# Patient Record
Sex: FEMALE | Race: WHITE | NOT HISPANIC OR LATINO | Employment: UNEMPLOYED | URBAN - METROPOLITAN AREA
[De-identification: names, ages, dates, MRNs, and addresses within clinical notes are randomized per-mention and may not be internally consistent; named-entity substitution may affect disease eponyms.]

---

## 2023-07-19 ENCOUNTER — HOSPITAL ENCOUNTER (EMERGENCY)
Facility: HOSPITAL | Age: 58
Discharge: HOME | End: 2023-07-19
Attending: EMERGENCY MEDICINE
Payer: MEDICAID

## 2023-07-19 VITALS
TEMPERATURE: 98 F | HEART RATE: 88 BPM | HEIGHT: 60 IN | RESPIRATION RATE: 18 BRPM | DIASTOLIC BLOOD PRESSURE: 75 MMHG | WEIGHT: 119.25 LBS | OXYGEN SATURATION: 95 % | BODY MASS INDEX: 23.41 KG/M2 | SYSTOLIC BLOOD PRESSURE: 160 MMHG

## 2023-07-19 DIAGNOSIS — K70.31 ALCOHOLIC CIRRHOSIS OF LIVER WITH ASCITES: Primary | ICD-10-CM

## 2023-07-19 DIAGNOSIS — D61.818 PANCYTOPENIA: ICD-10-CM

## 2023-07-19 DIAGNOSIS — F10.10 ALCOHOL ABUSE: ICD-10-CM

## 2023-07-19 DIAGNOSIS — R60.1 ANASARCA: ICD-10-CM

## 2023-07-19 LAB
ALBUMIN SERPL BCP-MCNC: 2.5 G/DL (ref 3.5–5.2)
ALP SERPL-CCNC: 158 U/L (ref 55–135)
ALT SERPL W/O P-5'-P-CCNC: 35 U/L (ref 10–44)
AMPHET+METHAMPHET UR QL: NEGATIVE
ANION GAP SERPL CALC-SCNC: 12 MMOL/L (ref 8–16)
APPEARANCE FLD: NORMAL
APTT PPP: 33.4 SEC (ref 21–32)
AST SERPL-CCNC: 110 U/L (ref 10–40)
BARBITURATES UR QL SCN>200 NG/ML: NEGATIVE
BASOPHILS # BLD AUTO: 0.09 K/UL (ref 0–0.2)
BASOPHILS NFR BLD: 3 % (ref 0–1.9)
BENZODIAZ UR QL SCN>200 NG/ML: NEGATIVE
BILIRUB SERPL-MCNC: 6.1 MG/DL (ref 0.1–1)
BILIRUB UR QL STRIP: NEGATIVE
BNP SERPL-MCNC: 359 PG/ML (ref 0–99)
BODY FLD TYPE: NORMAL
BUN SERPL-MCNC: 7 MG/DL (ref 6–20)
BZE UR QL SCN: NEGATIVE
CALCIUM SERPL-MCNC: 8.3 MG/DL (ref 8.7–10.5)
CANNABINOIDS UR QL SCN: NEGATIVE
CHLORIDE SERPL-SCNC: 110 MMOL/L (ref 95–110)
CLARITY UR: CLEAR
CO2 SERPL-SCNC: 20 MMOL/L (ref 23–29)
COLOR FLD: YELLOW
COLOR UR: YELLOW
CREAT SERPL-MCNC: 0.6 MG/DL (ref 0.5–1.4)
CREAT UR-MCNC: 17.4 MG/DL (ref 15–325)
DIFFERENTIAL METHOD: ABNORMAL
EOSINOPHIL # BLD AUTO: 0.1 K/UL (ref 0–0.5)
EOSINOPHIL NFR BLD: 1.7 % (ref 0–8)
ERYTHROCYTE [DISTWIDTH] IN BLOOD BY AUTOMATED COUNT: 15.1 % (ref 11.5–14.5)
EST. GFR  (NO RACE VARIABLE): >60 ML/MIN/1.73 M^2
ETHANOL SERPL-MCNC: 166 MG/DL
GLUCOSE SERPL-MCNC: 100 MG/DL (ref 70–110)
GLUCOSE UR QL STRIP: NEGATIVE
HCT VFR BLD AUTO: 27.6 % (ref 37–48.5)
HCV AB SERPL QL IA: NEGATIVE
HEP C VIRUS HOLD SPECIMEN: NORMAL
HGB BLD-MCNC: 9.2 G/DL (ref 12–16)
HGB UR QL STRIP: NEGATIVE
HIV 1+2 AB+HIV1 P24 AG SERPL QL IA: NEGATIVE
IMM GRANULOCYTES # BLD AUTO: 0.02 K/UL (ref 0–0.04)
IMM GRANULOCYTES NFR BLD AUTO: 0.7 % (ref 0–0.5)
INR PPP: 1.6 (ref 0.8–1.2)
KETONES UR QL STRIP: NEGATIVE
LEUKOCYTE ESTERASE UR QL STRIP: NEGATIVE
LIPASE SERPL-CCNC: 120 U/L (ref 4–60)
LYMPHOCYTES # BLD AUTO: 0.8 K/UL (ref 1–4.8)
LYMPHOCYTES NFR BLD: 27.4 % (ref 18–48)
LYMPHOCYTES NFR FLD MANUAL: 39 %
MAGNESIUM SERPL-MCNC: 1.6 MG/DL (ref 1.6–2.6)
MCH RBC QN AUTO: 35.7 PG (ref 27–31)
MCHC RBC AUTO-ENTMCNC: 33.3 G/DL (ref 32–36)
MCV RBC AUTO: 107 FL (ref 82–98)
MESOTHL CELL NFR FLD MANUAL: 25 %
METHADONE UR QL SCN>300 NG/ML: NEGATIVE
MONOCYTES # BLD AUTO: 0.5 K/UL (ref 0.3–1)
MONOCYTES NFR BLD: 17.7 % (ref 4–15)
MONOS+MACROS NFR FLD MANUAL: 30 %
NEUTROPHILS # BLD AUTO: 1.5 K/UL (ref 1.8–7.7)
NEUTROPHILS NFR BLD: 49.5 % (ref 38–73)
NEUTROPHILS NFR FLD MANUAL: 6 %
NITRITE UR QL STRIP: NEGATIVE
NRBC BLD-RTO: 0 /100 WBC
OPIATES UR QL SCN: ABNORMAL
PCP UR QL SCN>25 NG/ML: NEGATIVE
PH UR STRIP: 6 [PH] (ref 5–8)
PLATELET # BLD AUTO: 58 K/UL (ref 150–450)
PMV BLD AUTO: 11.3 FL (ref 9.2–12.9)
POTASSIUM SERPL-SCNC: 3.9 MMOL/L (ref 3.5–5.1)
PROT SERPL-MCNC: 6.9 G/DL (ref 6–8.4)
PROT UR QL STRIP: NEGATIVE
PROTHROMBIN TIME: 16.4 SEC (ref 9–12.5)
RBC # BLD AUTO: 2.58 M/UL (ref 4–5.4)
SODIUM SERPL-SCNC: 142 MMOL/L (ref 136–145)
SP GR UR STRIP: 1.01 (ref 1–1.03)
TOXICOLOGY INFORMATION: ABNORMAL
URN SPEC COLLECT METH UR: NORMAL
UROBILINOGEN UR STRIP-ACNC: NEGATIVE EU/DL
WBC # BLD AUTO: 2.99 K/UL (ref 3.9–12.7)
WBC # FLD: 160 /CU MM

## 2023-07-19 PROCEDURE — 89051 BODY FLUID CELL COUNT: CPT | Performed by: EMERGENCY MEDICINE

## 2023-07-19 PROCEDURE — 82077 ASSAY SPEC XCP UR&BREATH IA: CPT | Performed by: EMERGENCY MEDICINE

## 2023-07-19 PROCEDURE — 82042 OTHER SOURCE ALBUMIN QUAN EA: CPT | Performed by: EMERGENCY MEDICINE

## 2023-07-19 PROCEDURE — 25500020 PHARM REV CODE 255: Performed by: EMERGENCY MEDICINE

## 2023-07-19 PROCEDURE — 85610 PROTHROMBIN TIME: CPT | Performed by: EMERGENCY MEDICINE

## 2023-07-19 PROCEDURE — 80053 COMPREHEN METABOLIC PANEL: CPT | Performed by: EMERGENCY MEDICINE

## 2023-07-19 PROCEDURE — 86803 HEPATITIS C AB TEST: CPT | Performed by: EMERGENCY MEDICINE

## 2023-07-19 PROCEDURE — 83615 LACTATE (LD) (LDH) ENZYME: CPT | Performed by: EMERGENCY MEDICINE

## 2023-07-19 PROCEDURE — 83880 ASSAY OF NATRIURETIC PEPTIDE: CPT | Performed by: EMERGENCY MEDICINE

## 2023-07-19 PROCEDURE — 96375 TX/PRO/DX INJ NEW DRUG ADDON: CPT | Mod: 59

## 2023-07-19 PROCEDURE — 80307 DRUG TEST PRSMV CHEM ANLYZR: CPT | Performed by: EMERGENCY MEDICINE

## 2023-07-19 PROCEDURE — 96374 THER/PROPH/DIAG INJ IV PUSH: CPT

## 2023-07-19 PROCEDURE — 87389 HIV-1 AG W/HIV-1&-2 AB AG IA: CPT | Performed by: EMERGENCY MEDICINE

## 2023-07-19 PROCEDURE — 87205 SMEAR GRAM STAIN: CPT | Performed by: EMERGENCY MEDICINE

## 2023-07-19 PROCEDURE — 99291 CRITICAL CARE FIRST HOUR: CPT

## 2023-07-19 PROCEDURE — 83735 ASSAY OF MAGNESIUM: CPT | Performed by: EMERGENCY MEDICINE

## 2023-07-19 PROCEDURE — 82945 GLUCOSE OTHER FLUID: CPT | Performed by: EMERGENCY MEDICINE

## 2023-07-19 PROCEDURE — 85730 THROMBOPLASTIN TIME PARTIAL: CPT | Performed by: EMERGENCY MEDICINE

## 2023-07-19 PROCEDURE — 81003 URINALYSIS AUTO W/O SCOPE: CPT | Mod: 59 | Performed by: EMERGENCY MEDICINE

## 2023-07-19 PROCEDURE — 83690 ASSAY OF LIPASE: CPT | Performed by: EMERGENCY MEDICINE

## 2023-07-19 PROCEDURE — 85025 COMPLETE CBC W/AUTO DIFF WBC: CPT | Performed by: EMERGENCY MEDICINE

## 2023-07-19 PROCEDURE — 87070 CULTURE OTHR SPECIMN AEROBIC: CPT | Performed by: EMERGENCY MEDICINE

## 2023-07-19 PROCEDURE — 63600175 PHARM REV CODE 636 W HCPCS: Performed by: EMERGENCY MEDICINE

## 2023-07-19 RX ORDER — FUROSEMIDE 40 MG/1
40 TABLET ORAL DAILY
Qty: 30 TABLET | Refills: 0 | Status: SHIPPED | OUTPATIENT
Start: 2023-07-19 | End: 2023-09-06 | Stop reason: SDUPTHER

## 2023-07-19 RX ORDER — MORPHINE SULFATE 4 MG/ML
4 INJECTION, SOLUTION INTRAMUSCULAR; INTRAVENOUS
Status: COMPLETED | OUTPATIENT
Start: 2023-07-19 | End: 2023-07-19

## 2023-07-19 RX ORDER — SPIRONOLACTONE 50 MG/1
50 TABLET, FILM COATED ORAL DAILY
Qty: 30 TABLET | Refills: 0 | Status: SHIPPED | OUTPATIENT
Start: 2023-07-19 | End: 2023-09-06 | Stop reason: DRUGHIGH

## 2023-07-19 RX ORDER — FUROSEMIDE 10 MG/ML
40 INJECTION INTRAMUSCULAR; INTRAVENOUS
Status: COMPLETED | OUTPATIENT
Start: 2023-07-19 | End: 2023-07-19

## 2023-07-19 RX ORDER — ONDANSETRON 2 MG/ML
4 INJECTION INTRAMUSCULAR; INTRAVENOUS
Status: COMPLETED | OUTPATIENT
Start: 2023-07-19 | End: 2023-07-19

## 2023-07-19 RX ADMIN — ONDANSETRON 4 MG: 2 INJECTION INTRAMUSCULAR; INTRAVENOUS at 11:07

## 2023-07-19 RX ADMIN — FUROSEMIDE 40 MG: 10 INJECTION, SOLUTION INTRAMUSCULAR; INTRAVENOUS at 12:07

## 2023-07-19 RX ADMIN — IOHEXOL 100 ML: 350 INJECTION, SOLUTION INTRAVENOUS at 12:07

## 2023-07-19 RX ADMIN — MORPHINE SULFATE 4 MG: 4 INJECTION INTRAVENOUS at 11:07

## 2023-07-19 NOTE — OP NOTE
Pre Op Diagnosis: ascites     Post Op Diagnosis: same    Procedure:  para     Procedure performed by: Mason LINARES, Presley TRINIDAD     Written Informed Consent Obtained: Yes     Specimen Removed:  yes     Estimated Blood Loss:  minimal     Findings: Local anesthesia and moderate sedation were used.     The patient tolerated the procedure well and there were no complications.      Disposition:  F/U in clinic    Discharge instructions:  Light activity for 24 hours.  Remove band aid in 24 hours.  No baths (showers are appropriate).    F/U with ordering physician    Sterile technique was performed in the lower abdomen, lidocaine was used as a local anesthetic.   4 ccs of cynthia fluid removed for diagnostic purposes.  Pt tolerated the procedure well without immediate complications.  Please see radiologist report for details. F/u with PCP and/or ordering physician.

## 2023-07-19 NOTE — ED PROVIDER NOTES
"SCRIBE #1 NOTE: I, Zofia Brain, am scribing for, and in the presence of, Una Baker MD. I have scribed the entire note.      History      Chief Complaint   Patient presents with    Abdominal Pain     Pt reports abdominal pain for 1.5 days, denies N/V, + loose stools, pt states "it feels like my intestines are twisting"       Review of patient's allergies indicates:  No Known Allergies     HPI   HPI    7/19/2023, 11:02 AM   History obtained from the patient      History of Present Illness: Mara Mojica is a 57 y.o. female patient with a PMHx of liver cirrhosis and a PSHx of hernia repair surgery who presents to the Emergency Department for abdominal pain which onset 1.5 days ago. She also reports that she is currently on amoxicillin and has been having diarrhea since starting amoxicillin. Symptoms are constant and moderate in severity. No mitigating or exacerbating factors reported. Associated sxs include diarrhea, BUE swelling, and bilateral leg swelling. Patient denies any nausea, vomiting, fever, chills, CP, SOB, weakness, and all other sxs at this time. No prior Tx reported. Pt reports that she last drank ETOH yesterday. She also takes furosemide PRN. No further complaints or concerns at this time.         Arrival mode: EMS    PCP: No primary care provider on file.       Past Medical History:  Past Medical History:   Diagnosis Date    Alcoholic cirrhosis of liver     Kidney failure     Unilateral inguinal hernia, without obstruction or gangrene, not specified as recurrent        Past Surgical History:  Past Surgical History:   Procedure Laterality Date    HERNIA REPAIR           Family History:  No family history on file.    Social History:  Social History     Tobacco Use    Smoking status: Not on file    Smokeless tobacco: Not on file   Substance and Sexual Activity    Alcohol use: Yes    Drug use: Not on file    Sexual activity: Not on file       ROS   Review of Systems   Constitutional:  Negative " for chills and fever.   HENT:  Negative for sore throat.    Respiratory:  Negative for shortness of breath.    Cardiovascular:  Positive for leg swelling (bilateral). Negative for chest pain.   Gastrointestinal:  Positive for abdominal pain and diarrhea. Negative for nausea and vomiting.   Genitourinary:  Negative for dysuria.   Musculoskeletal:  Negative for back pain.        (+) BUE swelling   Skin:  Negative for rash.   Neurological:  Negative for weakness.   Hematological:  Does not bruise/bleed easily.   All other systems reviewed and are negative.    Physical Exam      Initial Vitals [07/19/23 1053]   BP Pulse Resp Temp SpO2   (!) 149/75 107 18 97.7 °F (36.5 °C) 95 %      MAP       --          Physical Exam  Nursing Notes and Vital Signs Reviewed.  Constitutional: Patient is in no acute distress. Pt appears older than stated age. Chronically ill appearing.   Head: Atraumatic. Normocephalic.  Eyes: PERRL. EOM intact. Conjunctivae are not pale. Scleral  icterus noted.   ENT: Mucous membranes are moist. Oropharynx is clear and symmetric.    Mouth: Poor dentition.  Neck: Supple. Full ROM. No lymphadenopathy.  Cardiovascular: Regular rate. Regular rhythm. No murmurs, rubs, or gallops. Distal pulses are 2+ and symmetric.  Pulmonary/Chest: No respiratory distress. Clear to auscultation bilaterally. No wheezing or rales.  Abdominal: Soft. There is an umbilical hernia that is easily reducible. Pt reports tenderness to the area of the umbilical hernia.  No rebound, guarding, or rigidity.   Genitourinary: No CVA tenderness  Musculoskeletal: Moves all extremities. No obvious deformities. 3+ edema to the legs bilaterally. There is soft tissue swelling to the hands and arms bilaterally.  Skin: Jaundice.  Neurological:  Alert, awake, and appropriate.  Normal speech.  No acute focal neurological deficits are appreciated.  Psychiatric: Normal affect. Good eye contact. Appropriate in content.    ED Course    Critical  Care    Date/Time: 7/19/2023 11:12 AM  Performed by: Una Baker MD  Authorized by: Una Baker MD   Direct patient critical care time: 25 minutes  Additional history critical care time: 8 minutes  Ordering / reviewing critical care time: 10 minutes  Documentation critical care time: 5 minutes  Total critical care time (exclusive of procedural time) : 48 minutes  Critical care was necessary to treat or prevent imminent or life-threatening deterioration of the following conditions: hepatic failure and dehydration (liver failure).  Critical care was time spent personally by me on the following activities: blood draw for specimens, development of treatment plan with patient or surrogate, interpretation of cardiac output measurements, evaluation of patient's response to treatment, examination of patient, obtaining history from patient or surrogate, ordering and performing treatments and interventions, ordering and review of laboratory studies, ordering and review of radiographic studies, pulse oximetry, re-evaluation of patient's condition and review of old charts.      ED Vital Signs:  Vitals:    07/19/23 1053 07/19/23 1128 07/19/23 1200 07/19/23 1330   BP: (!) 149/75  (!) 167/74 (!) 167/85   Pulse: 107  94 74   Resp: 18 19 18 18   Temp: 97.7 °F (36.5 °C)      TempSrc: Oral      SpO2: 95%  95% 97%   Weight: 54.1 kg (119 lb 4.3 oz)      Height: 5' (1.524 m)       07/19/23 1400 07/19/23 1637   BP: (!) 167/80 (!) 160/75   Pulse: 87 88   Resp: 18 18   Temp:  97.7 °F (36.5 °C)   TempSrc:  Oral   SpO2: 95%    Weight:     Height:         Abnormal Lab Results:  Labs Reviewed   CBC W/ AUTO DIFFERENTIAL - Abnormal; Notable for the following components:       Result Value    WBC 2.99 (*)     RBC 2.58 (*)     Hemoglobin 9.2 (*)     Hematocrit 27.6 (*)      (*)     MCH 35.7 (*)     RDW 15.1 (*)     Platelets 58 (*)     Immature Granulocytes 0.7 (*)     Gran # (ANC) 1.5 (*)     Lymph # 0.8 (*)     Mono % 17.7 (*)      Basophil % 3.0 (*)     All other components within normal limits    Narrative:     Release to patient->Immediate   COMPREHENSIVE METABOLIC PANEL - Abnormal; Notable for the following components:    CO2 20 (*)     Calcium 8.3 (*)     Albumin 2.5 (*)     Total Bilirubin 6.1 (*)     Alkaline Phosphatase 158 (*)      (*)     All other components within normal limits    Narrative:     Release to patient->Immediate   PROTIME-INR - Abnormal; Notable for the following components:    Prothrombin Time 16.4 (*)     INR 1.6 (*)     All other components within normal limits    Narrative:     Release to patient->Immediate   APTT - Abnormal; Notable for the following components:    aPTT 33.4 (*)     All other components within normal limits    Narrative:     Release to patient->Immediate   ALCOHOL,MEDICAL (ETHANOL) - Abnormal; Notable for the following components:    Alcohol, Serum 166 (*)     All other components within normal limits    Narrative:     Release to patient->Immediate   LIPASE - Abnormal; Notable for the following components:    Lipase 120 (*)     All other components within normal limits    Narrative:     Release to patient->Immediate   B-TYPE NATRIURETIC PEPTIDE - Abnormal; Notable for the following components:     (*)     All other components within normal limits    Narrative:     Release to patient->Immediate   DRUG SCREEN PANEL, URINE EMERGENCY - Abnormal; Notable for the following components:    Opiate Scrn, Ur Presumptive Positive (*)     All other components within normal limits    Narrative:     Specimen Source->Urine   CULTURE, FLUID  (AEROBIC) WITH GRAM STAIN   HIV 1 / 2 ANTIBODY    Narrative:     Release to patient->Immediate   HEPATITIS C ANTIBODY    Narrative:     Release to patient->Immediate   HEP C VIRUS HOLD SPECIMEN    Narrative:     Release to patient->Immediate   MAGNESIUM    Narrative:     Release to patient->Immediate   URINALYSIS, REFLEX TO URINE CULTURE    Narrative:      Specimen Source->Urine   WBC & DIFF, BODY FLUID   GLUCOSE, PERITONEAL, PLEURAL FLUID OR KENZIE DRAINAGE, IN-HOUSE   ALBUMIN, PERITONEAL, PLEURAL FLUID OR KENZIE DRAINAGE, IN-HOUSE   PATHOLOGIST REVIEW, BODY FLUID   LDH, BODY FLUID (REFERENCE LAB OR NON-[facility])   GLUCOSE, BODY FLUID   ALBUMIN, BODY FLUID        All Lab Results:  Results for orders placed or performed during the hospital encounter of 07/19/23   Culture, Body Fluid (Aerobic) w/ GS    Specimen: Ascites   Result Value Ref Range    Gram Stain Result Few WBC's     Gram Stain Result No organisms seen    HIV 1/2 Ag/Ab (4th Gen)   Result Value Ref Range    HIV 1/2 Ag/Ab Negative Negative   Hepatitis C Antibody   Result Value Ref Range    Hepatitis C Ab Negative Negative   HCV Virus Hold Specimen   Result Value Ref Range    HEP C Virus Hold Specimen Hold for HCV sendout    CBC auto differential   Result Value Ref Range    WBC 2.99 (L) 3.90 - 12.70 K/uL    RBC 2.58 (L) 4.00 - 5.40 M/uL    Hemoglobin 9.2 (L) 12.0 - 16.0 g/dL    Hematocrit 27.6 (L) 37.0 - 48.5 %     (H) 82 - 98 fL    MCH 35.7 (H) 27.0 - 31.0 pg    MCHC 33.3 32.0 - 36.0 g/dL    RDW 15.1 (H) 11.5 - 14.5 %    Platelets 58 (L) 150 - 450 K/uL    MPV 11.3 9.2 - 12.9 fL    Immature Granulocytes 0.7 (H) 0.0 - 0.5 %    Gran # (ANC) 1.5 (L) 1.8 - 7.7 K/uL    Immature Grans (Abs) 0.02 0.00 - 0.04 K/uL    Lymph # 0.8 (L) 1.0 - 4.8 K/uL    Mono # 0.5 0.3 - 1.0 K/uL    Eos # 0.1 0.0 - 0.5 K/uL    Baso # 0.09 0.00 - 0.20 K/uL    nRBC 0 0 /100 WBC    Gran % 49.5 38.0 - 73.0 %    Lymph % 27.4 18.0 - 48.0 %    Mono % 17.7 (H) 4.0 - 15.0 %    Eosinophil % 1.7 0.0 - 8.0 %    Basophil % 3.0 (H) 0.0 - 1.9 %    Differential Method Automated    Comprehensive metabolic panel   Result Value Ref Range    Sodium 142 136 - 145 mmol/L    Potassium 3.9 3.5 - 5.1 mmol/L    Chloride 110 95 - 110 mmol/L    CO2 20 (L) 23 - 29 mmol/L    Glucose 100 70 - 110 mg/dL    BUN 7 6 - 20 mg/dL    Creatinine 0.6 0.5 - 1.4 mg/dL    Calcium  8.3 (L) 8.7 - 10.5 mg/dL    Total Protein 6.9 6.0 - 8.4 g/dL    Albumin 2.5 (L) 3.5 - 5.2 g/dL    Total Bilirubin 6.1 (H) 0.1 - 1.0 mg/dL    Alkaline Phosphatase 158 (H) 55 - 135 U/L     (H) 10 - 40 U/L    ALT 35 10 - 44 U/L    eGFR >60 >60 mL/min/1.73 m^2    Anion Gap 12 8 - 16 mmol/L   Protime-INR   Result Value Ref Range    Prothrombin Time 16.4 (H) 9.0 - 12.5 sec    INR 1.6 (H) 0.8 - 1.2   APTT   Result Value Ref Range    aPTT 33.4 (H) 21.0 - 32.0 sec   Ethanol   Result Value Ref Range    Alcohol, Serum 166 (H) <10 mg/dL   Lipase   Result Value Ref Range    Lipase 120 (H) 4 - 60 U/L   Magnesium   Result Value Ref Range    Magnesium 1.6 1.6 - 2.6 mg/dL   Brain natriuretic peptide   Result Value Ref Range     (H) 0 - 99 pg/mL   Urinalysis, Reflex to Urine Culture Urine, Clean Catch    Specimen: Urine   Result Value Ref Range    Specimen UA Urine, Clean Catch     Color, UA Yellow Yellow, Straw, Sarina    Appearance, UA Clear Clear    pH, UA 6.0 5.0 - 8.0    Specific Gravity, UA 1.015 1.005 - 1.030    Protein, UA Negative Negative    Glucose, UA Negative Negative    Ketones, UA Negative Negative    Bilirubin (UA) Negative Negative    Occult Blood UA Negative Negative    Nitrite, UA Negative Negative    Urobilinogen, UA Negative <2.0 EU/dL    Leukocytes, UA Negative Negative   Drug screen panel, emergency   Result Value Ref Range    Benzodiazepines Negative Negative    Methadone metabolites Negative Negative    Cocaine (Metab.) Negative Negative    Opiate Scrn, Ur Presumptive Positive (A) Negative    Barbiturate Screen, Ur Negative Negative    Amphetamine Screen, Ur Negative Negative    THC Negative Negative    Phencyclidine Negative Negative    Creatinine, Urine 17.4 15.0 - 325.0 mg/dL    Toxicology Information SEE COMMENT    WBC & Diff,Body Fluid Ascites   Result Value Ref Range    Body Fluid Type Ascites     Fluid Appearance Hazy     Fluid Color Yellow     WBC, Body Fluid 160 /cu mm    Segs, Fluid 6 %     Lymphs, Fluid 39 %    Monocytes/Macrophages, Fluid 30 %    Mesothelial Cells, Fluid 25 %   Glucose, Peritoneal, Pleural Fluid or KENZIE Drainage, In-House   Result Value Ref Range    Body Fluid Source, Glucose Ascites     Glucose, Fluid 107 Not established mg/dL   Albumin, Peritoneal, Pleural Fluid or KENZIE Drainage, In-House   Result Value Ref Range    Body Fluid Source, Albumin Ascites     Body Fluid, Albumin 0.5 See text g/dL   Pathologist Review of Laboratory Test   Result Value Ref Range    Pathologist Review, Body Fluid REVIEWED          Imaging Results:  Imaging Results              US Guided Paracentesis (Final result)  Result time 07/19/23 15:57:59      Final result by Romulo Bartholomew MD (07/19/23 15:57:59)                   Impression:      Technically successful paracentesis with removal of 4 mL for diagnostics.      Electronically signed by: Romulo Bartholomew  Date:    07/19/2023  Time:    15:57               Narrative:    EXAMINATION:  US GUIDED PARACENTESIS INC IMAGING    CLINICAL HISTORY:  ascites;  abdominal pain    TECHNIQUE:  Operators: Procedure performed by AMOS Sherwood under the direct supervision of Romulo Bartholomew MD.  And Romulo Bartholomew MD.    Written and verbal informed consent was obtained.  Targeted ultrasound was performed and the skin was marked superficial to a safe pocket of abdominal ascites.  The area was prepped and draped in the usual sterile fashion.  A time-out was performed.  Maximal sterile technique, sterile gloves, sterile gel, and sterile ultrasound cover were utilized.  Lidocaine was instilled for local anesthesia.  5 [place] catheter was inserted into the right lower quadrant.  Challenging access.  Approximately 4 mL of cloudy ascites was withdrawn. The catheter was removed.  Local hemostasis was achieved.  A sterile dressing was applied. The patient tolerated the procedure well.    FINDINGS:  Prominent ascites.                                       CT Abdomen Pelvis With  Contrast (Final result)  Result time 07/19/23 13:19:56      Final result by Paco Lara III, MD (07/19/23 13:19:56)                   Impression:      Small bilateral pleural effusions along with bibasilar airspace opacities, suggesting atelectasis or pneumonia.    Findings compatible with cirrhosis and portal hypertension.  Prominent portal collaterals as discussed.  Small gastroesophageal varices.    Anasarca.    Distended gallbladder.    Moderate to large ascites.    Thickened loops of proximal jejunum which may be secondary to nonspecific enteritis or portal hypertension.  There is also some wall thickening involving the right colon suggesting infectious or inflammatory colitis.      Electronically signed by: Reymundo Lara  Date:    07/19/2023  Time:    13:19               Narrative:    EXAMINATION:  CT ABDOMEN PELVIS WITH CONTRAST    CLINICAL HISTORY:  Bowel obstruction suspected;    TECHNIQUE:  Low dose axial images, sagittal and coronal reformations were obtained from the lung bases to the pubic symphysis following the IV administration of 100 mL of Omnipaque 350    COMPARISON:  None.    FINDINGS:  There are small bilateral pleural effusions.  Airspace opacities at the lung bases bilaterally suggesting atelectasis or pneumonia.    The liver is small and has a nodular contour compatible with cirrhosis.  Enlargement of the caudate lobe.  No visualized hepatic mass or biliary dilatation.  Moderate to large amount of ascites most prominent within the right upper quadrant and pelvis.  Increased attenuation within the subcutaneous fat compatible with anasarca.  This is noted diffusely.    Prominent collateral vessel arising from the portal system extending cephalad into the gastrohepatic ligament.  Small esophageal varices are noted.    The spleen is enlarged measuring 13 cm.  No splenic mass lesions.  The splenic vein and portal vein are patent.  The portal vein is not enlarged.  The gallbladder is  distended.  No visualized calcified stones.  The adrenal glands and kidneys are normal.    Calcification within the aorta and its branches.  No aortic aneurysm.  No free air or adenopathy.    Umbilical hernia containing fat.  The pancreas is unremarkable.    Wall thickening involving the proximal jejunum.  The appendix is normal.  The uterus and bladder are within normal limits.  The ovaries are age-appropriate.  There is wall thickening involving the ascending colon up to the hepatic flexure.  Surgical anastomosis is noted within the jejunum, anteriorly, at the midline.                                              The Emergency Provider reviewed the vital signs and test results, which are outlined above.    ED Discussion     3:46 PM: Reassessed pt at this time.   Discussed with pt all pertinent ED information and results. Discussed pt dx and plan of tx. Gave pt all f/u and return to the ED instructions. All questions and concerns were addressed at this time. Pt expresses understanding of information and instructions, and is comfortable with plan to discharge. Pt is stable for discharge.    I discussed with patient and/or family/caretaker that evaluation in the ED does not suggest any emergent or life threatening medical conditions requiring immediate intervention beyond what was provided in the ED, and I believe patient is safe for discharge.  Regardless, an unremarkable evaluation in the ED does not preclude the development or presence of a serious of life threatening condition. As such, patient was instructed to return immediately for any worsening or change in current symptoms.           ED Medication(s):  Medications   morphine injection 4 mg (4 mg Intravenous Given 7/19/23 1128)   ondansetron injection 4 mg (4 mg Intravenous Given 7/19/23 1128)   furosemide injection 40 mg (40 mg Intravenous Given 7/19/23 1224)   iohexoL (OMNIPAQUE 350) injection 100 mL (100 mLs Intravenous Given 7/19/23 1250)        Follow-up  Information       [facility]. Schedule an appointment as soon as possible for a visit in 3 days.    Specialty: Hepatology  Why: Return to the Emergency Room, If symptoms worsen  Contact information:  [address]  [facility] [id number] [phone]                           Discharge Medication List as of 7/19/2023  3:42 PM        START taking these medications    Details   furosemide (LASIX) 40 MG tablet Take 1 tablet (40 mg total) by mouth once daily., Starting Wed 7/19/2023, Until Thu 7/18/2024, Print      spironolactone (ALDACTONE) 50 MG tablet Take 1 tablet (50 mg total) by mouth once daily., Starting Wed 7/19/2023, Until Thu 7/18/2024, Print              Medication List        START taking these medications      furosemide 40 MG tablet  Commonly known as: LASIX  Take 1 tablet (40 mg total) by mouth once daily.     spironolactone 50 MG tablet  Commonly known as: ALDACTONE  Take 1 tablet (50 mg total) by mouth once daily.               Where to Get Your Medications        You can get these medications from any pharmacy    Bring a paper prescription for each of these medications  furosemide 40 MG tablet  spironolactone 50 MG tablet           Medical Decision Making    Medical Decision Making:   Differential Diagnosis:   1. Decompensated cirrhosis  2. Infection  3. SBP    Clinical Tests:   Lab Tests: Ordered and Reviewed  Radiological Study: Ordered and Reviewed  ED Management:  Hx of alcoholic cirrhosis, recently moved here from [place], not under the care of a physician here, continues to drink presents with concerns for swelling all over, generalized abdominal pain, patient noted to be volume overload, jaundiced, lab work reviewed and pancytopenia noted, kidney function normal, BNP elevated, urinalysis normal, inr 1.4, lab work overall consistent with decompensated cirrhosis but still actively drinking, CT abdomen reviewed and moderate to large amt ascites noted otherwise stable,  patient underwent diagnostic and therapeutic paracentesis, no evidence of SBP, no indication patient needs admission, nothing to suggest infection or gi bleed, will start on diuretics, drinking cessation encouraged, referral to hepatology placed.          Scribe Attestation:   Scribe #1: I performed the above scribed service and the documentation accurately describes the services I performed. I attest to the accuracy of the note.    Attending:   Physician Attestation Statement for Scribe #1: I, Una Baker MD, personally performed the services described in this documentation, as scribed by Zofia De La Paz, in my presence, and it is both accurate and complete.          Clinical Impression       ICD-10-CM ICD-9-CM   1. Alcoholic cirrhosis of liver with ascites  K70.31 571.2   2. Alcohol abuse  F10.10 305.00   3. Pancytopenia  D61.818 284.19   4. Anasarca  R60.1 782.3       Disposition:   Disposition: Discharged  Condition: Stable       Una Baker MD  07/20/23 1118

## 2023-07-20 LAB
ALBUMIN FLD-MCNC: 0.5 G/DL
GLUCOSE FLD-MCNC: 107 MG/DL
PATH INTERP FLD-IMP: NORMAL
SPECIMEN SOURCE: NORMAL
SPECIMEN SOURCE: NORMAL

## 2023-07-21 LAB — LACTATE DEHYDROGENASE FLUID: 111 U/L

## 2023-07-24 LAB
BACTERIA FLD AEROBE CULT: NO GROWTH
GRAM STN SPEC: NORMAL
GRAM STN SPEC: NORMAL

## 2023-08-22 RX ORDER — SPIRONOLACTONE 50 MG/1
50 TABLET, FILM COATED ORAL DAILY
Qty: 30 TABLET | Refills: 0 | OUTPATIENT
Start: 2023-08-22 | End: 2024-08-21

## 2023-08-22 RX ORDER — FUROSEMIDE 40 MG/1
40 TABLET ORAL DAILY
Qty: 30 TABLET | Refills: 0 | OUTPATIENT
Start: 2023-08-22 | End: 2024-08-21

## 2023-08-28 ENCOUNTER — TELEPHONE (OUTPATIENT)
Dept: HEPATOLOGY | Facility: CLINIC | Age: 58
End: 2023-08-28

## 2023-08-28 NOTE — TELEPHONE ENCOUNTER
----- Message from Cal Ramirez sent at 8/28/2023  9:05 AM CDT -----  Contact: [phone]  Type:  Patient Returning Call    Who Called:Mara   Who Left Message for Patient:lion   Does the patient know what this is regarding?:yes   Would the patient rather a call back or a response via [facility]? Call back   Best Call Back Number:[phone]  Additional Information: n/a      Thanks KB

## 2023-08-28 NOTE — TELEPHONE ENCOUNTER
Attempted to contact patient at [phone] with no answer. Left voicemail message for patient to return call.

## 2023-08-28 NOTE — TELEPHONE ENCOUNTER
----- Message from Lily Randolph RN sent at 8/25/2023  2:47 PM CDT -----  Contact: [phone]    ----- Message -----  From: Shannan Reardon MD  Sent: 8/25/2023   1:33 PM CDT  To: Lily Randolph RN; Alexys ERICKSON Staff    Yes please schedule at [facility]. Thanks  ----- Message -----  From: Lily Randolph RN  Sent: 8/25/2023  11:05 AM CDT  To: Shannan Reardon MD; Alexys ERICKSON Staff    Dr. Reardon- This is a patient seen in the ER back in July with a history of cirrhosis. Her calculated MELD was 22. She is a Medicaid payor. She is requesting a ER follow-up. Given her MELD, would you like her scheduled within [facility]?    ----- Message -----  From: Caitie Kessler  Sent: 8/25/2023  10:16 AM CDT  To: [facility] Clinical Support Staff; #    Type:  Sooner Apoointment Request    Caller is requesting a sooner appointment.  Caller declined first available appointment listed below.  Caller will not accept being placed on the waitlist and is requesting a message be sent to doctor.  Name of Caller:Mara   When is the first available appointment?03/04/2024  Symptoms:E.R f/u  Would the patient rather a call back or a response via [facility]? Call back   Best Call Back Number:[phone]  Additional Information: pt is needing medication

## 2023-09-06 ENCOUNTER — LAB VISIT (OUTPATIENT)
Dept: LAB | Facility: HOSPITAL | Age: 58
End: 2023-09-06
Attending: INTERNAL MEDICINE
Payer: MEDICAID

## 2023-09-06 ENCOUNTER — OFFICE VISIT (OUTPATIENT)
Dept: HEPATOLOGY | Facility: CLINIC | Age: 58
End: 2023-09-06
Payer: MEDICAID

## 2023-09-06 VITALS
HEART RATE: 98 BPM | HEIGHT: 59 IN | BODY MASS INDEX: 25.69 KG/M2 | SYSTOLIC BLOOD PRESSURE: 169 MMHG | WEIGHT: 127.44 LBS | DIASTOLIC BLOOD PRESSURE: 76 MMHG

## 2023-09-06 DIAGNOSIS — R18.8 OTHER ASCITES: ICD-10-CM

## 2023-09-06 DIAGNOSIS — R25.1 TREMORS OF NERVOUS SYSTEM: ICD-10-CM

## 2023-09-06 DIAGNOSIS — K70.31 ALCOHOLIC CIRRHOSIS OF LIVER WITH ASCITES: ICD-10-CM

## 2023-09-06 DIAGNOSIS — K70.31 ALCOHOLIC CIRRHOSIS OF LIVER WITH ASCITES: Primary | ICD-10-CM

## 2023-09-06 DIAGNOSIS — R60.0 BILATERAL LOWER EXTREMITY EDEMA: ICD-10-CM

## 2023-09-06 LAB
ALBUMIN SERPL BCP-MCNC: 2.5 G/DL (ref 3.5–5.2)
ALP SERPL-CCNC: 165 U/L (ref 55–135)
ALT SERPL W/O P-5'-P-CCNC: 38 U/L (ref 10–44)
ANION GAP SERPL CALC-SCNC: 8 MMOL/L (ref 8–16)
AST SERPL-CCNC: 45 U/L (ref 10–40)
BASOPHILS # BLD AUTO: 0.06 K/UL (ref 0–0.2)
BASOPHILS NFR BLD: 1 % (ref 0–1.9)
BILIRUB SERPL-MCNC: 6.2 MG/DL (ref 0.1–1)
BUN SERPL-MCNC: 14 MG/DL (ref 6–20)
CALCIUM SERPL-MCNC: 8.9 MG/DL (ref 8.7–10.5)
CHLORIDE SERPL-SCNC: 111 MMOL/L (ref 95–110)
CO2 SERPL-SCNC: 19 MMOL/L (ref 23–29)
CREAT SERPL-MCNC: 0.8 MG/DL (ref 0.5–1.4)
DIFFERENTIAL METHOD: ABNORMAL
EOSINOPHIL # BLD AUTO: 0.1 K/UL (ref 0–0.5)
EOSINOPHIL NFR BLD: 2.3 % (ref 0–8)
ERYTHROCYTE [DISTWIDTH] IN BLOOD BY AUTOMATED COUNT: 13.9 % (ref 11.5–14.5)
EST. GFR  (NO RACE VARIABLE): >60 ML/MIN/1.73 M^2
GLUCOSE SERPL-MCNC: 78 MG/DL (ref 70–110)
HCT VFR BLD AUTO: 28.4 % (ref 37–48.5)
HGB BLD-MCNC: 9.4 G/DL (ref 12–16)
IMM GRANULOCYTES # BLD AUTO: 0.01 K/UL (ref 0–0.04)
IMM GRANULOCYTES NFR BLD AUTO: 0.2 % (ref 0–0.5)
INR PPP: 1.8 (ref 0.8–1.2)
LYMPHOCYTES # BLD AUTO: 1.5 K/UL (ref 1–4.8)
LYMPHOCYTES NFR BLD: 23.7 % (ref 18–48)
MCH RBC QN AUTO: 33.6 PG (ref 27–31)
MCHC RBC AUTO-ENTMCNC: 33.1 G/DL (ref 32–36)
MCV RBC AUTO: 101 FL (ref 82–98)
MONOCYTES # BLD AUTO: 0.9 K/UL (ref 0.3–1)
MONOCYTES NFR BLD: 14 % (ref 4–15)
NEUTROPHILS # BLD AUTO: 3.6 K/UL (ref 1.8–7.7)
NEUTROPHILS NFR BLD: 58.8 % (ref 38–73)
NRBC BLD-RTO: 0 /100 WBC
PLATELET # BLD AUTO: 66 K/UL (ref 150–450)
PMV BLD AUTO: 10.6 FL (ref 9.2–12.9)
POTASSIUM SERPL-SCNC: 4.2 MMOL/L (ref 3.5–5.1)
PROT SERPL-MCNC: 6.5 G/DL (ref 6–8.4)
PROTHROMBIN TIME: 18.5 SEC (ref 9–12.5)
RBC # BLD AUTO: 2.8 M/UL (ref 4–5.4)
SODIUM SERPL-SCNC: 138 MMOL/L (ref 136–145)
WBC # BLD AUTO: 6.16 K/UL (ref 3.9–12.7)

## 2023-09-06 PROCEDURE — 82728 ASSAY OF FERRITIN: CPT | Performed by: INTERNAL MEDICINE

## 2023-09-06 PROCEDURE — 86038 ANTINUCLEAR ANTIBODIES: CPT | Performed by: INTERNAL MEDICINE

## 2023-09-06 PROCEDURE — 1159F PR MEDICATION LIST DOCUMENTED IN MEDICAL RECORD: ICD-10-PCS | Mod: CPTII,,, | Performed by: INTERNAL MEDICINE

## 2023-09-06 PROCEDURE — 36415 COLL VENOUS BLD VENIPUNCTURE: CPT | Performed by: INTERNAL MEDICINE

## 2023-09-06 PROCEDURE — 1159F MED LIST DOCD IN RCRD: CPT | Mod: CPTII,,, | Performed by: INTERNAL MEDICINE

## 2023-09-06 PROCEDURE — 85610 PROTHROMBIN TIME: CPT | Performed by: INTERNAL MEDICINE

## 2023-09-06 PROCEDURE — 86039 ANTINUCLEAR ANTIBODIES (ANA): CPT | Performed by: INTERNAL MEDICINE

## 2023-09-06 PROCEDURE — 99205 PR OFFICE/OUTPT VISIT, NEW, LEVL V, 60-74 MIN: ICD-10-PCS | Mod: S$PBB,,, | Performed by: INTERNAL MEDICINE

## 2023-09-06 PROCEDURE — 86706 HEP B SURFACE ANTIBODY: CPT | Performed by: INTERNAL MEDICINE

## 2023-09-06 PROCEDURE — 1160F RVW MEDS BY RX/DR IN RCRD: CPT | Mod: CPTII,,, | Performed by: INTERNAL MEDICINE

## 2023-09-06 PROCEDURE — 84466 ASSAY OF TRANSFERRIN: CPT | Performed by: INTERNAL MEDICINE

## 2023-09-06 PROCEDURE — 3008F PR BODY MASS INDEX (BMI) DOCUMENTED: ICD-10-PCS | Mod: CPTII,,, | Performed by: INTERNAL MEDICINE

## 2023-09-06 PROCEDURE — 3078F PR MOST RECENT DIASTOLIC BLOOD PRESSURE < 80 MM HG: ICD-10-PCS | Mod: CPTII,,, | Performed by: INTERNAL MEDICINE

## 2023-09-06 PROCEDURE — 85025 COMPLETE CBC W/AUTO DIFF WBC: CPT | Performed by: INTERNAL MEDICINE

## 2023-09-06 PROCEDURE — 83540 ASSAY OF IRON: CPT | Performed by: INTERNAL MEDICINE

## 2023-09-06 PROCEDURE — 86235 NUCLEAR ANTIGEN ANTIBODY: CPT | Mod: 59 | Performed by: INTERNAL MEDICINE

## 2023-09-06 PROCEDURE — 99205 OFFICE O/P NEW HI 60 MIN: CPT | Mod: S$PBB,,, | Performed by: INTERNAL MEDICINE

## 2023-09-06 PROCEDURE — 3008F BODY MASS INDEX DOCD: CPT | Mod: CPTII,,, | Performed by: INTERNAL MEDICINE

## 2023-09-06 PROCEDURE — 82103 ALPHA-1-ANTITRYPSIN TOTAL: CPT | Performed by: INTERNAL MEDICINE

## 2023-09-06 PROCEDURE — 86015 ACTIN ANTIBODY EACH: CPT | Performed by: INTERNAL MEDICINE

## 2023-09-06 PROCEDURE — 86381 MITOCHONDRIAL ANTIBODY EACH: CPT | Performed by: INTERNAL MEDICINE

## 2023-09-06 PROCEDURE — 82390 ASSAY OF CERULOPLASMIN: CPT | Performed by: INTERNAL MEDICINE

## 2023-09-06 PROCEDURE — 99999 PR PBB SHADOW E&M-EST. PATIENT-LVL III: ICD-10-PCS | Mod: PBBFAC,,, | Performed by: INTERNAL MEDICINE

## 2023-09-06 PROCEDURE — 1160F PR REVIEW ALL MEDS BY PRESCRIBER/CLIN PHARMACIST DOCUMENTED: ICD-10-PCS | Mod: CPTII,,, | Performed by: INTERNAL MEDICINE

## 2023-09-06 PROCEDURE — 99213 OFFICE O/P EST LOW 20 MIN: CPT | Mod: PBBFAC | Performed by: INTERNAL MEDICINE

## 2023-09-06 PROCEDURE — 3077F SYST BP >= 140 MM HG: CPT | Mod: CPTII,,, | Performed by: INTERNAL MEDICINE

## 2023-09-06 PROCEDURE — 82105 ALPHA-FETOPROTEIN SERUM: CPT | Performed by: INTERNAL MEDICINE

## 2023-09-06 PROCEDURE — 86790 VIRUS ANTIBODY NOS: CPT | Performed by: INTERNAL MEDICINE

## 2023-09-06 PROCEDURE — 3077F PR MOST RECENT SYSTOLIC BLOOD PRESSURE >= 140 MM HG: ICD-10-PCS | Mod: CPTII,,, | Performed by: INTERNAL MEDICINE

## 2023-09-06 PROCEDURE — 99999 PR PBB SHADOW E&M-EST. PATIENT-LVL III: CPT | Mod: PBBFAC,,, | Performed by: INTERNAL MEDICINE

## 2023-09-06 PROCEDURE — 80053 COMPREHEN METABOLIC PANEL: CPT | Performed by: INTERNAL MEDICINE

## 2023-09-06 PROCEDURE — 3078F DIAST BP <80 MM HG: CPT | Mod: CPTII,,, | Performed by: INTERNAL MEDICINE

## 2023-09-06 PROCEDURE — 87340 HEPATITIS B SURFACE AG IA: CPT | Performed by: INTERNAL MEDICINE

## 2023-09-06 RX ORDER — SPIRONOLACTONE 100 MG/1
100 TABLET, FILM COATED ORAL DAILY
Qty: 30 TABLET | Refills: 5 | Status: SHIPPED | OUTPATIENT
Start: 2023-09-06 | End: 2023-10-11 | Stop reason: SDUPTHER

## 2023-09-06 RX ORDER — FUROSEMIDE 40 MG/1
40 TABLET ORAL DAILY
Qty: 30 TABLET | Refills: 5 | Status: ON HOLD | OUTPATIENT
Start: 2023-09-06 | End: 2023-11-27 | Stop reason: HOSPADM

## 2023-09-06 RX ORDER — PROPRANOLOL HYDROCHLORIDE 10 MG/1
10 TABLET ORAL 2 TIMES DAILY
Qty: 60 TABLET | Refills: 5 | Status: SHIPPED | OUTPATIENT
Start: 2023-09-06 | End: 2023-10-11

## 2023-09-06 NOTE — PROGRESS NOTES
Subjective:     Mara Mojica is here for evaluation of Ascites (Hospital follow up) and Tremors      HPI  Mara Mojica is here for follow-up and management of decompensated cirrhosis.  Of note the patient has known about her diagnosis of cirrhosis seems like for a little while.  She was being monitored in [place].  It seems she 1st presented when she had an umbilical or periumbilical herniation that seems like it may have required emergency surgery.  She did have surgical repair and at that time it was identified that she had cirrhosis of the liver.  She reports she had quit alcohol at 1 point but then restarted sometime after COVID.  More recently she is had issues with ascites for which she is had to go to the emergency room.  She was seen in the emergency room in July.  She was started on diuretics.  Those diuretics have now 1 out this week.  She was taking both Lasix and spironolactone.  She reports that she stopped drinking in July after that ED visit.  She reports committed to alcohol abstinence.  She has some issues with foggy thinking but denies any overt encephalopathy.  She reports that in the past she did have issues with encephalopathy but has not been requiring lactulose.  She is not aware of whether not she has varices.  She believes her last upper endoscopy probably was around 2020.  She denies any gastrointestinal bleeding.  She reports she is not due for colonoscopy for another 1-2 years.    She also reports she's had issues with tremors mostly with doing things.    She is at this visit with her fiance.  She is now living in the [facility].    Review of Systems    Objective:     Physical Exam  Vitals reviewed.   Constitutional:       General: She is not in acute distress.     Appearance: She is well-developed.   HENT:      Head: Normocephalic and atraumatic.      Mouth/Throat:      Pharynx: No oropharyngeal exudate.   Eyes:      General: Scleral icterus present.         Right eye:  No discharge.         Left eye: No discharge.      Pupils: Pupils are equal, round, and reactive to light.   Pulmonary:      Effort: Pulmonary effort is normal. No respiratory distress.      Breath sounds: Normal breath sounds. No wheezing.   Abdominal:      General: There is distension (nontense).      Palpations: Abdomen is soft.      Tenderness: There is no abdominal tenderness.   Musculoskeletal:      Right lower leg: Edema present.      Left lower leg: Edema present.   Skin:     Coloration: Skin is jaundiced.   Neurological:      Mental Status: She is alert and oriented to person, place, and time.      Comments: Occasional tremors   Psychiatric:         Behavior: Behavior normal.         MELD 3.0: 22 at 7/19/2023 11:32 AM  MELD-Na: 19 at 7/19/2023 11:32 AM  Calculated from:  Serum Creatinine: 0.6 mg/dL (Using min of 1 mg/dL) at 7/19/2023 11:32 AM  Serum Sodium: 142 mmol/L (Using max of 137 mmol/L) at 7/19/2023 11:32 AM  Total Bilirubin: 6.1 mg/dL at 7/19/2023 11:32 AM  Serum Albumin: 2.5 g/dL at 7/19/2023 11:32 AM  INR(ratio): 1.6 at 7/19/2023 11:32 AM  Age at listing (hypothetical): 57 years  Sex: Female at 7/19/2023 11:32 AM      WBC   Date Value Ref Range Status   07/19/2023 2.99 (L) 3.90 - 12.70 K/uL Final     Hemoglobin   Date Value Ref Range Status   07/19/2023 9.2 (L) 12.0 - 16.0 g/dL Final     Hematocrit   Date Value Ref Range Status   07/19/2023 27.6 (L) 37.0 - 48.5 % Final     Platelets   Date Value Ref Range Status   07/19/2023 58 (L) 150 - 450 K/uL Final     BUN   Date Value Ref Range Status   07/19/2023 7 6 - 20 mg/dL Final     Creatinine   Date Value Ref Range Status   07/19/2023 0.6 0.5 - 1.4 mg/dL Final     Glucose   Date Value Ref Range Status   07/19/2023 100 70 - 110 mg/dL Final     Calcium   Date Value Ref Range Status   07/19/2023 8.3 (L) 8.7 - 10.5 mg/dL Final     Sodium   Date Value Ref Range Status   07/19/2023 142 136 - 145 mmol/L Final     Potassium   Date Value Ref Range Status    07/19/2023 3.9 3.5 - 5.1 mmol/L Final     Chloride   Date Value Ref Range Status   07/19/2023 110 95 - 110 mmol/L Final     Magnesium   Date Value Ref Range Status   07/19/2023 1.6 1.6 - 2.6 mg/dL Final     AST   Date Value Ref Range Status   07/19/2023 110 (H) 10 - 40 U/L Final     ALT   Date Value Ref Range Status   07/19/2023 35 10 - 44 U/L Final     Alkaline Phosphatase   Date Value Ref Range Status   07/19/2023 158 (H) 55 - 135 U/L Final     Total Bilirubin   Date Value Ref Range Status   07/19/2023 6.1 (H) 0.1 - 1.0 mg/dL Final     Comment:     For infants and newborns, interpretation of results should be based  on gestational age, weight and in agreement with clinical  observations.    Premature Infant recommended reference ranges:  Up to 24 hours.............<8.0 mg/dL  Up to 48 hours............<12.0 mg/dL  3-5 days..................<15.0 mg/dL  6-29 days.................<15.0 mg/dL       Albumin   Date Value Ref Range Status   07/19/2023 2.5 (L) 3.5 - 5.2 g/dL Final     INR   Date Value Ref Range Status   07/19/2023 1.6 (H) 0.8 - 1.2 Final     Comment:     Coumadin Therapy:  2.0 - 3.0 for INR for all indicators except mechanical heart valves  and antiphospholipid syndromes which should use 2.5 - 3.5.           Assessment/Plan:     1. Alcoholic cirrhosis of liver with ascites    2. Other ascites    3. Bilateral lower extremity edema    4. Tremors of nervous system      Mara Mojica is a 57 y.o. female withAscites (Hospital follow up) and Tremors    Alcoholic cirrhosis of liver with ascites-decompensated with elevated meld score.  Patient reports that she had been abstinent for about a month and a half.  -explained to patient that she needs to continue with complete alcohol abstinence as she is high risk of dying from alcohol-related liver disease in transplant is not an option if she continues to drink  -will check for other causes of liver disease  -will see if meld score is improved with alcohol  abstinence  -continue HCC surveillance- due 1/2024 can  -concerned that she is high risk for esophageal varices given her level of decompensation in history of alcohol use.  Referral placed for upper endoscopy.  -     Anti-Smooth Muscle Antibody; Future; Expected date: 09/06/2023  -     BALWINDER Screen w/Reflex; Future; Expected date: 09/06/2023  -     Iron and TIBC; Future; Expected date: 09/06/2023  -     Ferritin; Future; Expected date: 09/06/2023  -     Ceruloplasmin; Future; Expected date: 09/06/2023  -     Antimitochondrial Antibody; Future; Expected date: 09/06/2023  -     Alpha-1-Antitrypsin; Future; Expected date: 09/06/2023  -     Hepatitis A antibody, IgG; Future; Expected date: 09/06/2023  -     Comprehensive Metabolic Panel; Standing  -     CBC Auto Differential; Standing  -     AFP Tumor Marker; Future; Expected date: 09/06/2023  -     Protime-INR; Standing  -     Hepatitis B Surface Ab, Qualitative; Future; Expected date: 09/06/2023  -     Hepatitis B Surface Antigen; Future; Expected date: 09/06/2023  -     Phosphatidylethanol (PETH); Future; Expected date: 09/06/2023  -     Ambulatory referral/consult to Endo Procedure ; Future; Expected date: 09/07/2023    Other ascites-appears improved but still with some ascites.  -will titrate diuretics; continue on Lasix 40 mg daily will increase spironolactone to 100 mg daily  -based on labs from today will decide if repeat are needed to decide on titration of diuretics  -     Comprehensive Metabolic Panel; Standing  -     furosemide (LASIX) 40 MG tablet; Take 1 tablet (40 mg total) by mouth once daily.  Dispense: 30 tablet; Refill: 5  -     spironolactone (ALDACTONE) 100 MG tablet; Take 1 tablet (100 mg total) by mouth once daily.  Dispense: 30 tablet; Refill: 5    Bilateral lower extremity edema-uncontrolled  -low-salt diet  -titrate diuretics  -     furosemide (LASIX) 40 MG tablet; Take 1 tablet (40 mg total) by mouth once daily.  Dispense: 30 tablet;  Refill: 5  -     spironolactone (ALDACTONE) 100 MG tablet; Take 1 tablet (100 mg total) by mouth once daily.  Dispense: 30 tablet; Refill: 5    Tremors of nervous system-unclear etiology  -will give propranolol to help with symptoms; if persistent she will need to see Neurology  -     propranoloL (INDERAL) 10 MG tablet; Take 1 tablet (10 mg total) by mouth 2 (two) times daily.  Dispense: 60 tablet; Refill: 5      Return to clinic in 4-6 weeks with preclinic labs    Shannan Reardon MD

## 2023-09-07 ENCOUNTER — TELEPHONE (OUTPATIENT)
Dept: HEPATOLOGY | Facility: CLINIC | Age: 58
End: 2023-09-07
Payer: MEDICAID

## 2023-09-07 LAB
A1AT SERPL-MCNC: 103 MG/DL (ref 100–190)
AFP SERPL-MCNC: 7.8 NG/ML (ref 0–8.4)
CERULOPLASMIN SERPL-MCNC: 12 MG/DL (ref 15–45)
FERRITIN SERPL-MCNC: 89 NG/ML (ref 20–300)
HAV IGG SER QL IA: REACTIVE
HBV SURFACE AB SER-ACNC: <3 MIU/ML
HBV SURFACE AB SER-ACNC: NORMAL M[IU]/ML
HBV SURFACE AG SERPL QL IA: NORMAL
IRON SERPL-MCNC: 237 UG/DL (ref 30–160)
SATURATED IRON: 73 % (ref 20–50)
TOTAL IRON BINDING CAPACITY: 326 UG/DL (ref 250–450)
TRANSFERRIN SERPL-MCNC: 220 MG/DL (ref 200–375)

## 2023-09-07 NOTE — TELEPHONE ENCOUNTER
----- Message from Deep Hunter MA sent at 9/7/2023  9:20 AM CDT -----  Per Dr. Reardon patient needs to be seen in person within 4-6 weeks Meld score of 22.

## 2023-09-07 NOTE — TELEPHONE ENCOUNTER
----- Message from Swathi Durand sent at 9/7/2023  2:11 PM CDT -----  Contact: Mara  .Type:  Patient Returning Call    Who Called:Mara   Who Left Message for Patient: nurse   Does the patient know what this is regarding?:appt   Would the patient rather a call back or a response via [facility]? Call   Best Call Back Number:.[phone]   Additional Information: Pt is returning a call

## 2023-09-08 LAB
ANA PATTERN 1: NORMAL
ANA SER QL IF: POSITIVE
ANA TITR SER IF: NORMAL {TITER}
MITOCHONDRIA AB TITR SER IF: NORMAL {TITER}

## 2023-09-09 DIAGNOSIS — E83.00 LOW CERULOPLASMIN LEVEL: Primary | ICD-10-CM

## 2023-09-09 DIAGNOSIS — R79.0 ABNORMAL IRON SATURATION: ICD-10-CM

## 2023-09-11 LAB
ANTI SM ANTIBODY: 0.09 RATIO (ref 0–0.99)
ANTI SM/RNP ANTIBODY: 0.08 RATIO (ref 0–0.99)
ANTI-SM INTERPRETATION: NEGATIVE
ANTI-SM/RNP INTERPRETATION: NEGATIVE
ANTI-SSA ANTIBODY: 0.09 RATIO (ref 0–0.99)
ANTI-SSA INTERPRETATION: NEGATIVE
ANTI-SSB ANTIBODY: 0.09 RATIO (ref 0–0.99)
ANTI-SSB INTERPRETATION: NEGATIVE
DSDNA AB SER-ACNC: NORMAL [IU]/ML

## 2023-09-12 ENCOUNTER — HOSPITAL ENCOUNTER (OUTPATIENT)
Dept: PREADMISSION TESTING | Facility: HOSPITAL | Age: 58
Discharge: HOME | End: 2023-09-12
Attending: INTERNAL MEDICINE
Payer: MEDICAID

## 2023-09-12 DIAGNOSIS — K70.31 ALCOHOLIC CIRRHOSIS OF LIVER WITH ASCITES: Primary | ICD-10-CM

## 2023-09-12 LAB — SMOOTH MUSCLE AB TITR SER IF: ABNORMAL {TITER}

## 2023-09-21 ENCOUNTER — HOSPITAL ENCOUNTER (OUTPATIENT)
Facility: HOSPITAL | Age: 58
Discharge: HOME | End: 2023-09-21
Attending: INTERNAL MEDICINE | Admitting: INTERNAL MEDICINE
Payer: MEDICAID

## 2023-09-21 ENCOUNTER — ANESTHESIA EVENT (OUTPATIENT)
Dept: ENDOSCOPY | Facility: HOSPITAL | Age: 58
End: 2023-09-21
Payer: MEDICAID

## 2023-09-21 ENCOUNTER — ANESTHESIA (OUTPATIENT)
Dept: ENDOSCOPY | Facility: HOSPITAL | Age: 58
End: 2023-09-21
Payer: MEDICAID

## 2023-09-21 DIAGNOSIS — K70.31 ALCOHOLIC CIRRHOSIS OF LIVER WITH ASCITES: Primary | ICD-10-CM

## 2023-09-21 PROCEDURE — 25000003 PHARM REV CODE 250: Performed by: NURSE ANESTHETIST, CERTIFIED REGISTERED

## 2023-09-21 PROCEDURE — 63600175 PHARM REV CODE 636 W HCPCS: Performed by: NURSE ANESTHETIST, CERTIFIED REGISTERED

## 2023-09-21 PROCEDURE — 43235 EGD DIAGNOSTIC BRUSH WASH: CPT | Performed by: INTERNAL MEDICINE

## 2023-09-21 PROCEDURE — 43235 EGD DIAGNOSTIC BRUSH WASH: CPT | Mod: ,,, | Performed by: INTERNAL MEDICINE

## 2023-09-21 PROCEDURE — 37000008 HC ANESTHESIA 1ST 15 MINUTES: Performed by: INTERNAL MEDICINE

## 2023-09-21 PROCEDURE — 43235 PR EGD, FLEX, DIAGNOSTIC: ICD-10-PCS | Mod: ,,, | Performed by: INTERNAL MEDICINE

## 2023-09-21 RX ORDER — SODIUM CHLORIDE 9 MG/ML
INJECTION, SOLUTION INTRAVENOUS CONTINUOUS
Status: DISCONTINUED | OUTPATIENT
Start: 2023-09-21 | End: 2023-09-21 | Stop reason: HOSPADM

## 2023-09-21 RX ORDER — LIDOCAINE HYDROCHLORIDE 10 MG/ML
INJECTION, SOLUTION EPIDURAL; INFILTRATION; INTRACAUDAL; PERINEURAL
Status: DISCONTINUED | OUTPATIENT
Start: 2023-09-21 | End: 2023-09-21

## 2023-09-21 RX ORDER — SODIUM CHLORIDE, SODIUM LACTATE, POTASSIUM CHLORIDE, CALCIUM CHLORIDE 600; 310; 30; 20 MG/100ML; MG/100ML; MG/100ML; MG/100ML
INJECTION, SOLUTION INTRAVENOUS CONTINUOUS PRN
Status: DISCONTINUED | OUTPATIENT
Start: 2023-09-21 | End: 2023-09-21

## 2023-09-21 RX ORDER — PROPOFOL 10 MG/ML
VIAL (ML) INTRAVENOUS
Status: DISCONTINUED | OUTPATIENT
Start: 2023-09-21 | End: 2023-09-21

## 2023-09-21 RX ADMIN — SODIUM CHLORIDE, SODIUM LACTATE, POTASSIUM CHLORIDE, AND CALCIUM CHLORIDE: 600; 310; 30; 20 INJECTION, SOLUTION INTRAVENOUS at 11:09

## 2023-09-21 RX ADMIN — LIDOCAINE HYDROCHLORIDE 50 MG: 10 SOLUTION INTRAVENOUS at 11:09

## 2023-09-21 RX ADMIN — PROPOFOL 70 MG: 10 INJECTION, EMULSION INTRAVENOUS at 11:09

## 2023-09-21 NOTE — PROVATION PATIENT INSTRUCTIONS
Discharge Summary/Instructions after an Endoscopic Procedure  Patient Name: Mara Mojica  Patient MRN: [id number]  Patient YOB: 1965  Thursday, September 21, 2023 Melissa Edwards MD  Dear patient,  As a result of recent federal legislation (The Federal Cures Act), you may   receive lab or pathology results from your procedure in your [facility]   account before your physician is able to contact you. Your physician or   their representative will relay the results to you with their   recommendations at their soonest availability.  Thank you,  RESTRICTIONS:  During your procedure today, you received medications for sedation.  These   medications may affect your judgment, balance and coordination.  Therefore,   for 24 hours, you have the following restrictions:   - DO NOT drive a car, operate machinery, make legal/financial decisions,   sign important papers or drink alcohol.    ACTIVITY:  Today: no heavy lifting, straining or running due to procedural   sedation/anesthesia.  The following day: return to full activity including work.  DIET:  Eat and drink normally unless instructed otherwise.     TREATMENT FOR COMMON SIDE EFFECTS:  - Mild abdominal pain, nausea, belching, bloating or excessive gas:  rest,   eat lightly and use a heating pad.  - Sore Throat: treat with throat lozenges and/or gargle with warm salt   water.  - Because air was used during the procedure, expelling large amounts of air   from your rectum or belching is normal.  - If a bowel prep was taken, you may not have a bowel movement for 1-3 days.    This is normal.  SYMPTOMS TO WATCH FOR AND REPORT TO YOUR PHYSICIAN:  1. Abdominal pain or bloating, other than gas cramps.  2. Chest pain.  3. Back pain.  4. Signs of infection such as: chills or fever occurring within 24 hours   after the procedure.  5. Rectal bleeding, which would show as bright red, maroon, or black stools.   (A tablespoon of blood from the rectum is not serious,  especially if   hemorrhoids are present.)  6. Vomiting.  7. Weakness or dizziness.  GO DIRECTLY TO THE NEAREST EMERGENCY ROOM IF YOU HAVE ANY OF THE FOLLOWING:      Difficulty breathing              Chills and/or fever over 101 F   Persistent vomiting and/or vomiting blood   Severe abdominal pain   Severe chest pain   Black, tarry stools   Bleeding- more than one tablespoon   Any other symptom or condition that you feel may need urgent attention  Your doctor recommends these additional instructions:  If any biopsies were taken, your doctors clinic will contact you in 1 to 2   weeks with any results.  - Discharge patient to home (via wheelchair).   - Resume previous diet.   - Continue present medications.   - Repeat upper endoscopy in 2 years for surveillance.   - Patient has a contact number available for emergencies.  The signs and   symptoms of potential delayed complications were discussed with the   patient.  Return to normal activities tomorrow.  Written discharge   instructions were provided to the patient.  For questions, problems or results please call your physician Melissa Edwards MD at Work:  [phone]  If you have any questions about the above instructions, call the GI   department at [phone] or call the endoscopy unit at [phone]   from 7am until 3 pm.  [facility] [place], EMERGENCY ROOM PHONE NUMBER:   [phone]  IF A COMPLICATION OR EMERGENCY SITUATION ARISES AND YOU ARE UNABLE TO REACH   YOUR PHYSICIAN - GO DIRECTLY TO THE EMERGENCY ROOM.  I have read or have had read to me these discharge instructions for my   procedure and have received a written copy.  I understand these   instructions and will follow-up with my physician if I have any questions.     __________________________________       _____________________________________  Nurse Signature                                          Patient/Designated   Responsible Party Signature  Melissa Edawrds  MD Melissa Edwards MD  9/21/2023 11:34:27 AM  [employer]ATION

## 2023-09-21 NOTE — ANESTHESIA POSTPROCEDURE EVALUATION
Anesthesia Post Evaluation    Patient: Mara Mojica    Procedure(s) Performed: Procedure(s) (LRB):  EGD (ESOPHAGOGASTRODUODENOSCOPY) (N/A)    Final Anesthesia Type: MAC      Patient location during evaluation: GI PACU  Patient participation: Yes- Able to Participate  Level of consciousness: awake and alert and oriented  Post-procedure vital signs: reviewed and stable  Pain management: adequate  Airway patency: patent  REYES mitigation strategies: Multimodal analgesia  PONV status at discharge: No PONV  Anesthetic complications: no      Cardiovascular status: hemodynamically stable  Respiratory status: unassisted, spontaneous ventilation and room air  Hydration status: euvolemic  Follow-up not needed.          Vitals Value Taken Time   /64 09/21/23 1154   Temp 36.4 °C (97.6 °F) 09/21/23 1154   Pulse 68 09/21/23 1154   Resp 16 09/21/23 1154   SpO2 99 % 09/21/23 1154         Event Time   Out of Recovery 12:02:28         Pain/Christopher Score: Christopher Score: 10 (9/21/2023 11:54 AM)

## 2023-09-21 NOTE — H&P
Short Stay Endoscopy History and Physical    PCP - Osiris Nevarez NP    Procedure - EGD  ASA - 2  Mallampati - per anesthesia  History of Anesthesia problems - no  Family history Anesthesia problems -  no     HPI:  This is a 57 y.o. female here for evaluation of :   Active Hospital Problems    Diagnosis  POA    *Alcoholic cirrhosis of liver with ascites [K70.31]  Yes      Resolved Hospital Problems   No resolved problems to display.         ROS:  CONSTITUTIONAL: Denies weight change,  fatigue, fevers, chills, night sweats.  CARDIOVASCULAR: Denies chest pain, shortness of breath, orthopnea and edema.  RESPIRATORY: Denies cough, hemoptysis, dyspnea, and wheezing.  GI: See HPI.    Medical History:   Past Medical History:   Diagnosis Date    Alcoholic cirrhosis of liver     Kidney failure     Unilateral inguinal hernia, without obstruction or gangrene, not specified as recurrent        Surgical History:   Past Surgical History:   Procedure Laterality Date    HERNIA REPAIR         Family History:  History reviewed. No pertinent family history.    Social History:   Social History     Tobacco Use    Smoking status: Every Day     Current packs/day: 0.50     Types: Cigarettes     Passive exposure: Never    Smokeless tobacco: Never   Substance Use Topics    Alcohol use: Yes       Allergy  Review of patient's allergies indicates:  No Known Allergies    Medications:   No current facility-administered medications on file prior to encounter.     Current Outpatient Medications on File Prior to Encounter   Medication Sig Dispense Refill    furosemide (LASIX) 40 MG tablet Take 1 tablet (40 mg total) by mouth once daily. 30 tablet 5    propranoloL (INDERAL) 10 MG tablet Take 1 tablet (10 mg total) by mouth 2 (two) times daily. 60 tablet 5    spironolactone (ALDACTONE) 100 MG tablet Take 1 tablet (100 mg total) by mouth once daily. 30 tablet 5       Physical Exam:  Vital Signs: There were no vitals filed for this visit.  General  Appearance: Well appearing in no acute distress  ENT: OP clear  Chest: CTA B  CV: RRR, no m/r/g  Abd: s/nt/nd/nabs  Ext: no edema    Labs:  Reviewed    IMP:  Active Hospital Problems    Diagnosis  POA    *Alcoholic cirrhosis of liver with ascites [K70.31]  Yes      Resolved Hospital Problems   No resolved problems to display.         Plan:  I have explained the risks and benefits of endoscopy procedures to the patient including but not limited to bleeding, perforation, infection, and death. The patient wishes to proceed.

## 2023-09-21 NOTE — TRANSFER OF CARE
"Anesthesia Transfer of Care Note    Patient: Mara Mojica    Procedure(s) Performed: Procedure(s) (LRB):  EGD (ESOPHAGOGASTRODUODENOSCOPY) (N/A)    Patient location: GI    Anesthesia Type: MAC    Transport from OR: Transported from OR on room air with adequate spontaneous ventilation    Post pain: adequate analgesia    Post assessment: no apparent anesthetic complications and tolerated procedure well    Post vital signs: stable    Level of consciousness: awake    Nausea/Vomiting: no nausea/vomiting    Complications: none    Transfer of care protocol was followed      Last vitals:   Visit Vitals  BP (!) 153/70   Pulse 68   Temp 36.7 °C (98.1 °F) (Temporal)   Resp 17   Ht 4' 11" (1.499 m)   Wt 53.5 kg (118 lb)   LMP  (LMP Unknown)   SpO2 98%   Breastfeeding No   BMI 23.83 kg/m²     "

## 2023-09-21 NOTE — DISCHARGE SUMMARY
[facility] - Endoscopy (Hospital)  Discharge Note  Short Stay    Procedure(s) (LRB):  EGD (ESOPHAGOGASTRODUODENOSCOPY) (N/A)      OUTCOME: Patient tolerated treatment/procedure well without complication and is now ready for discharge.    DISPOSITION: Home or Self Care    FINAL DIAGNOSIS:  Alcoholic cirrhosis of liver with ascites    FOLLOWUP: With primary care provider    DISCHARGE INSTRUCTIONS:  No discharge procedures on file.

## 2023-09-21 NOTE — ANESTHESIA PREPROCEDURE EVALUATION
09/21/2023  Mara Mojica is a 57 y.o., female.      Pre-op Assessment    I have reviewed the Patient Summary Reports.    I have reviewed the NPO Status.   I have reviewed the Medications.     Review of Systems  Anesthesia Hx:  No problems with previous Anesthesia    Social:  Alcohol Use    Hematology/Oncology:     Oncology Normal    -- Anemia: Hematology Comments: Thrombocytopenia     EENT/Dental:EENT/Dental Normal   Cardiovascular:  Cardiovascular Normal     Pulmonary:  Pulmonary Normal    Renal/:  Renal/ Normal     Hepatic/GI:   GERD, well controlled Liver Disease,    Musculoskeletal:  Musculoskeletal Normal    Neurological:  Neurology Normal    Endocrine:  Endocrine Normal    Dermatological:  Skin Normal    Psych:  Psychiatric Normal           Physical Exam  General: Well nourished, Cooperative, Alert and Oriented    Airway:  Mallampati: II   Mouth Opening: Normal  TM Distance: Normal  Tongue: Normal  Neck ROM: Normal ROM    Dental:  Periodontal disease        Anesthesia Plan  Type of Anesthesia, risks & benefits discussed:    Anesthesia Type: MAC  Intra-op Monitoring Plan: Standard ASA Monitors  Post Op Pain Control Plan: IV/PO Opioids PRN  Informed Consent: Informed consent signed with the Patient and all parties understand the risks and agree with anesthesia plan.  All questions answered.   ASA Score: 2  Day of Surgery Review of History & Physical: H&P Update referred to the surgeon/provider.    Ready For Surgery From Anesthesia Perspective.     .

## 2023-09-22 VITALS
TEMPERATURE: 98 F | SYSTOLIC BLOOD PRESSURE: 104 MMHG | HEIGHT: 59 IN | DIASTOLIC BLOOD PRESSURE: 64 MMHG | BODY MASS INDEX: 23.79 KG/M2 | RESPIRATION RATE: 16 BRPM | HEART RATE: 68 BPM | OXYGEN SATURATION: 99 % | WEIGHT: 118 LBS

## 2023-09-25 ENCOUNTER — TELEPHONE (OUTPATIENT)
Dept: HEPATOLOGY | Facility: CLINIC | Age: 58
End: 2023-09-25
Payer: MEDICAID

## 2023-09-25 NOTE — TELEPHONE ENCOUNTER
----- Message from Giadiel Lopez sent at 9/25/2023  8:34 AM CDT -----  States she is having side effects from the propranoloL. States her hair was falling out and spots on her skin, before she started taking taking this medicine. States she would like to get off of this medication. States her hair falling out and the spots on her skin are worse. States she is feeling bad, she nauseated, anxiety, insomina and sweating. Please call pt [phone]. Thank you

## 2023-09-25 NOTE — TELEPHONE ENCOUNTER
Patient returned my call, she states that since starting the Inderal she's having dry itchy skin, red patches, tremblig and nightsweats. Please advise.

## 2023-09-30 ENCOUNTER — TELEPHONE (OUTPATIENT)
Dept: HEPATOLOGY | Facility: CLINIC | Age: 58
End: 2023-09-30
Payer: MEDICAID

## 2023-09-30 ENCOUNTER — PATIENT MESSAGE (OUTPATIENT)
Dept: HEPATOLOGY | Facility: CLINIC | Age: 58
End: 2023-09-30
Payer: MEDICAID

## 2023-09-30 NOTE — TELEPHONE ENCOUNTER
Called patient but had to leave a message. I will send a patient message through the portal to hav eher stop the propranolol.

## 2023-10-04 ENCOUNTER — LAB VISIT (OUTPATIENT)
Dept: LAB | Facility: HOSPITAL | Age: 58
End: 2023-10-04
Attending: INTERNAL MEDICINE
Payer: MEDICAID

## 2023-10-04 DIAGNOSIS — R79.0 ABNORMAL IRON SATURATION: ICD-10-CM

## 2023-10-04 DIAGNOSIS — E83.00 LOW CERULOPLASMIN LEVEL: ICD-10-CM

## 2023-10-04 DIAGNOSIS — R18.8 OTHER ASCITES: ICD-10-CM

## 2023-10-04 DIAGNOSIS — K70.31 ALCOHOLIC CIRRHOSIS OF LIVER WITH ASCITES: ICD-10-CM

## 2023-10-04 LAB
ALBUMIN SERPL BCP-MCNC: 2.2 G/DL (ref 3.5–5.2)
ALP SERPL-CCNC: 174 U/L (ref 55–135)
ALT SERPL W/O P-5'-P-CCNC: 34 U/L (ref 10–44)
ANION GAP SERPL CALC-SCNC: 8 MMOL/L (ref 8–16)
AST SERPL-CCNC: 53 U/L (ref 10–40)
BASOPHILS # BLD AUTO: 0.07 K/UL (ref 0–0.2)
BASOPHILS NFR BLD: 1.4 % (ref 0–1.9)
BILIRUB SERPL-MCNC: 4.3 MG/DL (ref 0.1–1)
BUN SERPL-MCNC: 13 MG/DL (ref 6–20)
CALCIUM SERPL-MCNC: 7.8 MG/DL (ref 8.7–10.5)
CERULOPLASMIN SERPL-MCNC: 15 MG/DL (ref 15–45)
CHLORIDE SERPL-SCNC: 105 MMOL/L (ref 95–110)
CO2 SERPL-SCNC: 21 MMOL/L (ref 23–29)
CREAT SERPL-MCNC: 0.9 MG/DL (ref 0.5–1.4)
DIFFERENTIAL METHOD: ABNORMAL
EOSINOPHIL # BLD AUTO: 0.2 K/UL (ref 0–0.5)
EOSINOPHIL NFR BLD: 3.5 % (ref 0–8)
ERYTHROCYTE [DISTWIDTH] IN BLOOD BY AUTOMATED COUNT: 15.3 % (ref 11.5–14.5)
EST. GFR  (NO RACE VARIABLE): >60 ML/MIN/1.73 M^2
GLUCOSE SERPL-MCNC: 77 MG/DL (ref 70–110)
HCT VFR BLD AUTO: 23.9 % (ref 37–48.5)
HGB BLD-MCNC: 7.9 G/DL (ref 12–16)
IMM GRANULOCYTES # BLD AUTO: 0.01 K/UL (ref 0–0.04)
IMM GRANULOCYTES NFR BLD AUTO: 0.2 % (ref 0–0.5)
INR PPP: 1.8 (ref 0.8–1.2)
LYMPHOCYTES # BLD AUTO: 1.7 K/UL (ref 1–4.8)
LYMPHOCYTES NFR BLD: 32.3 % (ref 18–48)
MCH RBC QN AUTO: 33.2 PG (ref 27–31)
MCHC RBC AUTO-ENTMCNC: 33.1 G/DL (ref 32–36)
MCV RBC AUTO: 100 FL (ref 82–98)
MONOCYTES # BLD AUTO: 0.8 K/UL (ref 0.3–1)
MONOCYTES NFR BLD: 16.3 % (ref 4–15)
NEUTROPHILS # BLD AUTO: 2.4 K/UL (ref 1.8–7.7)
NEUTROPHILS NFR BLD: 46.3 % (ref 38–73)
NRBC BLD-RTO: 0 /100 WBC
PLATELET # BLD AUTO: 70 K/UL (ref 150–450)
PMV BLD AUTO: 11.1 FL (ref 9.2–12.9)
POTASSIUM SERPL-SCNC: 3.6 MMOL/L (ref 3.5–5.1)
PROT SERPL-MCNC: 5.6 G/DL (ref 6–8.4)
PROTHROMBIN TIME: 18.3 SEC (ref 9–12.5)
RBC # BLD AUTO: 2.38 M/UL (ref 4–5.4)
SODIUM SERPL-SCNC: 134 MMOL/L (ref 136–145)
WBC # BLD AUTO: 5.14 K/UL (ref 3.9–12.7)

## 2023-10-04 PROCEDURE — 85610 PROTHROMBIN TIME: CPT | Performed by: INTERNAL MEDICINE

## 2023-10-04 PROCEDURE — 85025 COMPLETE CBC W/AUTO DIFF WBC: CPT | Performed by: INTERNAL MEDICINE

## 2023-10-04 PROCEDURE — 81256 HFE GENE: CPT | Performed by: INTERNAL MEDICINE

## 2023-10-04 PROCEDURE — 82390 ASSAY OF CERULOPLASMIN: CPT | Performed by: INTERNAL MEDICINE

## 2023-10-04 PROCEDURE — 80053 COMPREHEN METABOLIC PANEL: CPT | Performed by: INTERNAL MEDICINE

## 2023-10-04 PROCEDURE — 80321 ALCOHOLS BIOMARKERS 1OR 2: CPT | Performed by: INTERNAL MEDICINE

## 2023-10-06 ENCOUNTER — HOSPITAL ENCOUNTER (EMERGENCY)
Facility: HOSPITAL | Age: 58
Discharge: HOME | End: 2023-10-06
Attending: EMERGENCY MEDICINE
Payer: MEDICAID

## 2023-10-06 VITALS
HEIGHT: 59 IN | WEIGHT: 131.63 LBS | HEART RATE: 102 BPM | BODY MASS INDEX: 26.54 KG/M2 | TEMPERATURE: 99 F | OXYGEN SATURATION: 100 % | DIASTOLIC BLOOD PRESSURE: 70 MMHG | SYSTOLIC BLOOD PRESSURE: 158 MMHG | RESPIRATION RATE: 17 BRPM

## 2023-10-06 DIAGNOSIS — E87.6 HYPOKALEMIA: Primary | ICD-10-CM

## 2023-10-06 DIAGNOSIS — K70.30 ALCOHOLIC CIRRHOSIS, UNSPECIFIED WHETHER ASCITES PRESENT: ICD-10-CM

## 2023-10-06 DIAGNOSIS — R06.02 SHORTNESS OF BREATH: ICD-10-CM

## 2023-10-06 LAB
ALBUMIN SERPL BCP-MCNC: 2.4 G/DL (ref 3.5–5.2)
ALP SERPL-CCNC: 167 U/L (ref 55–135)
ALT SERPL W/O P-5'-P-CCNC: 37 U/L (ref 10–44)
ANION GAP SERPL CALC-SCNC: 10 MMOL/L (ref 8–16)
AST SERPL-CCNC: 55 U/L (ref 10–40)
BASOPHILS # BLD AUTO: 0.04 K/UL (ref 0–0.2)
BASOPHILS NFR BLD: 0.9 % (ref 0–1.9)
BILIRUB SERPL-MCNC: 5 MG/DL (ref 0.1–1)
BNP SERPL-MCNC: 134 PG/ML (ref 0–99)
BUN SERPL-MCNC: 11 MG/DL (ref 6–20)
CALCIUM SERPL-MCNC: 8.2 MG/DL (ref 8.7–10.5)
CHLORIDE SERPL-SCNC: 102 MMOL/L (ref 95–110)
CLINICAL BIOCHEMIST REVIEW: NORMAL
CO2 SERPL-SCNC: 24 MMOL/L (ref 23–29)
CREAT SERPL-MCNC: 0.8 MG/DL (ref 0.5–1.4)
DIFFERENTIAL METHOD: ABNORMAL
EOSINOPHIL # BLD AUTO: 0.1 K/UL (ref 0–0.5)
EOSINOPHIL NFR BLD: 2.7 % (ref 0–8)
ERYTHROCYTE [DISTWIDTH] IN BLOOD BY AUTOMATED COUNT: 15 % (ref 11.5–14.5)
EST. GFR  (NO RACE VARIABLE): >60 ML/MIN/1.73 M^2
GLUCOSE SERPL-MCNC: 98 MG/DL (ref 70–110)
HCT VFR BLD AUTO: 25.4 % (ref 37–48.5)
HGB BLD-MCNC: 8.8 G/DL (ref 12–16)
IMM GRANULOCYTES # BLD AUTO: 0.02 K/UL (ref 0–0.04)
IMM GRANULOCYTES NFR BLD AUTO: 0.5 % (ref 0–0.5)
LYMPHOCYTES # BLD AUTO: 1.4 K/UL (ref 1–4.8)
LYMPHOCYTES NFR BLD: 30.9 % (ref 18–48)
MCH RBC QN AUTO: 33.7 PG (ref 27–31)
MCHC RBC AUTO-ENTMCNC: 34.6 G/DL (ref 32–36)
MCV RBC AUTO: 97 FL (ref 82–98)
MONOCYTES # BLD AUTO: 0.9 K/UL (ref 0.3–1)
MONOCYTES NFR BLD: 20.2 % (ref 4–15)
NEUTROPHILS # BLD AUTO: 2 K/UL (ref 1.8–7.7)
NEUTROPHILS NFR BLD: 44.8 % (ref 38–73)
NRBC BLD-RTO: 0 /100 WBC
PLATELET # BLD AUTO: 56 K/UL (ref 150–450)
PLPETH BLD-MCNC: <10 NG/ML
PMV BLD AUTO: 10.1 FL (ref 9.2–12.9)
POPETH BLD-MCNC: <10 NG/ML
POTASSIUM SERPL-SCNC: 3.1 MMOL/L (ref 3.5–5.1)
PROT SERPL-MCNC: 6.1 G/DL (ref 6–8.4)
RBC # BLD AUTO: 2.61 M/UL (ref 4–5.4)
SODIUM SERPL-SCNC: 136 MMOL/L (ref 136–145)
TROPONIN I SERPL DL<=0.01 NG/ML-MCNC: 0.02 NG/ML (ref 0–0.03)
WBC # BLD AUTO: 4.4 K/UL (ref 3.9–12.7)

## 2023-10-06 PROCEDURE — 93010 ELECTROCARDIOGRAM REPORT: CPT | Mod: ,,, | Performed by: INTERNAL MEDICINE

## 2023-10-06 PROCEDURE — 83880 ASSAY OF NATRIURETIC PEPTIDE: CPT | Performed by: NURSE PRACTITIONER

## 2023-10-06 PROCEDURE — 84484 ASSAY OF TROPONIN QUANT: CPT | Performed by: NURSE PRACTITIONER

## 2023-10-06 PROCEDURE — 93010 EKG 12-LEAD: ICD-10-PCS | Mod: ,,, | Performed by: INTERNAL MEDICINE

## 2023-10-06 PROCEDURE — 99285 EMERGENCY DEPT VISIT HI MDM: CPT | Mod: 25

## 2023-10-06 PROCEDURE — 85025 COMPLETE CBC W/AUTO DIFF WBC: CPT | Performed by: NURSE PRACTITIONER

## 2023-10-06 PROCEDURE — 80053 COMPREHEN METABOLIC PANEL: CPT | Performed by: NURSE PRACTITIONER

## 2023-10-06 PROCEDURE — 93005 ELECTROCARDIOGRAM TRACING: CPT

## 2023-10-06 PROCEDURE — 25000003 PHARM REV CODE 250: Performed by: EMERGENCY MEDICINE

## 2023-10-06 RX ORDER — POTASSIUM CHLORIDE 20 MEQ/1
40 TABLET, EXTENDED RELEASE ORAL
Status: COMPLETED | OUTPATIENT
Start: 2023-10-06 | End: 2023-10-06

## 2023-10-06 RX ORDER — POTASSIUM CHLORIDE 750 MG/1
10 TABLET, EXTENDED RELEASE ORAL DAILY
Qty: 30 TABLET | Refills: 0 | Status: ON HOLD | OUTPATIENT
Start: 2023-10-06 | End: 2023-10-28

## 2023-10-06 RX ADMIN — POTASSIUM CHLORIDE 40 MEQ: 1500 TABLET, EXTENDED RELEASE ORAL at 02:10

## 2023-10-06 NOTE — ED PROVIDER NOTES
SCRIBE #1 NOTE: I, Duane Lauren, am scribing for, and in the presence of, Chong Frost MD. I have scribed the entire note.       History     Chief Complaint   Patient presents with    Shortness of Breath     Pt reports SOB with increased edema, reports she just restarted her lasix. Hx cirrhosis      Review of patient's allergies indicates:  No Known Allergies      History of Present Illness     HPI    10/6/2023, 1:59 AM  History obtained from the patient      History of Present Illness: Mara Mojica is a 58 y.o. female patient with a PMHx of alcoholic cirrhosis of liver, kidney failure who presents to the Emergency Department for evaluation of SOB which onset gradually yesterday. Pt states she no longer takes spironolactone due to increased acid reflux. Symptoms are constant and moderate in severity. No mitigating or exacerbating factors reported. Associated sxs include BLE swelling, abdominal distension, fatigue, generalized weakness, dizziness. Patient denies any fever, chills, CP, HA, and all other sxs at this time. No further complaints or concerns at this time.       Arrival mode: EMS    PCP: Osiris Nevarez NP        Past Medical History:  Past Medical History:   Diagnosis Date    Alcoholic cirrhosis of liver     Kidney failure     Unilateral inguinal hernia, without obstruction or gangrene, not specified as recurrent        Past Surgical History:  Past Surgical History:   Procedure Laterality Date    ESOPHAGOGASTRODUODENOSCOPY N/A 9/21/2023    Procedure: EGD (ESOPHAGOGASTRODUODENOSCOPY);  Surgeon: Melissa Edwards MD;  Location: [facility];  Service: Endoscopy;  Laterality: N/A;    HERNIA REPAIR           Family History:  No family history on file.    Social History:  Social History     Tobacco Use    Smoking status: Every Day     Current packs/day: 0.50     Types: Cigarettes     Passive exposure: Never    Smokeless tobacco: Never   Substance and Sexual Activity    Alcohol use: Yes    Drug use: Not on  file    Sexual activity: Not on file        Review of Systems     Review of Systems   Constitutional:  Positive for fatigue. Negative for fever.   HENT:  Negative for sore throat.    Respiratory:  Positive for shortness of breath.    Cardiovascular:  Positive for leg swelling (BLE). Negative for chest pain.   Gastrointestinal:  Positive for abdominal distention. Negative for abdominal pain and nausea.   Genitourinary:  Negative for dysuria.   Musculoskeletal:  Negative for back pain.   Skin:  Negative for rash.   Neurological:  Positive for dizziness. Negative for weakness.   Hematological:  Does not bruise/bleed easily.   All other systems reviewed and are negative.       Physical Exam     Initial Vitals [10/06/23 0040]   BP Pulse Resp Temp SpO2   (!) 169/98 108 18 98.7 °F (37.1 °C) 98 %      MAP       --          Physical Exam   Nursing Notes and Vital Signs Reviewed.  Constitutional: Patient is in no acute distress. Well-developed and well-nourished.  Head: Atraumatic. Normocephalic.  Eyes: PERRL. EOM intact. Conjunctivae are not pale. Pt has scleral icterus.  ENT: Mucous membranes are moist. Oropharynx is clear and symmetric.    Neck: Supple. Full ROM. No lymphadenopathy.  Cardiovascular: Regular rate. Regular rhythm. No murmurs, rubs, or gallops. Distal pulses are 2+ and symmetric.  Pulmonary/Chest: No respiratory distress. Clear to auscultation bilaterally. No wheezing or rales.  Abdominal: Pt has signs of ascites.  There is no tenderness.  No rebound, guarding, or rigidity. Good bowel sounds.  Genitourinary: No CVA tenderness  Musculoskeletal: Moves all extremities. No obvious deformities. No edema. No calf tenderness.  Skin: Warm and dry.  Neurological:  Alert, awake, and appropriate.  Normal speech.  No acute focal neurological deficits are appreciated.  Psychiatric: Normal affect. Good eye contact. Appropriate in content.     ED Course   Procedures  ED Vital Signs:  Vitals:    10/06/23 0040 10/06/23 0120  "10/06/23 0132 10/06/23 0201   BP: (!) 169/98  (!) 146/68 (!) 120/57   Pulse: 108  100 99   Resp: 18  (!) 21 15   Temp: 98.7 °F (37.1 °C)      TempSrc: Oral      SpO2: 98%  100% 98%   Weight:  59.7 kg (131 lb 9.8 oz)     Height: 4' 11" (1.499 m)       10/06/23 0232   BP: (!) 158/70   Pulse: 102   Resp: 17   Temp:    TempSrc:    SpO2: 100%   Weight:    Height:        Abnormal Lab Results:  Labs Reviewed   B-TYPE NATRIURETIC PEPTIDE - Abnormal; Notable for the following components:       Result Value     (*)     All other components within normal limits   CBC W/ AUTO DIFFERENTIAL - Abnormal; Notable for the following components:    RBC 2.61 (*)     Hemoglobin 8.8 (*)     Hematocrit 25.4 (*)     MCH 33.7 (*)     RDW 15.0 (*)     Platelets 56 (*)     Mono % 20.2 (*)     All other components within normal limits   COMPREHENSIVE METABOLIC PANEL - Abnormal; Notable for the following components:    Potassium 3.1 (*)     Calcium 8.2 (*)     Albumin 2.4 (*)     Total Bilirubin 5.0 (*)     Alkaline Phosphatase 167 (*)     AST 55 (*)     All other components within normal limits   TROPONIN I        All Lab Results:  Results for orders placed or performed during the hospital encounter of 10/06/23   Brain natriuretic peptide   Result Value Ref Range     (H) 0 - 99 pg/mL   CBC auto differential   Result Value Ref Range    WBC 4.40 3.90 - 12.70 K/uL    RBC 2.61 (L) 4.00 - 5.40 M/uL    Hemoglobin 8.8 (L) 12.0 - 16.0 g/dL    Hematocrit 25.4 (L) 37.0 - 48.5 %    MCV 97 82 - 98 fL    MCH 33.7 (H) 27.0 - 31.0 pg    MCHC 34.6 32.0 - 36.0 g/dL    RDW 15.0 (H) 11.5 - 14.5 %    Platelets 56 (L) 150 - 450 K/uL    MPV 10.1 9.2 - 12.9 fL    Immature Granulocytes 0.5 0.0 - 0.5 %    Gran # (ANC) 2.0 1.8 - 7.7 K/uL    Immature Grans (Abs) 0.02 0.00 - 0.04 K/uL    Lymph # 1.4 1.0 - 4.8 K/uL    Mono # 0.9 0.3 - 1.0 K/uL    Eos # 0.1 0.0 - 0.5 K/uL    Baso # 0.04 0.00 - 0.20 K/uL    nRBC 0 0 /100 WBC    Gran % 44.8 38.0 - 73.0 %    " Lymph % 30.9 18.0 - 48.0 %    Mono % 20.2 (H) 4.0 - 15.0 %    Eosinophil % 2.7 0.0 - 8.0 %    Basophil % 0.9 0.0 - 1.9 %    Differential Method Automated    Troponin I   Result Value Ref Range    Troponin I 0.018 0.000 - 0.026 ng/mL   Comprehensive metabolic panel   Result Value Ref Range    Sodium 136 136 - 145 mmol/L    Potassium 3.1 (L) 3.5 - 5.1 mmol/L    Chloride 102 95 - 110 mmol/L    CO2 24 23 - 29 mmol/L    Glucose 98 70 - 110 mg/dL    BUN 11 6 - 20 mg/dL    Creatinine 0.8 0.5 - 1.4 mg/dL    Calcium 8.2 (L) 8.7 - 10.5 mg/dL    Total Protein 6.1 6.0 - 8.4 g/dL    Albumin 2.4 (L) 3.5 - 5.2 g/dL    Total Bilirubin 5.0 (H) 0.1 - 1.0 mg/dL    Alkaline Phosphatase 167 (H) 55 - 135 U/L    AST 55 (H) 10 - 40 U/L    ALT 37 10 - 44 U/L    eGFR >60 >60 mL/min/1.73 m^2    Anion Gap 10 8 - 16 mmol/L         Imaging Results:  Imaging Results              X-Ray Chest 1 View (In process)                      The EKG was ordered, reviewed, and independently interpreted by the ED provider.  Interpretation time: 01:36  Rate: 97 BPM  Rhythm: normal sinus rhythm  Interpretation: Nonspecific T wave abnormality. Prolonged QT. No STEMI.    The Emergency Provider reviewed the vital signs and test results, which are outlined above.     ED Discussion       2:48 AM: Reassessed pt at this time. Discussed with pt all pertinent ED information and results. Discussed need for additional supplementation of potassium. Discussed pt dx and plan of tx. Gave pt all f/u and return to the ED instructions. All questions and concerns were addressed at this time. Pt expresses understanding of information and instructions, and is comfortable with plan to discharge. Pt is stable for discharge.    I discussed with patient and/or family/caretaker that evaluation in the ED does not suggest any emergent or life threatening medical conditions requiring immediate intervention beyond what was provided in the ED, and I believe patient is safe for discharge.   Regardless, an unremarkable evaluation in the ED does not preclude the development or presence of a serious of life threatening condition. As such, patient was instructed to return immediately for any worsening or change in current symptoms.         Medical Decision Making  Amount and/or Complexity of Data Reviewed  Labs: ordered. Decision-making details documented in ED Course.  Radiology: ordered. Decision-making details documented in ED Course.  ECG/medicine tests: ordered and independent interpretation performed. Decision-making details documented in ED Course.    Risk  Prescription drug management.                 ED Medication(s):  Medications   potassium chloride SA CR tablet 40 mEq (40 mEq Oral Given 10/6/23 0245)       Discharge Medication List as of 10/6/2023  2:45 AM        START taking these medications    Details   potassium chloride (KLOR-CON) 10 MEQ TbSR Take 1 tablet (10 mEq total) by mouth once daily., Starting Fri 10/6/2023, Print              Follow-up Information       Shannan Reardon MD In 3 days.    Specialties: Gastroenterology, Hepatology  Contact information:  [address]  [place] [address] [phone]               [facility] - Emergency Dept..    Specialty: Emergency Medicine  Why: As needed, If symptoms worsen  Contact information:  [address] [facility] [address] [phone]                               Scribe Attestation:   Scribe #1: I performed the above scribed service and the documentation accurately describes the services I performed. I attest to the accuracy of the note.     Attending:   Physician Attestation Statement for Scribe #1: I, Chong Frost MD, personally performed the services described in this documentation, as scribed by Duane Aguilar, in my presence, and it is both accurate and complete.           Clinical Impression       ICD-10-CM ICD-9-CM   1. Hypokalemia  E87.6 276.8   2. Shortness of breath  R06.02 786.05   3. Alcoholic  cirrhosis, unspecified whether ascites present  K70.30 571.2       Disposition:   Disposition: Discharged  Condition: Stable         Chong Frost MD  10/06/23 0540

## 2023-10-10 LAB
GENETICIST REVIEW: NORMAL
HFE GENE MUT ANL BLD/T: NORMAL
HFE RELEASED BY: NORMAL
HFE RESULT SUMMARY: NEGATIVE
REF LAB TEST METHOD: NORMAL
SPECIMEN SOURCE: NORMAL
SPECIMEN,  HEMOCHROMATOSIS: NORMAL

## 2023-10-11 ENCOUNTER — TELEPHONE (OUTPATIENT)
Dept: HEPATOLOGY | Facility: CLINIC | Age: 58
End: 2023-10-11
Payer: MEDICAID

## 2023-10-11 ENCOUNTER — OFFICE VISIT (OUTPATIENT)
Dept: HEPATOLOGY | Facility: CLINIC | Age: 58
End: 2023-10-11
Payer: MEDICAID

## 2023-10-11 VITALS
BODY MASS INDEX: 26.66 KG/M2 | HEART RATE: 71 BPM | WEIGHT: 132.25 LBS | SYSTOLIC BLOOD PRESSURE: 134 MMHG | HEIGHT: 59 IN | DIASTOLIC BLOOD PRESSURE: 72 MMHG

## 2023-10-11 DIAGNOSIS — R60.0 BILATERAL LOWER EXTREMITY EDEMA: ICD-10-CM

## 2023-10-11 DIAGNOSIS — R18.8 OTHER ASCITES: ICD-10-CM

## 2023-10-11 DIAGNOSIS — K70.31 ALCOHOLIC CIRRHOSIS OF LIVER WITH ASCITES: Primary | ICD-10-CM

## 2023-10-11 DIAGNOSIS — Z23 NEED FOR HEPATITIS B VACCINATION: ICD-10-CM

## 2023-10-11 DIAGNOSIS — K76.82 HEPATIC ENCEPHALOPATHY: ICD-10-CM

## 2023-10-11 PROCEDURE — 1159F MED LIST DOCD IN RCRD: CPT | Mod: CPTII,,, | Performed by: INTERNAL MEDICINE

## 2023-10-11 PROCEDURE — 3075F PR MOST RECENT SYSTOLIC BLOOD PRESS GE 130-139MM HG: ICD-10-PCS | Mod: CPTII,,, | Performed by: INTERNAL MEDICINE

## 2023-10-11 PROCEDURE — 3075F SYST BP GE 130 - 139MM HG: CPT | Mod: CPTII,,, | Performed by: INTERNAL MEDICINE

## 2023-10-11 PROCEDURE — 3008F PR BODY MASS INDEX (BMI) DOCUMENTED: ICD-10-PCS | Mod: CPTII,,, | Performed by: INTERNAL MEDICINE

## 2023-10-11 PROCEDURE — 99999 PR PBB SHADOW E&M-EST. PATIENT-LVL III: ICD-10-PCS | Mod: PBBFAC,,, | Performed by: INTERNAL MEDICINE

## 2023-10-11 PROCEDURE — 1160F PR REVIEW ALL MEDS BY PRESCRIBER/CLIN PHARMACIST DOCUMENTED: ICD-10-PCS | Mod: CPTII,,, | Performed by: INTERNAL MEDICINE

## 2023-10-11 PROCEDURE — 3078F DIAST BP <80 MM HG: CPT | Mod: CPTII,,, | Performed by: INTERNAL MEDICINE

## 2023-10-11 PROCEDURE — 1159F PR MEDICATION LIST DOCUMENTED IN MEDICAL RECORD: ICD-10-PCS | Mod: CPTII,,, | Performed by: INTERNAL MEDICINE

## 2023-10-11 PROCEDURE — 3008F BODY MASS INDEX DOCD: CPT | Mod: CPTII,,, | Performed by: INTERNAL MEDICINE

## 2023-10-11 PROCEDURE — 3078F PR MOST RECENT DIASTOLIC BLOOD PRESSURE < 80 MM HG: ICD-10-PCS | Mod: CPTII,,, | Performed by: INTERNAL MEDICINE

## 2023-10-11 PROCEDURE — 99213 OFFICE O/P EST LOW 20 MIN: CPT | Mod: PBBFAC | Performed by: INTERNAL MEDICINE

## 2023-10-11 PROCEDURE — 99215 OFFICE O/P EST HI 40 MIN: CPT | Mod: S$PBB,,, | Performed by: INTERNAL MEDICINE

## 2023-10-11 PROCEDURE — 1160F RVW MEDS BY RX/DR IN RCRD: CPT | Mod: CPTII,,, | Performed by: INTERNAL MEDICINE

## 2023-10-11 PROCEDURE — 99215 PR OFFICE/OUTPT VISIT, EST, LEVL V, 40-54 MIN: ICD-10-PCS | Mod: S$PBB,,, | Performed by: INTERNAL MEDICINE

## 2023-10-11 PROCEDURE — 99999 PR PBB SHADOW E&M-EST. PATIENT-LVL III: CPT | Mod: PBBFAC,,, | Performed by: INTERNAL MEDICINE

## 2023-10-11 RX ORDER — SPIRONOLACTONE 100 MG/1
100 TABLET, FILM COATED ORAL DAILY
Qty: 30 TABLET | Refills: 5 | Status: ON HOLD | OUTPATIENT
Start: 2023-10-11 | End: 2023-11-27 | Stop reason: HOSPADM

## 2023-10-11 RX ORDER — LACTULOSE 10 G/15ML
10 SOLUTION ORAL 3 TIMES DAILY
Qty: 1892 ML | Refills: 5 | Status: ON HOLD | OUTPATIENT
Start: 2023-10-11 | End: 2023-11-27 | Stop reason: HOSPADM

## 2023-10-11 NOTE — PROGRESS NOTES
Subjective:     Mara Mojica is here for follow up of cirrhosis.    HPI  Since Mara Mojica's last visit the main issues have been:    Worsening volume overload but she had stopped her aldactone b/c she thought she needed to stop it when stopping the propranolol for side effects. She also has worsening memory issues with delays in cognition. Denies any GIB.    She has issues with fatigue.    Review of Systems    Objective:     Physical Exam  Vitals reviewed.   Constitutional:       General: She is not in acute distress.     Appearance: She is well-developed.   HENT:      Head: Normocephalic and atraumatic.      Mouth/Throat:      Pharynx: No oropharyngeal exudate.   Eyes:      General: Scleral icterus present.         Right eye: No discharge.         Left eye: No discharge.      Conjunctiva/sclera: Conjunctivae normal.      Pupils: Pupils are equal, round, and reactive to light.   Pulmonary:      Effort: Pulmonary effort is normal. No respiratory distress.      Breath sounds: Normal breath sounds. No wheezing.   Abdominal:      General: There is distension.      Palpations: Abdomen is soft.      Tenderness: There is no abdominal tenderness.   Musculoskeletal:      Right lower leg: Edema present.      Left lower leg: Edema present.   Skin:     Coloration: Skin is jaundiced.   Neurological:      Mental Status: She is alert and oriented to person, place, and time.   Psychiatric:         Behavior: Behavior normal.         MELD 3.0: 22 at 10/6/2023  1:19 AM  MELD-Na: 20 at 10/6/2023  1:19 AM  Calculated from:  Serum Creatinine: 0.8 mg/dL (Using min of 1 mg/dL) at 10/6/2023  1:19 AM  Serum Sodium: 136 mmol/L at 10/6/2023  1:19 AM  Total Bilirubin: 5.0 mg/dL at 10/6/2023  1:19 AM  Serum Albumin: 2.4 g/dL at 10/6/2023  1:19 AM  INR(ratio): 1.8 at 10/4/2023  8:50 AM  Age at listing (hypothetical): 58 years  Sex: Female at 10/6/2023  1:19 AM      WBC   Date Value Ref Range Status   10/06/2023 4.40 3.90 - 12.70 K/uL  Final     Hemoglobin   Date Value Ref Range Status   10/06/2023 8.8 (L) 12.0 - 16.0 g/dL Final     Hematocrit   Date Value Ref Range Status   10/06/2023 25.4 (L) 37.0 - 48.5 % Final     Platelets   Date Value Ref Range Status   10/06/2023 56 (L) 150 - 450 K/uL Final     BUN   Date Value Ref Range Status   10/06/2023 11 6 - 20 mg/dL Final     Creatinine   Date Value Ref Range Status   10/06/2023 0.8 0.5 - 1.4 mg/dL Final     Glucose   Date Value Ref Range Status   10/06/2023 98 70 - 110 mg/dL Final     Calcium   Date Value Ref Range Status   10/06/2023 8.2 (L) 8.7 - 10.5 mg/dL Final     Sodium   Date Value Ref Range Status   10/06/2023 136 136 - 145 mmol/L Final     Potassium   Date Value Ref Range Status   10/06/2023 3.1 (L) 3.5 - 5.1 mmol/L Final     Chloride   Date Value Ref Range Status   10/06/2023 102 95 - 110 mmol/L Final     Magnesium   Date Value Ref Range Status   07/19/2023 1.6 1.6 - 2.6 mg/dL Final     AST   Date Value Ref Range Status   10/06/2023 55 (H) 10 - 40 U/L Final     ALT   Date Value Ref Range Status   10/06/2023 37 10 - 44 U/L Final     Alkaline Phosphatase   Date Value Ref Range Status   10/06/2023 167 (H) 55 - 135 U/L Final     Total Bilirubin   Date Value Ref Range Status   10/06/2023 5.0 (H) 0.1 - 1.0 mg/dL Final     Comment:     For infants and newborns, interpretation of results should be based  on gestational age, weight and in agreement with clinical  observations.    Premature Infant recommended reference ranges:  Up to 24 hours.............<8.0 mg/dL  Up to 48 hours............<12.0 mg/dL  3-5 days..................<15.0 mg/dL  6-29 days.................<15.0 mg/dL       Albumin   Date Value Ref Range Status   10/06/2023 2.4 (L) 3.5 - 5.2 g/dL Final     INR   Date Value Ref Range Status   10/04/2023 1.8 (H) 0.8 - 1.2 Final     Comment:     Coumadin Therapy:  2.0 - 3.0 for INR for all indicators except mechanical heart valves  and antiphospholipid syndromes which should use 2.5 -  3.5.           Assessment/Plan:     1. Alcoholic cirrhosis of liver with ascites    2. Other ascites    3. Bilateral lower extremity edema    4. Need for hepatitis B vaccination    5. Hepatic encephalopathy      Mara Mojica is a 58 y.o. female withCirrhosis      Alcoholic cirrhosis of liver with ascites-decomp with elevated MELD. Recommend she proceed with liver transplant evaluation. Advised patient and caregiver that autoimmune Ab + but low concern for active disease, hard to tell if there was a component of AIH that contributed to cirrhosis.  -Continue to monitor MELD labs  -Continue HCC surveillance  -Continue variceal surveillance-9/2026  -     Basic Metabolic Panel; Future; Expected date: 10/11/2023  -     Comprehensive Metabolic Panel; Future; Expected date: 10/11/2023  -     CBC Auto Differential; Future; Expected date: 10/11/2023  -     Protime-INR; Future; Expected date: 10/11/2023    Other ascites-uncontrolled  -Advised she restart aldactone and monitor volume status and for side effects  -     spironolactone (ALDACTONE) 100 MG tablet; Take 1 tablet (100 mg total) by mouth once daily.  Dispense: 30 tablet; Refill: 5    Bilateral lower extremity edema-uncontrolled, plan per ascites  -     spironolactone (ALDACTONE) 100 MG tablet; Take 1 tablet (100 mg total) by mouth once daily.  Dispense: 30 tablet; Refill: 5    Need for hepatitis B vaccination  -Can be done at a pharmacy    Hepatic encephalopathy-concerned she is having mild HE  -Start lactulose  -     lactulose (CHRONULAC) 20 gram/30 mL Soln; Take 15 mLs (10 g total) by mouth 3 (three) times daily.  Dispense: 1892 mL; Refill: 5    RTC in 8-12 weeks with preclinic labs      Shannan Reardon MD

## 2023-10-11 NOTE — TELEPHONE ENCOUNTER
----- Message from Annette Cobos sent at 10/11/2023  3:31 PM CDT -----  Contact: Mara  Patient is calling to speak with someone regarding visit. Patient reports receiving a call regarding arriving earlier for visit and will arrive for 4:15 p. Please give patient a call back at [phone], if needing to reschedule.  Thank you,  GH

## 2023-10-12 ENCOUNTER — TELEPHONE (OUTPATIENT)
Dept: TRANSPLANT | Facility: CLINIC | Age: 58
End: 2023-10-12
Payer: MEDICAID

## 2023-10-12 NOTE — TELEPHONE ENCOUNTER
Referral received from Dr ANANT PIMENTEL   Initial  referral from Una Baker MD  Patient with Alcoholic cirrhosis of liver with ascites-decomp with elevated MELD 22  ICD-10:  K74.60   Referred for liver transplant for EVALUATION.    Referral completed and forwarded to [facility].          Insurance:   PRIMARY:   ID:  Contact #     SECONDARY:   ID:  Contact #

## 2023-10-14 ENCOUNTER — HOSPITAL ENCOUNTER (INPATIENT)
Facility: HOSPITAL | Age: 58
LOS: 2 days | Discharge: LEFT BEFORE DISCHARGE | DRG: 390 | End: 2023-10-16
Attending: EMERGENCY MEDICINE | Admitting: FAMILY MEDICINE
Payer: MEDICAID

## 2023-10-14 DIAGNOSIS — D64.9 ANEMIA, UNSPECIFIED TYPE: ICD-10-CM

## 2023-10-14 DIAGNOSIS — E87.6 HYPOKALEMIA: ICD-10-CM

## 2023-10-14 DIAGNOSIS — K56.609 SBO (SMALL BOWEL OBSTRUCTION): Primary | ICD-10-CM

## 2023-10-14 DIAGNOSIS — Z01.89 ENCOUNTER FOR IMAGING STUDY TO CONFIRM NASOGASTRIC (NG) TUBE PLACEMENT: ICD-10-CM

## 2023-10-14 DIAGNOSIS — R17 JAUNDICE: ICD-10-CM

## 2023-10-14 DIAGNOSIS — Z87.19 HX OF CIRRHOSIS: ICD-10-CM

## 2023-10-14 DIAGNOSIS — R10.9 ABDOMINAL PAIN: ICD-10-CM

## 2023-10-14 LAB
ALBUMIN SERPL BCP-MCNC: 2.7 G/DL (ref 3.5–5.2)
ALP SERPL-CCNC: 121 U/L (ref 55–135)
ALT SERPL W/O P-5'-P-CCNC: 40 U/L (ref 10–44)
AMMONIA PLAS-SCNC: 42 UMOL/L (ref 10–50)
AMPHET+METHAMPHET UR QL: NEGATIVE
ANION GAP SERPL CALC-SCNC: 13 MMOL/L (ref 8–16)
AST SERPL-CCNC: 59 U/L (ref 10–40)
BARBITURATES UR QL SCN>200 NG/ML: NEGATIVE
BASOPHILS # BLD AUTO: 0.05 K/UL (ref 0–0.2)
BASOPHILS NFR BLD: 1.2 % (ref 0–1.9)
BENZODIAZ UR QL SCN>200 NG/ML: NEGATIVE
BILIRUB SERPL-MCNC: 7.8 MG/DL (ref 0.1–1)
BILIRUB UR QL STRIP: NEGATIVE
BUN SERPL-MCNC: 11 MG/DL (ref 6–20)
BZE UR QL SCN: NEGATIVE
CALCIUM SERPL-MCNC: 9.1 MG/DL (ref 8.7–10.5)
CANNABINOIDS UR QL SCN: NEGATIVE
CHLORIDE SERPL-SCNC: 100 MMOL/L (ref 95–110)
CLARITY UR: CLEAR
CO2 SERPL-SCNC: 24 MMOL/L (ref 23–29)
COLOR UR: YELLOW
CREAT SERPL-MCNC: 1 MG/DL (ref 0.5–1.4)
CREAT UR-MCNC: 30.1 MG/DL (ref 15–325)
DIFFERENTIAL METHOD: ABNORMAL
EOSINOPHIL # BLD AUTO: 0 K/UL (ref 0–0.5)
EOSINOPHIL NFR BLD: 0.7 % (ref 0–8)
ERYTHROCYTE [DISTWIDTH] IN BLOOD BY AUTOMATED COUNT: 14.7 % (ref 11.5–14.5)
EST. GFR  (NO RACE VARIABLE): >60 ML/MIN/1.73 M^2
ETHANOL SERPL-MCNC: <10 MG/DL
GLUCOSE SERPL-MCNC: 97 MG/DL (ref 70–110)
GLUCOSE UR QL STRIP: NEGATIVE
HCT VFR BLD AUTO: 29.5 % (ref 37–48.5)
HGB BLD-MCNC: 10.1 G/DL (ref 12–16)
HGB UR QL STRIP: NEGATIVE
IMM GRANULOCYTES # BLD AUTO: 0.01 K/UL (ref 0–0.04)
IMM GRANULOCYTES NFR BLD AUTO: 0.2 % (ref 0–0.5)
KETONES UR QL STRIP: NEGATIVE
LEUKOCYTE ESTERASE UR QL STRIP: NEGATIVE
LIPASE SERPL-CCNC: 77 U/L (ref 4–60)
LYMPHOCYTES # BLD AUTO: 1.2 K/UL (ref 1–4.8)
LYMPHOCYTES NFR BLD: 27.5 % (ref 18–48)
MAGNESIUM SERPL-MCNC: 1.4 MG/DL (ref 1.6–2.6)
MCH RBC QN AUTO: 33.7 PG (ref 27–31)
MCHC RBC AUTO-ENTMCNC: 34.2 G/DL (ref 32–36)
MCV RBC AUTO: 98 FL (ref 82–98)
METHADONE UR QL SCN>300 NG/ML: NEGATIVE
MONOCYTES # BLD AUTO: 0.6 K/UL (ref 0.3–1)
MONOCYTES NFR BLD: 14.5 % (ref 4–15)
NEUTROPHILS # BLD AUTO: 2.4 K/UL (ref 1.8–7.7)
NEUTROPHILS NFR BLD: 55.9 % (ref 38–73)
NITRITE UR QL STRIP: NEGATIVE
NRBC BLD-RTO: 0 /100 WBC
OPIATES UR QL SCN: NEGATIVE
PCP UR QL SCN>25 NG/ML: NEGATIVE
PH UR STRIP: 7 [PH] (ref 5–8)
PLATELET # BLD AUTO: 67 K/UL (ref 150–450)
PMV BLD AUTO: 10.5 FL (ref 9.2–12.9)
POTASSIUM SERPL-SCNC: 3.1 MMOL/L (ref 3.5–5.1)
PROT SERPL-MCNC: 6.7 G/DL (ref 6–8.4)
PROT UR QL STRIP: NEGATIVE
RBC # BLD AUTO: 3 M/UL (ref 4–5.4)
SODIUM SERPL-SCNC: 137 MMOL/L (ref 136–145)
SP GR UR STRIP: 1.01 (ref 1–1.03)
TOXICOLOGY INFORMATION: NORMAL
TROPONIN I SERPL DL<=0.01 NG/ML-MCNC: 0.01 NG/ML (ref 0–0.03)
URN SPEC COLLECT METH UR: NORMAL
UROBILINOGEN UR STRIP-ACNC: NEGATIVE EU/DL
WBC # BLD AUTO: 4.22 K/UL (ref 3.9–12.7)

## 2023-10-14 PROCEDURE — 93010 EKG 12-LEAD: ICD-10-PCS | Mod: NTX,,, | Performed by: INTERNAL MEDICINE

## 2023-10-14 PROCEDURE — 93005 ELECTROCARDIOGRAM TRACING: CPT | Mod: NTX

## 2023-10-14 PROCEDURE — 25500020 PHARM REV CODE 255: Mod: NTX | Performed by: EMERGENCY MEDICINE

## 2023-10-14 PROCEDURE — 85025 COMPLETE CBC W/AUTO DIFF WBC: CPT | Mod: NTX | Performed by: EMERGENCY MEDICINE

## 2023-10-14 PROCEDURE — 93010 ELECTROCARDIOGRAM REPORT: CPT | Mod: NTX,,, | Performed by: INTERNAL MEDICINE

## 2023-10-14 PROCEDURE — 84484 ASSAY OF TROPONIN QUANT: CPT | Mod: NTX | Performed by: EMERGENCY MEDICINE

## 2023-10-14 PROCEDURE — 11000001 HC ACUTE MED/SURG PRIVATE ROOM: Mod: NTX

## 2023-10-14 PROCEDURE — 82077 ASSAY SPEC XCP UR&BREATH IA: CPT | Mod: NTX | Performed by: EMERGENCY MEDICINE

## 2023-10-14 PROCEDURE — 80053 COMPREHEN METABOLIC PANEL: CPT | Mod: NTX | Performed by: EMERGENCY MEDICINE

## 2023-10-14 PROCEDURE — 83735 ASSAY OF MAGNESIUM: CPT | Mod: NTX | Performed by: EMERGENCY MEDICINE

## 2023-10-14 PROCEDURE — 81003 URINALYSIS AUTO W/O SCOPE: CPT | Mod: NTX | Performed by: EMERGENCY MEDICINE

## 2023-10-14 PROCEDURE — 80307 DRUG TEST PRSMV CHEM ANLYZR: CPT | Mod: NTX | Performed by: EMERGENCY MEDICINE

## 2023-10-14 PROCEDURE — 63600175 PHARM REV CODE 636 W HCPCS: Mod: NTX | Performed by: FAMILY MEDICINE

## 2023-10-14 PROCEDURE — 99291 CRITICAL CARE FIRST HOUR: CPT | Mod: NTX

## 2023-10-14 PROCEDURE — 25000003 PHARM REV CODE 250: Mod: NTX | Performed by: EMERGENCY MEDICINE

## 2023-10-14 PROCEDURE — 82140 ASSAY OF AMMONIA: CPT | Mod: NTX | Performed by: EMERGENCY MEDICINE

## 2023-10-14 PROCEDURE — 63600175 PHARM REV CODE 636 W HCPCS: Mod: NTX | Performed by: EMERGENCY MEDICINE

## 2023-10-14 PROCEDURE — 83690 ASSAY OF LIPASE: CPT | Mod: NTX | Performed by: EMERGENCY MEDICINE

## 2023-10-14 RX ORDER — KETOROLAC TROMETHAMINE 30 MG/ML
15 INJECTION, SOLUTION INTRAMUSCULAR; INTRAVENOUS
Status: COMPLETED | OUTPATIENT
Start: 2023-10-14 | End: 2023-10-14

## 2023-10-14 RX ORDER — ONDANSETRON 2 MG/ML
4 INJECTION INTRAMUSCULAR; INTRAVENOUS EVERY 6 HOURS PRN
Status: DISCONTINUED | OUTPATIENT
Start: 2023-10-14 | End: 2023-10-16 | Stop reason: HOSPADM

## 2023-10-14 RX ORDER — MORPHINE SULFATE 4 MG/ML
4 INJECTION, SOLUTION INTRAMUSCULAR; INTRAVENOUS
Status: COMPLETED | OUTPATIENT
Start: 2023-10-14 | End: 2023-10-14

## 2023-10-14 RX ORDER — DEXTROSE MONOHYDRATE, SODIUM CHLORIDE, AND POTASSIUM CHLORIDE 50; 1.49; 4.5 G/1000ML; G/1000ML; G/1000ML
INJECTION, SOLUTION INTRAVENOUS CONTINUOUS
Status: DISCONTINUED | OUTPATIENT
Start: 2023-10-14 | End: 2023-10-15

## 2023-10-14 RX ORDER — MORPHINE SULFATE 4 MG/ML
4 INJECTION, SOLUTION INTRAMUSCULAR; INTRAVENOUS EVERY 6 HOURS PRN
Status: DISCONTINUED | OUTPATIENT
Start: 2023-10-14 | End: 2023-10-16 | Stop reason: HOSPADM

## 2023-10-14 RX ORDER — HYDRALAZINE HYDROCHLORIDE 20 MG/ML
10 INJECTION INTRAMUSCULAR; INTRAVENOUS EVERY 6 HOURS PRN
Status: DISCONTINUED | OUTPATIENT
Start: 2023-10-14 | End: 2023-10-16 | Stop reason: HOSPADM

## 2023-10-14 RX ORDER — ONDANSETRON 2 MG/ML
4 INJECTION INTRAMUSCULAR; INTRAVENOUS
Status: COMPLETED | OUTPATIENT
Start: 2023-10-14 | End: 2023-10-14

## 2023-10-14 RX ORDER — KETOROLAC TROMETHAMINE 30 MG/ML
30 INJECTION, SOLUTION INTRAMUSCULAR; INTRAVENOUS
Status: DISCONTINUED | OUTPATIENT
Start: 2023-10-14 | End: 2023-10-14

## 2023-10-14 RX ORDER — POTASSIUM CHLORIDE 7.45 MG/ML
10 INJECTION INTRAVENOUS
Status: COMPLETED | OUTPATIENT
Start: 2023-10-14 | End: 2023-10-14

## 2023-10-14 RX ORDER — ACETAMINOPHEN 650 MG/1
650 SUPPOSITORY RECTAL EVERY 6 HOURS PRN
Status: DISCONTINUED | OUTPATIENT
Start: 2023-10-14 | End: 2023-10-16 | Stop reason: HOSPADM

## 2023-10-14 RX ADMIN — KETOROLAC TROMETHAMINE 15 MG: 30 INJECTION, SOLUTION INTRAMUSCULAR; INTRAVENOUS at 12:10

## 2023-10-14 RX ADMIN — ONDANSETRON 4 MG: 2 INJECTION INTRAMUSCULAR; INTRAVENOUS at 12:10

## 2023-10-14 RX ADMIN — MORPHINE SULFATE 4 MG: 4 INJECTION INTRAVENOUS at 04:10

## 2023-10-14 RX ADMIN — POTASSIUM CHLORIDE 10 MEQ: 7.46 INJECTION, SOLUTION INTRAVENOUS at 05:10

## 2023-10-14 RX ADMIN — POTASSIUM BICARBONATE 40 MEQ: 391 TABLET, EFFERVESCENT ORAL at 12:10

## 2023-10-14 RX ADMIN — IOHEXOL 100 ML: 350 INJECTION, SOLUTION INTRAVENOUS at 02:10

## 2023-10-14 RX ADMIN — DEXTROSE, SODIUM CHLORIDE, AND POTASSIUM CHLORIDE: 5; .45; .15 INJECTION INTRAVENOUS at 08:10

## 2023-10-14 RX ADMIN — MORPHINE SULFATE 4 MG: 4 INJECTION INTRAVENOUS at 02:10

## 2023-10-14 NOTE — HPI
Patient is a 58 y.o.  female with a PMHx of alcoholic cirrhosis who presents to the Emergency Department for evaluation of R-sided abd pain which onset 12 hrs PTA. She reports thinking it was her liver hurting however the pain persistent. She reported having a small liquid bowel movement yesterday but nothing since. States she had a hernia repair surgery several years ago for an incarcerated hernia. Currently sees Dr. Reardon outpatient and reports needing a liver transplant. Denies any current alcohol use. Patient reports nausea however has not vomited.     In the Ed, CT abd and pelvis was concerning for sbo. K: 3.1, biliribuin: 7.8. Ng tube to be placed in ED and general surgery notified.

## 2023-10-14 NOTE — Clinical Note
Diagnosis: SBO (small bowel obstruction) [222005]   Admitting Provider:: EVANGELIST STOCK [[id number]]   Future Attending Provider: EVANGELIST STOCK [[id number]]   Reason for IP Medical Treatment  (Clinical interventions that can only be accomplished in the IP setting? ) :: SBO   I certify that Inpatient services for greater than or equal to 2 midnights are medically necessary:: Yes   Plans for Post-Acute care--if anticipated (pick the single best option):: A. No post acute care anticipated at this time

## 2023-10-14 NOTE — ASSESSMENT & PLAN NOTE
CT scan reviewed  NG tube to be placed in ed  Keep NPO  Surgery consulted  Maintenance IVF  zofran prn  Morphine prn

## 2023-10-14 NOTE — ED NOTES
Mara is AAOx4. No acute findings or s/s of distress. She is compliant with treatment and plan of care. Will continue to monitor/treat per MD orders.
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
## 2023-10-14 NOTE — PHARMACY MED REC
"Admission Medication History     The home medication history was taken by Satinder Dale.    You may go to "Admission" then "Reconcile Home Medications" tabs to review and/or act upon these items.     The home medication list has been updated by the Pharmacy department.   Please read ALL comments highlighted in yellow.   Please address this information as you see fit.    Feel free to contact us if you have any questions or require assistance.      Medications listed below were obtained from: Analytic software- [employer] and Medical records  (Not in a hospital admission)    Satinder Dale  [id number]    Current Outpatient Medications on File Prior to Encounter   Medication Sig Dispense Refill Last Dose    furosemide (LASIX) 40 MG tablet Take 1 tablet (40 mg total) by mouth once daily. 30 tablet 5 10/14/2023    lactulose (CHRONULAC) 20 gram/30 mL Soln Take 15 mLs (10 g total) by mouth 3 (three) times daily. 1892 mL 5 10/14/2023    potassium chloride (KLOR-CON) 10 MEQ TbSR Take 1 tablet (10 mEq total) by mouth once daily. 30 tablet 0 10/14/2023    spironolactone (ALDACTONE) 100 MG tablet Take 1 tablet (100 mg total) by mouth once daily. 30 tablet 5 10/14/2023                         .          "

## 2023-10-14 NOTE — ED NOTES
Mara request assistance with urinary waste management due to generalized weakness.     Intervention: Pure Wick external catheter applied. The wick was carefully placed near the urethral meatus to facilitate absorption of urine. Mara tolerated well. Will continue to monitor patency while in use.

## 2023-10-14 NOTE — SUBJECTIVE & OBJECTIVE
Past Medical History:   Diagnosis Date    Alcoholic cirrhosis of liver     Kidney failure     Unilateral inguinal hernia, without obstruction or gangrene, not specified as recurrent        Past Surgical History:   Procedure Laterality Date    ESOPHAGOGASTRODUODENOSCOPY N/A 9/21/2023    Procedure: EGD (ESOPHAGOGASTRODUODENOSCOPY);  Surgeon: Melissa Edwards MD;  Location: [facility];  Service: Endoscopy;  Laterality: N/A;    HERNIA REPAIR         Review of patient's allergies indicates:  No Known Allergies    No current facility-administered medications on file prior to encounter.     Current Outpatient Medications on File Prior to Encounter   Medication Sig    furosemide (LASIX) 40 MG tablet Take 1 tablet (40 mg total) by mouth once daily.    lactulose (CHRONULAC) 20 gram/30 mL Soln Take 15 mLs (10 g total) by mouth 3 (three) times daily.    potassium chloride (KLOR-CON) 10 MEQ TbSR Take 1 tablet (10 mEq total) by mouth once daily.    spironolactone (ALDACTONE) 100 MG tablet Take 1 tablet (100 mg total) by mouth once daily.     Family History    None       Tobacco Use    Smoking status: Every Day     Current packs/day: 0.50     Types: Cigarettes     Passive exposure: Never    Smokeless tobacco: Never   Substance and Sexual Activity    Alcohol use: Yes    Drug use: Not on file    Sexual activity: Not on file     Review of Systems   Constitutional:  Negative for fatigue and fever.   HENT:  Negative for sinus pressure.    Eyes:  Negative for visual disturbance.   Respiratory:  Negative for shortness of breath.    Cardiovascular:  Negative for chest pain.   Gastrointestinal:  Positive for abdominal pain and nausea. Negative for vomiting.   Genitourinary:  Negative for difficulty urinating.   Musculoskeletal:  Negative for back pain.   Skin:  Negative for rash.   Neurological:  Negative for headaches.   Psychiatric/Behavioral:  Negative for confusion.      Objective:     Vital Signs (Most Recent):  Temp: 98 °F (36.7  °C) (10/14/23 1525)  Pulse: 90 (10/14/23 1525)  Resp: 20 (10/14/23 1525)  BP: (!) 182/76 (10/14/23 1525)  SpO2: 100 % (10/14/23 1525) Vital Signs (24h Range):  Temp:  [97 °F (36.1 °C)-98 °F (36.7 °C)] 98 °F (36.7 °C)  Pulse:  [] 90  Resp:  [18-20] 20  SpO2:  [100 %] 100 %  BP: (130-182)/(66-76) 182/76        There is no height or weight on file to calculate BMI.     Physical Exam  Constitutional:       General: She is not in acute distress.     Appearance: She is well-developed. She is not diaphoretic.   HENT:      Head: Normocephalic and atraumatic.   Eyes:      General: Scleral icterus present.      Pupils: Pupils are equal, round, and reactive to light.   Cardiovascular:      Rate and Rhythm: Normal rate and regular rhythm.      Heart sounds: Normal heart sounds. No murmur heard.     No friction rub. No gallop.   Pulmonary:      Effort: Pulmonary effort is normal. No respiratory distress.      Breath sounds: Normal breath sounds. No stridor. No wheezing or rales.   Abdominal:      General: Bowel sounds are normal. There is distension.      Palpations: Abdomen is soft. There is no mass.      Tenderness: There is abdominal tenderness. There is no guarding.   Skin:     General: Skin is warm.      Coloration: Skin is jaundiced.      Findings: No erythema.   Neurological:      Mental Status: She is alert and oriented to person, place, and time.              CRANIAL NERVES     CN III, IV, VI   Pupils are equal, round, and reactive to light.       Significant Labs:   Results for orders placed or performed during the hospital encounter of 10/14/23   Urinalysis, Reflex to Urine Culture Urine, Clean Catch    Specimen: Urine   Result Value Ref Range    Specimen UA Urine, Clean Catch     Color, UA Yellow Yellow, Straw, Sarina    Appearance, UA Clear Clear    pH, UA 7.0 5.0 - 8.0    Specific Gravity, UA 1.010 1.005 - 1.030    Protein, UA Negative Negative    Glucose, UA Negative Negative    Ketones, UA Negative Negative     Bilirubin (UA) Negative Negative    Occult Blood UA Negative Negative    Nitrite, UA Negative Negative    Urobilinogen, UA Negative <2.0 EU/dL    Leukocytes, UA Negative Negative   Ethanol   Result Value Ref Range    Alcohol, Serum <10 <10 mg/dL   Drug screen panel, in-house   Result Value Ref Range    Benzodiazepines Negative Negative    Methadone metabolites Negative Negative    Cocaine (Metab.) Negative Negative    Opiate Scrn, Ur Negative Negative    Barbiturate Screen, Ur Negative Negative    Amphetamine Screen, Ur Negative Negative    THC Negative Negative    Phencyclidine Negative Negative    Creatinine, Urine 30.1 15.0 - 325.0 mg/dL    Toxicology Information SEE COMMENT    CBC auto differential   Result Value Ref Range    WBC 4.22 3.90 - 12.70 K/uL    RBC 3.00 (L) 4.00 - 5.40 M/uL    Hemoglobin 10.1 (L) 12.0 - 16.0 g/dL    Hematocrit 29.5 (L) 37.0 - 48.5 %    MCV 98 82 - 98 fL    MCH 33.7 (H) 27.0 - 31.0 pg    MCHC 34.2 32.0 - 36.0 g/dL    RDW 14.7 (H) 11.5 - 14.5 %    Platelets 67 (L) 150 - 450 K/uL    MPV 10.5 9.2 - 12.9 fL    Immature Granulocytes 0.2 0.0 - 0.5 %    Gran # (ANC) 2.4 1.8 - 7.7 K/uL    Immature Grans (Abs) 0.01 0.00 - 0.04 K/uL    Lymph # 1.2 1.0 - 4.8 K/uL    Mono # 0.6 0.3 - 1.0 K/uL    Eos # 0.0 0.0 - 0.5 K/uL    Baso # 0.05 0.00 - 0.20 K/uL    nRBC 0 0 /100 WBC    Gran % 55.9 38.0 - 73.0 %    Lymph % 27.5 18.0 - 48.0 %    Mono % 14.5 4.0 - 15.0 %    Eosinophil % 0.7 0.0 - 8.0 %    Basophil % 1.2 0.0 - 1.9 %    Differential Method Automated    Comprehensive metabolic panel   Result Value Ref Range    Sodium 137 136 - 145 mmol/L    Potassium 3.1 (L) 3.5 - 5.1 mmol/L    Chloride 100 95 - 110 mmol/L    CO2 24 23 - 29 mmol/L    Glucose 97 70 - 110 mg/dL    BUN 11 6 - 20 mg/dL    Creatinine 1.0 0.5 - 1.4 mg/dL    Calcium 9.1 8.7 - 10.5 mg/dL    Total Protein 6.7 6.0 - 8.4 g/dL    Albumin 2.7 (L) 3.5 - 5.2 g/dL    Total Bilirubin 7.8 (H) 0.1 - 1.0 mg/dL    Alkaline Phosphatase 121 55 - 135  U/L    AST 59 (H) 10 - 40 U/L    ALT 40 10 - 44 U/L    eGFR >60 >60 mL/min/1.73 m^2    Anion Gap 13 8 - 16 mmol/L   Lipase   Result Value Ref Range    Lipase 77 (H) 4 - 60 U/L   Ammonia   Result Value Ref Range    Ammonia 42 10 - 50 umol/L   Troponin I   Result Value Ref Range    Troponin I 0.007 0.000 - 0.026 ng/mL        Significant Imaging: CT Abdomen Pelvis With Contrast  Narrative: EXAMINATION:  CT ABDOMEN PELVIS WITH CONTRAST    CLINICAL HISTORY:  Abdominal abscess/infection suspected;Bowel obstruction suspected;    TECHNIQUE:  Standard abdomen and pelvis CT protocol with oral and IV contrast was performed.    COMPARISON:  07/19/2023    FINDINGS:  Finding: The size of the heart is within normal limits.  There are minimal dependent atelectatic changes in both lungs.  There is no pneumothorax or pleural effusion.    There is a small amount of ascites.  The liver has a nodular surface.  There are varices in the left upper quadrant of the abdomen.  The gallbladder, pancreas, spleen, and adrenals are normal in appearance.  There are oval-shaped hypodense areas associated with the kidneys.  One of the larger ones measures 7 mm and is located in the anterior aspect of the midpole of the left kidney.  There is no hydronephrosis or nephrolithiasis.  The ureters and the urinary bladder are normal in appearance.  The uterus and ovaries were not optimally visualized.  The appendix is normal in appearance.  The wall of the antrum of the stomach is prominent in thickness.  It measures 11 mm in thickness.  There are dilated loops of small bowel.  The midportion of the ileum as a diameter of 3.3 cm.  The distal aspect of the ileum has a diameter of 1.2 cm.  There is no pneumoperitoneum.  There is grade 3 anterolisthesis of L5 on S1.  There is bony fusion between the L5 and S1 vertebral bodies.  Impression: 1. There are dilated loops of small bowel. The midportion of the ileum as a diameter of 3.3 cm.  The distal aspect of the  ileum has a diameter of 1.2 cm.  This is characteristic of a small-bowel obstruction between the mid and distal portions of the ileum.  2. The wall of the antrum of the stomach is prominent in thickness. It measures 11 mm in thickness.  3. There is a small amount of ascites.  4. The liver has a nodular surface.  There are varices in the left upper quadrant of the abdomen.  This is characteristic of hepatic cirrhosis.  5. There are oval-shaped hypodense areas associated with the kidneys.  One of the larger ones measures 7 mm and is located in the anterior aspect of the midpole of the left kidney.  This is characteristic of cysts.  6. There is grade 3 anterolisthesis of L5 on S1. There is bony fusion between the L5 and S1 vertebral bodies.  All CT scans at this facility use dose modulation, iterative reconstruction, and/or weight base dosing when appropriate to reduce radiation dose when appropriate to reduce radiation dose to as low as reasonably achievable.    Electronically signed by: Ahsan Lin MD  Date:    10/14/2023  Time:    15:04  X-Ray Abdomen Flat And Erect  Narrative: EXAMINATION:  XR ABDOMEN FLAT AND ERECT    CLINICAL HISTORY:  Abdominal Pain;    COMPARISON:  None    FINDINGS:  There is a prominent amount of gas within the gastrointestinal system.  Individual loops of bowel are not well seen secondary to multiple overlying loops of bowel.  There is no pneumoperitoneum. The bony structures appear intact.  Impression: There is a prominent amount of gas within the gastrointestinal system. Individual loops of bowel are not well seen secondary to multiple overlying loops of bowel.  If additional imaging evaluation is clinically indicated, I recommend consideration of a CT examination of the abdomen and pelvis with oral and IV contrast.    Electronically signed by: Ahsan Lin MD  Date:    10/14/2023  Time:    10:40

## 2023-10-14 NOTE — ED NOTES
Mara is AAOx4. No acute findings or s/s of distress x persistent abd pain . She is complaint with treatment and plan of care. Will continue to monitor/treat per MD orders.

## 2023-10-14 NOTE — H&P
[facility] Emergency Dept.  [facility] Medicine  History & Physical    Patient Name: Mara Mojica  MRN: [id number]  Patient Class: Emergency  Admission Date: 10/14/2023  Attending Physician: Justin De La Cruz MD  Primary Care Provider: Osiris Nevarez NP         Patient information was obtained from patient and ER records.     Subjective:     Principal Problem:SBO (small bowel obstruction)    Chief Complaint:   Chief Complaint   Patient presents with    Abdominal Pain     Abdominal pain/swelling        HPI: Patient is a 58 y.o.  female with a PMHx of alcoholic cirrhosis who presents to the Emergency Department for evaluation of R-sided abd pain which onset 12 hrs PTA. She reports thinking it was her liver hurting however the pain persistent. She reported having a small liquid bowel movement yesterday but nothing since. States she had a hernia repair surgery several years ago for an incarcerated hernia. Currently sees Dr. Reardon outpatient and reports needing a liver transplant. Denies any current alcohol use. Patient reports nausea however has not vomited.     In the Ed, CT abd and pelvis was concerning for sbo. K: 3.1, biliribuin: 7.8. Ng tube to be placed in ED and general surgery notified.           Past Medical History:   Diagnosis Date    Alcoholic cirrhosis of liver     Kidney failure     Unilateral inguinal hernia, without obstruction or gangrene, not specified as recurrent        Past Surgical History:   Procedure Laterality Date    ESOPHAGOGASTRODUODENOSCOPY N/A 9/21/2023    Procedure: EGD (ESOPHAGOGASTRODUODENOSCOPY);  Surgeon: Melissa Edwards MD;  Location: [facility];  Service: Endoscopy;  Laterality: N/A;    HERNIA REPAIR         Review of patient's allergies indicates:  No Known Allergies    No current facility-administered medications on file prior to encounter.     Current Outpatient Medications on File Prior to Encounter   Medication Sig    furosemide (LASIX) 40 MG tablet Take 1 tablet (40 mg  total) by mouth once daily.    lactulose (CHRONULAC) 20 gram/30 mL Soln Take 15 mLs (10 g total) by mouth 3 (three) times daily.    potassium chloride (KLOR-CON) 10 MEQ TbSR Take 1 tablet (10 mEq total) by mouth once daily.    spironolactone (ALDACTONE) 100 MG tablet Take 1 tablet (100 mg total) by mouth once daily.     Family History    None       Tobacco Use    Smoking status: Every Day     Current packs/day: 0.50     Types: Cigarettes     Passive exposure: Never    Smokeless tobacco: Never   Substance and Sexual Activity    Alcohol use: Yes    Drug use: Not on file    Sexual activity: Not on file     Review of Systems   Constitutional:  Negative for fatigue and fever.   HENT:  Negative for sinus pressure.    Eyes:  Negative for visual disturbance.   Respiratory:  Negative for shortness of breath.    Cardiovascular:  Negative for chest pain.   Gastrointestinal:  Positive for abdominal pain and nausea. Negative for vomiting.   Genitourinary:  Negative for difficulty urinating.   Musculoskeletal:  Negative for back pain.   Skin:  Negative for rash.   Neurological:  Negative for headaches.   Psychiatric/Behavioral:  Negative for confusion.      Objective:     Vital Signs (Most Recent):  Temp: 98 °F (36.7 °C) (10/14/23 1525)  Pulse: 90 (10/14/23 1525)  Resp: 20 (10/14/23 1525)  BP: (!) 182/76 (10/14/23 1525)  SpO2: 100 % (10/14/23 1525) Vital Signs (24h Range):  Temp:  [97 °F (36.1 °C)-98 °F (36.7 °C)] 98 °F (36.7 °C)  Pulse:  [] 90  Resp:  [18-20] 20  SpO2:  [100 %] 100 %  BP: (130-182)/(66-76) 182/76        There is no height or weight on file to calculate BMI.     Physical Exam  Constitutional:       General: She is not in acute distress.     Appearance: She is well-developed. She is not diaphoretic.   HENT:      Head: Normocephalic and atraumatic.   Eyes:      General: Scleral icterus present.      Pupils: Pupils are equal, round, and reactive to light.   Cardiovascular:      Rate and Rhythm: Normal  rate and regular rhythm.      Heart sounds: Normal heart sounds. No murmur heard.     No friction rub. No gallop.   Pulmonary:      Effort: Pulmonary effort is normal. No respiratory distress.      Breath sounds: Normal breath sounds. No stridor. No wheezing or rales.   Abdominal:      General: Bowel sounds are normal. There is distension.      Palpations: Abdomen is soft. There is no mass.      Tenderness: There is abdominal tenderness. There is no guarding.   Skin:     General: Skin is warm.      Coloration: Skin is jaundiced.      Findings: No erythema.   Neurological:      Mental Status: She is alert and oriented to person, place, and time.              CRANIAL NERVES     CN III, IV, VI   Pupils are equal, round, and reactive to light.       Significant Labs:   Results for orders placed or performed during the hospital encounter of 10/14/23   Urinalysis, Reflex to Urine Culture Urine, Clean Catch    Specimen: Urine   Result Value Ref Range    Specimen UA Urine, Clean Catch     Color, UA Yellow Yellow, Straw, Sarina    Appearance, UA Clear Clear    pH, UA 7.0 5.0 - 8.0    Specific Gravity, UA 1.010 1.005 - 1.030    Protein, UA Negative Negative    Glucose, UA Negative Negative    Ketones, UA Negative Negative    Bilirubin (UA) Negative Negative    Occult Blood UA Negative Negative    Nitrite, UA Negative Negative    Urobilinogen, UA Negative <2.0 EU/dL    Leukocytes, UA Negative Negative   Ethanol   Result Value Ref Range    Alcohol, Serum <10 <10 mg/dL   Drug screen panel, in-house   Result Value Ref Range    Benzodiazepines Negative Negative    Methadone metabolites Negative Negative    Cocaine (Metab.) Negative Negative    Opiate Scrn, Ur Negative Negative    Barbiturate Screen, Ur Negative Negative    Amphetamine Screen, Ur Negative Negative    THC Negative Negative    Phencyclidine Negative Negative    Creatinine, Urine 30.1 15.0 - 325.0 mg/dL    Toxicology Information SEE COMMENT    CBC auto differential    Result Value Ref Range    WBC 4.22 3.90 - 12.70 K/uL    RBC 3.00 (L) 4.00 - 5.40 M/uL    Hemoglobin 10.1 (L) 12.0 - 16.0 g/dL    Hematocrit 29.5 (L) 37.0 - 48.5 %    MCV 98 82 - 98 fL    MCH 33.7 (H) 27.0 - 31.0 pg    MCHC 34.2 32.0 - 36.0 g/dL    RDW 14.7 (H) 11.5 - 14.5 %    Platelets 67 (L) 150 - 450 K/uL    MPV 10.5 9.2 - 12.9 fL    Immature Granulocytes 0.2 0.0 - 0.5 %    Gran # (ANC) 2.4 1.8 - 7.7 K/uL    Immature Grans (Abs) 0.01 0.00 - 0.04 K/uL    Lymph # 1.2 1.0 - 4.8 K/uL    Mono # 0.6 0.3 - 1.0 K/uL    Eos # 0.0 0.0 - 0.5 K/uL    Baso # 0.05 0.00 - 0.20 K/uL    nRBC 0 0 /100 WBC    Gran % 55.9 38.0 - 73.0 %    Lymph % 27.5 18.0 - 48.0 %    Mono % 14.5 4.0 - 15.0 %    Eosinophil % 0.7 0.0 - 8.0 %    Basophil % 1.2 0.0 - 1.9 %    Differential Method Automated    Comprehensive metabolic panel   Result Value Ref Range    Sodium 137 136 - 145 mmol/L    Potassium 3.1 (L) 3.5 - 5.1 mmol/L    Chloride 100 95 - 110 mmol/L    CO2 24 23 - 29 mmol/L    Glucose 97 70 - 110 mg/dL    BUN 11 6 - 20 mg/dL    Creatinine 1.0 0.5 - 1.4 mg/dL    Calcium 9.1 8.7 - 10.5 mg/dL    Total Protein 6.7 6.0 - 8.4 g/dL    Albumin 2.7 (L) 3.5 - 5.2 g/dL    Total Bilirubin 7.8 (H) 0.1 - 1.0 mg/dL    Alkaline Phosphatase 121 55 - 135 U/L    AST 59 (H) 10 - 40 U/L    ALT 40 10 - 44 U/L    eGFR >60 >60 mL/min/1.73 m^2    Anion Gap 13 8 - 16 mmol/L   Lipase   Result Value Ref Range    Lipase 77 (H) 4 - 60 U/L   Ammonia   Result Value Ref Range    Ammonia 42 10 - 50 umol/L   Troponin I   Result Value Ref Range    Troponin I 0.007 0.000 - 0.026 ng/mL        Significant Imaging: CT Abdomen Pelvis With Contrast  Narrative: EXAMINATION:  CT ABDOMEN PELVIS WITH CONTRAST    CLINICAL HISTORY:  Abdominal abscess/infection suspected;Bowel obstruction suspected;    TECHNIQUE:  Standard abdomen and pelvis CT protocol with oral and IV contrast was performed.    COMPARISON:  07/19/2023    FINDINGS:  Finding: The size of the heart is within normal  limits.  There are minimal dependent atelectatic changes in both lungs.  There is no pneumothorax or pleural effusion.    There is a small amount of ascites.  The liver has a nodular surface.  There are varices in the left upper quadrant of the abdomen.  The gallbladder, pancreas, spleen, and adrenals are normal in appearance.  There are oval-shaped hypodense areas associated with the kidneys.  One of the larger ones measures 7 mm and is located in the anterior aspect of the midpole of the left kidney.  There is no hydronephrosis or nephrolithiasis.  The ureters and the urinary bladder are normal in appearance.  The uterus and ovaries were not optimally visualized.  The appendix is normal in appearance.  The wall of the antrum of the stomach is prominent in thickness.  It measures 11 mm in thickness.  There are dilated loops of small bowel.  The midportion of the ileum as a diameter of 3.3 cm.  The distal aspect of the ileum has a diameter of 1.2 cm.  There is no pneumoperitoneum.  There is grade 3 anterolisthesis of L5 on S1.  There is bony fusion between the L5 and S1 vertebral bodies.  Impression: 1. There are dilated loops of small bowel. The midportion of the ileum as a diameter of 3.3 cm.  The distal aspect of the ileum has a diameter of 1.2 cm.  This is characteristic of a small-bowel obstruction between the mid and distal portions of the ileum.  2. The wall of the antrum of the stomach is prominent in thickness. It measures 11 mm in thickness.  3. There is a small amount of ascites.  4. The liver has a nodular surface.  There are varices in the left upper quadrant of the abdomen.  This is characteristic of hepatic cirrhosis.  5. There are oval-shaped hypodense areas associated with the kidneys.  One of the larger ones measures 7 mm and is located in the anterior aspect of the midpole of the left kidney.  This is characteristic of cysts.  6. There is grade 3 anterolisthesis of L5 on S1. There is bony fusion  between the L5 and S1 vertebral bodies.  All CT scans at this facility use dose modulation, iterative reconstruction, and/or weight base dosing when appropriate to reduce radiation dose when appropriate to reduce radiation dose to as low as reasonably achievable.    Electronically signed by: Ahsan Lin MD  Date:    10/14/2023  Time:    15:04  X-Ray Abdomen Flat And Erect  Narrative: EXAMINATION:  XR ABDOMEN FLAT AND ERECT    CLINICAL HISTORY:  Abdominal Pain;    COMPARISON:  None    FINDINGS:  There is a prominent amount of gas within the gastrointestinal system.  Individual loops of bowel are not well seen secondary to multiple overlying loops of bowel.  There is no pneumoperitoneum. The bony structures appear intact.  Impression: There is a prominent amount of gas within the gastrointestinal system. Individual loops of bowel are not well seen secondary to multiple overlying loops of bowel.  If additional imaging evaluation is clinically indicated, I recommend consideration of a CT examination of the abdomen and pelvis with oral and IV contrast.    Electronically signed by: Ahsan Lin MD  Date:    10/14/2023  Time:    10:40        Assessment/Plan:     * SBO (small bowel obstruction)  CT scan reviewed  NG tube to be placed in ed  Keep NPO  Surgery consulted  Maintenance IVF  zofran prn  Morphine prn       Hypokalemia  replete  Check mag    Alcoholic cirrhosis of liver with ascites  Sees hepatology outpatient  Denies any recent alcohol use        VTE Risk Mitigation (From admission, onward)         Ordered     Place sequential compression device  Until discontinued         10/14/23 1610                           Justin De La Cruz MD  Department of Hospital Medicine  [facility] - Emergency Dept.

## 2023-10-15 LAB
ALBUMIN SERPL BCP-MCNC: 1.9 G/DL (ref 3.5–5.2)
ALP SERPL-CCNC: 79 U/L (ref 55–135)
ALT SERPL W/O P-5'-P-CCNC: 27 U/L (ref 10–44)
AMMONIA PLAS-SCNC: 88 UMOL/L (ref 10–50)
ANION GAP SERPL CALC-SCNC: 10 MMOL/L (ref 8–16)
AST SERPL-CCNC: 40 U/L (ref 10–40)
BASOPHILS # BLD AUTO: 0.07 K/UL (ref 0–0.2)
BASOPHILS NFR BLD: 0.9 % (ref 0–1.9)
BILIRUB SERPL-MCNC: 5.3 MG/DL (ref 0.1–1)
BUN SERPL-MCNC: 13 MG/DL (ref 6–20)
CALCIUM SERPL-MCNC: 7 MG/DL (ref 8.7–10.5)
CHLORIDE SERPL-SCNC: 108 MMOL/L (ref 95–110)
CO2 SERPL-SCNC: 20 MMOL/L (ref 23–29)
CREAT SERPL-MCNC: 1.6 MG/DL (ref 0.5–1.4)
DIFFERENTIAL METHOD: ABNORMAL
EOSINOPHIL # BLD AUTO: 0.2 K/UL (ref 0–0.5)
EOSINOPHIL NFR BLD: 1.9 % (ref 0–8)
ERYTHROCYTE [DISTWIDTH] IN BLOOD BY AUTOMATED COUNT: 15.2 % (ref 11.5–14.5)
EST. GFR  (NO RACE VARIABLE): 38 ML/MIN/1.73 M^2
GLUCOSE SERPL-MCNC: 88 MG/DL (ref 70–110)
HCT VFR BLD AUTO: 24.7 % (ref 37–48.5)
HGB BLD-MCNC: 8.2 G/DL (ref 12–16)
IMM GRANULOCYTES # BLD AUTO: 0.03 K/UL (ref 0–0.04)
IMM GRANULOCYTES NFR BLD AUTO: 0.4 % (ref 0–0.5)
LYMPHOCYTES # BLD AUTO: 1.5 K/UL (ref 1–4.8)
LYMPHOCYTES NFR BLD: 18.9 % (ref 18–48)
MCH RBC QN AUTO: 33.2 PG (ref 27–31)
MCHC RBC AUTO-ENTMCNC: 33.2 G/DL (ref 32–36)
MCV RBC AUTO: 100 FL (ref 82–98)
MONOCYTES # BLD AUTO: 1.2 K/UL (ref 0.3–1)
MONOCYTES NFR BLD: 15.3 % (ref 4–15)
NEUTROPHILS # BLD AUTO: 5 K/UL (ref 1.8–7.7)
NEUTROPHILS NFR BLD: 62.6 % (ref 38–73)
NRBC BLD-RTO: 0 /100 WBC
PLATELET # BLD AUTO: 74 K/UL (ref 150–450)
PMV BLD AUTO: 10.7 FL (ref 9.2–12.9)
POTASSIUM SERPL-SCNC: 3.4 MMOL/L (ref 3.5–5.1)
PROT SERPL-MCNC: 4.7 G/DL (ref 6–8.4)
RBC # BLD AUTO: 2.47 M/UL (ref 4–5.4)
SODIUM SERPL-SCNC: 138 MMOL/L (ref 136–145)
WBC # BLD AUTO: 7.97 K/UL (ref 3.9–12.7)

## 2023-10-15 PROCEDURE — 85025 COMPLETE CBC W/AUTO DIFF WBC: CPT | Mod: NTX | Performed by: FAMILY MEDICINE

## 2023-10-15 PROCEDURE — 36410 VNPNXR 3YR/> PHY/QHP DX/THER: CPT | Mod: NTX

## 2023-10-15 PROCEDURE — 99223 PR INITIAL HOSPITAL CARE,LEVL III: ICD-10-PCS | Mod: NTX,,, | Performed by: SURGERY

## 2023-10-15 PROCEDURE — 11000001 HC ACUTE MED/SURG PRIVATE ROOM: Mod: NTX

## 2023-10-15 PROCEDURE — 63600175 PHARM REV CODE 636 W HCPCS: Mod: NTX | Performed by: NURSE PRACTITIONER

## 2023-10-15 PROCEDURE — 25000003 PHARM REV CODE 250: Mod: NTX | Performed by: NURSE PRACTITIONER

## 2023-10-15 PROCEDURE — C1751 CATH, INF, PER/CENT/MIDLINE: HCPCS | Mod: NTX

## 2023-10-15 PROCEDURE — 80053 COMPREHEN METABOLIC PANEL: CPT | Mod: NTX | Performed by: FAMILY MEDICINE

## 2023-10-15 PROCEDURE — 63600175 PHARM REV CODE 636 W HCPCS: Mod: NTX | Performed by: FAMILY MEDICINE

## 2023-10-15 PROCEDURE — 82140 ASSAY OF AMMONIA: CPT | Mod: NTX | Performed by: FAMILY MEDICINE

## 2023-10-15 PROCEDURE — 99223 1ST HOSP IP/OBS HIGH 75: CPT | Mod: NTX,,, | Performed by: SURGERY

## 2023-10-15 RX ORDER — POTASSIUM CHLORIDE 7.45 MG/ML
10 INJECTION INTRAVENOUS
Status: COMPLETED | OUTPATIENT
Start: 2023-10-15 | End: 2023-10-15

## 2023-10-15 RX ORDER — SPIRONOLACTONE 25 MG/1
100 TABLET ORAL DAILY
Status: DISCONTINUED | OUTPATIENT
Start: 2023-10-15 | End: 2023-10-16 | Stop reason: HOSPADM

## 2023-10-15 RX ORDER — POTASSIUM CHLORIDE 7.45 MG/ML
10 INJECTION INTRAVENOUS
Status: DISCONTINUED | OUTPATIENT
Start: 2023-10-15 | End: 2023-10-15

## 2023-10-15 RX ORDER — POTASSIUM CHLORIDE 7.45 MG/ML
10 INJECTION INTRAVENOUS
Status: DISPENSED | OUTPATIENT
Start: 2023-10-15 | End: 2023-10-15

## 2023-10-15 RX ORDER — FUROSEMIDE 10 MG/ML
20 INJECTION INTRAMUSCULAR; INTRAVENOUS DAILY
Status: DISCONTINUED | OUTPATIENT
Start: 2023-10-15 | End: 2023-10-16 | Stop reason: HOSPADM

## 2023-10-15 RX ORDER — DIPHENHYDRAMINE HYDROCHLORIDE 50 MG/ML
25 INJECTION INTRAMUSCULAR; INTRAVENOUS EVERY 4 HOURS PRN
Status: DISCONTINUED | OUTPATIENT
Start: 2023-10-15 | End: 2023-10-16 | Stop reason: HOSPADM

## 2023-10-15 RX ADMIN — SPIRONOLACTONE 100 MG: 25 TABLET ORAL at 02:10

## 2023-10-15 RX ADMIN — FUROSEMIDE 20 MG: 10 INJECTION, SOLUTION INTRAMUSCULAR; INTRAVENOUS at 09:10

## 2023-10-15 RX ADMIN — POTASSIUM CHLORIDE 10 MEQ: 7.46 INJECTION, SOLUTION INTRAVENOUS at 02:10

## 2023-10-15 RX ADMIN — POTASSIUM CHLORIDE 10 MEQ: 7.46 INJECTION, SOLUTION INTRAVENOUS at 03:10

## 2023-10-15 RX ADMIN — POTASSIUM CHLORIDE 10 MEQ: 7.46 INJECTION, SOLUTION INTRAVENOUS at 04:10

## 2023-10-15 RX ADMIN — POTASSIUM CHLORIDE 10 MEQ: 7.46 INJECTION, SOLUTION INTRAVENOUS at 09:10

## 2023-10-15 NOTE — PLAN OF CARE
[facility]  Initial Discharge Assessment       Primary Care Provider: Osiris Nevarez NP    Admission Diagnosis: Hypokalemia [E87.6]  SBO (small bowel obstruction) [K56.609]  Jaundice [R17]  Encounter for imaging study to confirm nasogastric (NG) tube placement [Z01.89]  Abdominal pain [R10.9]  Hx of cirrhosis [Z87.19]  Anemia, unspecified type [D64.9]    Admission Date: 10/14/2023  Expected Discharge Date:     Transition of Care Barriers: None    Payor: MEDICAID / Plan: [place] [employer] / Product Type: Managed Medicaid /     Extended Emergency Contact Information  Primary Emergency Contact: AR FARIA  Address: [address]           [place] [address] [place]  Mobile Phone: [phone]  Relation: Significant other  Preferred language: English   needed? No    Discharge Plan A: Home         [id number] [place] - [address] AT [facility] [address]  [address]   [place] [address]  Phone: [phone] Fax: [phone]      Initial Assessment (most recent)       Adult Discharge Assessment - 10/15/23 1146          Discharge Assessment    Assessment Type Discharge Planning Assessment     Confirmed/corrected address, phone number and insurance Yes     Confirmed Demographics Correct on Facesheet     Source of Information patient     Communicated ASHELY with patient/caregiver Yes     Reason For Admission SBO/Liver failure     People in Home significant other     Facility Arrived From: home     Do you expect to return to your current living situation? Yes     Do you have help at home or someone to help you manage your care at home? Yes     Who are your caregiver(s) and their phone number(s)? Ema Robin [phone]     Prior to hospitilization cognitive status: Alert/Oriented     Current cognitive status: Alert/Oriented     Home Layout Able to live on 1st floor     Equipment Currently Used at Home walker, rolling     Readmission within 30 days? No      Patient currently being followed by outpatient case management? No     Do you currently have service(s) that help you manage your care at home? No     Do you take prescription medications? Yes     Do you have prescription coverage? Yes     Coverage SUN     Do you have any problems affording any of your prescribed medications? No     Is the patient taking medications as prescribed? yes     Who is going to help you get home at discharge? S.O.     How do you get to doctors appointments? family or friend will provide     Are you on dialysis? No     Do you take coumadin? No     DME Needed Upon Discharge  none     Discharge Plan discussed with: Patient;Friend     Transition of Care Barriers None     Discharge Plan A Home                 Anticipated DC dispo: home  Prior Level of Function: Indp  People in home:  S.O.    Comments:  CM met with patient at bedside to introduce role and discuss discharge planning. Friend or S.O.  will be help at home and can provide transport at time of discharge. CM discharge needs depends on hospital progress. CM will continue following to assist with other needs.

## 2023-10-15 NOTE — ASSESSMENT & PLAN NOTE
CT scan reviewed  NG tube to be placed in ed  Keep NPO  Surgery consulted  Maintenance IVF  zofran prn  Morphine prn     10/15: KUB improved, passing gas and had BM, NGT removed, diet advnaced per general surgery

## 2023-10-15 NOTE — PLAN OF CARE
Ongoing (interventions implemented as appropriate)  Pt is alert and oriented.    VSS  Pt able to make needs known.  Pt remained afebrile throughout this shift.   Pt remained free of falls this shift.   Pt denies pain this shift.  Plan of care reviewed. Patient verbalizes understanding.   Pt moving/turing independent. Frequent weight shifting encouraged.  Patient normal sinus rhythm on monitor.   Bed low, side rails up x 2, wheels locked, call light in reach.   Hourly rounding completed.   Will continue to observe.

## 2023-10-15 NOTE — ASSESSMENT & PLAN NOTE
Sees hepatology outpatient  Denies any recent alcohol use    - trend bili, ast/alt  - restarted home lasix, aldactone and lactulose; ammonia level normal

## 2023-10-15 NOTE — CONSULTS
General Surgery Consultation    Mara Mojica  1965  [id number]    Date of consultation: 10/15/2023    Reason: Bowel obstruction    HPI: This is a 58 y.o. y/o female with history of alcoholic liver cirrhosis and ex lap with bowel resection in the past for incarcerated hernia presented due to abdominal pain with nausea. CT scan in the ED concerning for SBO.  Currently, she is feeling much better. She is passing gas and stool. No nausea.        Past Medical History:   Diagnosis Date    Alcoholic cirrhosis of liver     Kidney failure     Unilateral inguinal hernia, without obstruction or gangrene, not specified as recurrent        Past Surgical History:   Procedure Laterality Date    ESOPHAGOGASTRODUODENOSCOPY N/A 9/21/2023    Procedure: EGD (ESOPHAGOGASTRODUODENOSCOPY);  Surgeon: Melissa Edwards MD;  Location: [facility];  Service: Endoscopy;  Laterality: N/A;    HERNIA REPAIR         History reviewed. No pertinent family history.    Social History     Socioeconomic History    Marital status:    Tobacco Use    Smoking status: Every Day     Current packs/day: 0.50     Types: Cigarettes     Passive exposure: Never    Smokeless tobacco: Never   Substance and Sexual Activity    Alcohol use: Yes       Review of patient's allergies indicates:  No Known Allergies      Current Facility-Administered Medications:     acetaminophen suppository 650 mg, 650 mg, Rectal, Q6H PRN, Justin De La Cruz MD    furosemide injection 20 mg, 20 mg, Intravenous, Daily, Kaleb Day MD, 20 mg at 10/15/23 0904    hydrALAZINE injection 10 mg, 10 mg, Intravenous, Q6H PRNDorothy Loi, MD    morphine injection 4 mg, 4 mg, Intravenous, Q6H PRNDorotyh Loi, MD    ondansetron injection 4 mg, 4 mg, Intravenous, Q6H PRN, Justin De La Cruz MD    Review of Systems:  Constitutional: Denies fever, chills, unexpected weight loss  Eyes: Denies eye pain, eye discharge, sudden vision changes  ENT: Denies sore throat, ear pain, nasal drainage  Respiratory: Denies  cough, SOB, hemoptysis, wheezing  Cardiovascular: Denies chest pain, palpitations,   Gastrointestinal: per HPI  Genitourinary: Denies hematuria  Hematologic/Lymphatic: Denies easy bruising or lymphadenopathy  Musculoskeletal: Denies neck rigidity, joint swelling, new traumatic injuries.  Neurological: Denies focal weakness, numbness/tingling, seizures, LOC.  Behavioral/Psych: Denies hallucinations, suicidal and homicidal ideation.  Endocrine: Denies polydipsia, cold intolerance, heat intolerance      Physical exam:  Vital Signs (Most Recent):  Temp: 97.5 °F (36.4 °C) (10/15/23 1125)  Pulse: 80 (10/15/23 1125)  Resp: 17 (10/15/23 1125)  BP: (!) 117/54 (10/15/23 1125)  SpO2: 95 % (10/15/23 1125) Vital Signs (24h Range):  Temp:  [97.2 °F (36.2 °C)-98 °F (36.7 °C)] 97.5 °F (36.4 °C)  Pulse:  [80-92] 80  Resp:  [16-20] 17  SpO2:  [95 %-100 %] 95 %  BP: (109-182)/(54-76) 117/54   Weight: 60.5 kg (133 lb 6.1 oz)  Body mass index is 26.94 kg/m².      Gen: Alert, awake, NAD, pleasant, non-toxic appearing  HENT: Atraumatic, normocephalic, mucus membranes moist. NG tube with scant amount of clear output.  Neck: No nuchal rigidity, no masses, trachea midline  Cardio: RRR  Resp: No wheezes, no stridor, no respiratory distress  Abdomen: Midline laparotomy cicatrix with soft, reducible hernia. Non distended. Soft. Nontender.  Integumentary: Warm, dry  Extremities: RUE edema. No cyanosis, no deformity. No lower extremity edema.  Vascular: No gangrene, no pallor, cap refill <2s  Neuro: AAOx3, no focal deficits, PERRL  Psych: Normal affect      I have reviewed all pertinent lab results within the past 24 hours.  CBC:   Recent Labs   Lab 10/14/23  1126   WBC 4.22   RBC 3.00*   HGB 10.1*   HCT 29.5*   PLT 67*   MCV 98   MCH 33.7*   MCHC 34.2     CMP:   Recent Labs   Lab 10/14/23  1126   GLU 97   CALCIUM 9.1   ALBUMIN 2.7*   PROT 6.7      K 3.1*   CO2 24      BUN 11   CREATININE 1.0   ALKPHOS 121   ALT 40   AST 59*   BILITOT  7.8*       I have reviewed all pertinent imaging results/findings within the past 24 hours.    Assessment & Plan:  Patient Active Problem List   Diagnosis    Alcoholic cirrhosis of liver with ascites    Other ascites    SBO (small bowel obstruction)    Hypokalemia     - Obstruction appears to have resolved. NG tube removed. May have clear liquids and advance as tolerated.  - Medical management per           Thank you for this consultation. Will sign off. Please do not hesitate to contact me for any questions or concerns.    Nika Lorenzo, DO  General Surgery  [facility]  10/15/2023

## 2023-10-15 NOTE — SUBJECTIVE & OBJECTIVE
Interval History: ab pain improved, still having swelling to bother upper and lower extremities     Review of Systems   Cardiovascular:  Positive for leg swelling.   Gastrointestinal:  Positive for abdominal pain.     Objective:     Vital Signs (Most Recent):  Temp: 97.5 °F (36.4 °C) (10/15/23 1125)  Pulse: 80 (10/15/23 1125)  Resp: 17 (10/15/23 1125)  BP: (!) 117/54 (10/15/23 1125)  SpO2: 95 % (10/15/23 1125) Vital Signs (24h Range):  Temp:  [97.2 °F (36.2 °C)-98 °F (36.7 °C)] 97.5 °F (36.4 °C)  Pulse:  [80-92] 80  Resp:  [16-20] 17  SpO2:  [95 %-100 %] 95 %  BP: (109-182)/(54-76) 117/54     Weight: 60.5 kg (133 lb 6.1 oz)  Body mass index is 26.94 kg/m².    Intake/Output Summary (Last 24 hours) at 10/15/2023 1413  Last data filed at 10/15/2023 1200  Gross per 24 hour   Intake 0 ml   Output 150 ml   Net -150 ml         Physical Exam  Vitals and nursing note reviewed.   Constitutional:       Appearance: She is obese.   Eyes:      General: Scleral icterus present.   Cardiovascular:      Rate and Rhythm: Normal rate and regular rhythm.      Pulses: Normal pulses.      Heart sounds: Normal heart sounds.   Pulmonary:      Effort: Pulmonary effort is normal.      Breath sounds: Normal breath sounds. No wheezing.   Abdominal:      Comments: Large obese ab, non distended + BS, non-tender    Musculoskeletal:         General: Swelling (BUE and BLE) present.   Skin:     General: Skin is warm and dry.   Neurological:      Mental Status: She is alert and oriented to person, place, and time.             Significant Labs: All pertinent labs within the past 24 hours have been reviewed.    Significant Imaging: I have reviewed all pertinent imaging results/findings within the past 24 hours.

## 2023-10-15 NOTE — HOSPITAL COURSE
The patient presented with right sided abdominal pain. Workup in the ED was concerning for SBO. NG tube was placed in ED and surgery was consulted. Bowel function returned after bowel decompression. She was passing flatus and having bowel movements. NG was removed as recommended by surgery. Diet was resumed. She tolerated.       Cirrhosis with ascites: She was placed on diuretics. Her renal function worsened. Patient became agitated and stated Dr. Reardon would take care of her abnormal labs as none of the readings are new. Patient's creatinine was 2.8 prior to her leaving AMA.    Anemia: H/H repeated as concern for occult blood loss. Obtained peripheral specimen instead of from midline and reading more in line with previous levels.     Patient left AMA.

## 2023-10-15 NOTE — PROGRESS NOTES
[facility] Surg 3  [place] [facility] Medicine  Progress Note    Patient Name: Mara Mojica  MRN: [id number]  Patient Class: IP- Inpatient   Admission Date: 10/14/2023  Length of Stay: 1 days  Attending Physician: Kaleb Day MD  Primary Care Provider: Osiris Nevarez NP        Subjective:     Principal Problem:SBO (small bowel obstruction)        HPI:  Patient is a 58 y.o.  female with a PMHx of alcoholic cirrhosis who presents to the Emergency Department for evaluation of R-sided abd pain which onset 12 hrs PTA. She reports thinking it was her liver hurting however the pain persistent. She reported having a small liquid bowel movement yesterday but nothing since. States she had a hernia repair surgery several years ago for an incarcerated hernia. Currently sees Dr. Reardon outpatient and reports needing a liver transplant. Denies any current alcohol use. Patient reports nausea however has not vomited.     In the Ed, CT abd and pelvis was concerning for sbo. K: 3.1, biliribuin: 7.8. Ng tube to be placed in ED and general surgery notified.           Overview/Hospital Course:  Now passing gas and had BM, general surgery evaulated, ok to removed ngt and advance diet as tolerate.  Midline placed d/t poor iv access and inability to draw labs, will replace electrolytes, diuresis and restarted home lactulose.  Will repeat labs in am.       Interval History: ab pain improved, still having swelling to bother upper and lower extremities     Review of Systems   Cardiovascular:  Positive for leg swelling.   Gastrointestinal:  Positive for abdominal pain.     Objective:     Vital Signs (Most Recent):  Temp: 97.5 °F (36.4 °C) (10/15/23 1125)  Pulse: 80 (10/15/23 1125)  Resp: 17 (10/15/23 1125)  BP: (!) 117/54 (10/15/23 1125)  SpO2: 95 % (10/15/23 1125) Vital Signs (24h Range):  Temp:  [97.2 °F (36.2 °C)-98 °F (36.7 °C)] 97.5 °F (36.4 °C)  Pulse:  [80-92] 80  Resp:  [16-20] 17  SpO2:  [95 %-100 %] 95 %  BP: (109-182)/(54-76)  117/54     Weight: 60.5 kg (133 lb 6.1 oz)  Body mass index is 26.94 kg/m².    Intake/Output Summary (Last 24 hours) at 10/15/2023 1413  Last data filed at 10/15/2023 1200  Gross per 24 hour   Intake 0 ml   Output 150 ml   Net -150 ml         Physical Exam  Vitals and nursing note reviewed.   Constitutional:       Appearance: She is obese.   Eyes:      General: Scleral icterus present.   Cardiovascular:      Rate and Rhythm: Normal rate and regular rhythm.      Pulses: Normal pulses.      Heart sounds: Normal heart sounds.   Pulmonary:      Effort: Pulmonary effort is normal.      Breath sounds: Normal breath sounds. No wheezing.   Abdominal:      Comments: Large obese ab, non distended + BS, non-tender    Musculoskeletal:         General: Swelling (BUE and BLE) present.   Skin:     General: Skin is warm and dry.   Neurological:      Mental Status: She is alert and oriented to person, place, and time.             Significant Labs: All pertinent labs within the past 24 hours have been reviewed.    Significant Imaging: I have reviewed all pertinent imaging results/findings within the past 24 hours.      Assessment/Plan:      * SBO (small bowel obstruction)  CT scan reviewed  NG tube to be placed in ed  Keep NPO  Surgery consulted  Maintenance IVF  zofran prn  Morphine prn     10/15: KUB improved, passing gas and had BM, NGT removed, diet advnaced per general surgery     Hypokalemia  - replacing K and mag    Alcoholic cirrhosis of liver with ascites  Sees hepatology outpatient  Denies any recent alcohol use    - trend bili, ast/alt  - restarted home lasix, aldactone and lactulose; ammonia level normal        VTE Risk Mitigation (From admission, onward)         Ordered     Place sequential compression device  Until discontinued         10/14/23 1610                Discharge Planning   ASHELY:      Code Status: Full Code   Is the patient medically ready for discharge?:     Reason for patient still in hospital (select all  that apply): Patient trending condition, Laboratory test and Treatment  Discharge Plan A: Home                  Richelle Paul NP  Department of Hospital Medicine   [facility]

## 2023-10-16 ENCOUNTER — CLINICAL SUPPORT (OUTPATIENT)
Dept: SMOKING CESSATION | Facility: CLINIC | Age: 58
End: 2023-10-16
Payer: COMMERCIAL

## 2023-10-16 VITALS
HEIGHT: 59 IN | OXYGEN SATURATION: 96 % | BODY MASS INDEX: 26.89 KG/M2 | RESPIRATION RATE: 18 BRPM | HEART RATE: 84 BPM | TEMPERATURE: 98 F | SYSTOLIC BLOOD PRESSURE: 99 MMHG | WEIGHT: 133.38 LBS | DIASTOLIC BLOOD PRESSURE: 51 MMHG

## 2023-10-16 DIAGNOSIS — F17.200 NICOTINE DEPENDENCE: Primary | ICD-10-CM

## 2023-10-16 LAB
ABO + RH BLD: ABNORMAL
ALBUMIN SERPL BCP-MCNC: 2 G/DL (ref 3.5–5.2)
ALP SERPL-CCNC: 90 U/L (ref 55–135)
ALT SERPL W/O P-5'-P-CCNC: 27 U/L (ref 10–44)
AMMONIA PLAS-SCNC: 59 UMOL/L (ref 10–50)
ANION GAP SERPL CALC-SCNC: 8 MMOL/L (ref 8–16)
ANISOCYTOSIS BLD QL SMEAR: SLIGHT
AST SERPL-CCNC: 45 U/L (ref 10–40)
BASOPHILS # BLD AUTO: 0.06 K/UL (ref 0–0.2)
BASOPHILS NFR BLD: 1.1 % (ref 0–1.9)
BILIRUB SERPL-MCNC: 4.3 MG/DL (ref 0.1–1)
BLD GP AB SCN CELLS X3 SERPL QL: ABNORMAL
BLOOD GROUP ANTIBODIES SERPL: NORMAL
BUN SERPL-MCNC: 20 MG/DL (ref 6–20)
CALCIUM SERPL-MCNC: 8.1 MG/DL (ref 8.7–10.5)
CHLORIDE SERPL-SCNC: 101 MMOL/L (ref 95–110)
CO2 SERPL-SCNC: 25 MMOL/L (ref 23–29)
CREAT SERPL-MCNC: 2.7 MG/DL (ref 0.5–1.4)
CREAT SERPL-MCNC: 2.8 MG/DL (ref 0.5–1.4)
DIFFERENTIAL METHOD: ABNORMAL
EOSINOPHIL # BLD AUTO: 0.2 K/UL (ref 0–0.5)
EOSINOPHIL NFR BLD: 4.1 % (ref 0–8)
ERYTHROCYTE [DISTWIDTH] IN BLOOD BY AUTOMATED COUNT: 15.3 % (ref 11.5–14.5)
EST. GFR  (NO RACE VARIABLE): 19 ML/MIN/1.73 M^2
EST. GFR  (NO RACE VARIABLE): 20 ML/MIN/1.73 M^2
GLUCOSE SERPL-MCNC: 91 MG/DL (ref 70–110)
HCT VFR BLD AUTO: 18.1 % (ref 37–48.5)
HCT VFR BLD AUTO: 21.3 % (ref 37–48.5)
HCT VFR BLD AUTO: 23.7 % (ref 37–48.5)
HGB BLD-MCNC: 5.9 G/DL (ref 12–16)
HGB BLD-MCNC: 7.1 G/DL (ref 12–16)
HGB BLD-MCNC: 7.7 G/DL (ref 12–16)
IMM GRANULOCYTES # BLD AUTO: 0.01 K/UL (ref 0–0.04)
IMM GRANULOCYTES NFR BLD AUTO: 0.2 % (ref 0–0.5)
LYMPHOCYTES # BLD AUTO: 1.7 K/UL (ref 1–4.8)
LYMPHOCYTES NFR BLD: 31 % (ref 18–48)
MAGNESIUM SERPL-MCNC: 1.6 MG/DL (ref 1.6–2.6)
MCH RBC QN AUTO: 33.5 PG (ref 27–31)
MCHC RBC AUTO-ENTMCNC: 33.3 G/DL (ref 32–36)
MCV RBC AUTO: 101 FL (ref 82–98)
MONOCYTES # BLD AUTO: 1.1 K/UL (ref 0.3–1)
MONOCYTES NFR BLD: 21.1 % (ref 4–15)
NEUTROPHILS # BLD AUTO: 2.3 K/UL (ref 1.8–7.7)
NEUTROPHILS NFR BLD: 42.5 % (ref 38–73)
NRBC BLD-RTO: 0 /100 WBC
OVALOCYTES BLD QL SMEAR: ABNORMAL
PLATELET # BLD AUTO: 54 K/UL (ref 150–450)
PLATELET BLD QL SMEAR: ABNORMAL
PMV BLD AUTO: 10.9 FL (ref 9.2–12.9)
POIKILOCYTOSIS BLD QL SMEAR: SLIGHT
POTASSIUM SERPL-SCNC: 4 MMOL/L (ref 3.5–5.1)
PROT SERPL-MCNC: 4.9 G/DL (ref 6–8.4)
RBC # BLD AUTO: 2.12 M/UL (ref 4–5.4)
SCHISTOCYTES BLD QL SMEAR: PRESENT
SODIUM SERPL-SCNC: 134 MMOL/L (ref 136–145)
SPECIMEN OUTDATE: ABNORMAL
WBC # BLD AUTO: 5.33 K/UL (ref 3.9–12.7)

## 2023-10-16 PROCEDURE — 85025 COMPLETE CBC W/AUTO DIFF WBC: CPT | Mod: NTX | Performed by: NURSE PRACTITIONER

## 2023-10-16 PROCEDURE — 99406 BEHAV CHNG SMOKING 3-10 MIN: CPT | Mod: S$GLB,TXP,,

## 2023-10-16 PROCEDURE — 63600175 PHARM REV CODE 636 W HCPCS: Mod: NTX | Performed by: NURSE PRACTITIONER

## 2023-10-16 PROCEDURE — 36415 COLL VENOUS BLD VENIPUNCTURE: CPT | Mod: NTX | Performed by: INTERNAL MEDICINE

## 2023-10-16 PROCEDURE — 85014 HEMATOCRIT: CPT | Mod: NTX | Performed by: INTERNAL MEDICINE

## 2023-10-16 PROCEDURE — 25000003 PHARM REV CODE 250: Mod: NTX | Performed by: NURSE PRACTITIONER

## 2023-10-16 PROCEDURE — 82140 ASSAY OF AMMONIA: CPT | Mod: NTX | Performed by: FAMILY MEDICINE

## 2023-10-16 PROCEDURE — 86870 RBC ANTIBODY IDENTIFICATION: CPT | Mod: NTX | Performed by: INTERNAL MEDICINE

## 2023-10-16 PROCEDURE — 83735 ASSAY OF MAGNESIUM: CPT | Mod: NTX | Performed by: INTERNAL MEDICINE

## 2023-10-16 PROCEDURE — 86901 BLOOD TYPING SEROLOGIC RH(D): CPT | Mod: NTX | Performed by: INTERNAL MEDICINE

## 2023-10-16 PROCEDURE — 63600175 PHARM REV CODE 636 W HCPCS: Mod: NTX | Performed by: FAMILY MEDICINE

## 2023-10-16 PROCEDURE — 86905 BLOOD TYPING RBC ANTIGENS: CPT | Mod: NTX | Performed by: INTERNAL MEDICINE

## 2023-10-16 PROCEDURE — 80053 COMPREHEN METABOLIC PANEL: CPT | Mod: NTX | Performed by: NURSE PRACTITIONER

## 2023-10-16 PROCEDURE — 99406 PT REFUSED TOBACCO CESSATION: ICD-10-PCS | Mod: S$GLB,TXP,,

## 2023-10-16 PROCEDURE — 82565 ASSAY OF CREATININE: CPT | Mod: NTX | Performed by: INTERNAL MEDICINE

## 2023-10-16 PROCEDURE — 85018 HEMOGLOBIN: CPT | Mod: NTX | Performed by: INTERNAL MEDICINE

## 2023-10-16 RX ADMIN — SPIRONOLACTONE 100 MG: 25 TABLET ORAL at 08:10

## 2023-10-16 RX ADMIN — FUROSEMIDE 20 MG: 10 INJECTION, SOLUTION INTRAMUSCULAR; INTRAVENOUS at 08:10

## 2023-10-16 RX ADMIN — DIPHENHYDRAMINE HYDROCHLORIDE 25 MG: 50 INJECTION, SOLUTION INTRAMUSCULAR; INTRAVENOUS at 12:10

## 2023-10-16 NOTE — NURSING
Patient leaving against medical advice. Midline removed per patient request. Dr de la paz at bedside discussing out of range labs. Patient refusing to stay admitted. Patient signed AMA form with Dr De La Paz. I have witnessed the signature.

## 2023-10-16 NOTE — DISCHARGE SUMMARY
[facility] 3  [facility] Medicine  Discharge Summary      Patient Name: Mara Mojica  MRN: [id number]  [facility]: [id number]  Patient Class: IP- Inpatient  Admission Date: 10/14/2023  Hospital Length of Stay: 2 days  Discharge Date and Time: 10/16/2023 4:36 PM  Attending Physician: Jas De La Paz MD   Discharging Provider: Jas De La Paz MD  Primary Care Provider: Osiris Nevarez NP    Primary Care Team: Networked reference to record PCT     HPI:   Patient is a 58 y.o.  female with a PMHx of alcoholic cirrhosis who presents to the Emergency Department for evaluation of R-sided abd pain which onset 12 hrs PTA. She reports thinking it was her liver hurting however the pain persistent. She reported having a small liquid bowel movement yesterday but nothing since. States she had a hernia repair surgery several years ago for an incarcerated hernia. Currently sees Dr. Reardon outpatient and reports needing a liver transplant. Denies any current alcohol use. Patient reports nausea however has not vomited.     In the Ed, CT abd and pelvis was concerning for sbo. K: 3.1, biliribuin: 7.8. Ng tube to be placed in ED and general surgery notified.           * No surgery found *      Hospital Course:   The patient presented with right sided abdominal pain. Workup in the ED was concerning for SBO. NG tube was placed in ED and surgery was consulted. Bowel function returned after bowel decompression. She was passing flatus and having bowel movements. NG was removed as recommended by surgery. Diet was resumed. She tolerated.       Cirrhosis with ascites: She was placed on diuretics. Her renal function worsened. Patient became agitated and stated Dr. Reardon would take care of her abnormal labs as none of the readings are new. Patient's creatinine was 2.8 prior to her leaving AMA.    Anemia: H/H repeated as concern for occult blood loss. Obtained peripheral specimen instead of from midline and reading more in line with  previous levels.     Patient left AMA.        Goals of Care Treatment Preferences:  Code Status: Full Code      Consults:   Consults (From admission, onward)        Status Ordering Provider     Inpatient consult to Midline team  Once        Provider:  (Not yet assigned)    JACQUI Lee     Inpatient consult to General surgery  Once        Provider:  Nika Lorenzo, DO    NATHALIE Israel JR            Final Active Diagnoses:    Diagnosis Date Noted POA    PRINCIPAL PROBLEM:  SBO (small bowel obstruction) [K56.609] 10/14/2023 Yes    Hypokalemia [E87.6] 10/14/2023 Yes    Alcoholic cirrhosis of liver with ascites [K70.31] 09/06/2023 Yes      Problems Resolved During this Admission:       Discharged Condition: against medical advice    Disposition: Left Against Medical Adv*    Follow Up:    Patient Instructions:   No discharge procedures on file.    Significant Diagnostic Studies: Labs:   BMP:   Recent Labs   Lab 10/15/23  1410 10/16/23  0630 10/16/23  1204   GLU 88 91  --     134*  --    K 3.4* 4.0  --     101  --    CO2 20* 25  --    BUN 13 20  --    CREATININE 1.6* 2.7* 2.8*   CALCIUM 7.0* 8.1*  --    MG  --   --  1.6    and CBC   Recent Labs   Lab 10/15/23  1435 10/16/23  0630 10/16/23  0945 10/16/23  1204   WBC 7.97 5.33  --   --    HGB 8.2* 7.1* 5.9* 7.7*   HCT 24.7* 21.3* 18.1* 23.7*   PLT 74* 54*  --   --        Pending Diagnostic Studies:     None         Medications:  Reconciled Home Medications:  No changes were made to home medications as patient left AMA     Medication List      ASK your doctor about these medications    furosemide 40 MG tablet  Commonly known as: LASIX  Take 1 tablet (40 mg total) by mouth once daily.     lactulose 20 gram/30 mL Soln  Commonly known as: CHRONULAC  Take 15 mLs (10 g total) by mouth 3 (three) times daily.     potassium chloride 10 MEQ Tbsr  Commonly known as: KLOR-CON  Take 1 tablet (10 mEq total) by mouth once daily.      spironolactone 100 MG tablet  Commonly known as: ALDACTONE  Take 1 tablet (100 mg total) by mouth once daily.            Indwelling Lines/Drains at time of discharge:   Lines/Drains/Airways     None                 Time spent on the discharge of patient: 31 minutes         Jas De La Paz MD  Department of Hospital Medicine  [facility]

## 2023-10-18 ENCOUNTER — HOSPITAL ENCOUNTER (INPATIENT)
Facility: HOSPITAL | Age: 58
LOS: 7 days | Discharge: ANOTHER HOSPITAL | DRG: 441 | End: 2023-10-25
Attending: EMERGENCY MEDICINE | Admitting: HOSPITALIST
Payer: MEDICAID

## 2023-10-18 DIAGNOSIS — G93.40 ACUTE ENCEPHALOPATHY: ICD-10-CM

## 2023-10-18 DIAGNOSIS — K74.60 DECOMPENSATED HEPATIC CIRRHOSIS: ICD-10-CM

## 2023-10-18 DIAGNOSIS — R20.0 NUMBNESS OF LIP: ICD-10-CM

## 2023-10-18 DIAGNOSIS — R41.82 ALTERED MENTAL STATUS: ICD-10-CM

## 2023-10-18 DIAGNOSIS — M62.82 NON-TRAUMATIC RHABDOMYOLYSIS: Primary | ICD-10-CM

## 2023-10-18 DIAGNOSIS — R60.1 ANASARCA: ICD-10-CM

## 2023-10-18 DIAGNOSIS — S32.511A: ICD-10-CM

## 2023-10-18 DIAGNOSIS — K74.60 CIRRHOSIS OF LIVER WITHOUT ASCITES, UNSPECIFIED HEPATIC CIRRHOSIS TYPE: ICD-10-CM

## 2023-10-18 DIAGNOSIS — R29.90 STROKE-LIKE SYMPTOMS: ICD-10-CM

## 2023-10-18 DIAGNOSIS — G93.40 ENCEPHALOPATHY: ICD-10-CM

## 2023-10-18 DIAGNOSIS — K76.82 HEPATIC ENCEPHALOPATHY: ICD-10-CM

## 2023-10-18 DIAGNOSIS — K72.90 DECOMPENSATED HEPATIC CIRRHOSIS: ICD-10-CM

## 2023-10-18 PROBLEM — D68.9 COAGULOPATHY: Status: ACTIVE | Noted: 2023-10-18

## 2023-10-18 PROBLEM — N17.9 AKI (ACUTE KIDNEY INJURY): Status: ACTIVE | Noted: 2023-10-18

## 2023-10-18 PROBLEM — R53.1 GENERALIZED WEAKNESS: Status: ACTIVE | Noted: 2023-10-18

## 2023-10-18 LAB
ABO + RH BLD: ABNORMAL
ALBUMIN SERPL BCP-MCNC: 2.7 G/DL (ref 3.5–5.2)
ALLENS TEST: ABNORMAL
ALP SERPL-CCNC: 142 U/L (ref 55–135)
ALT SERPL W/O P-5'-P-CCNC: 47 U/L (ref 10–44)
AMMONIA PLAS-SCNC: 42 UMOL/L (ref 10–50)
AMPHET+METHAMPHET UR QL: NEGATIVE
ANION GAP SERPL CALC-SCNC: 12 MMOL/L (ref 8–16)
AORTIC ROOT ANNULUS: 2.93 CM
APTT PPP: 27 SEC (ref 21–32)
ASCENDING AORTA: 2.85 CM
AST SERPL-CCNC: 96 U/L (ref 10–40)
AV INDEX (PROSTH): 0.76
AV MEAN GRADIENT: 16 MMHG
AV PEAK GRADIENT: 35 MMHG
AV VALVE AREA BY VELOCITY RATIO: 2.01 CM²
AV VALVE AREA: 2.05 CM²
AV VELOCITY RATIO: 0.74
BACTERIA #/AREA URNS HPF: ABNORMAL /HPF
BACTERIA #/AREA URNS HPF: NORMAL /HPF
BARBITURATES UR QL SCN>200 NG/ML: NEGATIVE
BASOPHILS # BLD AUTO: 0.06 K/UL (ref 0–0.2)
BASOPHILS NFR BLD: 1 % (ref 0–1.9)
BENZODIAZ UR QL SCN>200 NG/ML: NEGATIVE
BILIRUB SERPL-MCNC: 6.4 MG/DL (ref 0.1–1)
BILIRUB UR QL STRIP: NEGATIVE
BILIRUB UR QL STRIP: NEGATIVE
BLD GP AB SCN CELLS X3 SERPL QL: ABNORMAL
BLOOD GROUP ANTIBODIES SERPL: NORMAL
BSA FOR ECHO PROCEDURE: 1.56 M2
BUN SERPL-MCNC: 16 MG/DL (ref 6–20)
BZE UR QL SCN: NEGATIVE
CALCIUM SERPL-MCNC: 8.8 MG/DL (ref 8.7–10.5)
CANNABINOIDS UR QL SCN: NEGATIVE
CHLORIDE SERPL-SCNC: 104 MMOL/L (ref 95–110)
CK SERPL-CCNC: 844 U/L (ref 20–180)
CLARITY UR: CLEAR
CLARITY UR: CLEAR
CO2 SERPL-SCNC: 20 MMOL/L (ref 23–29)
COLOR UR: YELLOW
COLOR UR: YELLOW
CREAT SERPL-MCNC: 1.5 MG/DL (ref 0.5–1.4)
CREAT UR-MCNC: 72.1 MG/DL (ref 15–325)
CV ECHO LV RWT: 0.68 CM
DIFFERENTIAL METHOD: ABNORMAL
DOP CALC AO PEAK VEL: 2.95 M/S
DOP CALC AO VTI: 55.2 CM
DOP CALC LVOT AREA: 2.7 CM2
DOP CALC LVOT DIAMETER: 1.86 CM
DOP CALC LVOT PEAK VEL: 2.18 M/S
DOP CALC LVOT STROKE VOLUME: 113.25 CM3
DOP CALC RVOT PEAK VEL: 1.21 M/S
DOP CALC RVOT VTI: 21.6 CM
DOP CALCLVOT PEAK VEL VTI: 41.7 CM
E WAVE DECELERATION TIME: 184.11 MSEC
E/A RATIO: 0.73
E/E' RATIO: 5.56 M/S
ECHO LV POSTERIOR WALL: 1.08 CM (ref 0.6–1.1)
EJECTION FRACTION: 65 %
EOSINOPHIL # BLD AUTO: 0.1 K/UL (ref 0–0.5)
EOSINOPHIL NFR BLD: 1.9 % (ref 0–8)
ERYTHROCYTE [DISTWIDTH] IN BLOOD BY AUTOMATED COUNT: 15.3 % (ref 11.5–14.5)
EST. GFR  (NO RACE VARIABLE): 41 ML/MIN/1.73 M^2
ESTIMATED AVG GLUCOSE: ABNORMAL MG/DL (ref 68–131)
FRACTIONAL SHORTENING: 34 % (ref 28–44)
GLUCOSE SERPL-MCNC: 88 MG/DL (ref 70–110)
GLUCOSE UR QL STRIP: NEGATIVE
GLUCOSE UR QL STRIP: NEGATIVE
HBA1C MFR BLD: <4 % (ref 4–5.6)
HCO3 UR-SCNC: 22.1 MMOL/L (ref 24–28)
HCT VFR BLD AUTO: 24.9 % (ref 37–48.5)
HGB BLD-MCNC: 8.5 G/DL (ref 12–16)
HGB UR QL STRIP: NEGATIVE
HGB UR QL STRIP: NEGATIVE
HYALINE CASTS #/AREA URNS LPF: 3 /LPF
IMM GRANULOCYTES # BLD AUTO: 0.02 K/UL (ref 0–0.04)
IMM GRANULOCYTES NFR BLD AUTO: 0.3 % (ref 0–0.5)
INR PPP: 2 (ref 0.8–1.2)
INTERVENTRICULAR SEPTUM: 1.16 CM (ref 0.6–1.1)
IVC DIAMETER: 1.27 CM
IVRT: 79.92 MSEC
KETONES UR QL STRIP: ABNORMAL
KETONES UR QL STRIP: NEGATIVE
LA MAJOR: 4.72 CM
LA MINOR: 4.68 CM
LA WIDTH: 3.3 CM
LEFT ATRIUM SIZE: 2.84 CM
LEFT ATRIUM VOLUME INDEX: 24.5 ML/M2
LEFT ATRIUM VOLUME: 37.44 CM3
LEFT INTERNAL DIMENSION IN SYSTOLE: 2.1 CM (ref 2.1–4)
LEFT VENTRICLE DIASTOLIC VOLUME INDEX: 25.93 ML/M2
LEFT VENTRICLE DIASTOLIC VOLUME: 39.68 ML
LEFT VENTRICLE MASS INDEX: 69 G/M2
LEFT VENTRICLE SYSTOLIC VOLUME INDEX: 9.4 ML/M2
LEFT VENTRICLE SYSTOLIC VOLUME: 14.45 ML
LEFT VENTRICULAR INTERNAL DIMENSION IN DIASTOLE: 3.16 CM (ref 3.5–6)
LEFT VENTRICULAR MASS: 105.36 G
LEUKOCYTE ESTERASE UR QL STRIP: NEGATIVE
LEUKOCYTE ESTERASE UR QL STRIP: NEGATIVE
LV LATERAL E/E' RATIO: 5.36 M/S
LV SEPTAL E/E' RATIO: 5.77 M/S
LVOT MG: 10.41 MMHG
LVOT MV: 1.5 CM/S
LYMPHOCYTES # BLD AUTO: 1.4 K/UL (ref 1–4.8)
LYMPHOCYTES NFR BLD: 21.6 % (ref 18–48)
MCH RBC QN AUTO: 32.9 PG (ref 27–31)
MCHC RBC AUTO-ENTMCNC: 34.1 G/DL (ref 32–36)
MCV RBC AUTO: 97 FL (ref 82–98)
METHADONE UR QL SCN>300 NG/ML: NEGATIVE
MICROSCOPIC COMMENT: ABNORMAL
MICROSCOPIC COMMENT: NORMAL
MONOCYTES # BLD AUTO: 1.2 K/UL (ref 0.3–1)
MONOCYTES NFR BLD: 19.4 % (ref 4–15)
MV PEAK A VEL: 1.03 M/S
MV PEAK E VEL: 0.75 M/S
MV STENOSIS PRESSURE HALF TIME: 53.39 MS
MV VALVE AREA P 1/2 METHOD: 4.12 CM2
NEUTROPHILS # BLD AUTO: 3.5 K/UL (ref 1.8–7.7)
NEUTROPHILS NFR BLD: 55.8 % (ref 38–73)
NITRITE UR QL STRIP: POSITIVE
NITRITE UR QL STRIP: POSITIVE
NRBC BLD-RTO: 0 /100 WBC
OPIATES UR QL SCN: NEGATIVE
PATHOLOGIST INTERPRETATION AB/XM: NORMAL
PCO2 BLDA: 27 MMHG (ref 35–45)
PCP UR QL SCN>25 NG/ML: NEGATIVE
PH SMN: 7.52 [PH] (ref 7.35–7.45)
PH UR STRIP: 8 [PH] (ref 5–8)
PH UR STRIP: 8 [PH] (ref 5–8)
PISA TR MAX VEL: 3.09 M/S
PLATELET # BLD AUTO: 66 K/UL (ref 150–450)
PMV BLD AUTO: 11.3 FL (ref 9.2–12.9)
PO2 BLDA: 51 MMHG (ref 40–60)
POC BE: -1 MMOL/L
POC SATURATED O2: 90 % (ref 95–100)
POTASSIUM SERPL-SCNC: 4.3 MMOL/L (ref 3.5–5.1)
PROT SERPL-MCNC: 6.8 G/DL (ref 6–8.4)
PROT UR QL STRIP: ABNORMAL
PROT UR QL STRIP: NEGATIVE
PROTHROMBIN TIME: 20.2 SEC (ref 9–12.5)
PV MEAN GRADIENT: 3 MMHG
RA MAJOR: 3.27 CM
RA PRESSURE ESTIMATED: 3 MMHG
RA WIDTH: 2.8 CM
RBC # BLD AUTO: 2.58 M/UL (ref 4–5.4)
RBC #/AREA URNS HPF: 2 /HPF (ref 0–4)
RV TB RVSP: 6 MMHG
SAMPLE: ABNORMAL
SITE: ABNORMAL
SODIUM SERPL-SCNC: 136 MMOL/L (ref 136–145)
SP GR UR STRIP: 1.01 (ref 1–1.03)
SP GR UR STRIP: 1.02 (ref 1–1.03)
SPECIMEN OUTDATE: ABNORMAL
STJ: 2.82 CM
TDI LATERAL: 0.14 M/S
TDI SEPTAL: 0.13 M/S
TDI: 0.14 M/S
TOXICOLOGY INFORMATION: NORMAL
TR MAX PG: 38 MMHG
TRICUSPID ANNULAR PLANE SYSTOLIC EXCURSION: 2.27 CM
TROPONIN I SERPL DL<=0.01 NG/ML-MCNC: 0.02 NG/ML (ref 0–0.03)
TSH SERPL DL<=0.005 MIU/L-ACNC: 0.44 UIU/ML (ref 0.4–4)
TV REST PULMONARY ARTERY PRESSURE: 41 MMHG
UNIDENT CRYS URNS QL MICRO: ABNORMAL
URN SPEC COLLECT METH UR: ABNORMAL
URN SPEC COLLECT METH UR: ABNORMAL
UROBILINOGEN UR STRIP-ACNC: ABNORMAL EU/DL
UROBILINOGEN UR STRIP-ACNC: ABNORMAL EU/DL
WBC # BLD AUTO: 6.29 K/UL (ref 3.9–12.7)
WBC #/AREA URNS HPF: 13 /HPF (ref 0–5)
WBC #/AREA URNS HPF: 5 /HPF (ref 0–5)
Z-SCORE OF LEFT VENTRICULAR DIMENSION IN END DIASTOLE: -3.45
Z-SCORE OF LEFT VENTRICULAR DIMENSION IN END SYSTOLE: -2.19

## 2023-10-18 PROCEDURE — 85610 PROTHROMBIN TIME: CPT | Mod: NTX | Performed by: EMERGENCY MEDICINE

## 2023-10-18 PROCEDURE — 63600175 PHARM REV CODE 636 W HCPCS: Mod: NTX | Performed by: EMERGENCY MEDICINE

## 2023-10-18 PROCEDURE — 25000003 PHARM REV CODE 250: Mod: NTX | Performed by: NURSE PRACTITIONER

## 2023-10-18 PROCEDURE — 21400001 HC TELEMETRY ROOM: Mod: NTX

## 2023-10-18 PROCEDURE — 92523 SPEECH SOUND LANG COMPREHEN: CPT | Mod: NTX

## 2023-10-18 PROCEDURE — 87077 CULTURE AEROBIC IDENTIFY: CPT | Mod: NTX | Performed by: NURSE PRACTITIONER

## 2023-10-18 PROCEDURE — 86077 PHYS BLOOD BANK SERV XMATCH: CPT | Mod: NTX,,, | Performed by: PATHOLOGY

## 2023-10-18 PROCEDURE — 87040 BLOOD CULTURE FOR BACTERIA: CPT | Mod: 59,NTX | Performed by: NURSE PRACTITIONER

## 2023-10-18 PROCEDURE — 81000 URINALYSIS NONAUTO W/SCOPE: CPT | Mod: 91,59,NTX | Performed by: NURSE PRACTITIONER

## 2023-10-18 PROCEDURE — 93010 ELECTROCARDIOGRAM REPORT: CPT | Mod: NTX,,, | Performed by: STUDENT IN AN ORGANIZED HEALTH CARE EDUCATION/TRAINING PROGRAM

## 2023-10-18 PROCEDURE — 81000 URINALYSIS NONAUTO W/SCOPE: CPT | Mod: NTX,59 | Performed by: EMERGENCY MEDICINE

## 2023-10-18 PROCEDURE — 82803 BLOOD GASES ANY COMBINATION: CPT | Mod: NTX

## 2023-10-18 PROCEDURE — 97166 OT EVAL MOD COMPLEX 45 MIN: CPT | Mod: NTX

## 2023-10-18 PROCEDURE — 84443 ASSAY THYROID STIM HORMONE: CPT | Mod: NTX | Performed by: NURSE PRACTITIONER

## 2023-10-18 PROCEDURE — 83036 HEMOGLOBIN GLYCOSYLATED A1C: CPT | Mod: NTX | Performed by: NURSE PRACTITIONER

## 2023-10-18 PROCEDURE — 93005 ELECTROCARDIOGRAM TRACING: CPT | Mod: NTX

## 2023-10-18 PROCEDURE — 99215 PR OFFICE/OUTPT VISIT, EST, LEVL V, 40-54 MIN: ICD-10-PCS | Mod: NTX,,, | Performed by: INTERNAL MEDICINE

## 2023-10-18 PROCEDURE — 97530 THERAPEUTIC ACTIVITIES: CPT | Mod: NTX

## 2023-10-18 PROCEDURE — 93010 EKG 12-LEAD: ICD-10-PCS | Mod: NTX,,, | Performed by: STUDENT IN AN ORGANIZED HEALTH CARE EDUCATION/TRAINING PROGRAM

## 2023-10-18 PROCEDURE — 87186 SC STD MICRODIL/AGAR DIL: CPT | Mod: NTX | Performed by: NURSE PRACTITIONER

## 2023-10-18 PROCEDURE — 87086 URINE CULTURE/COLONY COUNT: CPT | Mod: NTX | Performed by: NURSE PRACTITIONER

## 2023-10-18 PROCEDURE — 25000003 PHARM REV CODE 250: Mod: NTX | Performed by: EMERGENCY MEDICINE

## 2023-10-18 PROCEDURE — 80053 COMPREHEN METABOLIC PANEL: CPT | Mod: NTX | Performed by: EMERGENCY MEDICINE

## 2023-10-18 PROCEDURE — 84484 ASSAY OF TROPONIN QUANT: CPT | Mod: NTX | Performed by: EMERGENCY MEDICINE

## 2023-10-18 PROCEDURE — 36415 COLL VENOUS BLD VENIPUNCTURE: CPT | Mod: NTX | Performed by: NURSE PRACTITIONER

## 2023-10-18 PROCEDURE — 97162 PT EVAL MOD COMPLEX 30 MIN: CPT | Mod: NTX

## 2023-10-18 PROCEDURE — 80307 DRUG TEST PRSMV CHEM ANLYZR: CPT | Mod: NTX | Performed by: EMERGENCY MEDICINE

## 2023-10-18 PROCEDURE — 85730 THROMBOPLASTIN TIME PARTIAL: CPT | Mod: NTX | Performed by: EMERGENCY MEDICINE

## 2023-10-18 PROCEDURE — 99215 OFFICE O/P EST HI 40 MIN: CPT | Mod: NTX,,, | Performed by: INTERNAL MEDICINE

## 2023-10-18 PROCEDURE — 86077 PATHOLOGIST INTERPRETATION AB/XM: ICD-10-PCS | Mod: NTX,,, | Performed by: PATHOLOGY

## 2023-10-18 PROCEDURE — 85025 COMPLETE CBC W/AUTO DIFF WBC: CPT | Mod: NTX | Performed by: EMERGENCY MEDICINE

## 2023-10-18 PROCEDURE — 86870 RBC ANTIBODY IDENTIFICATION: CPT | Mod: NTX | Performed by: EMERGENCY MEDICINE

## 2023-10-18 PROCEDURE — 99285 EMERGENCY DEPT VISIT HI MDM: CPT | Mod: 25,NTX

## 2023-10-18 PROCEDURE — 99900035 HC TECH TIME PER 15 MIN (STAT): Mod: NTX

## 2023-10-18 PROCEDURE — 86901 BLOOD TYPING SEROLOGIC RH(D): CPT | Mod: NTX | Performed by: EMERGENCY MEDICINE

## 2023-10-18 PROCEDURE — 92610 EVALUATE SWALLOWING FUNCTION: CPT | Mod: NTX

## 2023-10-18 PROCEDURE — 87088 URINE BACTERIA CULTURE: CPT | Mod: NTX | Performed by: NURSE PRACTITIONER

## 2023-10-18 PROCEDURE — 82140 ASSAY OF AMMONIA: CPT | Mod: NTX | Performed by: EMERGENCY MEDICINE

## 2023-10-18 PROCEDURE — 82550 ASSAY OF CK (CPK): CPT | Mod: NTX | Performed by: EMERGENCY MEDICINE

## 2023-10-18 RX ORDER — SPIRONOLACTONE 25 MG/1
100 TABLET ORAL DAILY
Status: CANCELLED | OUTPATIENT
Start: 2023-10-18

## 2023-10-18 RX ORDER — ONDANSETRON 2 MG/ML
4 INJECTION INTRAMUSCULAR; INTRAVENOUS EVERY 12 HOURS PRN
Status: DISCONTINUED | OUTPATIENT
Start: 2023-10-18 | End: 2023-10-25 | Stop reason: HOSPADM

## 2023-10-18 RX ORDER — SODIUM CHLORIDE 0.9 % (FLUSH) 0.9 %
10 SYRINGE (ML) INJECTION
Status: DISCONTINUED | OUTPATIENT
Start: 2023-10-18 | End: 2023-10-25 | Stop reason: HOSPADM

## 2023-10-18 RX ORDER — LACTULOSE 10 G/15ML
10 SOLUTION ORAL 3 TIMES DAILY
Status: CANCELLED | OUTPATIENT
Start: 2023-10-18

## 2023-10-18 RX ORDER — LACTULOSE 10 G/15ML
10 SOLUTION ORAL 3 TIMES DAILY
Status: DISCONTINUED | OUTPATIENT
Start: 2023-10-18 | End: 2023-10-20

## 2023-10-18 RX ORDER — FUROSEMIDE 40 MG/1
40 TABLET ORAL DAILY
Status: CANCELLED | OUTPATIENT
Start: 2023-10-18

## 2023-10-18 RX ORDER — SODIUM CHLORIDE 9 MG/ML
INJECTION, SOLUTION INTRAVENOUS ONCE
Status: COMPLETED | OUTPATIENT
Start: 2023-10-18 | End: 2023-10-18

## 2023-10-18 RX ORDER — FUROSEMIDE 10 MG/ML
40 INJECTION INTRAMUSCULAR; INTRAVENOUS 2 TIMES DAILY
Status: DISCONTINUED | OUTPATIENT
Start: 2023-10-18 | End: 2023-10-18

## 2023-10-18 RX ADMIN — SODIUM CHLORIDE: 0.9 INJECTION, SOLUTION INTRAVENOUS at 06:10

## 2023-10-18 RX ADMIN — LACTULOSE 10 G: 20 SOLUTION ORAL at 08:10

## 2023-10-18 RX ADMIN — LACTULOSE 10 G: 20 SOLUTION ORAL at 10:10

## 2023-10-18 RX ADMIN — RIFAXIMIN 550 MG: 550 TABLET ORAL at 08:10

## 2023-10-18 RX ADMIN — LACTULOSE 10 G: 20 SOLUTION ORAL at 05:10

## 2023-10-18 RX ADMIN — CEFTRIAXONE SODIUM 1 G: 1 INJECTION, POWDER, FOR SOLUTION INTRAMUSCULAR; INTRAVENOUS at 05:10

## 2023-10-18 NOTE — PROGRESS NOTES
US Abd- no ascites. MRI Brain done but not read yet- appears normal to me, likely has HE and UTI-  will cont Lactulose and Rifaximin.

## 2023-10-18 NOTE — ASSESSMENT & PLAN NOTE
Liver continues to decompensate   Consult hepatology     MELD 3.0: 28 at 10/18/2023  2:56 AM  MELD-Na: 25 at 10/18/2023  2:56 AM  Calculated from:  Serum Creatinine: 1.5 mg/dL at 10/18/2023  2:56 AM  Serum Sodium: 136 mmol/L at 10/18/2023  2:56 AM  Total Bilirubin: 6.4 mg/dL at 10/18/2023  2:56 AM  Serum Albumin: 2.7 g/dL at 10/18/2023  2:56 AM  INR(ratio): 2.0 at 10/18/2023  2:56 AM  Age at listing (hypothetical): 58 years  Sex: Female at 10/18/2023  2:56 AM

## 2023-10-18 NOTE — ASSESSMENT & PLAN NOTE
CT head showed nothing acute -STAT RAD  Will obtain MRI brain   Carotid U/S   Echo  Hold ASA due to elevated INR and low platelets  PT/OT/ST  Neuro checks

## 2023-10-18 NOTE — ASSESSMENT & PLAN NOTE
Patient with acute kidney injury/acute renal failure likely due to pre-renal azotemia due to IVVD SAMRA is currently worsening. Baseline creatinine normal creatine  - Labs reviewed- Renal function/electrolytes with Estimated Creatinine Clearance: 34.9 mL/min (A) (based on SCr of 1.5 mg/dL (H)). according to latest data. Monitor urine output and serial BMP and adjust therapy as needed. Avoid nephrotoxins and renally dose meds for GFR listed above.    Will give one liter IVFs at 75ml/hr  Hold Lasix and Spironolactone for now

## 2023-10-18 NOTE — PROGRESS NOTES
59 yo female with Alcoholic cirrhosis with ascites, s/p Ex Lap with bowel resection in the past for incarcerated hernia, recent SBO with SAMRA, who presented to ED with AMS. Per ER, pt's  came home from work and found her on the floor with her head on a blood-stained pillow and some slurred speech, surrounded by soiled blankets with urine and feces. Per EMS, pt was ambulatory at the scene. The pt told the paramedics that she was on the floor for about 3 hours. In the ER, the pt is oriented x3 (contrary to triage note). She also c/o numbness to the entire tongue and lips which onset 3 hours PTA. She denies any falls or seizures. Pt can not recall all the events, however, she states she was laying on the couch and moved to the floor because she soiled on herself. She reports she was too weak to go to her bedroom or to the bathroom. She states she cut her tongue on her teeth (due to poor dentition). She denies LOC. However, the S.O. states that the pt was very confused on his arrival to the home. Confusion has improved since arrival to ED. Pt denies any alcohol intake or substance use. Last alcohol drink was 7/2023.     In the ED, the pt was also noted to have a left facial droop and reported numbness to to her mouth and tongue with concern for stroke.      Of note, the pt was recently hospitalized with SBO and SAMRA. SBO resolved with conservative treatment. Pt then left AMA. Serum Cr was 2.8 on discharge.      In the ED, Afebrile, BP stable. Mild tachycardia noted. Labs showed Hgb 8.5, Platelets 66, Serum Cr 1.4, T BIli 2.7, AST 96, ALT 47, , , Ammonia normal. INR 2, UA +nitrites, 13 WBCs. ABGs PH 7.5, pCO2 27, pO2 51, HCO3 22. CT head showed nothing acute.    She was placed in Obs under Hosp Med for AMS r/o CVA, UTI, possible Hep Encephalopathy. Hepatology evaluated the pt. Recommended Paracentesis to r/o SBP and cont Lactulose, Rifaximin. Await paracentesis and MRI Brain. Start Rocephin.

## 2023-10-18 NOTE — CONSULTS
[facility] - Telemetry ([facility])  Hepatology  Consult Note    Patient Name: Mara Mojica  MRN: [id number]  Admission Date: 10/18/2023  Hospital Length of Stay: 0 days  Attending Provider: Jesu Angel MD   Primary Care Physician: Roque, Osiris, NP  Principal Problem:Stroke-like symptoms    Inpatient Consult to Telemedicine - Hepatology  Consult performed by: Demarco Jackson MD  Consult ordered by: Raisa Carnes NP        Subjective:     Transplant status: No    HPI: The patient is a 57 yo female with Alcoholic cirrhosis with ascites who presented to ED with AMS. Hepatology was consulted for cirrhosis    In the ED, the pt was also noted to have a left facial droop and reported numbness to to her mouth and tongue with concern for stroke. CT negative. Labs showed Hgb 8.5, Platelets 66, Serum Cr 1.4, T BIli 2.7, AST 96, ALT 47, , , Ammonia normal INR 2.0. UA +nitrites, no WBCs. ABGs PH 7.5, pCO2 27, pHCO3 22.      Of note, the pt was recently hospitalized with SBO and SAMRA. SBO resolved with conservative treatment. Pt then left AMA. Serum Cr was 2.8 on discharge    Per records  patient has known about her diagnosis of cirrhosis seems like for a little while.  She was being monitored in [place].  It seems she 1st presented when she had an umbilical or periumbilical herniation that seems like it may have required emergency surgery.  She did have surgical repair and at that time it was identified that she had cirrhosis of the liver.  She reports she had quit alcohol at 1 point but then restarted sometime after COVID.  More recently she is had issues with ascites for which she is had to go to the emergency room.   She was started on diuretics. She reports that she stopped drinking in July after that ED visit.  She reports committed to alcohol abstinence.  She believes her last upper endoscopy probably was around 2020.  She reports she is not due for colonoscopy for another 1-2 years. Recommend she  proceed with liver transplant evaluation on 10/11/23.       Review of Systems  Constitutional:  Positive for fatigue. Negative for appetite change, chills, diaphoresis, fever and unexpected weight change.   HENT:  Positive for mouth sores (tongue). Negative for congestion, nosebleeds, sinus pressure and sore throat.    Eyes:  Negative for pain, discharge and visual disturbance.   Respiratory:  Negative for cough, chest tightness, shortness of breath, wheezing and stridor.    Cardiovascular:  Negative for chest pain, palpitations and leg swelling.   Gastrointestinal:  Positive for diarrhea (from lactulose). Negative for abdominal distention, abdominal pain, blood in stool, constipation, nausea and vomiting.   Endocrine: Negative for cold intolerance and heat intolerance.   Genitourinary:  Negative for difficulty urinating, dysuria, flank pain, frequency and urgency.   Musculoskeletal:  Negative for arthralgias, back pain, joint swelling, myalgias, neck pain and neck stiffness.   Skin:  Negative for rash and wound.   Allergic/Immunologic: Positive for immunocompromised state. Negative for food allergies.   Neurological:  Positive for facial asymmetry, weakness and numbness. Negative for dizziness, seizures, syncope, speech difficulty, light-headedness and headaches.   Hematological:  Negative for adenopathy.   Psychiatric/Behavioral:  Positive for confusion. Negative for agitation and hallucinations.    Past Medical History:   Diagnosis Date    Alcoholic cirrhosis of liver     Kidney failure     Unilateral inguinal hernia, without obstruction or gangrene, not specified as recurrent        Past Surgical History:   Procedure Laterality Date    ESOPHAGOGASTRODUODENOSCOPY N/A 09/21/2023    Procedure: EGD (ESOPHAGOGASTRODUODENOSCOPY);  Surgeon: Melissa Edwards MD;  Location: [facility];  Service: Endoscopy;  Laterality: N/A;    HERNIA REPAIR      TONSILLECTOMY         Family history of liver disease: No    Review of  patient's allergies indicates:  No Known Allergies      Tobacco Use    Smoking status: Every Day     Current packs/day: 0.50     Average packs/day: 0.5 packs/day for 25.0 years (12.5 ttl pk-yrs)     Types: Cigarettes     Start date: 10/18/1998     Passive exposure: Never    Smokeless tobacco: Never   Substance and Sexual Activity    Alcohol use: Not Currently    Drug use: Never    Sexual activity: Not on file       Medications Prior to Admission   Medication Sig Dispense Refill Last Dose    furosemide (LASIX) 40 MG tablet Take 1 tablet (40 mg total) by mouth once daily. 30 tablet 5     lactulose (CHRONULAC) 20 gram/30 mL Soln Take 15 mLs (10 g total) by mouth 3 (three) times daily. 1892 mL 5     potassium chloride (KLOR-CON) 10 MEQ TbSR Take 1 tablet (10 mEq total) by mouth once daily. 30 tablet 0     spironolactone (ALDACTONE) 100 MG tablet Take 1 tablet (100 mg total) by mouth once daily. 30 tablet 5        Objective:     Vital Signs (Most Recent):  Temp: 98.3 °F (36.8 °C) (10/18/23 1156)  Pulse: 91 (10/18/23 1156)  Resp: 18 (10/18/23 1156)  BP: (!) 124/56 (10/18/23 1156)  SpO2: 100 % (10/18/23 1156) Vital Signs (24h Range):  Temp:  [98.3 °F (36.8 °C)-98.8 °F (37.1 °C)] 98.3 °F (36.8 °C)  Pulse:  [] 91  Resp:  [16-20] 18  SpO2:  [97 %-100 %] 100 %  BP: (119-187)/(56-83) 124/56     Weight: 58.5 kg (129 lb) (10/18/23 0741)  Body mass index is 26.05 kg/m².    Physical Exam  Vitals and nursing note reviewed.   Constitutional:       General: She is not in acute distress.     Appearance: She is Ill appearing.  She is not diaphoretic.   HENT:      Head: Normocephalic and atraumatic.      Nose: Nose normal.      Mouth/Throat:      Comments: Very poor dentition with multiple fractured and missing teeth with dental caries noted. Small superficial laceration to right tongue -bleeding controlled   Eyes:      General: Scleral icterus present.      Conjunctiva/sclera: Conjunctivae normal.   Neck:      Trachea: No tracheal  deviation.   Cardiovascular:      Rate and Rhythm: Regular rhythm. Tachycardia present.      Heart sounds: Murmur heard.      No friction rub. No gallop.   Pulmonary:      Effort: Pulmonary effort is normal. No respiratory distress.      Breath sounds: Normal breath sounds. No stridor. No wheezing or rales.   Chest:      Chest wall: No tenderness.   Abdominal:      General: Bowel sounds are normal. There is no distension.      Palpations: Abdomen is soft. There is no mass.      Tenderness: There is no abdominal tenderness. There is no guarding or rebound.   Musculoskeletal:         General: No tenderness or deformity. Normal range of motion.      Cervical back: Normal range of motion and neck supple.   Skin:     General: Skin is warm and dry.      Coloration: Skin is not pale.      Findings: Bruising (bruising of varied stages of healing to both arms) present. No erythema or rash.   Neurological:      Mental Status: She is alert and oriented to person, place, and time.      Cranial Nerves: Dysarthria and facial asymmetry present. No cranial nerve deficit.      Motor: Weakness and pronator drift present. No abnormal muscle tone.      Comments: +dysarthria   Some word finding difficulties   Mild left mouth droop  Left UE muscle strength 3/5  Right UE MS 4/5  Right and Left LE MS 4/5  Mild LEft arm drift    Psychiatric:         Behavior: Behavior normal.         Thought Content: Thought content normal.         Cognition and Memory: Cognition is impaired.       Significant Labs:  CBC:   Recent Labs   Lab 10/18/23  0319   WBC 6.29   RBC 2.58*   HGB 8.5*   HCT 24.9*   PLT 66*     CMP:   Recent Labs   Lab 10/18/23  0256   GLU 88   CALCIUM 8.8   ALBUMIN 2.7*   PROT 6.8      K 4.3   CO2 20*      BUN 16   CREATININE 1.5*   ALKPHOS 142*   ALT 47*   AST 96*   BILITOT 6.4*     Coagulation:   Recent Labs   Lab 10/18/23  0256   INR 2.0*   APTT 27.0       Significant Imaging:  Labs: Reviewed    MELD 3.0: 28 at  10/18/2023  2:56 AM  MELD-Na: 25 at 10/18/2023  2:56 AM  Calculated from:  Serum Creatinine: 1.5 mg/dL at 10/18/2023  2:56 AM  Serum Sodium: 136 mmol/L at 10/18/2023  2:56 AM  Total Bilirubin: 6.4 mg/dL at 10/18/2023  2:56 AM  Serum Albumin: 2.7 g/dL at 10/18/2023  2:56 AM  INR(ratio): 2.0 at 10/18/2023  2:56 AM  Age at listing (hypothetical): 58 years  Sex: Female at 10/18/2023  2:56 AM      Assessment/Plan:     Active Diagnoses:    Diagnosis Date Noted POA    PRINCIPAL PROBLEM:  Stroke-like symptoms [R29.90] 10/18/2023 Yes    Acute encephalopathy [G93.40] 10/18/2023 Yes    Generalized weakness [R53.1] 10/18/2023 Yes    SAMRA (acute kidney injury) [N17.9] 10/18/2023 Yes    Coagulopathy [D68.9] 10/18/2023 Yes    Non-traumatic rhabdomyolysis [M62.82] 10/18/2023 Yes    Decompensated alcoholic hepatic cirrhosis [K72.90, K74.60] 09/06/2023 Yes      Problems Resolved During this Admission:     Acute encephalopathy  R/o infection, Pan-culture, Xray chest, Diagnostic para  lactulose and rifaximin     Alcoholic cirrhosis of liver  Sober since 7/2023   Decompensated with Ascites and HE  Plan to  proceed with liver transplant evaluation.   monitor MELD labs  -Continue HCC surveillance  -Continue variceal surveillance-9/202     ascites-uncontrolled  -Diuretics on hold due to SAMRA  -US abdomen and diagnostic Paracentesis  to rule out SBP    SAMRA  Hold diuretics  IV albumin challenge  Rule out SBP      Rest Management as Per Primary team      Demarco Jackson MD  Hepatology  [facility] - Telemetry ([facility])

## 2023-10-18 NOTE — PLAN OF CARE
A215/A215 CHESTER Mojica is a 58 y.o.female admitted on 10/18/2023 for Stroke-like symptoms   Code Status: Full Code MRN: [id number]   Review of patient's allergies indicates:  No Known Allergies  Past Medical History:   Diagnosis Date    Alcoholic cirrhosis of liver     Kidney failure     Unilateral inguinal hernia, without obstruction or gangrene, not specified as recurrent       PRN meds    ondansetron, 4 mg, Q12H PRN  sodium chloride 0.9%, 500 mL, Continuous PRN  sodium chloride 0.9%, 10 mL, PRN      Chart check completed. Will continue plan of care.      Orientation: oriented x 4  Woody Coma Scale Score: 15     Lead Monitored: Lead II Rhythm: atrial rhythm       VTE Required Core Measure: (SCDs) Sequential compression device initiated/maintained Last Bowel Movement: 10/15/23  Diet Soft & Bite Sized (IDDSI Level 6) Thin  Voiding Characteristics: external catheter  Ananth Score: 18  Fall Risk Score: 11  Accucheck []   Freq?      Lines/Drains/Airways       Peripheral Intravenous Line  Duration                  Peripheral IV - Single Lumen 10/18/23 0255 18 G Anterior;Distal;Left Upper Arm <1 day         Peripheral IV - Single Lumen 10/18/23 0304 18 G Anterior;Right Forearm <1 day                        Problem: Adult Inpatient Plan of Care  Goal: Plan of Care Review  Outcome: Ongoing, Progressing  Goal: Patient-Specific Goal (Individualized)  Outcome: Ongoing, Progressing  Goal: Absence of Hospital-Acquired Illness or Injury  Outcome: Ongoing, Progressing  Goal: Optimal Comfort and Wellbeing  Outcome: Ongoing, Progressing  Goal: Readiness for Transition of Care  Outcome: Ongoing, Progressing     Problem: Adjustment to Illness (Stroke, Ischemic/Transient Ischemic Attack)  Goal: Optimal Coping  Outcome: Ongoing, Progressing     Problem: Bowel Elimination Impaired (Stroke, Ischemic/Transient Ischemic Attack)  Goal: Effective Bowel Elimination  Outcome: Ongoing, Progressing     Problem: Cerebral Tissue Perfusion  (Stroke, Ischemic/Transient Ischemic Attack)  Goal: Optimal Cerebral Tissue Perfusion  Outcome: Ongoing, Progressing     Problem: Cognitive Impairment (Stroke, Ischemic/Transient Ischemic Attack)  Goal: Optimal Cognitive Function  Outcome: Ongoing, Progressing     Problem: Communication Impairment (Stroke, Ischemic/Transient Ischemic Attack)  Goal: Improved Communication Skills  Outcome: Ongoing, Progressing     Problem: Functional Ability Impaired (Stroke, Ischemic/Transient Ischemic Attack)  Goal: Optimal Functional Ability  Outcome: Ongoing, Progressing     Problem: Respiratory Compromise (Stroke, Ischemic/Transient Ischemic Attack)  Goal: Effective Oxygenation and Ventilation  Outcome: Ongoing, Progressing     Problem: Sensorimotor Impairment (Stroke, Ischemic/Transient Ischemic Attack)  Goal: Improved Sensorimotor Function  Outcome: Ongoing, Progressing     Problem: Swallowing Impairment (Stroke, Ischemic/Transient Ischemic Attack)  Goal: Optimal Eating and Swallowing without Aspiration  Outcome: Ongoing, Progressing     Problem: Urinary Elimination Impaired (Stroke, Ischemic/Transient Ischemic Attack)  Goal: Effective Urinary Elimination  Outcome: Ongoing, Progressing     Problem: Fall Injury Risk  Goal: Absence of Fall and Fall-Related Injury  Outcome: Ongoing, Progressing     Problem: Infection  Goal: Absence of Infection Signs and Symptoms  Outcome: Ongoing, Progressing     Problem: Fluid and Electrolyte Imbalance (Acute Kidney Injury/Impairment)  Goal: Fluid and Electrolyte Balance  Outcome: Ongoing, Progressing     Problem: Oral Intake Inadequate (Acute Kidney Injury/Impairment)  Goal: Optimal Nutrition Intake  Outcome: Ongoing, Progressing     Problem: Renal Function Impairment (Acute Kidney Injury/Impairment)  Goal: Effective Renal Function  Outcome: Ongoing, Progressing     Problem: Skin Injury Risk Increased  Goal: Skin Health and Integrity  Outcome: Ongoing, Progressing

## 2023-10-18 NOTE — PT/OT/SLP EVAL
"Occupational Therapy   Evaluation    Name: Mara Mojica  MRN: [id number]  Admitting Diagnosis: Stroke-like symptoms  Recent Surgery: * No surgery found *      Recommendations:     Discharge Recommendations: Moderate Intensity Therapy  Discharge Equipment Recommendations:  to be determined by next level of care  Barriers to discharge:  Inaccessible home environment    Assessment:     Mara Mojica is a 58 y.o. female with a medical diagnosis of Stroke-like symptoms.  She presents with the following performance deficits affecting function: weakness, impaired endurance, impaired sensation, impaired self care skills, impaired functional mobility, gait instability, impaired balance, impaired cognition, decreased coordination, decreased upper extremity function, decreased lower extremity function, decreased safety awareness, pain, edema, impaired cardiopulmonary response to activity.      Rehab Prognosis: Good; patient would benefit from acute skilled OT services to address these deficits and reach maximum level of function.       Plan:     Patient to be seen 2 x/week to address the above listed problems via therapeutic activities, therapeutic exercises, self-care/home management  Plan of Care Expires: 11/01/23  Plan of Care Reviewed with: patient    Subjective     Chief Complaint: Pain in hips and calves, "I'm wobbly."  Patient/Family Comments/goals: get better, return home    Occupational Profile:  Living Environment: lives with fiance in a trailer home with 4 steps and a railing to enter.   Previous level of function: Pt Mod (I) - (I) with ADLs and functional mobility limited household distances with no AD.  Roles and Routines: does not drive  Equipment Used at Home: shower chair, grab bar  Assistance upon Discharge: fiance    Pain/Comfort:  Pain Rating 1: 8/10 ("left is 9/10, right is 7/10")  Location - Side 1: Bilateral (L>R)  Location - Orientation 1: generalized  Location 1: hip  Pain Addressed 1: Reposition, " Distraction  Pain Rating Post-Intervention 1: 8/10  Pain Rating 2: 6/10  Location - Side 2: Bilateral  Location - Orientation 2: generalized  Location 2: calf (with standing)  Pain Addressed 2: Reposition, Distraction  Pain Rating Post-Intervention 2: 6/10    Objective:     Communicated with: Nurse and epic chart review prior to session.  Patient found supine with telemetry, peripheral IV, PureWick, bed alarm upon OT entry to room.    General Precautions: Standard, fall  Orthopedic Precautions: N/A  Braces: N/A  Respiratory Status: Room air    Occupational Performance:    Bed Mobility:    Patient completed Rolling/Turning to Left with  contact guard assistance  Patient completed Scooting/Bridging with contact guard assistance  Patient completed Supine to Sit with contact guard assistance  Patient completed Sit to Supine with minimum assistance    Functional Mobility/Transfers:  Patient completed Sit <> Stand Transfer with minimum assistance  with  rolling walker . Pt requiring verbal cueing for hand placement with RW.  Functional Mobility: Patient completed ~8 side steps to L side with Min A and RW to increase dynamic standing balance and activity tolerance needed for ADL completion.   Pt with posterior lean while standing, requiring verbal cueing for anterior weight shift.    Activities of Daily Living:  Upper Body Dressing: maximal assistance ny gown  Lower Body Dressing: total assistance ny socks    Cognitive/Visual Perceptual:  Cognitive/Psychosocial Skills:     -       Oriented to: Person, Place, and Time   -       Follows Commands/attention:Follows one-step commands  -       Communication: dysarthria  -       Safety awareness/insight to disability: impaired     Physical Exam:  Edema:  LUE  Sensation:    -       Impaired  pt reports numbness in L hand, lips, and tongue  Dominant hand:    -       right  Upper Extremity Range of Motion:     -       Right Upper Extremity: WFL  -       Left Upper Extremity:  WFL  Upper Extremity Strength:    -       Right Upper Extremity: 4/5 grossly  -       Left Upper Extremity: 4-/5 grossly   Strength:    -       Right Upper Extremity: WFL  -       Left Upper Extremity: WFL    AMPAC 6 Click ADL:  AMPAC Total Score: 13    Treatment & Education:  Patient educated on role of OT in acute setting and benefits of participation. Educated on techniques to use to increase independence and decrease fall risk with functional transfers. Educated on importance of OOB activity and calling for A to transfer and meet needs. Encouraged completion of B UE AROM therex throughout the day to tolerance to increase functional strength and activity tolerance. Educated on digit flexion/ext therex for edema management in BUE. Educated patient on importance of increased tolerance to upright position and direct impact on CV endurance and strength. Patient encouraged to sit up with bed in chair position for a minimum of 2 consecutive hours per day and for meals. Patient stated understanding and in agreement with POC.     Patient left with bed in chair position with all lines intact, call button in reach, bed alarm on, and nurse notified    GOALS:   Multidisciplinary Problems       Occupational Therapy Goals          Problem: Occupational Therapy    Goal Priority Disciplines Outcome Interventions   Occupational Therapy Goal     OT, PT/OT     Description: Goals to be met by: 11/1/23     Patient will increase functional independence with ADLs by performing:    UE Dressing with Stand-by Assistance.  Grooming while standing at sink with Supervision.  Toileting from toilet with Set-up Assistance for hygiene and clothing management.   Toilet transfer to toilet with Supervision.  Upper extremity exercise program x15 reps per handout, with independence.                         History:     Past Medical History:   Diagnosis Date    Alcoholic cirrhosis of liver     Kidney failure     Unilateral inguinal hernia, without  obstruction or gangrene, not specified as recurrent          Past Surgical History:   Procedure Laterality Date    ESOPHAGOGASTRODUODENOSCOPY N/A 09/21/2023    Procedure: EGD (ESOPHAGOGASTRODUODENOSCOPY);  Surgeon: Melissa Edwards MD;  Location: [facility];  Service: Endoscopy;  Laterality: N/A;    HERNIA REPAIR      TONSILLECTOMY         Time Tracking:     OT Date of Treatment: 10/18/23  OT Start Time: 1415  OT Stop Time: 1445  OT Total Time (min): 30 min    Billable Minutes:Evaluation 15  Therapeutic Activity 15    10/18/2023  Pat Wade OT

## 2023-10-18 NOTE — ASSESSMENT & PLAN NOTE
R/o infection   Repeat Cath UA  Check CXR   Blood cultures   Restart lactulose   UDS-negative   Alcohol level pending

## 2023-10-18 NOTE — ED PROVIDER NOTES
SCRIBE #1 NOTE: I, Treva Kumar, am scribing for, and in the presence of, Chong Frost MD. I have scribed the entire note.       History     Chief Complaint   Patient presents with    Altered Mental Status     Pt to ED via EMS who report pt found unresponsive on floor for several hours. Pt altered and disoriented to time, person, and place. Pt severe fluid overload with fluid weeping, ext swollen. Pt recently DC AMA from hospital for high ammonia levels. Pt hx liver disease     Review of patient's allergies indicates:  No Known Allergies      History of Present Illness     HPI    10/18/2023, 2:50 AM  History is obtained from the patient  Partial history obtained from independent historian: [facility] paramedic      History of Present Illness: Mara Mojica is a 58 y.o. female patient with a PMHx of alcoholic cirrhosis of liver and kidney failure who presents to the Emergency Department via [facility] for evaluation of altered mental status which onset PTA. Per paramedic: the pt's  came home from work and found her on the floor with her head on a blood-stained pillow. The pt was ambulatory on scene. The pt told the paramedics that she was on the floor for about 3 hours. On evaluation, the pt is oriented x3 (contrary to triage note). She is c/o numbness to the entire tongue and lips which onset 3 hours PTA. She denies any falls or seizures and states that she just wanted to lay down on the floor. The pt was recently admitted here for SBO, jaundice, and elevated ammonia but she left AMA. Associated sxs include swelling to all extremities. Patient denies any fever, CP, SOB, abdominal pain, headache, lateralized weakness, and all other sxs at this time. She denies alcohol use. No further complaints or concerns at this time.       Arrival mode: [facility]    PCP: Osiris Nevarez NP        Past Medical History:  Past Medical History:   Diagnosis Date    Alcoholic cirrhosis of liver     Kidney failure     Unilateral  inguinal hernia, without obstruction or gangrene, not specified as recurrent        Past Surgical History:  Past Surgical History:   Procedure Laterality Date    ESOPHAGOGASTRODUODENOSCOPY N/A 9/21/2023    Procedure: EGD (ESOPHAGOGASTRODUODENOSCOPY);  Surgeon: Melissa Edwards MD;  Location: [facility];  Service: Endoscopy;  Laterality: N/A;    HERNIA REPAIR           Family History:  No family history on file.    Social History:  Social History     Tobacco Use    Smoking status: Every Day     Current packs/day: 0.50     Types: Cigarettes     Passive exposure: Never    Smokeless tobacco: Never   Substance and Sexual Activity    Alcohol use: Yes    Drug use: Not on file    Sexual activity: Not on file        Review of Systems     Review of Systems   Constitutional:  Negative for fever.   HENT:  Negative for sore throat.    Respiratory:  Negative for shortness of breath.    Cardiovascular:  Positive for leg swelling (BLE). Negative for chest pain.   Gastrointestinal:  Negative for abdominal pain and nausea.   Genitourinary:  Negative for dysuria.   Musculoskeletal:  Negative for back pain.   Skin:  Negative for rash.   Neurological:  Positive for numbness (tongue and mouth). Negative for seizures, weakness and headaches.   Hematological:  Does not bruise/bleed easily.   All other systems reviewed and are negative.     Physical Exam     Initial Vitals [10/18/23 0245]   BP Pulse Resp Temp SpO2   (!) 187/83 (!) 120 16 98.5 °F (36.9 °C) 100 %      MAP       --          Physical Exam  Nursing Notes and Vital Signs Reviewed.  Constitutional: Patient is in no acute distress. Well-developed and well-nourished.  Head: Atraumatic. Normocephalic.  Eyes: PERRL. EOM intact. Conjunctivae are not pale. Scleral icterus.  ENT: Mucous membranes are moist. Oropharynx is clear and symmetric.    Neck: Supple. Full ROM. No lymphadenopathy.  Cardiovascular: Tachycardic. Regular rhythm. No murmurs, rubs, or gallops. Distal pulses are 2+  and symmetric.  Pulmonary/Chest: No respiratory distress. Clear to auscultation bilaterally. No wheezing or rales.  Abdominal: Soft and non-distended. Umbilical hernia. There is no tenderness.  No rebound, guarding, or rigidity. Good bowel sounds.  Genitourinary: No CVA tenderness  Musculoskeletal: Moves all extremities. No obvious deformities. 2+ edema to all extremities. No calf tenderness.  Skin: Warm and dry.  Neurological:  Alert, awake, and appropriate.  Normal speech. Left mouth droop. 5/5 strength to all extremities. The patient follows commands and responds to questions appropriately.   Psychiatric: Normal affect. Good eye contact. Appropriate in content.       ED Course   Critical Care    Date/Time: 10/18/2023 5:29 AM    Performed by: Chong Frost MD  Authorized by: Chong Frost MD  Direct patient critical care time: 17 minutes  Additional history critical care time: 6 minutes  Ordering / reviewing critical care time: 11 minutes  Documentation critical care time: 5 minutes  Consulting other physicians critical care time: 6 minutes  Total critical care time (exclusive of procedural time) : 45 minutes  Critical care time was exclusive of separately billable procedures and treating other patients and teaching time.  Critical care was necessary to treat or prevent imminent or life-threatening deterioration of the following conditions: Rhabdomyolysis.  Critical care was time spent personally by me on the following activities: blood draw for specimens, development of treatment plan with patient or surrogate, discussions with consultants, interpretation of cardiac output measurements, examination of patient, evaluation of patient's response to treatment, obtaining history from patient or surrogate, ordering and performing treatments and interventions, ordering and review of laboratory studies, ordering and review of radiographic studies, pulse oximetry, re-evaluation of patient's condition and review of old  "charts.        ED Vital Signs:  Vitals:    10/18/23 0245 10/18/23 0303 10/18/23 0322 10/18/23 0502   BP: (!) 187/83  134/73 (!) 151/66   Pulse: (!) 120 (!) 122 (!) 118 (!) 115   Resp: 16 19 20 18   Temp: 98.5 °F (36.9 °C)      TempSrc: Oral      SpO2: 100% 98% 99% 98%   Weight: 58.8 kg (129 lb 9.6 oz)      Height: 4' 11" (1.499 m)          Abnormal Lab Results:  Labs Reviewed   COMPREHENSIVE METABOLIC PANEL - Abnormal; Notable for the following components:       Result Value    CO2 20 (*)     Creatinine 1.5 (*)     Albumin 2.7 (*)     Total Bilirubin 6.4 (*)     Alkaline Phosphatase 142 (*)     AST 96 (*)     ALT 47 (*)     eGFR 41 (*)     All other components within normal limits   PROTIME-INR - Abnormal; Notable for the following components:    Prothrombin Time 20.2 (*)     INR 2.0 (*)     All other components within normal limits   URINALYSIS, REFLEX TO URINE CULTURE - Abnormal; Notable for the following components:    Nitrite, UA Positive (*)     Urobilinogen, UA 2.0-3.0 (*)     All other components within normal limits    Narrative:     Specimen Source->Urine   CK - Abnormal; Notable for the following components:     (*)     All other components within normal limits   CBC W/ AUTO DIFFERENTIAL - Abnormal; Notable for the following components:    RBC 2.58 (*)     Hemoglobin 8.5 (*)     Hematocrit 24.9 (*)     MCH 32.9 (*)     RDW 15.3 (*)     Platelets 66 (*)     Mono # 1.2 (*)     Mono % 19.4 (*)     All other components within normal limits   ISTAT PROCEDURE - Abnormal; Notable for the following components:    POC PH 7.521 (*)     POC PCO2 27.0 (*)     POC HCO3 22.1 (*)     All other components within normal limits   APTT   TROPONIN I   DRUG SCREEN PANEL, URINE EMERGENCY    Narrative:     Specimen Source->Urine   AMMONIA   URINALYSIS MICROSCOPIC    Narrative:     Specimen Source->Urine   ALCOHOL,MEDICAL (ETHANOL)   TYPE & SCREEN        All Lab Results:  Results for orders placed or performed during the " hospital encounter of 10/18/23   Comprehensive metabolic panel   Result Value Ref Range    Sodium 136 136 - 145 mmol/L    Potassium 4.3 3.5 - 5.1 mmol/L    Chloride 104 95 - 110 mmol/L    CO2 20 (L) 23 - 29 mmol/L    Glucose 88 70 - 110 mg/dL    BUN 16 6 - 20 mg/dL    Creatinine 1.5 (H) 0.5 - 1.4 mg/dL    Calcium 8.8 8.7 - 10.5 mg/dL    Total Protein 6.8 6.0 - 8.4 g/dL    Albumin 2.7 (L) 3.5 - 5.2 g/dL    Total Bilirubin 6.4 (H) 0.1 - 1.0 mg/dL    Alkaline Phosphatase 142 (H) 55 - 135 U/L    AST 96 (H) 10 - 40 U/L    ALT 47 (H) 10 - 44 U/L    eGFR 41 (A) >60 mL/min/1.73 m^2    Anion Gap 12 8 - 16 mmol/L   Protime-INR   Result Value Ref Range    Prothrombin Time 20.2 (H) 9.0 - 12.5 sec    INR 2.0 (H) 0.8 - 1.2   APTT   Result Value Ref Range    aPTT 27.0 21.0 - 32.0 sec   Troponin I   Result Value Ref Range    Troponin I 0.020 0.000 - 0.026 ng/mL   Drug screen panel, emergency   Result Value Ref Range    Benzodiazepines Negative Negative    Methadone metabolites Negative Negative    Cocaine (Metab.) Negative Negative    Opiate Scrn, Ur Negative Negative    Barbiturate Screen, Ur Negative Negative    Amphetamine Screen, Ur Negative Negative    THC Negative Negative    Phencyclidine Negative Negative    Creatinine, Urine 72.1 15.0 - 325.0 mg/dL    Toxicology Information SEE COMMENT    Urinalysis, Reflex to Urine Culture Urine, Clean Catch    Specimen: Urine   Result Value Ref Range    Specimen UA Urine, Clean Catch     Color, UA Yellow Yellow, Straw, Sarina    Appearance, UA Clear Clear    pH, UA 8.0 5.0 - 8.0    Specific Gravity, UA 1.015 1.005 - 1.030    Protein, UA Negative Negative    Glucose, UA Negative Negative    Ketones, UA Negative Negative    Bilirubin (UA) Negative Negative    Occult Blood UA Negative Negative    Nitrite, UA Positive (A) Negative    Urobilinogen, UA 2.0-3.0 (A) <2.0 EU/dL    Leukocytes, UA Negative Negative   Ammonia   Result Value Ref Range    Ammonia 42 10 - 50 umol/L   CPK   Result Value  Ref Range     (H) 20 - 180 U/L   CBC auto differential   Result Value Ref Range    WBC 6.29 3.90 - 12.70 K/uL    RBC 2.58 (L) 4.00 - 5.40 M/uL    Hemoglobin 8.5 (L) 12.0 - 16.0 g/dL    Hematocrit 24.9 (L) 37.0 - 48.5 %    MCV 97 82 - 98 fL    MCH 32.9 (H) 27.0 - 31.0 pg    MCHC 34.1 32.0 - 36.0 g/dL    RDW 15.3 (H) 11.5 - 14.5 %    Platelets 66 (L) 150 - 450 K/uL    MPV 11.3 9.2 - 12.9 fL    Immature Granulocytes 0.3 0.0 - 0.5 %    Gran # (ANC) 3.5 1.8 - 7.7 K/uL    Immature Grans (Abs) 0.02 0.00 - 0.04 K/uL    Lymph # 1.4 1.0 - 4.8 K/uL    Mono # 1.2 (H) 0.3 - 1.0 K/uL    Eos # 0.1 0.0 - 0.5 K/uL    Baso # 0.06 0.00 - 0.20 K/uL    nRBC 0 0 /100 WBC    Gran % 55.8 38.0 - 73.0 %    Lymph % 21.6 18.0 - 48.0 %    Mono % 19.4 (H) 4.0 - 15.0 %    Eosinophil % 1.9 0.0 - 8.0 %    Basophil % 1.0 0.0 - 1.9 %    Differential Method Automated    Urinalysis Microscopic   Result Value Ref Range    WBC, UA 5 0 - 5 /hpf    Bacteria Rare None-Occ /hpf    Microscopic Comment SEE COMMENT    ISTAT PROCEDURE   Result Value Ref Range    POC PH 7.521 (H) 7.35 - 7.45    POC PCO2 27.0 (L) 35 - 45 mmHg    POC PO2 51 40 - 60 mmHg    POC HCO3 22.1 (L) 24 - 28 mmol/L    POC BE -1 -2 to 2 mmol/L    POC SATURATED O2 90 95 - 100 %    Sample VENOUS     Site Other     Allens Test N/A          Imaging Results:  Imaging Results              CT Head Without Contrast (In process)                    Type of Interpretation: Outside Written Report.  Radiology Procedure Done: Head CT without contrast.  Interpretation: Normal head/brain CT.   Radiologist: Michele Saunders MD            The EKG was ordered, reviewed, and independently interpreted by the ED provider.  Interpretation time: 02:51  Rate: 121 BPM  Rhythm: sinus tachycardia  Interpretation: No acute ST changes. No STEMI.             The Emergency Provider reviewed the vital signs and test results, which are outlined above.     ED Discussion     5:30 AM: Discussed case with Jesu Angel MD  (Hospital Medicine). Dr. Angel agrees with current care and management of pt and accepts admission.   Admitting Service: Hospital Medicine  Admitting Physician: Dr. Angel  Admit to: Obs tele    5:30 AM: Re-evaluated pt. I have discussed test results, shared treatment plan, and the need for admission with patient and family at bedside. Pt and family express understanding at this time and agree with all information. All questions answered. Pt and family have no further questions or concerns at this time. Pt is ready for admit.           Medical Decision Making  Amount and/or Complexity of Data Reviewed  Labs: ordered. Decision-making details documented in ED Course.  Radiology: ordered. Decision-making details documented in ED Course.  ECG/medicine tests: ordered and independent interpretation performed. Decision-making details documented in ED Course.    Risk  Decision regarding hospitalization.                ED Medication(s):  Medications - No data to display    New Prescriptions    No medications on file               Scribe Attestation:   Scribe #1: I performed the above scribed service and the documentation accurately describes the services I performed. I attest to the accuracy of the note.     Attending:   Physician Attestation Statement for Scribe #1: I, Chong Frost MD, personally performed the services described in this documentation, as scribed by Treva Kumar, in my presence, and it is both accurate and complete.           Clinical Impression       ICD-10-CM ICD-9-CM   1. Non-traumatic rhabdomyolysis  M62.82 728.88   2. Altered mental status  R41.82 780.97   3. Cirrhosis of liver without ascites, unspecified hepatic cirrhosis type  K74.60 571.5   4. Numbness of lip  R20.0 782.0   5. Anasarca  R60.1 782.3       Disposition:   Disposition: Placed in Observation  Condition: Fair         Chong Frost MD  10/18/23 0537

## 2023-10-18 NOTE — PT/OT/SLP EVAL
Speech Language Pathology Evaluation  Cognitive/Bedside Swallow    Patient Name:  Mara Mojica   MRN:  [id number]  Admitting Diagnosis: Stroke-like symptoms    Recommendations:                  General Recommendations:  Speech/language therapy  Diet recommendations:  Soft & Bite Sized Diet - IDDSI Level 6, Thin liquids - IDDSI Level 0   Aspiration Precautions: 1 bite/sip at a time, Frequent oral care, HOB to 90 degrees, and Remain upright 30 minutes post meal   General Precautions: Standard, fall  Communication strategies:  none    Assessment:     Mara Mojica is a 58 y.o. female with an SLP diagnosis of oral dysphagia, Dysarthria and Cognitive-Linguistic Impairment.  She presents with slow, slurred speech with decreased speech intelligibility and decreased cognition related to safety awareness, problem solving, and perception.  Awaiting MRI results to determine acute infarct vs encephalopathy to direct ST in cognitive POC.      Pt completed bedside swallow evaluation with let corner drooling during intake of thin liquids via cup rim and minimum oral residue with solid consistencies requiring a liquid wash to clear.  No overt signs of aspiration noted at bedside.      History:     Past Medical History:   Diagnosis Date    Alcoholic cirrhosis of liver     Kidney failure     Unilateral inguinal hernia, without obstruction or gangrene, not specified as recurrent        Past Surgical History:   Procedure Laterality Date    ESOPHAGOGASTRODUODENOSCOPY N/A 09/21/2023    Procedure: EGD (ESOPHAGOGASTRODUODENOSCOPY);  Surgeon: Melissa Edwards MD;  Location: [facility];  Service: Endoscopy;  Laterality: N/A;    HERNIA REPAIR      TONSILLECTOMY         Subjective   Mara Mojica is a 57 yo female admitted from home after her fiance' found her down on the floor with AMS and slurred speech.  She was laying on a bloody pillow surrounded by soiled blankets.  She was placed in Obs under Hosp Med for AMS r/o CVA, UTI,  possible Hep Encephalopathy. Hepatology evaluated the pt. Recommended Paracentesis to r/o SBP and cont Lactulose, Rifaximin. Await paracentesis and MRI Brain. Start Rocephin.     Pt seen at bedside with no family/friends present.  She reports living with her fiance' who works a lot leaving her home by herself.  Pt stated she is independent with all ADLs but does not drive, denies previous dysphagia, and reported a good appetite.    Patient goals: Pt wants to eat     Pain/Comfort:  Pain Rating 1: 3/10  Location - Side 1: Left  Location 1: hip  Pain Addressed 1: Distraction, Reposition    Respiratory Status: Room air    Objective:     Cognitive Status:    Decreased safety awareness, problem solving, and insight.  She is oriented times 4 and able to participate in conversation      Receptive Language:   Comprehension:   Delayed response time and keeping up with topic    Pragmatics:    Pt crying when describing events leading to admission, she stated that she was scared of her finace' to find her with a bloddy mouth    Expressive Language:  Verbal:    Pt is able to answer all questions       Motor Speech:  Dysarthria characterized by slow, slurred speech and decreased intelligibility.     Voice:   Low intensity    Visual-Spatial:  She reports no visual deficits       Clinical Swallow Exam/ Anam Mechanism Exam:  Oral Motor Exam:  Mandibular Strength and Mobility - Trigeminal Nerve (CNV) Rest: equal   Lateralization: decreased   Protrusion: decreased   Retraction:  decreased  Involuntary Movement: absent    Oral Labial Strength and Mobility -Facial Nerve (CN VII)  Rest: min left sided droop   Lateralization: decreased on left  Protrusion: decreased  Retraction:  decreased  Involuntary Movement: absent    Lingual Strength and Mobility- Hypoglossal Nerve (CN XII)     Lateralization: decreased  Protrusion: decreased  Retraction:  decreased  Involuntary Movement: absent    Buccal Strength and Mobility - Facial Nerve (CN VII)   reduced left side   Facial Sensation and Movement - Facial Nerve (CN VII) Symmetrical at rest: No  Wrinkle forehead: Yes  Able to close eyes tightly: Yes  Pt reported decreased left labial and chin sensation        [place] Swallow Protocol:  [place] Swallow Protocol dictates patient remain NPO if fail screener (Hilaryr et al. 2014).  The [place] Swallow Protocol was administered. The patient was alert and provided the instructions prior to the beginning of the protocol. The patient consumed 3 oz before putting the cup down. Patient drank with consecutive swallows. No overt s/s of pharyngeal dysphagia or aspiration were observed during today's assessment.     Clinical Swallow Examination:   Of note, patient self-fed throughout evaluation. Patient presented with:     CONSISTENCY  NOTES   THIN (IDDSI 0) 3 oz water challenge    Left corner drooling with big sip size from cup rim   PUREE (IDDSI 4/Extremely Thick)   TSP bites of pudding/applesauce  Left lingual residue cleared with a liquid wash   SOLID (IDDSI 7/Regular) Bel[place]an cracker   Slow mastication with minimal oral residue cleared with a liquid wash      Thickened liquids were not used in this assessment. ODominic (2018) reported that thickened liquids have no sound evidence at reducing the risk of pneumonia in patients with dysphagia and can cause harm by increasing their risk of dehydration. It also presents an increased risk of UTI, electrolyte imbalance, constipation, fecal impaction, cognitive impairment, functional decline and even death (Langmore, 2002; [place], 2016).  Thickened liquids are associated with risks including dehydration, increased pharyngeal residue, potential interference with medication absorption, and decreased quality of life (Cyndi, 2013). Thickened liquids are also more likely to be silently aspirated than thin liquids (Quang et al., 2018). This supports the assertion that we should confirm a patient requires thickened liquids with an  instrumental swallow study prior to recommending them.    References:   Cyndi BRIGGS (2013). Thickening agents used for dysphagia management: Effect on bioavailability of water, medication and feelings of satiety. Nutrition Journal, 12, 54. https://doi.org/10.1186/3348-3058-60-54    CHESTER Del [place], SANTIAGO Fuentes, HECTOR Frausto, & CHESTER Jolly (2018). Cough response to aspiration in thin and thick fluids during FEES in hospitalized inpatients. International journal of language & communication disorders, 53(5), 909-918. https://doi.org/10.1111/1606-2119.32517    INTERPRETATION AND RISK ASSESSMENT:  Clinical swallow evaluation (CSE) revealed oral phase characterized by lingual, labial, and buccal strength and range of motion decreased (especially left side). The patient experienced left sided anterior loss of the bolus with thin liquids secondary to decreased closure of the lips around the cup rim. Min residue remained in the oral cavity following the swallow. Patient without overt clinical signs/symptoms of aspiration on any PO trials given.  Contributing risk factors for dysphagia include cognitive deficits. Patient with increased risk for silent aspiration given potential sensory deficits related to stroke.    Clinical signs of oral dysphagia, likely Acute related to left sided weakness. Instrumental swallow study is not indicated to assess the swallowing physiology and rule out aspiration of various consistencies at this time, however swallowing will be monitored and instrumental assessment will be completed if warranted.      Goals:   Multidisciplinary Problems       SLP Goals          Problem: SLP    Goal Priority Disciplines Outcome   SLP Goal     SLP    Description: 1. Pt will tolerate a regular consistency diet without overt signs of swallow difficulty  2. Pt will communicate wants and needs with 100% intelligibility                        Plan:     Patient to be seen:  2 x/week   Plan of Care expires:  11/01/23  Plan  of Care reviewed with:  patient   SLP Follow-Up:  Yes       Discharge recommendations:  Therapy Intensity Recommendations at Discharge: Moderate Intensity Therapy   Barriers to Discharge:  None    Time Tracking:     SLP Treatment Date:   10/18/23  Speech Start Time:  1000  Speech Stop Time:  1025     Speech Total Time (min):  25 min    Billable Minutes: Eval 15  and Eval Swallow and Oral Function 10    10/18/2023

## 2023-10-18 NOTE — HPI
1K given heads-up. Sitter arrives at bedside.    The patient is a 59 yo female with Alcoholic cirrhosis with ascites who presented to ED with AMS. Pt's significant other reports while at work, he tried calling the pt all after noon, but she did not answer the phone. When he got home from work at 1pm, he found the pt on the floor confused with slurred speech laying on a pillow with blood stains and surrounded by soiled blankets with urine and feces and paper towels with blood. Pt can not recall all the events, however, she states she was laying on the couch and moved to the floor because she soiled on herself. She denies any falls or seizures and states that she just wanted to lay down on the floor. She reports she was too weak to go to her bedroom or to the bathroom. She states she cut her tongue on her teeth (due to poor dentition). She denies LOC. However, the S.O. states that the pt was very confused on his arrival to the home. Confusion has improved since arrival to ED. Pt denies any alcohol intake or substance use. Last alcohol drink was 7/2023.   In the ED, the pt was also noted to have a left facial droop and reported numbness to to her mouth and tongue with concern for stroke.     Of note, the pt was recently hospitalized with SBO and SAMRA. SBO resolved with conservative treatment. Pt then left AMA. Serum Cr was 2.8 on discharge.     In the ED, Afebrile, BP stable. Mild tachycardia noted. Labs showed Hgb 8.5, Platelets 66, Serum Cr 1.4, T BIli 2.7, AST 96, ALT 47, , , Ammonia normal INR 2.0. UA +nitrites, no WBCs. ABGs PH 7.5, pCO2 27, pHCO3 22. CT head showed nothing acute -per STAT RAD.

## 2023-10-18 NOTE — PLAN OF CARE
PT EVAL complete. Required CGA for bed mobility, pt able to take 8 side steps towards HOB MIN A using RW. Recommending moderated intensity PT upon d/c.

## 2023-10-18 NOTE — H&P
[facility], later [facility] - Emergency Dept.  [facility] Medicine  History & Physical    Patient Name: Mara Mojica  MRN: [id number]  Patient Class: OP- Observation  Admission Date: 10/18/2023  Attending Physician: Jesu Angel MD   Primary Care Provider: Osiris Nevarez NP         Patient information was obtained from patient, spouse/SO and ER records.     Subjective:     Principal Problem:Stroke-like symptoms    Chief Complaint:   Chief Complaint   Patient presents with    Altered Mental Status     Pt to ED via EMS who report pt found unresponsive on floor for several hours. Pt altered and disoriented to time, person, and place. Pt severe fluid overload with fluid weeping, ext swollen. Pt recently DC AMA from hospital for high ammonia levels. Pt hx liver disease        HPI: The patient is a 59 yo female with Alcoholic cirrhosis with ascites who presented to ED with AMS. Pt's significant other reports while at work, he tried calling the pt all after noon, but she did not answer the phone. When he got home from work at 1pm, he found the pt on the floor confused with slurred speech laying on a pillow with blood stains and surrounded by soiled blankets with urine and feces and paper towels with blood. Pt can not recall all the events, however, she states she was laying on the couch and moved to the floor because she soiled on herself. She denies any falls or seizures and states that she just wanted to lay down on the floor. She reports she was too weak to go to her bedroom or to the bathroom. She states she cut her tongue on her teeth (due to poor dentition). She denies LOC. However, the S.O. states that the pt was very confused on his arrival to the home. Confusion has improved since arrival to ED. Pt denies any alcohol intake or substance use. Last alcohol drink was 7/2023.   In the ED, the pt was also noted to have a left facial droop and reported numbness to to her mouth and tongue with concern for stroke.     Of note, the pt was  recently hospitalized with SBO and SAMRA. SBO resolved with conservative treatment. Pt then left AMA. Serum Cr was 2.8 on discharge.     In the ED, Afebrile, BP stable. Mild tachycardia noted. Labs showed Hgb 8.5, Platelets 66, Serum Cr 1.4, T BIli 2.7, AST 96, ALT 47, , , Ammonia normal INR 2.0. UA +nitrites, no WBCs. ABGs PH 7.5, pCO2 27, pHCO3 22. CT head showed nothing acute -per STAT RAD.       Past Medical History:   Diagnosis Date    Alcoholic cirrhosis of liver     Kidney failure     Unilateral inguinal hernia, without obstruction or gangrene, not specified as recurrent        Past Surgical History:   Procedure Laterality Date    ESOPHAGOGASTRODUODENOSCOPY N/A 09/21/2023    Procedure: EGD (ESOPHAGOGASTRODUODENOSCOPY);  Surgeon: Melissa Edwards MD;  Location: [facility];  Service: Endoscopy;  Laterality: N/A;    HERNIA REPAIR      TONSILLECTOMY         Review of patient's allergies indicates:  No Known Allergies    No current facility-administered medications on file prior to encounter.     Current Outpatient Medications on File Prior to Encounter   Medication Sig    furosemide (LASIX) 40 MG tablet Take 1 tablet (40 mg total) by mouth once daily.    lactulose (CHRONULAC) 20 gram/30 mL Soln Take 15 mLs (10 g total) by mouth 3 (three) times daily.    potassium chloride (KLOR-CON) 10 MEQ TbSR Take 1 tablet (10 mEq total) by mouth once daily.    spironolactone (ALDACTONE) 100 MG tablet Take 1 tablet (100 mg total) by mouth once daily.     Family History       Problem Relation (Age of Onset)    Ovarian cancer Mother    Seizures Father          Tobacco Use    Smoking status: Every Day     Current packs/day: 0.50     Average packs/day: 0.5 packs/day for 25.0 years (12.5 ttl pk-yrs)     Types: Cigarettes     Start date: 10/18/1998     Passive exposure: Never    Smokeless tobacco: Never   Substance and Sexual Activity    Alcohol use: Not Currently    Drug use: Never    Sexual activity: Not on file      Review of Systems   Constitutional:  Positive for fatigue. Negative for appetite change, chills, diaphoresis, fever and unexpected weight change.   HENT:  Positive for mouth sores (tongue). Negative for congestion, nosebleeds, sinus pressure and sore throat.    Eyes:  Negative for pain, discharge and visual disturbance.   Respiratory:  Negative for cough, chest tightness, shortness of breath, wheezing and stridor.    Cardiovascular:  Negative for chest pain, palpitations and leg swelling.   Gastrointestinal:  Positive for diarrhea (from lactulose). Negative for abdominal distention, abdominal pain, blood in stool, constipation, nausea and vomiting.   Endocrine: Negative for cold intolerance and heat intolerance.   Genitourinary:  Negative for difficulty urinating, dysuria, flank pain, frequency and urgency.   Musculoskeletal:  Negative for arthralgias, back pain, joint swelling, myalgias, neck pain and neck stiffness.   Skin:  Negative for rash and wound.   Allergic/Immunologic: Positive for immunocompromised state. Negative for food allergies.   Neurological:  Positive for facial asymmetry, weakness and numbness. Negative for dizziness, seizures, syncope, speech difficulty, light-headedness and headaches.   Hematological:  Negative for adenopathy.   Psychiatric/Behavioral:  Positive for confusion. Negative for agitation and hallucinations.      Objective:     Vital Signs (Most Recent):  Temp: 98.5 °F (36.9 °C) (10/18/23 0245)  Pulse: (!) 115 (10/18/23 0502)  Resp: 18 (10/18/23 0502)  BP: (!) 151/66 (10/18/23 0502)  SpO2: 98 % (10/18/23 0502) Vital Signs (24h Range):  Temp:  [98.5 °F (36.9 °C)] 98.5 °F (36.9 °C)  Pulse:  [115-122] 115  Resp:  [16-20] 18  SpO2:  [98 %-100 %] 98 %  BP: (134-187)/(66-83) 151/66     Weight: 58.8 kg (129 lb 9.6 oz)  Body mass index is 26.18 kg/m².     Physical Exam  Vitals and nursing note reviewed.   Constitutional:       General: She is not in acute distress.     Appearance: She is  Ill appearing.  She is not diaphoretic.   HENT:      Head: Normocephalic and atraumatic.      Nose: Nose normal.      Mouth/Throat:      Comments: Very poor dentition with multiple fractured and missing teeth with dental caries noted. Small superficial laceration to right tongue -bleeding controlled   Eyes:      General: Scleral icterus present.      Conjunctiva/sclera: Conjunctivae normal.   Neck:      Trachea: No tracheal deviation.   Cardiovascular:      Rate and Rhythm: Regular rhythm. Tachycardia present.      Heart sounds: Murmur heard.      No friction rub. No gallop.   Pulmonary:      Effort: Pulmonary effort is normal. No respiratory distress.      Breath sounds: Normal breath sounds. No stridor. No wheezing or rales.   Chest:      Chest wall: No tenderness.   Abdominal:      General: Bowel sounds are normal. There is no distension.      Palpations: Abdomen is soft. There is no mass.      Tenderness: There is no abdominal tenderness. There is no guarding or rebound.   Musculoskeletal:         General: No tenderness or deformity. Normal range of motion.      Cervical back: Normal range of motion and neck supple.   Skin:     General: Skin is warm and dry.      Coloration: Skin is not pale.      Findings: Bruising (bruising of varied stages of healing to both arms) present. No erythema or rash.   Neurological:      Mental Status: She is alert and oriented to person, place, and time.      Cranial Nerves: Dysarthria and facial asymmetry present. No cranial nerve deficit.      Motor: Weakness and pronator drift present. No abnormal muscle tone.      Comments: +dysarthria   Some word finding difficulties   Mild left mouth droop  Left UE muscle strength 3/5  Right UE MS 4/5  Right and Left LE MS 4/5  Mild LEft arm drift    Psychiatric:         Behavior: Behavior normal.         Thought Content: Thought content normal.         Cognition and Memory: Cognition is impaired.                Significant Labs: All  pertinent labs within the past 24 hours have been reviewed.    Significant Imaging: I have reviewed all pertinent imaging results/findings within the past 24 hours.    Assessment/Plan:     * Stroke-like symptoms  CT head showed nothing acute -STAT RAD  Will obtain MRI brain   Carotid U/S   Echo  Hold ASA due to elevated INR and low platelets  PT/OT/ST  Neuro checks       Acute encephalopathy  R/o infection   Repeat Cath UA  Check CXR   Blood cultures   Restart lactulose   UDS-negative   Alcohol level pending       Decompensated alcoholic hepatic cirrhosis  Liver continues to decompensate   Consult hepatology     MELD 3.0: 28 at 10/18/2023  2:56 AM  MELD-Na: 25 at 10/18/2023  2:56 AM  Calculated from:  Serum Creatinine: 1.5 mg/dL at 10/18/2023  2:56 AM  Serum Sodium: 136 mmol/L at 10/18/2023  2:56 AM  Total Bilirubin: 6.4 mg/dL at 10/18/2023  2:56 AM  Serum Albumin: 2.7 g/dL at 10/18/2023  2:56 AM  INR(ratio): 2.0 at 10/18/2023  2:56 AM  Age at listing (hypothetical): 58 years  Sex: Female at 10/18/2023  2:56 AM        SAMRA (acute kidney injury)  Patient with acute kidney injury/acute renal failure likely due to pre-renal azotemia due to IVVD SAMRA is currently worsening. Baseline creatinine normal creatine  - Labs reviewed- Renal function/electrolytes with Estimated Creatinine Clearance: 34.9 mL/min (A) (based on SCr of 1.5 mg/dL (H)). according to latest data. Monitor urine output and serial BMP and adjust therapy as needed. Avoid nephrotoxins and renally dose meds for GFR listed above.    Will give one liter IVFs at 75ml/hr  Hold Lasix and Spironolactone for now     Generalized weakness  PT/OT/ST      Non-traumatic rhabdomyolysis  Gentle IV hydration   monitor      Coagulopathy  INR 2.0  Consult hepatology         VTE Risk Mitigation (From admission, onward)           Ordered     IP VTE HIGH RISK PATIENT  Once         10/18/23 0645     Place sequential compression device  Until discontinued         10/18/23 0645      Reason for No Pharmacological VTE Prophylaxis  Once        Question:  Reasons:  Answer:  Risk of Bleeding    10/18/23 0645                         On 10/18/2023, patient should be placed in hospital observation services under my care in collaboration with Dr. Angel .      Raisa Carnes NP  Department of Hospital Medicine  [facility] - Emergency Dept.

## 2023-10-18 NOTE — PLAN OF CARE
OT jared completed. Recommends moderate intensity therapy.  CGA for bed mobility. Min A for t/fs and side steps with RW.

## 2023-10-18 NOTE — SUBJECTIVE & OBJECTIVE
Past Medical History:   Diagnosis Date    Alcoholic cirrhosis of liver     Kidney failure     Unilateral inguinal hernia, without obstruction or gangrene, not specified as recurrent        Past Surgical History:   Procedure Laterality Date    ESOPHAGOGASTRODUODENOSCOPY N/A 09/21/2023    Procedure: EGD (ESOPHAGOGASTRODUODENOSCOPY);  Surgeon: Melissa Edwards MD;  Location: [facility];  Service: Endoscopy;  Laterality: N/A;    HERNIA REPAIR      TONSILLECTOMY         Review of patient's allergies indicates:  No Known Allergies    No current facility-administered medications on file prior to encounter.     Current Outpatient Medications on File Prior to Encounter   Medication Sig    furosemide (LASIX) 40 MG tablet Take 1 tablet (40 mg total) by mouth once daily.    lactulose (CHRONULAC) 20 gram/30 mL Soln Take 15 mLs (10 g total) by mouth 3 (three) times daily.    potassium chloride (KLOR-CON) 10 MEQ TbSR Take 1 tablet (10 mEq total) by mouth once daily.    spironolactone (ALDACTONE) 100 MG tablet Take 1 tablet (100 mg total) by mouth once daily.     Family History       Problem Relation (Age of Onset)    Ovarian cancer Mother    Seizures Father          Tobacco Use    Smoking status: Every Day     Current packs/day: 0.50     Average packs/day: 0.5 packs/day for 25.0 years (12.5 ttl pk-yrs)     Types: Cigarettes     Start date: 10/18/1998     Passive exposure: Never    Smokeless tobacco: Never   Substance and Sexual Activity    Alcohol use: Not Currently    Drug use: Never    Sexual activity: Not on file     Review of Systems   Constitutional:  Positive for fatigue. Negative for appetite change, chills, diaphoresis, fever and unexpected weight change.   HENT:  Positive for mouth sores (tongue). Negative for congestion, nosebleeds, sinus pressure and sore throat.    Eyes:  Negative for pain, discharge and visual disturbance.   Respiratory:  Negative for cough, chest tightness, shortness of breath, wheezing and  stridor.    Cardiovascular:  Negative for chest pain, palpitations and leg swelling.   Gastrointestinal:  Positive for diarrhea (from lactulose). Negative for abdominal distention, abdominal pain, blood in stool, constipation, nausea and vomiting.   Endocrine: Negative for cold intolerance and heat intolerance.   Genitourinary:  Negative for difficulty urinating, dysuria, flank pain, frequency and urgency.   Musculoskeletal:  Negative for arthralgias, back pain, joint swelling, myalgias, neck pain and neck stiffness.   Skin:  Negative for rash and wound.   Allergic/Immunologic: Positive for immunocompromised state. Negative for food allergies.   Neurological:  Positive for facial asymmetry, weakness and numbness. Negative for dizziness, seizures, syncope, speech difficulty, light-headedness and headaches.   Hematological:  Negative for adenopathy.   Psychiatric/Behavioral:  Positive for confusion. Negative for agitation and hallucinations.      Objective:     Vital Signs (Most Recent):  Temp: 98.5 °F (36.9 °C) (10/18/23 0245)  Pulse: (!) 115 (10/18/23 0502)  Resp: 18 (10/18/23 0502)  BP: (!) 151/66 (10/18/23 0502)  SpO2: 98 % (10/18/23 0502) Vital Signs (24h Range):  Temp:  [98.5 °F (36.9 °C)] 98.5 °F (36.9 °C)  Pulse:  [115-122] 115  Resp:  [16-20] 18  SpO2:  [98 %-100 %] 98 %  BP: (134-187)/(66-83) 151/66     Weight: 58.8 kg (129 lb 9.6 oz)  Body mass index is 26.18 kg/m².     Physical Exam  Vitals and nursing note reviewed.   Constitutional:       General: She is not in acute distress.     Appearance: She is well-developed. She is not diaphoretic.   HENT:      Head: Normocephalic and atraumatic.      Nose: Nose normal.      Mouth/Throat:      Comments: Very poor dentition with multiple fractured and missing teeth with dental caries noted  Eyes:      General: Scleral icterus present.      Conjunctiva/sclera: Conjunctivae normal.   Neck:      Trachea: No tracheal deviation.   Cardiovascular:      Rate and Rhythm:  Regular rhythm. Tachycardia present.      Heart sounds: Murmur heard.      No friction rub. No gallop.   Pulmonary:      Effort: Pulmonary effort is normal. No respiratory distress.      Breath sounds: Normal breath sounds. No stridor. No wheezing or rales.   Chest:      Chest wall: No tenderness.   Abdominal:      General: Bowel sounds are normal. There is no distension.      Palpations: Abdomen is soft. There is no mass.      Tenderness: There is no abdominal tenderness. There is no guarding or rebound.   Musculoskeletal:         General: No tenderness or deformity. Normal range of motion.      Cervical back: Normal range of motion and neck supple.   Skin:     General: Skin is warm and dry.      Coloration: Skin is not pale.      Findings: Bruising (bruising of varied stages of healing to both arms) present. No erythema or rash.   Neurological:      Mental Status: She is alert and oriented to person, place, and time.      Cranial Nerves: Dysarthria and facial asymmetry present. No cranial nerve deficit.      Motor: Weakness and pronator drift present. No abnormal muscle tone.      Comments: +dysarthria   Some word finding difficulties   Mild left mouth droop  Left UE muscle strength 3/5  Right UE MS 4/5  Right and Left LE MS 4/5  Mild LEft arm drift    Psychiatric:         Behavior: Behavior normal.         Thought Content: Thought content normal.         Cognition and Memory: Cognition is impaired.                Significant Labs: All pertinent labs within the past 24 hours have been reviewed.    Significant Imaging: I have reviewed all pertinent imaging results/findings within the past 24 hours.

## 2023-10-18 NOTE — PT/OT/SLP EVAL
Physical Therapy Evaluation and Treatment    Patient Name: Mara Mojica   MRN: [id number]  Recent Surgery: * No surgery found *      Recommendations:     Discharge Recommendations: Moderate Intensity Therapy   Discharge Equipment Recommendations: to be determined by next level of care   Barriers to discharge: None    Assessment:     Mara Mojica is a 58 y.o. female admitted with a medical diagnosis of Stroke-like symptoms. She presents with the following impairments/functional limitations: weakness, impaired endurance, impaired functional mobility, gait instability, impaired balance, pain, decreased safety awareness, decreased lower extremity function, decreased coordination, impaired cardiopulmonary response to activity, impaired sensation, impaired cognition.    Rehab Prognosis: Good; patient would benefit from acute PT services to address these deficits and reach maximum level of function.    Plan:     During this hospitalization, patient to be seen 3 x/week to address the above listed problems via gait training, therapeutic activities, therapeutic exercises    Plan of Care Expires: 11/01/23    Subjective     Chief Complaint: Pt is motivated to participate  Patient Comments/Goals: none stated  Pain/Comfort:  Pain Rating 1: 9/10  Location - Side 1: Bilateral (L>R)  Location - Orientation 1: generalized  Location 1: hip  Pain Addressed 1: Reposition, Distraction, Cessation of Activity  Pain Rating Post-Intervention 1: 9/10  Pain Rating 2: 6/10  Location - Side 2: Bilateral  Location - Orientation 2: generalized  Location 2: calf  Pain Addressed 2: Reposition, Distraction, Cessation of Activity  Pain Rating Post-Intervention 2: 6/10    Social History:  Living Environment: Patient lives with their significant other in a mobile home with number of outside stair(s): 4 with rail  Prior Level of Function: Prior to admission, patient was independent, not driving and not working, and ambulated household distances  "using no AD  Equipment Used at Home: shower chair  DME owned (not currently used): rollator  Assistance Upon Discharge: significant other    Objective:     Communicated with nurse Astorga and epic chart review prior to session. Patient found left sidelying with peripheral IV, bed alarm, telemetry, PureWick upon PT entry to room.    General Precautions: Standard, fall   Orthopedic Precautions: N/A   Braces: N/A    Respiratory Status: Room air    Exams:  Cognition: Patient is oriented to Person and Place  RLE ROM: WFL  RLE Strength:  Grossly 4-/5  LLE ROM: WFL  LLE Strength:  Grossly 4-/5  Sensation:    -       Impaired  L UE  Skin Integrity/Edema:     -       Skin integrity: Visible skin intact  -       Edema: Moderate L UE, B LE    Functional Mobility:  Gait belt applied - Yes  Bed Mobility  Scooting: contact guard assistance  Supine to Sit: contact guard assistance  Sit to Supine: contact guard assistance  Transfers  Sit to Stand: minimum assistance with rolling walker and with cues for hand placement  Gait  Patient able to take 8 side steps toward HOB with rolling walker and minimum assistance. Patient demonstrates unsteady gait. No c/o dizziness or SOB, no gross LOB. All lines remained intact throughout ambulation trail.  Balance  Sitting: contact guard assistance  Standing: minimum assistance  Linens soiled and changed    Therapeutic Activities and Exercises:   Pt educated on role of PT in acute care and POC. Educated on importance of OOB activities, activity pacing, and HEP (marching/hip flex, hip abd, heel slides/LAQ, quad sets, ankle pumps) in order to maintain/regain strength. Encouraged to sit up for all meals. Educated on proper use of RW for safety and to reduce risk of falling. Educated on "call don't fall" policy and increased risk of falling due to weakness, instructed to utilize call bell for assistance with all transfers. Pt agreeable to all requests.    AM-PAC 6 CLICK MOBILITY  Total " Score:16    Patient left with bed in chair position with all lines intact, call button in reach, RN notified, and bed alarm on.    GOALS:   Multidisciplinary Problems       Physical Therapy Goals          Problem: Physical Therapy    Goal Priority Disciplines Outcome Goal Variances Interventions   Physical Therapy Goal     PT, PT/OT      Description: Goals to be met by 11/1/23.  1. Pt will complete bed mobility SBA.  2. Pt will complete sit to stand CGA.  3. Pt will ambulate 100ft CGA using RW.  4. Pt will increase AMPAC score by 2 points to progress functional mobility.                       History:     Past Medical History:   Diagnosis Date    Alcoholic cirrhosis of liver     Kidney failure     Unilateral inguinal hernia, without obstruction or gangrene, not specified as recurrent        Past Surgical History:   Procedure Laterality Date    ESOPHAGOGASTRODUODENOSCOPY N/A 09/21/2023    Procedure: EGD (ESOPHAGOGASTRODUODENOSCOPY);  Surgeon: Melissa Edwards MD;  Location: [facility];  Service: Endoscopy;  Laterality: N/A;    HERNIA REPAIR      TONSILLECTOMY         Time Tracking:     PT Received On: 10/18/23  PT Start Time: 1415  PT Stop Time: 1445  PT Total Time (min): 30 min     Billable Minutes: Evaluation 15min and Therapeutic Activity 15min    10/18/2023

## 2023-10-19 PROBLEM — R60.1 ANASARCA: Status: ACTIVE | Noted: 2023-10-19

## 2023-10-19 PROCEDURE — 97530 THERAPEUTIC ACTIVITIES: CPT | Mod: NTX

## 2023-10-19 PROCEDURE — 99223 1ST HOSP IP/OBS HIGH 75: CPT | Mod: NTX,,, | Performed by: PSYCHIATRY & NEUROLOGY

## 2023-10-19 PROCEDURE — 25000003 PHARM REV CODE 250: Mod: NTX | Performed by: NURSE PRACTITIONER

## 2023-10-19 PROCEDURE — 95816 EEG AWAKE AND DROWSY: CPT | Mod: 26,NTX,, | Performed by: PSYCHIATRY & NEUROLOGY

## 2023-10-19 PROCEDURE — 99223 PR INITIAL HOSPITAL CARE,LEVL III: ICD-10-PCS | Mod: NTX,,, | Performed by: PSYCHIATRY & NEUROLOGY

## 2023-10-19 PROCEDURE — 63600175 PHARM REV CODE 636 W HCPCS: Mod: NTX | Performed by: EMERGENCY MEDICINE

## 2023-10-19 PROCEDURE — 21400001 HC TELEMETRY ROOM: Mod: NTX

## 2023-10-19 PROCEDURE — 92526 ORAL FUNCTION THERAPY: CPT | Mod: NTX

## 2023-10-19 PROCEDURE — 25000003 PHARM REV CODE 250: Mod: NTX | Performed by: EMERGENCY MEDICINE

## 2023-10-19 PROCEDURE — 99232 PR SUBSEQUENT HOSPITAL CARE,LEVL II: ICD-10-PCS | Mod: NTX,,, | Performed by: INTERNAL MEDICINE

## 2023-10-19 PROCEDURE — 97530 THERAPEUTIC ACTIVITIES: CPT | Mod: NTX,CQ

## 2023-10-19 PROCEDURE — 99232 SBSQ HOSP IP/OBS MODERATE 35: CPT | Mod: NTX,,, | Performed by: INTERNAL MEDICINE

## 2023-10-19 PROCEDURE — 92507 TX SP LANG VOICE COMM INDIV: CPT | Mod: NTX

## 2023-10-19 PROCEDURE — 95816 EEG AWAKE AND DROWSY: CPT | Mod: NTX

## 2023-10-19 PROCEDURE — 95816 PR EEG,W/AWAKE & DROWSY RECORD: ICD-10-PCS | Mod: 26,NTX,, | Performed by: PSYCHIATRY & NEUROLOGY

## 2023-10-19 RX ORDER — THIAMINE HCL 100 MG
100 TABLET ORAL DAILY
Status: DISCONTINUED | OUTPATIENT
Start: 2023-10-19 | End: 2023-10-25 | Stop reason: HOSPADM

## 2023-10-19 RX ADMIN — THIAMINE HCL TAB 100 MG 100 MG: 100 TAB at 11:10

## 2023-10-19 RX ADMIN — Medication 1 TABLET: at 11:10

## 2023-10-19 RX ADMIN — LACTULOSE 10 G: 20 SOLUTION ORAL at 11:10

## 2023-10-19 RX ADMIN — THERA TABS 1 TABLET: TAB at 11:10

## 2023-10-19 RX ADMIN — RIFAXIMIN 550 MG: 550 TABLET ORAL at 11:10

## 2023-10-19 RX ADMIN — CEFTRIAXONE SODIUM 1 G: 1 INJECTION, POWDER, FOR SOLUTION INTRAMUSCULAR; INTRAVENOUS at 03:10

## 2023-10-19 RX ADMIN — LACTULOSE 10 G: 20 SOLUTION ORAL at 08:10

## 2023-10-19 RX ADMIN — RIFAXIMIN 550 MG: 550 TABLET ORAL at 08:10

## 2023-10-19 RX ADMIN — LACTULOSE 10 G: 20 SOLUTION ORAL at 03:10

## 2023-10-19 NOTE — PROGRESS NOTES
[facility] Medicine  Progress Note    Patient Name: Mara Mojica  MRN: [id number]  Patient Class: IP- Inpatient   Admission Date: 10/18/2023  Length of Stay: 1 days  Attending Physician: Naveed Faith MD  Primary Care Provider: Osiris Nevarez NP        Subjective:     Principal Problem:Stroke-like symptoms        HPI:  The patient is a 57 yo female with Alcoholic cirrhosis with ascites who presented to ED with AMS. Pt's significant other reports while at work, he tried calling the pt all after noon, but she did not answer the phone. When he got home from work at 1pm, he found the pt on the floor confused with slurred speech laying on a pillow with blood stains and surrounded by soiled blankets with urine and feces and paper towels with blood. Pt can not recall all the events, however, she states she was laying on the couch and moved to the floor because she soiled on herself. She denies any falls or seizures and states that she just wanted to lay down on the floor. She reports she was too weak to go to her bedroom or to the bathroom. She states she cut her tongue on her teeth (due to poor dentition). She denies LOC. However, the S.O. states that the pt was very confused on his arrival to the home. Confusion has improved since arrival to ED. Pt denies any alcohol intake or substance use. Last alcohol drink was 7/2023.   In the ED, the pt was also noted to have a left facial droop and reported numbness to to her mouth and tongue with concern for stroke.     Of note, the pt was recently hospitalized with SBO and SAMRA. SBO resolved with conservative treatment. Pt then left AMA. Serum Cr was 2.8 on discharge.     In the ED, Afebrile, BP stable. Mild tachycardia noted. Labs showed Hgb 8.5, Platelets 66, Serum Cr 1.4, T BIli 2.7, AST 96, ALT 47, , , Ammonia normal INR 2.0. UA +nitrites, no WBCs. ABGs PH 7.5, pCO2 27, pHCO3 22. CT head showed nothing acute -per STAT RAD.        Overview/Hospital Course:  57 yo female with Alcoholic cirrhosis with ascites, s/p Ex Lap with bowel resection in the past for incarcerated hernia, recent SBO with SAMRA, who presented to ED with AMS. Per ER, pt's  came home from work and found her on the floor with her head on a blood-stained pillow and some slurred speech, surrounded by soiled blankets with urine and feces. Per EMS, pt was ambulatory at the scene. The pt told the paramedics that she was on the floor for about 3 hours. In the ER, the pt is oriented x3 (contrary to triage note). She also c/o numbness to the entire tongue and lips which onset 3 hours PTA. She denies any falls or seizures. Pt can not recall all the events, however, she states she was laying on the couch and moved to the floor because she soiled on herself. She reports she was too weak to go to her bedroom or to the bathroom. She states she cut her tongue on her teeth (due to poor dentition). She denies LOC. However, the S.O. states that the pt was very confused on his arrival to the home. Confusion has improved since arrival to ED. Pt denies any alcohol intake or substance use. Last alcohol drink was 7/2023.      In the ED, the pt was also noted to have a left facial droop and reported numbness to to her mouth and tongue with concern for stroke.      Of note, the pt was recently hospitalized with SBO and SAMRA. SBO resolved with conservative treatment. Pt then left AMA. Serum Cr was 2.8 on discharge.      In the ED, Afebrile, BP stable. Mild tachycardia noted. Labs showed Hgb 8.5, Platelets 66, Serum Cr 1.4, T BIli 2.7, AST 96, ALT 47, , , Ammonia normal. INR 2, UA +nitrites, 13 WBCs. ABGs PH 7.5, pCO2 27, pO2 51, HCO3 22. CT head showed nothing acute.     She was placed in Obs under Hosp Med for AMS r/o CVA, UTI, possible Hep Encephalopathy. Hepatology evaluated the pt. Recommended Paracentesis to r/o SBP and cont Lactulose, Rifaximin. Await paracentesis and  MRI Brain. Start Rocephin.     US Abd- no ascites. MRI Brain done but not read yet- appears normal to me, likely has HE and UTI-  will cont Lactulose and Rifaximin.  Evaluated by PT/OT/ST.     10/19- Appreciate Hepatology and Neurology: remains mostly unable to speak but appropriately says yes or no. Was not swallowing anything yesterday but finally gave her her lactulose and Rifaximin with applesauce, now improving. Her boyfriend is with her which helped. Looks calmer, less tremulous. EEG pending. Urine Cx growing E coli- getting Rocephin. Dr. Jackson worries that she will soon need Liver Tx. Cont present care.        Interval History:  Appreciate Hepatology and Neurology: remains mostly unable to speak but appropriately says yes or no. Was not swallowing anything yesterday but finally gave her her lactulose and Rifaximin with applesauce, now improving. Her boyfriend is with her which helped. Looks calmer, less tremulous. EEG pending. Urine Cx growing E coli- getting Rocephin. Dr. Jackson worries that she will soon need Liver Tx. Cont present care.      Review of Systems   Constitutional:  Positive for activity change, appetite change and fatigue. Negative for chills, diaphoresis, fever and unexpected weight change.   HENT:  Negative for congestion, mouth sores (tongue), nosebleeds, sinus pressure and sore throat.    Eyes:  Negative for pain, discharge and visual disturbance.   Respiratory:  Negative for cough, chest tightness, shortness of breath, wheezing and stridor.    Cardiovascular:  Negative for chest pain, palpitations and leg swelling.   Gastrointestinal:  Negative for abdominal distention, abdominal pain, blood in stool, constipation, diarrhea (from lactulose), nausea and vomiting.   Endocrine: Negative for cold intolerance and heat intolerance.   Genitourinary:  Negative for difficulty urinating, dysuria, flank pain, frequency and urgency.   Musculoskeletal:  Negative for arthralgias, back pain, joint  swelling, myalgias, neck pain and neck stiffness.   Skin:  Negative for rash and wound.   Allergic/Immunologic: Positive for immunocompromised state. Negative for food allergies.   Neurological:  Positive for speech difficulty, weakness and numbness. Negative for dizziness, seizures, syncope, facial asymmetry, light-headedness and headaches.   Hematological:  Negative for adenopathy.   Psychiatric/Behavioral:  Positive for confusion. Negative for agitation and hallucinations.      Objective:     Vital Signs (Most Recent):  Temp: 98.7 °F (37.1 °C) (10/19/23 1148)  Pulse: 100 (10/19/23 1539)  Resp: 18 (10/19/23 1148)  BP: 129/88 (10/19/23 1148)  SpO2: 100 % (10/19/23 1148) Vital Signs (24h Range):  Temp:  [97.8 °F (36.6 °C)-99.4 °F (37.4 °C)] 98.7 °F (37.1 °C)  Pulse:  [] 100  Resp:  [16-18] 18  SpO2:  [98 %-100 %] 100 %  BP: (106-136)/(57-88) 129/88     Weight: 58.5 kg (129 lb)  Body mass index is 26.05 kg/m².    Intake/Output Summary (Last 24 hours) at 10/19/2023 1643  Last data filed at 10/18/2023 1809  Gross per 24 hour   Intake 200 ml   Output 250 ml   Net -50 ml         Physical Exam  Vitals and nursing note reviewed.   Constitutional:       General: She is not in acute distress.     Appearance: She is well-developed. She is ill-appearing. She is not diaphoretic.      Comments: Middle aged lady, tremulous, anxious, restless   HENT:      Head: Normocephalic and atraumatic.      Nose: Nose normal.      Mouth/Throat:      Comments: Very poor dentition with multiple fractured and missing teeth with dental caries noted  Eyes:      General: Scleral icterus present.      Conjunctiva/sclera: Conjunctivae normal.   Neck:      Trachea: No tracheal deviation.   Cardiovascular:      Rate and Rhythm: Regular rhythm. Tachycardia present.      Heart sounds: Murmur heard.      No friction rub. No gallop.   Pulmonary:      Effort: Pulmonary effort is normal. No respiratory distress.      Breath sounds: Normal breath  sounds. No stridor. No wheezing or rales.   Chest:      Chest wall: No tenderness.   Abdominal:      General: Bowel sounds are normal. There is no distension.      Palpations: Abdomen is soft. There is no mass.      Tenderness: There is no abdominal tenderness. There is no guarding or rebound.   Musculoskeletal:         General: No tenderness or deformity. Normal range of motion.      Cervical back: Normal range of motion and neck supple.   Skin:     General: Skin is warm and dry.      Coloration: Skin is not pale.      Findings: Bruising (bruising of varied stages of healing to both arms) present. No erythema or rash.   Neurological:      Mental Status: She is alert and oriented to person, place, and time.      Cranial Nerves: Dysarthria and facial asymmetry present. No cranial nerve deficit.      Motor: Weakness and pronator drift present. No abnormal muscle tone.      Comments: +dysarthria   Some word finding difficulties   Mild left mouth droop  Left UE muscle strength 3/5  Right UE MS 4/5  Right and Left LE MS 4/5  Mild LEft arm drift    Psychiatric:         Behavior: Behavior normal.         Thought Content: Thought content normal.         Cognition and Memory: Cognition is impaired.             Significant Labs: All pertinent labs within the past 24 hours have been reviewed.  Blood Culture:   Recent Labs   Lab 10/18/23  0728 10/18/23  0729   LABBLOO No Growth to date No Growth to date     BMP:   Recent Labs   Lab 10/18/23  0256   GLU 88      K 4.3      CO2 20*   BUN 16   CREATININE 1.5*   CALCIUM 8.8     CBC:   Recent Labs   Lab 10/18/23  0319   WBC 6.29   HGB 8.5*   HCT 24.9*   PLT 66*     Urine Culture:   Recent Labs   Lab 10/18/23  0832   LABURIN GRAM NEGATIVE DOUGIE  > 100,000 cfu/ml  Identification and susceptibility pending  *       Significant Imaging: I have reviewed all pertinent imaging results/findings within the past 24 hours.      Assessment/Plan:      * Stroke-like symptoms  CT head  showed nothing acute -STAT RAD  Will obtain MRI brain   Carotid U/S   Echo  Hold ASA due to elevated INR and low platelets  PT/OT/ST  Neuro checks     No signs of any CVA- MRI neg, improving a little with lactulose      Non-traumatic rhabdomyolysis  Gentle IV hydration   monitor      Coagulopathy  INR 2.0  Consult hepatology     Repeat INR in am      SAMRA (acute kidney injury)  Patient with acute kidney injury/acute renal failure likely due to pre-renal azotemia due to IVVD SAMRA is currently worsening. Baseline creatinine normal creatine  - Labs reviewed- Renal function/electrolytes with Estimated Creatinine Clearance: 32.3 mL/min (A) (based on SCr of 1.5 mg/dL (H)). according to latest data. Monitor urine output and serial BMP and adjust therapy as needed. Avoid nephrotoxins and renally dose meds for GFR listed above.    Will give one liter IVFs at 75ml/hr  Hold Lasix and Spironolactone for now     improving    Generalized weakness  PT/OT/ST    inj B12 IM    Acute encephalopathy  R/o infection   Repeat Cath UA  Check CXR   Blood cultures   Restart lactulose   UDS-negative   Alcohol level pending       Likely metabolic or Hepatic Encephalopathy sec to UTI- Ammonia normal level yesterday    Decompensated alcoholic hepatic cirrhosis  Liver continues to decompensate   Consult hepatology     MELD 3.0: 28 at 10/18/2023  2:56 AM  MELD-Na: 25 at 10/18/2023  2:56 AM  Calculated from:  Serum Creatinine: 1.5 mg/dL at 10/18/2023  2:56 AM  Serum Sodium: 136 mmol/L at 10/18/2023  2:56 AM  Total Bilirubin: 6.4 mg/dL at 10/18/2023  2:56 AM  Serum Albumin: 2.7 g/dL at 10/18/2023  2:56 AM  INR(ratio): 2.0 at 10/18/2023  2:56 AM  Age at listing (hypothetical): 58 years  Sex: Female at 10/18/2023  2:56 AM    Ammonia was 42 yesterday, labs could not be drawn today  Cont Lactulose, Rifaximin      VTE Risk Mitigation (From admission, onward)         Ordered     IP VTE HIGH RISK PATIENT  Once         10/18/23 0645     Place sequential  compression device  Until discontinued         10/18/23 0645     Reason for No Pharmacological VTE Prophylaxis  Once        Question:  Reasons:  Answer:  Risk of Bleeding    10/18/23 0645                Discharge Planning   ASHELY:      Code Status: Full Code   Is the patient medically ready for discharge?:     Reason for patient still in hospital (select all that apply): Patient trending condition, Laboratory test, Treatment, Imaging, Consult recommendations and PT / OT recommendations             Naveed Faith MD  Department of Hospital Medicine   '[facility] ([facility])

## 2023-10-19 NOTE — ASSESSMENT & PLAN NOTE
Liver continues to decompensate   Consult hepatology     MELD 3.0: 28 at 10/18/2023  2:56 AM  MELD-Na: 25 at 10/18/2023  2:56 AM  Calculated from:  Serum Creatinine: 1.5 mg/dL at 10/18/2023  2:56 AM  Serum Sodium: 136 mmol/L at 10/18/2023  2:56 AM  Total Bilirubin: 6.4 mg/dL at 10/18/2023  2:56 AM  Serum Albumin: 2.7 g/dL at 10/18/2023  2:56 AM  INR(ratio): 2.0 at 10/18/2023  2:56 AM  Age at listing (hypothetical): 58 years  Sex: Female at 10/18/2023  2:56 AM    Ammonia was 42 yesterday, labs could not be drawn today  Cont Lactulose, Rifaximin

## 2023-10-19 NOTE — PT/OT/SLP PROGRESS
Physical Therapy  Treatment    Mara Mojica   MRN: [id number]   Admitting Diagnosis: Stroke-like symptoms    PT Received On: 10/19/23  PT Start Time: 0835     PT Stop Time: 0900    PT Total Time (min): 25 min       Billable Minutes:  Therapeutic Activity 25    Treatment Type: Treatment  PT/PTA: PTA     Number of PTA visits since last PT visit: 1       General Precautions: Standard, fall  Orthopedic Precautions: N/A  Braces: N/A  Respiratory Status: Room air    Spiritual, Cultural Beliefs, Sikhism Practices, Values that Affect Care: no    Subjective:  Communicated with patient's nurse, Ines, and completed Epic chart review prior to session.  Patient with little to no verbal interaction throughout tx session despite being asked questions and therapist attempting to interact.   She would moan/groan majority of the time as both response and to what appeared to be frustration.     Pain/Comfort  Pain Rating 1: other (see comments) (UNABLE TO RATE VERBALLY BUT FREQUENT MOANING AND POINTING TO AREA)  Location - Side 1: Left  Location 1: hip  Pain Addressed 1: Other (see comments), Cessation of Activity (ACTIVITY PACING)    Objective:   Patient found with: telemetry, peripheral IV, bed alarm    Supine > sit EOB: Max A of 2    Forward scoot towards EOB: Max A of 2    Seated EOB x 15 min total focusing on increased tolerance to upright position, core stability, trunk control and CV endurance.   Maintained self supported sitting balance with Min A -CGA  Unable to maintain unsupported sitting balance at this time.     X2 attempts made to have patient stand but both times she would shake her head and begin to cry    Sit > supine: Max A of 2    Supine scoot towards HOB: Total A of 2    Roll R/L to adjust/ replace padding under patient: Total A of 2    Educated patient on importance of increased tolerance to upright position and direct impact on CV endurance and strength. Patient encouraged to utilize elevated HOB to simulate  chair position until able to safely complete chair T/F. Patient given a minimum goal of majority of the day to be spent in upright, especially with all meals. Encouraged patient to perform AROM TE to BLE throughout the day within all available planes of motion. Re enforced importance of utilizing call light to meet needs in room and not attempt to get up without staff assistance. Unsure of patient's level of retention due to current cognitive status.        AM-PAC 6 CLICK MOBILITY  How much help from another person does this patient currently need?   1 = Unable, Total/Dependent Assistance  2 = A lot, Maximum/Moderate Assistance  3 = A little, Minimum/Contact Guard/Supervision  4 = None, Modified [place]/Independent    Turning over in bed (including adjusting bedclothes, sheets and blankets)?: 2  Sitting down on and standing up from a chair with arms (e.g., wheelchair, bedside commode, etc.): 1 (REFUSED)  Moving from lying on back to sitting on the side of the bed?: 2  Moving to and from a bed to a chair (including a wheelchair)?: 1  Need to walk in hospital room?: 1  Climbing 3-5 steps with a railing?: 1 (NT)  Basic Mobility Total Score: 8    AM-PAC Raw Score CMS G-Code Modifier Level of Impairment Assistance   6 % Total / Unable   7 - 9 CM 80 - 100% Maximal Assist   10 - 14 CL 60 - 80% Moderate Assist   15 - 19 CK 40 - 60% Moderate Assist   20 - 22 CJ 20 - 40% Minimal Assist   23 CI 1-20% SBA / CGA   24 CH 0% Independent/ Mod I     Patient left right sidelying & HOB in chair position with call button in reach and bed alarm on.    Assessment:  Mara Mojica is a 58 y.o. female with a medical diagnosis of Stroke-like symptoms and presents with overall decline in functional mobility. Patient would continue to benefit from skilled PT to address functional limitations listed below in order to return to PLOF/decrease caregiver burden. Patient demonstrates a significant regression in functional mobility  and abilities during today's session. At this time, patient would not attempt standing and is likely too unsafe to attempt gait. She required frequent re directive cues to remain on task but becomes easily frustrated and distracted. Noted difficulty with verbalization but appeared to have some consistency with nodding/ shaking head to yes/no questions.     Rehab identified problem list/impairments: weakness, impaired endurance, impaired sensation, impaired self care skills, impaired functional mobility, gait instability, impaired balance, impaired cognition, decreased coordination, decreased upper extremity function, decreased lower extremity function, decreased safety awareness, pain, decreased ROM, impaired coordination, impaired cardiopulmonary response to activity    Rehab potential is fair.    Activity tolerance: Fair    Discharge recommendations: Moderate Intensity Therapy      Barriers to discharge:      Equipment recommendations: to be determined by next level of care     GOALS:   Multidisciplinary Problems       Physical Therapy Goals          Problem: Physical Therapy    Goal Priority Disciplines Outcome Goal Variances Interventions   Physical Therapy Goal     PT, PT/OT      Description: Goals to be met by 11/1/23.  1. Pt will complete bed mobility SBA.  2. Pt will complete sit to stand CGA.  3. Pt will ambulate 100ft CGA using RW.  4. Pt will increase AMPAC score by 2 points to progress functional mobility.                       PLAN:    Patient to be seen 3 x/week to address the above listed problems via gait training, therapeutic activities, therapeutic exercises  Plan of Care expires: 11/01/23  Plan of Care reviewed with: patient         10/19/2023

## 2023-10-19 NOTE — PROCEDURES
Date of service  10/19/2023    Introduction  Electroencephalographic (EEG) recording is performed with electrodes placed according to the International 10-20 placement system. Thirty two (32) channels of digital signal (sampling rate of 512/sec) including T1 and T2 was simultaneously recorded from the scalp and may include EKG, EMG, and/or eye monitors. Recording band pass was 0.1 to 512 Hz. Digital video recording of the patient is simultaneously recorded with the EEG. The patient is instructed to report clinical symptoms which may occur during the recording session. EEG and video recording is stored and archived in digital format. Activation procedures which include photic stimulation, hyperventilation and instructing patients to perform simple tasks are done in selected patients.    The EEG is displayed on a monitor screen and can be reviewed using different montages. Computer assisted analysis is employed to detect spike and electrographic seizure activity. The entire record is submitted for computer analysis. The entire recording is visually reviewed and, the times identified by computer analysis as being spikes or seizures are reviewed again.     Compressed spectral analysis (CSA) is also performed on the activity recorded from each individual channel. This is displayed as a power display of frequencies from 0 to 30 Hz over time. The CSA is reviewed looking for asymmetries in power between homologous areas of the scalp and then compared with the original EEG recording.     Findings  The short-term video EEG recording during wakefulness contains 9 Hz alpha activity over the posterior head regions. There is mild diffuse slowing in the range of 5-7 Hz.      Photic stimulation and hyperventilation was not performed.     During the recording, the patient became drowsy but did not fall asleep.     The EKG channel regular rhythm.    Interpretation  The short-term video EEG shows mild diffuse nonspecific slowing of  the background.  No potentially epileptiform activity was seen.

## 2023-10-19 NOTE — CONSULTS
[facility] - Telemetry ([facility])  Neurology  Consult Note    Patient Name: Mara Mojica  MRN: [id number]  Admission Date: 10/18/2023  Hospital Length of Stay: 1 days  Code Status: Full Code   Attending Provider: Naveed Faith MD   Consulting Provider: Alexsander Claros MD  Primary Care Physician: Roque, Osiris, NP  Principal Problem:Stroke-like symptoms    Inpatient consult to Neurology  Consult performed by: Alexsander Claros MD  Consult ordered by: Naveed Faith MD        Subjective:     Chief Complaint:  Possible stroke     HPI: The patient is right handed.  History from chart review and from the  indicates that the patient's admission at this time because of patient being found down on the floor at home with evidence of incontinence of stool and urine, as well as confusion.    Her initial evaluation in the ER seemed to indicate left sided weakness, with down drift of the left arm.  The patient was also complaining of numbness of the left tongue, face and hand.  Since admission, CT scan brain did not show any acute abnormality.  MRI brain shows only small vessel vascular changes without evidence of a territorial infarct.  However, the patient has not been communicating well, although nodding understanding with yes/no questions.      Additional issues include her prior history of chronic alcoholism, cirrhosis of the liver.  The patient has been complaining of low back, hip pain and has been found to have fracture of the right superior pubic ramus.    Past Medical History:   Diagnosis Date    Alcoholic cirrhosis of liver     Kidney failure     Unilateral inguinal hernia, without obstruction or gangrene, not specified as recurrent        Past Surgical History:   Procedure Laterality Date    ESOPHAGOGASTRODUODENOSCOPY N/A 09/21/2023    Procedure: EGD (ESOPHAGOGASTRODUODENOSCOPY);  Surgeon: Melissa Edwards MD;  Location: [facility];  Service: Endoscopy;  Laterality: N/A;    HERNIA REPAIR       TONSILLECTOMY         Review of patient's allergies indicates:  No Known Allergies    Current Neurological Medications: see list below    No current facility-administered medications on file prior to encounter.     Current Outpatient Medications on File Prior to Encounter   Medication Sig    furosemide (LASIX) 40 MG tablet Take 1 tablet (40 mg total) by mouth once daily.    lactulose (CHRONULAC) 20 gram/30 mL Soln Take 15 mLs (10 g total) by mouth 3 (three) times daily.    potassium chloride (KLOR-CON) 10 MEQ TbSR Take 1 tablet (10 mEq total) by mouth once daily.    spironolactone (ALDACTONE) 100 MG tablet Take 1 tablet (100 mg total) by mouth once daily.      Family History       Problem Relation (Age of Onset)    Ovarian cancer Mother    Seizures Father          Tobacco Use    Smoking status: Every Day     Current packs/day: 0.50     Average packs/day: 0.5 packs/day for 25.0 years (12.5 ttl pk-yrs)     Types: Cigarettes     Start date: 10/18/1998     Passive exposure: Never    Smokeless tobacco: Never   Substance and Sexual Activity    Alcohol use: Not Currently    Drug use: Never    Sexual activity: Not on file     Review of Systems    ROS:  GENERAL: No fever, chills,  or  unplanned weight loss.  SKIN: No rashes,but has shown jaundice  HEAD: See comments in present illness.  Denies recent head trauma.  EYES: Visual acuity fine. No photophobia, ocular pain or diplopia.  EARS: Denies ear pain, discharge or vertigo.  NOSE: No loss of smell, no epistaxis or postnasal drip.  MOUTH & THROAT: No hoarseness or change in voice. No excessive gum bleeding.  NODES: Denies swollen glands.  CHEST: Denies HIGGINS, cyanosis, wheezing, cough and sputum production.  CARDIOVASCULAR: Denies chest pain, PND, orthopnea or reduced exercise tolerance.  ABDOMEN: Appetite fine. No weight loss. Prior history of ascites  URINARY: No flank pain, dysuria or hematuria.  PERIPHERAL VASCULAR: No claudication or cyanosis.  MUSCULOSKELETAL: Diffuse  extremity swelling.  Low back, hip pain  NEUROLOGIC: No history of known seizure, paralysis in the past       Objective:     Vital Signs (Most Recent):  Temp: 98.7 °F (37.1 °C) (10/19/23 1148)  Pulse: 98 (10/19/23 1148)  Resp: 18 (10/19/23 1148)  BP: 129/88 (10/19/23 1148)  SpO2: 100 % (10/19/23 1148) Vital Signs (24h Range):  Temp:  [97.8 °F (36.6 °C)-99.4 °F (37.4 °C)] 98.7 °F (37.1 °C)  Pulse:  [85-98] 98  Resp:  [16-18] 18  SpO2:  [98 %-100 %] 100 %  BP: (106-136)/(57-88) 129/88     Weight: 58.5 kg (129 lb)  Body mass index is 26.05 kg/m².    Physical Exam  APPEARANCE: Well nourished, well developed, who appears to be uncomfortable in hospital bed due to complaints of hip and back pain.  HEAD: Normocephalic, atraumatic.  EYES: PERRL. EOMI. Icteric slerae    NOSE: Mucosa pink. Airway clear.  MOUTH & THROAT: No tonsillar enlargement.  Mucous membranes moist.  NECK: Supple. No bruits.  CHEST: Lungs clear to auscultation.  CARDIOVASCULAR: Regular rhythm without significant murmurs.  MUSCULOSKELETAL:  Diffuse edema  NEUROLOGIC:   Mental Status:  The patient is well oriented to person, time, place, and situation.  The patient is attentive to the environment and cooperative for the exam. She was able to follow one and two step commands.  Cranial Nerves: II-XII grossly intact. Fundoscopic exam is normal.  No hemorrhage, exudate or papilledema is present. The extraocular muscles are intact in the cardinal directions of gaze.  No ptosis is present. Facial features are symmetrical.  Speech is marked by dysarthria, slow production and dysarthria.  Most answers are yes/no. Tongue protrudes in the midline.    Gait and Station:  Patient is confined to bed.  Motor:  No downdrift of either arm when held at shoulder level.  Manual muscle testing of proximal and distal muscles of both upper and lower extremities shows a diffuse, generalized weakness without focal or lateralized findings.    Sensory:  Intact both upper and lower  extremities to pin prick, touch.  However, sensory exam is not felt to be reliable.  Cerebellar:  Finger to nose done well.  Alternating movements intact.  No involuntary movements or tremor seen.  Reflexes:  Stretch reflexes are hypoactive both upper and lower extremities.  Plantar stimulation is flexor bilaterally and no pathological reflexes are seen           Significant Labs: CBC:   Recent Labs   Lab 10/18/23  0319   WBC 6.29   HGB 8.5*   HCT 24.9*   PLT 66*     CMP:   Recent Labs   Lab 10/18/23  0256   GLU 88      K 4.3      CO2 20*   BUN 16   CREATININE 1.5*   CALCIUM 8.8   PROT 6.8   ALBUMIN 2.7*   BILITOT 6.4*   ALKPHOS 142*   AST 96*   ALT 47*   ANIONGAP 12     Urine Studies:   Recent Labs   Lab 10/18/23  0832   COLORU Yellow   APPEARANCEUA Clear   PHUR 8.0   SPECGRAV 1.020   PROTEINUA Trace*   GLUCUA Negative   KETONESU Trace*   BILIRUBINUA Negative   OCCULTUA Negative   NITRITE Positive*   UROBILINOGEN 4.0-6.0*   LEUKOCYTESUR Negative   RBCUA 2   WBCUA 13*   BACTERIA Rare   HYALINECASTS 3*     All pertinent lab results from the past 24 hours have been reviewed.    Significant Imaging: I have reviewed all pertinent imaging results/findings within the past 24 hours.    Assessment and Plan:     ASSESSMENT   Metabolic/toxic encephalopathy in patient with cirrhosis and chronic liver failure    DISCUSSION  I have reviewed the MRI brain and CT brain, and do not see any acute changes that would support a diagnosis of stroke.  However, with the history of being found down on the floor, questionable tongue injury and obvious incontinence, a generalized seizure seems a possible explanation.  I will get EEG.      Active Diagnoses:    Diagnosis Date Noted POA    PRINCIPAL PROBLEM:  Stroke-like symptoms [R29.90] 10/18/2023 Yes    Acute encephalopathy [G93.40] 10/18/2023 Yes    Generalized weakness [R53.1] 10/18/2023 Yes    SAMRA (acute kidney injury) [N17.9] 10/18/2023 Yes    Coagulopathy [D68.9] 10/18/2023  Yes    Non-traumatic rhabdomyolysis [M62.82] 10/18/2023 Yes    Decompensated alcoholic hepatic cirrhosis [K72.90, K74.60] 09/06/2023 Yes      Problems Resolved During this Admission:       VTE Risk Mitigation (From admission, onward)           Ordered     IP VTE HIGH RISK PATIENT  Once         10/18/23 0645     Place sequential compression device  Until discontinued         10/18/23 0645     Reason for No Pharmacological VTE Prophylaxis  Once        Question:  Reasons:  Answer:  Risk of Bleeding    10/18/23 0645                    Thank you for your consult. I will follow-up with patient. Please contact us if you have any additional questions.    Alexsander Claros MD  Neurology  [facility] - Telemetry ([facility])

## 2023-10-19 NOTE — PT/OT/SLP PROGRESS
Occupational Therapy   Treatment    Name: Mara Mojica  MRN: [id number]  Admitting Diagnosis:  Stroke-like symptoms       Recommendations:     Discharge Recommendations: Moderate Intensity Therapy  Discharge Equipment Recommendations:  to be determined by next level of care  Barriers to discharge:  Inaccessible home environment    Assessment:     Mara Mojica is a 58 y.o. female with a medical diagnosis of Stroke-like symptoms.  She presents with the following performance deficits affecting function are weakness, impaired endurance, impaired sensation, impaired self care skills, impaired functional mobility, gait instability, impaired balance, impaired cognition, decreased coordination, decreased upper extremity function, decreased lower extremity function, decreased safety awareness, pain, decreased ROM, impaired skin, edema, impaired cardiopulmonary response to activity.     Rehab Prognosis:  Fair; patient would benefit from acute skilled OT services to address these deficits and reach maximum level of function.       Plan:     Patient to be seen 2 x/week to address the above listed problems via self-care/home management, therapeutic activities, therapeutic exercises  Plan of Care Expires: 11/01/23  Plan of Care Reviewed with: patient    Subjective     Chief Complaint: LEFT HIP PAIN  Patient/Family Comments/goals: none reported  Pain/Comfort:  Pain Rating 1:  (unable to rate verbally, pt frequently moaning and pointing to area)  Location - Side 1: Left  Location - Orientation 1: generalized  Location 1: hip  Pain Addressed 1: Other (see comments), Cessation of Activity (activity pacing)  Pain Rating Post-Intervention 1: 10/10    Objective:     Communicated with: Nurse and epic chart review prior to session.  Patient found HOB elevated with peripheral IV, telemetry, bed alarm upon OT entry to room.    General Precautions: Standard, fall, aspiration    Orthopedic Precautions:N/A  Braces: N/A  Respiratory  Status: Room air     Occupational Performance:     Bed Mobility:    Patient completed Rolling/Turning to Left with  total assistance and 2 persons  Patient completed Rolling/Turning to Right with total assistance and 2 persons  Patient completed Supine to Sit with maximal assistance and 2 persons  Patient completed Sit to Supine with maximal assistance and 2 persons   Forward scoot to EOB with Max A x2.  Supine scoot to HOB with Total A x2.    Functional Mobility/Transfers:  Pt declining sit<>stand at this time, shaking her head and crying.    Activities of Daily Living:  Upper Body Dressing: maximal assistance doff/ny gown EOB    [facility] 6 Click ADL: 12    Treatment & Education:  Pt with little to no verbalization throughout OT session despite engagement with pt. Pt groaning throughout, frequently pointing to left hip d/t pain. Pt noted to have increased edema in BUE. Pt sitting EOB ~15 minutes with Min A-CGA to maintain sitting balance.   Reviewed role of OT in acute setting and benefits of participation. Educated on techniques to use to increase independence with bed mobility. Educated on importance of EOB/OOB activity and calling for A to transfer and meet needs. Encouraged completion of B UE AROM therex throughout the day to tolerance to increase functional strength and activity tolerance. Educated patient on importance of increased tolerance to upright position and direct impact on CV endurance and strength. Patient encouraged to utilize elevated HOB to simulate chair position until able to safely complete chair T/F. Patient given a minimum goal of majority of the day to be spent in upright, especially with all meals. Patient stated understanding and in agreement with POC.     Patient left right sidelying with HOB elevated with all lines intact, call button in reach, bed alarm on, and nurse notified    GOALS:   Multidisciplinary Problems       Occupational Therapy Goals          Problem: Occupational Therapy     Goal Priority Disciplines Outcome Interventions   Occupational Therapy Goal     OT, PT/OT Ongoing, Progressing    Description: Goals to be met by: 11/1/23     Patient will increase functional independence with ADLs by performing:    UE Dressing with Stand-by Assistance.  Grooming while standing at sink with Supervision.  Toileting from toilet with Set-up Assistance for hygiene and clothing management.   Toilet transfer to toilet with Supervision.  Upper extremity exercise program x15 reps per handout, with independence.                         Time Tracking:     OT Date of Treatment: 10/19/23  OT Start Time: 0835  OT Stop Time: 0900  OT Total Time (min): 25 min    Billable Minutes:Therapeutic Activity 25    OT/SHAWNA: MENDEZ Wade OT     10/19/2023

## 2023-10-19 NOTE — ASSESSMENT & PLAN NOTE
CT head showed nothing acute -STAT RAD  Will obtain MRI brain   Carotid U/S   Echo  Hold ASA due to elevated INR and low platelets  PT/OT/ST  Neuro checks     No signs of any CVA- MRI neg, improving a little with lactulose

## 2023-10-19 NOTE — HOSPITAL COURSE
59 yo female with Alcoholic cirrhosis with ascites, s/p Ex Lap with bowel resection in the past for incarcerated hernia, recent SBO with SAMRA, who presented to ED with AMS. Per ER, pt's  came home from work and found her on the floor with her head on a blood-stained pillow and some slurred speech, surrounded by soiled blankets with urine and feces. Per EMS, pt was ambulatory at the scene. The pt told the paramedics that she was on the floor for about 3 hours. In the ER, the pt is oriented x3 (contrary to triage note). She also c/o numbness to the entire tongue and lips which onset 3 hours PTA. She denies any falls or seizures. Pt can not recall all the events, however, she states she was laying on the couch and moved to the floor because she soiled on herself. She reports she was too weak to go to her bedroom or to the bathroom. She states she cut her tongue on her teeth (due to poor dentition). She denies LOC. However, the S.O. states that the pt was very confused on his arrival to the home. Confusion has improved since arrival to ED. Pt denies any alcohol intake or substance use. Last alcohol drink was 7/2023.      In the ED, the pt was also noted to have a left facial droop and reported numbness to to her mouth and tongue with concern for stroke.      Of note, the pt was recently hospitalized with SBO and SAMRA. SBO resolved with conservative treatment. Pt then left AMA. Serum Cr was 2.8 on discharge.      In the ED, Afebrile, BP stable. Mild tachycardia noted. Labs showed Hgb 8.5, Platelets 66, Serum Cr 1.4, T BIli 2.7, AST 96, ALT 47, , , Ammonia normal. INR 2, UA +nitrites, 13 WBCs. ABGs PH 7.5, pCO2 27, pO2 51, HCO3 22. CT head showed nothing acute.     She was placed in Obs under Hosp Med for AMS r/o CVA, UTI, possible Hep Encephalopathy. Hepatology evaluated the pt. Recommended Paracentesis to r/o SBP and cont Lactulose, Rifaximin. Await paracentesis and MRI Brain. Start Rocephin.     US  Abd- no ascites. MRI Brain done but not read yet- appears normal to me, likely has HE and UTI-  will cont Lactulose and Rifaximin.  Evaluated by PT/OT/ST.     10/19- Appreciate Hepatology and Neurology: remains mostly unable to speak but appropriately says yes or no. Was not swallowing anything yesterday but finally gave her her lactulose and Rifaximin with applesauce, now improving. Her boyfriend is with her which helped. Looks calmer, less tremulous. EEG pending. Urine Cx growing E coli- getting Rocephin. Dr. Jackson worries that she will soon need Liver Tx. Cont present care.    10/20- not looking good, lethargic, eyes open but non responsive. No BM since yesterday, no meds given since last night. Ammonia 52. NGT placed but it clogged- hence Dr. Shannan Reardon ordered Lactulose enema and she had a huge BM later.  Cont present care. NGT replaced, will give lactulose thru the NGT.     10/21/23-Still not following commands. She seems alert but unable to communicate. Nursing staff reports several BMs. Plan for CT brain with contrast today. If no improvement by tomorrow will get LP.     10/22/23-Patient more alert, following simple commands but still not able to verbally communicate. Ammonia is normal and patient has continued to have large volume BMs. Awaiting input from Neurology. Hepatology following closely.   10/23 remains lethargic. After discussing with hepatology and neurology, transfer request placed for transplant service. Will attempt mri brain with contrast while transfer awaits. Labs reviewed with increased inr and thrombocytopenia  10/24 continues to be lethargic. Reconsulted neuro to review eeg and mri with contrast. Consulted infectious disease with repeat infectious workup in process. Ordered lumbar puncture and chest x-ray. prior echocardiogram with positive bubble study.    10/25  Patient not evaluated today. Patient left by ambulance for transfer to higher level of care.

## 2023-10-19 NOTE — PT/OT/SLP PROGRESS
Speech Language Pathology Treatment    Patient Name:  Mara Mojica   MRN:  [id number]  Admitting Diagnosis: Stroke-like symptoms    Recommendations:                 General Recommendations:  Dysphagia therapy, Speech/language therapy, and Cognitive-linguistic therapy  Diet recommendations:  NPO except medications  Aspiration Precautions: Frequent oral care, Ice chips sparingly, Meds crushed in puree, and Strict aspiration precautions   General Precautions: Standard, aspiration  Communication strategies:  provide increased time to answer      Subjective     Patient seen at bedside with significant other present. Pt utilizing suction. Pt reported pain everywhere, specifically her hip.   Patient goals: Did not state; however, assessment limited by c/o pain.      Pain/Comfort:  Pain Rating 1: other (see comments) (complained of pain everywhere)    Respiratory Status: Room air    Objective:     Has the patient been evaluated by SLP for swallowing?   Yes  Keep patient NPO? Yes   Current Respiratory Status:  room air          Clinical Swallow Examination:   Of note, patient was fed by SLP throughout evaluation s/t UE weakness/inability to hold cup/utensils when asked. Of note, pt with oral holding/anterior spillage of saliva, using Yankauer frequently; however, noted her ability to perform spontaneous swallows without difficulty.  Patient presented with:     CONSISTENCY  NOTES   THIN (IDDSI 0) Water via cup/ straw  Hand over hand   Assessment limited.  Absent deglutition secondary to oral holding, anterior/ labial spillage, purposeful oral excretion of liquids. Pt did not attempt initiation of swallow.  Coughing noted.    PUREE (IDDSI 4/Extremely Thick)   TSP/TBSP bites of applesauce No overt s/s of aspiration.  Denied c/o globus sensation/pharyngeus or odynophagia.  Slowed transit.     Thickened liquids were not used in this assessment. Mae (2018) reported that thickened liquids have no sound evidence at reducing  the risk of pneumonia in patients with dysphagia and can cause harm by increasing their risk of dehydration. It also presents an increased risk of UTI, electrolyte imbalance, constipation, fecal impaction, cognitive impairment, functional decline and even death (Langmore, 2002; Alicia, 2016).  Thickened liquids are associated with risks including dehydration, increased pharyngeal residue, potential interference with medication absorption, and decreased quality of life (Cyndi, 2013). Thickened liquids are also more likely to be silently aspirated than thin liquids (Quang et al., 2018). This supports the assertion that we should confirm a patient requires thickened liquids with an instrumental swallow study prior to recommending them.    References:   Cyndi BRIGGS (2013). Thickening agents used for dysphagia management: Effect on bioavailability of water, medication and feelings of satiety. Nutrition Journal, 12, 54. https://doi.org/10.1186/4172-9000-51-54    CHESTER Del Rio, SANTIAGO Fuentes, HECTOR Frausto, & CHESTER Jolly (2018). Cough response to aspiration in thin and thick fluids during FEES in hospitalized inpatients. International journal of language & communication disorders, 53(5), 909-918. https://doi.org/10.1111/7027-8007.64250    INTERPRETATION AND RISK ASSESSMENT:  Clinical swallow evaluation (CSE) revealed oral phase characterized by lingual, labial, and buccal strength and range of motion decreased (especially left side). The patient had  anterior loss of the bolus with incomplete closure of the lips around the cup.  Residue remained in the oral cavity following the swallow. Overall bedside swallow assessment limited by bolus holding and oral excretion of liquids (absent deglutition); however, no overt s/s of aspiration during pureed solid consumption. Patient presents with a possibly inefficient swallow as indicated by weakness, GI history, and encephalopathy. Patient reported inability to swallow, however denied  globus sensation or odynophagia.  Contributing risk factors for dysphagia include cognitive deficits and weakness related to liver disease.    Clinical signs of oral pharyngeal dysphagia, likely acute related to decompensated alcoholic hepatic cirrhosis, weakness, and encephalopathy. Instrumental swallow study is not indicated at this time secondary to patient's inconsistent swallow responses; However, ST is recommended for ongoing assessment as medical status improves and clinically appropriate for instrumental if indicated.     Goals:   Multidisciplinary Problems       SLP Goals          Problem: SLP    Goal Priority Disciplines Outcome   SLP Goal     SLP Ongoing, Not Progressing   Description: 1. Pt will tolerate a regular consistency diet without overt signs of swallow difficulty  2. Pt will communicate wants and needs with 100% intelligibility                      Assessment:     Mara Mojica is a 58 y.o. female with an SLP diagnosis of suspected oropharyngeal dysphagia, Dysarthria and Cognitive-Linguistic Impairment.  She presents with slow, slurred speech with decreased speech intelligibility and decreased cognition. Pt was alert; however, cognitive-linguistic assessment was limited by c/o pain and labile affect.  Inconsistent with basic y/n, /wh/ questions and commands. Patient c/o worsening dysphagia overnight with reduced secretion management, requiring Yankauer use. Patient unable to swallow thin liquids with absent deglutition noted secondary to oral holding, anterior/labial spillage, and purposeful oral excretion of liquids. Patient tolerated applesauce without incident; however, manipulation and transit delayed.  Of note, lacerations present on her tongue due to excessive biting prior to admission. Given pt's s/s of dysphagia limiting assessment and concern for inability to adequately meet nutritional needs, she is recommended to remain NPO (except medications placed in applesauce) with ongoing  swallowing assessment as medical status improves.        Plan:     Patient to be seen:  2 x/week, 3 x/week   Plan of Care expires:  10/25/23  Plan of Care reviewed with:  patient, significant other   SLP Follow-Up:  Yes       Discharge recommendations:  Moderate Intensity Therapy   Barriers to Discharge:  Decreased Care Giver Support and medical status    Time Tracking:     SLP Treatment Date:   10/19/23  Speech Start Time:  1030  Speech Stop Time:  1100     Speech Total Time (min):  30 min    Billable Minutes: Speech Therapy Individual 15 and Treatment Swallowing Dysfunction 15    CHARLOTTE Vela  Student Speech Language Pathologist    10/19/2023    SUN Alvarez, CCC-SLP  Speech Language Pathologist  10/19/2023

## 2023-10-19 NOTE — PLAN OF CARE
Problem: Adult Inpatient Plan of Care  Goal: Plan of Care Review  Outcome: Ongoing, Progressing  Goal: Patient-Specific Goal (Individualized)  Outcome: Ongoing, Progressing  Goal: Absence of Hospital-Acquired Illness or Injury  Outcome: Ongoing, Progressing  Goal: Optimal Comfort and Wellbeing  Outcome: Ongoing, Progressing  Goal: Readiness for Transition of Care  Outcome: Ongoing, Progressing  Pt oriented x4.  VSS.  Pt remained afebrile throughout this shift.   All meds administered per order.   Pt remained free of falls this shift.   Plan of care reviewed. Patient verbalizes understanding.   Pt moving/turning with assistance.   Patient normal sinus/ sinus tach on telemetry monitor.   Bed low, side rails up x 2, wheels locked, call light in reach. Airway suctioning connected and in use by patient.   Patient instructed to call for assistance.

## 2023-10-19 NOTE — PROGRESS NOTES
'[facility])  Hepatology  Consult Note    Patient Name: Mara Mojica  MRN: [id number]  Admission Date: 10/18/2023  Hospital Length of Stay: 1 days  Attending Provider: Naveed Faith MD   Primary Care Physician: Roque, Osiris, NP  Principal Problem:Stroke-like symptoms    Consults  Subjective:     Transplant status: No    HPI: The patient is a 57 yo female with Alcoholic cirrhosis with ascites who presented to ED with AMS. Hepatology was consulted for cirrhosis    In the ED, the pt was also noted to have a left facial droop and reported numbness to to her mouth and tongue with concern for stroke. CT negative. Labs showed Hgb 8.5, Platelets 66, Serum Cr 1.4, T BIli 2.7, AST 96, ALT 47, , , Ammonia normal INR 2.0. UA +nitrites, no WBCs. ABGs PH 7.5, pCO2 27, pHCO3 22.      Echo with bubble study Positive  ? HPS but no hypoxia on room air      Interval History  No Fluid . ? UTI. Started on Rocephin  AMS mild improvement   MRI Brain Normal except ? Small cavernous parietal lesion      Per records  patient has known about her diagnosis of cirrhosis seems like for a little while.  She was being monitored in [place].  It seems she 1st presented when she had an umbilical or periumbilical herniation that seems like it may have required emergency surgery.  She did have surgical repair and at that time it was identified that she had cirrhosis of the liver.  She reports she had quit alcohol at 1 point but then restarted sometime after COVID.  More recently she is had issues with ascites for which she is had to go to the emergency room.   She was started on diuretics. She reports that she stopped drinking in July after that ED visit.  She reports committed to alcohol abstinence.  She believes her last upper endoscopy probably was around 2020.  She reports she is not due for colonoscopy for another 1-2 years. Recommend she proceed with liver transplant evaluation on 10/11/23.     Of note, the  pt was recently hospitalized with SBO and SAMRA. SBO resolved with conservative treatment. Pt then left AMA. Serum Cr was 2.8 on discharge    Review of Systems  Constitutional:  Positive for fatigue. Negative for appetite change, chills, diaphoresis, fever and unexpected weight change.   HENT:  Positive for mouth sores (tongue). Negative for congestion, nosebleeds, sinus pressure and sore throat.    Eyes:  Negative for pain, discharge and visual disturbance.   Respiratory:  Negative for cough, chest tightness, shortness of breath, wheezing and stridor.    Cardiovascular:  Negative for chest pain, palpitations and leg swelling.   Gastrointestinal:  Positive for diarrhea (from lactulose). Negative for abdominal distention, abdominal pain, blood in stool, constipation, nausea and vomiting.   Endocrine: Negative for cold intolerance and heat intolerance.   Genitourinary:  Negative for difficulty urinating, dysuria, flank pain, frequency and urgency.   Musculoskeletal:  Negative for arthralgias, back pain, joint swelling, myalgias, neck pain and neck stiffness.   Skin:  Negative for rash and wound.   Allergic/Immunologic: Positive for immunocompromised state. Negative for food allergies.   Neurological:  Positive for facial asymmetry, weakness and numbness. Negative for dizziness, seizures, syncope, speech difficulty, light-headedness and headaches.   Hematological:  Negative for adenopathy.   Psychiatric/Behavioral:  Positive for confusion. Negative for agitation and hallucinations.    Past Medical History:   Diagnosis Date    Alcoholic cirrhosis of liver     Kidney failure     Unilateral inguinal hernia, without obstruction or gangrene, not specified as recurrent        Past Surgical History:   Procedure Laterality Date    ESOPHAGOGASTRODUODENOSCOPY N/A 09/21/2023    Procedure: EGD (ESOPHAGOGASTRODUODENOSCOPY);  Surgeon: Melissa Edwards MD;  Location: [facility];  Service: Endoscopy;  Laterality: N/A;    HERNIA  REPAIR      TONSILLECTOMY         Family history of liver disease: No    Review of patient's allergies indicates:  No Known Allergies      Tobacco Use    Smoking status: Every Day     Current packs/day: 0.50     Average packs/day: 0.5 packs/day for 25.0 years (12.5 ttl pk-yrs)     Types: Cigarettes     Start date: 10/18/1998     Passive exposure: Never    Smokeless tobacco: Never   Substance and Sexual Activity    Alcohol use: Not Currently    Drug use: Never    Sexual activity: Not on file       Medications Prior to Admission   Medication Sig Dispense Refill Last Dose    furosemide (LASIX) 40 MG tablet Take 1 tablet (40 mg total) by mouth once daily. 30 tablet 5     lactulose (CHRONULAC) 20 gram/30 mL Soln Take 15 mLs (10 g total) by mouth 3 (three) times daily. 1892 mL 5     potassium chloride (KLOR-CON) 10 MEQ TbSR Take 1 tablet (10 mEq total) by mouth once daily. 30 tablet 0     spironolactone (ALDACTONE) 100 MG tablet Take 1 tablet (100 mg total) by mouth once daily. 30 tablet 5        Objective:     Vital Signs (Most Recent):  Temp: 98 °F (36.7 °C) (10/19/23 0720)  Pulse: 97 (10/19/23 0720)  Resp: 18 (10/19/23 0418)  BP: 136/60 (10/19/23 0720)  SpO2: 100 % (10/19/23 0720) Vital Signs (24h Range):  Temp:  [97.8 °F (36.6 °C)-99.4 °F (37.4 °C)] 98 °F (36.7 °C)  Pulse:  [85-99] 97  Resp:  [17-18] 18  SpO2:  [98 %-100 %] 100 %  BP: (106-136)/(56-64) 136/60     Weight: 58.5 kg (129 lb) (10/18/23 0741)  Body mass index is 26.05 kg/m².    Physical Exam  Vitals and nursing note reviewed.   Constitutional:       General: She is not in acute distress.     Appearance: She is Ill appearing.  She is not diaphoretic.   HENT:      Head: Normocephalic and atraumatic.      Nose: Nose normal.      Mouth/Throat:      Comments: Very poor dentition with multiple fractured and missing teeth with dental caries noted. Small superficial laceration to right tongue -bleeding controlled   Eyes:      General: Scleral icterus present.       Conjunctiva/sclera: Conjunctivae normal.   Neck:      Trachea: No tracheal deviation.   Cardiovascular:      Rate and Rhythm: Regular rhythm. Tachycardia present.      Heart sounds: Murmur heard.      No friction rub. No gallop.   Pulmonary:      Effort: Pulmonary effort is normal. No respiratory distress.      Breath sounds: Normal breath sounds. No stridor. No wheezing or rales.   Chest:      Chest wall: No tenderness.   Abdominal:      General: Bowel sounds are normal. There is no distension.      Palpations: Abdomen is soft. There is no mass.      Tenderness: There is no abdominal tenderness. There is no guarding or rebound.   Musculoskeletal:         General: No tenderness or deformity. Normal range of motion.      Cervical back: Normal range of motion and neck supple.   Skin:     General: Skin is warm and dry.      Coloration: Skin is not pale.      Findings: Bruising (bruising of varied stages of healing to both arms) present. No erythema or rash.   Neurological:      Mental Status: She is alert and oriented to person, place, and time.      Cranial Nerves: Dysarthria and facial asymmetry present. No cranial nerve deficit.      Motor: Weakness and pronator drift present. No abnormal muscle tone.      Comments: +dysarthria   Some word finding difficulties   Mild left mouth droop  Left UE muscle strength 3/5  Right UE MS 4/5  Right and Left LE MS 4/5  Mild LEft arm drift    Psychiatric:         Behavior: Behavior normal.         Thought Content: Thought content normal.         Cognition and Memory: Cognition is impaired.       Significant Labs:  CBC:   Recent Labs   Lab 10/18/23  0319   WBC 6.29   RBC 2.58*   HGB 8.5*   HCT 24.9*   PLT 66*       CMP:   Recent Labs   Lab 10/18/23  0256   GLU 88   CALCIUM 8.8   ALBUMIN 2.7*   PROT 6.8      K 4.3   CO2 20*      BUN 16   CREATININE 1.5*   ALKPHOS 142*   ALT 47*   AST 96*   BILITOT 6.4*       Coagulation:   Recent Labs   Lab 10/18/23  0256   INR 2.0*    APTT 27.0         Significant Imaging:  Labs: Reviewed    MELD 3.0: 28 at 10/18/2023  2:56 AM  MELD-Na: 25 at 10/18/2023  2:56 AM  Calculated from:  Serum Creatinine: 1.5 mg/dL at 10/18/2023  2:56 AM  Serum Sodium: 136 mmol/L at 10/18/2023  2:56 AM  Total Bilirubin: 6.4 mg/dL at 10/18/2023  2:56 AM  Serum Albumin: 2.7 g/dL at 10/18/2023  2:56 AM  INR(ratio): 2.0 at 10/18/2023  2:56 AM  Age at listing (hypothetical): 58 years  Sex: Female at 10/18/2023  2:56 AM      Assessment/Plan:     Active Diagnoses:    Diagnosis Date Noted POA    PRINCIPAL PROBLEM:  Stroke-like symptoms [R29.90] 10/18/2023 Yes    Acute encephalopathy [G93.40] 10/18/2023 Yes    Generalized weakness [R53.1] 10/18/2023 Yes    SAMRA (acute kidney injury) [N17.9] 10/18/2023 Yes    Coagulopathy [D68.9] 10/18/2023 Yes    Non-traumatic rhabdomyolysis [M62.82] 10/18/2023 Yes    Decompensated alcoholic hepatic cirrhosis [K72.90, K74.60] 09/06/2023 Yes      Problems Resolved During this Admission:     Acute encephalopathy  R/o infection, Pan-culture-awaited  lactulose and rifaximin to continue  Treat UTI    Alcoholic cirrhosis of liver  Sober since 7/2023   Decompensated with Ascites and HE  Plan to  proceed with liver transplant evaluation outpatient once AMS resolves.   monitor MELD labs  -Up to date with HCC surveillance  -variceal surveillance-9/2022. Plan to schedule outpatient unless active GI bleed  Nutrition is a concern. May need [place] feeds     ascites-uncontrolled  -Diuretics on hold due to SAMRA    SAMRA  Improving  Hold diuretics  IV albumin challenge      UTI  Mx as per Primary care      Rest Management as Per Primary team      Demarco Jackson MD  Hepatology  [facility] - Telemetry ([facility])

## 2023-10-19 NOTE — ASSESSMENT & PLAN NOTE
R/o infection   Repeat Cath UA  Check CXR   Blood cultures   Restart lactulose   UDS-negative   Alcohol level pending       Likely metabolic or Hepatic Encephalopathy sec to UTI- Ammonia normal level yesterday

## 2023-10-19 NOTE — SUBJECTIVE & OBJECTIVE
Interval History:  Appreciate Hepatology and Neurology: remains mostly unable to speak but appropriately says yes or no. Was not swallowing anything yesterday but finally gave her her lactulose and Rifaximin with applesauce, now improving. Her boyfriend is with her which helped. Looks calmer, less tremulous. EEG pending. Urine Cx growing E coli- getting Rocephin. Dr. Jackson worries that she will soon need Liver Tx. Cont present care.      Review of Systems   Constitutional:  Positive for activity change, appetite change and fatigue. Negative for chills, diaphoresis, fever and unexpected weight change.   HENT:  Negative for congestion, mouth sores (tongue), nosebleeds, sinus pressure and sore throat.    Eyes:  Negative for pain, discharge and visual disturbance.   Respiratory:  Negative for cough, chest tightness, shortness of breath, wheezing and stridor.    Cardiovascular:  Negative for chest pain, palpitations and leg swelling.   Gastrointestinal:  Negative for abdominal distention, abdominal pain, blood in stool, constipation, diarrhea (from lactulose), nausea and vomiting.   Endocrine: Negative for cold intolerance and heat intolerance.   Genitourinary:  Negative for difficulty urinating, dysuria, flank pain, frequency and urgency.   Musculoskeletal:  Negative for arthralgias, back pain, joint swelling, myalgias, neck pain and neck stiffness.   Skin:  Negative for rash and wound.   Allergic/Immunologic: Positive for immunocompromised state. Negative for food allergies.   Neurological:  Positive for speech difficulty, weakness and numbness. Negative for dizziness, seizures, syncope, facial asymmetry, light-headedness and headaches.   Hematological:  Negative for adenopathy.   Psychiatric/Behavioral:  Positive for confusion. Negative for agitation and hallucinations.      Objective:     Vital Signs (Most Recent):  Temp: 98.7 °F (37.1 °C) (10/19/23 1148)  Pulse: 100 (10/19/23 1539)  Resp: 18 (10/19/23 1148)  BP:  129/88 (10/19/23 1148)  SpO2: 100 % (10/19/23 1148) Vital Signs (24h Range):  Temp:  [97.8 °F (36.6 °C)-99.4 °F (37.4 °C)] 98.7 °F (37.1 °C)  Pulse:  [] 100  Resp:  [16-18] 18  SpO2:  [98 %-100 %] 100 %  BP: (106-136)/(57-88) 129/88     Weight: 58.5 kg (129 lb)  Body mass index is 26.05 kg/m².    Intake/Output Summary (Last 24 hours) at 10/19/2023 1643  Last data filed at 10/18/2023 1809  Gross per 24 hour   Intake 200 ml   Output 250 ml   Net -50 ml         Physical Exam  Vitals and nursing note reviewed.   Constitutional:       General: She is not in acute distress.     Appearance: She is well-developed. She is ill-appearing. She is not diaphoretic.      Comments: Middle aged lady, tremulous, anxious, restless   HENT:      Head: Normocephalic and atraumatic.      Nose: Nose normal.      Mouth/Throat:      Comments: Very poor dentition with multiple fractured and missing teeth with dental caries noted  Eyes:      General: Scleral icterus present.      Conjunctiva/sclera: Conjunctivae normal.   Neck:      Trachea: No tracheal deviation.   Cardiovascular:      Rate and Rhythm: Regular rhythm. Tachycardia present.      Heart sounds: Murmur heard.      No friction rub. No gallop.   Pulmonary:      Effort: Pulmonary effort is normal. No respiratory distress.      Breath sounds: Normal breath sounds. No stridor. No wheezing or rales.   Chest:      Chest wall: No tenderness.   Abdominal:      General: Bowel sounds are normal. There is no distension.      Palpations: Abdomen is soft. There is no mass.      Tenderness: There is no abdominal tenderness. There is no guarding or rebound.   Musculoskeletal:         General: No tenderness or deformity. Normal range of motion.      Cervical back: Normal range of motion and neck supple.   Skin:     General: Skin is warm and dry.      Coloration: Skin is not pale.      Findings: Bruising (bruising of varied stages of healing to both arms) present. No erythema or rash.    Neurological:      Mental Status: She is alert and oriented to person, place, and time.      Cranial Nerves: Dysarthria and facial asymmetry present. No cranial nerve deficit.      Motor: Weakness and pronator drift present. No abnormal muscle tone.      Comments: +dysarthria   Some word finding difficulties   Mild left mouth droop  Left UE muscle strength 3/5  Right UE MS 4/5  Right and Left LE MS 4/5  Mild LEft arm drift    Psychiatric:         Behavior: Behavior normal.         Thought Content: Thought content normal.         Cognition and Memory: Cognition is impaired.             Significant Labs: All pertinent labs within the past 24 hours have been reviewed.  Blood Culture:   Recent Labs   Lab 10/18/23  0728 10/18/23  0729   LABBLOO No Growth to date No Growth to date     BMP:   Recent Labs   Lab 10/18/23  0256   GLU 88      K 4.3      CO2 20*   BUN 16   CREATININE 1.5*   CALCIUM 8.8     CBC:   Recent Labs   Lab 10/18/23  0319   WBC 6.29   HGB 8.5*   HCT 24.9*   PLT 66*     Urine Culture:   Recent Labs   Lab 10/18/23  0832   LABURIN GRAM NEGATIVE DOUGIE  > 100,000 cfu/ml  Identification and susceptibility pending  *       Significant Imaging: I have reviewed all pertinent imaging results/findings within the past 24 hours.

## 2023-10-19 NOTE — ASSESSMENT & PLAN NOTE
Patient with acute kidney injury/acute renal failure likely due to pre-renal azotemia due to IVVD SAMRA is currently worsening. Baseline creatinine normal creatine  - Labs reviewed- Renal function/electrolytes with Estimated Creatinine Clearance: 32.3 mL/min (A) (based on SCr of 1.5 mg/dL (H)). according to latest data. Monitor urine output and serial BMP and adjust therapy as needed. Avoid nephrotoxins and renally dose meds for GFR listed above.    Will give one liter IVFs at 75ml/hr  Hold Lasix and Spironolactone for now     improving

## 2023-10-19 NOTE — PT/OT/SLP PROGRESS
Physical Therapy      Patient Name:  Mara Mojica   MRN:  [id number]    08:00 a.m.  Patient eating breakfast at this time. Will follow up at next available opportunity.

## 2023-10-20 ENCOUNTER — TELEPHONE (OUTPATIENT)
Dept: TRANSPLANT | Facility: CLINIC | Age: 58
End: 2023-10-20
Payer: MEDICAID

## 2023-10-20 PROBLEM — K76.82 ACUTE HEPATIC ENCEPHALOPATHY: Status: ACTIVE | Noted: 2023-10-18

## 2023-10-20 PROBLEM — S32.511A: Status: ACTIVE | Noted: 2023-10-20

## 2023-10-20 PROBLEM — R29.90 STROKE-LIKE SYMPTOMS: Status: RESOLVED | Noted: 2023-10-18 | Resolved: 2023-10-20

## 2023-10-20 LAB
ALBUMIN SERPL BCP-MCNC: 2.3 G/DL (ref 3.5–5.2)
ALP SERPL-CCNC: 90 U/L (ref 55–135)
ALT SERPL W/O P-5'-P-CCNC: 48 U/L (ref 10–44)
AMMONIA PLAS-SCNC: 58 UMOL/L (ref 10–50)
ANION GAP SERPL CALC-SCNC: 11 MMOL/L (ref 8–16)
AST SERPL-CCNC: 111 U/L (ref 10–40)
BACTERIA UR CULT: ABNORMAL
BASOPHILS # BLD AUTO: 0.09 K/UL (ref 0–0.2)
BASOPHILS NFR BLD: 1.6 % (ref 0–1.9)
BILIRUB SERPL-MCNC: 7.7 MG/DL (ref 0.1–1)
BUN SERPL-MCNC: 13 MG/DL (ref 6–20)
CALCIUM SERPL-MCNC: 8.2 MG/DL (ref 8.7–10.5)
CHLORIDE SERPL-SCNC: 111 MMOL/L (ref 95–110)
CO2 SERPL-SCNC: 17 MMOL/L (ref 23–29)
CREAT SERPL-MCNC: 0.9 MG/DL (ref 0.5–1.4)
DIFFERENTIAL METHOD: ABNORMAL
EOSINOPHIL # BLD AUTO: 0.2 K/UL (ref 0–0.5)
EOSINOPHIL NFR BLD: 4.2 % (ref 0–8)
ERYTHROCYTE [DISTWIDTH] IN BLOOD BY AUTOMATED COUNT: 14.6 % (ref 11.5–14.5)
EST. GFR  (NO RACE VARIABLE): >60 ML/MIN/1.73 M^2
GLUCOSE SERPL-MCNC: 80 MG/DL (ref 70–110)
HCT VFR BLD AUTO: 24 % (ref 37–48.5)
HGB BLD-MCNC: 8 G/DL (ref 12–16)
IMM GRANULOCYTES # BLD AUTO: 0.02 K/UL (ref 0–0.04)
IMM GRANULOCYTES NFR BLD AUTO: 0.4 % (ref 0–0.5)
INR PPP: 1.9 (ref 0.8–1.2)
LYMPHOCYTES # BLD AUTO: 1.5 K/UL (ref 1–4.8)
LYMPHOCYTES NFR BLD: 27.1 % (ref 18–48)
MCH RBC QN AUTO: 32.9 PG (ref 27–31)
MCHC RBC AUTO-ENTMCNC: 33.3 G/DL (ref 32–36)
MCV RBC AUTO: 99 FL (ref 82–98)
MONOCYTES # BLD AUTO: 0.9 K/UL (ref 0.3–1)
MONOCYTES NFR BLD: 16.9 % (ref 4–15)
NEUTROPHILS # BLD AUTO: 2.7 K/UL (ref 1.8–7.7)
NEUTROPHILS NFR BLD: 49.8 % (ref 38–73)
NRBC BLD-RTO: 0 /100 WBC
PLATELET # BLD AUTO: 68 K/UL (ref 150–450)
PMV BLD AUTO: 10 FL (ref 9.2–12.9)
POTASSIUM SERPL-SCNC: 3.4 MMOL/L (ref 3.5–5.1)
PROT SERPL-MCNC: 5.7 G/DL (ref 6–8.4)
PROTHROMBIN TIME: 19.3 SEC (ref 9–12.5)
RBC # BLD AUTO: 2.43 M/UL (ref 4–5.4)
SODIUM SERPL-SCNC: 139 MMOL/L (ref 136–145)
WBC # BLD AUTO: 5.5 K/UL (ref 3.9–12.7)

## 2023-10-20 PROCEDURE — 25000003 PHARM REV CODE 250: Mod: NTX | Performed by: EMERGENCY MEDICINE

## 2023-10-20 PROCEDURE — 80053 COMPREHEN METABOLIC PANEL: CPT | Mod: NTX | Performed by: NURSE PRACTITIONER

## 2023-10-20 PROCEDURE — 99232 PR SUBSEQUENT HOSPITAL CARE,LEVL II: ICD-10-PCS | Mod: 95,NTX,, | Performed by: INTERNAL MEDICINE

## 2023-10-20 PROCEDURE — 63600175 PHARM REV CODE 636 W HCPCS: Mod: NTX | Performed by: EMERGENCY MEDICINE

## 2023-10-20 PROCEDURE — 99232 SBSQ HOSP IP/OBS MODERATE 35: CPT | Mod: 95,NTX,, | Performed by: INTERNAL MEDICINE

## 2023-10-20 PROCEDURE — 36415 COLL VENOUS BLD VENIPUNCTURE: CPT | Mod: NTX | Performed by: NURSE PRACTITIONER

## 2023-10-20 PROCEDURE — 85610 PROTHROMBIN TIME: CPT | Mod: NTX | Performed by: EMERGENCY MEDICINE

## 2023-10-20 PROCEDURE — 21400001 HC TELEMETRY ROOM: Mod: NTX

## 2023-10-20 PROCEDURE — 85025 COMPLETE CBC W/AUTO DIFF WBC: CPT | Mod: NTX | Performed by: NURSE PRACTITIONER

## 2023-10-20 PROCEDURE — 99223 1ST HOSP IP/OBS HIGH 75: CPT | Mod: NTX,,, | Performed by: PHYSICIAN ASSISTANT

## 2023-10-20 PROCEDURE — 99223 PR INITIAL HOSPITAL CARE,LEVL III: ICD-10-PCS | Mod: NTX,,, | Performed by: PHYSICIAN ASSISTANT

## 2023-10-20 PROCEDURE — 25000003 PHARM REV CODE 250: Mod: NTX | Performed by: INTERNAL MEDICINE

## 2023-10-20 PROCEDURE — 82140 ASSAY OF AMMONIA: CPT | Mod: NTX | Performed by: EMERGENCY MEDICINE

## 2023-10-20 PROCEDURE — 25000003 PHARM REV CODE 250: Mod: NTX | Performed by: NURSE PRACTITIONER

## 2023-10-20 PROCEDURE — 92507 TX SP LANG VOICE COMM INDIV: CPT | Mod: NTX

## 2023-10-20 RX ORDER — LACTULOSE 10 G/15ML
200 SOLUTION ORAL; RECTAL 3 TIMES DAILY
Status: DISCONTINUED | OUTPATIENT
Start: 2023-10-20 | End: 2023-10-25 | Stop reason: HOSPADM

## 2023-10-20 RX ADMIN — LACTULOSE 200 G: 10 SOLUTION ORAL at 10:10

## 2023-10-20 RX ADMIN — RIFAXIMIN 550 MG: 550 TABLET ORAL at 08:10

## 2023-10-20 RX ADMIN — LACTULOSE 200 G: 10 SOLUTION ORAL at 02:10

## 2023-10-20 RX ADMIN — CEFTRIAXONE SODIUM 1 G: 1 INJECTION, POWDER, FOR SOLUTION INTRAMUSCULAR; INTRAVENOUS at 03:10

## 2023-10-20 NOTE — PT/OT/SLP PROGRESS
Speech Language Pathology Treatment    Patient Name:  Mara Mojica   MRN:  [id number]  Admitting Diagnosis: Stroke-like symptoms    Recommendations:                 General Recommendations:  Dysphagia therapy/ongoing swallowing assessment as mentation improves  Diet recommendations:  NPO, Liquid Diet Level: NPO   Aspiration Precautions: Frequent oral care, strict aspiration precautions  General Precautions: Standard, aspiration  Communication strategies:  provide increased time to answer    Subjective     Pt see bedside for ST.  Somnolent with NR to tasks.  Moaning/grunting; otherwise, nonverbal/vocal. No family present.  Patient goals: Unable to state     Pain/Comfort:  Pain Rating 1: 0/10  Pain Rating Post-Intervention 1: 0/10  Pain Rating 2: 0/10  Pain Rating Post-Intervention 2: 0/10    Objective:     Has the patient been evaluated by SLP for swallowing?   Yes  Keep patient NPO? Yes     Pt NR to basic commands, y/n questioning.  Nonverbal.  Opened eyes briefly with inability to attend or remain alert for PO consumption.    Recommended NPO with consideration of alternate means of nutrition.      Assessment:     Mara Mojica is a 58 y.o. female with an SLP diagnosis of suspected dysphagia and Cognitive-Linguistic Impairment in the setting of decompensated alcoholic hepatic cirrhoiss with acute encephalopathy.  She presents lethargic with nonverbal communication (moaning/grunting only) when aroused. Non-responsive to tasks.  She is recommended to remain NPO with consideration of alternate means of nutrition with ongoing swallowing assessment as medical status and mentation improves.     Goals:   Multidisciplinary Problems       SLP Goals          Problem: SLP    Goal Priority Disciplines Outcome   SLP Goal     SLP Ongoing, Not Progressing   Description: 1. Pt will consume least restrictive diet with ongoing swallowing assessment as mentation improves    2. Pt will communicate basic wants and needs with 100%  intelligibility                        Plan:     Patient to be seen:  2 x/week, 3 x/week   Plan of Care expires:  10/25/23  Plan of Care reviewed with:  patient   SLP Follow-Up:  Yes       Discharge recommendations:  Moderate Intensity Therapy (Pending medical improvement)   Barriers to Discharge:   medical status    Time Tracking:     SLP Treatment Date:   10/20/23  Speech Start Time:  1045  Speech Stop Time:  1100     Speech Total Time (min):  15 min    Billable Minutes: Speech Therapy Individual 15 minutes    10/20/2023

## 2023-10-20 NOTE — PT/OT/SLP PROGRESS
Occupational Therapy      Patient Name:  Mara Mojica   MRN:  [id number]    Patient not seen today secondary to profound lethargy. Unable to follow commands to actively participate in skilled intervention. Will continue efforts.    Sabine Allan OT  10/20/2023  1139

## 2023-10-20 NOTE — SUBJECTIVE & OBJECTIVE
Interval History: Seems mental status is worse today. Her eyes are open and active but not responding to commands. She recently had a NGT placed but it is clogged. Patient had not had a BM since yesterday.    Current Facility-Administered Medications   Medication    cefTRIAXone (ROCEPHIN) 1 g in dextrose 5 % in water (D5W) 100 mL IVPB (MB+)    folic acid-vit B6-vit B12 2.5-25-2 mg tablet 1 tablet    lactulose 10 gram/15 mL enema solution (inpatient use) 200 g    multivitamin tablet    ondansetron injection 4 mg    rifAXIMin tablet 550 mg    sodium chloride 0.9% bolus 500 mL 500 mL    sodium chloride 0.9% flush 10 mL    thiamine tablet 100 mg       Objective:     Vital Signs (Most Recent):  Temp: 97 °F (36.1 °C) (10/20/23 1131)  Pulse: 94 (10/20/23 1131)  Resp: 17 (10/20/23 1131)  BP: (!) 142/65 (10/20/23 1131)  SpO2: 100 % (10/20/23 1131) Vital Signs (24h Range):  Temp:  [97 °F (36.1 °C)-98.6 °F (37 °C)] 97 °F (36.1 °C)  Pulse:  [] 94  Resp:  [12-18] 17  SpO2:  [98 %-100 %] 100 %  BP: (120-161)/(52-68) 142/65     Weight: 58.8 kg (129 lb 10.1 oz) (10/20/23 0014)  Body mass index is 26.18 kg/m².       Physical Exam  Vitals reviewed.   Constitutional:       General: She is not in acute distress.     Appearance: She is well-developed.   HENT:      Head: Normocephalic and atraumatic.      Mouth/Throat:      Pharynx: No oropharyngeal exudate.   Eyes:      General: Scleral icterus present.         Right eye: No discharge.         Left eye: No discharge.      Pupils: Pupils are equal, round, and reactive to light.   Pulmonary:      Effort: Pulmonary effort is normal. No respiratory distress.      Breath sounds: Normal breath sounds. No wheezing.   Abdominal:      General: There is no distension.      Palpations: Abdomen is soft.      Tenderness: There is no abdominal tenderness.   Musculoskeletal:      Right lower leg: No edema.      Left lower leg: No edema.   Skin:     Coloration: Skin is jaundiced.   Neurological:       Mental Status: She is alert.      Comments: Alert but not responsive to commands            MELD 3.0: 25 at 10/20/2023  5:23 AM  MELD-Na: 21 at 10/20/2023  5:23 AM  Calculated from:  Serum Creatinine: 0.9 mg/dL (Using min of 1 mg/dL) at 10/20/2023  5:23 AM  Serum Sodium: 139 mmol/L (Using max of 137 mmol/L) at 10/20/2023  5:23 AM  Total Bilirubin: 7.7 mg/dL at 10/20/2023  5:23 AM  Serum Albumin: 2.3 g/dL at 10/20/2023  5:23 AM  INR(ratio): 1.9 at 10/20/2023  5:23 AM  Age at listing (hypothetical): 58 years  Sex: Female at 10/20/2023  5:23 AM      Significant Labs:  Labs within the past month have been reviewed.    Significant Imaging:  X-Ray: Reviewed  US: Reviewed  MRI: Reviewed

## 2023-10-20 NOTE — CONSULTS
'[facility]  Orthopedics  Consult Note    Patient Name: Mara Mojica  MRN: [id number]  Admission Date: 10/18/2023  Hospital Length of Stay: 2 days  Attending Provider: Naveed Faith MD  Primary Care Provider: Osiris Nevarez NP    Patient information was obtained from patient, past medical records, ER records, and primary team.     Inpatient consult to Orthopedic Surgery  Consult performed by: Darion Murphy PA-C  Consult ordered by: Naveed Faith MD        Subjective:     Principal Problem:Stroke-like symptoms    Chief Complaint:   Chief Complaint   Patient presents with    Altered Mental Status     Pt to ED via EMS who report pt found unresponsive on floor for several hours. Pt altered and disoriented to time, person, and place. Pt severe fluid overload with fluid weeping, ext swollen. Pt recently DC AMA from hospital for high ammonia levels. Pt hx liver disease        HPI: Mara Mojica is a 58-year-old female with past medical history significant for encephalopathy, hypokalemia, small-bowel obstruction, and acute kidney injury is admitted for stroke-like symptoms and Orthopedics has been consulted for pubic rami fracture.  Patient is unable to provide any history at this time.  All history was obtained from discussion with hospital medicine attending and chart review.    Past Medical History:   Diagnosis Date    Alcoholic cirrhosis of liver     Kidney failure     Unilateral inguinal hernia, without obstruction or gangrene, not specified as recurrent        Past Surgical History:   Procedure Laterality Date    ESOPHAGOGASTRODUODENOSCOPY N/A 09/21/2023    Procedure: EGD (ESOPHAGOGASTRODUODENOSCOPY);  Surgeon: Melissa Edwards MD;  Location: [facility];  Service: Endoscopy;  Laterality: N/A;    HERNIA REPAIR      TONSILLECTOMY         Review of patient's allergies indicates:  No Known Allergies    Current Facility-Administered Medications   Medication    cefTRIAXone (ROCEPHIN) 1 g in dextrose 5 %  in water (D5W) 100 mL IVPB (MB+)    folic acid-vit B6-vit B12 2.5-25-2 mg tablet 1 tablet    lactulose 20 gram/30 mL solution Soln 10 g    multivitamin tablet    ondansetron injection 4 mg    rifAXIMin tablet 550 mg    sodium chloride 0.9% bolus 500 mL 500 mL    sodium chloride 0.9% flush 10 mL    thiamine tablet 100 mg     Family History       Problem Relation (Age of Onset)    Ovarian cancer Mother    Seizures Father          Tobacco Use    Smoking status: Every Day     Current packs/day: 0.50     Average packs/day: 0.5 packs/day for 25.0 years (12.5 ttl pk-yrs)     Types: Cigarettes     Start date: 10/18/1998     Passive exposure: Never    Smokeless tobacco: Never   Substance and Sexual Activity    Alcohol use: Not Currently    Drug use: Never    Sexual activity: Not on file     Review of Systems   Constitutional: Negative for chills, decreased appetite, fever and weight loss.   HENT:  Negative for congestion, hoarse voice and sore throat.    Eyes:  Negative for blurred vision, double vision, vision loss in left eye and vision loss in right eye.   Cardiovascular:  Negative for chest pain, palpitations and syncope.   Respiratory:  Negative for cough, shortness of breath and wheezing.    Endocrine: Negative for cold intolerance and heat intolerance.   Hematologic/Lymphatic: Negative for bleeding problem. Does not bruise/bleed easily.   Skin:  Negative for dry skin, flushing, itching and suspicious lesions.   Musculoskeletal:  Positive for joint pain. Negative for falls and joint swelling.   Gastrointestinal:  Negative for abdominal pain, diarrhea, nausea and vomiting.   Genitourinary:  Negative for dysuria, frequency and urgency.   Neurological:  Negative for dizziness, headaches, numbness and paresthesias.   Psychiatric/Behavioral:  Negative for altered mental status and memory loss.      Objective:     Vital Signs (Most Recent):  Temp: 97 °F (36.1 °C) (10/20/23 1131)  Pulse: 94 (10/20/23 1131)  Resp: 17 (10/20/23  "1131)  BP: (!) 142/65 (10/20/23 1131)  SpO2: 100 % (10/20/23 1131) Vital Signs (24h Range):  Temp:  [97 °F (36.1 °C)-98.6 °F (37 °C)] 97 °F (36.1 °C)  Pulse:  [] 94  Resp:  [12-18] 17  SpO2:  [98 %-100 %] 100 %  BP: (120-161)/(52-68) 142/65     Weight: 58.8 kg (129 lb 10.1 oz)  Height: 4' 11" (149.9 cm)  Body mass index is 26.18 kg/m².      Intake/Output Summary (Last 24 hours) at 10/20/2023 1302  Last data filed at 10/19/2023 1911  Gross per 24 hour   Intake 178.58 ml   Output --   Net 178.58 ml       Ortho/SPM Exam  Lower extremities:   There is edema, but no tenderness  Increased muscle tension noted   No pain with ROM   Calf and compartments are soft and compressible  Feet are plantar flexed at rest in the bed    GEN: Well developed, well nourished female. AAOX3. No acute distress.   Head: Normocephalic, atraumatic.   Eyes: LEANDRO  Neck: Trachea is midline, no adenopathy  Resp: Breathing unlabored.  Neuro: Motor function normal, Cranial nerves intact  Psych: Mood and affect appropriate.      Significant Labs:   Recent Lab Results         10/20/23  0523        Albumin 2.3       ALP 90       ALT 48       Ammonia 58       Anion Gap 11              Baso # 0.09       Basophil % 1.6       BILIRUBIN TOTAL 7.7  Comment: For infants and newborns, interpretation of results should be based  on gestational age, weight and in agreement with clinical  observations.    Premature Infant recommended reference ranges:  Up to 24 hours.............<8.0 mg/dL  Up to 48 hours............<12.0 mg/dL  3-5 days..................<15.0 mg/dL  6-29 days.................<15.0 mg/dL         BUN 13       Calcium 8.2       Chloride 111       CO2 17       Creatinine 0.9       Differential Method Automated       eGFR >60       Eos # 0.2       Eosinophil % 4.2       Glucose 80       Gran # (ANC) 2.7       Gran % 49.8       Hematocrit 24.0       Hemoglobin 8.0       Immature Grans (Abs) 0.02  Comment: Mild elevation in immature " granulocytes is non specific and   can be seen in a variety of conditions including stress response,   acute inflammation, trauma and pregnancy. Correlation with other   laboratory and clinical findings is essential.         Immature Granulocytes 0.4       INR 1.9  Comment: Coumadin Therapy:  2.0 - 3.0 for INR for all indicators except mechanical heart valves  and antiphospholipid syndromes which should use 2.5 - 3.5.         Lymph # 1.5       Lymph % 27.1       MCH 32.9       MCHC 33.3       MCV 99       Mono # 0.9       Mono % 16.9       MPV 10.0       nRBC 0       Platelet Count 68       Potassium 3.4       PROTEIN TOTAL 5.7       Protime 19.3       RBC 2.43       RDW 14.6       Sodium 139       WBC 5.50               Significant Imaging: X-Ray: I have reviewed all pertinent results/findings and my personal findings are:  Minimally displaced right superior pubic rami fracture    Assessment/Plan:     Active Diagnoses:    Diagnosis Date Noted POA    PRINCIPAL PROBLEM:  Stroke-like symptoms [R29.90] 10/18/2023 Yes    Anasarca [R60.1] 10/19/2023 No    Acute encephalopathy [G93.40] 10/18/2023 Yes    Generalized weakness [R53.1] 10/18/2023 Yes    SAMRA (acute kidney injury) [N17.9] 10/18/2023 Yes    Coagulopathy [D68.9] 10/18/2023 Yes    Non-traumatic rhabdomyolysis [M62.82] 10/18/2023 Yes    Decompensated alcoholic hepatic cirrhosis [K72.90, K74.60] 09/06/2023 Yes      Problems Resolved During this Admission:       Assessment:   58-year-old female with past medical history significant for encephalopathy, hypokalemia, small-bowel obstruction, and acute kidney injury is admitted for stroke-like symptoms and Orthopedics has been consulted for pubic rami fracture    Plan:   Patient may be weight-bearing as tolerated to the bilateral lower extremities   PT/OT for ROM, gait training, and ADLs   Pain control per primary team   We will continue to follow this patient and can see her in clinic for repeat radiographs following  discharge    Darion Murphy PA-C  Orthopedics  [facility]

## 2023-10-20 NOTE — ASSESSMENT & PLAN NOTE
Encephalopathy and MELD worse than her baseline possible from UTI, no other clear precipitant.  -Continue to monitor MELD score    MELD 3.0: 25 at 10/20/2023  5:23 AM  MELD-Na: 21 at 10/20/2023  5:23 AM  Calculated from:  Serum Creatinine: 0.9 mg/dL (Using min of 1 mg/dL) at 10/20/2023  5:23 AM  Serum Sodium: 139 mmol/L (Using max of 137 mmol/L) at 10/20/2023  5:23 AM  Total Bilirubin: 7.7 mg/dL at 10/20/2023  5:23 AM  Serum Albumin: 2.3 g/dL at 10/20/2023  5:23 AM  INR(ratio): 1.9 at 10/20/2023  5:23 AM  Age at listing (hypothetical): 58 years  Sex: Female at 10/20/2023  5:23 AM

## 2023-10-20 NOTE — ASSESSMENT & PLAN NOTE
Liver continues to decompensate   Consult hepatology     MELD 3.0: 25 at 10/20/2023  5:23 AM  MELD-Na: 21 at 10/20/2023  5:23 AM  Calculated from:  Serum Creatinine: 0.9 mg/dL (Using min of 1 mg/dL) at 10/20/2023  5:23 AM  Serum Sodium: 139 mmol/L (Using max of 137 mmol/L) at 10/20/2023  5:23 AM  Total Bilirubin: 7.7 mg/dL at 10/20/2023  5:23 AM  Serum Albumin: 2.3 g/dL at 10/20/2023  5:23 AM  INR(ratio): 1.9 at 10/20/2023  5:23 AM  Age at listing (hypothetical): 58 years  Sex: Female at 10/20/2023  5:23 AM    Ammonia was 42 yesterday, labs could not be drawn today  Cont Lactulose, Rifaximin    S/p lactulose enema- better

## 2023-10-20 NOTE — TELEPHONE ENCOUNTER
PT called  to inform of clearance  for liver transplant evaluation. No answer lvm to call [phone] to schedule appts

## 2023-10-20 NOTE — ASSESSMENT & PLAN NOTE
Concern for hepatic encephalopathy but it is unusual to have this degree with minimal ammonia elevation.  -Recommend changing over to lactulose enemas tid and once patient has improvement consider switching back to PO/NGT  -Agree need for neuro input  -Monitor ammonia level

## 2023-10-20 NOTE — ASSESSMENT & PLAN NOTE
R/o infection   Repeat Cath UA  Check CXR   Blood cultures   Restart lactulose   UDS-negative   Alcohol level pending       Likely metabolic or Hepatic Encephalopathy sec to UTI- Ammonia normal level yesterday    See above Liver

## 2023-10-20 NOTE — PLAN OF CARE
Problem: Skin Injury Risk Increased  Goal: Skin Health and Integrity  Intervention: Optimize Skin Protection  Flowsheets (Taken 10/20/2023 1403)  Pressure Reduction Techniques: weight shift assistance provided  Skin Protection:   tubing/devices free from skin contact   transparent dressing maintained   skin-to-skin areas padded  Head of Bed (HOB) Positioning: HOB elevated     Problem: Adult Inpatient Plan of Care  Goal: Plan of Care Review  Outcome: Ongoing, Progressing

## 2023-10-20 NOTE — PROGRESS NOTES
[facility] Medicine  Progress Note    Patient Name: Mara Mojica  MRN: [id number]  Patient Class: IP- Inpatient   Admission Date: 10/18/2023  Length of Stay: 2 days  Attending Physician: Naveed Faith MD  Primary Care Provider: Osiris Nevarez NP        Subjective:     Principal Problem:Decompensated hepatic cirrhosis        HPI:  The patient is a 59 yo female with Alcoholic cirrhosis with ascites who presented to ED with AMS. Pt's significant other reports while at work, he tried calling the pt all after noon, but she did not answer the phone. When he got home from work at 1pm, he found the pt on the floor confused with slurred speech laying on a pillow with blood stains and surrounded by soiled blankets with urine and feces and paper towels with blood. Pt can not recall all the events, however, she states she was laying on the couch and moved to the floor because she soiled on herself. She denies any falls or seizures and states that she just wanted to lay down on the floor. She reports she was too weak to go to her bedroom or to the bathroom. She states she cut her tongue on her teeth (due to poor dentition). She denies LOC. However, the S.O. states that the pt was very confused on his arrival to the home. Confusion has improved since arrival to ED. Pt denies any alcohol intake or substance use. Last alcohol drink was 7/2023.   In the ED, the pt was also noted to have a left facial droop and reported numbness to to her mouth and tongue with concern for stroke.     Of note, the pt was recently hospitalized with SBO and SAMRA. SBO resolved with conservative treatment. Pt then left AMA. Serum Cr was 2.8 on discharge.     In the ED, Afebrile, BP stable. Mild tachycardia noted. Labs showed Hgb 8.5, Platelets 66, Serum Cr 1.4, T BIli 2.7, AST 96, ALT 47, , , Ammonia normal INR 2.0. UA +nitrites, no WBCs. ABGs PH 7.5, pCO2 27, pHCO3 22. CT head showed nothing acute -per STAT  RAD.       Overview/Hospital Course:  59 yo female with Alcoholic cirrhosis with ascites, s/p Ex Lap with bowel resection in the past for incarcerated hernia, recent SBO with SAMRA, who presented to ED with AMS. Per ER, pt's  came home from work and found her on the floor with her head on a blood-stained pillow and some slurred speech, surrounded by soiled blankets with urine and feces. Per EMS, pt was ambulatory at the scene. The pt told the paramedics that she was on the floor for about 3 hours. In the ER, the pt is oriented x3 (contrary to triage note). She also c/o numbness to the entire tongue and lips which onset 3 hours PTA. She denies any falls or seizures. Pt can not recall all the events, however, she states she was laying on the couch and moved to the floor because she soiled on herself. She reports she was too weak to go to her bedroom or to the bathroom. She states she cut her tongue on her teeth (due to poor dentition). She denies LOC. However, the S.O. states that the pt was very confused on his arrival to the home. Confusion has improved since arrival to ED. Pt denies any alcohol intake or substance use. Last alcohol drink was 7/2023.      In the ED, the pt was also noted to have a left facial droop and reported numbness to to her mouth and tongue with concern for stroke.      Of note, the pt was recently hospitalized with SBO and SAMRA. SBO resolved with conservative treatment. Pt then left AMA. Serum Cr was 2.8 on discharge.      In the ED, Afebrile, BP stable. Mild tachycardia noted. Labs showed Hgb 8.5, Platelets 66, Serum Cr 1.4, T BIli 2.7, AST 96, ALT 47, , , Ammonia normal. INR 2, UA +nitrites, 13 WBCs. ABGs PH 7.5, pCO2 27, pO2 51, HCO3 22. CT head showed nothing acute.     She was placed in Obs under Hosp Med for AMS r/o CVA, UTI, possible Hep Encephalopathy. Hepatology evaluated the pt. Recommended Paracentesis to r/o SBP and cont Lactulose, Rifaximin. Await paracentesis  and MRI Brain. Start Rocephin.     US Abd- no ascites. MRI Brain done but not read yet- appears normal to me, likely has HE and UTI-  will cont Lactulose and Rifaximin.  Evaluated by PT/OT/ST.     10/19- Appreciate Hepatology and Neurology: remains mostly unable to speak but appropriately says yes or no. Was not swallowing anything yesterday but finally gave her her lactulose and Rifaximin with applesauce, now improving. Her boyfriend is with her which helped. Looks calmer, less tremulous. EEG pending. Urine Cx growing E coli- getting Rocephin. Dr. Jackson worries that she will soon need Liver Tx. Cont present care.    10/20- not looking good, lethargic, eyes open but non responsive. No BM since yesterday, no meds given since last night. Ammonia 52. NGT placed but it clogged- hence Dr. Shannan Reardon ordered Lactulose enema and she had a huge BM later.  Cont present care. NGT replaced, will give lactulose thru the NGT.       Interval History: not looking good, lethargic, eyes open but non responsive. No BM since yesterday, no meds given since last night. Ammonia 52. NGT placed but it clogged- hence Dr. Shannan Reardon ordered Lactulose enema and she had a huge BM later.  Cont present care. NGT replaced, will give lactulose thru the NGT.     Review of Systems  Objective:     Vital Signs (Most Recent):  Temp: 98.4 °F (36.9 °C) (10/20/23 1529)  Pulse: 93 (10/20/23 1655)  Resp: 16 (10/20/23 1529)  BP: (!) 149/68 (10/20/23 1529)  SpO2: 99 % (10/20/23 1529) Vital Signs (24h Range):  Temp:  [97 °F (36.1 °C)-98.6 °F (37 °C)] 98.4 °F (36.9 °C)  Pulse:  [] 93  Resp:  [12-18] 16  SpO2:  [98 %-100 %] 99 %  BP: (120-161)/(52-68) 149/68     Weight: 58.8 kg (129 lb 10.1 oz)  Body mass index is 26.18 kg/m².    Intake/Output Summary (Last 24 hours) at 10/20/2023 1827  Last data filed at 10/20/2023 1825  Gross per 24 hour   Intake 277.95 ml   Output --   Net 277.95 ml         Physical Exam        Significant Labs: All pertinent labs  within the past 24 hours have been reviewed.  Blood Culture:   BMP:   Recent Labs   Lab 10/20/23  0523   GLU 80      K 3.4*   *   CO2 17*   BUN 13   CREATININE 0.9   CALCIUM 8.2*     CBC:   Recent Labs   Lab 10/20/23  0523   WBC 5.50   HGB 8.0*   HCT 24.0*   PLT 68*     CMP:   Recent Labs   Lab 10/20/23  0523      K 3.4*   *   CO2 17*   GLU 80   BUN 13   CREATININE 0.9   CALCIUM 8.2*   PROT 5.7*   ALBUMIN 2.3*   BILITOT 7.7*   ALKPHOS 90   *   ALT 48*   ANIONGAP 11     Coagulation:   Recent Labs   Lab 10/20/23  0523   INR 1.9*     Urine Culture:     Significant Imaging: I have reviewed all pertinent imaging results/findings within the past 24 hours.      Assessment/Plan:      * Decompensated alcoholic hepatic cirrhosis  Liver continues to decompensate   Consult hepatology     MELD 3.0: 25 at 10/20/2023  5:23 AM  MELD-Na: 21 at 10/20/2023  5:23 AM  Calculated from:  Serum Creatinine: 0.9 mg/dL (Using min of 1 mg/dL) at 10/20/2023  5:23 AM  Serum Sodium: 139 mmol/L (Using max of 137 mmol/L) at 10/20/2023  5:23 AM  Total Bilirubin: 7.7 mg/dL at 10/20/2023  5:23 AM  Serum Albumin: 2.3 g/dL at 10/20/2023  5:23 AM  INR(ratio): 1.9 at 10/20/2023  5:23 AM  Age at listing (hypothetical): 58 years  Sex: Female at 10/20/2023  5:23 AM    Ammonia was 42 yesterday, labs could not be drawn today  Cont Lactulose, Rifaximin    S/p lactulose enema- better    Fracture of right superior pubic ramus, closed, initial encounter  Ortho following- no further intervention      Non-traumatic rhabdomyolysis  Gentle IV hydration   monitor      Coagulopathy  INR 2.0- sec to Liver Cirrhosis  Consult hepatology     Repeat INR in am    stable    SAMRA (acute kidney injury)  Patient with acute kidney injury/acute renal failure likely due to pre-renal azotemia due to IVVD SAMRA is currently worsening. Baseline creatinine normal creatine  - Labs reviewed- Renal function/electrolytes with Estimated Creatinine Clearance: 58.2  mL/min (based on SCr of 0.9 mg/dL). according to latest data. Monitor urine output and serial BMP and adjust therapy as needed. Avoid nephrotoxins and renally dose meds for GFR listed above.    Will give one liter IVFs at 75ml/hr  Hold Lasix and Spironolactone for now     improving    Generalized weakness  PT/OT/ST    inj B12 IM    Persists,    Acute hepatic encephalopathy  R/o infection   Repeat Cath UA  Check CXR   Blood cultures   Restart lactulose   UDS-negative   Alcohol level pending       Likely metabolic or Hepatic Encephalopathy sec to UTI- Ammonia normal level yesterday    See above Liver      VTE Risk Mitigation (From admission, onward)         Ordered     IP VTE HIGH RISK PATIENT  Once         10/18/23 0645     Place sequential compression device  Until discontinued         10/18/23 0645     Reason for No Pharmacological VTE Prophylaxis  Once        Question:  Reasons:  Answer:  Risk of Bleeding    10/18/23 0645                Discharge Planning   ASHELY:      Code Status: Full Code   Is the patient medically ready for discharge?:     Reason for patient still in hospital (select all that apply): Patient trending condition, Laboratory test, Treatment, Imaging and Consult recommendations             Naveed Faith MD  Department of Hospital Medicine   [facility] - Telemetry ([facility])

## 2023-10-20 NOTE — PT/OT/SLP PROGRESS
Physical Therapy      Patient Name:  Mara Mojica   MRN:  [id number]    10:30 a.m.  Patient somnolent and unable to participate in PT session.   Will follow up at next available opportunity.

## 2023-10-20 NOTE — SUBJECTIVE & OBJECTIVE
Interval History: not looking good, lethargic, eyes open but non responsive. No BM since yesterday, no meds given since last night. Ammonia 52. NGT placed but it clogged- hence Dr. Shannan Reardon ordered Lactulose enema and she had a huge BM later.  Cont present care. NGT replaced, will give lactulose thru the NGT.     Review of Systems  Objective:     Vital Signs (Most Recent):  Temp: 98.4 °F (36.9 °C) (10/20/23 1529)  Pulse: 93 (10/20/23 1655)  Resp: 16 (10/20/23 1529)  BP: (!) 149/68 (10/20/23 1529)  SpO2: 99 % (10/20/23 1529) Vital Signs (24h Range):  Temp:  [97 °F (36.1 °C)-98.6 °F (37 °C)] 98.4 °F (36.9 °C)  Pulse:  [] 93  Resp:  [12-18] 16  SpO2:  [98 %-100 %] 99 %  BP: (120-161)/(52-68) 149/68     Weight: 58.8 kg (129 lb 10.1 oz)  Body mass index is 26.18 kg/m².    Intake/Output Summary (Last 24 hours) at 10/20/2023 1827  Last data filed at 10/20/2023 1825  Gross per 24 hour   Intake 277.95 ml   Output --   Net 277.95 ml         Physical Exam        Significant Labs: All pertinent labs within the past 24 hours have been reviewed.  Blood Culture:   BMP:   Recent Labs   Lab 10/20/23  0523   GLU 80      K 3.4*   *   CO2 17*   BUN 13   CREATININE 0.9   CALCIUM 8.2*     CBC:   Recent Labs   Lab 10/20/23  0523   WBC 5.50   HGB 8.0*   HCT 24.0*   PLT 68*     CMP:   Recent Labs   Lab 10/20/23  0523      K 3.4*   *   CO2 17*   GLU 80   BUN 13   CREATININE 0.9   CALCIUM 8.2*   PROT 5.7*   ALBUMIN 2.3*   BILITOT 7.7*   ALKPHOS 90   *   ALT 48*   ANIONGAP 11     Coagulation:   Recent Labs   Lab 10/20/23  0523   INR 1.9*     Urine Culture:     Significant Imaging: I have reviewed all pertinent imaging results/findings within the past 24 hours.

## 2023-10-20 NOTE — PROGRESS NOTES
'[place] [facility])  Hepatology  Telemedicine Progress Note    Patient Name: Mara Mojica  MRN: [id number]  Admission Date: 10/18/2023  Hospital Length of Stay: 2 days  Attending Provider: Naveed Faith MD   Primary Care Physician: Roque, Osiris, NP  Principal Problem:Stroke-like symptoms    Subjective:     Transplant status: No    Interval History: Seems mental status is worse today. Her eyes are open and active but not responding to commands. She recently had a NGT placed but it is clogged. Patient had not had a BM since yesterday.    Current Facility-Administered Medications   Medication    cefTRIAXone (ROCEPHIN) 1 g in dextrose 5 % in water (D5W) 100 mL IVPB (MB+)    folic acid-vit B6-vit B12 2.5-25-2 mg tablet 1 tablet    lactulose 10 gram/15 mL enema solution (inpatient use) 200 g    multivitamin tablet    ondansetron injection 4 mg    rifAXIMin tablet 550 mg    sodium chloride 0.9% bolus 500 mL 500 mL    sodium chloride 0.9% flush 10 mL    thiamine tablet 100 mg       Objective:     Vital Signs (Most Recent):  Temp: 97 °F (36.1 °C) (10/20/23 1131)  Pulse: 94 (10/20/23 1131)  Resp: 17 (10/20/23 1131)  BP: (!) 142/65 (10/20/23 1131)  SpO2: 100 % (10/20/23 1131) Vital Signs (24h Range):  Temp:  [97 °F (36.1 °C)-98.6 °F (37 °C)] 97 °F (36.1 °C)  Pulse:  [] 94  Resp:  [12-18] 17  SpO2:  [98 %-100 %] 100 %  BP: (120-161)/(52-68) 142/65     Weight: 58.8 kg (129 lb 10.1 oz) (10/20/23 0014)  Body mass index is 26.18 kg/m².       Physical Exam  Vitals reviewed.   Constitutional:       General: She is not in acute distress.     Appearance: She is well-developed.   HENT:      Head: Normocephalic and atraumatic.      Mouth/Throat:      Pharynx: No oropharyngeal exudate.   Eyes:      General: Scleral icterus present.         Right eye: No discharge.         Left eye: No discharge.      Pupils: Pupils are equal, round, and reactive to light.   Pulmonary:      Effort: Pulmonary effort is normal.  No respiratory distress.      Breath sounds: Normal breath sounds. No wheezing.   Abdominal:      General: There is no distension.      Palpations: Abdomen is soft.      Tenderness: There is no abdominal tenderness.   Musculoskeletal:      Right lower leg: No edema.      Left lower leg: No edema.   Skin:     Coloration: Skin is jaundiced.   Neurological:      Mental Status: She is alert.      Comments: Alert but not responsive to commands            MELD 3.0: 25 at 10/20/2023  5:23 AM  MELD-Na: 21 at 10/20/2023  5:23 AM  Calculated from:  Serum Creatinine: 0.9 mg/dL (Using min of 1 mg/dL) at 10/20/2023  5:23 AM  Serum Sodium: 139 mmol/L (Using max of 137 mmol/L) at 10/20/2023  5:23 AM  Total Bilirubin: 7.7 mg/dL at 10/20/2023  5:23 AM  Serum Albumin: 2.3 g/dL at 10/20/2023  5:23 AM  INR(ratio): 1.9 at 10/20/2023  5:23 AM  Age at listing (hypothetical): 58 years  Sex: Female at 10/20/2023  5:23 AM      Significant Labs:  Labs within the past month have been reviewed.    Significant Imaging:  X-Ray: Reviewed  US: Reviewed  MRI: Reviewed    Assessment/Plan:     Neuro  Acute encephalopathy  Concern for hepatic encephalopathy but it is unusual to have this degree with minimal ammonia elevation.  -Recommend changing over to lactulose enemas tid and once patient has improvement consider switching back to PO/NGT  -Agree need for neuro input  -Monitor ammonia level    GI  Decompensated alcoholic hepatic cirrhosis  Encephalopathy and MELD worse than her baseline possible from UTI, no other clear precipitant.  -Continue to monitor MELD score    MELD 3.0: 25 at 10/20/2023  5:23 AM  MELD-Na: 21 at 10/20/2023  5:23 AM  Calculated from:  Serum Creatinine: 0.9 mg/dL (Using min of 1 mg/dL) at 10/20/2023  5:23 AM  Serum Sodium: 139 mmol/L (Using max of 137 mmol/L) at 10/20/2023  5:23 AM  Total Bilirubin: 7.7 mg/dL at 10/20/2023  5:23 AM  Serum Albumin: 2.3 g/dL at 10/20/2023  5:23 AM  INR(ratio): 1.9 at 10/20/2023  5:23 AM  Age at  listing (hypothetical): 58 years  Sex: Female at 10/20/2023  5:23 AM            Thank you for your consult. I will follow-up with patient. Please contact us if you have any additional questions.    Shannan Reardon MD  Hepatology  [facility] Telemetry ([facility])

## 2023-10-20 NOTE — PLAN OF CARE
Problem: Adult Inpatient Plan of Care  Goal: Plan of Care Review  10/19/2023 1911 by Ines Perez LPN  Outcome: Ongoing, Progressing  10/19/2023 1629 by Ines Perez LPN  Outcome: Ongoing, Progressing  Goal: Patient-Specific Goal (Individualized)  10/19/2023 1911 by Ines Perez LPN  Outcome: Ongoing, Progressing  10/19/2023 1629 by Ines Perez LPN  Outcome: Ongoing, Progressing  Goal: Absence of Hospital-Acquired Illness or Injury  10/19/2023 1911 by Ines Perez LPN  Outcome: Ongoing, Progressing  10/19/2023 1629 by Ines Perez LPN  Outcome: Ongoing, Progressing  Goal: Optimal Comfort and Wellbeing  10/19/2023 1911 by Ines Perez LPN  Outcome: Ongoing, Progressing  10/19/2023 1629 by Ines Perez LPN  Outcome: Ongoing, Progressing  Goal: Readiness for Transition of Care  10/19/2023 1911 by Ines Perez LPN  Outcome: Ongoing, Progressing  10/19/2023 1629 by Ines Perez LPN  Outcome: Ongoing, Progressing

## 2023-10-20 NOTE — ASSESSMENT & PLAN NOTE
Patient with acute kidney injury/acute renal failure likely due to pre-renal azotemia due to IVVD SAMRA is currently worsening. Baseline creatinine normal creatine  - Labs reviewed- Renal function/electrolytes with Estimated Creatinine Clearance: 58.2 mL/min (based on SCr of 0.9 mg/dL). according to latest data. Monitor urine output and serial BMP and adjust therapy as needed. Avoid nephrotoxins and renally dose meds for GFR listed above.    Will give one liter IVFs at 75ml/hr  Hold Lasix and Spironolactone for now     improving

## 2023-10-20 NOTE — PLAN OF CARE
SW attempted to contact patient's emergency contact to complete initial DC assessment. Patient's emergency contact did not answer.   SW will attempt at a later time to complete assessment.     SW will continue to follow and assist as needed.

## 2023-10-21 PROBLEM — N17.9 AKI (ACUTE KIDNEY INJURY): Status: RESOLVED | Noted: 2023-10-18 | Resolved: 2023-10-21

## 2023-10-21 LAB
ALBUMIN SERPL BCP-MCNC: 2.3 G/DL (ref 3.5–5.2)
ALP SERPL-CCNC: 84 U/L (ref 55–135)
ALT SERPL W/O P-5'-P-CCNC: 46 U/L (ref 10–44)
AMMONIA PLAS-SCNC: 53 UMOL/L (ref 10–50)
ANION GAP SERPL CALC-SCNC: 11 MMOL/L (ref 8–16)
AST SERPL-CCNC: 104 U/L (ref 10–40)
BASOPHILS # BLD AUTO: 0.11 K/UL (ref 0–0.2)
BASOPHILS NFR BLD: 1.7 % (ref 0–1.9)
BILIRUB SERPL-MCNC: 7.2 MG/DL (ref 0.1–1)
BUN SERPL-MCNC: 16 MG/DL (ref 6–20)
CALCIUM SERPL-MCNC: 8.5 MG/DL (ref 8.7–10.5)
CHLORIDE SERPL-SCNC: 112 MMOL/L (ref 95–110)
CK SERPL-CCNC: 192 U/L (ref 20–180)
CO2 SERPL-SCNC: 18 MMOL/L (ref 23–29)
CREAT SERPL-MCNC: 0.8 MG/DL (ref 0.5–1.4)
DIFFERENTIAL METHOD: ABNORMAL
EOSINOPHIL # BLD AUTO: 0.2 K/UL (ref 0–0.5)
EOSINOPHIL NFR BLD: 3.1 % (ref 0–8)
ERYTHROCYTE [DISTWIDTH] IN BLOOD BY AUTOMATED COUNT: 14.8 % (ref 11.5–14.5)
EST. GFR  (NO RACE VARIABLE): >60 ML/MIN/1.73 M^2
GLUCOSE SERPL-MCNC: 73 MG/DL (ref 70–110)
HCT VFR BLD AUTO: 25.4 % (ref 37–48.5)
HGB BLD-MCNC: 8.1 G/DL (ref 12–16)
IMM GRANULOCYTES # BLD AUTO: 0.06 K/UL (ref 0–0.04)
IMM GRANULOCYTES NFR BLD AUTO: 0.9 % (ref 0–0.5)
INR PPP: 1.9 (ref 0.8–1.2)
LYMPHOCYTES # BLD AUTO: 1.7 K/UL (ref 1–4.8)
LYMPHOCYTES NFR BLD: 25.5 % (ref 18–48)
MCH RBC QN AUTO: 33.1 PG (ref 27–31)
MCHC RBC AUTO-ENTMCNC: 31.9 G/DL (ref 32–36)
MCV RBC AUTO: 104 FL (ref 82–98)
MONOCYTES # BLD AUTO: 1 K/UL (ref 0.3–1)
MONOCYTES NFR BLD: 15.7 % (ref 4–15)
NEUTROPHILS # BLD AUTO: 3.4 K/UL (ref 1.8–7.7)
NEUTROPHILS NFR BLD: 53.1 % (ref 38–73)
NRBC BLD-RTO: 0 /100 WBC
PLATELET # BLD AUTO: 72 K/UL (ref 150–450)
PMV BLD AUTO: 10.1 FL (ref 9.2–12.9)
POTASSIUM SERPL-SCNC: 3.7 MMOL/L (ref 3.5–5.1)
PROT SERPL-MCNC: 5.8 G/DL (ref 6–8.4)
PROTHROMBIN TIME: 19.3 SEC (ref 9–12.5)
RBC # BLD AUTO: 2.45 M/UL (ref 4–5.4)
SODIUM SERPL-SCNC: 141 MMOL/L (ref 136–145)
WBC # BLD AUTO: 6.48 K/UL (ref 3.9–12.7)

## 2023-10-21 PROCEDURE — 82550 ASSAY OF CK (CPK): CPT | Mod: NTX | Performed by: NURSE PRACTITIONER

## 2023-10-21 PROCEDURE — 82140 ASSAY OF AMMONIA: CPT | Mod: NTX | Performed by: INTERNAL MEDICINE

## 2023-10-21 PROCEDURE — 85610 PROTHROMBIN TIME: CPT | Mod: NTX | Performed by: INTERNAL MEDICINE

## 2023-10-21 PROCEDURE — 36415 COLL VENOUS BLD VENIPUNCTURE: CPT | Mod: NTX | Performed by: INTERNAL MEDICINE

## 2023-10-21 PROCEDURE — 25500020 PHARM REV CODE 255: Mod: NTX | Performed by: SPECIALIST

## 2023-10-21 PROCEDURE — 85025 COMPLETE CBC W/AUTO DIFF WBC: CPT | Mod: NTX | Performed by: INTERNAL MEDICINE

## 2023-10-21 PROCEDURE — 36415 COLL VENOUS BLD VENIPUNCTURE: CPT | Mod: NTX | Performed by: NURSE PRACTITIONER

## 2023-10-21 PROCEDURE — 63600175 PHARM REV CODE 636 W HCPCS: Mod: NTX | Performed by: EMERGENCY MEDICINE

## 2023-10-21 PROCEDURE — 80053 COMPREHEN METABOLIC PANEL: CPT | Mod: NTX | Performed by: INTERNAL MEDICINE

## 2023-10-21 PROCEDURE — 21400001 HC TELEMETRY ROOM: Mod: NTX

## 2023-10-21 PROCEDURE — 25000003 PHARM REV CODE 250: Mod: NTX | Performed by: INTERNAL MEDICINE

## 2023-10-21 PROCEDURE — 99232 PR SUBSEQUENT HOSPITAL CARE,LEVL II: ICD-10-PCS | Mod: 95,NTX,, | Performed by: INTERNAL MEDICINE

## 2023-10-21 PROCEDURE — 99232 SBSQ HOSP IP/OBS MODERATE 35: CPT | Mod: 95,NTX,, | Performed by: INTERNAL MEDICINE

## 2023-10-21 PROCEDURE — 25000003 PHARM REV CODE 250: Mod: NTX | Performed by: EMERGENCY MEDICINE

## 2023-10-21 RX ADMIN — RIFAXIMIN 550 MG: 550 TABLET ORAL at 09:10

## 2023-10-21 RX ADMIN — LACTULOSE 200 G: 10 SOLUTION ORAL at 04:10

## 2023-10-21 RX ADMIN — CEFTRIAXONE SODIUM 1 G: 1 INJECTION, POWDER, FOR SOLUTION INTRAMUSCULAR; INTRAVENOUS at 04:10

## 2023-10-21 RX ADMIN — IOHEXOL 100 ML: 350 INJECTION, SOLUTION INTRAVENOUS at 02:10

## 2023-10-21 RX ADMIN — Medication 1 TABLET: at 09:10

## 2023-10-21 RX ADMIN — THERA TABS 1 TABLET: TAB at 09:10

## 2023-10-21 RX ADMIN — LACTULOSE 200 G: 10 SOLUTION ORAL at 09:10

## 2023-10-21 RX ADMIN — THIAMINE HCL TAB 100 MG 100 MG: 100 TAB at 09:10

## 2023-10-21 NOTE — SUBJECTIVE & OBJECTIVE
"Interval History: No acute events overnight.     Review of Systems   Unable to perform ROS: Mental status change     Objective:     Vital Signs (Most Recent):  Temp: 98.6 °F (37 °C) (10/21/23 1145)  Pulse: 97 (10/21/23 1145)  Resp: 19 (10/21/23 1145)  BP: (!) 118/54 (10/21/23 1145)  SpO2: 97 % (10/21/23 1145) Vital Signs (24h Range):  Temp:  [98 °F (36.7 °C)-98.7 °F (37.1 °C)] 98.6 °F (37 °C)  Pulse:  [] 97  Resp:  [16-19] 19  SpO2:  [96 %-99 %] 97 %  BP: (118-149)/(54-68) 118/54     Weight: 56 kg (123 lb 7.3 oz)  Body mass index is 24.94 kg/m².    Intake/Output Summary (Last 24 hours) at 10/21/2023 1406  Last data filed at 10/21/2023 0733  Gross per 24 hour   Intake 349.37 ml   Output 0 ml   Net 349.37 ml         Physical Exam  Vitals reviewed.   Constitutional:       General: She is not in acute distress.     Appearance: She is well-developed.   HENT:      Head: Normocephalic and atraumatic.      Mouth/Throat:      Pharynx: No oropharyngeal exudate.   Eyes:      General: Scleral icterus present.         Right eye: No discharge.         Left eye: No discharge.      Pupils: Pupils are equal, round, and reactive to light.   Cardiovascular:      Comments: Bilateral UEs with swelling.   Pulmonary:      Effort: Pulmonary effort is normal. No respiratory distress.      Breath sounds: Normal breath sounds. No wheezing.   Abdominal:      General: There is no distension.      Palpations: Abdomen is soft.      Tenderness: There is no abdominal tenderness.   Musculoskeletal:      Right lower leg: No edema.      Left lower leg: No edema.   Neurological:      Mental Status: She is alert.      Comments: Alert, does not follow commands. She seems like she is trying to communicate but cannot.             Significant Labs: All pertinent labs within the past 24 hours have been reviewed.  Blood Culture: No results for input(s): "LABBLOO" in the last 48 hours.  BMP:   Recent Labs   Lab 10/21/23  0454   GLU 73      K 3.7 "   *   CO2 18*   BUN 16   CREATININE 0.8   CALCIUM 8.5*     CBC:   Recent Labs   Lab 10/20/23  0523 10/21/23  0454   WBC 5.50 6.48   HGB 8.0* 8.1*   HCT 24.0* 25.4*   PLT 68* 72*     CMP:   Recent Labs   Lab 10/20/23  0523 10/21/23  0454    141   K 3.4* 3.7   * 112*   CO2 17* 18*   GLU 80 73   BUN 13 16   CREATININE 0.9 0.8   CALCIUM 8.2* 8.5*   PROT 5.7* 5.8*   ALBUMIN 2.3* 2.3*   BILITOT 7.7* 7.2*   ALKPHOS 90 84   * 104*   ALT 48* 46*   ANIONGAP 11 11       Significant Imaging: I have reviewed all pertinent imaging results/findings within the past 24 hours.

## 2023-10-21 NOTE — PROGRESS NOTES
Formerly [facility] - Telemetry [facility])  Hepatology  Telemedicine Progress Note    Patient Name: Mara Mojica  MRN: [id number]  Admission Date: 10/18/2023  Hospital Length of Stay: 3 days  Attending Provider: Hermila Grimes MD   Primary Care Physician: Roque, Osiris, NP  Principal Problem:Decompensated hepatic cirrhosis    Subjective:     Transplant status: No      Interval History: Still not really able to follow commands. She seems alert but unable to communicate. She has been having a lot of BMs.    Current Facility-Administered Medications   Medication    cefTRIAXone (ROCEPHIN) 1 g in dextrose 5 % in water (D5W) 100 mL IVPB (MB+)    folic acid-vit B6-vit B12 2.5-25-2 mg tablet 1 tablet    lactulose 10 gram/15 mL enema solution (inpatient use) 200 g    multivitamin tablet    ondansetron injection 4 mg    rifAXIMin tablet 550 mg    sodium chloride 0.9% bolus 500 mL 500 mL    sodium chloride 0.9% flush 10 mL    thiamine tablet 100 mg       Objective:     Vital Signs (Most Recent):  Temp: 98.6 °F (37 °C) (10/21/23 1145)  Pulse: 97 (10/21/23 1145)  Resp: 19 (10/21/23 1145)  BP: (!) 118/54 (10/21/23 1145)  SpO2: 97 % (10/21/23 1145) Vital Signs (24h Range):  Temp:  [98 °F (36.7 °C)-98.7 °F (37.1 °C)] 98.6 °F (37 °C)  Pulse:  [] 97  Resp:  [16-19] 19  SpO2:  [96 %-99 %] 97 %  BP: (118-149)/(54-68) 118/54     Weight: 56 kg (123 lb 7.3 oz) (10/20/23 2326)  Body mass index is 24.94 kg/m².       Physical Exam  Vitals reviewed.   Constitutional:       General: She is not in acute distress.     Appearance: She is well-developed.   HENT:      Head: Normocephalic and atraumatic.      Mouth/Throat:      Pharynx: No oropharyngeal exudate.   Eyes:      General: Scleral icterus present.         Right eye: No discharge.         Left eye: No discharge.      Pupils: Pupils are equal, round, and reactive to light.   Pulmonary:      Effort: Pulmonary effort is normal. No respiratory distress.      Breath sounds: Normal  breath sounds. No wheezing.   Abdominal:      General: There is no distension.      Palpations: Abdomen is soft.      Tenderness: There is no abdominal tenderness.   Musculoskeletal:      Right lower leg: No edema.      Left lower leg: No edema.   Neurological:      Mental Status: She is alert.      Comments: Alert, was able to minimally move finger on command, but not able to follow other commands. She seems like she is trying to communicate but cannot.            MELD 3.0: 24 at 10/21/2023  4:54 AM  MELD-Na: 21 at 10/21/2023  4:54 AM  Calculated from:  Serum Creatinine: 0.8 mg/dL (Using min of 1 mg/dL) at 10/21/2023  4:54 AM  Serum Sodium: 141 mmol/L (Using max of 137 mmol/L) at 10/21/2023  4:54 AM  Total Bilirubin: 7.2 mg/dL at 10/21/2023  4:54 AM  Serum Albumin: 2.3 g/dL at 10/21/2023  4:54 AM  INR(ratio): 1.9 at 10/21/2023  4:54 AM  Age at listing (hypothetical): 58 years  Sex: Female at 10/21/2023  4:54 AM      Significant Labs:  Labs within the past month have been reviewed.    Significant Imaging:  N/a    Assessment/Plan:     GI  * Decompensated alcoholic hepatic cirrhosis  Encephalopathy and MELD worse than her baseline possible from UTI, no other clear precipitant.  -Continue to monitor MELD score  -Meld is high but at median for liver transplantation, but if patient continues with encephalopathy may consider tranfer to [facility] for additional eval but would like to see if additional neuro eval and lactulose make a difference    MELD 3.0: 24 at 10/21/2023  4:54 AM  MELD-Na: 21 at 10/21/2023  4:54 AM  Calculated from:  Serum Creatinine: 0.8 mg/dL (Using min of 1 mg/dL) at 10/21/2023  4:54 AM  Serum Sodium: 141 mmol/L (Using max of 137 mmol/L) at 10/21/2023  4:54 AM  Total Bilirubin: 7.2 mg/dL at 10/21/2023  4:54 AM  Serum Albumin: 2.3 g/dL at 10/21/2023  4:54 AM  INR(ratio): 1.9 at 10/21/2023  4:54 AM  Age at listing (hypothetical): 58 years  Sex: Female at 10/21/2023  4:54 AM        Acute hepatic  encephalopathy  Concern for hepatic encephalopathy but it is unusual to have this degree with minimal ammonia elevation and no clinical change despite multiple lactulose enemas with large volume BMs. Communicated with hospital med and neuro to continue to look for other etiologies.  -Agree with CT head and contrast  -Continue with lactulose enemas  -Monitor ammonia        Thank you for your consult. I will follow-up with patient. Please contact us if you have any additional questions.    Shannan Reardon MD  Hepatology  [facility] - Telemetry ([facility])

## 2023-10-21 NOTE — SUBJECTIVE & OBJECTIVE
Interval History: Still not really able to follow commands. She seems alert but unable to communicate. She has been having a lot of BMs.    Current Facility-Administered Medications   Medication    cefTRIAXone (ROCEPHIN) 1 g in dextrose 5 % in water (D5W) 100 mL IVPB (MB+)    folic acid-vit B6-vit B12 2.5-25-2 mg tablet 1 tablet    lactulose 10 gram/15 mL enema solution (inpatient use) 200 g    multivitamin tablet    ondansetron injection 4 mg    rifAXIMin tablet 550 mg    sodium chloride 0.9% bolus 500 mL 500 mL    sodium chloride 0.9% flush 10 mL    thiamine tablet 100 mg       Objective:     Vital Signs (Most Recent):  Temp: 98.6 °F (37 °C) (10/21/23 1145)  Pulse: 97 (10/21/23 1145)  Resp: 19 (10/21/23 1145)  BP: (!) 118/54 (10/21/23 1145)  SpO2: 97 % (10/21/23 1145) Vital Signs (24h Range):  Temp:  [98 °F (36.7 °C)-98.7 °F (37.1 °C)] 98.6 °F (37 °C)  Pulse:  [] 97  Resp:  [16-19] 19  SpO2:  [96 %-99 %] 97 %  BP: (118-149)/(54-68) 118/54     Weight: 56 kg (123 lb 7.3 oz) (10/20/23 2326)  Body mass index is 24.94 kg/m².       Physical Exam  Vitals reviewed.   Constitutional:       General: She is not in acute distress.     Appearance: She is well-developed.   HENT:      Head: Normocephalic and atraumatic.      Mouth/Throat:      Pharynx: No oropharyngeal exudate.   Eyes:      General: Scleral icterus present.         Right eye: No discharge.         Left eye: No discharge.      Pupils: Pupils are equal, round, and reactive to light.   Pulmonary:      Effort: Pulmonary effort is normal. No respiratory distress.      Breath sounds: Normal breath sounds. No wheezing.   Abdominal:      General: There is no distension.      Palpations: Abdomen is soft.      Tenderness: There is no abdominal tenderness.   Musculoskeletal:      Right lower leg: No edema.      Left lower leg: No edema.   Neurological:      Mental Status: She is alert.      Comments: Alert, was able to minimally move finger on command, but not able  to follow other commands. She seems like she is trying to communicate but cannot.            MELD 3.0: 24 at 10/21/2023  4:54 AM  MELD-Na: 21 at 10/21/2023  4:54 AM  Calculated from:  Serum Creatinine: 0.8 mg/dL (Using min of 1 mg/dL) at 10/21/2023  4:54 AM  Serum Sodium: 141 mmol/L (Using max of 137 mmol/L) at 10/21/2023  4:54 AM  Total Bilirubin: 7.2 mg/dL at 10/21/2023  4:54 AM  Serum Albumin: 2.3 g/dL at 10/21/2023  4:54 AM  INR(ratio): 1.9 at 10/21/2023  4:54 AM  Age at listing (hypothetical): 58 years  Sex: Female at 10/21/2023  4:54 AM      Significant Labs:  Labs within the past month have been reviewed.    Significant Imaging:  N/a

## 2023-10-21 NOTE — ASSESSMENT & PLAN NOTE
Concern for hepatic encephalopathy but it is unusual to have this degree with minimal ammonia elevation and no clinical change despite multiple lactulose enemas with large volume BMs.   -Plan for CT head with contrast  -Continue with lactulose enemas  -Monitor ammonia

## 2023-10-21 NOTE — PLAN OF CARE
Problem: Adult Inpatient Plan of Care  Goal: Patient-Specific Goal (Individualized)  Outcome: Ongoing, Progressing     Pt Alert however nonverbal   Unable to follow commands   SR on tele   Bp stable   Afebrile   On room air   Repeat CT of head with contrast - unremarkable   Possible LP in AM   Hepatology following   Neuro following   Lactulose enemas to be administered rectally per GI   Pt with 6 large BMs this shift   Incontinence care   Fall precautions in place   Avasys at bedside   Chart review completed

## 2023-10-21 NOTE — PROGRESS NOTES
[facility] Medicine  Progress Note    Patient Name: Mara Mojica  MRN: [id number]  Patient Class: IP- Inpatient   Admission Date: 10/18/2023  Length of Stay: 3 days  Attending Physician: Hermila Grimes MD  Primary Care Provider: Osiris Nevarez NP        Subjective:     Principal Problem:Decompensated hepatic cirrhosis        HPI:  The patient is a 59 yo female with Alcoholic cirrhosis with ascites who presented to ED with AMS. Pt's significant other reports while at work, he tried calling the pt all after noon, but she did not answer the phone. When he got home from work at 1pm, he found the pt on the floor confused with slurred speech laying on a pillow with blood stains and surrounded by soiled blankets with urine and feces and paper towels with blood. Pt can not recall all the events, however, she states she was laying on the couch and moved to the floor because she soiled on herself. She denies any falls or seizures and states that she just wanted to lay down on the floor. She reports she was too weak to go to her bedroom or to the bathroom. She states she cut her tongue on her teeth (due to poor dentition). She denies LOC. However, the S.O. states that the pt was very confused on his arrival to the home. Confusion has improved since arrival to ED. Pt denies any alcohol intake or substance use. Last alcohol drink was 7/2023.   In the ED, the pt was also noted to have a left facial droop and reported numbness to to her mouth and tongue with concern for stroke.     Of note, the pt was recently hospitalized with SBO and SAMRA. SBO resolved with conservative treatment. Pt then left AMA. Serum Cr was 2.8 on discharge.     In the ED, Afebrile, BP stable. Mild tachycardia noted. Labs showed Hgb 8.5, Platelets 66, Serum Cr 1.4, T BIli 2.7, AST 96, ALT 47, , , Ammonia normal INR 2.0. UA +nitrites, no WBCs. ABGs PH 7.5, pCO2 27, pHCO3 22. CT head showed nothing acute -per  STAT RAD.       Overview/Hospital Course:  59 yo female with Alcoholic cirrhosis with ascites, s/p Ex Lap with bowel resection in the past for incarcerated hernia, recent SBO with SAMRA, who presented to ED with AMS. Per ER, pt's  came home from work and found her on the floor with her head on a blood-stained pillow and some slurred speech, surrounded by soiled blankets with urine and feces. Per EMS, pt was ambulatory at the scene. The pt told the paramedics that she was on the floor for about 3 hours. In the ER, the pt is oriented x3 (contrary to triage note). She also c/o numbness to the entire tongue and lips which onset 3 hours PTA. She denies any falls or seizures. Pt can not recall all the events, however, she states she was laying on the couch and moved to the floor because she soiled on herself. She reports she was too weak to go to her bedroom or to the bathroom. She states she cut her tongue on her teeth (due to poor dentition). She denies LOC. However, the S.O. states that the pt was very confused on his arrival to the home. Confusion has improved since arrival to ED. Pt denies any alcohol intake or substance use. Last alcohol drink was 7/2023.      In the ED, the pt was also noted to have a left facial droop and reported numbness to to her mouth and tongue with concern for stroke.      Of note, the pt was recently hospitalized with SBO and SAMRA. SBO resolved with conservative treatment. Pt then left AMA. Serum Cr was 2.8 on discharge.      In the ED, Afebrile, BP stable. Mild tachycardia noted. Labs showed Hgb 8.5, Platelets 66, Serum Cr 1.4, T BIli 2.7, AST 96, ALT 47, , , Ammonia normal. INR 2, UA +nitrites, 13 WBCs. ABGs PH 7.5, pCO2 27, pO2 51, HCO3 22. CT head showed nothing acute.     She was placed in Obs under Hosp Med for AMS r/o CVA, UTI, possible Hep Encephalopathy. Hepatology evaluated the pt. Recommended Paracentesis to r/o SBP and cont Lactulose, Rifaximin. Await  paracentesis and MRI Brain. Start Rocephin.     US Abd- no ascites. MRI Brain done but not read yet- appears normal to me, likely has HE and UTI-  will cont Lactulose and Rifaximin.  Evaluated by PT/OT/ST.     10/19- Appreciate Hepatology and Neurology: remains mostly unable to speak but appropriately says yes or no. Was not swallowing anything yesterday but finally gave her her lactulose and Rifaximin with applesauce, now improving. Her boyfriend is with her which helped. Looks calmer, less tremulous. EEG pending. Urine Cx growing E coli- getting Rocephin. Dr. Jackson worries that she will soon need Liver Tx. Cont present care.    10/20- not looking good, lethargic, eyes open but non responsive. No BM since yesterday, no meds given since last night. Ammonia 52. NGT placed but it clogged- hence Dr. Shannan Reardon ordered Lactulose enema and she had a huge BM later.  Cont present care. NGT replaced, will give lactulose thru the NGT.     10/21/23-Still not following commands. She seems alert but unable to communicate. Nursing staff reports several BMs. Plan for CT brain with contrast today. If no improvement by tomorrow will get LP.          Interval History: No acute events overnight.     Review of Systems   Unable to perform ROS: Mental status change     Objective:     Vital Signs (Most Recent):  Temp: 98.6 °F (37 °C) (10/21/23 1145)  Pulse: 97 (10/21/23 1145)  Resp: 19 (10/21/23 1145)  BP: (!) 118/54 (10/21/23 1145)  SpO2: 97 % (10/21/23 1145) Vital Signs (24h Range):  Temp:  [98 °F (36.7 °C)-98.7 °F (37.1 °C)] 98.6 °F (37 °C)  Pulse:  [] 97  Resp:  [16-19] 19  SpO2:  [96 %-99 %] 97 %  BP: (118-149)/(54-68) 118/54     Weight: 56 kg (123 lb 7.3 oz)  Body mass index is 24.94 kg/m².    Intake/Output Summary (Last 24 hours) at 10/21/2023 1406  Last data filed at 10/21/2023 0733  Gross per 24 hour   Intake 349.37 ml   Output 0 ml   Net 349.37 ml         Physical Exam  Vitals reviewed.   Constitutional:       General:  "She is not in acute distress.     Appearance: She is well-developed.   HENT:      Head: Normocephalic and atraumatic.      Mouth/Throat:      Pharynx: No oropharyngeal exudate.   Eyes:      General: Scleral icterus present.         Right eye: No discharge.         Left eye: No discharge.      Pupils: Pupils are equal, round, and reactive to light.   Cardiovascular:      Comments: Bilateral UEs with swelling.   Pulmonary:      Effort: Pulmonary effort is normal. No respiratory distress.      Breath sounds: Normal breath sounds. No wheezing.   Abdominal:      General: There is no distension.      Palpations: Abdomen is soft.      Tenderness: There is no abdominal tenderness.   Musculoskeletal:      Right lower leg: No edema.      Left lower leg: No edema.   Neurological:      Mental Status: She is alert.      Comments: Alert, does not follow commands. She seems like she is trying to communicate but cannot.             Significant Labs: All pertinent labs within the past 24 hours have been reviewed.  Blood Culture: No results for input(s): "LABBLOO" in the last 48 hours.  BMP:   Recent Labs   Lab 10/21/23  0454   GLU 73      K 3.7   *   CO2 18*   BUN 16   CREATININE 0.8   CALCIUM 8.5*     CBC:   Recent Labs   Lab 10/20/23  0523 10/21/23  0454   WBC 5.50 6.48   HGB 8.0* 8.1*   HCT 24.0* 25.4*   PLT 68* 72*     CMP:   Recent Labs   Lab 10/20/23  0523 10/21/23  0454    141   K 3.4* 3.7   * 112*   CO2 17* 18*   GLU 80 73   BUN 13 16   CREATININE 0.9 0.8   CALCIUM 8.2* 8.5*   PROT 5.7* 5.8*   ALBUMIN 2.3* 2.3*   BILITOT 7.7* 7.2*   ALKPHOS 90 84   * 104*   ALT 48* 46*   ANIONGAP 11 11       Significant Imaging: I have reviewed all pertinent imaging results/findings within the past 24 hours.      Assessment/Plan:      * Decompensated alcoholic hepatic cirrhosis  Liver continues to decompensate   Consult hepatology     MELD 3.0: 24 at 10/21/2023  4:54 AM  MELD-Na: 21 at 10/21/2023  4:54 " AM  Calculated from:  Serum Creatinine: 0.8 mg/dL (Using min of 1 mg/dL) at 10/21/2023  4:54 AM  Serum Sodium: 141 mmol/L (Using max of 137 mmol/L) at 10/21/2023  4:54 AM  Total Bilirubin: 7.2 mg/dL at 10/21/2023  4:54 AM  Serum Albumin: 2.3 g/dL at 10/21/2023  4:54 AM  INR(ratio): 1.9 at 10/21/2023  4:54 AM  Age at listing (hypothetical): 58 years  Sex: Female at 10/21/2023  4:54 AM    Ammonia level 53  Cont Lactulose, Rifaximin        Fracture of right superior pubic ramus, closed, initial encounter  Ortho following- no further intervention  Follow-up outpatient       Non-traumatic rhabdomyolysis  CPK pending       Coagulopathy  INR-1.9  stable    Generalized weakness  PT/OT/ST      Acute hepatic encephalopathy  Concern for hepatic encephalopathy but it is unusual to have this degree with minimal ammonia elevation and no clinical change despite multiple lactulose enemas with large volume BMs.   -Plan for CT head with contrast  -Continue with lactulose enemas  -Monitor ammonia        VTE Risk Mitigation (From admission, onward)         Ordered     IP VTE HIGH RISK PATIENT  Once         10/18/23 0645     Place sequential compression device  Until discontinued         10/18/23 0645     Reason for No Pharmacological VTE Prophylaxis  Once        Question:  Reasons:  Answer:  Risk of Bleeding    10/18/23 0645                Discharge Planning   ASHELY:      Code Status: Full Code   Is the patient medically ready for discharge?:     Reason for patient still in hospital (select all that apply): Patient trending condition, Laboratory test and Treatment                     Rachell De La Paz NP  Department of Hospital Medicine   [facility] - Telemetry ([facility]

## 2023-10-21 NOTE — ASSESSMENT & PLAN NOTE
Patient with acute kidney injury/acute renal failure likely due to pre-renal azotemia due to IVVD SAMRA is currently worsening. Baseline creatinine normal creatine  - Labs reviewed- Renal function/electrolytes with Estimated Creatinine Clearance: 60.5 mL/min (based on SCr of 0.8 mg/dL). according to latest data. Monitor urine output and serial BMP and adjust therapy as needed. Avoid nephrotoxins and renally dose meds for GFR listed above.    Will give one liter IVFs at 75ml/hr  Hold Lasix and Spironolactone for now     Resolved

## 2023-10-21 NOTE — ASSESSMENT & PLAN NOTE
Encephalopathy and MELD worse than her baseline possible from UTI, no other clear precipitant.  -Continue to monitor MELD score  -Meld is high but at median for liver transplantation, but if patient continues with encephalopathy may consider tranfer to [facility] for additional eval but would like to see if additional neuro eval and lactulose make a difference    MELD 3.0: 24 at 10/21/2023  4:54 AM  MELD-Na: 21 at 10/21/2023  4:54 AM  Calculated from:  Serum Creatinine: 0.8 mg/dL (Using min of 1 mg/dL) at 10/21/2023  4:54 AM  Serum Sodium: 141 mmol/L (Using max of 137 mmol/L) at 10/21/2023  4:54 AM  Total Bilirubin: 7.2 mg/dL at 10/21/2023  4:54 AM  Serum Albumin: 2.3 g/dL at 10/21/2023  4:54 AM  INR(ratio): 1.9 at 10/21/2023  4:54 AM  Age at listing (hypothetical): 58 years  Sex: Female at 10/21/2023  4:54 AM

## 2023-10-21 NOTE — ASSESSMENT & PLAN NOTE
Liver continues to decompensate   Consult hepatology     MELD 3.0: 24 at 10/21/2023  4:54 AM  MELD-Na: 21 at 10/21/2023  4:54 AM  Calculated from:  Serum Creatinine: 0.8 mg/dL (Using min of 1 mg/dL) at 10/21/2023  4:54 AM  Serum Sodium: 141 mmol/L (Using max of 137 mmol/L) at 10/21/2023  4:54 AM  Total Bilirubin: 7.2 mg/dL at 10/21/2023  4:54 AM  Serum Albumin: 2.3 g/dL at 10/21/2023  4:54 AM  INR(ratio): 1.9 at 10/21/2023  4:54 AM  Age at listing (hypothetical): 58 years  Sex: Female at 10/21/2023  4:54 AM    Ammonia level 53  Cont Lactulose, Rifaximin

## 2023-10-21 NOTE — PT/OT/SLP PROGRESS
Physical Therapy      Patient Name:  Mara Mojica   MRN:  [id number]    Attempted to see patient at 0740. Patient not seen today secondary to being drowsy and not following commands well Will continue efforts.

## 2023-10-21 NOTE — ASSESSMENT & PLAN NOTE
Concern for hepatic encephalopathy but it is unusual to have this degree with minimal ammonia elevation and no clinical change despite multiple lactulose enemas with large volume BMs. Communicated with hospital med and neuro to continue to look for other etiologies.  -Agree with CT head and contrast  -Continue with lactulose enemas  -Monitor ammonia

## 2023-10-22 PROBLEM — G93.40 ACUTE ENCEPHALOPATHY: Status: ACTIVE | Noted: 2023-10-22

## 2023-10-22 LAB
ALBUMIN SERPL BCP-MCNC: 2.3 G/DL (ref 3.5–5.2)
ALP SERPL-CCNC: 79 U/L (ref 55–135)
ALT SERPL W/O P-5'-P-CCNC: 43 U/L (ref 10–44)
AMMONIA PLAS-SCNC: 47 UMOL/L (ref 10–50)
ANION GAP SERPL CALC-SCNC: 11 MMOL/L (ref 8–16)
AST SERPL-CCNC: 85 U/L (ref 10–40)
BASOPHILS # BLD AUTO: 0.11 K/UL (ref 0–0.2)
BASOPHILS NFR BLD: 1.7 % (ref 0–1.9)
BILIRUB SERPL-MCNC: 5.7 MG/DL (ref 0.1–1)
BUN SERPL-MCNC: 18 MG/DL (ref 6–20)
CALCIUM SERPL-MCNC: 8.9 MG/DL (ref 8.7–10.5)
CHLORIDE SERPL-SCNC: 114 MMOL/L (ref 95–110)
CO2 SERPL-SCNC: 17 MMOL/L (ref 23–29)
CREAT SERPL-MCNC: 0.8 MG/DL (ref 0.5–1.4)
DIFFERENTIAL METHOD: ABNORMAL
EOSINOPHIL # BLD AUTO: 0.1 K/UL (ref 0–0.5)
EOSINOPHIL NFR BLD: 2.2 % (ref 0–8)
ERYTHROCYTE [DISTWIDTH] IN BLOOD BY AUTOMATED COUNT: 14.8 % (ref 11.5–14.5)
EST. GFR  (NO RACE VARIABLE): >60 ML/MIN/1.73 M^2
GLUCOSE SERPL-MCNC: 71 MG/DL (ref 70–110)
HCT VFR BLD AUTO: 24.6 % (ref 37–48.5)
HGB BLD-MCNC: 7.6 G/DL (ref 12–16)
IMM GRANULOCYTES # BLD AUTO: 0.1 K/UL (ref 0–0.04)
IMM GRANULOCYTES NFR BLD AUTO: 1.6 % (ref 0–0.5)
INR PPP: 2 (ref 0.8–1.2)
LYMPHOCYTES # BLD AUTO: 1.6 K/UL (ref 1–4.8)
LYMPHOCYTES NFR BLD: 25.3 % (ref 18–48)
MCH RBC QN AUTO: 32.5 PG (ref 27–31)
MCHC RBC AUTO-ENTMCNC: 30.9 G/DL (ref 32–36)
MCV RBC AUTO: 105 FL (ref 82–98)
MONOCYTES # BLD AUTO: 1.1 K/UL (ref 0.3–1)
MONOCYTES NFR BLD: 16.5 % (ref 4–15)
NEUTROPHILS # BLD AUTO: 3.4 K/UL (ref 1.8–7.7)
NEUTROPHILS NFR BLD: 52.7 % (ref 38–73)
NRBC BLD-RTO: 0 /100 WBC
PLATELET # BLD AUTO: 78 K/UL (ref 150–450)
PMV BLD AUTO: 10 FL (ref 9.2–12.9)
POTASSIUM SERPL-SCNC: 3.7 MMOL/L (ref 3.5–5.1)
PROT SERPL-MCNC: 5.6 G/DL (ref 6–8.4)
PROTHROMBIN TIME: 21 SEC (ref 9–12.5)
RBC # BLD AUTO: 2.34 M/UL (ref 4–5.4)
SODIUM SERPL-SCNC: 142 MMOL/L (ref 136–145)
WBC # BLD AUTO: 6.41 K/UL (ref 3.9–12.7)

## 2023-10-22 PROCEDURE — 82140 ASSAY OF AMMONIA: CPT | Mod: NTX | Performed by: INTERNAL MEDICINE

## 2023-10-22 PROCEDURE — 21400001 HC TELEMETRY ROOM: Mod: NTX

## 2023-10-22 PROCEDURE — 63600175 PHARM REV CODE 636 W HCPCS: Mod: NTX | Performed by: EMERGENCY MEDICINE

## 2023-10-22 PROCEDURE — 97530 THERAPEUTIC ACTIVITIES: CPT | Mod: NTX

## 2023-10-22 PROCEDURE — 25000003 PHARM REV CODE 250: Mod: NTX | Performed by: INTERNAL MEDICINE

## 2023-10-22 PROCEDURE — 25000003 PHARM REV CODE 250: Mod: NTX | Performed by: EMERGENCY MEDICINE

## 2023-10-22 PROCEDURE — 99233 SBSQ HOSP IP/OBS HIGH 50: CPT | Mod: NTX,,, | Performed by: PSYCHIATRY & NEUROLOGY

## 2023-10-22 PROCEDURE — 99232 SBSQ HOSP IP/OBS MODERATE 35: CPT | Mod: 95,NTX,, | Performed by: INTERNAL MEDICINE

## 2023-10-22 PROCEDURE — 80053 COMPREHEN METABOLIC PANEL: CPT | Mod: NTX | Performed by: INTERNAL MEDICINE

## 2023-10-22 PROCEDURE — 85025 COMPLETE CBC W/AUTO DIFF WBC: CPT | Mod: NTX | Performed by: INTERNAL MEDICINE

## 2023-10-22 PROCEDURE — 25000003 PHARM REV CODE 250: Mod: NTX | Performed by: SPECIALIST

## 2023-10-22 PROCEDURE — 36415 COLL VENOUS BLD VENIPUNCTURE: CPT | Mod: NTX | Performed by: INTERNAL MEDICINE

## 2023-10-22 PROCEDURE — 85610 PROTHROMBIN TIME: CPT | Mod: NTX | Performed by: INTERNAL MEDICINE

## 2023-10-22 PROCEDURE — 99232 PR SUBSEQUENT HOSPITAL CARE,LEVL II: ICD-10-PCS | Mod: 95,NTX,, | Performed by: INTERNAL MEDICINE

## 2023-10-22 PROCEDURE — 92526 ORAL FUNCTION THERAPY: CPT | Mod: NTX

## 2023-10-22 PROCEDURE — 99233 PR SUBSEQUENT HOSPITAL CARE,LEVL III: ICD-10-PCS | Mod: NTX,,, | Performed by: PSYCHIATRY & NEUROLOGY

## 2023-10-22 RX ORDER — SODIUM CHLORIDE 9 MG/ML
INJECTION, SOLUTION INTRAVENOUS
Status: DISCONTINUED | OUTPATIENT
Start: 2023-10-22 | End: 2023-10-25 | Stop reason: HOSPADM

## 2023-10-22 RX ADMIN — RIFAXIMIN 550 MG: 550 TABLET ORAL at 08:10

## 2023-10-22 RX ADMIN — CEFTRIAXONE SODIUM 1 G: 1 INJECTION, POWDER, FOR SOLUTION INTRAMUSCULAR; INTRAVENOUS at 02:10

## 2023-10-22 RX ADMIN — SODIUM CHLORIDE: 9 INJECTION, SOLUTION INTRAVENOUS at 02:10

## 2023-10-22 RX ADMIN — THIAMINE HCL TAB 100 MG 100 MG: 100 TAB at 09:10

## 2023-10-22 RX ADMIN — Medication 1 TABLET: at 09:10

## 2023-10-22 RX ADMIN — LACTULOSE 200 G: 10 SOLUTION ORAL at 07:10

## 2023-10-22 RX ADMIN — LACTULOSE 200 G: 10 SOLUTION ORAL at 12:10

## 2023-10-22 RX ADMIN — RIFAXIMIN 550 MG: 550 TABLET ORAL at 09:10

## 2023-10-22 RX ADMIN — THERA TABS 1 TABLET: TAB at 09:10

## 2023-10-22 NOTE — ASSESSMENT & PLAN NOTE
Encephalopathy and MELD worse than her baseline possible from UTI, no other clear precipitant.  -Continue to monitor MELD score  -Meld is high but lower than median for liver transplantation, but if patient continues with encephalopathy may consider tranfer to [facility] for additional eval but would like to see if additional neuro eval and lactulose make a difference. Will discuss with the team.    MELD 3.0: 24 at 10/22/2023  5:14 AM  MELD-Na: 21 at 10/22/2023  5:14 AM  Calculated from:  Serum Creatinine: 0.8 mg/dL (Using min of 1 mg/dL) at 10/22/2023  5:14 AM  Serum Sodium: 142 mmol/L (Using max of 137 mmol/L) at 10/22/2023  5:14 AM  Total Bilirubin: 5.7 mg/dL at 10/22/2023  5:14 AM  Serum Albumin: 2.3 g/dL at 10/22/2023  5:14 AM  INR(ratio): 2.0 at 10/22/2023  5:14 AM  Age at listing (hypothetical): 58 years  Sex: Female at 10/22/2023  5:14 AM

## 2023-10-22 NOTE — ASSESSMENT & PLAN NOTE
Concern for hepatic encephalopathy but it is unusual to have this degree with now ammonia elevation and multiple lactulose enemas with large volume BMs. Communicated with hospital med and neuro to continue to look for other etiologies.  -I would like to discuss with neurology once they have seen her   -Agree with CT head and contrast  -Continue with lactulose enemas  -Monitor ammonia

## 2023-10-22 NOTE — SUBJECTIVE & OBJECTIVE
Interval History: No acute events over noted.     Review of Systems   Unable to perform ROS: Mental status change     Objective:     Vital Signs (Most Recent):  Temp: 98.1 °F (36.7 °C) (10/22/23 0704)  Pulse: 99 (10/22/23 0943)  Resp: 18 (10/22/23 0704)  BP: (!) 126/56 (10/22/23 0704)  SpO2: 98 % (10/22/23 0704) Vital Signs (24h Range):  Temp:  [97.6 °F (36.4 °C)-98.6 °F (37 °C)] 98.1 °F (36.7 °C)  Pulse:  [] 99  Resp:  [17-19] 18  SpO2:  [96 %-98 %] 98 %  BP: (118-170)/(54-76) 126/56     Weight: 56 kg (123 lb 7.3 oz)  Body mass index is 24.94 kg/m².    Intake/Output Summary (Last 24 hours) at 10/22/2023 1037  Last data filed at 10/22/2023 0814  Gross per 24 hour   Intake 596.93 ml   Output --   Net 596.93 ml         Physical Exam  Vitals reviewed.   Constitutional:       General: She is not in acute distress.     Appearance: She is well-developed.   HENT:      Head: Normocephalic and atraumatic.      Mouth/Throat:      Pharynx: No oropharyngeal exudate.   Eyes:      General: Scleral icterus present.         Right eye: No discharge.         Left eye: No discharge.      Pupils: Pupils are equal, round, and reactive to light.   Pulmonary:      Effort: Pulmonary effort is normal. No respiratory distress.      Breath sounds: Normal breath sounds. No wheezing.   Abdominal:      General: There is no distension.      Palpations: Abdomen is soft.      Tenderness: There is no abdominal tenderness.   Musculoskeletal:      Right lower leg: No edema.      Left lower leg: No edema.   Skin:     Coloration: Skin is jaundiced.   Neurological:      Mental Status: She is alert.      Comments: Alert follows simple commands, unable to verbally communicate. Not sure if she is comprehending.    Psychiatric:         Behavior: Behavior normal.             Significant Labs: All pertinent labs within the past 24 hours have been reviewed.  BMP:   Recent Labs   Lab 10/22/23  0514   GLU 71      K 3.7   *   CO2 17*   BUN 18    CREATININE 0.8   CALCIUM 8.9     CBC:   Recent Labs   Lab 10/21/23  0454 10/22/23  0514   WBC 6.48 6.41   HGB 8.1* 7.6*   HCT 25.4* 24.6*   PLT 72* 78*     CMP:   Recent Labs   Lab 10/21/23  0454 10/22/23  0514    142   K 3.7 3.7   * 114*   CO2 18* 17*   GLU 73 71   BUN 16 18   CREATININE 0.8 0.8   CALCIUM 8.5* 8.9   PROT 5.8* 5.6*   ALBUMIN 2.3* 2.3*   BILITOT 7.2* 5.7*   ALKPHOS 84 79   * 85*   ALT 46* 43   ANIONGAP 11 11     Coagulation:   Recent Labs   Lab 10/22/23  0514   INR 2.0*       Significant Imaging: I have reviewed all pertinent imaging results/findings within the past 24 hours.

## 2023-10-22 NOTE — PLAN OF CARE
Problem: Adult Inpatient Plan of Care  Goal: Patient-Specific Goal (Individualized)  Outcome: Ongoing, Progressing      Pt Alert however nonverbal   Following one step commands   SR on tele   Bp stable   Afebrile   On room air   Hepatology following   Neuro following   Lactulose enemas continued, ammonia level trending down   Pt with 3 large BMs this shift   Incontinence care   Fall precautions in place   Avasys at bedside   Chart review completed     Ok to keep current PIV order in place

## 2023-10-22 NOTE — SUBJECTIVE & OBJECTIVE
Interval History: Patient more alert and interactive but still not able to verbally communicate and difficult to fully assess comprehension. Ammonia is normal and patient has continued to have large volume BM. No acute issues.    Current Facility-Administered Medications   Medication    cefTRIAXone (ROCEPHIN) 1 g in dextrose 5 % in water (D5W) 100 mL IVPB (MB+)    folic acid-vit B6-vit B12 2.5-25-2 mg tablet 1 tablet    lactulose 10 gram/15 mL enema solution (inpatient use) 200 g    multivitamin tablet    ondansetron injection 4 mg    rifAXIMin tablet 550 mg    sodium chloride 0.9% bolus 500 mL 500 mL    sodium chloride 0.9% flush 10 mL    thiamine tablet 100 mg       Objective:     Vital Signs (Most Recent):  Temp: 98.1 °F (36.7 °C) (10/22/23 0704)  Pulse: 99 (10/22/23 0943)  Resp: 18 (10/22/23 0704)  BP: (!) 126/56 (10/22/23 0704)  SpO2: 98 % (10/22/23 0704) Vital Signs (24h Range):  Temp:  [97.6 °F (36.4 °C)-98.6 °F (37 °C)] 98.1 °F (36.7 °C)  Pulse:  [] 99  Resp:  [17-19] 18  SpO2:  [96 %-98 %] 98 %  BP: (118-170)/(54-76) 126/56     Weight: 56 kg (123 lb 7.3 oz) (10/21/23 2356)  Body mass index is 24.94 kg/m².       Physical Exam  Vitals reviewed.   Constitutional:       General: She is not in acute distress.     Appearance: She is well-developed.   HENT:      Head: Normocephalic and atraumatic.      Mouth/Throat:      Pharynx: No oropharyngeal exudate.   Eyes:      General: Scleral icterus present.         Right eye: No discharge.         Left eye: No discharge.      Pupils: Pupils are equal, round, and reactive to light.   Pulmonary:      Effort: Pulmonary effort is normal. No respiratory distress.      Breath sounds: Normal breath sounds. No wheezing.   Abdominal:      General: There is no distension.      Palpations: Abdomen is soft.      Tenderness: There is no abdominal tenderness.   Musculoskeletal:      Right lower leg: No edema.      Left lower leg: No edema.   Skin:     Coloration: Skin is  jaundiced.   Neurological:      Mental Status: She is alert.      Comments: Alert partially follows basic commands, unable to verbally communicate and appears confused   Psychiatric:         Behavior: Behavior normal.            MELD 3.0: 24 at 10/22/2023  5:14 AM  MELD-Na: 21 at 10/22/2023  5:14 AM  Calculated from:  Serum Creatinine: 0.8 mg/dL (Using min of 1 mg/dL) at 10/22/2023  5:14 AM  Serum Sodium: 142 mmol/L (Using max of 137 mmol/L) at 10/22/2023  5:14 AM  Total Bilirubin: 5.7 mg/dL at 10/22/2023  5:14 AM  Serum Albumin: 2.3 g/dL at 10/22/2023  5:14 AM  INR(ratio): 2.0 at 10/22/2023  5:14 AM  Age at listing (hypothetical): 58 years  Sex: Female at 10/22/2023  5:14 AM      Significant Labs:  Labs within the past month have been reviewed.    Significant Imaging:  N/a

## 2023-10-22 NOTE — PT/OT/SLP PROGRESS
Speech Language Pathology Treatment    Patient Name:  Mara Mojica   MRN:  [id number]  Admitting Diagnosis: Decompensated hepatic cirrhosis    Recommendations:                 General Recommendations:  Dysphagia therapy  Diet recommendations:  NPO, Liquid Diet Level: NPO   Aspiration Precautions: Frequent oral care and Strict aspiration precautions   General Precautions: Standard, aspiration  Communication strategies:  yes/no questions only, provide increased time to answer, and go to room if call light pushed    Assessment:     Mara Mojica is a 58 y.o. female who remains nonverbal and remains a risk for aspiration.  Pt with decreased tolerance of her secretions and coughed/choked x2 on ice chip.  No other consistencies attempted.  Pt recommended to remain NPO at this time and S.T. to continue.    Subjective     Pt has yanker suction in mouth when S.T. entered room   Patient goals:       Pain/Comfort:   0/10    Respiratory Status:  see medical chart     Objective:     Has the patient been evaluated by SLP for swallowing?    yes  Keep patient NPO?   yes  Current Respiratory Status:   see medical chart     Dysphagia Tx. Pt remains nonverbal. She followed a few motor commands but unable to imitate or initiate any verbalizations. Oral care also completed and recommended to be continued.     Goals:   Multidisciplinary Problems       SLP Goals          Problem: SLP    Goal Priority Disciplines Outcome   SLP Goal     SLP Ongoing, Not Progressing   Description: 1. Pt will consume least restrictive diet with ongoing swallowing assessment as mentation improves    2. Pt will communicate basic wants and needs with 100% intelligibility                        Plan:     Patient to be seen:  2 x/week, 3 x/week   Plan of Care expires:  10/25/23  Plan of Care reviewed with:  patient   SLP Follow-Up:  Yes       Discharge recommendations:  Moderate Intensity Therapy (Pending medical improvement)   Barriers to Discharge:  Level  of Skilled Assistance Needed   and Safety Awareness      Time Tracking:     SLP Treatment Date:    10/22/23  Speech Start Time:   1455  Speech Stop Time:     1545   Speech Total Time (min):   20 minutes     Billable Minutes: Total Time 20     10/22/2023

## 2023-10-22 NOTE — PLAN OF CARE
Pt found L SL upon PT arrival. Pt used hand singals (thumbs up) and blinking once for yes and twice for no to communicate with PT throughout session. Pt was assisted to EOB with max A. Once seated EOB, pt sat EOB for 20 minutes with min A for steadying balance and bouts of S assist. While sitting EOB, pt was assisted in weight shifting activities through B Ues as well as lower extremity active assisted exercise of LAQ, marches, ankle pumps, and hip ab/adduction. Pt was then assisted back to bed with max A and required max A for returning to L SL in bed.

## 2023-10-22 NOTE — PT/OT/SLP PROGRESS
Physical Therapy Treatment    Patient Name:  Mara Mojica   MRN:  [id number]    Recommendations:     Discharge Recommendations: Moderate Intensity Therapy  Discharge Equipment Recommendations: to be determined by next level of care  Barriers to discharge: None    Assessment:     Mara Mojica is a 58 y.o. female admitted with a medical diagnosis of Decompensated hepatic cirrhosis.  She presents with the following impairments/functional limitations: weakness, decreased upper extremity function, decreased ROM, gait instability, impaired balance, impaired endurance, decreased lower extremity function, impaired functional mobility, decreased coordination, decreased safety awareness .    Rehab Prognosis: Fair; patient would benefit from acute skilled PT services to address these deficits and reach maximum level of function.    Recent Surgery: * No surgery found *      Plan:     During this hospitalization, patient to be seen 3 x/week to address the identified rehab impairments via gait training, therapeutic activities, therapeutic exercises, neuromuscular re-education and progress toward the following goals:    Plan of Care Expires:  11/01/23    Subjective     Chief Complaint: pt verbalizes no complaints at this time   Patient/Family Comments/goals: pt indicates that her goal is to be able to sit up today   Pain/Comfort:         Objective:     Communicated with ELLE Pino prior to session.  Patient found right sidelying with peripheral IV, telemetry, NG tube upon PT entry to room.     General Precautions: Standard, aspiration, fall  Orthopedic Precautions: N/A  Braces: N/A  Respiratory Status: Room air     Functional Mobility:  Bed Mobility:     Scooting: maximal assistance  Supine to Sit: maximal assistance  Sit to Supine: maximal assistance      AM-PAC 6 CLICK MOBILITY  Turning over in bed (including adjusting bedclothes, sheets and blankets)?: 2  Sitting down on and standing up from a chair with arms (e.g.,  wheelchair, bedside commode, etc.): 1  Moving from lying on back to sitting on the side of the bed?: 2  Moving to and from a bed to a chair (including a wheelchair)?: 1  Need to walk in hospital room?: 1  Climbing 3-5 steps with a railing?: 1  Basic Mobility Total Score: 8       Treatment & Education:  Pt found L SL upon PT arrival. Pt used hand singals (thumbs up) and blinking once for yes and twice for no to communicate with PT throughout session. Pt was assisted to EOB with max A. Once seated EOB, pt sat EOB for 20 minutes with min A for steadying balance and bouts of S assist. While sitting EOB, pt was assisted in weight shifting activities through B Ues as well as lower extremity active assisted exercise of LAQ, marches, ankle pumps, and hip ab/adduction. Pt was then assisted back to bed with max A and required max A for returning to L SL in bed.     Patient left right sidelying with all lines intact, call button in reach, and RN notified..    GOALS:   Multidisciplinary Problems       Physical Therapy Goals          Problem: Physical Therapy    Goal Priority Disciplines Outcome Goal Variances Interventions   Physical Therapy Goal     PT, PT/OT Ongoing, Progressing     Description: Goals to be met by 11/1/23.  1. Pt will complete bed mobility SBA.  2. Pt will complete sit to stand CGA.  3. Pt will ambulate 100ft CGA using RW.  4. Pt will increase AMPAC score by 2 points to progress functional mobility.                       Time Tracking:     PT Received On: 10/22/23  PT Start Time: 0840     PT Stop Time: 0905  PT Total Time (min): 25 min     Billable Minutes: Therapeutic Activity 25    Treatment Type: Treatment  PT/PTA: PT     Number of PTA visits since last PT visit: 0     10/22/2023

## 2023-10-22 NOTE — PROGRESS NOTES
'[facility] - Telemetry [facility]\"")  Hepatology  Progress Note    Patient Name: Mara Mojica  MRN: [id number]  Admission Date: 10/18/2023  Hospital Length of Stay: 4 days  Attending Provider: Hermila Grimes MD   Primary Care Physician: Roque, Osiris, NP  Principal Problem:Decompensated hepatic cirrhosis    Subjective:     Transplant status: No    Interval History: Patient more alert and interactive but still not able to verbally communicate and difficult to fully assess comprehension. Ammonia is normal and patient has continued to have large volume BM. No acute issues.    Current Facility-Administered Medications   Medication    cefTRIAXone (ROCEPHIN) 1 g in dextrose 5 % in water (D5W) 100 mL IVPB (MB+)    folic acid-vit B6-vit B12 2.5-25-2 mg tablet 1 tablet    lactulose 10 gram/15 mL enema solution (inpatient use) 200 g    multivitamin tablet    ondansetron injection 4 mg    rifAXIMin tablet 550 mg    sodium chloride 0.9% bolus 500 mL 500 mL    sodium chloride 0.9% flush 10 mL    thiamine tablet 100 mg       Objective:     Vital Signs (Most Recent):  Temp: 98.1 °F (36.7 °C) (10/22/23 0704)  Pulse: 99 (10/22/23 0943)  Resp: 18 (10/22/23 0704)  BP: (!) 126/56 (10/22/23 0704)  SpO2: 98 % (10/22/23 0704) Vital Signs (24h Range):  Temp:  [97.6 °F (36.4 °C)-98.6 °F (37 °C)] 98.1 °F (36.7 °C)  Pulse:  [] 99  Resp:  [17-19] 18  SpO2:  [96 %-98 %] 98 %  BP: (118-170)/(54-76) 126/56     Weight: 56 kg (123 lb 7.3 oz) (10/21/23 2356)  Body mass index is 24.94 kg/m².       Physical Exam  Vitals reviewed.   Constitutional:       General: She is not in acute distress.     Appearance: She is well-developed.   HENT:      Head: Normocephalic and atraumatic.      Mouth/Throat:      Pharynx: No oropharyngeal exudate.   Eyes:      General: Scleral icterus present.         Right eye: No discharge.         Left eye: No discharge.      Pupils: Pupils are equal, round, and reactive to light.   Pulmonary:      Effort:  Pulmonary effort is normal. No respiratory distress.      Breath sounds: Normal breath sounds. No wheezing.   Abdominal:      General: There is no distension.      Palpations: Abdomen is soft.      Tenderness: There is no abdominal tenderness.   Musculoskeletal:      Right lower leg: No edema.      Left lower leg: No edema.   Skin:     Coloration: Skin is jaundiced.   Neurological:      Mental Status: She is alert.      Comments: Alert partially follows basic commands, unable to verbally communicate and appears confused   Psychiatric:         Behavior: Behavior normal.            MELD 3.0: 24 at 10/22/2023  5:14 AM  MELD-Na: 21 at 10/22/2023  5:14 AM  Calculated from:  Serum Creatinine: 0.8 mg/dL (Using min of 1 mg/dL) at 10/22/2023  5:14 AM  Serum Sodium: 142 mmol/L (Using max of 137 mmol/L) at 10/22/2023  5:14 AM  Total Bilirubin: 5.7 mg/dL at 10/22/2023  5:14 AM  Serum Albumin: 2.3 g/dL at 10/22/2023  5:14 AM  INR(ratio): 2.0 at 10/22/2023  5:14 AM  Age at listing (hypothetical): 58 years  Sex: Female at 10/22/2023  5:14 AM      Significant Labs:  Labs within the past month have been reviewed.    Significant Imaging:  N/a    Assessment/Plan:     GI  * Decompensated alcoholic hepatic cirrhosis  Encephalopathy and MELD worse than her baseline possible from UTI, no other clear precipitant.  -Continue to monitor MELD score  -Meld is high but lower than median for liver transplantation, but if patient continues with encephalopathy may consider tranfer to [facility] for additional eval but would like to see if additional neuro eval and lactulose make a difference. Will discuss with the team.    MELD 3.0: 24 at 10/22/2023  5:14 AM  MELD-Na: 21 at 10/22/2023  5:14 AM  Calculated from:  Serum Creatinine: 0.8 mg/dL (Using min of 1 mg/dL) at 10/22/2023  5:14 AM  Serum Sodium: 142 mmol/L (Using max of 137 mmol/L) at 10/22/2023  5:14 AM  Total Bilirubin: 5.7 mg/dL at 10/22/2023  5:14 AM  Serum Albumin: 2.3 g/dL at 10/22/2023  5:14  AM  INR(ratio): 2.0 at 10/22/2023  5:14 AM  Age at listing (hypothetical): 58 years  Sex: Female at 10/22/2023  5:14 AM        Acute hepatic encephalopathy  Concern for hepatic encephalopathy but it is unusual to have this degree with now ammonia elevation and multiple lactulose enemas with large volume BMs. Communicated with hospital med and neuro to continue to look for other etiologies.  -I would like to discuss with neurology once they have seen her   -Agree with CT head and contrast  -Continue with lactulose enemas  -Monitor ammonia        Thank you for your consult. I will follow-up with patient. Please contact us if you have any additional questions.    Shannan Reardon MD  Hepatology  [facility] - Telemetry ([facility])

## 2023-10-22 NOTE — ASSESSMENT & PLAN NOTE
Concern for hepatic encephalopathy but it is unusual to have this degree with minimal ammonia elevation and no clinical change despite multiple lactulose enemas with large volume BMs.   -Plan for CT head with contrast  -Continue with lactulose enemas  -Monitor ammonia    CT head with contrast was negative for acute findings   Patient more alert, following simple commands but still not able to verbally communicate.   Ammonia is normal   Awaiting input from Neurology  Hepatology following closely

## 2023-10-22 NOTE — PROGRESS NOTES
[facility] - Telemetry ([facility])  Neurology  Progress Note    Patient Name: Mara Mojica  MRN: [id number]  Admission Date: 10/18/2023  Hospital Length of Stay: 4 days  Code Status: Full Code   Attending Provider: Hermila Grimes MD  Primary Care Physician: Osiris Nevarez NP   Principal Problem:Decompensated hepatic cirrhosis    Subjective:     Interval History: The patient seems to be improving, now more alert and following commands better, although still not articulating.  Repeat CT brain with contrast did not show any changes.  Prior work up has included CT brain without contrast, MRI brain without contrast, repeated labs, and EEG, which was low voltage, generally slow without triphasic waves.        Current Neurological Medications: See list below    Current Facility-Administered Medications   Medication Dose Route Frequency Provider Last Rate Last Admin    cefTRIAXone (ROCEPHIN) 1 g in dextrose 5 % in water (D5W) 100 mL IVPB (MB+)  1 g Intravenous Q24H Naveed Faith MD   Stopped at 10/21/23 1640    folic acid-vit B6-vit B12 2.5-25-2 mg tablet 1 tablet  1 tablet Oral Daily Naveed Faith MD   1 tablet at 10/22/23 0915    lactulose 10 gram/15 mL enema solution (inpatient use) 200 g  200 g Rectal TID Shannan Reardon MD   200 g at 10/21/23 2119    multivitamin tablet  1 tablet Oral Daily Naveed Faith MD   1 tablet at 10/22/23 0915    ondansetron injection 4 mg  4 mg Intravenous Q12H PRN Raisa Carnes, NP        rifAXIMin tablet 550 mg  550 mg Oral BID Naveed Faith MD   550 mg at 10/22/23 0915    sodium chloride 0.9% bolus 500 mL 500 mL  500 mL Intravenous Continuous PRN Raisa Carnes, NP        sodium chloride 0.9% flush 10 mL  10 mL Intravenous PRN Raisa Carnes, NP        thiamine tablet 100 mg  100 mg Oral Daily Naveed Faith MD   100 mg at 10/22/23 0915       Review of Systems  ROS not possible due to speech difficulty    Objective:     Vital Signs (Most Recent):  Temp: 98.1 °F (36.7  °C) (10/22/23 0704)  Pulse: 96 (10/22/23 1100)  Resp: 18 (10/22/23 0704)  BP: (!) 126/56 (10/22/23 0704)  SpO2: 98 % (10/22/23 0704) Vital Signs (24h Range):  Temp:  [97.6 °F (36.4 °C)-98.1 °F (36.7 °C)] 98.1 °F (36.7 °C)  Pulse:  [] 96  Resp:  [17-19] 18  SpO2:  [96 %-98 %] 98 %  BP: (122-170)/(56-76) 126/56     Weight: 56 kg (123 lb 7.3 oz)  Body mass index is 24.94 kg/m².    Physical Exam  Neck:  Supple, without evidence of nuchal rigidity or obvious discomfort  Mental Status:  Alert, attentive to environment.  Follows one step commands to squeeze hand, move feet, close eyes, give thumbs up.  Does not articulate  Cranial Nerves:  EOM intact without nystagmus.  Pupils reactive and equal.  Intact facial movement, upper and lower face. Uvula is midline.  Tongue protrudes in midline.  Motor:  Gives thumbs up, follows commands to move both upper and lower extremities, but with generalized weakness  Reflexes:  Stretch reflexes are hypoactive bilaterally.  Plantar stimulation is flexor       Significant Labs: CBC:   Recent Labs   Lab 10/21/23  0454 10/22/23  0514   WBC 6.48 6.41   HGB 8.1* 7.6*   HCT 25.4* 24.6*   PLT 72* 78*     CMP:   Recent Labs   Lab 10/21/23  0454 10/22/23  0514   GLU 73 71    142   K 3.7 3.7   * 114*   CO2 18* 17*   BUN 16 18   CREATININE 0.8 0.8   CALCIUM 8.5* 8.9   PROT 5.8* 5.6*   ALBUMIN 2.3* 2.3*   BILITOT 7.2* 5.7*   ALKPHOS 84 79   * 85*   ALT 46* 43   ANIONGAP 11 11     All pertinent lab results from the past 24 hours have been reviewed.    Significant Imaging: I have reviewed all pertinent imaging results/findings within the past 24 hours.    Assessment and Plan:     ASSESSMENT  The history and findings continue to most consistent with a toxic/metabolic encephalopathy.  She is improving with current care, and today is more alert and more cooperative for the exam.    RECOMMENDATIONS   Continue current level of care  Consideration was given to LP, but risk probably  outweighs benefits.      Active Diagnoses:    Diagnosis Date Noted POA    PRINCIPAL PROBLEM:  Decompensated alcoholic hepatic cirrhosis [K72.90, K74.60] 09/06/2023 Yes    Fracture of right superior pubic ramus, closed, initial encounter [S32.511A] 10/20/2023 Yes    Anasarca [R60.1] 10/19/2023 No    Acute hepatic encephalopathy [K76.82] 10/18/2023 Yes    Generalized weakness [R53.1] 10/18/2023 Yes    Coagulopathy [D68.9] 10/18/2023 Yes    Non-traumatic rhabdomyolysis [M62.82] 10/18/2023 Yes      Problems Resolved During this Admission:    Diagnosis Date Noted Date Resolved POA    Stroke-like symptoms [R29.90] 10/18/2023 10/20/2023 Yes    SAMRA (acute kidney injury) [N17.9] 10/18/2023 10/21/2023 Yes       VTE Risk Mitigation (From admission, onward)           Ordered     IP VTE HIGH RISK PATIENT  Once         10/18/23 0645     Place sequential compression device  Until discontinued         10/18/23 0645     Reason for No Pharmacological VTE Prophylaxis  Once        Question:  Reasons:  Answer:  Risk of Bleeding    10/18/23 0645                    Alexsander Claros MD  Neurology  [facility] - Telemetry ([facility])     10

## 2023-10-22 NOTE — PROGRESS NOTES
[facility] Medicine  Progress Note    Patient Name: Mara Mojica  MRN: [id number]  Patient Class: IP- Inpatient   Admission Date: 10/18/2023  Length of Stay: 4 days  Attending Physician: Hermila Grimes MD  Primary Care Provider: Osiris Nevarez NP        Subjective:     Principal Problem:Decompensated hepatic cirrhosis        HPI:  The patient is a 57 yo female with Alcoholic cirrhosis with ascites who presented to ED with AMS. Pt's significant other reports while at work, he tried calling the pt all after noon, but she did not answer the phone. When he got home from work at 1pm, he found the pt on the floor confused with slurred speech laying on a pillow with blood stains and surrounded by soiled blankets with urine and feces and paper towels with blood. Pt can not recall all the events, however, she states she was laying on the couch and moved to the floor because she soiled on herself. She denies any falls or seizures and states that she just wanted to lay down on the floor. She reports she was too weak to go to her bedroom or to the bathroom. She states she cut her tongue on her teeth (due to poor dentition). She denies LOC. However, the S.O. states that the pt was very confused on his arrival to the home. Confusion has improved since arrival to ED. Pt denies any alcohol intake or substance use. Last alcohol drink was 7/2023.   In the ED, the pt was also noted to have a left facial droop and reported numbness to to her mouth and tongue with concern for stroke.     Of note, the pt was recently hospitalized with SBO and SAMRA. SBO resolved with conservative treatment. Pt then left AMA. Serum Cr was 2.8 on discharge.     In the ED, Afebrile, BP stable. Mild tachycardia noted. Labs showed Hgb 8.5, Platelets 66, Serum Cr 1.4, T BIli 2.7, AST 96, ALT 47, , , Ammonia normal INR 2.0. UA +nitrites, no WBCs. ABGs PH 7.5, pCO2 27, pHCO3 22. CT head showed nothing acute -per  STAT RAD.       Overview/Hospital Course:  57 yo female with Alcoholic cirrhosis with ascites, s/p Ex Lap with bowel resection in the past for incarcerated hernia, recent SBO with SAMRA, who presented to ED with AMS. Per ER, pt's  came home from work and found her on the floor with her head on a blood-stained pillow and some slurred speech, surrounded by soiled blankets with urine and feces. Per EMS, pt was ambulatory at the scene. The pt told the paramedics that she was on the floor for about 3 hours. In the ER, the pt is oriented x3 (contrary to triage note). She also c/o numbness to the entire tongue and lips which onset 3 hours PTA. She denies any falls or seizures. Pt can not recall all the events, however, she states she was laying on the couch and moved to the floor because she soiled on herself. She reports she was too weak to go to her bedroom or to the bathroom. She states she cut her tongue on her teeth (due to poor dentition). She denies LOC. However, the S.O. states that the pt was very confused on his arrival to the home. Confusion has improved since arrival to ED. Pt denies any alcohol intake or substance use. Last alcohol drink was 7/2023.      In the ED, the pt was also noted to have a left facial droop and reported numbness to to her mouth and tongue with concern for stroke.      Of note, the pt was recently hospitalized with SBO and SAMRA. SBO resolved with conservative treatment. Pt then left AMA. Serum Cr was 2.8 on discharge.      In the ED, Afebrile, BP stable. Mild tachycardia noted. Labs showed Hgb 8.5, Platelets 66, Serum Cr 1.4, T BIli 2.7, AST 96, ALT 47, , , Ammonia normal. INR 2, UA +nitrites, 13 WBCs. ABGs PH 7.5, pCO2 27, pO2 51, HCO3 22. CT head showed nothing acute.     She was placed in Obs under Hosp Med for AMS r/o CVA, UTI, possible Hep Encephalopathy. Hepatology evaluated the pt. Recommended Paracentesis to r/o SBP and cont Lactulose, Rifaximin. Await  paracentesis and MRI Brain. Start Rocephin.     US Abd- no ascites. MRI Brain done but not read yet- appears normal to me, likely has HE and UTI-  will cont Lactulose and Rifaximin.  Evaluated by PT/OT/ST.     10/19- Appreciate Hepatology and Neurology: remains mostly unable to speak but appropriately says yes or no. Was not swallowing anything yesterday but finally gave her her lactulose and Rifaximin with applesauce, now improving. Her boyfriend is with her which helped. Looks calmer, less tremulous. EEG pending. Urine Cx growing E coli- getting Rocephin. Dr. Jackson worries that she will soon need Liver Tx. Cont present care.    10/20- not looking good, lethargic, eyes open but non responsive. No BM since yesterday, no meds given since last night. Ammonia 52. NGT placed but it clogged- hence Dr. Shannan Reardon ordered Lactulose enema and she had a huge BM later.  Cont present care. NGT replaced, will give lactulose thru the NGT.     10/21/23-Still not following commands. She seems alert but unable to communicate. Nursing staff reports several BMs. Plan for CT brain with contrast today. If no improvement by tomorrow will get LP.     10/22/23-Patient more alert, following simple commands but still not able to verbally communicate. Ammonia is normal and patient has continued to have large volume BMs. Awaiting input from Neurology. Hepatology following closely.       Interval History: No acute events over noted.     Review of Systems   Unable to perform ROS: Mental status change     Objective:     Vital Signs (Most Recent):  Temp: 98.1 °F (36.7 °C) (10/22/23 0704)  Pulse: 99 (10/22/23 0943)  Resp: 18 (10/22/23 0704)  BP: (!) 126/56 (10/22/23 0704)  SpO2: 98 % (10/22/23 0704) Vital Signs (24h Range):  Temp:  [97.6 °F (36.4 °C)-98.6 °F (37 °C)] 98.1 °F (36.7 °C)  Pulse:  [] 99  Resp:  [17-19] 18  SpO2:  [96 %-98 %] 98 %  BP: (118-170)/(54-76) 126/56     Weight: 56 kg (123 lb 7.3 oz)  Body mass index is 24.94  kg/m².    Intake/Output Summary (Last 24 hours) at 10/22/2023 1037  Last data filed at 10/22/2023 0814  Gross per 24 hour   Intake 596.93 ml   Output --   Net 596.93 ml         Physical Exam  Vitals reviewed.   Constitutional:       General: She is not in acute distress.     Appearance: She is well-developed.   HENT:      Head: Normocephalic and atraumatic.      Mouth/Throat:      Pharynx: No oropharyngeal exudate.   Eyes:      General: Scleral icterus present.         Right eye: No discharge.         Left eye: No discharge.      Pupils: Pupils are equal, round, and reactive to light.   Pulmonary:      Effort: Pulmonary effort is normal. No respiratory distress.      Breath sounds: Normal breath sounds. No wheezing.   Abdominal:      General: There is no distension.      Palpations: Abdomen is soft.      Tenderness: There is no abdominal tenderness.   Musculoskeletal:      Right lower leg: No edema.      Left lower leg: No edema.   Skin:     Coloration: Skin is jaundiced.   Neurological:      Mental Status: She is alert.      Comments: Alert follows simple commands, unable to verbally communicate. Not sure if she is comprehending.    Psychiatric:         Behavior: Behavior normal.             Significant Labs: All pertinent labs within the past 24 hours have been reviewed.  BMP:   Recent Labs   Lab 10/22/23  0514   GLU 71      K 3.7   *   CO2 17*   BUN 18   CREATININE 0.8   CALCIUM 8.9     CBC:   Recent Labs   Lab 10/21/23  0454 10/22/23  0514   WBC 6.48 6.41   HGB 8.1* 7.6*   HCT 25.4* 24.6*   PLT 72* 78*     CMP:   Recent Labs   Lab 10/21/23  0454 10/22/23  0514    142   K 3.7 3.7   * 114*   CO2 18* 17*   GLU 73 71   BUN 16 18   CREATININE 0.8 0.8   CALCIUM 8.5* 8.9   PROT 5.8* 5.6*   ALBUMIN 2.3* 2.3*   BILITOT 7.2* 5.7*   ALKPHOS 84 79   * 85*   ALT 46* 43   ANIONGAP 11 11     Coagulation:   Recent Labs   Lab 10/22/23  0514   INR 2.0*       Significant Imaging: I have reviewed all  pertinent imaging results/findings within the past 24 hours.      Assessment/Plan:      * Decompensated alcoholic hepatic cirrhosis  Liver continues to decompensate   Consult hepatology     MELD 3.0: 24 at 10/22/2023  5:14 AM  MELD-Na: 21 at 10/22/2023  5:14 AM  Calculated from:  Serum Creatinine: 0.8 mg/dL (Using min of 1 mg/dL) at 10/22/2023  5:14 AM  Serum Sodium: 142 mmol/L (Using max of 137 mmol/L) at 10/22/2023  5:14 AM  Total Bilirubin: 5.7 mg/dL at 10/22/2023  5:14 AM  Serum Albumin: 2.3 g/dL at 10/22/2023  5:14 AM  INR(ratio): 2.0 at 10/22/2023  5:14 AM  Age at listing (hypothetical): 58 years  Sex: Female at 10/22/2023  5:14 AM    Ammonia level normal   Continues to have large BMs.  Cont Lactulose, Rifaximin        Fracture of right superior pubic ramus, closed, initial encounter  Ortho following- no further intervention  Follow-up outpatient       Non-traumatic rhabdomyolysis      Resolved       Coagulopathy  INR-2.0  stable    Generalized weakness  PT/OT/ST  Turn q 2 hour       Acute hepatic encephalopathy  Concern for hepatic encephalopathy but it is unusual to have this degree with minimal ammonia elevation and no clinical change despite multiple lactulose enemas with large volume BMs.   -Plan for CT head with contrast  -Continue with lactulose enemas  -Monitor ammonia    CT head with contrast was negative for acute findings   Patient more alert, following simple commands but still not able to verbally communicate.   Ammonia is normal   Awaiting input from Neurology  Hepatology following closely      VTE Risk Mitigation (From admission, onward)         Ordered     IP VTE HIGH RISK PATIENT  Once         10/18/23 0645     Place sequential compression device  Until discontinued         10/18/23 0645     Reason for No Pharmacological VTE Prophylaxis  Once        Question:  Reasons:  Answer:  Risk of Bleeding    10/18/23 0645                Discharge Planning   ASHELY:      Code Status: Full Code   Is  the patient medically ready for discharge?:     Reason for patient still in hospital (select all that apply): Patient trending condition, Laboratory test, Treatment and Consult recommendations                     Rachell De La Paz NP  Department of Hospital Medicine   [facility] ([facility])

## 2023-10-22 NOTE — ASSESSMENT & PLAN NOTE
Liver continues to decompensate   Consult hepatology     MELD 3.0: 24 at 10/22/2023  5:14 AM  MELD-Na: 21 at 10/22/2023  5:14 AM  Calculated from:  Serum Creatinine: 0.8 mg/dL (Using min of 1 mg/dL) at 10/22/2023  5:14 AM  Serum Sodium: 142 mmol/L (Using max of 137 mmol/L) at 10/22/2023  5:14 AM  Total Bilirubin: 5.7 mg/dL at 10/22/2023  5:14 AM  Serum Albumin: 2.3 g/dL at 10/22/2023  5:14 AM  INR(ratio): 2.0 at 10/22/2023  5:14 AM  Age at listing (hypothetical): 58 years  Sex: Female at 10/22/2023  5:14 AM    Ammonia level normal   Continues to have large BMs.  Cont Lactulose, Rifaximin

## 2023-10-23 LAB
ALBUMIN SERPL BCP-MCNC: 2.3 G/DL (ref 3.5–5.2)
ALP SERPL-CCNC: 73 U/L (ref 55–135)
ALT SERPL W/O P-5'-P-CCNC: 44 U/L (ref 10–44)
AMMONIA PLAS-SCNC: 28 UMOL/L (ref 10–50)
ANION GAP SERPL CALC-SCNC: 6 MMOL/L (ref 8–16)
AST SERPL-CCNC: 79 U/L (ref 10–40)
BACTERIA BLD CULT: NORMAL
BASOPHILS # BLD AUTO: 0.09 K/UL (ref 0–0.2)
BASOPHILS NFR BLD: 1.3 % (ref 0–1.9)
BILIRUB SERPL-MCNC: 5 MG/DL (ref 0.1–1)
BUN SERPL-MCNC: 21 MG/DL (ref 6–20)
CALCIUM SERPL-MCNC: 8.7 MG/DL (ref 8.7–10.5)
CHLORIDE SERPL-SCNC: 116 MMOL/L (ref 95–110)
CO2 SERPL-SCNC: 20 MMOL/L (ref 23–29)
CREAT SERPL-MCNC: 0.8 MG/DL (ref 0.5–1.4)
DIFFERENTIAL METHOD: ABNORMAL
EOSINOPHIL # BLD AUTO: 0.1 K/UL (ref 0–0.5)
EOSINOPHIL NFR BLD: 1.6 % (ref 0–8)
ERYTHROCYTE [DISTWIDTH] IN BLOOD BY AUTOMATED COUNT: 15.3 % (ref 11.5–14.5)
EST. GFR  (NO RACE VARIABLE): >60 ML/MIN/1.73 M^2
GLUCOSE SERPL-MCNC: 79 MG/DL (ref 70–110)
HCT VFR BLD AUTO: 25.3 % (ref 37–48.5)
HGB BLD-MCNC: 7.9 G/DL (ref 12–16)
IMM GRANULOCYTES # BLD AUTO: 0.12 K/UL (ref 0–0.04)
IMM GRANULOCYTES NFR BLD AUTO: 1.8 % (ref 0–0.5)
INR PPP: 2.2 (ref 0.8–1.2)
LYMPHOCYTES # BLD AUTO: 1.3 K/UL (ref 1–4.8)
LYMPHOCYTES NFR BLD: 19.4 % (ref 18–48)
MCH RBC QN AUTO: 33.2 PG (ref 27–31)
MCHC RBC AUTO-ENTMCNC: 31.2 G/DL (ref 32–36)
MCV RBC AUTO: 106 FL (ref 82–98)
MONOCYTES # BLD AUTO: 1 K/UL (ref 0.3–1)
MONOCYTES NFR BLD: 14.8 % (ref 4–15)
NEUTROPHILS # BLD AUTO: 4.2 K/UL (ref 1.8–7.7)
NEUTROPHILS NFR BLD: 61.1 % (ref 38–73)
NRBC BLD-RTO: 1 /100 WBC
PLATELET # BLD AUTO: 69 K/UL (ref 150–450)
PMV BLD AUTO: 9.9 FL (ref 9.2–12.9)
POTASSIUM SERPL-SCNC: 4 MMOL/L (ref 3.5–5.1)
PROT SERPL-MCNC: 5.6 G/DL (ref 6–8.4)
PROTHROMBIN TIME: 22.9 SEC (ref 9–12.5)
RBC # BLD AUTO: 2.38 M/UL (ref 4–5.4)
SODIUM SERPL-SCNC: 142 MMOL/L (ref 136–145)
WBC # BLD AUTO: 6.81 K/UL (ref 3.9–12.7)

## 2023-10-23 PROCEDURE — 25500020 PHARM REV CODE 255: Mod: NTX | Performed by: FAMILY MEDICINE

## 2023-10-23 PROCEDURE — 99233 PR SUBSEQUENT HOSPITAL CARE,LEVL III: ICD-10-PCS | Mod: 95,NTX,, | Performed by: INTERNAL MEDICINE

## 2023-10-23 PROCEDURE — 84591 ASSAY OF NOS VITAMIN: CPT | Mod: NTX | Performed by: FAMILY MEDICINE

## 2023-10-23 PROCEDURE — 63600175 PHARM REV CODE 636 W HCPCS: Mod: NTX | Performed by: EMERGENCY MEDICINE

## 2023-10-23 PROCEDURE — 21400001 HC TELEMETRY ROOM: Mod: NTX

## 2023-10-23 PROCEDURE — 85610 PROTHROMBIN TIME: CPT | Mod: NTX | Performed by: FAMILY MEDICINE

## 2023-10-23 PROCEDURE — 36415 COLL VENOUS BLD VENIPUNCTURE: CPT | Mod: NTX | Performed by: FAMILY MEDICINE

## 2023-10-23 PROCEDURE — 25000003 PHARM REV CODE 250: Mod: NTX | Performed by: EMERGENCY MEDICINE

## 2023-10-23 PROCEDURE — 97530 THERAPEUTIC ACTIVITIES: CPT | Mod: NTX

## 2023-10-23 PROCEDURE — 99233 SBSQ HOSP IP/OBS HIGH 50: CPT | Mod: 95,NTX,, | Performed by: INTERNAL MEDICINE

## 2023-10-23 PROCEDURE — 80053 COMPREHEN METABOLIC PANEL: CPT | Mod: NTX | Performed by: FAMILY MEDICINE

## 2023-10-23 PROCEDURE — 82140 ASSAY OF AMMONIA: CPT | Mod: NTX | Performed by: FAMILY MEDICINE

## 2023-10-23 PROCEDURE — A9585 GADOBUTROL INJECTION: HCPCS | Mod: NTX | Performed by: FAMILY MEDICINE

## 2023-10-23 PROCEDURE — 25000003 PHARM REV CODE 250: Mod: NTX | Performed by: INTERNAL MEDICINE

## 2023-10-23 PROCEDURE — 85025 COMPLETE CBC W/AUTO DIFF WBC: CPT | Mod: NTX | Performed by: FAMILY MEDICINE

## 2023-10-23 RX ORDER — GADOBUTROL 604.72 MG/ML
10 INJECTION INTRAVENOUS
Status: COMPLETED | OUTPATIENT
Start: 2023-10-23 | End: 2023-10-23

## 2023-10-23 RX ADMIN — RIFAXIMIN 550 MG: 550 TABLET ORAL at 08:10

## 2023-10-23 RX ADMIN — LACTULOSE 200 G: 10 SOLUTION ORAL at 08:10

## 2023-10-23 RX ADMIN — GADOBUTROL 5 ML: 604.72 INJECTION INTRAVENOUS at 06:10

## 2023-10-23 RX ADMIN — THERA TABS 1 TABLET: TAB at 08:10

## 2023-10-23 RX ADMIN — Medication 1 TABLET: at 08:10

## 2023-10-23 RX ADMIN — RIFAXIMIN 550 MG: 550 TABLET ORAL at 09:10

## 2023-10-23 RX ADMIN — THIAMINE HCL TAB 100 MG 100 MG: 100 TAB at 08:10

## 2023-10-23 RX ADMIN — CEFTRIAXONE SODIUM 1 G: 1 INJECTION, POWDER, FOR SOLUTION INTRAMUSCULAR; INTRAVENOUS at 03:10

## 2023-10-23 RX ADMIN — LACTULOSE 200 G: 10 SOLUTION ORAL at 09:10

## 2023-10-23 NOTE — PROVIDER TRANSFER
Outside Transfer Acceptance Note / Regional Referral Center    Referring facility: [facility]   Referring provider: YESI ANDERSON  Accepting facility: [facility]  Accepting provider: YE PADRON  Reason for transfer: Higher Level of Care   Transfer diagnosis: Hepatic Encephalopathy  Transfer specialty requested: Hepatology  Transfer specialty notified: Yes  Transfer level: NUMBER 1-5: 2  Isolation status: No active isolations   Admission class or status: IP- Inpatient    Narrative     59 yo woman with alcoholic cirrhosis with ascites, s/p Ex Lap with bowel resection for incarcerated hernia, recent SBO with SAMRA, adm to [facility] on 10/18 with AMS.  Patient found on the floor confused with slurred speech with incontinence and with questionable tongue injury    On presentation /65, HR 95, RR 18, SpO2 96% (RA).  At that time the patient was alert and oriented.  There was left-sided facial weakness.  Otherwise neurologic exam was unremarkable (? )    Labs (10/18) WBC 6.2, Hb 8.5 (BL 10.1), Plts 66, INR 2.0, Na 136, K 4.3, BUN 16, Cr 1.5 (BL 1.0) bilirubin 6.4, AST 96, ALT 47, ammonia 42.  .  Alcohol not tested (<10 on 10/14) UTOX neg U/A WBC 13, bacteria rare. CTH and MRI brain neg for acute changes.    Patient evaluated by Neurology on 10/19.  Per neurology, at that time she was oriented to person place time and situation and attentive to her environment.  She was able to follow one and two-step commands.  Facial features were symmetrical.  Speech marked by dysarthria.  Most answers are yes/no.  Muscle testing of proximal and distal muscles of upper and LE showed diffuse generalized weakness WO focal or lateralizing findings.  No pathologic reflexes were noted.     Labs 10/18 WBC 6.2 > 6.8, Hb 8.5 > 7.9, Plts 66 > 69, INR > 2.2, Na 136 > 142 Cr 1.5 > 0.8, T bili 6.4 > 5.0, AST 96 > 79, ALT 47 > 44, Ammonia 42 > 28  Presently patient is lethargic and responding to  questions with one-word answers.      Her AMS is not readily attributed to HE with ammonia now 28 and CVA seems unlikely with neg MRI brain.  An MRI brain with contrast has been scheduled.  An EEG has not been done so seizures remain in the differential especially given her presentation.  Patient found on the floor with questionable tongue injury and incontinent of urine and feces.  Patient known to be drinking as recently as July 2023.  More recently patient has said that she is not drinking.  On admission alcohol was < 10.   A PEth has not been done    Dr Shannan YenJuancarlos) ESTELLA at [facility] consulted with Dr Renteria who recommended transfer to [facility].      Instructions      [facility]-  Admit to Hospital Medicine  Upon patient arrival to floor, please send SecureChat to [facility] or call extension [phone] (if no answer, do NOT leave a callback number after the beep, rather please send a SecureChat to [facility]), for Hospital Medicine admit team assignment and for additional admit orders for the patient.  Do not page the attending physician associated with the patient on arrival (this physician may not be on duty at the time of arrival).  Rather, always send a SecureChat to [facility] or call [phone] to reach the triage physician for orders and team assignment.

## 2023-10-23 NOTE — PT/OT/SLP PROGRESS
Physical Therapy Treatment    Patient Name:  Mara Mojica   MRN:  [id number]    Recommendations:     Discharge Recommendations: Moderate Intensity Therapy  Discharge Equipment Recommendations: to be determined by next level of care  Barriers to discharge: None    Assessment:     Mara Mojica is a 58 y.o. female admitted with a medical diagnosis of Decompensated hepatic cirrhosis.  She presents with the following impairments/functional limitations: weakness, impaired endurance, impaired functional mobility, gait instability, impaired balance, pain, decreased safety awareness, decreased lower extremity function, decreased coordination, impaired cardiopulmonary response to activity, edema, impaired cognition.    Rehab Prognosis: Fair; patient would benefit from acute skilled PT services to address these deficits and reach maximum level of function.    Recent Surgery: * No surgery found *      Plan:     During this hospitalization, patient to be seen 3 x/week to address the identified rehab impairments via gait training, therapeutic activities, therapeutic exercises, neuromuscular re-education and progress toward the following goals:    Plan of Care Expires:  11/01/23    Subjective     Chief Complaint: Pt is somnolent, difficulty following commands, difficult to arouse  Patient/Family Comments/goals: none stated  Pain/Comfort:  Pain Rating 1:  (none stated, no non-verbal indicators of pain)      Objective:     Communicated with nurse Pino and epic chart review prior to session.  Patient found right sidelying with peripheral IV, NG tube, telemetry upon PT entry to room.     General Precautions: Standard, aspiration, fall  Orthopedic Precautions: N/A  Braces: N/A  Respiratory Status: Room air     Functional Mobility:  Gait belt applied - N/A  Bed Mobility  Rolling Left: total assistance and of 2 persons  Rolling Right: total assistance and of 2 persons  Scooting: total assistance and of 2 persons  Supine to Sit:  "total assistance and of 2 persons for LE management and trunk management  Sit to Supine: total assistance and of 2 persons for LE management and trunk management  Transfers/Gait  Unable, will progress as appropriate  Balance  Sitting: total assistance  Tolerated sitting EOB 15min, presented with decreased trunk control, decreased head control. No active engagement from pt to keep balance, able to lift toes off ground when asked but no other active movement present    AM-PAC 6 CLICK MOBILITY  Turning over in bed (including adjusting bedclothes, sheets and blankets)?: 1  Sitting down on and standing up from a chair with arms (e.g., wheelchair, bedside commode, etc.): 1  Moving from lying on back to sitting on the side of the bed?: 1  Moving to and from a bed to a chair (including a wheelchair)?: 1  Need to walk in hospital room?: 1  Climbing 3-5 steps with a railing?: 1  Basic Mobility Total Score: 6       Treatment & Education:  Reviewed role of PT in acute care and POC. Pt tolerated interventions fair. Patient performed 1 set(s) of 10 repetitions of the following seated PROM: ankle pumps and long arc quads for bilateral LE. Patient required skilled PT for instruction of exercises and appropriate cues to perform exercises safely and appropriately. Reviewed importance of OOB/EOB activities, activity pacing, and HEP (marching/hip flex, hip abd, heel slides/LAQ, quad sets, ankle pumps) in order to maintain/regain strength. Reviewed "call don't fall" policy and increased risk of falling due to weakness, instructed to utilize call bell for assistance with all transfers. Pt not receptive to education at this time.       Patient left left sidelying with all lines intact, call button in reach, and nurse present..    GOALS:   Multidisciplinary Problems       Physical Therapy Goals          Problem: Physical Therapy    Goal Priority Disciplines Outcome Goal Variances Interventions   Physical Therapy Goal     PT, PT/OT Ongoing, " Progressing     Description: Goals to be met by 11/1/23.  1. Pt will complete bed mobility SBA.  2. Pt will complete sit to stand CGA.  3. Pt will ambulate 100ft CGA using RW.  4. Pt will increase AMPAC score by 2 points to progress functional mobility.                       Time Tracking:     PT Received On: 10/23/23  PT Start Time: 1140     PT Stop Time: 1205  PT Total Time (min): 25 min     Billable Minutes: Therapeutic Activity 25min    Treatment Type: Treatment  PT/PTA: PT     Number of PTA visits since last PT visit: 0     10/23/2023

## 2023-10-23 NOTE — ASSESSMENT & PLAN NOTE
Concern for hepatic encephalopathy but it is unusual to have this degree with minimal ammonia elevation and no clinical change despite multiple lactulose enemas with large volume BMs.   -Plan for CT head with contrast  -Continue with lactulose enemas  -Monitor ammonia    CT head with contrast was negative for acute findings   Patient more alert, following simple commands but still not able to verbally communicate.   Ammonia is normal   Neurology recommending further imaging  Hepatology recommending transfer for higher level of care

## 2023-10-23 NOTE — SUBJECTIVE & OBJECTIVE
Interval History: See hospital course for today      Review of Systems   Unable to perform ROS: Mental status change     Objective:     Vital Signs (Most Recent):  Temp: 98.8 °F (37.1 °C) (10/23/23 1214)  Pulse: 91 (10/23/23 1214)  Resp: 19 (10/23/23 1214)  BP: (!) 122/58 (10/23/23 1214)  SpO2: 98 % (10/23/23 1214) Vital Signs (24h Range):  Temp:  [97.5 °F (36.4 °C)-99 °F (37.2 °C)] 98.8 °F (37.1 °C)  Pulse:  [] 91  Resp:  [18-19] 19  SpO2:  [97 %-100 %] 98 %  BP: (102-125)/(52-61) 122/58     Weight: 55.5 kg (122 lb 5.7 oz)  Body mass index is 24.71 kg/m².    Intake/Output Summary (Last 24 hours) at 10/23/2023 1237  Last data filed at 10/23/2023 0714  Gross per 24 hour   Intake 583.24 ml   Output 0 ml   Net 583.24 ml         Physical Exam  Vitals and nursing note reviewed.   Constitutional:       General: She is sleeping. She is not in acute distress.     Appearance: She is ill-appearing. She is not toxic-appearing.   HENT:      Head: Normocephalic and atraumatic.   Cardiovascular:      Rate and Rhythm: Normal rate.   Pulmonary:      Effort: Pulmonary effort is normal. No respiratory distress.   Abdominal:      Palpations: Abdomen is soft.      Tenderness: There is no abdominal tenderness.   Musculoskeletal:         General: Swelling present.   Skin:     General: Skin is warm.      Coloration: Skin is jaundiced.   Neurological:      Mental Status: She is easily aroused. She is lethargic.      Motor: Weakness present.             Significant Labs: All pertinent labs within the past 24 hours have been reviewed.  CBC:   Recent Labs   Lab 10/22/23  0514 10/23/23  0813   WBC 6.41 6.81   HGB 7.6* 7.9*   HCT 24.6* 25.3*   PLT 78* 69*     CMP:   Recent Labs   Lab 10/22/23  0514 10/23/23  0813    142   K 3.7 4.0   * 116*   CO2 17* 20*   GLU 71 79   BUN 18 21*   CREATININE 0.8 0.8   CALCIUM 8.9 8.7   PROT 5.6* 5.6*   ALBUMIN 2.3* 2.3*   BILITOT 5.7* 5.0*   ALKPHOS 79 73   AST 85* 79*   ALT 43 44   ANIONGAP  11 6*       Significant Imaging: I have reviewed all pertinent imaging results/findings within the past 24 hours.  Mri with contrast brain pending

## 2023-10-23 NOTE — ASSESSMENT & PLAN NOTE
Liver continues to decompensate   Consult hepatology     MELD 3.0: 24 at 10/23/2023  8:13 AM  MELD-Na: 21 at 10/23/2023  8:13 AM  Calculated from:  Serum Creatinine: 0.8 mg/dL (Using min of 1 mg/dL) at 10/23/2023  8:13 AM  Serum Sodium: 142 mmol/L (Using max of 137 mmol/L) at 10/23/2023  8:13 AM  Total Bilirubin: 5.0 mg/dL at 10/23/2023  8:13 AM  Serum Albumin: 2.3 g/dL at 10/23/2023  8:13 AM  INR(ratio): 2.2 at 10/23/2023  8:13 AM  Age at listing (hypothetical): 58 years  Sex: Female at 10/23/2023  8:13 AM    Ammonia level normal   Continues to have large BMs.  Cont Lactulose, Rifaximin

## 2023-10-23 NOTE — PLAN OF CARE
Pt tolerated interventions fair. Required total A of 2 for bed mobility, total A for sitting balance, difficulty following commands. Recommending moderate intensity upon d/c.

## 2023-10-23 NOTE — ASSESSMENT & PLAN NOTE
Hepatology and neurology following  Mri brain pending  Transfer for higher level of care, transplant

## 2023-10-23 NOTE — PLAN OF CARE
Problem: Adult Inpatient Plan of Care  Goal: Patient-Specific Goal (Individualized)  Outcome: Ongoing, Progressing       Pt more lethargic today however nonverbal   Following one step commands   SR on tele   Bp stable   Afebrile   On room air   Hepatology following   Neuro following   Lactulose enemas continued, ammonia level trending down 28  Pt with 3 large BMs this shift   Incontinence care   Fall precautions in place   Avasys at bedside   Chart review completed   Pt to transfer to [facility]      Ok to keep current PIV order in place

## 2023-10-23 NOTE — PROGRESS NOTES
[facility] Medicine  Progress Note    Patient Name: Mara Mojica  MRN: [id number]  Patient Class: IP- Inpatient   Admission Date: 10/18/2023  Length of Stay: 5 days  Attending Physician: Junior Luna MD  Primary Care Provider: Osiris Nevarez NP        Subjective:     Principal Problem:Decompensated hepatic cirrhosis        HPI:  The patient is a 57 yo female with Alcoholic cirrhosis with ascites who presented to ED with AMS. Pt's significant other reports while at work, he tried calling the pt all after noon, but she did not answer the phone. When he got home from work at 1pm, he found the pt on the floor confused with slurred speech laying on a pillow with blood stains and surrounded by soiled blankets with urine and feces and paper towels with blood. Pt can not recall all the events, however, she states she was laying on the couch and moved to the floor because she soiled on herself. She denies any falls or seizures and states that she just wanted to lay down on the floor. She reports she was too weak to go to her bedroom or to the bathroom. She states she cut her tongue on her teeth (due to poor dentition). She denies LOC. However, the S.O. states that the pt was very confused on his arrival to the home. Confusion has improved since arrival to ED. Pt denies any alcohol intake or substance use. Last alcohol drink was 7/2023.   In the ED, the pt was also noted to have a left facial droop and reported numbness to to her mouth and tongue with concern for stroke.     Of note, the pt was recently hospitalized with SBO and SAMRA. SBO resolved with conservative treatment. Pt then left AMA. Serum Cr was 2.8 on discharge.     In the ED, Afebrile, BP stable. Mild tachycardia noted. Labs showed Hgb 8.5, Platelets 66, Serum Cr 1.4, T BIli 2.7, AST 96, ALT 47, , , Ammonia normal INR 2.0. UA +nitrites, no WBCs. ABGs PH 7.5, pCO2 27, pHCO3 22. CT head showed nothing acute -per STAT  RAD.       Overview/Hospital Course:  59 yo female with Alcoholic cirrhosis with ascites, s/p Ex Lap with bowel resection in the past for incarcerated hernia, recent SBO with SAMRA, who presented to ED with AMS. Per ER, pt's  came home from work and found her on the floor with her head on a blood-stained pillow and some slurred speech, surrounded by soiled blankets with urine and feces. Per EMS, pt was ambulatory at the scene. The pt told the paramedics that she was on the floor for about 3 hours. In the ER, the pt is oriented x3 (contrary to triage note). She also c/o numbness to the entire tongue and lips which onset 3 hours PTA. She denies any falls or seizures. Pt can not recall all the events, however, she states she was laying on the couch and moved to the floor because she soiled on herself. She reports she was too weak to go to her bedroom or to the bathroom. She states she cut her tongue on her teeth (due to poor dentition). She denies LOC. However, the S.O. states that the pt was very confused on his arrival to the home. Confusion has improved since arrival to ED. Pt denies any alcohol intake or substance use. Last alcohol drink was 7/2023.      In the ED, the pt was also noted to have a left facial droop and reported numbness to to her mouth and tongue with concern for stroke.      Of note, the pt was recently hospitalized with SBO and SAMRA. SBO resolved with conservative treatment. Pt then left AMA. Serum Cr was 2.8 on discharge.      In the ED, Afebrile, BP stable. Mild tachycardia noted. Labs showed Hgb 8.5, Platelets 66, Serum Cr 1.4, T BIli 2.7, AST 96, ALT 47, , , Ammonia normal. INR 2, UA +nitrites, 13 WBCs. ABGs PH 7.5, pCO2 27, pO2 51, HCO3 22. CT head showed nothing acute.     She was placed in Obs under Hosp Med for AMS r/o CVA, UTI, possible Hep Encephalopathy. Hepatology evaluated the pt. Recommended Paracentesis to r/o SBP and cont Lactulose, Rifaximin. Await paracentesis  and MRI Brain. Start Rocephin.     US Abd- no ascites. MRI Brain done but not read yet- appears normal to me, likely has HE and UTI-  will cont Lactulose and Rifaximin.  Evaluated by PT/OT/ST.     10/19- Appreciate Hepatology and Neurology: remains mostly unable to speak but appropriately says yes or no. Was not swallowing anything yesterday but finally gave her her lactulose and Rifaximin with applesauce, now improving. Her boyfriend is with her which helped. Looks calmer, less tremulous. EEG pending. Urine Cx growing E coli- getting Rocephin. Dr. Jackson worries that she will soon need Liver Tx. Cont present care.    10/20- not looking good, lethargic, eyes open but non responsive. No BM since yesterday, no meds given since last night. Ammonia 52. NGT placed but it clogged- hence Dr. Shannan Reardon ordered Lactulose enema and she had a huge BM later.  Cont present care. NGT replaced, will give lactulose thru the NGT.     10/21/23-Still not following commands. She seems alert but unable to communicate. Nursing staff reports several BMs. Plan for CT brain with contrast today. If no improvement by tomorrow will get LP.     10/22/23-Patient more alert, following simple commands but still not able to verbally communicate. Ammonia is normal and patient has continued to have large volume BMs. Awaiting input from Neurology. Hepatology following closely.   10/23 remains lethargic. After discussing with hepatology and neurology, transfer request placed for transplant service. Will attempt mri brain with contrast while transfer awaits. Labs reviewed with increased inr and thrombocytopenia      Interval History: See hospital course for today      Review of Systems   Unable to perform ROS: Mental status change     Objective:     Vital Signs (Most Recent):  Temp: 98.8 °F (37.1 °C) (10/23/23 1214)  Pulse: 91 (10/23/23 1214)  Resp: 19 (10/23/23 1214)  BP: (!) 122/58 (10/23/23 1214)  SpO2: 98 % (10/23/23 1214) Vital Signs (24h  Range):  Temp:  [97.5 °F (36.4 °C)-99 °F (37.2 °C)] 98.8 °F (37.1 °C)  Pulse:  [] 91  Resp:  [18-19] 19  SpO2:  [97 %-100 %] 98 %  BP: (102-125)/(52-61) 122/58     Weight: 55.5 kg (122 lb 5.7 oz)  Body mass index is 24.71 kg/m².    Intake/Output Summary (Last 24 hours) at 10/23/2023 1237  Last data filed at 10/23/2023 0714  Gross per 24 hour   Intake 583.24 ml   Output 0 ml   Net 583.24 ml         Physical Exam  Vitals and nursing note reviewed.   Constitutional:       General: She is sleeping. She is not in acute distress.     Appearance: She is ill-appearing. She is not toxic-appearing.   HENT:      Head: Normocephalic and atraumatic.   Cardiovascular:      Rate and Rhythm: Normal rate.   Pulmonary:      Effort: Pulmonary effort is normal. No respiratory distress.   Abdominal:      Palpations: Abdomen is soft.      Tenderness: There is no abdominal tenderness.   Musculoskeletal:         General: Swelling present.   Skin:     General: Skin is warm.      Coloration: Skin is jaundiced.   Neurological:      Mental Status: She is easily aroused. She is lethargic.      Motor: Weakness present.             Significant Labs: All pertinent labs within the past 24 hours have been reviewed.  CBC:   Recent Labs   Lab 10/22/23  0514 10/23/23  0813   WBC 6.41 6.81   HGB 7.6* 7.9*   HCT 24.6* 25.3*   PLT 78* 69*     CMP:   Recent Labs   Lab 10/22/23  0514 10/23/23  0813    142   K 3.7 4.0   * 116*   CO2 17* 20*   GLU 71 79   BUN 18 21*   CREATININE 0.8 0.8   CALCIUM 8.9 8.7   PROT 5.6* 5.6*   ALBUMIN 2.3* 2.3*   BILITOT 5.7* 5.0*   ALKPHOS 79 73   AST 85* 79*   ALT 43 44   ANIONGAP 11 6*       Significant Imaging: I have reviewed all pertinent imaging results/findings within the past 24 hours.  Mri with contrast brain pending      Assessment/Plan:      * Decompensated alcoholic hepatic cirrhosis  Liver continues to decompensate   Consult hepatology     MELD 3.0: 24 at 10/23/2023  8:13 AM  MELD-Na: 21 at  10/23/2023  8:13 AM  Calculated from:  Serum Creatinine: 0.8 mg/dL (Using min of 1 mg/dL) at 10/23/2023  8:13 AM  Serum Sodium: 142 mmol/L (Using max of 137 mmol/L) at 10/23/2023  8:13 AM  Total Bilirubin: 5.0 mg/dL at 10/23/2023  8:13 AM  Serum Albumin: 2.3 g/dL at 10/23/2023  8:13 AM  INR(ratio): 2.2 at 10/23/2023  8:13 AM  Age at listing (hypothetical): 58 years  Sex: Female at 10/23/2023  8:13 AM    Ammonia level normal   Continues to have large BMs.  Cont Lactulose, Rifaximin        Acute encephalopathy  Hepatology and neurology following  Mri brain pending  Transfer for higher level of care, transplant       Fracture of right superior pubic ramus, closed, initial encounter  Ortho following- no further intervention  Follow-up outpatient       Anasarca  Consider lasix infusion      Non-traumatic rhabdomyolysis      Resolved       Coagulopathy  INR inrcreasing    Generalized weakness  PT/OT/ST  Turn q 2 hour       Acute hepatic encephalopathy  Concern for hepatic encephalopathy but it is unusual to have this degree with minimal ammonia elevation and no clinical change despite multiple lactulose enemas with large volume BMs.   -Plan for CT head with contrast  -Continue with lactulose enemas  -Monitor ammonia    CT head with contrast was negative for acute findings   Patient more alert, following simple commands but still not able to verbally communicate.   Ammonia is normal   Neurology recommending further imaging  Hepatology recommending transfer for higher level of care      VTE Risk Mitigation (From admission, onward)         Ordered     IP VTE HIGH RISK PATIENT  Once         10/18/23 0645     Place sequential compression device  Until discontinued         10/18/23 0645     Reason for No Pharmacological VTE Prophylaxis  Once        Question:  Reasons:  Answer:  Risk of Bleeding    10/18/23 0645                Discharge Planning   ASHELY:      Code Status: Full Code   Is the patient medically ready for  discharge?:     Reason for patient still in hospital (select all that apply): Patient trending condition, Laboratory test, Treatment, Consult recommendations and Pending disposition  Discharge Plan A: Other (TBD)                  Junior Luna MD  Department of Hospital Medicine   [facility])

## 2023-10-23 NOTE — PLAN OF CARE
[facility] - Telemetry (Hospital)  Initial Discharge Assessment       Primary Care Provider: Osiris Nevarez NP    Admission Diagnosis: Anasarca [R60.1]  Altered mental status [R41.82]  Numbness of lip [R20.0]  Stroke-like symptoms [R29.90]  Non-traumatic rhabdomyolysis [M62.82]  Cirrhosis of liver without ascites, unspecified hepatic cirrhosis type [K74.60]    Admission Date: 10/18/2023  Expected Discharge Date:     Transition of Care Barriers: Underinsured    Payor: MEDICAID / Plan: [employer] / Product Type: Managed Medicaid /     Extended Emergency Contact Information  Primary Emergency Contact: DARRON KELLY  Address: [address] [place] [address] [place]  Mobile Phone: [phone]  Relation: Significant other  Preferred language: English   needed? No    Discharge Plan A: Other (TBD)         [id number] - [place] - [address] AT [facility] [address]  [address]   [place] [address]  Phone: [phone] Fax: [phone]      Initial Assessment (most recent)       Adult Discharge Assessment - 10/23/23 0910          Discharge Assessment    Assessment Type Discharge Planning Assessment     Confirmed/corrected address, phone number and insurance Yes     Confirmed Demographics Correct on Facesheet     Source of Information health record     Communicated ASHELY with patient/caregiver Date not available/Unable to determine     Reason For Admission Decompensated alcoholic hepatic cirrhosis     People in Home significant other     Facility Arrived From: home     Do you expect to return to your current living situation? Yes     Do you have help at home or someone to help you manage your care at home? Yes     Who are your caregiver(s) and their phone number(s)? Darron Kelly - significant other and Hilaria Robin - friend     Prior to hospitilization cognitive status: Alert/Oriented     Current cognitive status: Unable to Assess     Walking or  Climbing Stairs ambulation difficulty, requires equipment     Mobility Management Rolling walker     Home Accessibility wheelchair accessible     Home Layout Able to live on 1st floor     Equipment Currently Used at Home walker, rolling     Readmission within 30 days? Yes     Patient currently being followed by outpatient case management? No     Do you currently have service(s) that help you manage your care at home? No     Do you take prescription medications? Yes     Do you have prescription coverage? Yes     Coverage [facility] Medicaid     Do you have any problems affording any of your prescribed medications? No     Is the patient taking medications as prescribed? yes     Who is going to help you get home at discharge? Darron Kelly - significant other and Hilaria Robin - friend     How do you get to doctors appointments? family or friend will provide     Are you on dialysis? No     Do you take coumadin? No     DME Needed Upon Discharge  none     Transition of Care Barriers Underinsured     Discharge Plan A Other   TBD                  Anticipated DC dispo: TBD  Prior Level of Function: unknown  People in home: Darron Kelly - significant other    Comments:  SW completed chart review to complete initial discharge assessment, due to Patient being unable and contacts, not answer the phone. Significant other and friend will be help at home and can provide transport at time of discharge. CM discharge needs depends on hospital progress. SW will continue following to assist with other needs.

## 2023-10-23 NOTE — PLAN OF CARE
Problem: Adult Inpatient Plan of Care  Goal: Plan of Care Review  Outcome: Ongoing, Progressing     Problem: Adjustment to Illness (Stroke, Ischemic/Transient Ischemic Attack)  Goal: Optimal Coping  Outcome: Ongoing, Progressing     Problem: Bowel Elimination Impaired (Stroke, Ischemic/Transient Ischemic Attack)  Goal: Effective Bowel Elimination  Outcome: Ongoing, Progressing     Problem: Cerebral Tissue Perfusion (Stroke, Ischemic/Transient Ischemic Attack)  Goal: Optimal Cerebral Tissue Perfusion  Outcome: Ongoing, Progressing     Problem: Cognitive Impairment (Stroke, Ischemic/Transient Ischemic Attack)  Goal: Optimal Cognitive Function  Outcome: Ongoing, Progressing     Problem: Communication Impairment (Stroke, Ischemic/Transient Ischemic Attack)  Goal: Improved Communication Skills  Outcome: Ongoing, Progressing     Problem: Respiratory Compromise (Stroke, Ischemic/Transient Ischemic Attack)  Goal: Effective Oxygenation and Ventilation  Outcome: Ongoing, Progressing     Problem: Urinary Elimination Impaired (Stroke, Ischemic/Transient Ischemic Attack)  Goal: Effective Urinary Elimination  Outcome: Ongoing, Progressing     Problem: Fall Injury Risk  Goal: Absence of Fall and Fall-Related Injury  Outcome: Ongoing, Progressing     Problem: Fluid and Electrolyte Imbalance (Acute Kidney Injury/Impairment)  Goal: Fluid and Electrolyte Balance  Outcome: Ongoing, Progressing     Problem: Renal Function Impairment (Acute Kidney Injury/Impairment)  Goal: Effective Renal Function  Outcome: Ongoing, Progressing     Problem: Skin Injury Risk Increased  Goal: Skin Health and Integrity  Outcome: Ongoing, Progressing     Problem: Fluid and Electrolyte Imbalance (Liver Failure)  Goal: Fluid and Electrolyte Balance  Outcome: Ongoing, Progressing     Problem: Impaired Coagulation (Liver Failure)  Goal: Optimal Coagulation Function  Outcome: Ongoing, Progressing     Problem: Neurologic Function Impaired (Liver Failure)  Goal:  Optimal Neurologic Function  Outcome: Ongoing, Progressing     Problem: Oral Intake Inadequate (Liver Failure)  Goal: Improved Oral Intake  Outcome: Ongoing, Progressing     Problem: Respiratory Compromise (Liver Failure)  Goal: Effective Oxygenation and Ventilation  Outcome: Ongoing, Progressing

## 2023-10-23 NOTE — ASSESSMENT & PLAN NOTE
Concern for hepatic encephalopathy but it is unusual to have this degree with now normal (even lower) ammonia levels and multiple lactulose enemas with large volume BMs. Communicated with hospital med and neuro.  -Will discuss with hep in [place] possible transfer  -Continue with lactulose enemas  -Monitor ammonia

## 2023-10-23 NOTE — ASSESSMENT & PLAN NOTE
Encephalopathy and MELD worse than her baseline possible from UTI, no other clear precipitant.  -Continue to monitor MELD score  -Meld is high but lower than median for liver transplantation, but since patient still have significant issues will discuss with colleague in [facility] to consider transfer    MELD 3.0: 24 at 10/23/2023  8:13 AM  MELD-Na: 21 at 10/23/2023  8:13 AM  Calculated from:  Serum Creatinine: 0.8 mg/dL (Using min of 1 mg/dL) at 10/23/2023  8:13 AM  Serum Sodium: 142 mmol/L (Using max of 137 mmol/L) at 10/23/2023  8:13 AM  Total Bilirubin: 5.0 mg/dL at 10/23/2023  8:13 AM  Serum Albumin: 2.3 g/dL at 10/23/2023  8:13 AM  INR(ratio): 2.2 at 10/23/2023  8:13 AM  Age at listing (hypothetical): 58 years  Sex: Female at 10/23/2023  8:13 AM

## 2023-10-23 NOTE — CONSULTS
[facility] - Telemetry ([facility])  Hepatology  Telemedicine Consult Note    Patient Name: Mara Mojica  MRN: [id number]  Admission Date: 10/18/2023  Hospital Length of Stay: 5 days  Attending Provider: Junior Luna MD   Primary Care Physician: Roque, Osiris, NP  Principal Problem:Decompensated hepatic cirrhosis    Inpatient consult to Hospitalist  Consult performed by: Shannan Reardon MD  Consult ordered by: Chong Frost MD  Reason for consult: Encephalopathy  Assessment/Recommendations: Lactulose        Subjective:     Transplant status: No      Interval History: Patient now having trouble with secretions. Similar to yesterday able to follow basic commands but not able to verbalize. Ammonia is normal and she continues to have BMs.    Current Facility-Administered Medications   Medication    0.9%  NaCl infusion    cefTRIAXone (ROCEPHIN) 1 g in dextrose 5 % in water (D5W) 100 mL IVPB (MB+)    folic acid-vit B6-vit B12 2.5-25-2 mg tablet 1 tablet    lactulose 10 gram/15 mL enema solution (inpatient use) 200 g    multivitamin tablet    ondansetron injection 4 mg    rifAXIMin tablet 550 mg    sodium chloride 0.9% bolus 500 mL 500 mL    sodium chloride 0.9% flush 10 mL    thiamine tablet 100 mg       Objective:     Vital Signs (Most Recent):  Temp: 99 °F (37.2 °C) (10/23/23 0714)  Pulse: 86 (10/23/23 0714)  Resp: 18 (10/23/23 0714)  BP: 125/61 (10/23/23 0714)  SpO2: 99 % (10/23/23 0714) Vital Signs (24h Range):  Temp:  [97.5 °F (36.4 °C)-99 °F (37.2 °C)] 99 °F (37.2 °C)  Pulse:  [] 86  Resp:  [18-19] 18  SpO2:  [97 %-100 %] 99 %  BP: (102-130)/(52-61) 125/61     Weight: 57.7 kg (127 lb 3.3 oz) (10/23/23 0001)  Body mass index is 25.69 kg/m².       Physical Exam  Vitals reviewed.   Constitutional:       General: She is not in acute distress.     Appearance: She is well-developed.   HENT:      Head: Normocephalic and atraumatic.      Mouth/Throat:      Pharynx: No oropharyngeal exudate.   Eyes:      General:  No scleral icterus.        Right eye: No discharge.         Left eye: No discharge.      Conjunctiva/sclera: Conjunctivae normal.      Pupils: Pupils are equal, round, and reactive to light.   Pulmonary:      Effort: Pulmonary effort is normal. No respiratory distress.      Breath sounds: Normal breath sounds. No wheezing.   Abdominal:      General: There is no distension.      Palpations: Abdomen is soft.      Tenderness: There is no abdominal tenderness.   Musculoskeletal:      Right lower leg: Edema present.      Left lower leg: Edema present.   Neurological:      Mental Status: She is alert.      Comments: Alert unable to verbalize but follows one step commands partially; +weakness            MELD 3.0: 24 at 10/23/2023  8:13 AM  MELD-Na: 21 at 10/23/2023  8:13 AM  Calculated from:  Serum Creatinine: 0.8 mg/dL (Using min of 1 mg/dL) at 10/23/2023  8:13 AM  Serum Sodium: 142 mmol/L (Using max of 137 mmol/L) at 10/23/2023  8:13 AM  Total Bilirubin: 5.0 mg/dL at 10/23/2023  8:13 AM  Serum Albumin: 2.3 g/dL at 10/23/2023  8:13 AM  INR(ratio): 2.2 at 10/23/2023  8:13 AM  Age at listing (hypothetical): 58 years  Sex: Female at 10/23/2023  8:13 AM      Significant Labs:  Labs within the past month have been reviewed.    Significant Imaging:  CT: Reviewed    Assessment/Plan:     GI  * Decompensated alcoholic hepatic cirrhosis  Encephalopathy and MELD worse than her baseline possible from UTI, no other clear precipitant.  -Continue to monitor MELD score  -Meld is high but lower than median for liver transplantation, but since patient still have significant issues will discuss with colleague in [facility] to consider transfer    MELD 3.0: 24 at 10/23/2023  8:13 AM  MELD-Na: 21 at 10/23/2023  8:13 AM  Calculated from:  Serum Creatinine: 0.8 mg/dL (Using min of 1 mg/dL) at 10/23/2023  8:13 AM  Serum Sodium: 142 mmol/L (Using max of 137 mmol/L) at 10/23/2023  8:13 AM  Total Bilirubin: 5.0 mg/dL at 10/23/2023  8:13 AM  Serum  Albumin: 2.3 g/dL at 10/23/2023  8:13 AM  INR(ratio): 2.2 at 10/23/2023  8:13 AM  Age at listing (hypothetical): 58 years  Sex: Female at 10/23/2023  8:13 AM        Acute hepatic encephalopathy  Concern for hepatic encephalopathy but it is unusual to have this degree with now normal (even lower) ammonia levels and multiple lactulose enemas with large volume BMs. Communicated with hospital med and neuro.  -Will discuss with hep in [place] possible transfer  -Continue with lactulose enemas  -Monitor ammonia    Other  Generalized weakness  Significant weakness  -Need to see if PT/OT can continue to work with patient        Thank you for your consult. I will follow-up with patient. Please contact us if you have any additional questions.    Shannan Reardon MD  Hepatology  [facility] - Telemetry ([facility])

## 2023-10-23 NOTE — PLAN OF CARE
Problem: Adult Inpatient Plan of Care  Goal: Plan of Care Review  Outcome: Ongoing, Progressing  Goal: Patient-Specific Goal (Individualized)  Outcome: Ongoing, Progressing  Goal: Absence of Hospital-Acquired Illness or Injury  Outcome: Ongoing, Progressing  Goal: Readiness for Transition of Care  Outcome: Ongoing, Progressing     Problem: Adjustment to Illness (Stroke, Ischemic/Transient Ischemic Attack)  Goal: Optimal Coping  Outcome: Ongoing, Progressing     Problem: Bowel Elimination Impaired (Stroke, Ischemic/Transient Ischemic Attack)  Goal: Effective Bowel Elimination  Outcome: Ongoing, Progressing     Problem: Cerebral Tissue Perfusion (Stroke, Ischemic/Transient Ischemic Attack)  Goal: Optimal Cerebral Tissue Perfusion  Outcome: Ongoing, Progressing     Problem: Cognitive Impairment (Stroke, Ischemic/Transient Ischemic Attack)  Goal: Optimal Cognitive Function  Outcome: Ongoing, Progressing     Problem: Communication Impairment (Stroke, Ischemic/Transient Ischemic Attack)  Goal: Improved Communication Skills  Outcome: Ongoing, Progressing   Chart check done

## 2023-10-23 NOTE — SUBJECTIVE & OBJECTIVE
Interval History: Patient now having trouble with secretions. Similar to yesterday able to follow basic commands but not able to verbalize. Ammonia is normal and she continues to have BMs.    Current Facility-Administered Medications   Medication    0.9%  NaCl infusion    cefTRIAXone (ROCEPHIN) 1 g in dextrose 5 % in water (D5W) 100 mL IVPB (MB+)    folic acid-vit B6-vit B12 2.5-25-2 mg tablet 1 tablet    lactulose 10 gram/15 mL enema solution (inpatient use) 200 g    multivitamin tablet    ondansetron injection 4 mg    rifAXIMin tablet 550 mg    sodium chloride 0.9% bolus 500 mL 500 mL    sodium chloride 0.9% flush 10 mL    thiamine tablet 100 mg       Objective:     Vital Signs (Most Recent):  Temp: 99 °F (37.2 °C) (10/23/23 0714)  Pulse: 86 (10/23/23 0714)  Resp: 18 (10/23/23 0714)  BP: 125/61 (10/23/23 0714)  SpO2: 99 % (10/23/23 0714) Vital Signs (24h Range):  Temp:  [97.5 °F (36.4 °C)-99 °F (37.2 °C)] 99 °F (37.2 °C)  Pulse:  [] 86  Resp:  [18-19] 18  SpO2:  [97 %-100 %] 99 %  BP: (102-130)/(52-61) 125/61     Weight: 57.7 kg (127 lb 3.3 oz) (10/23/23 0001)  Body mass index is 25.69 kg/m².       Physical Exam  Vitals reviewed.   Constitutional:       General: She is not in acute distress.     Appearance: She is well-developed.   HENT:      Head: Normocephalic and atraumatic.      Mouth/Throat:      Pharynx: No oropharyngeal exudate.   Eyes:      General: No scleral icterus.        Right eye: No discharge.         Left eye: No discharge.      Conjunctiva/sclera: Conjunctivae normal.      Pupils: Pupils are equal, round, and reactive to light.   Pulmonary:      Effort: Pulmonary effort is normal. No respiratory distress.      Breath sounds: Normal breath sounds. No wheezing.   Abdominal:      General: There is no distension.      Palpations: Abdomen is soft.      Tenderness: There is no abdominal tenderness.   Musculoskeletal:      Right lower leg: Edema present.      Left lower leg: Edema present.    Neurological:      Mental Status: She is alert.      Comments: Alert unable to verbalize but follows one step commands partially; +weakness            MELD 3.0: 24 at 10/23/2023  8:13 AM  MELD-Na: 21 at 10/23/2023  8:13 AM  Calculated from:  Serum Creatinine: 0.8 mg/dL (Using min of 1 mg/dL) at 10/23/2023  8:13 AM  Serum Sodium: 142 mmol/L (Using max of 137 mmol/L) at 10/23/2023  8:13 AM  Total Bilirubin: 5.0 mg/dL at 10/23/2023  8:13 AM  Serum Albumin: 2.3 g/dL at 10/23/2023  8:13 AM  INR(ratio): 2.2 at 10/23/2023  8:13 AM  Age at listing (hypothetical): 58 years  Sex: Female at 10/23/2023  8:13 AM      Significant Labs:  Labs within the past month have been reviewed.    Significant Imaging:  CT: Reviewed

## 2023-10-24 PROBLEM — R93.1 ABNORMAL ECHOCARDIOGRAM: Status: ACTIVE | Noted: 2023-10-24

## 2023-10-24 PROBLEM — R60.0 BILATERAL LOWER EXTREMITY EDEMA: Status: ACTIVE | Noted: 2023-10-24

## 2023-10-24 LAB
ALBUMIN SERPL BCP-MCNC: 2.1 G/DL (ref 3.5–5.2)
ALLENS TEST: ABNORMAL
ALP SERPL-CCNC: 69 U/L (ref 55–135)
ALT SERPL W/O P-5'-P-CCNC: 41 U/L (ref 10–44)
AMMONIA PLAS-SCNC: 40 UMOL/L (ref 10–50)
ANION GAP SERPL CALC-SCNC: 9 MMOL/L (ref 8–16)
AST SERPL-CCNC: 75 U/L (ref 10–40)
BACTERIA BLD CULT: NORMAL
BASOPHILS # BLD AUTO: 0.07 K/UL (ref 0–0.2)
BASOPHILS NFR BLD: 1 % (ref 0–1.9)
BILIRUB SERPL-MCNC: 4.9 MG/DL (ref 0.1–1)
BLD PROD TYP BPU: NORMAL
BLOOD UNIT EXPIRATION DATE: NORMAL
BLOOD UNIT TYPE CODE: 6200
BLOOD UNIT TYPE: NORMAL
BUN SERPL-MCNC: 27 MG/DL (ref 6–20)
CALCIUM SERPL-MCNC: 8.8 MG/DL (ref 8.7–10.5)
CHLORIDE SERPL-SCNC: 116 MMOL/L (ref 95–110)
CO2 SERPL-SCNC: 19 MMOL/L (ref 23–29)
CODING SYSTEM: NORMAL
CREAT SERPL-MCNC: 0.8 MG/DL (ref 0.5–1.4)
CROSSMATCH INTERPRETATION: NORMAL
DELSYS: ABNORMAL
DIFFERENTIAL METHOD: ABNORMAL
DISPENSE STATUS: NORMAL
EOSINOPHIL # BLD AUTO: 0.1 K/UL (ref 0–0.5)
EOSINOPHIL NFR BLD: 1.5 % (ref 0–8)
ERYTHROCYTE [DISTWIDTH] IN BLOOD BY AUTOMATED COUNT: 16.7 % (ref 11.5–14.5)
EST. GFR  (NO RACE VARIABLE): >60 ML/MIN/1.73 M^2
FIO2: 21
GLUCOSE SERPL-MCNC: 69 MG/DL (ref 70–110)
GLUCOSE SERPL-MCNC: 73 MG/DL (ref 70–110)
HCO3 UR-SCNC: 21.4 MMOL/L (ref 24–28)
HCT VFR BLD AUTO: 23.7 % (ref 37–48.5)
HCT VFR BLD CALC: 20 %PCV (ref 36–54)
HGB BLD-MCNC: 7.4 G/DL (ref 12–16)
IMM GRANULOCYTES # BLD AUTO: 0.08 K/UL (ref 0–0.04)
IMM GRANULOCYTES NFR BLD AUTO: 1.2 % (ref 0–0.5)
INR PPP: 2.2 (ref 0.8–1.2)
LYMPHOCYTES # BLD AUTO: 1.6 K/UL (ref 1–4.8)
LYMPHOCYTES NFR BLD: 23.6 % (ref 18–48)
MCH RBC QN AUTO: 33.6 PG (ref 27–31)
MCHC RBC AUTO-ENTMCNC: 31.2 G/DL (ref 32–36)
MCV RBC AUTO: 108 FL (ref 82–98)
MODE: ABNORMAL
MONOCYTES # BLD AUTO: 1 K/UL (ref 0.3–1)
MONOCYTES NFR BLD: 15.2 % (ref 4–15)
NEUTROPHILS # BLD AUTO: 3.8 K/UL (ref 1.8–7.7)
NEUTROPHILS NFR BLD: 57.5 % (ref 38–73)
NRBC BLD-RTO: 1 /100 WBC
PCO2 BLDA: 30.7 MMHG (ref 35–45)
PH SMN: 7.45 [PH] (ref 7.35–7.45)
PLATELET # BLD AUTO: 76 K/UL (ref 150–450)
PMV BLD AUTO: 10.3 FL (ref 9.2–12.9)
PO2 BLDA: 73 MMHG (ref 80–100)
POC BE: -3 MMOL/L
POC IONIZED CALCIUM: 1.37 MMOL/L (ref 1.06–1.42)
POC SATURATED O2: 95 % (ref 95–100)
POTASSIUM BLD-SCNC: 3.8 MMOL/L (ref 3.5–5.1)
POTASSIUM SERPL-SCNC: 3.9 MMOL/L (ref 3.5–5.1)
PROT SERPL-MCNC: 5.3 G/DL (ref 6–8.4)
PROTHROMBIN TIME: 22.9 SEC (ref 9–12.5)
RBC # BLD AUTO: 2.2 M/UL (ref 4–5.4)
RPR SER QL: NORMAL
SAMPLE: ABNORMAL
SARS-COV-2 RDRP RESP QL NAA+PROBE: NEGATIVE
SITE: ABNORMAL
SODIUM BLD-SCNC: 146 MMOL/L (ref 136–145)
SODIUM SERPL-SCNC: 144 MMOL/L (ref 136–145)
UNIT NUMBER: NORMAL
VIT B12 SERPL-MCNC: >2000 PG/ML (ref 210–950)
WBC # BLD AUTO: 6.69 K/UL (ref 3.9–12.7)

## 2023-10-24 PROCEDURE — 85025 COMPLETE CBC W/AUTO DIFF WBC: CPT | Mod: NTX | Performed by: FAMILY MEDICINE

## 2023-10-24 PROCEDURE — 82330 ASSAY OF CALCIUM: CPT | Mod: NTX

## 2023-10-24 PROCEDURE — 99233 SBSQ HOSP IP/OBS HIGH 50: CPT | Mod: 95,NTX,, | Performed by: INTERNAL MEDICINE

## 2023-10-24 PROCEDURE — 99223 1ST HOSP IP/OBS HIGH 75: CPT | Mod: NSCH,NTX,, | Performed by: INTERNAL MEDICINE

## 2023-10-24 PROCEDURE — 95816 PR EEG,W/AWAKE & DROWSY RECORD: ICD-10-PCS | Mod: 26,NTX,, | Performed by: PSYCHIATRY & NEUROLOGY

## 2023-10-24 PROCEDURE — 99223 PR INITIAL HOSPITAL CARE,LEVL III: ICD-10-PCS | Mod: NSCH,NTX,, | Performed by: INTERNAL MEDICINE

## 2023-10-24 PROCEDURE — 80053 COMPREHEN METABOLIC PANEL: CPT | Mod: NTX | Performed by: FAMILY MEDICINE

## 2023-10-24 PROCEDURE — 63600175 PHARM REV CODE 636 W HCPCS: Mod: NTX | Performed by: EMERGENCY MEDICINE

## 2023-10-24 PROCEDURE — 85014 HEMATOCRIT: CPT | Mod: NTX

## 2023-10-24 PROCEDURE — 95816 EEG AWAKE AND DROWSY: CPT | Mod: NTX

## 2023-10-24 PROCEDURE — 82140 ASSAY OF AMMONIA: CPT | Mod: NTX | Performed by: HOSPITALIST

## 2023-10-24 PROCEDURE — 25000003 PHARM REV CODE 250: Mod: NTX | Performed by: EMERGENCY MEDICINE

## 2023-10-24 PROCEDURE — 97530 THERAPEUTIC ACTIVITIES: CPT | Mod: NTX

## 2023-10-24 PROCEDURE — 99199 UNLISTED SPECIAL SVC PX/RPRT: CPT | Mod: NTX,,, | Performed by: STUDENT IN AN ORGANIZED HEALTH CARE EDUCATION/TRAINING PROGRAM

## 2023-10-24 PROCEDURE — 63600175 PHARM REV CODE 636 W HCPCS: Mod: NTX | Performed by: FAMILY MEDICINE

## 2023-10-24 PROCEDURE — 36600 WITHDRAWAL OF ARTERIAL BLOOD: CPT | Mod: NTX

## 2023-10-24 PROCEDURE — 36415 COLL VENOUS BLD VENIPUNCTURE: CPT | Mod: NTX | Performed by: FAMILY MEDICINE

## 2023-10-24 PROCEDURE — 99199 PR TELEMEDICINE REVIEW: ICD-10-PCS | Mod: NTX,,, | Performed by: STUDENT IN AN ORGANIZED HEALTH CARE EDUCATION/TRAINING PROGRAM

## 2023-10-24 PROCEDURE — 25000003 PHARM REV CODE 250: Mod: NTX | Performed by: INTERNAL MEDICINE

## 2023-10-24 PROCEDURE — 21400001 HC TELEMETRY ROOM: Mod: NTX

## 2023-10-24 PROCEDURE — U0002 COVID-19 LAB TEST NON-CDC: HCPCS | Mod: NTX | Performed by: FAMILY MEDICINE

## 2023-10-24 PROCEDURE — 84132 ASSAY OF SERUM POTASSIUM: CPT | Mod: NTX

## 2023-10-24 PROCEDURE — 87040 BLOOD CULTURE FOR BACTERIA: CPT | Mod: 59,NTX | Performed by: FAMILY MEDICINE

## 2023-10-24 PROCEDURE — 99233 PR SUBSEQUENT HOSPITAL CARE,LEVL III: ICD-10-PCS | Mod: 95,NTX,, | Performed by: INTERNAL MEDICINE

## 2023-10-24 PROCEDURE — 84295 ASSAY OF SERUM SODIUM: CPT | Mod: NTX

## 2023-10-24 PROCEDURE — 25000003 PHARM REV CODE 250: Mod: NTX | Performed by: FAMILY MEDICINE

## 2023-10-24 PROCEDURE — 84425 ASSAY OF VITAMIN B-1: CPT | Mod: NTX | Performed by: FAMILY MEDICINE

## 2023-10-24 PROCEDURE — 82803 BLOOD GASES ANY COMBINATION: CPT | Mod: NTX

## 2023-10-24 PROCEDURE — 86592 SYPHILIS TEST NON-TREP QUAL: CPT | Mod: NTX | Performed by: FAMILY MEDICINE

## 2023-10-24 PROCEDURE — 99900035 HC TECH TIME PER 15 MIN (STAT): Mod: NTX

## 2023-10-24 PROCEDURE — 97110 THERAPEUTIC EXERCISES: CPT | Mod: NTX

## 2023-10-24 PROCEDURE — 82607 VITAMIN B-12: CPT | Mod: NTX | Performed by: FAMILY MEDICINE

## 2023-10-24 PROCEDURE — 85610 PROTHROMBIN TIME: CPT | Mod: NTX | Performed by: FAMILY MEDICINE

## 2023-10-24 PROCEDURE — P9035 PLATELET PHERES LEUKOREDUCED: HCPCS | Mod: NTX | Performed by: FAMILY MEDICINE

## 2023-10-24 PROCEDURE — 95816 EEG AWAKE AND DROWSY: CPT | Mod: 26,NTX,, | Performed by: PSYCHIATRY & NEUROLOGY

## 2023-10-24 PROCEDURE — 36430 TRANSFUSION BLD/BLD COMPNT: CPT | Mod: NTX

## 2023-10-24 RX ORDER — FUROSEMIDE 10 MG/ML
20 INJECTION INTRAMUSCULAR; INTRAVENOUS DAILY
Status: DISCONTINUED | OUTPATIENT
Start: 2023-10-24 | End: 2023-10-25 | Stop reason: HOSPADM

## 2023-10-24 RX ORDER — HYDROCODONE BITARTRATE AND ACETAMINOPHEN 500; 5 MG/1; MG/1
TABLET ORAL
Status: DISCONTINUED | OUTPATIENT
Start: 2023-10-24 | End: 2023-10-25 | Stop reason: HOSPADM

## 2023-10-24 RX ADMIN — LACTULOSE 200 G: 10 SOLUTION ORAL at 12:10

## 2023-10-24 RX ADMIN — RIFAXIMIN 550 MG: 550 TABLET ORAL at 09:10

## 2023-10-24 RX ADMIN — FUROSEMIDE 20 MG: 10 INJECTION, SOLUTION INTRAMUSCULAR; INTRAVENOUS at 11:10

## 2023-10-24 RX ADMIN — PHYTONADIONE 10 MG: 10 INJECTION, EMULSION INTRAMUSCULAR; INTRAVENOUS; SUBCUTANEOUS at 09:10

## 2023-10-24 RX ADMIN — LACTULOSE 200 G: 10 SOLUTION ORAL at 06:10

## 2023-10-24 RX ADMIN — CEFTRIAXONE SODIUM 1 G: 1 INJECTION, POWDER, FOR SOLUTION INTRAMUSCULAR; INTRAVENOUS at 02:10

## 2023-10-24 RX ADMIN — THIAMINE HCL TAB 100 MG 100 MG: 100 TAB at 08:10

## 2023-10-24 RX ADMIN — THERA TABS 1 TABLET: TAB at 08:10

## 2023-10-24 RX ADMIN — Medication 1 TABLET: at 08:10

## 2023-10-24 RX ADMIN — RIFAXIMIN 550 MG: 550 TABLET ORAL at 08:10

## 2023-10-24 NOTE — PLAN OF CARE
Pt tolerated interventions fair. Total A for bed mobility, PROM at all LE joints completed, no active participation by pt, unable to follow commands. Recommending moderate intensity PT upon d/c.

## 2023-10-24 NOTE — PT/OT/SLP PROGRESS
Occupational Therapy   Treatment    Name: Mara Mojica  MRN: [id number]  Admitting Diagnosis:  Decompensated hepatic cirrhosis       Recommendations:     Discharge Recommendations: Moderate Intensity Therapy  Discharge Equipment Recommendations:  to be determined by next level of care  Barriers to discharge:  Inaccessible home environment    Assessment:     Mara Mojica is a 58 y.o. female with a medical diagnosis of Decompensated hepatic cirrhosis.  She presents with the following performance deficits affecting function are weakness, impaired endurance, impaired sensation, impaired self care skills, impaired functional mobility, gait instability, impaired balance, impaired cognition, decreased coordination, decreased upper extremity function, decreased lower extremity function, decreased safety awareness, abnormal tone, decreased ROM, impaired coordination, impaired fine motor, edema, impaired cardiopulmonary response to activity.     Rehab Prognosis:  Fair; patient would benefit from acute skilled OT services to address these deficits and reach maximum level of function.       Plan:     Patient to be seen 2 x/week to address the above listed problems via self-care/home management, therapeutic activities, therapeutic exercises  Plan of Care Expires: 11/01/23  Plan of Care Reviewed with: patient    Subjective     Chief Complaint: pt nonverbal  Patient/Family Comments/goals: pt nonverbal  Pain/Comfort:  Pain Rating 1: other (see comments) (pt non-verbal, no indicators of pain)    Objective:     Communicated with: Nurse and epic chart review prior to session.  Patient found right sidelying with HOB elevated with NG tube, telemetry, peripheral IV, pressure relief boots, bed alarm upon OT entry to room.    General Precautions: Standard, aspiration, fall    Orthopedic Precautions:N/A  Braces: N/A  Respiratory Status: Room air     Occupational Performance:     Bed Mobility:    Patient completed Rolling/Turning to  "Left with  total assistance  Patient completed Rolling/Turning to Right with total assistance  Patient completed Scooting/Bridging with total assistance and 2 persons     Activities of Daily Living:  Grooming: total assistance wash face    [facility] 6 Click ADL: 7    Treatment & Education:  Pt with increased edema in BUE. Performed x10 reps BUE PROM shoulder flexion, elbow flexion/ext, wrist flexion/ext, and digit flexion/ext. Repositioned pt in bed for comfort, proper midline alignment, and pressure relief to prevent skin breakdown/injury. Elevated BUE on pillows for edema management. Pt alert but continues to be nonverbal and with difficulty following commands. Reviewed role of OT in acute care and POC. Reviewed "call don't fall" policy and increased risk of falling due to weakness, instructed to utilize call bell for assistance with all transfers. Unsure of pt's level of retention of understanding d/t cognition.    Patient left left sidelying with HOB elevated with all lines intact, call button in reach, bed alarm on, and nurse present    GOALS:   Multidisciplinary Problems       Occupational Therapy Goals          Problem: Occupational Therapy    Goal Priority Disciplines Outcome Interventions   Occupational Therapy Goal     OT, PT/OT Ongoing, Progressing    Description: Goals to be met by: 11/1/23     Patient will increase functional independence with ADLs by performing:    UE Dressing with Stand-by Assistance.  Grooming while standing at sink with Supervision.  Toileting from toilet with Set-up Assistance for hygiene and clothing management.   Toilet transfer to toilet with Supervision.  Upper extremity exercise program x15 reps per handout, with independence.                         Time Tracking:     OT Date of Treatment: 10/24/23  OT Start Time: 1305  OT Stop Time: 1335  OT Total Time (min): 30 min    Billable Minutes:Therapeutic Activity 30    OT/SHAWNA: OT      Pat Wade OT     10/24/2023  "

## 2023-10-24 NOTE — CARE UPDATE
Updated patient's significant other, Darron    Discussed risks and benefits of lumbar puncture    Darron agreeable to procedure  Will proceed with lumbar puncture  Covid swab  Empiric antibiotic(s)   Csf studies pending lumbar puncture

## 2023-10-24 NOTE — PLAN OF CARE
Patient remained free from falls throughout shift, call bell within reach. Turn q2h. Lactulose enemas continued. Patient alert, able to follow some commands such as wiggling her fingers and toes, but not much more than that. Unable to speak. NGT for medication administration. Waiting on a bed at main campus, currently on diversion. Vitals stable, will continue to monitor.

## 2023-10-24 NOTE — PROGRESS NOTES
[facility] - Telemetry [facility])  Hepatology  Telemedicine Progress Note    Patient Name: Mara Mojica  MRN: [id number]  Admission Date: 10/18/2023  Hospital Length of Stay: 6 days  Attending Provider: Ahsan Cunningham MD   Primary Care Physician: Roque, Osiris, NP  Principal Problem:Decompensated hepatic cirrhosis    Subjective:     Transplant status: No      Interval History: Concerned that patient is not improving so asked for neuro to weight back in. She also has some increasing lower ext edema.    Current Facility-Administered Medications   Medication    0.9%  NaCl infusion    cefTRIAXone (ROCEPHIN) 1 g in dextrose 5 % in water (D5W) 100 mL IVPB (MB+)    folic acid-vit B6-vit B12 2.5-25-2 mg tablet 1 tablet    lactulose 10 gram/15 mL enema solution (inpatient use) 200 g    multivitamin tablet    ondansetron injection 4 mg    rifAXIMin tablet 550 mg    sodium chloride 0.9% bolus 500 mL 500 mL    sodium chloride 0.9% flush 10 mL    thiamine tablet 100 mg       Objective:     Vital Signs (Most Recent):  Temp: 99.2 °F (37.3 °C) (10/24/23 0719)  Pulse: 98 (10/24/23 0738)  Resp: 18 (10/24/23 0719)  BP: (!) 121/58 (10/24/23 0719)  SpO2: 96 % (10/24/23 0719) Vital Signs (24h Range):  Temp:  [97.2 °F (36.2 °C)-99.2 °F (37.3 °C)] 99.2 °F (37.3 °C)  Pulse:  [] 98  Resp:  [17-19] 18  SpO2:  [96 %-99 %] 96 %  BP: ()/(46-70) 121/58     Weight: 56.2 kg (123 lb 14.4 oz) (10/24/23 0719)  Body mass index is 25.02 kg/m².       Physical Exam  Vitals reviewed.   Constitutional:       General: She is not in acute distress.     Appearance: She is well-developed.   HENT:      Head: Normocephalic and atraumatic.      Mouth/Throat:      Pharynx: No oropharyngeal exudate.   Eyes:      General: No scleral icterus.        Right eye: No discharge.         Left eye: No discharge.      Conjunctiva/sclera: Conjunctivae normal.      Pupils: Pupils are equal, round, and reactive to light.   Pulmonary:      Effort:  Pulmonary effort is normal. No respiratory distress.      Breath sounds: Normal breath sounds. No wheezing.   Abdominal:      General: There is no distension.      Palpations: Abdomen is soft.      Tenderness: There is no abdominal tenderness.   Musculoskeletal:      Right lower leg: Edema present.      Left lower leg: Edema present.   Neurological:      Mental Status: She is alert.      Comments: Alert not orients, no verbal, follows basic commands   Psychiatric:         Behavior: Behavior normal.            MELD 3.0: 24 at 10/23/2023  8:13 AM  MELD-Na: 21 at 10/23/2023  8:13 AM  Calculated from:  Serum Creatinine: 0.8 mg/dL (Using min of 1 mg/dL) at 10/23/2023  8:13 AM  Serum Sodium: 142 mmol/L (Using max of 137 mmol/L) at 10/23/2023  8:13 AM  Total Bilirubin: 5.0 mg/dL at 10/23/2023  8:13 AM  Serum Albumin: 2.3 g/dL at 10/23/2023  8:13 AM  INR(ratio): 2.2 at 10/23/2023  8:13 AM  Age at listing (hypothetical): 58 years  Sex: Female at 10/23/2023  8:13 AM      Significant Labs:  Labs within the past month have been reviewed.    Significant Imaging:  MRI: Reviewed    Assessment/Plan:     GI  * Decompensated alcoholic hepatic cirrhosis  Encephalopathy and MELD worse than her baseline possible from UTI, no other clear precipitant.  -Continue to monitor MELD score  -Meld is high but lower than median for liver transplantation, but since patient still have significant issues will transfer to [facility]  -Seems to have increasing volume issues would start diuresis    MELD 3.0: 24 at 10/23/2023  8:13 AM  MELD-Na: 21 at 10/23/2023  8:13 AM  Calculated from:  Serum Creatinine: 0.8 mg/dL (Using min of 1 mg/dL) at 10/23/2023  8:13 AM  Serum Sodium: 142 mmol/L (Using max of 137 mmol/L) at 10/23/2023  8:13 AM  Total Bilirubin: 5.0 mg/dL at 10/23/2023  8:13 AM  Serum Albumin: 2.3 g/dL at 10/23/2023  8:13 AM  INR(ratio): 2.2 at 10/23/2023  8:13 AM  Age at listing (hypothetical): 58 years  Sex: Female at 10/23/2023  8:13 AM        Acute  hepatic encephalopathy  Concern for hepatic encephalopathy but it is unusual to have this degree with now normal (even lower) ammonia levels and multiple lactulose enemas with large volume BMs. Communicated with hospital med and neuro is going to weigh back in. Need to consider other causes of encephalopathy  -Awaiting transfer to [facility]  -Continue with lactulose enemas and rifaximin  -Monitor ammonia  -Check RPR, B12    Other  Bilateral lower extremity edema  Starting to have volume issues  -Start lasix 20mg IV daily         Thank you for your consult. I will follow-up with patient. Please contact us if you have any additional questions.    Shannan Reardon MD  Hepatology  [facility] - Telemetry ([facility])

## 2023-10-24 NOTE — PROGRESS NOTES
[facility] - Telemetry ([facility])  Neurology  Progress Note    Patient Name: Mara Mojica  MRN: [id number]  Admission Date: 10/18/2023  Hospital Length of Stay: 6 days  Code Status: Full Code   Attending Provider: Ahsan Cunningham MD  Primary Care Physician: Osiris Nevarez NP   Principal Problem:Decompensated hepatic cirrhosis    Subjective:     59 y/o female with a history of alcoholic cirrhosis, s/p ex-lap w/ bowel resection for incarcerated hernia, who was admitted on 10/18 with acute encephalopathy. Was found down by her  with confusion, slurred speech and incontinent of urine of feces. She also reportedly bit her tongue due to poor dentition.   Ammonia normal.    Please refer to Neurology note from 10/19 for complete HPI. On initial exam, she was alert and oriented x 3 with no evidence of focal weakness. Speech was slurred and she had a delayed response.      Interval History:     Has been lethargic since yesterday.  MRI of the brain w/ contrast showed no pathologic enhancement. Overall unchanged compared to prior scan on 10/19.  EEG showed no underlying epileptiform activity.    Afebrile. No white count.  Sodium, ammonia normal.      Current Neurological Medications: None    Current Facility-Administered Medications   Medication Dose Route Frequency Provider Last Rate Last Admin    0.9%  NaCl infusion   Intravenous PRN Hermila Grimes MD   Stopped at 10/22/23 1432    cefTRIAXone (ROCEPHIN) 1 g in dextrose 5 % in water (D5W) 100 mL IVPB (MB+)  1 g Intravenous Q24H Naveed Faith MD   Stopped at 10/23/23 1553    folic acid-vit B6-vit B12 2.5-25-2 mg tablet 1 tablet  1 tablet Oral Daily Naveed Faith MD   1 tablet at 10/24/23 0844    lactulose 10 gram/15 mL enema solution (inpatient use) 200 g  200 g Rectal TID Shannan Reardon MD   200 g at 10/23/23 2102    multivitamin tablet  1 tablet Oral Daily Naveed Faith MD   1 tablet at 10/24/23 0844    ondansetron injection 4 mg  4 mg Intravenous Q12H  "PRN Raisa Carnes NP        rifAXIMin tablet 550 mg  550 mg Oral BID Naveed Faith MD   550 mg at 10/24/23 0844    sodium chloride 0.9% bolus 500 mL 500 mL  500 mL Intravenous Continuous PRN Raisa Carnes NP        sodium chloride 0.9% flush 10 mL  10 mL Intravenous PRN Raisa Carnes NP        thiamine tablet 100 mg  100 mg Oral Daily Naveed Faith MD   100 mg at 10/24/23 0844       Review of Systems  Objective:     Vital Signs (Most Recent):  Temp: 99.2 °F (37.3 °C) (10/24/23 0719)  Pulse: 98 (10/24/23 0738)  Resp: 18 (10/24/23 0719)  BP: (!) 121/58 (10/24/23 0719)  SpO2: 96 % (10/24/23 0719) Vital Signs (24h Range):  Temp:  [97.2 °F (36.2 °C)-99.2 °F (37.3 °C)] 99.2 °F (37.3 °C)  Pulse:  [] 98  Resp:  [17-19] 18  SpO2:  [96 %-99 %] 96 %  BP: ()/(46-70) 121/58     Weight: 56.2 kg (123 lb 14.4 oz)  Body mass index is 25.02 kg/m².    Physical Exam       Lethargic. On arousal, she keeps her mouth open. Makes gurgling noises. Does not follow commands.  Non verbal  Moves the fingers in her right hand and wiggles her right toes.  No movement on the left side.      Significant Labs: Blood Culture: No results for input(s): "LABBLOO" in the last 48 hours.  BMP:   Recent Labs   Lab 10/23/23  0813   GLU 79      K 4.0   *   CO2 20*   BUN 21*   CREATININE 0.8   CALCIUM 8.7     CBC:   Recent Labs   Lab 10/23/23  0813   WBC 6.81   HGB 7.9*   HCT 25.3*   PLT 69*     CMP:   Recent Labs   Lab 10/23/23  0813   GLU 79      K 4.0   *   CO2 20*   BUN 21*   CREATININE 0.8   CALCIUM 8.7   PROT 5.6*   ALBUMIN 2.3*   BILITOT 5.0*   ALKPHOS 73   AST 79*   ALT 44   ANIONGAP 6*     POCT Glucose: No results for input(s): "POCTGLUCOSE" in the last 24 hours.  Urine Culture: No results for input(s): "LABURIN" in the last 48 hours.  Urine Studies: No results for input(s): "COLORU", "APPEARANCEUA", "PHUR", "SPECGRAV", "PROTEINUA", "GLUCUA", "KETONESU", "BILIRUBINUA", "OCCULTUA", "NITRITE", " ""UROBILINOGEN", "LEUKOCYTESUR", "RBCUA", "WBCUA", "BACTERIA", "SQUAMEPITHEL", "HYALINECASTS" in the last 48 hours.    Invalid input(s): "WRIGHTSUR"  All pertinent lab results from the past 24 hours have been reviewed.    Significant Imaging: I have reviewed all pertinent imaging results/findings within the past 24 hours.    Assessment and Plan:     Active Diagnoses:    Diagnosis Date Noted POA    PRINCIPAL PROBLEM:  Decompensated alcoholic hepatic cirrhosis [K72.90, K74.60] 09/06/2023 Yes    Acute encephalopathy [G93.40] 10/22/2023 Yes    Fracture of right superior pubic ramus, closed, initial encounter [S32.511A] 10/20/2023 Yes    Anasarca [R60.1] 10/19/2023 No    Acute hepatic encephalopathy [K76.82] 10/18/2023 Yes    Generalized weakness [R53.1] 10/18/2023 Yes    Coagulopathy [D68.9] 10/18/2023 Yes    Non-traumatic rhabdomyolysis [M62.82] 10/18/2023 Yes      Problems Resolved During this Admission:    Diagnosis Date Noted Date Resolved POA    Stroke-like symptoms [R29.90] 10/18/2023 10/20/2023 Yes    SAMRA (acute kidney injury) [N17.9] 10/18/2023 10/21/2023 Yes       VTE Risk Mitigation (From admission, onward)           Ordered     IP VTE HIGH RISK PATIENT  Once         10/18/23 0645     Place sequential compression device  Until discontinued         10/18/23 0645     Reason for No Pharmacological VTE Prophylaxis  Once        Question:  Reasons:  Answer:  Risk of Bleeding    10/18/23 0645                    Assessment and recommendations:    57 y/o female with a history of decompensation alcoholic cirrhosis who presented with acute encephalopathy, urinary and fecal incontinence and concern for a tingue bite (that was attributed to poor dentition). Per the notes, she was alert and oriented x 3 on intial neurology evaluation.  MRI of the brain on 10/19 and repeat imaging w/ contrast yesterday did not identified any acute abnormality or pathologic enhancement to explain her presentation.  EEG showed no underlying " epileptiform activity. PDR was seen with generalized slowing in the background.    Her labs showed a marginally elevated ammonia.   Afebrile and no evidence of an infection/sepsis.  Sodium normal.    On exam today, she was lethargic and making gurgling sounds. Did not answer any questions or follow any commands.    Presentation seems consistent with a metabolic encephalopathy. Recommend CXR to evaluate for any evidence of aspiration PNA.  She is on Rocephin.  Pending a repeat EEG.   Do not feel she needs an LP.    Delirium Precautions  -Re-orient patient frequently and keep pt's whiteboard up to date with current day and team member's names  -Keep shades open/room lit during day  -Low light at night (close blinds at night)  -Low stimulation, minimize interruptions at night  -Minimize use of restraints  -Encourage family to be at bedside  -Regular bowel movements  -Avoid opiates, benzodiazepines, antihistamines, anticholinergics, hypnotics as much as possible        Leo Zepeda MD  Neurology  [facility] - Telemetry ([facility]

## 2023-10-24 NOTE — PROGRESS NOTES
[facility] Medicine  Progress Note    Patient Name: Mara Mojica  MRN: [id number]  Patient Class: IP- Inpatient   Admission Date: 10/18/2023  Length of Stay: 6 days  Attending Physician: Ahsan Cunningham MD  Primary Care Provider: Osiris Nevarez NP        Subjective:     Principal Problem:Decompensated hepatic cirrhosis        HPI:  The patient is a 59 yo female with Alcoholic cirrhosis with ascites who presented to ED with AMS. Pt's significant other reports while at work, he tried calling the pt all after noon, but she did not answer the phone. When he got home from work at 1pm, he found the pt on the floor confused with slurred speech laying on a pillow with blood stains and surrounded by soiled blankets with urine and feces and paper towels with blood. Pt can not recall all the events, however, she states she was laying on the couch and moved to the floor because she soiled on herself. She denies any falls or seizures and states that she just wanted to lay down on the floor. She reports she was too weak to go to her bedroom or to the bathroom. She states she cut her tongue on her teeth (due to poor dentition). She denies LOC. However, the S.O. states that the pt was very confused on his arrival to the home. Confusion has improved since arrival to ED. Pt denies any alcohol intake or substance use. Last alcohol drink was 7/2023.   In the ED, the pt was also noted to have a left facial droop and reported numbness to to her mouth and tongue with concern for stroke.     Of note, the pt was recently hospitalized with SBO and SAMRA. SBO resolved with conservative treatment. Pt then left AMA. Serum Cr was 2.8 on discharge.     In the ED, Afebrile, BP stable. Mild tachycardia noted. Labs showed Hgb 8.5, Platelets 66, Serum Cr 1.4, T BIli 2.7, AST 96, ALT 47, , , Ammonia normal INR 2.0. UA +nitrites, no WBCs. ABGs PH 7.5, pCO2 27, pHCO3 22. CT head showed nothing acute -per  STAT RAD.       Overview/Hospital Course:  59 yo female with Alcoholic cirrhosis with ascites, s/p Ex Lap with bowel resection in the past for incarcerated hernia, recent SBO with SAMRA, who presented to ED with AMS. Per ER, pt's  came home from work and found her on the floor with her head on a blood-stained pillow and some slurred speech, surrounded by soiled blankets with urine and feces. Per EMS, pt was ambulatory at the scene. The pt told the paramedics that she was on the floor for about 3 hours. In the ER, the pt is oriented x3 (contrary to triage note). She also c/o numbness to the entire tongue and lips which onset 3 hours PTA. She denies any falls or seizures. Pt can not recall all the events, however, she states she was laying on the couch and moved to the floor because she soiled on herself. She reports she was too weak to go to her bedroom or to the bathroom. She states she cut her tongue on her teeth (due to poor dentition). She denies LOC. However, the S.O. states that the pt was very confused on his arrival to the home. Confusion has improved since arrival to ED. Pt denies any alcohol intake or substance use. Last alcohol drink was 7/2023.      In the ED, the pt was also noted to have a left facial droop and reported numbness to to her mouth and tongue with concern for stroke.      Of note, the pt was recently hospitalized with SBO and SAMRA. SBO resolved with conservative treatment. Pt then left AMA. Serum Cr was 2.8 on discharge.      In the ED, Afebrile, BP stable. Mild tachycardia noted. Labs showed Hgb 8.5, Platelets 66, Serum Cr 1.4, T BIli 2.7, AST 96, ALT 47, , , Ammonia normal. INR 2, UA +nitrites, 13 WBCs. ABGs PH 7.5, pCO2 27, pO2 51, HCO3 22. CT head showed nothing acute.     She was placed in Obs under Hosp Med for AMS r/o CVA, UTI, possible Hep Encephalopathy. Hepatology evaluated the pt. Recommended Paracentesis to r/o SBP and cont Lactulose, Rifaximin. Await  paracentesis and MRI Brain. Start Rocephin.     US Abd- no ascites. MRI Brain done but not read yet- appears normal to me, likely has HE and UTI-  will cont Lactulose and Rifaximin.  Evaluated by PT/OT/ST.     10/19- Appreciate Hepatology and Neurology: remains mostly unable to speak but appropriately says yes or no. Was not swallowing anything yesterday but finally gave her her lactulose and Rifaximin with applesauce, now improving. Her boyfriend is with her which helped. Looks calmer, less tremulous. EEG pending. Urine Cx growing E coli- getting Rocephin. Dr. Jackson worries that she will soon need Liver Tx. Cont present care.    10/20- not looking good, lethargic, eyes open but non responsive. No BM since yesterday, no meds given since last night. Ammonia 52. NGT placed but it clogged- hence Dr. Shannan Reardon ordered Lactulose enema and she had a huge BM later.  Cont present care. NGT replaced, will give lactulose thru the NGT.     10/21/23-Still not following commands. She seems alert but unable to communicate. Nursing staff reports several BMs. Plan for CT brain with contrast today. If no improvement by tomorrow will get LP.     10/22/23-Patient more alert, following simple commands but still not able to verbally communicate. Ammonia is normal and patient has continued to have large volume BMs. Awaiting input from Neurology. Hepatology following closely.   10/23 remains lethargic. After discussing with hepatology and neurology, transfer request placed for transplant service. Will attempt mri brain with contrast while transfer awaits. Labs reviewed with increased inr and thrombocytopenia  10/24 continues to be lethargic. Reconsulted neuro to review eeg and mri with contrast. Consulted infectious disease with repeat infectious workup in process. Ordered lumbar puncture and chest x-ray. prior echocardiogram with positive bubble study.      Interval History: See hospital course for today      Review of Systems   Unable  to perform ROS: Mental status change     Objective:     Vital Signs (Most Recent):  Temp: 99.2 °F (37.3 °C) (10/24/23 0719)  Pulse: 98 (10/24/23 0738)  Resp: 18 (10/24/23 0719)  BP: (!) 121/58 (10/24/23 0719)  SpO2: 96 % (10/24/23 0719) Vital Signs (24h Range):  Temp:  [97.2 °F (36.2 °C)-99.2 °F (37.3 °C)] 99.2 °F (37.3 °C)  Pulse:  [] 98  Resp:  [17-19] 18  SpO2:  [96 %-99 %] 96 %  BP: ()/(46-70) 121/58     Weight: 56.2 kg (123 lb 14.4 oz)  Body mass index is 25.02 kg/m².    Intake/Output Summary (Last 24 hours) at 10/24/2023 1031  Last data filed at 10/24/2023 0719  Gross per 24 hour   Intake 250 ml   Output 0 ml   Net 250 ml         Physical Exam  Vitals and nursing note reviewed. Exam conducted with a chaperone present ([employer] tech).   Constitutional:       General: She is not in acute distress.     Appearance: She is ill-appearing. She is not toxic-appearing.   HENT:      Head: Normocephalic and atraumatic.        Mouth/Throat:      Mouth: Mucous membranes are moist.   Cardiovascular:      Rate and Rhythm: Normal rate.      Heart sounds: Murmur heard.   Pulmonary:      Effort: Pulmonary effort is normal. No respiratory distress.   Abdominal:      Palpations: Abdomen is soft.      Tenderness: There is no abdominal tenderness.   Musculoskeletal:         General: Swelling present.   Skin:     General: Skin is warm.      Coloration: Skin is jaundiced.   Neurological:      Motor: Weakness present.             Significant Labs: All pertinent labs within the past 24 hours have been reviewed.  CBC:   Recent Labs   Lab 10/23/23  0813   WBC 6.81   HGB 7.9*   HCT 25.3*   PLT 69*     CMP:   Recent Labs   Lab 10/23/23  0813      K 4.0   *   CO2 20*   GLU 79   BUN 21*   CREATININE 0.8   CALCIUM 8.7   PROT 5.6*   ALBUMIN 2.3*   BILITOT 5.0*   ALKPHOS 73   AST 79*   ALT 44   ANIONGAP 6*     Coagulation:   Recent Labs   Lab 10/23/23  0813   INR 2.2*     TSH:   Recent Labs   Lab 10/18/23  0729   TSH 0.440        Significant Imaging: I have reviewed all pertinent imaging results/findings within the past 24 hours.  Mri with contrast with no acute process      Assessment/Plan:      * Decompensated alcoholic hepatic cirrhosis  Liver continues to decompensate   Consult hepatology     MELD 3.0: 24 at 10/23/2023  8:13 AM  MELD-Na: 21 at 10/23/2023  8:13 AM  Calculated from:  Serum Creatinine: 0.8 mg/dL (Using min of 1 mg/dL) at 10/23/2023  8:13 AM  Serum Sodium: 142 mmol/L (Using max of 137 mmol/L) at 10/23/2023  8:13 AM  Total Bilirubin: 5.0 mg/dL at 10/23/2023  8:13 AM  Serum Albumin: 2.3 g/dL at 10/23/2023  8:13 AM  INR(ratio): 2.2 at 10/23/2023  8:13 AM  Age at listing (hypothetical): 58 years  Sex: Female at 10/23/2023  8:13 AM    Ammonia level normal   Continues to have large BMs.  Cont Lactulose, Rifaximin        Bilateral lower extremity edema  Resume lasix intravenous       Acute encephalopathy  Attributed to metabolic vs hospital delirium  Hepatology and neurology following  Mri brain with contrast without acute process  Transfer for higher level of care, transplant     Repeat chest x-ray pending  Labs pending (b12, rpr, b1, b3)  eeg pending  Infectious disease consulted   Blood cultures pending  Transfer pending  Lumbar puncture pending    Fracture of right superior pubic ramus, closed, initial encounter  Ortho following- no further intervention  Follow-up outpatient       Anasarca  Restart lasix intravenous push    Non-traumatic rhabdomyolysis      Resolved       Coagulopathy  INR increasing  Repeat pending    Generalized weakness  PT/OT/ST  Turn q 2 hour       Acute hepatic encephalopathy  Concern for hepatic encephalopathy but it is unusual to have this degree with minimal ammonia elevation and no clinical change despite multiple lactulose enemas with large volume BMs.   -Plan for CT head with contrast  -Continue with lactulose enemas  -Monitor ammonia    CT head with contrast was negative for acute  findings   Patient more alert, following simple commands but still not able to verbally communicate.   Ammonia is normal   Neurology recommending further imaging  Hepatology recommending transfer for higher level of care  Ammonia pending    VTE Risk Mitigation (From admission, onward)         Ordered     IP VTE HIGH RISK PATIENT  Once         10/18/23 0645     Place sequential compression device  Until discontinued         10/18/23 0645     Reason for No Pharmacological VTE Prophylaxis  Once        Question:  Reasons:  Answer:  Risk of Bleeding    10/18/23 0645                Discharge Planning   ASHELY:      Code Status: Full Code   Is the patient medically ready for discharge?:     Reason for patient still in hospital (select all that apply): Patient trending condition, Laboratory test, Treatment, Imaging, Consult recommendations and Pending disposition  Discharge Plan A: Other (TBD)                  Junior Luna MD  Department of Hospital Medicine   [facility] ([facility])

## 2023-10-24 NOTE — PROGRESS NOTES
Consult for an LP by radiology.    I just spoke with the  who seems to be unwilling to proceed because 'the other day they said she could bleed to death now they want to do it. she's been in an out this hospital.  Don't touch her, I want to speak with the ordering doctor!'.  I told him there is always a risk of bleeding and her risk was higher but within the parameters but he was adamant about not moving forward at this moment.  I told him we evaluate risks/benefits and it's ultimately up to him.

## 2023-10-24 NOTE — ASSESSMENT & PLAN NOTE
Encephalopathy and MELD worse than her baseline possible from UTI, no other clear precipitant.  -Continue to monitor MELD score  -Meld is high but lower than median for liver transplantation, but since patient still have significant issues will transfer to [facility]  -Seems to have increasing volume issues would start diuresis    MELD 3.0: 24 at 10/23/2023  8:13 AM  MELD-Na: 21 at 10/23/2023  8:13 AM  Calculated from:  Serum Creatinine: 0.8 mg/dL (Using min of 1 mg/dL) at 10/23/2023  8:13 AM  Serum Sodium: 142 mmol/L (Using max of 137 mmol/L) at 10/23/2023  8:13 AM  Total Bilirubin: 5.0 mg/dL at 10/23/2023  8:13 AM  Serum Albumin: 2.3 g/dL at 10/23/2023  8:13 AM  INR(ratio): 2.2 at 10/23/2023  8:13 AM  Age at listing (hypothetical): 58 years  Sex: Female at 10/23/2023  8:13 AM

## 2023-10-24 NOTE — SUBJECTIVE & OBJECTIVE
Interval History: See hospital course for today      Review of Systems   Unable to perform ROS: Mental status change     Objective:     Vital Signs (Most Recent):  Temp: 99.2 °F (37.3 °C) (10/24/23 0719)  Pulse: 98 (10/24/23 0738)  Resp: 18 (10/24/23 0719)  BP: (!) 121/58 (10/24/23 0719)  SpO2: 96 % (10/24/23 0719) Vital Signs (24h Range):  Temp:  [97.2 °F (36.2 °C)-99.2 °F (37.3 °C)] 99.2 °F (37.3 °C)  Pulse:  [] 98  Resp:  [17-19] 18  SpO2:  [96 %-99 %] 96 %  BP: ()/(46-70) 121/58     Weight: 56.2 kg (123 lb 14.4 oz)  Body mass index is 25.02 kg/m².    Intake/Output Summary (Last 24 hours) at 10/24/2023 1031  Last data filed at 10/24/2023 0719  Gross per 24 hour   Intake 250 ml   Output 0 ml   Net 250 ml         Physical Exam  Vitals and nursing note reviewed. Exam conducted with a chaperone present (eeg tech).   Constitutional:       General: She is not in acute distress.     Appearance: She is ill-appearing. She is not toxic-appearing.   HENT:      Head: Normocephalic and atraumatic.        Mouth/Throat:      Mouth: Mucous membranes are moist.   Cardiovascular:      Rate and Rhythm: Normal rate.      Heart sounds: Murmur heard.   Pulmonary:      Effort: Pulmonary effort is normal. No respiratory distress.   Abdominal:      Palpations: Abdomen is soft.      Tenderness: There is no abdominal tenderness.   Musculoskeletal:         General: Swelling present.   Skin:     General: Skin is warm.      Coloration: Skin is jaundiced.   Neurological:      Motor: Weakness present.             Significant Labs: All pertinent labs within the past 24 hours have been reviewed.  CBC:   Recent Labs   Lab 10/23/23  0813   WBC 6.81   HGB 7.9*   HCT 25.3*   PLT 69*     CMP:   Recent Labs   Lab 10/23/23  0813      K 4.0   *   CO2 20*   GLU 79   BUN 21*   CREATININE 0.8   CALCIUM 8.7   PROT 5.6*   ALBUMIN 2.3*   BILITOT 5.0*   ALKPHOS 73   AST 79*   ALT 44   ANIONGAP 6*     Coagulation:   Recent Labs   Lab  10/23/23  0813   INR 2.2*     TSH:   Recent Labs   Lab 10/18/23  0729   TSH 0.440       Significant Imaging: I have reviewed all pertinent imaging results/findings within the past 24 hours.  Mri with contrast with no acute process

## 2023-10-24 NOTE — SUBJECTIVE & OBJECTIVE
Interval History: Concerned that patient is not improving so asked for neuro to weight back in. She also has some increasing lower ext edema.    Current Facility-Administered Medications   Medication    0.9%  NaCl infusion    cefTRIAXone (ROCEPHIN) 1 g in dextrose 5 % in water (D5W) 100 mL IVPB (MB+)    folic acid-vit B6-vit B12 2.5-25-2 mg tablet 1 tablet    lactulose 10 gram/15 mL enema solution (inpatient use) 200 g    multivitamin tablet    ondansetron injection 4 mg    rifAXIMin tablet 550 mg    sodium chloride 0.9% bolus 500 mL 500 mL    sodium chloride 0.9% flush 10 mL    thiamine tablet 100 mg       Objective:     Vital Signs (Most Recent):  Temp: 99.2 °F (37.3 °C) (10/24/23 0719)  Pulse: 98 (10/24/23 0738)  Resp: 18 (10/24/23 0719)  BP: (!) 121/58 (10/24/23 0719)  SpO2: 96 % (10/24/23 0719) Vital Signs (24h Range):  Temp:  [97.2 °F (36.2 °C)-99.2 °F (37.3 °C)] 99.2 °F (37.3 °C)  Pulse:  [] 98  Resp:  [17-19] 18  SpO2:  [96 %-99 %] 96 %  BP: ()/(46-70) 121/58     Weight: 56.2 kg (123 lb 14.4 oz) (10/24/23 0719)  Body mass index is 25.02 kg/m².       Physical Exam  Vitals reviewed.   Constitutional:       General: She is not in acute distress.     Appearance: She is well-developed.   HENT:      Head: Normocephalic and atraumatic.      Mouth/Throat:      Pharynx: No oropharyngeal exudate.   Eyes:      General: No scleral icterus.        Right eye: No discharge.         Left eye: No discharge.      Conjunctiva/sclera: Conjunctivae normal.      Pupils: Pupils are equal, round, and reactive to light.   Pulmonary:      Effort: Pulmonary effort is normal. No respiratory distress.      Breath sounds: Normal breath sounds. No wheezing.   Abdominal:      General: There is no distension.      Palpations: Abdomen is soft.      Tenderness: There is no abdominal tenderness.   Musculoskeletal:      Right lower leg: Edema present.      Left lower leg: Edema present.   Neurological:      Mental Status: She is  alert.      Comments: Alert not orients, no verbal, follows basic commands   Psychiatric:         Behavior: Behavior normal.            MELD 3.0: 24 at 10/23/2023  8:13 AM  MELD-Na: 21 at 10/23/2023  8:13 AM  Calculated from:  Serum Creatinine: 0.8 mg/dL (Using min of 1 mg/dL) at 10/23/2023  8:13 AM  Serum Sodium: 142 mmol/L (Using max of 137 mmol/L) at 10/23/2023  8:13 AM  Total Bilirubin: 5.0 mg/dL at 10/23/2023  8:13 AM  Serum Albumin: 2.3 g/dL at 10/23/2023  8:13 AM  INR(ratio): 2.2 at 10/23/2023  8:13 AM  Age at listing (hypothetical): 58 years  Sex: Female at 10/23/2023  8:13 AM      Significant Labs:  Labs within the past month have been reviewed.    Significant Imaging:  MRI: Reviewed

## 2023-10-24 NOTE — PROCEDURES
EEG REPORT      Mara Mojica  [id number]  1965    DATE OF SERVICE: 10/24/2023         METHODOLOGY      Extended electroencephalographic recording is made while the patient is ambulatory and continuing normal daily activities.  Electrodes are placed according to the International 10-20 placement system and included T1 and T2 electrode placement.  Twenty four (24) channels of digital signal (sampling rate of 512/sec) was simultaneously recorded from the scalp including EKG and eye monitors.  Recording band pass was 0.1 to 100 hz and all data was stored digitally on the recorder.  The patient is instructed to press an event button when clinical symptoms occur and write the symptoms into a diary. Activation procedures which include photic stimulation, hyperventilation and instructing patients to perform simple task are done in selected patients.        The EEG is displayed on a monitor screen and can be reformatted into different montages for evaluation.  The entire recoding is submitted for computer assisted analysis to detect spike and electrographic seizure activity.  The entire recording is visually reviewed and the times identified by computer analysis as being spikes or seizures are reviewed again.  Compresses spectral analysis (CSA) is also performed on the activity recorded from each individual channel.  This is displayed as a power display of frequencies from 0 to 30 Hz over time.   The CSA analysis is done and displayed continuously.  This is reviewed for asymmetries in power between homologous areas of the scalp and for presence of changes in power which canbe seen when seizures occur.  Sections of suspected abnormalities on the CSA is then compared with the original EEG recording.  .     [employer] software was also utilized in the review of this study.  This software suite analyzes the EEG recording in multiple domains.  Coherence and rhythmicity is computed to identify EEG sections which may  contain organized seizures.  Each channel undergoes analysis to detect presence of spike and sharp waves which have special and morphological characteristic of epileptic activity.  The routine EEG recording is converted from spacial into frequency domain.  This is then displayed comparing homologous areas to identify areas of significant asymmetry.  Algorithm to identify non-cortically generated artifact is used to separate eye movement, EMG and other artifact from the EEG     Recording Times    A total of 00:25:54 hours of EEG was recorded.      EEG FINDINGS:  Background activity:   The background rhythm was characterized by poorly organized theta and alpha activity with occasional fragments of a 7-8Hz posterior dominant alpha rhythm.   Symmetry and continuity: the background was continuous and symmetric     Sleep:   No sleep transients were seen.    Activation procedures:   Photic stimulation was performed with no abnormalities seen    Abnormal activity:   No epileptiform discharges, periodic discharges, lateralized rhythmic delta activity or electrographic seizures were seen.    IMPRESSION:   Abnormal EEG due to a mild generalized non-specific cerebral dysfunction with no electrographic seizures or indications of seizure tendency.      Ryan Garcia MD  Neurology-Epilepsy.  [facility].

## 2023-10-24 NOTE — PT/OT/SLP PROGRESS
Physical Therapy Treatment    Patient Name:  Mara Mojica   MRN:  [id number]    Recommendations:     Discharge Recommendations: Moderate Intensity Therapy  Discharge Equipment Recommendations: to be determined by next level of care  Barriers to discharge: None    Assessment:     Mara Mojica is a 58 y.o. female admitted with a medical diagnosis of Decompensated hepatic cirrhosis.  She presents with the following impairments/functional limitations: weakness, impaired endurance, impaired functional mobility, gait instability, impaired balance, pain, decreased safety awareness, decreased lower extremity function, decreased coordination, decreased ROM, abnormal tone, impaired cardiopulmonary response to activity, impaired cognition.    Rehab Prognosis: Fair; patient would benefit from acute skilled PT services to address these deficits and reach maximum level of function.    Recent Surgery: * No surgery found *      Plan:     During this hospitalization, patient to be seen 3 x/week to address the identified rehab impairments via therapeutic exercises, therapeutic activities, gait training and progress toward the following goals:    Plan of Care Expires:  11/01/23    Subjective     Chief Complaint: Pt is non-verbal  Patient/Family Comments/goals: none stated  Pain/Comfort:  Pain Rating 1:  (pt non-verbal, no indicators of pain)      Objective:     Communicated with nurse Portillo and epic chart review prior to session.  Patient found right sidelying with peripheral IV, NG tube, telemetry, pressure relief boots, bed alarm upon PT entry to room.     General Precautions: Standard, aspiration, fall  Orthopedic Precautions: N/A  Braces: N/A  Respiratory Status: Room air     Functional Mobility:  Gait belt applied - N/A  Bed Mobility  Rolling Left: total assistance and of 2 persons  Rolling Right: total assistance and of 2 persons  Scooting: total assistance and of 2 persons  Soiled linens changed  Pt positioned and pillows  "placed to reduce UE swelling and reduce the risk of pressure injury.  Pt unable to follow commands, no active participation.    AM-PAC 6 CLICK MOBILITY  Turning over in bed (including adjusting bedclothes, sheets and blankets)?: 1  Sitting down on and standing up from a chair with arms (e.g., wheelchair, bedside commode, etc.): 1  Moving from lying on back to sitting on the side of the bed?: 1  Moving to and from a bed to a chair (including a wheelchair)?: 1  Need to walk in hospital room?: 1  Climbing 3-5 steps with a railing?: 1  Basic Mobility Total Score: 6       Treatment & Education:  Reviewed role of PT in acute care and POC. Pt tolerated interventions fair. Patient performed 1 set(s) of 15 repetitions of the following PROM bed level exercises: hip flexion/extension, knee flexion/extension, ankle plantar/dorsiflexion  for bilateral LE, no active participation form pt. Reviewed importance of EOB activities, activity pacing, and HEP (marching/hip flex, hip abd, heel slides/LAQ, quad sets, ankle pumps) in order to maintain/regain strength. Reviewed "call don't fall" policy and increased risk of falling due to weakness, instructed to utilize call bell for assistance with all transfers. Pt not receptive to education at this time.    Patient left left sidelying with all lines intact, call button in reach, bed alarm on, and nurse present.    GOALS:   Multidisciplinary Problems       Physical Therapy Goals          Problem: Physical Therapy    Goal Priority Disciplines Outcome Goal Variances Interventions   Physical Therapy Goal     PT, PT/OT Ongoing, Not Progressing     Description: Goals to be met by 11/1/23.  1. Pt will complete bed mobility SBA.  2. Pt will complete sit to stand CGA.  3. Pt will ambulate 100ft CGA using RW.  4. Pt will increase AMPAC score by 2 points to progress functional mobility.                       Time Tracking:     PT Received On: 10/24/23  PT Start Time: 1305     PT Stop Time: 1335  PT " Total Time (min): 30 min     Billable Minutes: Therapeutic Activity 15min and Therapeutic Exercise 15min    Treatment Type: Treatment  PT/PTA: PT     Number of PTA visits since last PT visit: 0     10/24/2023

## 2023-10-24 NOTE — PLAN OF CARE
A215/A215 CHESTER Mojica is a 58 y.o.female admitted on 10/18/2023 for Decompensated hepatic cirrhosis   Code Status: Full Code MRN: [id number]   Review of patient's allergies indicates:  No Known Allergies  Past Medical History:   Diagnosis Date    Alcoholic cirrhosis of liver     Kidney failure     Unilateral inguinal hernia, without obstruction or gangrene, not specified as recurrent       PRN meds    sodium chloride 0.9%, , PRN  ondansetron, 4 mg, Q12H PRN  sodium chloride 0.9%, 500 mL, Continuous PRN  sodium chloride 0.9%, 10 mL, PRN      Neuro checked q 4 and pt turned q 2 hrs. No falls were reported on shift and hourly rounding is complete. Cardiac and lab monitoring continues. Chart check completed. Will continue plan of care.      Orientation: other (see comments) (MAGY)  [place] Coma Scale Score: 9     Lead Monitored: Lead II Rhythm: normal sinus rhythm    Cardiac/Telemetry Box Number: [id number]  VTE Required Core Measure: (SCDs) Sequential compression device initiated/maintained Last Bowel Movement: 10/23/23  Diet NPO  Voiding Characteristics: incontinence  Ananth Score: 6  Fall Risk Score: 15  Accucheck []   Freq?      Lines/Drains/Airways       Drain  Duration                  NG/OG Tube 10/20/23 1500 [place] sump 16 Fr. Left nostril 3 days              Peripheral Intravenous Line  Duration                  Peripheral IV - Single Lumen 10/18/23 0304 18 G Anterior;Right Forearm 6 days

## 2023-10-24 NOTE — ASSESSMENT & PLAN NOTE
Concern for hepatic encephalopathy but it is unusual to have this degree with now normal (even lower) ammonia levels and multiple lactulose enemas with large volume BMs. Communicated with hospital med and neuro is going to weigh back in. Need to consider other causes of encephalopathy  -Awaiting transfer to [facility]  -Continue with lactulose enemas and rifaximin  -Monitor ammonia  -Check RPR, B12

## 2023-10-24 NOTE — ASSESSMENT & PLAN NOTE
Concern for hepatic encephalopathy but it is unusual to have this degree with minimal ammonia elevation and no clinical change despite multiple lactulose enemas with large volume BMs.   -Plan for CT head with contrast  -Continue with lactulose enemas  -Monitor ammonia    CT head with contrast was negative for acute findings   Patient more alert, following simple commands but still not able to verbally communicate.   Ammonia is normal   Neurology recommending further imaging  Hepatology recommending transfer for higher level of care  Ammonia pending

## 2023-10-24 NOTE — ASSESSMENT & PLAN NOTE
Attributed to metabolic vs hospital delirium  Hepatology and neurology following  Mri brain with contrast without acute process  Transfer for higher level of care, transplant     Repeat chest x-ray pending  Labs pending (b12, rpr, b1, b3)  eeg pending  Infectious disease consulted   Blood cultures pending  Transfer pending  Lumbar puncture pending

## 2023-10-24 NOTE — PROGRESS NOTES
ST attempted dysphagia intervention; however, pt lethargic and neither safe/efficient for swallowing assessment.  Pt recommended for continued alternate means of nutrition/medication with ongoing communication/swallowing evaluation as mentation improves.  Pt also pending transfer to main campus for higher level of care.

## 2023-10-25 ENCOUNTER — HOSPITAL ENCOUNTER (INPATIENT)
Facility: HOSPITAL | Age: 58
LOS: 33 days | Discharge: HOSPICE | DRG: 100 | End: 2023-11-27
Attending: HOSPITALIST | Admitting: HOSPITALIST
Payer: MEDICAID

## 2023-10-25 ENCOUNTER — TELEPHONE (OUTPATIENT)
Dept: ORTHOPEDICS | Facility: CLINIC | Age: 58
End: 2023-10-25
Payer: MEDICAID

## 2023-10-25 VITALS
BODY MASS INDEX: 24.97 KG/M2 | OXYGEN SATURATION: 97 % | HEART RATE: 89 BPM | RESPIRATION RATE: 18 BRPM | TEMPERATURE: 98 F | DIASTOLIC BLOOD PRESSURE: 49 MMHG | HEIGHT: 59 IN | WEIGHT: 123.88 LBS | SYSTOLIC BLOOD PRESSURE: 103 MMHG

## 2023-10-25 DIAGNOSIS — R53.81 DEBILITY: ICD-10-CM

## 2023-10-25 DIAGNOSIS — K13.70 ORAL MUCOSAL LESION: ICD-10-CM

## 2023-10-25 DIAGNOSIS — Z46.59 ENCOUNTER FOR FEEDING TUBE PLACEMENT: ICD-10-CM

## 2023-10-25 DIAGNOSIS — K72.90 DECOMPENSATED HEPATIC CIRRHOSIS: ICD-10-CM

## 2023-10-25 DIAGNOSIS — E87.0 HYPERNATREMIA: ICD-10-CM

## 2023-10-25 DIAGNOSIS — Z71.89 GOALS OF CARE, COUNSELING/DISCUSSION: ICD-10-CM

## 2023-10-25 DIAGNOSIS — D64.9 ANEMIA, UNSPECIFIED TYPE: ICD-10-CM

## 2023-10-25 DIAGNOSIS — K76.82 ACUTE HEPATIC ENCEPHALOPATHY: ICD-10-CM

## 2023-10-25 DIAGNOSIS — D69.6 THROMBOCYTOPENIA: ICD-10-CM

## 2023-10-25 DIAGNOSIS — R60.9 SWELLING: ICD-10-CM

## 2023-10-25 DIAGNOSIS — R56.9 SEIZURE: ICD-10-CM

## 2023-10-25 DIAGNOSIS — K76.82 HEPATIC ENCEPHALOPATHY: ICD-10-CM

## 2023-10-25 DIAGNOSIS — E44.0 MODERATE MALNUTRITION: ICD-10-CM

## 2023-10-25 DIAGNOSIS — G72.81 CRITICAL ILLNESS MYOPATHY: ICD-10-CM

## 2023-10-25 DIAGNOSIS — A41.9 SEPSIS, DUE TO UNSPECIFIED ORGANISM, UNSPECIFIED WHETHER ACUTE ORGAN DYSFUNCTION PRESENT: ICD-10-CM

## 2023-10-25 DIAGNOSIS — R74.01 TRANSAMINITIS: ICD-10-CM

## 2023-10-25 DIAGNOSIS — K74.60 DECOMPENSATED HEPATIC CIRRHOSIS: ICD-10-CM

## 2023-10-25 DIAGNOSIS — G93.40 ACUTE ENCEPHALOPATHY: Primary | ICD-10-CM

## 2023-10-25 DIAGNOSIS — R10.2 PAIN IN PELVIS: Primary | ICD-10-CM

## 2023-10-25 LAB
ALBUMIN SERPL BCP-MCNC: 2.5 G/DL (ref 3.5–5.2)
ALP SERPL-CCNC: 76 U/L (ref 55–135)
ALT SERPL W/O P-5'-P-CCNC: 40 U/L (ref 10–44)
AMMONIA PLAS-SCNC: 17 UMOL/L (ref 10–50)
AMPHET+METHAMPHET UR QL: NEGATIVE
ANION GAP SERPL CALC-SCNC: 11 MMOL/L (ref 8–16)
AST SERPL-CCNC: 77 U/L (ref 10–40)
BACTERIA #/AREA URNS AUTO: ABNORMAL /HPF
BARBITURATES UR QL SCN>200 NG/ML: NEGATIVE
BASOPHILS # BLD AUTO: 0.09 K/UL (ref 0–0.2)
BASOPHILS NFR BLD: 1.5 % (ref 0–1.9)
BENZODIAZ UR QL SCN>200 NG/ML: NEGATIVE
BILIRUB SERPL-MCNC: 5.6 MG/DL (ref 0.1–1)
BILIRUB UR QL STRIP: ABNORMAL
BUN SERPL-MCNC: 29 MG/DL (ref 6–20)
BZE UR QL SCN: NEGATIVE
CALCIUM SERPL-MCNC: 10 MG/DL (ref 8.7–10.5)
CANNABINOIDS UR QL SCN: NEGATIVE
CHLORIDE SERPL-SCNC: 116 MMOL/L (ref 95–110)
CLARITY UR REFRACT.AUTO: ABNORMAL
CO2 SERPL-SCNC: 20 MMOL/L (ref 23–29)
COLOR UR AUTO: YELLOW
CREAT SERPL-MCNC: 0.9 MG/DL (ref 0.5–1.4)
CREAT UR-MCNC: 229 MG/DL (ref 15–325)
DIFFERENTIAL METHOD: ABNORMAL
EOSINOPHIL # BLD AUTO: 0.1 K/UL (ref 0–0.5)
EOSINOPHIL NFR BLD: 1.8 % (ref 0–8)
ERYTHROCYTE [DISTWIDTH] IN BLOOD BY AUTOMATED COUNT: 17.9 % (ref 11.5–14.5)
EST. GFR  (NO RACE VARIABLE): >60 ML/MIN/1.73 M^2
ETHANOL UR-MCNC: <10 MG/DL
GLUCOSE SERPL-MCNC: 68 MG/DL (ref 70–110)
GLUCOSE UR QL STRIP: NEGATIVE
HCT VFR BLD AUTO: 26.9 % (ref 37–48.5)
HGB BLD-MCNC: 8.2 G/DL (ref 12–16)
HGB UR QL STRIP: ABNORMAL
HYALINE CASTS UR QL AUTO: 0 /LPF
IMM GRANULOCYTES # BLD AUTO: 0.09 K/UL (ref 0–0.04)
IMM GRANULOCYTES NFR BLD AUTO: 1.5 % (ref 0–0.5)
INR PPP: 1.8 (ref 0.8–1.2)
KETONES UR QL STRIP: ABNORMAL
LEUKOCYTE ESTERASE UR QL STRIP: ABNORMAL
LYMPHOCYTES # BLD AUTO: 1.4 K/UL (ref 1–4.8)
LYMPHOCYTES NFR BLD: 21.8 % (ref 18–48)
MCH RBC QN AUTO: 33.5 PG (ref 27–31)
MCHC RBC AUTO-ENTMCNC: 30.5 G/DL (ref 32–36)
MCV RBC AUTO: 110 FL (ref 82–98)
METHADONE UR QL SCN>300 NG/ML: NEGATIVE
MICROSCOPIC COMMENT: ABNORMAL
MONOCYTES # BLD AUTO: 1 K/UL (ref 0.3–1)
MONOCYTES NFR BLD: 16.2 % (ref 4–15)
NEUTROPHILS # BLD AUTO: 3.6 K/UL (ref 1.8–7.7)
NEUTROPHILS NFR BLD: 57.2 % (ref 38–73)
NITRITE UR QL STRIP: NEGATIVE
NRBC BLD-RTO: 1 /100 WBC
OPIATES UR QL SCN: NEGATIVE
PCP UR QL SCN>25 NG/ML: NEGATIVE
PH UR STRIP: 6 [PH] (ref 5–8)
PLATELET # BLD AUTO: 143 K/UL (ref 150–450)
PMV BLD AUTO: 9.8 FL (ref 9.2–12.9)
POCT GLUCOSE: 92 MG/DL (ref 70–110)
POTASSIUM SERPL-SCNC: 3.8 MMOL/L (ref 3.5–5.1)
PROCALCITONIN SERPL IA-MCNC: 0.04 NG/ML
PROT SERPL-MCNC: 6.2 G/DL (ref 6–8.4)
PROT UR QL STRIP: ABNORMAL
PROTHROMBIN TIME: 18.2 SEC (ref 9–12.5)
RBC # BLD AUTO: 2.45 M/UL (ref 4–5.4)
RBC #/AREA URNS AUTO: >100 /HPF (ref 0–4)
SODIUM SERPL-SCNC: 147 MMOL/L (ref 136–145)
SP GR UR STRIP: >1.03 (ref 1–1.03)
SQUAMOUS #/AREA URNS AUTO: 3 /HPF
TOXICOLOGY INFORMATION: NORMAL
TSH SERPL DL<=0.005 MIU/L-ACNC: 0.6 UIU/ML (ref 0.4–4)
URN SPEC COLLECT METH UR: ABNORMAL
WBC # BLD AUTO: 6.19 K/UL (ref 3.9–12.7)
WBC #/AREA URNS AUTO: 80 /HPF (ref 0–5)

## 2023-10-25 PROCEDURE — 99223 PR INITIAL HOSPITAL CARE,LEVL III: ICD-10-PCS | Mod: NTX,,, | Performed by: HOSPITALIST

## 2023-10-25 PROCEDURE — 87040 BLOOD CULTURE FOR BACTERIA: CPT | Mod: NTX | Performed by: HOSPITALIST

## 2023-10-25 PROCEDURE — 36415 COLL VENOUS BLD VENIPUNCTURE: CPT | Mod: NTX | Performed by: HOSPITALIST

## 2023-10-25 PROCEDURE — 82607 VITAMIN B-12: CPT | Mod: NTX | Performed by: HOSPITALIST

## 2023-10-25 PROCEDURE — 85025 COMPLETE CBC W/AUTO DIFF WBC: CPT | Mod: NTX | Performed by: HOSPITALIST

## 2023-10-25 PROCEDURE — 80321 ALCOHOLS BIOMARKERS 1OR 2: CPT | Mod: NTX | Performed by: HOSPITALIST

## 2023-10-25 PROCEDURE — 80053 COMPREHEN METABOLIC PANEL: CPT | Mod: NTX | Performed by: HOSPITALIST

## 2023-10-25 PROCEDURE — 87088 URINE BACTERIA CULTURE: CPT | Mod: NTX | Performed by: HOSPITALIST

## 2023-10-25 PROCEDURE — 85610 PROTHROMBIN TIME: CPT | Mod: NTX | Performed by: HOSPITALIST

## 2023-10-25 PROCEDURE — 80307 DRUG TEST PRSMV CHEM ANLYZR: CPT | Mod: NTX | Performed by: HOSPITALIST

## 2023-10-25 PROCEDURE — 63600175 PHARM REV CODE 636 W HCPCS: Mod: NTX | Performed by: HOSPITALIST

## 2023-10-25 PROCEDURE — 86592 SYPHILIS TEST NON-TREP QUAL: CPT | Mod: NTX | Performed by: HOSPITALIST

## 2023-10-25 PROCEDURE — 84145 PROCALCITONIN (PCT): CPT | Mod: NTX | Performed by: FAMILY MEDICINE

## 2023-10-25 PROCEDURE — 82300 ASSAY OF CADMIUM: CPT | Mod: NTX | Performed by: HOSPITALIST

## 2023-10-25 PROCEDURE — 51798 US URINE CAPACITY MEASURE: CPT | Mod: NTX

## 2023-10-25 PROCEDURE — 20600001 HC STEP DOWN PRIVATE ROOM: Mod: NTX

## 2023-10-25 PROCEDURE — 82140 ASSAY OF AMMONIA: CPT | Mod: NTX | Performed by: HOSPITALIST

## 2023-10-25 PROCEDURE — 25000003 PHARM REV CODE 250: Mod: NTX | Performed by: HOSPITALIST

## 2023-10-25 PROCEDURE — 83921 ORGANIC ACID SINGLE QUANT: CPT | Mod: NTX | Performed by: HOSPITALIST

## 2023-10-25 PROCEDURE — 84425 ASSAY OF VITAMIN B-1: CPT | Mod: NTX | Performed by: HOSPITALIST

## 2023-10-25 PROCEDURE — 87086 URINE CULTURE/COLONY COUNT: CPT | Mod: NTX | Performed by: HOSPITALIST

## 2023-10-25 PROCEDURE — 84443 ASSAY THYROID STIM HORMONE: CPT | Mod: NTX | Performed by: HOSPITALIST

## 2023-10-25 PROCEDURE — 81001 URINALYSIS AUTO W/SCOPE: CPT | Mod: NTX | Performed by: HOSPITALIST

## 2023-10-25 PROCEDURE — 99223 1ST HOSP IP/OBS HIGH 75: CPT | Mod: NTX,,, | Performed by: HOSPITALIST

## 2023-10-25 PROCEDURE — 36415 COLL VENOUS BLD VENIPUNCTURE: CPT | Mod: NTX | Performed by: FAMILY MEDICINE

## 2023-10-25 RX ORDER — ONDANSETRON 2 MG/ML
4 INJECTION INTRAMUSCULAR; INTRAVENOUS EVERY 8 HOURS PRN
Status: DISCONTINUED | OUTPATIENT
Start: 2023-10-25 | End: 2023-11-25

## 2023-10-25 RX ORDER — LANOLIN ALCOHOL/MO/W.PET/CERES
100 CREAM (GRAM) TOPICAL DAILY
Status: ON HOLD
Start: 2023-10-25 | End: 2023-10-26 | Stop reason: CLARIF

## 2023-10-25 RX ORDER — LACTULOSE 10 G/15ML
30 SOLUTION ORAL 3 TIMES DAILY
Status: DISCONTINUED | OUTPATIENT
Start: 2023-10-25 | End: 2023-10-29

## 2023-10-25 RX ORDER — ACETAMINOPHEN 650 MG/1
650 SUPPOSITORY RECTAL EVERY 6 HOURS PRN
Status: DISCONTINUED | OUTPATIENT
Start: 2023-10-25 | End: 2023-10-28

## 2023-10-25 RX ORDER — SODIUM CHLORIDE 0.9 % (FLUSH) 0.9 %
10 SYRINGE (ML) INJECTION
Status: DISCONTINUED | OUTPATIENT
Start: 2023-10-25 | End: 2023-10-31

## 2023-10-25 RX ADMIN — PHYTONADIONE 10 MG: 10 INJECTION, EMULSION INTRAMUSCULAR; INTRAVENOUS; SUBCUTANEOUS at 04:10

## 2023-10-25 RX ADMIN — LACTULOSE 30 G: 20 SOLUTION ORAL at 04:10

## 2023-10-25 RX ADMIN — THIAMINE HYDROCHLORIDE 500 MG: 100 INJECTION, SOLUTION INTRAMUSCULAR; INTRAVENOUS at 05:10

## 2023-10-25 RX ADMIN — LACTULOSE 30 G: 20 SOLUTION ORAL at 08:10

## 2023-10-25 RX ADMIN — RIFAXIMIN 550 MG: 550 TABLET ORAL at 08:10

## 2023-10-25 RX ADMIN — THIAMINE HYDROCHLORIDE 500 MG: 100 INJECTION, SOLUTION INTRAMUSCULAR; INTRAVENOUS at 08:10

## 2023-10-25 RX ADMIN — CEFTRIAXONE 2 G: 2 INJECTION, POWDER, FOR SOLUTION INTRAMUSCULAR; INTRAVENOUS at 02:10

## 2023-10-25 NOTE — HPI
59 yo female with Alcoholic cirrhosis with ascites who presented to ED with AMS.She was found on the floor confused with slurred speech laying on a pillow with blood stains and surrounded by soiled blankets with urine and feces and paper towels with blood.  She was  also noted to have a left facial droop and reported numbness to to her mouth and tongue with concern for stroke.    Labs and imaging test -  . CBC showed  Hgb 8.5, Platelets 66, Serum Cr 1.4,   Brain MRI- no acute process  RPR -normal

## 2023-10-25 NOTE — ASSESSMENT & PLAN NOTE
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

## 2023-10-25 NOTE — ASSESSMENT & PLAN NOTE
Although patient with History of liver cirrhosis, presentation atypical  Thiamine 500 TID IV started  CT head stat  MRI with contrast obtained before transfer  EEG ordered  Unable to perform LP due to coagulopathy  Neurology consulted  Continue HE treatment  Infectious evaluation

## 2023-10-25 NOTE — PLAN OF CARE
Patient remained free from falls throughout shift, call bell within reach. Turn q2h. Was accepted to [facility], has a bed, waiting on transportation. Report called. Lactulose enemas continued. Patient alert, not following any commands currently.  NGT for medication administration. Vitals stable, will continue to monitor.

## 2023-10-25 NOTE — ASSESSMENT & PLAN NOTE
Suspect related to liver disease  If progresses consider Hematology evaluation but monitoring for now in setting of no active bleeding.

## 2023-10-25 NOTE — NURSING
Nurses Note -- 4 Eyes      10/25/2023   11:20 AM      Skin assessed during: Admit      [x] No Altered Skin Integrity Present    [x]Prevention Measures Documented      [] Yes- Altered Skin Integrity Present or Discovered   [] LDA Added if Not in Epic (Describe Wound)   [] New Altered Skin Integrity was Present on Admit and Documented in LDA   [] Wound Image Taken    Wound Care Consulted? No    Attending Nurse:  Raisa Pena RN/Staff Member:   Nancy Otero

## 2023-10-25 NOTE — PLAN OF CARE
10/25/23 1646   Post-Acute Status   Post-Acute Authorization Placement  (PT/OT to evaluate)   Coverage [place]ICAID - [place] [facility]   Hospital Resources/Appts/Education Provided Provided education on problems/symptoms using teachback   Discharge Delays (!) Procedure Scheduling (IR, OR, Labs, Echo, Cath, Echo, EEG)   Discharge Plan   Discharge Plan A Other;Home Health  (PT/OT to evaluate)   Discharge Plan B Home Health     Ebony Alarcon RN  Case Management  [facility]  [phone]

## 2023-10-25 NOTE — ASSESSMENT & PLAN NOTE
MELD 3.0: 22 at 10/25/2023  1:38 PM  MELD-Na: 20 at 10/25/2023  1:38 PM  Calculated from:  Serum Creatinine: 0.9 mg/dL (Using min of 1 mg/dL) at 10/25/2023  1:38 PM  Serum Sodium: 147 mmol/L (Using max of 137 mmol/L) at 10/25/2023  1:38 PM  Total Bilirubin: 5.6 mg/dL at 10/25/2023  1:38 PM  Serum Albumin: 2.5 g/dL at 10/25/2023  1:38 PM  INR(ratio): 1.8 at 10/25/2023  1:38 PM  Age at listing (hypothetical): 58 years  Sex: Female at 10/25/2023  1:38 PM    Liver ultrasound  HE treatment  Hepatology consult

## 2023-10-25 NOTE — ASSESSMENT & PLAN NOTE
MELD 3.0: 24 at 10/24/2023 10:23 AM  MELD-Na: 21 at 10/24/2023 10:23 AM  Calculated from:  Serum Creatinine: 0.8 mg/dL (Using min of 1 mg/dL) at 10/24/2023 10:23 AM  Serum Sodium: 144 mmol/L (Using max of 137 mmol/L) at 10/24/2023 10:23 AM  Total Bilirubin: 4.9 mg/dL at 10/24/2023 10:23 AM  Serum Albumin: 2.1 g/dL at 10/24/2023 10:23 AM  INR(ratio): 2.2 at 10/24/2023 10:23 AM  Age at listing (hypothetical): 58 years  Sex: Female at 10/24/2023 10:23 AM    GI follow up

## 2023-10-25 NOTE — CONSULTS
[facility])  Infectious Disease  Consult Note    Patient Name: Mara Mojica  MRN: [id number]  Admission Date: 10/18/2023  Hospital Length of Stay: 7 days  Attending Physician: Ahsan Cunningham MD  Primary Care Provider: Osiris Nevarez NP     Isolation Status: No active isolations    Patient information was obtained from past medical records and ER records.      Consults  Assessment/Plan:     Neuro  Acute encephalopathy  Serum ammonia is normal   Will expect fever with meningo encephalitis   Will send autoimmune encephalopathy panel  Follow Csf results   On empiric Rocephin   Send [place] assay    Hematology  Thrombocytopenia  Related to liver disease -will need follow up/monitoring     GI  * Decompensated alcoholic hepatic cirrhosis  MELD 3.0: 24 at 10/24/2023 10:23 AM  MELD-Na: 21 at 10/24/2023 10:23 AM  Calculated from:  Serum Creatinine: 0.8 mg/dL (Using min of 1 mg/dL) at 10/24/2023 10:23 AM  Serum Sodium: 144 mmol/L (Using max of 137 mmol/L) at 10/24/2023 10:23 AM  Total Bilirubin: 4.9 mg/dL at 10/24/2023 10:23 AM  Serum Albumin: 2.1 g/dL at 10/24/2023 10:23 AM  INR(ratio): 2.2 at 10/24/2023 10:23 AM  Age at listing (hypothetical): 58 years  Sex: Female at 10/24/2023 10:23 AM    GI follow up        Thank you for your consult. I will follow-up with patient. Please contact us if you have any additional questions.    Silvestre Lawson MD, [facility]  Infectious Disease  [facility])    Subjective:     Principal Problem: Decompensated hepatic cirrhosis    HPI: 59 yo female with Alcoholic cirrhosis with ascites who presented to ED with AMS.She was found on the floor confused with slurred speech laying on a pillow with blood stains and surrounded by soiled blankets with urine and feces and paper towels with blood.  She was  also noted to have a left facial droop and reported numbness to to her mouth and tongue with concern for stroke.    Labs and imaging test -  . CBC showed  Hgb 8.5,  Platelets 66, Serum Cr 1.4,   Brain MRI- no acute process  RPR -normal            Past Medical History:   Diagnosis Date    Alcoholic cirrhosis of liver     Kidney failure     Unilateral inguinal hernia, without obstruction or gangrene, not specified as recurrent        Past Surgical History:   Procedure Laterality Date    ESOPHAGOGASTRODUODENOSCOPY N/A 09/21/2023    Procedure: EGD (ESOPHAGOGASTRODUODENOSCOPY);  Surgeon: Melissa Edwards MD;  Location: [facility];  Service: Endoscopy;  Laterality: N/A;    HERNIA REPAIR      TONSILLECTOMY         Review of patient's allergies indicates:  No Known Allergies    Medications:  Medications Prior to Admission   Medication Sig    furosemide (LASIX) 40 MG tablet Take 1 tablet (40 mg total) by mouth once daily.    lactulose (CHRONULAC) 20 gram/30 mL Soln Take 15 mLs (10 g total) by mouth 3 (three) times daily.    potassium chloride (KLOR-CON) 10 MEQ TbSR Take 1 tablet (10 mEq total) by mouth once daily.    spironolactone (ALDACTONE) 100 MG tablet Take 1 tablet (100 mg total) by mouth once daily.     Antibiotics (From admission, onward)      Start     Stop Route Frequency Ordered    10/18/23 2100  rifAXIMin tablet 550 mg         -- Oral 2 times daily 10/18/23 1327    10/18/23 1515  cefTRIAXone (ROCEPHIN) 1 g in dextrose 5 % in water (D5W) 100 mL IVPB (MB+)         -- IV Every 24 hours (non-standard times) 10/18/23 1407          Antifungals (From admission, onward)      None          Antivirals (From admission, onward)      None               There is no immunization history on file for this patient.    Family History       Problem Relation (Age of Onset)    Ovarian cancer Mother    Seizures Father          Social History     Socioeconomic History    Marital status:    Tobacco Use    Smoking status: Every Day     Current packs/day: 0.50     Average packs/day: 0.5 packs/day for 25.0 years (12.5 ttl pk-yrs)     Types: Cigarettes     Start date: 10/18/1998      Passive exposure: Never    Smokeless tobacco: Never   Substance and Sexual Activity    Alcohol use: Not Currently    Drug use: Never     Review of Systems   Unable to perform ROS: Mental status change     Objective:     Vital Signs (Most Recent):  Temp: 98 °F (36.7 °C) (10/25/23 0200)  Pulse: 80 (10/25/23 0200)  Resp: 18 (10/25/23 0200)  BP: (!) 117/57 (10/25/23 0200)  SpO2: 99 % (10/25/23 0200) Vital Signs (24h Range):  Temp:  [97.5 °F (36.4 °C)-99.2 °F (37.3 °C)] 98 °F (36.7 °C)  Pulse:  [] 80  Resp:  [16-20] 18  SpO2:  [96 %-99 %] 99 %  BP: (107-141)/(51-65) 117/57     Weight: 56.2 kg (123 lb 14.4 oz)  Body mass index is 25.02 kg/m².    Estimated Creatinine Clearance: 60.5 mL/min (based on SCr of 0.8 mg/dL).     Physical Exam  Vitals and nursing note reviewed.   Constitutional:       Comments: Patient is confused    Eyes:      General:         Right eye: No discharge.         Left eye: No discharge.   Cardiovascular:      Rate and Rhythm: Normal rate.   Pulmonary:      Effort: No respiratory distress.      Breath sounds: No wheezing.   Abdominal:      General: There is no distension.   Neurological:      Motor: Weakness present.          Significant Labs: BMP:   Recent Labs   Lab 10/24/23  1023   GLU 69*      K 3.9   *   CO2 19*   BUN 27*   CREATININE 0.8   CALCIUM 8.8     CBC:   Recent Labs   Lab 10/23/23  0813 10/24/23  1023 10/24/23  1102   WBC 6.81 6.69  --    HGB 7.9* 7.4*  --    HCT 25.3* 23.7* 20*   PLT 69* 76*  --      CMP:   Recent Labs   Lab 10/23/23  0813 10/24/23  1023    144   K 4.0 3.9   * 116*   CO2 20* 19*   GLU 79 69*   BUN 21* 27*   CREATININE 0.8 0.8   CALCIUM 8.7 8.8   PROT 5.6* 5.3*   ALBUMIN 2.3* 2.1*   BILITOT 5.0* 4.9*   ALKPHOS 73 69   AST 79* 75*   ALT 44 41   ANIONGAP 6* 9     All pertinent labs within the past 24 hours have been reviewed.    Significant Imaging: I have reviewed all pertinent imaging results/findings within the past 24  hours.

## 2023-10-25 NOTE — SUBJECTIVE & OBJECTIVE
Past Medical History:   Diagnosis Date    Alcoholic cirrhosis of liver     Kidney failure     Unilateral inguinal hernia, without obstruction or gangrene, not specified as recurrent        Past Surgical History:   Procedure Laterality Date    ESOPHAGOGASTRODUODENOSCOPY N/A 09/21/2023    Procedure: EGD (ESOPHAGOGASTRODUODENOSCOPY);  Surgeon: Melissa Edwards MD;  Location: [facility];  Service: Endoscopy;  Laterality: N/A;    HERNIA REPAIR      TONSILLECTOMY         Review of patient's allergies indicates:  No Known Allergies    Medications:  Medications Prior to Admission   Medication Sig    furosemide (LASIX) 40 MG tablet Take 1 tablet (40 mg total) by mouth once daily.    lactulose (CHRONULAC) 20 gram/30 mL Soln Take 15 mLs (10 g total) by mouth 3 (three) times daily.    potassium chloride (KLOR-CON) 10 MEQ TbSR Take 1 tablet (10 mEq total) by mouth once daily.    spironolactone (ALDACTONE) 100 MG tablet Take 1 tablet (100 mg total) by mouth once daily.     Antibiotics (From admission, onward)      Start     Stop Route Frequency Ordered    10/18/23 2100  rifAXIMin tablet 550 mg         -- Oral 2 times daily 10/18/23 1327    10/18/23 1515  cefTRIAXone (ROCEPHIN) 1 g in dextrose 5 % in water (D5W) 100 mL IVPB (MB+)         -- IV Every 24 hours (non-standard times) 10/18/23 1407          Antifungals (From admission, onward)      None          Antivirals (From admission, onward)      None               There is no immunization history on file for this patient.    Family History       Problem Relation (Age of Onset)    Ovarian cancer Mother    Seizures Father          Social History     Socioeconomic History    Marital status:    Tobacco Use    Smoking status: Every Day     Current packs/day: 0.50     Average packs/day: 0.5 packs/day for 25.0 years (12.5 ttl pk-yrs)     Types: Cigarettes     Start date: 10/18/1998     Passive exposure: Never    Smokeless tobacco: Never   Substance and Sexual Activity     Alcohol use: Not Currently    Drug use: Never     Review of Systems   Unable to perform ROS: Mental status change     Objective:     Vital Signs (Most Recent):  Temp: 98 °F (36.7 °C) (10/25/23 0200)  Pulse: 80 (10/25/23 0200)  Resp: 18 (10/25/23 0200)  BP: (!) 117/57 (10/25/23 0200)  SpO2: 99 % (10/25/23 0200) Vital Signs (24h Range):  Temp:  [97.5 °F (36.4 °C)-99.2 °F (37.3 °C)] 98 °F (36.7 °C)  Pulse:  [] 80  Resp:  [16-20] 18  SpO2:  [96 %-99 %] 99 %  BP: (107-141)/(51-65) 117/57     Weight: 56.2 kg (123 lb 14.4 oz)  Body mass index is 25.02 kg/m².    Estimated Creatinine Clearance: 60.5 mL/min (based on SCr of 0.8 mg/dL).     Physical Exam  Vitals and nursing note reviewed.   Constitutional:       Comments: Patient is confused    Eyes:      General:         Right eye: No discharge.         Left eye: No discharge.   Cardiovascular:      Rate and Rhythm: Normal rate.   Pulmonary:      Effort: No respiratory distress.      Breath sounds: No wheezing.   Abdominal:      General: There is no distension.   Neurological:      Motor: Weakness present.          Significant Labs: BMP:   Recent Labs   Lab 10/24/23  1023   GLU 69*      K 3.9   *   CO2 19*   BUN 27*   CREATININE 0.8   CALCIUM 8.8     CBC:   Recent Labs   Lab 10/23/23  0813 10/24/23  1023 10/24/23  1102   WBC 6.81 6.69  --    HGB 7.9* 7.4*  --    HCT 25.3* 23.7* 20*   PLT 69* 76*  --      CMP:   Recent Labs   Lab 10/23/23  0813 10/24/23  1023    144   K 4.0 3.9   * 116*   CO2 20* 19*   GLU 79 69*   BUN 21* 27*   CREATININE 0.8 0.8   CALCIUM 8.7 8.8   PROT 5.6* 5.3*   ALBUMIN 2.3* 2.1*   BILITOT 5.0* 4.9*   ALKPHOS 73 69   AST 79* 75*   ALT 44 41   ANIONGAP 6* 9     All pertinent labs within the past 24 hours have been reviewed.    Significant Imaging: I have reviewed all pertinent imaging results/findings within the past 24 hours.

## 2023-10-25 NOTE — H&P
[facility] - Intensive Care ([address] Medicine  History & Physical    Patient Name: Mara Mojica  MRN: [id number]  Patient Class: IP- Inpatient  Admission Date: 10/25/2023  Attending Physician: Aurelia Winston MD   Primary Care Provider: Osiris Nevarez NP         Patient information was obtained from patient, past medical records and ER records.     Subjective:     Principal Problem:<principal problem not specified>    Chief Complaint:   Chief Complaint   Patient presents with    metabolic encephalopathy        HPI: 57 yo woman with alcoholic cirrhosis with ascites, s/p Ex Lap with bowel resection for incarcerated hernia, recent SBO with [facility]RA, adm to [facility] on 10/18 with AMS.  Patient found on the floor confused with slurred speech with incontinence and with questionable tongue injury    On presentation /65, HR 95, RR 18, SpO2 96% (RA).  At that time the patient was alert and oriented.  There was left-sided facial weakness.  Otherwise neurologic exam was unremarkable (? )    Labs (10/18) WBC 6.2, Hb 8.5 (BL 10.1), Plts 66, INR 2.0, Na 136, K 4.3, BUN 16, Cr 1.5 (BL 1.0) bilirubin 6.4, AST 96, ALT 47, ammonia 42.  .  Alcohol not tested (<10 on 10/14) UTOX neg U/A WBC 13, bacteria rare. CTH and MRI brain neg for acute changes.    Patient evaluated by Neurology on 10/19.  Per neurology, at that time she was oriented to person place time and situation and attentive to her environment.  She was able to follow one and two-step commands.  Facial features were symmetrical.  Speech marked by dysarthria.  Most answers are yes/no.  Muscle testing of proximal and distal muscles of upper and LE showed diffuse generalized weakness WO focal or lateralizing findings.  No pathologic reflexes were noted.     Labs 10/18 WBC 6.2 > 6.8, Hb 8.5 > 7.9, Plts 66 > 69, INR > 2.2, Na 136 > 142 Cr 1.5 > 0.8, T bili 6.4 > 5.0, AST 96 > 79, ALT 47 > 44, Ammonia 42 > 28  Presently patient is lethargic and  responding to questions with one-word answers.      Her AMS is not readily attributed to HE with ammonia now 28 and CVA seems unlikely with neg MRI brain.  An MRI brain with contrast has been scheduled.  An EEG has not been done so seizures remain in the differential especially given her presentation.  Patient found on the floor with questionable tongue injury and incontinent of urine and feces.  Patient known to be drinking as recently as July 2023.  More recently patient has said that she is not drinking.  On admission alcohol was < 10.   A PEth has not been done    Dr Shannan SORIA ([place]) at [facility] consulted with Dr Renteria who recommended transfer to [facility].        Past Medical History:   Diagnosis Date    Alcoholic cirrhosis of liver     Kidney failure     Unilateral inguinal hernia, without obstruction or gangrene, not specified as recurrent        Past Surgical History:   Procedure Laterality Date    ESOPHAGOGASTRODUODENOSCOPY N/A 09/21/2023    Procedure: EGD (ESOPHAGOGASTRODUODENOSCOPY);  Surgeon: Melissa Edwards MD;  Location: [facility];  Service: Endoscopy;  Laterality: N/A;    HERNIA REPAIR      TONSILLECTOMY         Review of patient's allergies indicates:  No Known Allergies    Current Facility-Administered Medications on File Prior to Encounter   Medication    [DISCONTINUED] 0.9%  NaCl infusion (for blood administration)    [DISCONTINUED] 0.9%  NaCl infusion    [DISCONTINUED] cefTRIAXone (ROCEPHIN) 1 g in dextrose 5 % in water (D5W) 100 mL IVPB (MB+)    [DISCONTINUED] folic acid-vit B6-vit B12 2.5-25-2 mg tablet 1 tablet    [DISCONTINUED] furosemide injection 20 mg    [DISCONTINUED] lactulose 10 gram/15 mL enema solution (inpatient use) 200 g    [DISCONTINUED] multivitamin tablet    [DISCONTINUED] ondansetron injection 4 mg    [DISCONTINUED] phytonadione vitamin k (AQUA-MEPHYTON) 10 mg in dextrose 5 % (D5W) 50 mL IVPB    [DISCONTINUED] rifAXIMin tablet 550 mg     [DISCONTINUED] sodium chloride 0.9% bolus 500 mL 500 mL    [DISCONTINUED] sodium chloride 0.9% flush 10 mL    [DISCONTINUED] thiamine tablet 100 mg     Current Outpatient Medications on File Prior to Encounter   Medication Sig    dextrose 5 % in water (D5W) PgBk 100 mL with cefTRIAXone 1 gram SolR 1 g Inject 1 g into the vein every 12 (twelve) hours.    folic acid-vit B6-vit B12 2.5-25-2 mg (FOLBIC OR EQUIV) 2.5-25-2 mg Tab Take 1 tablet by mouth once daily.    furosemide (LASIX) 40 MG tablet Take 1 tablet (40 mg total) by mouth once daily.    lactulose (CHRONULAC) 20 gram/30 mL Soln Take 15 mLs (10 g total) by mouth 3 (three) times daily.    potassium chloride (KLOR-CON) 10 MEQ TbSR Take 1 tablet (10 mEq total) by mouth once daily.    rifAXIMin (XIFAXAN) 550 mg Tab Take 1 tablet (550 mg total) by mouth 2 (two) times daily.    spironolactone (ALDACTONE) 100 MG tablet Take 1 tablet (100 mg total) by mouth once daily.    thiamine 100 MG tablet Take 1 tablet (100 mg total) by mouth once daily.     Family History       Problem Relation (Age of Onset)    Ovarian cancer Mother    Seizures Father          Tobacco Use    Smoking status: Every Day     Current packs/day: 0.50     Average packs/day: 0.5 packs/day for 25.0 years (12.5 ttl pk-yrs)     Types: Cigarettes     Start date: 10/18/1998     Passive exposure: Never    Smokeless tobacco: Never   Substance and Sexual Activity    Alcohol use: Not Currently    Drug use: Never    Sexual activity: Not on file     Review of Systems   Unable to perform ROS: Mental status change     Objective:     Vital Signs (Most Recent):  Temp: 97.8 °F (36.6 °C) (10/25/23 1203)  Pulse: 83 (10/25/23 1507)  Resp: 18 (10/25/23 1203)  BP: (!) 143/63 (10/25/23 1203)  SpO2: 99 % (10/25/23 1203) Vital Signs (24h Range):  Temp:  [97.5 °F (36.4 °C)-98.6 °F (37 °C)] 97.8 °F (36.6 °C)  Pulse:  [78-94] 83  Resp:  [17-20] 18  SpO2:  [96 %-99 %] 99 %  BP: (103-143)/(49-63) 143/63     Weight:  56.2 kg (123 lb 14.4 oz)  Body mass index is 25.02 kg/m².     Physical Exam  Vitals reviewed. Chaperone present: eeg tech.   Constitutional:       General: She is not in acute distress.     Appearance: She is ill-appearing. She is not toxic-appearing.   HENT:      Head: Normocephalic and atraumatic.        Mouth/Throat:      Mouth: Mucous membranes are moist.   Cardiovascular:      Rate and Rhythm: Normal rate.      Heart sounds: Murmur heard.   Pulmonary:      Effort: Pulmonary effort is normal. No respiratory distress.   Abdominal:      Palpations: Abdomen is soft.      Tenderness: There is no abdominal tenderness.   Musculoskeletal:         General: Swelling present.   Skin:     General: Skin is warm.      Coloration: Skin is jaundiced.   Neurological:      Motor: Weakness present.                Significant Labs: All pertinent labs within the past 24 hours have been reviewed.    Significant Imaging: I have reviewed all pertinent imaging results/findings within the past 24 hours.    Assessment/Plan:     Thrombocytopenia    Suspect related to liver disease  If progresses consider Hematology evaluation but monitoring for now in setting of no active bleeding.     Acute encephalopathy  Although patient with History of liver cirrhosis, presentation atypical  Thiamine 500 TID IV started  CT head stat  MRI with contrast obtained before transfer  EEG ordered  Unable to perform LP due to coagulopathy  Neurology consulted  Continue HE treatment  Infectious evaluation       Acute hepatic encephalopathy  Continue lactulose and rifaximin      Decompensated alcoholic hepatic cirrhosis  MELD 3.0: 22 at 10/25/2023  1:38 PM  MELD-Na: 20 at 10/25/2023  1:38 PM  Calculated from:  Serum Creatinine: 0.9 mg/dL (Using min of 1 mg/dL) at 10/25/2023  1:38 PM  Serum Sodium: 147 mmol/L (Using max of 137 mmol/L) at 10/25/2023  1:38 PM  Total Bilirubin: 5.6 mg/dL at 10/25/2023  1:38 PM  Serum Albumin: 2.5 g/dL at 10/25/2023  1:38  PM  INR(ratio): 1.8 at 10/25/2023  1:38 PM  Age at listing (hypothetical): 58 years  Sex: Female at 10/25/2023  1:38 PM    Liver ultrasound  HE treatment  Hepatology consult        VTE Risk Mitigation (From admission, onward)         Ordered     IP VTE LOW RISK PATIENT  Once         10/25/23 1303     Place JOI hose  Until discontinued         10/25/23 1303     Place sequential compression device  Until discontinued         10/25/23 1303                               Aurelia Winston MD  Department of Hospital Medicine  [facility] - Intensive Care ([facility]-)    N/A  Family history is reviewed and has not changed   Pertinent information:

## 2023-10-25 NOTE — TELEPHONE ENCOUNTER
----- Message from Cal Ramirez sent at 10/25/2023 11:07 AM CDT -----  Contact: [phone]  Type:  Patient Returning Call    Who Called:Mr Mojica   Who Left Message for Patient:nurse   Does the patient know what this is regarding?:n/a   Would the patient rather a call back or a response via [facility]? Call back   Best Call Back Number:[phone]  Additional Information: n/a      Thanks KB

## 2023-10-25 NOTE — PT/OT/SLP PROGRESS
Physical Therapy      Patient Name:  Mara Mojica   MRN:  [id number]    Patient not seen today secondary to  (No MD note in on time of orders received. PT went to speak to nurse. Pt BAUDILIO for multiple tests/imaging. PT will f/u tomorrow as appropriate.).

## 2023-10-25 NOTE — PT/OT/SLP PROGRESS
Speech Language Pathology      Mara Mojica  MRN: [id number]    SLP evaluation orders received and reviewed with RN. Upon SLP attempt, RN confirmed Patient with transport at the door for pending testing and not available for assessment. Patient not seen today secondary to Nurse hold (pending testing), off the floor for testing upon SLP attempts.  ST to continue to monitor and re-attempt 10/26/23 pending medical status and clearance for PO trials.    10/25/2023

## 2023-10-25 NOTE — PLAN OF CARE
Pt alert, nonverbal, NSR on tele, VSS, s/o at bedside.  Pt with minimal dark cynthia output this shift via purewick, bladder scan results show 0.  Pt alert, bilat upper and lower ext flaccid but does slightly withdraw with painful stimuli.  Skin intact, bilat heel boots and SCDs in place.      No acute events this shift, bed low and locked, will continue to monitor.

## 2023-10-25 NOTE — SUBJECTIVE & OBJECTIVE
Past Medical History:   Diagnosis Date    Alcoholic cirrhosis of liver     Kidney failure     Unilateral inguinal hernia, without obstruction or gangrene, not specified as recurrent        Past Surgical History:   Procedure Laterality Date    ESOPHAGOGASTRODUODENOSCOPY N/A 09/21/2023    Procedure: EGD (ESOPHAGOGASTRODUODENOSCOPY);  Surgeon: Melissa Edwards MD;  Location: [facility];  Service: Endoscopy;  Laterality: N/A;    HERNIA REPAIR      TONSILLECTOMY         Review of patient's allergies indicates:  No Known Allergies    Current Facility-Administered Medications on File Prior to Encounter   Medication    [DISCONTINUED] 0.9%  NaCl infusion (for blood administration)    [DISCONTINUED] 0.9%  NaCl infusion    [DISCONTINUED] cefTRIAXone (ROCEPHIN) 1 g in dextrose 5 % in water (D5W) 100 mL IVPB (MB+)    [DISCONTINUED] folic acid-vit B6-vit B12 2.5-25-2 mg tablet 1 tablet    [DISCONTINUED] furosemide injection 20 mg    [DISCONTINUED] lactulose 10 gram/15 mL enema solution (inpatient use) 200 g    [DISCONTINUED] multivitamin tablet    [DISCONTINUED] ondansetron injection 4 mg    [DISCONTINUED] phytonadione vitamin k (AQUA-MEPHYTON) 10 mg in dextrose 5 % (D5W) 50 mL IVPB    [DISCONTINUED] rifAXIMin tablet 550 mg    [DISCONTINUED] sodium chloride 0.9% bolus 500 mL 500 mL    [DISCONTINUED] sodium chloride 0.9% flush 10 mL    [DISCONTINUED] thiamine tablet 100 mg     Current Outpatient Medications on File Prior to Encounter   Medication Sig    dextrose 5 % in water (D5W) PgBk 100 mL with cefTRIAXone 1 gram SolR 1 g Inject 1 g into the vein every 12 (twelve) hours.    folic acid-vit B6-vit B12 2.5-25-2 mg (FOLBIC OR EQUIV) 2.5-25-2 mg Tab Take 1 tablet by mouth once daily.    furosemide (LASIX) 40 MG tablet Take 1 tablet (40 mg total) by mouth once daily.    lactulose (CHRONULAC) 20 gram/30 mL Soln Take 15 mLs (10 g total) by mouth 3 (three) times daily.    potassium chloride (KLOR-CON) 10 MEQ TbSR Take 1 tablet (10  mEq total) by mouth once daily.    rifAXIMin (XIFAXAN) 550 mg Tab Take 1 tablet (550 mg total) by mouth 2 (two) times daily.    spironolactone (ALDACTONE) 100 MG tablet Take 1 tablet (100 mg total) by mouth once daily.    thiamine 100 MG tablet Take 1 tablet (100 mg total) by mouth once daily.     Family History       Problem Relation (Age of Onset)    Ovarian cancer Mother    Seizures Father          Tobacco Use    Smoking status: Every Day     Current packs/day: 0.50     Average packs/day: 0.5 packs/day for 25.0 years (12.5 ttl pk-yrs)     Types: Cigarettes     Start date: 10/18/1998     Passive exposure: Never    Smokeless tobacco: Never   Substance and Sexual Activity    Alcohol use: Not Currently    Drug use: Never    Sexual activity: Not on file     Review of Systems   Unable to perform ROS: Mental status change     Objective:     Vital Signs (Most Recent):  Temp: 97.8 °F (36.6 °C) (10/25/23 1203)  Pulse: 83 (10/25/23 1507)  Resp: 18 (10/25/23 1203)  BP: (!) 143/63 (10/25/23 1203)  SpO2: 99 % (10/25/23 1203) Vital Signs (24h Range):  Temp:  [97.5 °F (36.4 °C)-98.6 °F (37 °C)] 97.8 °F (36.6 °C)  Pulse:  [78-94] 83  Resp:  [17-20] 18  SpO2:  [96 %-99 %] 99 %  BP: (103-143)/(49-63) 143/63     Weight: 56.2 kg (123 lb 14.4 oz)  Body mass index is 25.02 kg/m².     Physical Exam  Vitals reviewed. Chaperone present: eeg tech.   Constitutional:       General: She is not in acute distress.     Appearance: She is ill-appearing. She is not toxic-appearing.   HENT:      Head: Normocephalic and atraumatic.        Mouth/Throat:      Mouth: Mucous membranes are moist.   Cardiovascular:      Rate and Rhythm: Normal rate.      Heart sounds: Murmur heard.   Pulmonary:      Effort: Pulmonary effort is normal. No respiratory distress.   Abdominal:      Palpations: Abdomen is soft.      Tenderness: There is no abdominal tenderness.   Musculoskeletal:         General: Swelling present.   Skin:     General: Skin is warm.       Coloration: Skin is jaundiced.   Neurological:      Motor: Weakness present.                Significant Labs: All pertinent labs within the past 24 hours have been reviewed.    Significant Imaging: I have reviewed all pertinent imaging results/findings within the past 24 hours.

## 2023-10-25 NOTE — DISCHARGE SUMMARY
[facility] Medicine  Discharge Summary      Patient Name: Mara Mojica  MRN: [id number]  [facility]: [id number]  Patient Class: IP- Inpatient  Admission Date: 10/18/2023  Hospital Length of Stay: 7 days  Discharge Date and Time:  10/25/2023 7:15 AM  Attending Physician: Ahsan Cunningham MD   Discharging Provider: Junior Luna MD  Primary Care Provider: Osiris Nevarez NP    Primary Care Team: Networked reference to record PCT     HPI:   The patient is a 59 yo female with Alcoholic cirrhosis with ascites who presented to ED with AMS. Pt's significant other reports while at work, he tried calling the pt all after noon, but she did not answer the phone. When he got home from work at 1pm, he found the pt on the floor confused with slurred speech laying on a pillow with blood stains and surrounded by soiled blankets with urine and feces and paper towels with blood. Pt can not recall all the events, however, she states she was laying on the couch and moved to the floor because she soiled on herself. She denies any falls or seizures and states that she just wanted to lay down on the floor. She reports she was too weak to go to her bedroom or to the bathroom. She states she cut her tongue on her teeth (due to poor dentition). She denies LOC. However, the S.O. states that the pt was very confused on his arrival to the home. Confusion has improved since arrival to ED. Pt denies any alcohol intake or substance use. Last alcohol drink was 7/2023.   In the ED, the pt was also noted to have a left facial droop and reported numbness to to her mouth and tongue with concern for stroke.     Of note, the pt was recently hospitalized with SBO and SAMRA. SBO resolved with conservative treatment. Pt then left AMA. Serum Cr was 2.8 on discharge.     In the ED, Afebrile, BP stable. Mild tachycardia noted. Labs showed Hgb 8.5, Platelets 66, Serum Cr 1.4, T BIli 2.7, AST 96, ALT 47, , , Ammonia normal  INR 2.0. UA +nitrites, no WBCs. ABGs PH 7.5, pCO2 27, pHCO3 22. CT head showed nothing acute -per STAT RAD.       * No surgery found *      Hospital Course:   59 yo female with Alcoholic cirrhosis with ascites, s/p Ex Lap with bowel resection in the past for incarcerated hernia, recent SBO with SAMRA, who presented to ED with AMS. Per ER, pt's  came home from work and found her on the floor with her head on a blood-stained pillow and some slurred speech, surrounded by soiled blankets with urine and feces. Per EMS, pt was ambulatory at the scene. The pt told the paramedics that she was on the floor for about 3 hours. In the ER, the pt is oriented x3 (contrary to triage note). She also c/o numbness to the entire tongue and lips which onset 3 hours PTA. She denies any falls or seizures. Pt can not recall all the events, however, she states she was laying on the couch and moved to the floor because she soiled on herself. She reports she was too weak to go to her bedroom or to the bathroom. She states she cut her tongue on her teeth (due to poor dentition). She denies LOC. However, the S.O. states that the pt was very confused on his arrival to the home. Confusion has improved since arrival to ED. Pt denies any alcohol intake or substance use. Last alcohol drink was 7/2023.      In the ED, the pt was also noted to have a left facial droop and reported numbness to to her mouth and tongue with concern for stroke.      Of note, the pt was recently hospitalized with SBO and SAMRA. SBO resolved with conservative treatment. Pt then left AMA. Serum Cr was 2.8 on discharge.      In the ED, Afebrile, BP stable. Mild tachycardia noted. Labs showed Hgb 8.5, Platelets 66, Serum Cr 1.4, T BIli 2.7, AST 96, ALT 47, , , Ammonia normal. INR 2, UA +nitrites, 13 WBCs. ABGs PH 7.5, pCO2 27, pO2 51, HCO3 22. CT head showed nothing acute.     She was placed in Obs under Hosp Med for AMS r/o CVA, UTI, possible Hep  Encephalopathy. Hepatology evaluated the pt. Recommended Paracentesis to r/o SBP and cont Lactulose, Rifaximin. Await paracentesis and MRI Brain. Start Rocephin.     US Abd- no ascites. MRI Brain done but not read yet- appears normal to me, likely has HE and UTI-  will cont Lactulose and Rifaximin.  Evaluated by PT/OT/ST.     10/19- Appreciate Hepatology and Neurology: remains mostly unable to speak but appropriately says yes or no. Was not swallowing anything yesterday but finally gave her her lactulose and Rifaximin with applesauce, now improving. Her boyfriend is with her which helped. Looks calmer, less tremulous. EEG pending. Urine Cx growing E coli- getting Rocephin. Dr. Jackson worries that she will soon need Liver Tx. Cont present care.    10/20- not looking good, lethargic, eyes open but non responsive. No BM since yesterday, no meds given since last night. Ammonia 52. NGT placed but it clogged- hence Dr. Shannan Reardon ordered Lactulose enema and she had a huge BM later.  Cont present care. NGT replaced, will give lactulose thru the NGT.     10/21/23-Still not following commands. She seems alert but unable to communicate. Nursing staff reports several BMs. Plan for CT brain with contrast today. If no improvement by tomorrow will get LP.     10/22/23-Patient more alert, following simple commands but still not able to verbally communicate. Ammonia is normal and patient has continued to have large volume BMs. Awaiting input from Neurology. Hepatology following closely.   10/23 remains lethargic. After discussing with hepatology and neurology, transfer request placed for transplant service. Will attempt mri brain with contrast while transfer awaits. Labs reviewed with increased inr and thrombocytopenia  10/24 continues to be lethargic. Reconsulted neuro to review eeg and mri with contrast. Consulted infectious disease with repeat infectious workup in process. Ordered lumbar puncture and chest x-ray. prior  echocardiogram with positive bubble study.    10/25  Patient not evaluated today. Patient left by ambulance for transfer to higher level of care.       Goals of Care Treatment Preferences:  Code Status: Full Code      Consults:   Consults (From admission, onward)        Status Ordering Provider     Inpatient consult to Registered Dietitian/Nutritionist  Once        Provider:  (Not yet assigned)    Acknowledged YESI ANDERSON     Inpatient consult to Infectious Diseases  Once        Provider:  Silvestre Lawson MD, KRISTAN    Acknowledged YESI ANDERSON     Inpatient consult to Telemedicine-General Neurology  Once        Provider:  (Not yet assigned)    Acknowledged YESI ANDERSON     Inpatient consult to Orthopedic Surgery  Once        Provider:  Brando Reardon MD    Completed HAROLDO WHITE     Inpatient consult to Neurology  Once        Provider:  Alexsander Claros MD    Completed HAROLDO WHITE     Inpatient Consult to Telemedicine - Hepatology  Once        Provider:  Demarco Jackson MD    Completed TJ COOL     Inpatient consult to Hospitalist  Once        Provider:  Jesu Angel MD    Completed TREY MARSHALL          No new Assessment & Plan notes have been filed under this hospital service since the last note was generated.  Service: Hospital Medicine    Final Active Diagnoses:    Diagnosis Date Noted POA    PRINCIPAL PROBLEM:  Decompensated alcoholic hepatic cirrhosis [K72.90, K74.60] 09/06/2023 Yes    Thrombocytopenia [D69.6] 10/25/2023 Unknown    Bilateral lower extremity edema [R60.0] 10/24/2023 No    Abnormal echocardiogram [R93.1] 10/24/2023 Yes    Acute encephalopathy [G93.40] 10/22/2023 Yes    Fracture of right superior pubic ramus, closed, initial encounter [S32.511A] 10/20/2023 Yes    Anasarca [R60.1] 10/19/2023 No    Acute hepatic encephalopathy [K76.82] 10/18/2023 Yes    Generalized weakness [R53.1] 10/18/2023 Yes    Coagulopathy [D68.9] 10/18/2023 Yes    Non-traumatic  rhabdomyolysis [M62.82] 10/18/2023 Yes      Problems Resolved During this Admission:    Diagnosis Date Noted Date Resolved POA    Stroke-like symptoms [R29.90] 10/18/2023 10/20/2023 Yes    SAMRA (acute kidney injury) [N17.9] 10/18/2023 10/21/2023 Yes       Discharged Condition: stable    Disposition: Another Health Care Inst*    Follow Up:    Patient Instructions:      Diet NPO       Significant Diagnostic Studies: Labs:   BMP:   Recent Labs   Lab 10/23/23  0813 10/24/23  1023   GLU 79 69*    144   K 4.0 3.9   * 116*   CO2 20* 19*   BUN 21* 27*   CREATININE 0.8 0.8   CALCIUM 8.7 8.8   , CMP   Recent Labs   Lab 10/23/23  0813 10/24/23  1023    144   K 4.0 3.9   * 116*   CO2 20* 19*   GLU 79 69*   BUN 21* 27*   CREATININE 0.8 0.8   CALCIUM 8.7 8.8   PROT 5.6* 5.3*   ALBUMIN 2.3* 2.1*   BILITOT 5.0* 4.9*   ALKPHOS 73 69   AST 79* 75*   ALT 44 41   ANIONGAP 6* 9   , CBC   Recent Labs   Lab 10/23/23  0813 10/24/23  1023 10/24/23  1102   WBC 6.81 6.69  --    HGB 7.9* 7.4*  --    HCT 25.3* 23.7* 20*   PLT 69* 76*  --    , INR   Lab Results   Component Value Date    INR 2.2 (H) 10/24/2023    INR 2.2 (H) 10/23/2023    INR 2.0 (H) 10/22/2023    and All labs within the past 24 hours have been reviewed  Microbiology:   Blood Culture   Lab Results   Component Value Date    LABBLOO No Growth to date 10/24/2023    LABBLOO No Growth to date 10/24/2023    and Urine Culture    Lab Results   Component Value Date    LABURIN ESCHERICHIA COLI  > 100,000 cfu/ml   (A) 10/18/2023     Radiology: X-Ray: CXR: X-Ray Chest 1 View (CXR):   Results for orders placed or performed during the hospital encounter of 10/18/23   X-Ray Chest 1 View    Narrative    EXAMINATION:  XR CHEST 1 VIEW    CLINICAL HISTORY:  Ng tube placement;    FINDINGS:  Single view of the chest.  Comparison 10/20/2023    Cardiac silhouette is normal.  The lungs demonstrate no evidence of active disease.  No evidence of pleural effusion or pneumothorax.   Bones appear intact.      Impression    No acute process seen.      Electronically signed by: Goyo Emanuel MD  Date:    10/20/2023  Time:    15:01    and portable and KUB: X-Ray Abdomen AP 1 View (KUB): No results found for this visit on 10/18/23. and abdomen flat and erect; xray hips bilateral   MRI: brain with and without contrast  CT scan: CT ABDOMEN PELVIS WITH CONTRAST: No results found for this visit on 10/18/23. and CT head with contrast; CT head wo contrast  Ultrasound: abdomen limited; carotid bilateral   Cardiac Graphics: Echocardiogram:   Transthoracic echo (TTE) complete (Cupid Only):   Results for orders placed or performed during the hospital encounter of 10/18/23   Echo   Result Value Ref Range    BSA 1.56 m2    LVOT stroke volume 113.25 cm3    LVIDd 3.16 (A) 3.5 - 6.0 cm    LV Systolic Volume 14.45 mL    LV Systolic Volume Index 9.4 mL/m2    LVIDs 2.10 2.1 - 4.0 cm    LV Diastolic Volume 39.68 mL    LV Diastolic Volume Index 25.93 mL/m2    IVS 1.16 (A) 0.6 - 1.1 cm    LVOT diameter 1.86 cm    LVOT area 2.7 cm2    FS 34 28 - 44 %    Left Ventricle Relative Wall Thickness 0.68 cm    Posterior Wall 1.08 0.6 - 1.1 cm    LV mass 105.36 g    LV Mass Index 69 g/m2    MV Peak E Allan 0.75 m/s    TDI LATERAL 0.14 m/s    TDI SEPTAL 0.13 m/s    E/E' ratio 5.56 m/s    MV Peak A Allan 1.03 m/s    TR Max Allan 3.09 m/s    E/A ratio 0.73     IVRT 79.92 msec    E wave deceleration time 184.11 msec    LV SEPTAL E/E' RATIO 5.77 m/s    LV LATERAL E/E' RATIO 5.36 m/s    LVOT peak allan 2.18 m/s    Left Ventricular Outflow Tract Mean Velocity 1.50 cm/s    Left Ventricular Outflow Tract Mean Gradient 10.41 mmHg    LA size 2.84 cm    Left Atrium Minor Axis 4.68 cm    Left Atrium Major Axis 4.72 cm    RVOT peak VTI 21.6 cm    TAPSE 2.27 cm    RA Major Axis 3.27 cm    AV mean gradient 16 mmHg    AV peak gradient 35 mmHg    Ao peak allan 2.95 m/s    Ao VTI 55.20 cm    LVOT peak VTI 41.70 cm    AV valve area 2.05 cm²    AV Velocity  Ratio 0.74     AV index (prosthetic) 0.76     KHUSHBU by Velocity Ratio 2.01 cm²    MV stenosis pressure 1/2 time 53.39 ms    MV valve area p 1/2 method 4.12 cm2    Triscuspid Valve Regurgitation Peak Gradient 38 mmHg    PV mean gradient 3 mmHg    RVOT peak kushal 1.21 m/s    Ao root annulus 2.93 cm    STJ 2.82 cm    Ascending aorta 2.85 cm    IVC diameter 1.27 cm    Mean e' 0.14 m/s    ZLVIDS -2.19     ZLVIDD -3.45     LA Volume Index 24.5 mL/m2    LA volume 37.44 cm3    LA WIDTH 3.3 cm    RA Width 2.8 cm    EF 65 %    TV resting pulmonary artery pressure 41 mmHg    RV TB RVSP 6 mmHg    Est. RA pres 3 mmHg    Narrative      Left Ventricle: The left ventricle is normal in size. Normal wall   thickness. There is concentric remodeling. Normal wall motion. There is   normal systolic function with a visually estimated ejection fraction of 55   - 70%. Ejection fraction by visual approximation is 65%. There is normal   diastolic function.    Right Ventricle: Normal right ventricular cavity size. Wall thickness   is normal. Right ventricle wall motion  is normal. Systolic function is   normal.    Pulmonary Artery: The estimated pulmonary artery systolic pressure is   41 mmHg.    IVC/SVC: Normal venous pressure at 3 mmHg.    Bubble study positive for intracardiac shunt.         Pending Diagnostic Studies:     Procedure Component Value Units Date/Time    CBC auto differential [[id number]]     Order Status: Sent Lab Status: No result     Specimen: Blood     Comprehensive metabolic panel [[id number]]     Order Status: Sent Lab Status: No result     Specimen: Blood     Ethanol [[id number]] Collected: 10/18/23 0256    Order Status: Sent Lab Status: In process Updated: 10/18/23 0256    Specimen: Blood     FL Lumbar Puncture (xpd) [[id number]]     Order Status: Sent Lab Status: No result     Magnesium [[id number]]     Order Status: Sent Lab Status: No result     Specimen: Blood     Phosphorus [[id number]]     Order Status: Sent Lab  Status: No result     Specimen: Blood     Protime-INR [[id number]]     Order Status: Sent Lab Status: No result     Specimen: Blood     Vitamin B1 [[id number]] Collected: 10/24/23 1029    Order Status: Sent Lab Status: In process Updated: 10/24/23 1503    Specimen: Blood          Medications:  Reconciled Home Medications:      Medication List      START taking these medications    dextrose 5 % in water (D5W) PgBk 100 mL with cefTRIAXone 1 gram SolR 1 g  Inject 1 g into the vein every 12 (twelve) hours.     folic acid-vit B6-vit B12 2.5-25-2 mg 2.5-25-2 mg Tab  Commonly known as: FOLBIC or Equiv  Take 1 tablet by mouth once daily.     rifAXIMin 550 mg Tab  Commonly known as: XIFAXAN  Take 1 tablet (550 mg total) by mouth 2 (two) times daily.     thiamine 100 MG tablet  Take 1 tablet (100 mg total) by mouth once daily.        CONTINUE taking these medications    furosemide 40 MG tablet  Commonly known as: LASIX  Take 1 tablet (40 mg total) by mouth once daily.     lactulose 20 gram/30 mL Soln  Commonly known as: CHRONULAC  Take 15 mLs (10 g total) by mouth 3 (three) times daily.     potassium chloride 10 MEQ Tbsr  Commonly known as: KLOR-CON  Take 1 tablet (10 mEq total) by mouth once daily.     spironolactone 100 MG tablet  Commonly known as: ALDACTONE  Take 1 tablet (100 mg total) by mouth once daily.            Indwelling Lines/Drains at time of discharge:   Lines/Drains/Airways     Drain  Duration                NG/OG Tube 10/20/23 1500 [address]rell sump 16 Fr. Left nostril 4 days                Time spent on the discharge of patient: 31 minutes         Junior Luna MD  Department of Hospital Medicine  [facility] ([facility])

## 2023-10-25 NOTE — TELEPHONE ENCOUNTER
Returned patient's call and left a message for her to call back regarding her orthopedic appointment.

## 2023-10-25 NOTE — NURSING
Pt arrived to unit via stretcher with EMS from [facility].  PT alert, nonverbal, visually tracks, bilat arms and legs are flaccid but minimal response to painful stimuli.  VSS, reached out to admitting MD for orders, will continue to monitor.

## 2023-10-25 NOTE — HPI
59 yo woman with alcoholic cirrhosis with ascites, s/p Ex Lap with bowel resection for incarcerated hernia, recent SBO with SAMRA, adm to [facility] on 10/18 with AMS.  Patient found on the floor confused with slurred speech with incontinence and with questionable tongue injury    On presentation /65, HR 95, RR 18, SpO2 96% (RA).  At that time the patient was alert and oriented.  There was left-sided facial weakness.  Otherwise neurologic exam was unremarkable (? )    Labs (10/18) WBC 6.2, Hb 8.5 (BL 10.1), Plts 66, INR 2.0, Na 136, K 4.3, BUN 16, Cr 1.5 (BL 1.0) bilirubin 6.4, AST 96, ALT 47, ammonia 42.  .  Alcohol not tested (<10 on 10/14) UTOX neg U/A WBC 13, bacteria rare. CTH and MRI brain neg for acute changes.    Patient evaluated by Neurology on 10/19.  Per neurology, at that time she was oriented to person place time and situation and attentive to her environment.  She was able to follow one and two-step commands.  Facial features were symmetrical.  Speech marked by dysarthria.  Most answers are yes/no.  Muscle testing of proximal and distal muscles of upper and LE showed diffuse generalized weakness WO focal or lateralizing findings.  No pathologic reflexes were noted.     Labs 10/18 WBC 6.2 > 6.8, Hb 8.5 > 7.9, Plts 66 > 69, INR > 2.2, Na 136 > 142 Cr 1.5 > 0.8, T bili 6.4 > 5.0, AST 96 > 79, ALT 47 > 44, Ammonia 42 > 28  Presently patient is lethargic and responding to questions with one-word answers.      Her AMS is not readily attributed to HE with ammonia now 28 and CVA seems unlikely with neg MRI brain.  An MRI brain with contrast has been scheduled.  An EEG has not been done so seizures remain in the differential especially given her presentation.  Patient found on the floor with questionable tongue injury and incontinent of urine and feces.  Patient known to be drinking as recently as July 2023.  More recently patient has said that she is not drinking.  On admission alcohol was < 10.   A  PEth has not been done    Dr Shannan Reardon (Juancarlos) ESTELLA at [facility] consulted with Dr [facility]

## 2023-10-25 NOTE — PLAN OF CARE
Inpatient consult to Neurology  Consult performed by: Harish Chacko MD  Consult ordered by: Aurelia Winston MD  Reason for consult: AMS      Consult acknowledged regarding this 57 yo female. Neurology consulted for AMS in the setting of alcoholic cirrhosis.     Overall, complicated past medical history. She was just transferred from an outside hospital due to no improvement in mental status in spite different treatments. She has had a thorough work up and so far no clear cause for her AMS per primary team. She has been treated for hepatic encephalopathy with no real improvement. Recently had an MRI brain done, read as normal.    On chart review, multiple toxic/metabolic conditions are noted, these include: worsening anemia with increased MCV, low platelets, SAMRA, Increased LFTs, UTI. Please keep in mind that hepatic encephalopathy is a clinic diagnosis, and LFTs or ammonia do not necessarily correlate with symptoms.     The patient was not in the room when I stopped by to examine (getting imaging). I spoke with Dr Winston and I agree with getting an EEG to further rule out encephalopathy/seizures. Treating with B1 empirically makes sense as well given history of chronic alcoholism     Full note to follow tomorrow with attending's attestation. Main recommendations at this time include:    - Labs (Please order the ones that have not been ordered): B1, B9, B12, MMA, homocysteine, TSH, RPR, heavy metals, urine tox, PETH  - start thiamine 500 mg IV TID x 1 day followed by 250 mg IV/IM QD x 3 days  - EEG  - Given anemia and thrombocytopenia, consider running case by hematology   - potential LP can be considered if no improvement with above. Per notes, they declined LP at outside hospital   - Neurology will continue to follow. Please call if any questions or concerns.

## 2023-10-26 PROBLEM — E44.0 MODERATE MALNUTRITION: Status: ACTIVE | Noted: 2023-10-26

## 2023-10-26 LAB
ALBUMIN SERPL BCP-MCNC: 2.5 G/DL (ref 3.5–5.2)
ALP SERPL-CCNC: 75 U/L (ref 55–135)
ALT SERPL W/O P-5'-P-CCNC: 38 U/L (ref 10–44)
ANION GAP SERPL CALC-SCNC: 10 MMOL/L (ref 8–16)
AST SERPL-CCNC: 81 U/L (ref 10–40)
BASOPHILS # BLD AUTO: 0.11 K/UL (ref 0–0.2)
BASOPHILS NFR BLD: 1.7 % (ref 0–1.9)
BILIRUB SERPL-MCNC: 5.3 MG/DL (ref 0.1–1)
BUN SERPL-MCNC: 31 MG/DL (ref 6–20)
CALCIUM SERPL-MCNC: 9.6 MG/DL (ref 8.7–10.5)
CHLORIDE SERPL-SCNC: 117 MMOL/L (ref 95–110)
CO2 SERPL-SCNC: 20 MMOL/L (ref 23–29)
CREAT SERPL-MCNC: 0.9 MG/DL (ref 0.5–1.4)
DIFFERENTIAL METHOD: ABNORMAL
EOSINOPHIL # BLD AUTO: 0.3 K/UL (ref 0–0.5)
EOSINOPHIL NFR BLD: 4 % (ref 0–8)
ERYTHROCYTE [DISTWIDTH] IN BLOOD BY AUTOMATED COUNT: 18.6 % (ref 11.5–14.5)
EST. GFR  (NO RACE VARIABLE): >60 ML/MIN/1.73 M^2
GLUCOSE SERPL-MCNC: 79 MG/DL (ref 70–110)
HCT VFR BLD AUTO: 24.8 % (ref 37–48.5)
HGB BLD-MCNC: 7.8 G/DL (ref 12–16)
IMM GRANULOCYTES # BLD AUTO: 0.06 K/UL (ref 0–0.04)
IMM GRANULOCYTES NFR BLD AUTO: 0.9 % (ref 0–0.5)
LYMPHOCYTES # BLD AUTO: 1.6 K/UL (ref 1–4.8)
LYMPHOCYTES NFR BLD: 24.5 % (ref 18–48)
MCH RBC QN AUTO: 34.1 PG (ref 27–31)
MCHC RBC AUTO-ENTMCNC: 31.5 G/DL (ref 32–36)
MCV RBC AUTO: 108 FL (ref 82–98)
MONOCYTES # BLD AUTO: 1.1 K/UL (ref 0.3–1)
MONOCYTES NFR BLD: 17 % (ref 4–15)
NEUTROPHILS # BLD AUTO: 3.4 K/UL (ref 1.8–7.7)
NEUTROPHILS NFR BLD: 51.9 % (ref 38–73)
NRBC BLD-RTO: 1 /100 WBC
PLATELET # BLD AUTO: 137 K/UL (ref 150–450)
PMV BLD AUTO: 9.9 FL (ref 9.2–12.9)
POTASSIUM SERPL-SCNC: 3.5 MMOL/L (ref 3.5–5.1)
PROT SERPL-MCNC: 6 G/DL (ref 6–8.4)
RBC # BLD AUTO: 2.29 M/UL (ref 4–5.4)
RPR SER QL: NORMAL
SODIUM SERPL-SCNC: 147 MMOL/L (ref 136–145)
WBC # BLD AUTO: 6.52 K/UL (ref 3.9–12.7)

## 2023-10-26 PROCEDURE — 99223 PR INITIAL HOSPITAL CARE,LEVL III: ICD-10-PCS | Mod: NTX,,, | Performed by: INTERNAL MEDICINE

## 2023-10-26 PROCEDURE — 63600175 PHARM REV CODE 636 W HCPCS: Mod: NTX | Performed by: STUDENT IN AN ORGANIZED HEALTH CARE EDUCATION/TRAINING PROGRAM

## 2023-10-26 PROCEDURE — 20600001 HC STEP DOWN PRIVATE ROOM: Mod: NTX

## 2023-10-26 PROCEDURE — 63600175 PHARM REV CODE 636 W HCPCS: Mod: NTX | Performed by: HOSPITALIST

## 2023-10-26 PROCEDURE — 99223 1ST HOSP IP/OBS HIGH 75: CPT | Mod: NTX,,, | Performed by: INTERNAL MEDICINE

## 2023-10-26 PROCEDURE — 80053 COMPREHEN METABOLIC PANEL: CPT | Mod: NTX | Performed by: HOSPITALIST

## 2023-10-26 PROCEDURE — 95720 EEG PHY/QHP EA INCR W/VEEG: CPT | Mod: NTX,,, | Performed by: PSYCHIATRY & NEUROLOGY

## 2023-10-26 PROCEDURE — 36415 COLL VENOUS BLD VENIPUNCTURE: CPT | Mod: NTX | Performed by: HOSPITALIST

## 2023-10-26 PROCEDURE — 36415 COLL VENOUS BLD VENIPUNCTURE: CPT | Mod: NTX | Performed by: STUDENT IN AN ORGANIZED HEALTH CARE EDUCATION/TRAINING PROGRAM

## 2023-10-26 PROCEDURE — 25000003 PHARM REV CODE 250: Mod: NTX | Performed by: HOSPITALIST

## 2023-10-26 PROCEDURE — 99222 PR INITIAL HOSPITAL CARE,LEVL II: ICD-10-PCS | Mod: NTX,,, | Performed by: PSYCHIATRY & NEUROLOGY

## 2023-10-26 PROCEDURE — 92610 EVALUATE SWALLOWING FUNCTION: CPT | Mod: NTX

## 2023-10-26 PROCEDURE — 25000003 PHARM REV CODE 250: Mod: NTX | Performed by: STUDENT IN AN ORGANIZED HEALTH CARE EDUCATION/TRAINING PROGRAM

## 2023-10-26 PROCEDURE — 99222 1ST HOSP IP/OBS MODERATE 55: CPT | Mod: NTX,,, | Performed by: PSYCHIATRY & NEUROLOGY

## 2023-10-26 PROCEDURE — 95714 VEEG EA 12-26 HR UNMNTR: CPT | Mod: NTX

## 2023-10-26 PROCEDURE — 95700 EEG CONT REC W/VID EEG TECH: CPT | Mod: NTX

## 2023-10-26 PROCEDURE — 95720 PR EEG, W/VIDEO, CONT RECORD, I&R, >12<26 HRS: ICD-10-PCS | Mod: NTX,,, | Performed by: PSYCHIATRY & NEUROLOGY

## 2023-10-26 PROCEDURE — 85025 COMPLETE CBC W/AUTO DIFF WBC: CPT | Mod: NTX | Performed by: HOSPITALIST

## 2023-10-26 PROCEDURE — 83785 ASSAY OF MANGANESE: CPT | Mod: NTX | Performed by: STUDENT IN AN ORGANIZED HEALTH CARE EDUCATION/TRAINING PROGRAM

## 2023-10-26 RX ORDER — LEVETIRACETAM 100 MG/ML
750 SOLUTION ORAL 2 TIMES DAILY
Status: DISCONTINUED | OUTPATIENT
Start: 2023-10-26 | End: 2023-10-27

## 2023-10-26 RX ORDER — MUPIROCIN 20 MG/G
OINTMENT TOPICAL 2 TIMES DAILY
Status: COMPLETED | OUTPATIENT
Start: 2023-10-26 | End: 2023-10-31

## 2023-10-26 RX ORDER — LACTULOSE 10 G/15ML
200 SOLUTION ORAL; RECTAL 3 TIMES DAILY
Status: DISCONTINUED | OUTPATIENT
Start: 2023-10-26 | End: 2023-10-27

## 2023-10-26 RX ORDER — PROPRANOLOL HYDROCHLORIDE 10 MG/1
10 TABLET ORAL 2 TIMES DAILY
Status: ON HOLD | COMMUNITY
End: 2023-11-27 | Stop reason: HOSPADM

## 2023-10-26 RX ORDER — LEVETIRACETAM 500 MG/5ML
2000 INJECTION, SOLUTION, CONCENTRATE INTRAVENOUS ONCE
Status: COMPLETED | OUTPATIENT
Start: 2023-10-26 | End: 2023-10-26

## 2023-10-26 RX ORDER — LORAZEPAM 2 MG/ML
2 INJECTION INTRAMUSCULAR ONCE
Status: COMPLETED | OUTPATIENT
Start: 2023-10-26 | End: 2023-10-26

## 2023-10-26 RX ADMIN — LEVETIRACETAM 750 MG: 500 SOLUTION ORAL at 09:10

## 2023-10-26 RX ADMIN — RIFAXIMIN 550 MG: 550 TABLET ORAL at 08:10

## 2023-10-26 RX ADMIN — RIFAXIMIN 550 MG: 550 TABLET ORAL at 09:10

## 2023-10-26 RX ADMIN — CEFTRIAXONE 2 G: 2 INJECTION, POWDER, FOR SOLUTION INTRAMUSCULAR; INTRAVENOUS at 02:10

## 2023-10-26 RX ADMIN — LACTULOSE 30 G: 20 SOLUTION ORAL at 09:10

## 2023-10-26 RX ADMIN — MUPIROCIN: 20 OINTMENT TOPICAL at 09:10

## 2023-10-26 RX ADMIN — LEVETIRACETAM 2000 MG: 100 INJECTION INTRAVENOUS at 01:10

## 2023-10-26 RX ADMIN — THIAMINE HYDROCHLORIDE 500 MG: 100 INJECTION, SOLUTION INTRAMUSCULAR; INTRAVENOUS at 09:10

## 2023-10-26 RX ADMIN — PHYTONADIONE 10 MG: 10 INJECTION, EMULSION INTRAMUSCULAR; INTRAVENOUS; SUBCUTANEOUS at 08:10

## 2023-10-26 RX ADMIN — LORAZEPAM 2 MG: 2 INJECTION INTRAMUSCULAR; INTRAVENOUS at 01:10

## 2023-10-26 RX ADMIN — LACTULOSE 30 G: 20 SOLUTION ORAL at 03:10

## 2023-10-26 RX ADMIN — THIAMINE HYDROCHLORIDE 500 MG: 100 INJECTION, SOLUTION INTRAMUSCULAR; INTRAVENOUS at 03:10

## 2023-10-26 RX ADMIN — LACTULOSE 30 G: 20 SOLUTION ORAL at 08:10

## 2023-10-26 NOTE — HPI
58 year old female with alcohol related cirrhosis, hx of laparotomy w/ bowel resection for incarcerated hernia, who presented 10/18 to OSH ([facility]) with altered mental stratus.  She was treated for hepatic encephalopathy but mental status did not improve, no fevers, MRI brain with no acute changes, small vessel vascular changes, repeat MRI brain w/ contrast without any changes, patient was transferred here for persistent encephalopathy warranting further workup. She was seen by ID at OSH, recommended LP with viral encephalitis and meningitis workup, did not get an LP 2/2 to coagulopathy. Here at [facility] she is currently admitted for further workup.  Of note she has been afebrile and without leukocytosis throughout the course of this admission.  No significant hypoxia noted.  Blood cultures from 10/18 as well as 1025 with no growth.  Cystitis is adequately covered for therapy with Ceftriaxone which she is also receiving for SBP ppx, still does not explain persistent encephalopathy. ID was consulted for a patient who was seen at . adding consultation to continue recommended care. patient s/p recent surgery and UTI, has AMS, multifactorial encephalopathy however concern for encephalitis

## 2023-10-26 NOTE — PROCEDURES
Prelim EEG  11:02 - 13:20  Disorganized background with frequent triphasic waves. L post maximum epileptiform discharges.  Subclinical seizures originating from the left posterior region were noted at a frequency of every 3-4 minutes.    Dr. Samuel

## 2023-10-26 NOTE — CONSULTS
"[facility] - Intensive Care ([facility]-)  Adult Nutrition  Consult Note    SUMMARY     Recommendations    1. When able, initiate TFs. Rec'd Isosource 1.5 @ 50 mL/hr to provide 1800 kcals, 82 g of protein, 917 mL fluid.   2. RD to monitor & follow-up.    Goals: Meet % EEN, EPN by RD f/u date  Nutrition Goal Status: new  Communication of RD Recs: reviewed with RN    Assessment and Plan    Moderate malnutrition    Nutrition Problem:  Moderate Protein-Calorie Malnutrition  Malnutrition in the context of Social/Environmental Circumstances    Related to (etiology):  Inability to consume sufficient energy     Signs and Symptoms (as evidenced by):  Body Fat Depletion: mild depletion of orbitals and triceps   Muscle Mass Depletion: mild depletion of temples, clavicle region and lower extremities   Fluid Accumulation: mild and moderate    Interventions(treatment strategy):  Collaboration of nutrition care w/ other providers  EN    Nutrition Diagnosis Status:  New    Malnutrition Assessment    Malnutrition Context: social/environmental circumstances  Malnutrition Level: moderate    Subcutaneous Fat (Malnutrition): mild depletion  Muscle Mass (Malnutrition): mild depletion  Fluid Accumulation (Malnutrition): moderate     Reason for Assessment    Reason For Assessment: consult  Diagnosis: other (see comments) (Encephalopathy)   Relevant Medical History: Alcoholic cirrhosis, Bowel resection  Interdisciplinary Rounds: did not attend    General Information Comments: NPO w/ NGT present. Asphasic w/ no family at bedside, unsure of energy intake PTA. Per chart review, pt w/ UBW of 120-130# (currently w/ +3 edema). Mild wasting found on physical exam. RD feels pt meets criteria for moderate malnutrition - please see PES statement for details.  Nutrition Discharge Planning: Pending clinical course    Nutrition/Diet History    Factors Affecting Nutritional Intake: NPO    Anthropometrics    Temp: 98.2 °F (36.8 °C)  Height: 4' 11" " (149.9 cm)  Height (inches): 59 in  Weight Method: Bed Scale  Weight: 56.2 kg (123 lb 14.4 oz)  Weight (lb): 123.9 lb  Ideal Body Weight (IBW), Female: 95 lb  % Ideal Body Weight, Female (lb): 130.42 %  BMI (Calculated): 25  BMI Grade: 25 - 29.9 - overweight    Lab/Procedures/Meds    Pertinent Labs Reviewed: reviewed  Pertinent Labs Comments: Na 147, Bili 5.3  Pertinent Medications Reviewed: reviewed  Pertinent Medications Comments: Lactulose    Estimated/Assessed Needs    Weight Used For Calorie Calculations: 56.2 kg (123 lb 14.4 oz)    Energy Calorie Requirements (kcal): 4270-3621 kcal/d  Energy Need Method: Kcal/kg (30-35 kcal/kg)    Protein Requirements: 73-84 g/d (1.3-1.5 g/kg)  Weight Used For Protein Calculations: 56.2 kg (123 lb 14.4 oz)    Estimated Fluid Requirement Method: other (see comments) (Per MD or 1 mL/kcal)  RDA Method (mL): 1686    Nutrition Prescription Ordered    Current Diet Order: NPO    Evaluation of Received Nutrient/Fluid Intake    I/O: +2L since admit    Comments: LBM: 10/26    Nutrition Risk    Level of Risk/Frequency of Follow-up:  (1x/week)     Monitor and Evaluation    Food and Nutrient Intake: enteral nutrition intake, food and beverage intake, energy intake  Food and Nutrient Adminstration: enteral and parenteral nutrition administration, diet order  Physical Activity and Function: nutrition-related ADLs and IADLs  Anthropometric Measurements: weight, weight change  Biochemical Data, Medical Tests and Procedures: inflammatory profile, lipid profile, glucose/endocrine profile, gastrointestinal profile, electrolyte and renal panel  Nutrition-Focused Physical Findings: overall appearance     Nutrition Follow-Up    RD Follow-up?: Yes

## 2023-10-26 NOTE — PLAN OF CARE
Recommendations     1. When able, initiate TFs. Rec'd Isosource 1.5 @ 50 mL/hr to provide 1800 kcals, 82 g of protein, 917 mL fluid.   2. RD to monitor & follow-up.     Goals: Meet % EEN, EPN by RD f/u date  Nutrition Goal Status: new  Communication of RD Recs: reviewed with RN

## 2023-10-26 NOTE — PT/OT/SLP PROGRESS
Occupational Therapy      Patient Name:  Mara Mojica   MRN:  [id number]    Patient not seen today secondary to on first attempt needed to be cleaned and on second attempt technician setting up EEG for 24 hour monitoring.    10/26/2023

## 2023-10-26 NOTE — HPI
This patient is a 59 y/o female with PMHx of alcoholic cirrhosis, s/p ex-lap w/ bowel resection for incarcerated hernia, who was initially admitted on 10/18 to [facility] with acute encephalopathy. Per history taken by Neurology at [facility],  found patient down on the floor at home with evidence of incontinence of stool and urine. Also noted confusion and slurred speech at that time. Appears to have had some concern for L sided weakness and down drift of L arm however CT head without evidence of acute abnormalities, MRI brain with only small vessel vascular changes without evidence of territorial infarct. Not answering questions but nodding head with yes/no questions.     Since then, had short term video EEG done on 10/19 with mild diffuse nonspecific background slowing, no potential epileptiform activity. Repeat MRI brain with contrast on 10/23 unchanged from initial MRI. Became more lethargic and was no longer following commands or answering questions. Due to worsening hepatic encephalopathy in setting of hepatic cirrhosis, patient transferred to [facility] for management with transplant team. Has remained on antibiotics, lactulose and rifaximin for treatment of UTI, HE.    Neurology consulted for management recommendations regarding persistent AMS. Had repeat EEG done on 10/24 with similar findings to prior EEG showing mild generalized non-specific cerebral dysfunction and no electrographic seizures or indication of seizure tendency. Additional CT head w/o contrast on 10/25 without evidence of acute issues. Remains confused and unable to answer questions on exam. Not withdrawing to painful stimuli. Was able to awaken to verbal stimuli in AM however when reevaluated in afternoon unresponsive however was after receiving Ativan.

## 2023-10-26 NOTE — ASSESSMENT & PLAN NOTE
58 year old female with cirrhosis from alcohol use, hx of bowel surgery, who was transferred to [facility] for persistant encephalopathy despite therapy for hepatic encephalopathy, other causes were explored MRI and CT imaging of brain unrevealing, EEG suggestive of subclinical seizures, neuro following, no fever or leukocytosis, mental status has not improved, ID was called to explore possibility of infectious etiology. Ucx with pan sensitive e coli and being treated with ceftriaxone for this and SBP ppx as well.       Recommendations    So far her clinical picture does not make a strong case for encephalitis given MRI findings, and lack of CSF data. We agree with neurology, in the setting of EEG findings it is reasonable to hold off on LP. However, if clinical suspicion rises for infectious etiology, would be hard to diagnose infectious encephalitis without CSF findings in this patient with neuro exam confounded by subclinical seizures.     Ceftriaxone course is already treating presumed cystitis, doubt this would be contributory to persistent encephalopathy.

## 2023-10-26 NOTE — PT/OT/SLP PROGRESS
Physical Therapy      Patient Name:  Mara Mojica   MRN:  [id number]    Patient not seen today secondary to  (Pt soiled on first attempt and having EEG placed on 2nd attempt. PT unable to return for 3rd attempt.). Will follow-up as appropriate.

## 2023-10-26 NOTE — SUBJECTIVE & OBJECTIVE
Past Medical History:   Diagnosis Date    Alcoholic cirrhosis of liver     Kidney failure     Unilateral inguinal hernia, without obstruction or gangrene, not specified as recurrent        Past Surgical History:   Procedure Laterality Date    ESOPHAGOGASTRODUODENOSCOPY N/A 09/21/2023    Procedure: EGD (ESOPHAGOGASTRODUODENOSCOPY);  Surgeon: Melissa Edwards MD;  Location: [facility];  Service: Endoscopy;  Laterality: N/A;    HERNIA REPAIR      TONSILLECTOMY         Review of patient's allergies indicates:  No Known Allergies    Medications:  Medications Prior to Admission   Medication Sig    furosemide (LASIX) 40 MG tablet Take 1 tablet (40 mg total) by mouth once daily.    lactulose (CHRONULAC) 20 gram/30 mL Soln Take 15 mLs (10 g total) by mouth 3 (three) times daily.    potassium chloride (KLOR-CON) 10 MEQ TbSR Take 1 tablet (10 mEq total) by mouth once daily.    propranoloL (INDERAL) 10 MG tablet Take 10 mg by mouth 2 (two) times daily.    spironolactone (ALDACTONE) 100 MG tablet Take 1 tablet (100 mg total) by mouth once daily.     Antibiotics (From admission, onward)      Start     Stop Route Frequency Ordered    10/26/23 2100  mupirocin 2 % ointment         10/31/23 2059 Nasl 2 times daily 10/26/23 1240    10/25/23 1415  cefTRIAXone (ROCEPHIN) 2 g in dextrose 5 % in water (D5W) 100 mL IVPB (MB+)         -- IV Every 24 hours (non-standard times) 10/25/23 1304    10/25/23 1315  rifAXIMin tablet 550 mg         -- PER NG TUBE 2 times daily 10/25/23 1303          Antifungals (From admission, onward)      None          Antivirals (From admission, onward)      None               There is no immunization history on file for this patient.    Family History       Problem Relation (Age of Onset)    Ovarian cancer Mother    Seizures Father          Social History     Socioeconomic History    Marital status:    Tobacco Use    Smoking status: Every Day     Current packs/day: 0.50     Average packs/day: 0.5  packs/day for 25.0 years (12.5 ttl pk-yrs)     Types: Cigarettes     Start date: 10/18/1998     Passive exposure: Never    Smokeless tobacco: Never   Substance and Sexual Activity    Alcohol use: Not Currently    Drug use: Never     Social Determinants of Health     Financial Resource Strain: Low Risk  (10/25/2023)    Overall Financial Resource Strain (CARDIA)     Difficulty of Paying Living Expenses: Not hard at all   Food Insecurity: No Food Insecurity (10/25/2023)    Hunger Vital Sign     Worried About Running Out of Food in the Last Year: Never true     Ran Out of Food in the Last Year: Never true   Transportation Needs: No Transportation Needs (10/25/2023)    PRAPARE - Transportation     Lack of Transportation (Medical): No     Lack of Transportation (Non-Medical): No   Physical Activity: Insufficiently Active (10/25/2023)    Exercise Vital Sign     Days of Exercise per Week: 4 days     Minutes of Exercise per Session: 20 min   Stress: Stress Concern Present (10/25/2023)    [employer] [place] [employer] - Occupational Stress Questionnaire     Feeling of Stress : Rather much   Social Connections: Moderately Isolated (10/25/2023)    Social Connection and Isolation Panel [NHANES]     Frequency of Communication with Friends and Family: Three times a week     Frequency of Social Gatherings with Friends and Family: Twice a week     Attends Religion Services: Never     Active Member of Clubs or Organizations: No     Attends Club or Organization Meetings: Never     Marital Status:    Housing Stability: Unknown (10/25/2023)    Housing Stability Vital Sign     Unable to Pay for Housing in the Last Year: No     Unstable Housing in the Last Year: No     Review of Systems   Reason unable to perform ROS: AMS.     Objective:     Vital Signs (Most Recent):  Temp: 98.2 °F (36.8 °C) (10/26/23 1600)  Pulse: 94 (10/26/23 1600)  Resp: 19 (10/26/23 1600)  BP: 106/61 (10/26/23 1600)  SpO2: (!) 91 % (10/26/23 1600)  Vital Signs (24h Range):  Temp:  [97.8 °F (36.6 °C)-98.6 °F (37 °C)] 98.2 °F (36.8 °C)  Pulse:  [82-98] 94  Resp:  [15-20] 19  SpO2:  [91 %-99 %] 91 %  BP: (106-146)/(58-64) 106/61     Weight: 56.2 kg (123 lb 14.4 oz)  Body mass index is 25.02 kg/m².    Estimated Creatinine Clearance: 53.8 mL/min (based on SCr of 0.9 mg/dL).     Physical Exam  Constitutional:       Appearance: She is ill-appearing. She is not toxic-appearing.   HENT:      Head: Normocephalic and atraumatic.      Comments: EEG bandage   Eyes:      Conjunctiva/sclera: Conjunctivae normal.   Cardiovascular:      Rate and Rhythm: Normal rate and regular rhythm.      Pulses: Normal pulses.      Heart sounds: Normal heart sounds.   Pulmonary:      Effort: Pulmonary effort is normal.      Breath sounds: Normal breath sounds.   Abdominal:      General: Bowel sounds are normal.      Palpations: Abdomen is soft.   Musculoskeletal:         General: No swelling or deformity.      Cervical back: Neck supple.   Skin:     General: Skin is warm and dry.   Neurological:      Mental Status: She is alert. She is disoriented.      Comments: Tracking with eyes noted  Withdraws to pain  Not following commands  Awake, but not responding to questions            Significant Labs: All pertinent labs within the past 24 hours have been reviewed.  Recent Lab Results         10/26/23  0416   10/25/23  2036        Albumin 2.5         ALP 75         ALT 38         Anion Gap 10         AST 81         Baso # 0.11         Basophil % 1.7         BILIRUBIN TOTAL 5.3  Comment: For infants and newborns, interpretation of results should be based  on gestational age, weight and in agreement with clinical  observations.    Premature Infant recommended reference ranges:  Up to 24 hours.............<8.0 mg/dL  Up to 48 hours............<12.0 mg/dL  3-5 days..................<15.0 mg/dL  6-29 days.................<15.0 mg/dL           BUN 31         Calcium 9.6         Chloride 117         CO2  20         Creatinine 0.9         Differential Method Automated         eGFR >60.0         Eos # 0.3         Eosinophil % 4.0         Glucose 79         Gran # (ANC) 3.4         Gran % 51.9         Hematocrit 24.8         Hemoglobin 7.8         Immature Grans (Abs) 0.06  Comment: Mild elevation in immature granulocytes is non specific and   can be seen in a variety of conditions including stress response,   acute inflammation, trauma and pregnancy. Correlation with other   laboratory and clinical findings is essential.           Immature Granulocytes 0.9         Lymph # 1.6         Lymph % 24.5         MCH 34.1         MCHC 31.5                  Mono # 1.1         Mono % 17.0         MPV 9.9         nRBC 1         Platelet Count 137         POCT Glucose   92       Potassium 3.5         PROTEIN TOTAL 6.0         RBC 2.29         RDW 18.6         Sodium 147         WBC 6.52                 Significant Imaging: I have reviewed all pertinent imaging results/findings within the past 24 hours.

## 2023-10-26 NOTE — ASSESSMENT & PLAN NOTE
Nutrition Problem:  Moderate Protein-Calorie Malnutrition  Malnutrition in the context of Social/Environmental Circumstances    Related to (etiology):  Inability to consume sufficient energy     Signs and Symptoms (as evidenced by):  Body Fat Depletion: mild depletion of orbitals and triceps   Muscle Mass Depletion: mild depletion of temples, clavicle region and lower extremities   Fluid Accumulation: mild and moderate    Interventions(treatment strategy):  Collaboration of nutrition care w/ other providers  EN    Nutrition Diagnosis Status:  Continues

## 2023-10-26 NOTE — PT/OT/SLP EVAL
Speech Language Pathology Evaluation  Bedside Swallow    Patient Name:  Mara Mojica   MRN:  [id number]  Admitting Diagnosis: Acute encephalopathy    Recommendations:                 General Recommendations:  Dysphagia therapy  Diet recommendations:  NPO, NPO   Aspiration Precautions: Frequent oral care and Strict aspiration precautions   General Precautions: Standard, aspiration, fall  Communication strategies:  none    Assessment:     Mara Mojica is a 58 y.o. female with an SLP diagnosis of Dysphagia.    History:     Past Medical History:   Diagnosis Date    Alcoholic cirrhosis of liver     Kidney failure     Unilateral inguinal hernia, without obstruction or gangrene, not specified as recurrent        Past Surgical History:   Procedure Laterality Date    ESOPHAGOGASTRODUODENOSCOPY N/A 09/21/2023    Procedure: EGD (ESOPHAGOGASTRODUODENOSCOPY);  Surgeon: Melissa Edwards MD;  Location: [facility];  Service: Endoscopy;  Laterality: N/A;    HERNIA REPAIR      TONSILLECTOMY       Prior Intubation HX:  none on file     Modified Barium Swallow: none on file     Chest X-Rays: FINDINGS: 10/24  One view.  Heart size is normal. The lung fields are clear. No acute cardiopulmonary infiltrate.  Nasogastric tube tip terminates in the stomach.     Impression:     Clear lungs.  Nasogastric tube in good position.    Prior diet: unknown pt is poor hx, however as of 10/18 pt was placed on an IDDSI 6 and thin liquid diet at outside facility.   Subjective     Nursing cleared pt for PO trials.   Patient goals: none stated     Pain/Comfort:  Pain Rating 1: 0/10    Respiratory Status: Room air    Objective:     Oral Musculature Evaluation  Oral Musculature Comments: Unable to assess 2/2 pt was unable to participate.   Poor/scattered dentition    Bedside Swallow Eval:   Consistencies Assessed:  Ice chips attempted     Oral Phase:   Pt did not accept dry spoon to oral cavity ( clenching jaw)   Ice chips on lips for tactile  stimulation with pt pursing lips    Pharyngeal Phase:   Pt with x1 spontaneous swallow without PO intake.     Wet congested audible breathing and cough throughout the session.     Compensatory Strategies  None    Treatment: Would recommend pt be strict NPO with frequent oral care. Pt did not follow commands or attempt to vocalize. Updated nursing on SLP recommendations. Education was provided to pt on ongoing NPO status, however no evidence of learning noted. SLP will continue to follow.     Goals:   Multidisciplinary Problems       SLP Goals          Problem: SLP    Goal Priority Disciplines Outcome   SLP Goal     SLP Ongoing, Progressing   Description: Speech-Language Pathology Goals:   1. Pt will tolerate PO trials to help guide SLP POC.                        Plan:     Patient to be seen:  4 x/week   Plan of Care expires:   11/09  Plan of Care reviewed with:    pt  SLP Follow-Up:  Yes       Discharge recommendations:    tbd  Barriers to Discharge:  Level of Skilled Assistance Needed     Time Tracking:     SLP Treatment Date:   10/26/23  Speech Start Time:  1045  Speech Stop Time:  1055     Speech Total Time (min):  10 min    Billable Minutes: Eval Swallow and Oral Function 10    10/26/2023

## 2023-10-26 NOTE — NURSING
0800 pt alert, non verbal. Pt eyes are open. Pt looks at you but does not follow instructions or track movements with eyes. Pt incontinent x2. Pt had large liquid BM.   Pt more drowsy and less arousable post ativan.

## 2023-10-26 NOTE — SUBJECTIVE & OBJECTIVE
Past Medical History:   Diagnosis Date    Alcoholic cirrhosis of liver     Kidney failure     Unilateral inguinal hernia, without obstruction or gangrene, not specified as recurrent        Past Surgical History:   Procedure Laterality Date    ESOPHAGOGASTRODUODENOSCOPY N/A 09/21/2023    Procedure: EGD (ESOPHAGOGASTRODUODENOSCOPY);  Surgeon: Melissa Edwards MD;  Location: [facility];  Service: Endoscopy;  Laterality: N/A;    HERNIA REPAIR      TONSILLECTOMY         Review of patient's allergies indicates:  No Known Allergies      No current facility-administered medications on file prior to encounter.     Current Outpatient Medications on File Prior to Encounter   Medication Sig    furosemide (LASIX) 40 MG tablet Take 1 tablet (40 mg total) by mouth once daily.    lactulose (CHRONULAC) 20 gram/30 mL Soln Take 15 mLs (10 g total) by mouth 3 (three) times daily.    potassium chloride (KLOR-CON) 10 MEQ TbSR Take 1 tablet (10 mEq total) by mouth once daily.    propranoloL (INDERAL) 10 MG tablet Take 10 mg by mouth 2 (two) times daily.    spironolactone (ALDACTONE) 100 MG tablet Take 1 tablet (100 mg total) by mouth once daily.    [DISCONTINUED] dextrose 5 % in water (D5W) PgBk 100 mL with cefTRIAXone 1 gram SolR 1 g Inject 1 g into the vein every 12 (twelve) hours.    [DISCONTINUED] folic acid-vit B6-vit B12 2.5-25-2 mg (FOLBIC OR EQUIV) 2.5-25-2 mg Tab Take 1 tablet by mouth once daily.    [DISCONTINUED] rifAXIMin (XIFAXAN) 550 mg Tab Take 1 tablet (550 mg total) by mouth 2 (two) times daily.    [DISCONTINUED] thiamine 100 MG tablet Take 1 tablet (100 mg total) by mouth once daily.     Family History       Problem Relation (Age of Onset)    Ovarian cancer Mother    Seizures Father          Tobacco Use    Smoking status: Every Day     Current packs/day: 0.50     Average packs/day: 0.5 packs/day for 25.0 years (12.5 ttl pk-yrs)     Types: Cigarettes     Start date: 10/18/1998     Passive exposure: Never    Smokeless  tobacco: Never   Substance and Sexual Activity    Alcohol use: Not Currently    Drug use: Never    Sexual activity: Not on file     Review of Systems   Unable to perform ROS: Mental status change     Objective:     Vital Signs (Most Recent):  Temp: 98.2 °F (36.8 °C) (10/26/23 0759)  Pulse: 96 (10/26/23 0759)  Resp: 15 (10/26/23 0759)  BP: 127/61 (10/26/23 0759)  SpO2: 95 % (10/26/23 0759) Vital Signs (24h Range):  Temp:  [97.8 °F (36.6 °C)-98.2 °F (36.8 °C)] 98.2 °F (36.8 °C)  Pulse:  [78-98] 96  Resp:  [15-20] 15  SpO2:  [92 %-99 %] 95 %  BP: (127-146)/(58-64) 127/61     Weight: 56.2 kg (123 lb 14.4 oz)  Body mass index is 25.02 kg/m².     Physical Exam  Vitals and nursing note reviewed.   Constitutional:       General: She is sleeping. She is not in acute distress.     Appearance: She is ill-appearing. She is not toxic-appearing.      Comments: Confused, however awakens to verbal stimuli. Some response to painful stimuli   HENT:      Head: Normocephalic and atraumatic.   Eyes:      Pupils: Pupils are equal, round, and reactive to light.   Cardiovascular:      Rate and Rhythm: Normal rate.   Pulmonary:      Effort: Pulmonary effort is normal. No respiratory distress.   Abdominal:      Palpations: Abdomen is soft.      Tenderness: There is no abdominal tenderness.   Musculoskeletal:         General: Swelling present.   Skin:     General: Skin is warm.      Coloration: Skin is jaundiced.   Neurological:      Mental Status: She is easily aroused. She is lethargic and disoriented.      Deep Tendon Reflexes:      Reflex Scores:       Tricep reflexes are 2+ on the right side and 2+ on the left side.       Bicep reflexes are 2+ on the right side and 2+ on the left side.       Brachioradialis reflexes are 2+ on the right side and 2+ on the left side.       Patellar reflexes are 2+ on the right side and 2+ on the left side.       Achilles reflexes are 2+ on the right side and 2+ on the left side.     Comments:  AAOx0  Noncooperative 2/2 encephalopathy. Withdraws to painful stimuli and awakens to verbal commands however not following commands, answering questions          NEUROLOGICAL EXAMINATION:     MENTAL STATUS   Level of consciousness: arousable by verbal stimuli ,  responsive to painful stimuli       When reevaluated in afternoon not responsive to verbal stimuli but had received Ativan     CRANIAL NERVES     CN III, IV, VI   Pupils are equal, round, and reactive to light.  Nystagmus: none     MOTOR EXAM   Overall muscle tone: normal    REFLEXES     Reflexes   Right brachioradialis: 2+  Left brachioradialis: 2+  Right biceps: 2+  Left biceps: 2+  Right triceps: 2+  Left triceps: 2+  Right patellar: 2+  Left patellar: 2+  Right achilles: 2+  Left achilles: 2+  Right ankle clonus: absent  Left ankle clonus: absent      Significant Labs: All pertinent lab results from the past 24 hours have been reviewed.    Pending drug of abuse, PETH, heavy metal screen, RPR, B12, B1, MMA, Ucx (10/25)  Ammonia 17    CMP  Sodium   Date Value Ref Range Status   10/26/2023 147 (H) 136 - 145 mmol/L Final     Potassium   Date Value Ref Range Status   10/26/2023 3.5 3.5 - 5.1 mmol/L Final     Chloride   Date Value Ref Range Status   10/26/2023 117 (H) 95 - 110 mmol/L Final     CO2   Date Value Ref Range Status   10/26/2023 20 (L) 23 - 29 mmol/L Final     Glucose   Date Value Ref Range Status   10/26/2023 79 70 - 110 mg/dL Final     BUN   Date Value Ref Range Status   10/26/2023 31 (H) 6 - 20 mg/dL Final     Creatinine   Date Value Ref Range Status   10/26/2023 0.9 0.5 - 1.4 mg/dL Final     Calcium   Date Value Ref Range Status   10/26/2023 9.6 8.7 - 10.5 mg/dL Final     Total Protein   Date Value Ref Range Status   10/26/2023 6.0 6.0 - 8.4 g/dL Final     Albumin   Date Value Ref Range Status   10/26/2023 2.5 (L) 3.5 - 5.2 g/dL Final     Total Bilirubin   Date Value Ref Range Status   10/26/2023 5.3 (H) 0.1 - 1.0 mg/dL Final     Comment:      For infants and newborns, interpretation of results should be based  on gestational age, weight and in agreement with clinical  observations.    Premature Infant recommended reference ranges:  Up to 24 hours.............<8.0 mg/dL  Up to 48 hours............<12.0 mg/dL  3-5 days..................<15.0 mg/dL  6-29 days.................<15.0 mg/dL       Alkaline Phosphatase   Date Value Ref Range Status   10/26/2023 75 55 - 135 U/L Final     AST   Date Value Ref Range Status   10/26/2023 81 (H) 10 - 40 U/L Final     ALT   Date Value Ref Range Status   10/26/2023 38 10 - 44 U/L Final     Anion Gap   Date Value Ref Range Status   10/26/2023 10 8 - 16 mmol/L Final     eGFR   Date Value Ref Range Status   10/26/2023 >60.0 >60 mL/min/1.73 m^2 Final     Lab Results   Component Value Date    WBC 6.52 10/26/2023    HGB 7.8 (L) 10/26/2023    HCT 24.8 (L) 10/26/2023     (H) 10/26/2023     (L) 10/26/2023       Significant Imaging: I have reviewed all pertinent imaging results/findings within the past 24 hours.    CT HEAD WITHOUT CONTRAST (10/25)  FINDINGS: The brain parenchyma appears unchanged from recent exam.  No new parenchymal hemorrhage, edema, mass effect or major vascular distribution infarct. Ventricles are stable in size, without evidence of hydrocephalus. No new extra-axial blood or fluid collections. No displaced calvarial fracture. The mastoid air cells and visualized paranasal sinuses are essentially clear.     Impression:  Examination mildly degraded by patient motion artifact.   No evidence of acute hemorrhage or major vascular distribution infarct.    MRI BRAIN WITH CONTRAST (10/23)  FINDINGS: Mild motion artifacts limits evaluation. A is susceptibility artifact was seen in the posterior aspect of the brain parenchyma on diffusion-weighted imaging. No evidence for acute infarct. No evidence for abnormal enhancement. No evidence for hemorrhage mass effect or midline shift. Globes and sinuses are  unremarkable. Midline structures are within normal limits. Normal flow voids are seen.     Impression:  No acute process seen. No evidence for abnormal enhancement.  Suboptimal imaging due to motion artifacts    MRI BRAIN WITHOUT CONTRAST (10/18/23)  FINDINGS: Ventricles and sulci are normal in size for age without evidence of hydrocephalus. No extra-axial blood or fluid collections. The cerebellar tonsils are in expected location. The visualized portions of the sella appear within normal limits. No intracranial restricted diffusion.  No focal encephalomalacia.  The brain parenchyma demonstrates no edema, mass or mass effect.  There are few patchy areas of periventricular and subcortical T2/FLAIR signal hyperintensity, which are nonspecific most suggestive of mild chronic microvascular ischemic changes. Small focus of gradient susceptibility artifact within the left parietal lobe. Normal vascular flow voids are preserved. Tiny left maxillary sinus mucous retention cyst. Mild mucosal thickening of the right maxillary sinus.  Mastoid air cells are clear. Visualized globes and orbits appear grossly within normal limits within limitations of motion artifact. Marrow signal is within normal limits.     Impression:  No acute intracranial abnormality.  White matter signal hyperintensities are nonspecific, but likely reflect mild chronic microvascular ischemic change.  Tiny left remote parietal microhemorrhage versus cerebral cavernous venous malformation.    Short-term video EEG (10/19/23)  FINDINGS: The short-term video EEG recording during wakefulness contains 9 Hz alpha activity over the posterior head regions. There is mild diffuse slowing in the range of 5-7 Hz.    Photic stimulation and hyperventilation was not performed.   During the recording, the patient became drowsy but did not fall asleep.   The EKG channel regular rhythm.     Interpretation  The short-term video EEG shows mild diffuse nonspecific slowing of the  background.  No potentially epileptiform activity was seen.      EEG (10/24/23)  A total of 00:25:54 hours of EEG was recorded.       EEG FINDINGS:  Background activity:   The background rhythm was characterized by poorly organized theta and alpha activity with occasional fragments of a 7-8Hz posterior dominant alpha rhythm.   Symmetry and continuity: the background was continuous and symmetric     Sleep:   No sleep transients were seen.     Activation procedures:   Photic stimulation was performed with no abnormalities seen     Abnormal activity:   No epileptiform discharges, periodic discharges, lateralized rhythmic delta activity or electrographic seizures were seen.     IMPRESSION:   Abnormal EEG due to a mild generalized non-specific cerebral dysfunction with no electrographic seizures or indications of seizure tendency.

## 2023-10-26 NOTE — CONSULTS
[facility]  Hepatology  Consult Note    Patient Name: Mara Mojica  MRN: [id number]  Admission Date: 10/25/2023  Hospital Length of Stay: 1 days  Code Status: Full Code   Attending Provider:  Dr. Renteria  Consulting Provider: Lv Resendiz MD  Primary Care Physician: Osiris Nevarez NP  Principal Problem:Acute encephalopathy    Inpatient consult to Hepatology  Consult performed by: Lv Resendiz MD  Consult ordered by: Aurelia Winston MD        Subjective:     HPI: Mara Mojica is a 58 y.o. female with history of alcoholic cirrhosis with ascites, s/p Ex Lap with bowel resection for incarcerated hernia, recent SBO, who presented to [facility] on 10/18 with AMS.  Patient found on the floor confused with slurred speech with incontinence and with questionable tongue injury.  EtOH level < 10. Patient was evaluated by Neurology - MRI brain was negative.  EEG showed mild generalized nonspecific cerebral dysfunction with no indication of seizure.  She was found to have a UTI which was treated with IV antibiotics.  Diuresis was started with IV Lasix 20 mg daily.  Her mentation did not improve significantly with lactulose enemas and large volume bowel movements.     Labs today significant for WBC 6.5, hemoglobin 7.8, INR 1.8, sodium 147, creatinine 0.9 and total bilirubin 5.3.  Ultrasound of liver performed 10/25 showed cirrhosis with sequelae of portal hypertension.No history could be obtained from the patient since she was non-verbal and no family present at bedside.     She was seen by Dr. Reardon in October 2023 for decompensated cirrhosis and it was recommended to proceed with liver transplant evaluation.  EGD in September 2023 showed normal esophagus and portal hypertensive gastropathy.  AFP in September 2023 was 7.8.      Review of patient's allergies indicates:  No Known Allergies    Review of Systems   Unable to perform ROS: Patient nonverbal        Objective:     Vitals:    10/26/23  0429   BP: (!) 146/64   Pulse: 98   Resp: 18   Temp: 97.9 °F (36.6 °C)         Constitutional: pale, icteric and non-verbal.   HENT: Head: Normal, normocephalic, atraumatic.  Eyes: conjunctiva clear and sclera icteric  GI: soft, non-tender, without masses or organomegaly, distended, without guarding, and without rebound  Musculoskeletal: no muscular tenderness noted  Skin: jaundice present  Neurological: Disoriented. No purposeful movements, responds to painful stimuli. Cannot track movements with her eyes.   Psychiatric: Cannot assess      Significant Labs:  Recent Labs   Lab 10/24/23  1023 10/25/23  1338 10/26/23  0416   HGB 7.4* 8.2* 7.8*       Lab Results   Component Value Date    WBC 6.52 10/26/2023    HGB 7.8 (L) 10/26/2023    HCT 24.8 (L) 10/26/2023     (H) 10/26/2023     (L) 10/26/2023       Lab Results   Component Value Date     (H) 10/26/2023    K 3.5 10/26/2023     (H) 10/26/2023    CO2 20 (L) 10/26/2023    BUN 31 (H) 10/26/2023    CREATININE 0.9 10/26/2023    CALCIUM 9.6 10/26/2023    ANIONGAP 10 10/26/2023       Lab Results   Component Value Date    ALT 38 10/26/2023    AST 81 (H) 10/26/2023    ALKPHOS 75 10/26/2023    BILITOT 5.3 (H) 10/26/2023       Lab Results   Component Value Date    INR 1.8 (H) 10/25/2023    INR 2.2 (H) 10/24/2023    INR 2.2 (H) 10/23/2023       Significant Imaging:  Reviewed pertinent radiology findings.       Assessment/Plan:     Mara Mojica is a 58 y.o. female with history of alcoholic cirrhosis with ascites, s/p Ex Lap with bowel resection for incarcerated hernia, recent SBO, who presented to [facility] on 10/18 with AMS. EtOH level < 10. CTH/MRI brain unremarkable. EEG showed mild generalized nonspecific cerebral dysfunction with no indication of seizure. Ammonia level WNL; not improving with lactulose in spite of large BMs. She was seen by Dr. Reardon in October 2023 for decompensated cirrhosis and it was recommended to proceed with liver transplant  evaluation.  EGD in September 2023 showed normal esophagus and portal hypertensive gastropathy.     Problem List:  Alcoholic cirrhosis of liver with ascites   AMS - not readily attributable to HE (differentials include encephalitis v/s seizure)      Recommendations:  - Start lactulose enemas TID. Continue Xifaxin.   - Avoid opiates and benzodiazepines, if able.   - IV thiamine and folate supplementation  - IV Rocephin for SBP prophylaxis  - Would obtain neurology & ID evaluation.   - Daily CBC, CMP & INR.   - We are not initiating liver transplant evaluation at this time. Patient will need to be conscious and able to participate in a discussion with us before this is possible.   - Will follow [facility].     Thank you for involving us in the care of Mara Mojica. Please call with any additional questions, concerns or changes in the patient's clinical status. We will continue to follow.     Lv Resendiz MD  Gastroenterology Fellow PGY V  [facility]

## 2023-10-26 NOTE — CONSULTS
[facility] ([place] [id number])  Neurology  Consult Note    Patient Name: Mara Mojica  MRN: [id number]  Admission Date: 10/25/2023  Hospital Length of Stay: 1 days  Code Status: Full Code   Attending Provider: Kate Grubbs MD   Consulting Provider: Elsie De La Cruz MD  Primary Care Physician: Osiris Nevarez NP  Principal Problem:Acute encephalopathy    Consults   Subjective:     Chief Complaint:  Acute encephalopathy     HPI:   This patient is a 57 y/o female with PMHx of alcoholic cirrhosis, s/p ex-lap w/ bowel resection for incarcerated hernia, who was initially admitted on 10/18 to [facility] with acute encephalopathy. Per history taken by Neurology at [facility],  found patient down on the floor at home with evidence of incontinence of stool and urine. Also noted confusion and slurred speech at that time. Appears to have had some concern for L sided weakness and down drift of L arm however CT head without evidence of acute abnormalities, MRI brain with only small vessel vascular changes without evidence of territorial infarct. Not answering questions but nodding head with yes/no questions.     Since then, had short term video EEG done on 10/19 with mild diffuse nonspecific background slowing, no potential epileptiform activity. Repeat MRI brain with contrast on 10/23 unchanged from initial MRI. Became more lethargic and was no longer following commands or answering questions. Due to worsening hepatic encephalopathy in setting of hepatic cirrhosis, patient transferred to [facility] for management with transplant team. Has remained on antibiotics, lactulose and rifaximin for treatment of UTI, HE.    Neurology consulted for management recommendations regarding persistent AMS. Had repeat EEG done on 10/24 with similar findings to prior EEG showing mild generalized non-specific cerebral dysfunction and no electrographic seizures or indication of seizure tendency. Additional CT head w/o contrast on 10/25 without  evidence of acute issues. Remains confused and unable to answer questions on exam. Not withdrawing to painful stimuli. Was able to awaken to verbal stimuli in AM however when reevaluated in afternoon unresponsive however was after receiving Ativan.       Past Medical History:   Diagnosis Date    Alcoholic cirrhosis of liver     Kidney failure     Unilateral inguinal hernia, without obstruction or gangrene, not specified as recurrent        Past Surgical History:   Procedure Laterality Date    ESOPHAGOGASTRODUODENOSCOPY N/A 09/21/2023    Procedure: EGD (ESOPHAGOGASTRODUODENOSCOPY);  Surgeon: Melissa Edwards MD;  Location: [facility];  Service: Endoscopy;  Laterality: N/A;    HERNIA REPAIR      TONSILLECTOMY         Review of patient's allergies indicates:  No Known Allergies      No current facility-administered medications on file prior to encounter.     Current Outpatient Medications on File Prior to Encounter   Medication Sig    furosemide (LASIX) 40 MG tablet Take 1 tablet (40 mg total) by mouth once daily.    lactulose (CHRONULAC) 20 gram/30 mL Soln Take 15 mLs (10 g total) by mouth 3 (three) times daily.    potassium chloride (KLOR-CON) 10 MEQ TbSR Take 1 tablet (10 mEq total) by mouth once daily.    propranoloL (INDERAL) 10 MG tablet Take 10 mg by mouth 2 (two) times daily.    spironolactone (ALDACTONE) 100 MG tablet Take 1 tablet (100 mg total) by mouth once daily.    [DISCONTINUED] dextrose 5 % in water (D5W) PgBk 100 mL with cefTRIAXone 1 gram SolR 1 g Inject 1 g into the vein every 12 (twelve) hours.    [DISCONTINUED] folic acid-vit B6-vit B12 2.5-25-2 mg (FOLBIC OR EQUIV) 2.5-25-2 mg Tab Take 1 tablet by mouth once daily.    [DISCONTINUED] rifAXIMin (XIFAXAN) 550 mg Tab Take 1 tablet (550 mg total) by mouth 2 (two) times daily.    [DISCONTINUED] thiamine 100 MG tablet Take 1 tablet (100 mg total) by mouth once daily.     Family History       Problem Relation (Age of Onset)    Ovarian  cancer Mother    Seizures Father          Tobacco Use    Smoking status: Every Day     Current packs/day: 0.50     Average packs/day: 0.5 packs/day for 25.0 years (12.5 ttl pk-yrs)     Types: Cigarettes     Start date: 10/18/1998     Passive exposure: Never    Smokeless tobacco: Never   Substance and Sexual Activity    Alcohol use: Not Currently    Drug use: Never    Sexual activity: Not on file     Review of Systems   Unable to perform ROS: Mental status change     Objective:     Vital Signs (Most Recent):  Temp: 98.2 °F (36.8 °C) (10/26/23 0759)  Pulse: 96 (10/26/23 0759)  Resp: 15 (10/26/23 0759)  BP: 127/61 (10/26/23 0759)  SpO2: 95 % (10/26/23 0759) Vital Signs (24h Range):  Temp:  [97.8 °F (36.6 °C)-98.2 °F (36.8 °C)] 98.2 °F (36.8 °C)  Pulse:  [78-98] 96  Resp:  [15-20] 15  SpO2:  [92 %-99 %] 95 %  BP: (127-146)/(58-64) 127/61     Weight: 56.2 kg (123 lb 14.4 oz)  Body mass index is 25.02 kg/m².     Physical Exam  Vitals and nursing note reviewed.   Constitutional:       General: She is sleeping. She is not in acute distress.     Appearance: She is ill-appearing. She is not toxic-appearing.      Comments: Confused, however awakens to verbal stimuli. Some response to painful stimuli   HENT:      Head: Normocephalic and atraumatic.   Eyes:      Pupils: Pupils are equal, round, and reactive to light.   Cardiovascular:      Rate and Rhythm: Normal rate.   Pulmonary:      Effort: Pulmonary effort is normal. No respiratory distress.   Abdominal:      Palpations: Abdomen is soft.      Tenderness: There is no abdominal tenderness.   Musculoskeletal:         General: Swelling present.   Skin:     General: Skin is warm.      Coloration: Skin is jaundiced.   Neurological:      Mental Status: She is easily aroused. She is lethargic and disoriented.      Deep Tendon Reflexes:      Reflex Scores:       Tricep reflexes are 2+ on the right side and 2+ on the left side.       Bicep reflexes are 2+ on the right side and  2+ on the left side.       Brachioradialis reflexes are 2+ on the right side and 2+ on the left side.       Patellar reflexes are 2+ on the right side and 2+ on the left side.       Achilles reflexes are 2+ on the right side and 2+ on the left side.     Comments: AAOx0  Noncooperative 2/2 encephalopathy. Withdraws to painful stimuli and awakens to verbal commands however not following commands, answering questions          NEUROLOGICAL EXAMINATION:     MENTAL STATUS   Level of consciousness: arousable by verbal stimuli ,  responsive to painful stimuli       When reevaluated in afternoon not responsive to verbal stimuli but had received Ativan     CRANIAL NERVES     CN III, IV, VI   Pupils are equal, round, and reactive to light.  Nystagmus: none     MOTOR EXAM   Overall muscle tone: normal    REFLEXES     Reflexes   Right brachioradialis: 2+  Left brachioradialis: 2+  Right biceps: 2+  Left biceps: 2+  Right triceps: 2+  Left triceps: 2+  Right patellar: 2+  Left patellar: 2+  Right achilles: 2+  Left achilles: 2+  Right ankle clonus: absent  Left ankle clonus: absent      Significant Labs: All pertinent lab results from the past 24 hours have been reviewed.    Pending drug of abuse, PETH, heavy metal screen, RPR, B12, B1, MMA, Ucx (10/25)  Ammonia 17    CMP  Sodium   Date Value Ref Range Status   10/26/2023 147 (H) 136 - 145 mmol/L Final     Potassium   Date Value Ref Range Status   10/26/2023 3.5 3.5 - 5.1 mmol/L Final     Chloride   Date Value Ref Range Status   10/26/2023 117 (H) 95 - 110 mmol/L Final     CO2   Date Value Ref Range Status   10/26/2023 20 (L) 23 - 29 mmol/L Final     Glucose   Date Value Ref Range Status   10/26/2023 79 70 - 110 mg/dL Final     BUN   Date Value Ref Range Status   10/26/2023 31 (H) 6 - 20 mg/dL Final     Creatinine   Date Value Ref Range Status   10/26/2023 0.9 0.5 - 1.4 mg/dL Final     Calcium   Date Value Ref Range Status   10/26/2023 9.6 8.7 - 10.5 mg/dL Final     Total Protein    Date Value Ref Range Status   10/26/2023 6.0 6.0 - 8.4 g/dL Final     Albumin   Date Value Ref Range Status   10/26/2023 2.5 (L) 3.5 - 5.2 g/dL Final     Total Bilirubin   Date Value Ref Range Status   10/26/2023 5.3 (H) 0.1 - 1.0 mg/dL Final     Comment:     For infants and newborns, interpretation of results should be based  on gestational age, weight and in agreement with clinical  observations.    Premature Infant recommended reference ranges:  Up to 24 hours.............<8.0 mg/dL  Up to 48 hours............<12.0 mg/dL  3-5 days..................<15.0 mg/dL  6-29 days.................<15.0 mg/dL       Alkaline Phosphatase   Date Value Ref Range Status   10/26/2023 75 55 - 135 U/L Final     AST   Date Value Ref Range Status   10/26/2023 81 (H) 10 - 40 U/L Final     ALT   Date Value Ref Range Status   10/26/2023 38 10 - 44 U/L Final     Anion Gap   Date Value Ref Range Status   10/26/2023 10 8 - 16 mmol/L Final     eGFR   Date Value Ref Range Status   10/26/2023 >60.0 >60 mL/min/1.73 m^2 Final     Lab Results   Component Value Date    WBC 6.52 10/26/2023    HGB 7.8 (L) 10/26/2023    HCT 24.8 (L) 10/26/2023     (H) 10/26/2023     (L) 10/26/2023       Significant Imaging: I have reviewed all pertinent imaging results/findings within the past 24 hours.    CT HEAD WITHOUT CONTRAST (10/25)  FINDINGS: The brain parenchyma appears unchanged from recent exam.  No new parenchymal hemorrhage, edema, mass effect or major vascular distribution infarct. Ventricles are stable in size, without evidence of hydrocephalus. No new extra-axial blood or fluid collections. No displaced calvarial fracture. The mastoid air cells and visualized paranasal sinuses are essentially clear.     Impression:  Examination mildly degraded by patient motion artifact.   No evidence of acute hemorrhage or major vascular distribution infarct.    MRI BRAIN WITH CONTRAST (10/23)  FINDINGS: Mild motion artifacts limits evaluation. A is  susceptibility artifact was seen in the posterior aspect of the brain parenchyma on diffusion-weighted imaging. No evidence for acute infarct. No evidence for abnormal enhancement. No evidence for hemorrhage mass effect or midline shift. Globes and sinuses are unremarkable. Midline structures are within normal limits. Normal flow voids are seen.     Impression:  No acute process seen. No evidence for abnormal enhancement.  Suboptimal imaging due to motion artifacts    MRI BRAIN WITHOUT CONTRAST (10/18/23)  FINDINGS: Ventricles and sulci are normal in size for age without evidence of hydrocephalus. No extra-axial blood or fluid collections. The cerebellar tonsils are in expected location. The visualized portions of the sella appear within normal limits. No intracranial restricted diffusion.  No focal encephalomalacia.  The brain parenchyma demonstrates no edema, mass or mass effect.  There are few patchy areas of periventricular and subcortical T2/FLAIR signal hyperintensity, which are nonspecific most suggestive of mild chronic microvascular ischemic changes. Small focus of gradient susceptibility artifact within the left parietal lobe. Normal vascular flow voids are preserved. Tiny left maxillary sinus mucous retention cyst. Mild mucosal thickening of the right maxillary sinus.  Mastoid air cells are clear. Visualized globes and orbits appear grossly within normal limits within limitations of motion artifact. Marrow signal is within normal limits.     Impression:  No acute intracranial abnormality.  White matter signal hyperintensities are nonspecific, but likely reflect mild chronic microvascular ischemic change.  Tiny left remote parietal microhemorrhage versus cerebral cavernous venous malformation.    Short-term video EEG (10/19/23)  FINDINGS: The short-term video EEG recording during wakefulness contains 9 Hz alpha activity over the posterior head regions. There is mild diffuse slowing in the range of 5-7 Hz.     Photic stimulation and hyperventilation was not performed.   During the recording, the patient became drowsy but did not fall asleep.   The EKG channel regular rhythm.     Interpretation  The short-term video EEG shows mild diffuse nonspecific slowing of the background.  No potentially epileptiform activity was seen.      EEG (10/24/23)  A total of 00:25:54 hours of EEG was recorded.       EEG FINDINGS:  Background activity:   The background rhythm was characterized by poorly organized theta and alpha activity with occasional fragments of a 7-8Hz posterior dominant alpha rhythm.   Symmetry and continuity: the background was continuous and symmetric     Sleep:   No sleep transients were seen.     Activation procedures:   Photic stimulation was performed with no abnormalities seen     Abnormal activity:   No epileptiform discharges, periodic discharges, lateralized rhythmic delta activity or electrographic seizures were seen.     IMPRESSION:   Abnormal EEG due to a mild generalized non-specific cerebral dysfunction with no electrographic seizures or indications of seizure tendency.          Assessment and Plan:     * Acute encephalopathy  - 59 y/o female with a history of decompensation alcoholic cirrhosis who presented to [facility] with acute encephalopathy, urinary and fecal incontinence and concern for a tingue bite (that was attributed to poor dentition). Per the notes, alert and oriented x 3 on intial neurology evaluation at that time. Since then has become more confused, encephalopathic and now no longer following commands. Transferred to [facility] for worsening encephalopathy.  - Remains confused on physical exam by neuro staff. Not following commands. Withdraws to painful stimuli and awakens to verbal stimuli. Limited CN exam with no loss of reflexes. DTRs normal.  - Labs with normal ammonia, some signs of UTI on recent UA on 10/25. Had prior UA showing E. Coli. Remains afebrile with normal WBC.   - MRI of  brain on 10/19 and 10/23 without evidence of acute abnormalities, no pathologic enhancements noted. Repeat CT head done 10/25 without evidence of acute intracranial process. EEG done on 10/18 and 10/24 without evidence of seizure activity.  - Preliminary read of EEG done today shows disorganized background with frequent triphasic waves and L post maximum epileptiform discharges. Subclinical seizures originating from L posterior region at frequency of every 3-4 minutes. Will treat at this time based on EEG findings, will follow up on full report. With frequent triphasic waves would also suggest hepatic encephalopathy as contributing to patient's current encephalopathy as this is a common EEG finding in this disease state despite normal ammonia. Also noted persistent UTI which may be contributing but would be unlikely to cause this severity of encephalopathy.    Recommendations  - 2g Keppra load, will start 750mg BID of keppra for subclinical seizures seen on EEG. Will follow up on full read.  - Hepatic encephalopathy treatment per primary team  - Okay to continue thiamine at this time  - Would continue to treat UTI at this time per primary team, ID. Avoid cefepime if possible due to increase seizure risk on this medication  - Delirium precautions  - Pending drug of abuse, PETH, heavy metal screen, RPR, B12, B1, MMA, Ucx (10/25) for other causes of encephalopathy  - Will hold off on LP at this time given EEG findings could explain current findings. Will reevaluate if not improving        VTE Risk Mitigation (From admission, onward)         Ordered     IP VTE LOW RISK PATIENT  Once         10/25/23 1303     Place JOI hose  Until discontinued         10/25/23 1303     Place sequential compression device  Until discontinued         10/25/23 1303                Thank you for your consult. I will follow-up with patient. Please contact us if you have any additional questions.    Elsie De La Cruz MD PGY-III  Neurology  [facility] -  Intensive Care ([facility]

## 2023-10-26 NOTE — ASSESSMENT & PLAN NOTE
- 57 y/o female with a history of decompensation alcoholic cirrhosis who presented to [facility] with acute encephalopathy, urinary and fecal incontinence and concern for a tingue bite (that was attributed to poor dentition). Per the notes, alert and oriented x 3 on intial neurology evaluation at that time. Since then has become more confused, encephalopathic and now no longer following commands. Transferred to [facility] for worsening encephalopathy.  - Remains confused on physical exam by neuro staff. Not following commands. Withdraws to painful stimuli and awakens to verbal stimuli. Limited CN exam with no loss of reflexes. DTRs normal.  - Labs with normal ammonia, some signs of UTI on recent UA on 10/25. Had prior UA showing E. Coli. Remains afebrile with normal WBC.   - MRI of brain on 10/19 and 10/23 without evidence of acute abnormalities, no pathologic enhancements noted. Repeat CT head done 10/25 without evidence of acute intracranial process. EEG done on 10/18 and 10/24 without evidence of seizure activity.  - Preliminary read of EEG done today shows disorganized background with frequent triphasic waves and L post maximum epileptiform discharges. Subclinical seizures originating from L posterior region at frequency of every 3-4 minutes. Will treat at this time based on EEG findings, will follow up on full report. With frequent triphasic waves would also suggest hepatic encephalopathy as contributing to patient's current encephalopathy as this is a common EEG finding in this disease state despite normal ammonia. Also noted persistent UTI which may be contributing but would be unlikely to cause this severity of encephalopathy.    Recommendations  - 2g Keppra load, will start 750mg BID of keppra for subclinical seizures seen on EEG. Will follow up on full read.  - Hepatic encephalopathy treatment per primary team  - Okay to continue thiamine at this time  - Would continue to treat UTI at this time per primary  team, ID. Avoid cefepime if possible due to increase seizure risk on this medication  - Delirium precautions  - Pending drug of abuse, PETH, heavy metal screen, RPR, B12, B1, MMA, Ucx (10/25) for other causes of encephalopathy  - Will hold off on LP at this time given EEG findings could explain current findings. Will reevaluate if not improving

## 2023-10-26 NOTE — CONSULTS
[facility] ([place] [address])  Infectious Disease  Consult Note    Patient Name: Mara Mojica  MRN: [id number]  Admission Date: 10/25/2023  Hospital Length of Stay: 1 days  Attending Physician: Kate Grubbs MD  Primary Care Provider: Osiris Nevarez NP     Isolation Status: No active isolations    Patient information was obtained from ER records.      Inpatient consult to Infectious Diseases  Consult performed by: David Ambrocio MD  Consult ordered by: Kate Grubbs MD        Assessment/Plan:     Neuro  * Acute encephalopathy  58 year old female with cirrhosis from alcohol use, hx of bowel surgery, who was transferred to [facility] for persistant encephalopathy despite therapy for hepatic encephalopathy, other causes were explored MRI and CT imaging of brain unrevealing, EEG suggestive of subclinical seizures, neuro following, no fever or leukocytosis, mental status has not improved, ID was called to explore possibility of infectious etiology. Ucx with pan sensitive e coli and being treated with ceftriaxone for this and SBP ppx as well.       Recommendations    So far her clinical picture does not make a strong case for encephalitis given MRI findings, and lack of CSF data. We agree with neurology, in the setting of EEG findings it is reasonable to hold off on LP. However, if clinical suspicion rises for infectious etiology, would be hard to diagnose infectious encephalitis without CSF findings in this patient with neuro exam confounded by subclinical seizures.     Ceftriaxone course is already treating presumed cystitis, doubt this would be contributory to persistent encephalopathy.         Thank you for your consult. I will sign off. Please contact us if you have any additional questions.    David Ambrocio MD  Infectious Disease  [facility] - [facility] ([place] [address])    Subjective:     Principal Problem: Acute encephalopathy    HPI: 58 year old female with alcohol related cirrhosis, hx of laparotomy  w/ bowel resection for incarcerated hernia, who presented 10/18 to OSH () with altered mental stratus.  She was treated for hepatic encephalopathy but mental status did not improve, no fevers, MRI brain with no acute changes, small vessel vascular changes, repeat MRI brain w/ contrast without any changes, patient was transferred here for persistent encephalopathy warranting further workup. She was seen by ID at OSH, recommended LP with viral encephalitis and meningitis workup, did not get an LP 2/2 to coagulopathy. Here at [facility] she is currently admitted for further workup.  Of note she has been afebrile and without leukocytosis throughout the course of this admission.  No significant hypoxia noted.  Blood cultures from 10/18 as well as 1025 with no growth.  Cystitis is adequately covered for therapy with Ceftriaxone which she is also receiving for SBP ppx, still does not explain persistent encephalopathy. ID was consulted for a patient who was seen at . adding consultation to continue recommended care. patient s/p recent surgery and UTI, has AMS, multifactorial encephalopathy however concern for encephalitis          Past Medical History:   Diagnosis Date    Alcoholic cirrhosis of liver     Kidney failure     Unilateral inguinal hernia, without obstruction or gangrene, not specified as recurrent        Past Surgical History:   Procedure Laterality Date    ESOPHAGOGASTRODUODENOSCOPY N/A 09/21/2023    Procedure: EGD (ESOPHAGOGASTRODUODENOSCOPY);  Surgeon: Melissa Edwards MD;  Location: [facility];  Service: Endoscopy;  Laterality: N/A;    HERNIA REPAIR      TONSILLECTOMY         Review of patient's allergies indicates:  No Known Allergies    Medications:  Medications Prior to Admission   Medication Sig    furosemide (LASIX) 40 MG tablet Take 1 tablet (40 mg total) by mouth once daily.    lactulose (CHRONULAC) 20 gram/30 mL Soln Take 15 mLs (10 g total) by mouth 3 (three) times daily.    potassium  chloride (KLOR-CON) 10 MEQ TbSR Take 1 tablet (10 mEq total) by mouth once daily.    propranoloL (INDERAL) 10 MG tablet Take 10 mg by mouth 2 (two) times daily.    spironolactone (ALDACTONE) 100 MG tablet Take 1 tablet (100 mg total) by mouth once daily.     Antibiotics (From admission, onward)      Start     Stop Route Frequency Ordered    10/26/23 2100  mupirocin 2 % ointment         10/31/23 2059 Nasl 2 times daily 10/26/23 1240    10/25/23 1415  cefTRIAXone (ROCEPHIN) 2 g in dextrose 5 % in water (D5W) 100 mL IVPB (MB+)         -- IV Every 24 hours (non-standard times) 10/25/23 1304    10/25/23 1315  rifAXIMin tablet 550 mg         -- PER NG TUBE 2 times daily 10/25/23 1303          Antifungals (From admission, onward)      None          Antivirals (From admission, onward)      None               There is no immunization history on file for this patient.    Family History       Problem Relation (Age of Onset)    Ovarian cancer Mother    Seizures Father          Social History     Socioeconomic History    Marital status:    Tobacco Use    Smoking status: Every Day     Current packs/day: 0.50     Average packs/day: 0.5 packs/day for 25.0 years (12.5 ttl pk-yrs)     Types: Cigarettes     Start date: 10/18/1998     Passive exposure: Never    Smokeless tobacco: Never   Substance and Sexual Activity    Alcohol use: Not Currently    Drug use: Never     Social Determinants of Health     Financial Resource Strain: Low Risk  (10/25/2023)    Overall Financial Resource Strain (CARDIA)     Difficulty of Paying Living Expenses: Not hard at all   Food Insecurity: No Food Insecurity (10/25/2023)    Hunger Vital Sign     Worried About Running Out of Food in the Last Year: Never true     Ran Out of Food in the Last Year: Never true   Transportation Needs: No Transportation Needs (10/25/2023)    PRAPARE - Transportation     Lack of Transportation (Medical): No     Lack of Transportation (Non-Medical): No    Physical Activity: Insufficiently Active (10/25/2023)    Exercise Vital Sign     Days of Exercise per Week: 4 days     Minutes of Exercise per Session: 20 min   Stress: Stress Concern Present (10/25/2023)    [employer] [place] [employer] - Occupational Stress Questionnaire     Feeling of Stress : Rather much   Social Connections: Moderately Isolated (10/25/2023)    Social Connection and Isolation Panel [NHANES]     Frequency of Communication with Friends and Family: Three times a week     Frequency of Social Gatherings with Friends and Family: Twice a week     Attends Temple Services: Never     Active Member of Clubs or Organizations: No     Attends Club or Organization Meetings: Never     Marital Status:    Housing Stability: Unknown (10/25/2023)    Housing Stability Vital Sign     Unable to Pay for Housing in the Last Year: No     Unstable Housing in the Last Year: No     Review of Systems   Reason unable to perform ROS: AMS.     Objective:     Vital Signs (Most Recent):  Temp: 98.2 °F (36.8 °C) (10/26/23 1600)  Pulse: 94 (10/26/23 1600)  Resp: 19 (10/26/23 1600)  BP: 106/61 (10/26/23 1600)  SpO2: (!) 91 % (10/26/23 1600) Vital Signs (24h Range):  Temp:  [97.8 °F (36.6 °C)-98.6 °F (37 °C)] 98.2 °F (36.8 °C)  Pulse:  [82-98] 94  Resp:  [15-20] 19  SpO2:  [91 %-99 %] 91 %  BP: (106-146)/(58-64) 106/61     Weight: 56.2 kg (123 lb 14.4 oz)  Body mass index is 25.02 kg/m².    Estimated Creatinine Clearance: 53.8 mL/min (based on SCr of 0.9 mg/dL).     Physical Exam  Constitutional:       Appearance: She is ill-appearing. She is not toxic-appearing.   HENT:      Head: Normocephalic and atraumatic.      Comments: EEG bandage   Eyes:      Conjunctiva/sclera: Conjunctivae normal.   Cardiovascular:      Rate and Rhythm: Normal rate and regular rhythm.      Pulses: Normal pulses.      Heart sounds: Normal heart sounds.   Pulmonary:      Effort: Pulmonary effort is normal.      Breath sounds:  Normal breath sounds.   Abdominal:      General: Bowel sounds are normal.      Palpations: Abdomen is soft.   Musculoskeletal:         General: No swelling or deformity.      Cervical back: Neck supple.   Skin:     General: Skin is warm and dry.   Neurological:      Mental Status: She is alert. She is disoriented.      Comments: Tracking with eyes noted  Withdraws to pain  Not following commands  Awake, but not responding to questions            Significant Labs: All pertinent labs within the past 24 hours have been reviewed.  Recent Lab Results         10/26/23  0416   10/25/23  2036        Albumin 2.5         ALP 75         ALT 38         Anion Gap 10         AST 81         Baso # 0.11         Basophil % 1.7         BILIRUBIN TOTAL 5.3  Comment: For infants and newborns, interpretation of results should be based  on gestational age, weight and in agreement with clinical  observations.    Premature Infant recommended reference ranges:  Up to 24 hours.............<8.0 mg/dL  Up to 48 hours............<12.0 mg/dL  3-5 days..................<15.0 mg/dL  6-29 days.................<15.0 mg/dL           BUN 31         Calcium 9.6         Chloride 117         CO2 20         Creatinine 0.9         Differential Method Automated         eGFR >60.0         Eos # 0.3         Eosinophil % 4.0         Glucose 79         Gran # (ANC) 3.4         Gran % 51.9         Hematocrit 24.8         Hemoglobin 7.8         Immature Grans (Abs) 0.06  Comment: Mild elevation in immature granulocytes is non specific and   can be seen in a variety of conditions including stress response,   acute inflammation, trauma and pregnancy. Correlation with other   laboratory and clinical findings is essential.           Immature Granulocytes 0.9         Lymph # 1.6         Lymph % 24.5         MCH 34.1         MCHC 31.5                  Mono # 1.1         Mono % 17.0         MPV 9.9         nRBC 1         Platelet Count 137         POCT Glucose    92       Potassium 3.5         PROTEIN TOTAL 6.0         RBC 2.29         RDW 18.6         Sodium 147         WBC 6.52                 Significant Imaging: I have reviewed all pertinent imaging results/findings within the past 24 hours.

## 2023-10-27 PROBLEM — R56.9 SEIZURE: Status: ACTIVE | Noted: 2023-10-27

## 2023-10-27 PROBLEM — A41.9 SEPSIS: Status: ACTIVE | Noted: 2023-10-27

## 2023-10-27 PROBLEM — E87.0 HYPERNATREMIA: Status: ACTIVE | Noted: 2023-10-27

## 2023-10-27 LAB
ALBUMIN SERPL BCP-MCNC: 2.2 G/DL (ref 3.5–5.2)
ALLENS TEST: ABNORMAL
ALP SERPL-CCNC: 93 U/L (ref 55–135)
ALT SERPL W/O P-5'-P-CCNC: 39 U/L (ref 10–44)
AMMONIA PLAS-SCNC: 40 UMOL/L (ref 10–50)
ANION GAP SERPL CALC-SCNC: 10 MMOL/L (ref 8–16)
ANION GAP SERPL CALC-SCNC: 11 MMOL/L (ref 8–16)
ARSENIC BLD-MCNC: 1 NG/ML
AST SERPL-CCNC: 85 U/L (ref 10–40)
BACTERIA UR CULT: ABNORMAL
BASOPHILS # BLD AUTO: 0.05 K/UL (ref 0–0.2)
BASOPHILS NFR BLD: 0.8 % (ref 0–1.9)
BILIRUB SERPL-MCNC: 4.4 MG/DL (ref 0.1–1)
BUN SERPL-MCNC: 19 MG/DL (ref 6–20)
BUN SERPL-MCNC: 26 MG/DL (ref 6–20)
CADMIUM BLD-MCNC: 0.8 NG/ML
CALCIUM SERPL-MCNC: 9.2 MG/DL (ref 8.7–10.5)
CALCIUM SERPL-MCNC: 9.4 MG/DL (ref 8.7–10.5)
CHLORIDE SERPL-SCNC: 115 MMOL/L (ref 95–110)
CHLORIDE SERPL-SCNC: 119 MMOL/L (ref 95–110)
CITY: NORMAL
CLINICAL BIOCHEMIST REVIEW: NORMAL
CO2 SERPL-SCNC: 19 MMOL/L (ref 23–29)
CO2 SERPL-SCNC: 20 MMOL/L (ref 23–29)
COUNTY: NORMAL
CREAT SERPL-MCNC: 0.8 MG/DL (ref 0.5–1.4)
CREAT SERPL-MCNC: 0.9 MG/DL (ref 0.5–1.4)
DELSYS: ABNORMAL
DIFFERENTIAL METHOD: ABNORMAL
EOSINOPHIL # BLD AUTO: 0.3 K/UL (ref 0–0.5)
EOSINOPHIL NFR BLD: 4.2 % (ref 0–8)
ERYTHROCYTE [DISTWIDTH] IN BLOOD BY AUTOMATED COUNT: 18.1 % (ref 11.5–14.5)
EST. GFR  (NO RACE VARIABLE): >60 ML/MIN/1.73 M^2
EST. GFR  (NO RACE VARIABLE): >60 ML/MIN/1.73 M^2
FLOW: 2
GLUCOSE SERPL-MCNC: 107 MG/DL (ref 70–110)
GLUCOSE SERPL-MCNC: 127 MG/DL (ref 70–110)
GUARDIAN FIRST NAME: NORMAL
GUARDIAN LAST NAME: NORMAL
HCO3 UR-SCNC: 21.5 MMOL/L (ref 24–28)
HCO3 UR-SCNC: 23.4 MMOL/L (ref 24–28)
HCT VFR BLD AUTO: 25.3 % (ref 37–48.5)
HCT VFR BLD CALC: 22 %PCV (ref 36–54)
HCT VFR BLD CALC: 23 %PCV (ref 36–54)
HGB BLD-MCNC: 7.7 G/DL (ref 12–16)
HOME PHONE: NORMAL
IMM GRANULOCYTES # BLD AUTO: 0.04 K/UL (ref 0–0.04)
IMM GRANULOCYTES NFR BLD AUTO: 0.6 % (ref 0–0.5)
INR PPP: 1.8 (ref 0.8–1.2)
LDH SERPL L TO P-CCNC: 1.91 MMOL/L (ref 0.36–1.25)
LEAD BLD-MCNC: 1 MCG/DL
LYMPHOCYTES # BLD AUTO: 1.3 K/UL (ref 1–4.8)
LYMPHOCYTES NFR BLD: 19.5 % (ref 18–48)
MAGNESIUM SERPL-MCNC: 2 MG/DL (ref 1.6–2.6)
MCH RBC QN AUTO: 33.2 PG (ref 27–31)
MCHC RBC AUTO-ENTMCNC: 30.4 G/DL (ref 32–36)
MCV RBC AUTO: 109 FL (ref 82–98)
MERCURY BLD-MCNC: 1 NG/ML
MODE: ABNORMAL
MONOCYTES # BLD AUTO: 1.1 K/UL (ref 0.3–1)
MONOCYTES NFR BLD: 17.1 % (ref 4–15)
NEUTROPHILS # BLD AUTO: 3.8 K/UL (ref 1.8–7.7)
NEUTROPHILS NFR BLD: 57.8 % (ref 38–73)
NIACIN SERPL-MCNC: <5 NG/ML
NICOTINAMIDE SERPL-MCNC: 50.2 NG/ML (ref 5–48)
NICOTINURATE SERPL-MCNC: <5 NG/ML
NRBC BLD-RTO: 0 /100 WBC
PCO2 BLDA: 30.6 MMHG (ref 35–45)
PCO2 BLDA: 31.9 MMHG (ref 35–45)
PH SMN: 7.46 [PH] (ref 7.35–7.45)
PH SMN: 7.47 [PH] (ref 7.35–7.45)
PHOSPHATE SERPL-MCNC: 1.8 MG/DL (ref 2.7–4.5)
PLATELET # BLD AUTO: 104 K/UL (ref 150–450)
PLPETH BLD-MCNC: <10 NG/ML
PMV BLD AUTO: 10.7 FL (ref 9.2–12.9)
PO2 BLDA: 67 MMHG (ref 80–100)
PO2 BLDA: 84 MMHG (ref 80–100)
POC BE: -2 MMOL/L
POC BE: 0 MMOL/L
POC IONIZED CALCIUM: 1.35 MMOL/L (ref 1.06–1.42)
POC IONIZED CALCIUM: 1.44 MMOL/L (ref 1.06–1.42)
POC SATURATED O2: 94 % (ref 95–100)
POC SATURATED O2: 97 % (ref 95–100)
POC TCO2: 22 MMOL/L (ref 23–27)
POC TCO2: 24 MMOL/L (ref 23–27)
POCT GLUCOSE: 127 MG/DL (ref 70–110)
POCT GLUCOSE: 127 MG/DL (ref 70–110)
POCT GLUCOSE: 151 MG/DL (ref 70–110)
POPETH BLD-MCNC: <10 NG/ML
POTASSIUM BLD-SCNC: 2.5 MMOL/L (ref 3.5–5.1)
POTASSIUM BLD-SCNC: 3.3 MMOL/L (ref 3.5–5.1)
POTASSIUM SERPL-SCNC: 2.5 MMOL/L (ref 3.5–5.1)
POTASSIUM SERPL-SCNC: 3.4 MMOL/L (ref 3.5–5.1)
PROT SERPL-MCNC: 5.5 G/DL (ref 6–8.4)
PROTHROMBIN TIME: 18.2 SEC (ref 9–12.5)
RACE: NORMAL
RBC # BLD AUTO: 2.32 M/UL (ref 4–5.4)
SAMPLE: ABNORMAL
SITE: ABNORMAL
SODIUM BLD-SCNC: 150 MMOL/L (ref 136–145)
SODIUM BLD-SCNC: 151 MMOL/L (ref 136–145)
SODIUM SERPL-SCNC: 146 MMOL/L (ref 136–145)
SODIUM SERPL-SCNC: 148 MMOL/L (ref 136–145)
SP02: 100
STATE: NORMAL
STREET ADDRESS: NORMAL
VENOUS/CAPILLARY: NORMAL
VIT B12 SERPL-MCNC: >1400 NG/L (ref 180–914)
WBC # BLD AUTO: 6.62 K/UL (ref 3.9–12.7)
ZIP: NORMAL

## 2023-10-27 PROCEDURE — 63600175 PHARM REV CODE 636 W HCPCS: Mod: NTX | Performed by: STUDENT IN AN ORGANIZED HEALTH CARE EDUCATION/TRAINING PROGRAM

## 2023-10-27 PROCEDURE — 25000003 PHARM REV CODE 250: Mod: NTX | Performed by: STUDENT IN AN ORGANIZED HEALTH CARE EDUCATION/TRAINING PROGRAM

## 2023-10-27 PROCEDURE — 20000000 HC ICU ROOM: Mod: NTX

## 2023-10-27 PROCEDURE — 83605 ASSAY OF LACTIC ACID: CPT | Mod: NTX

## 2023-10-27 PROCEDURE — 63600175 PHARM REV CODE 636 W HCPCS: Mod: NTX | Performed by: NURSE PRACTITIONER

## 2023-10-27 PROCEDURE — 82140 ASSAY OF AMMONIA: CPT | Mod: NTX | Performed by: HOSPITALIST

## 2023-10-27 PROCEDURE — 31720 CLEARANCE OF AIRWAYS: CPT | Mod: NTX

## 2023-10-27 PROCEDURE — 82803 BLOOD GASES ANY COMBINATION: CPT | Mod: NTX

## 2023-10-27 PROCEDURE — 87040 BLOOD CULTURE FOR BACTERIA: CPT | Mod: 59,NTX | Performed by: NURSE PRACTITIONER

## 2023-10-27 PROCEDURE — 83735 ASSAY OF MAGNESIUM: CPT | Mod: NTX | Performed by: STUDENT IN AN ORGANIZED HEALTH CARE EDUCATION/TRAINING PROGRAM

## 2023-10-27 PROCEDURE — 95720 PR EEG, W/VIDEO, CONT RECORD, I&R, >12<26 HRS: ICD-10-PCS | Mod: NTX,,, | Performed by: PSYCHIATRY & NEUROLOGY

## 2023-10-27 PROCEDURE — 84100 ASSAY OF PHOSPHORUS: CPT | Mod: NTX | Performed by: STUDENT IN AN ORGANIZED HEALTH CARE EDUCATION/TRAINING PROGRAM

## 2023-10-27 PROCEDURE — 82800 BLOOD PH: CPT | Mod: NTX

## 2023-10-27 PROCEDURE — 63600175 PHARM REV CODE 636 W HCPCS: Mod: NTX | Performed by: HOSPITALIST

## 2023-10-27 PROCEDURE — 63600175 PHARM REV CODE 636 W HCPCS: Mod: NTX | Performed by: PHYSICIAN ASSISTANT

## 2023-10-27 PROCEDURE — 94667 MNPJ CHEST WALL 1ST: CPT | Mod: NTX

## 2023-10-27 PROCEDURE — 27000221 HC OXYGEN, UP TO 24 HOURS: Mod: NTX

## 2023-10-27 PROCEDURE — C9254 INJECTION, LACOSAMIDE: HCPCS | Mod: NTX | Performed by: STUDENT IN AN ORGANIZED HEALTH CARE EDUCATION/TRAINING PROGRAM

## 2023-10-27 PROCEDURE — 94668 MNPJ CHEST WALL SBSQ: CPT | Mod: NTX

## 2023-10-27 PROCEDURE — 36415 COLL VENOUS BLD VENIPUNCTURE: CPT | Mod: NTX | Performed by: STUDENT IN AN ORGANIZED HEALTH CARE EDUCATION/TRAINING PROGRAM

## 2023-10-27 PROCEDURE — 99232 PR SUBSEQUENT HOSPITAL CARE,LEVL II: ICD-10-PCS | Mod: NTX,,, | Performed by: INTERNAL MEDICINE

## 2023-10-27 PROCEDURE — 80053 COMPREHEN METABOLIC PANEL: CPT | Mod: NTX | Performed by: HOSPITALIST

## 2023-10-27 PROCEDURE — 25000242 PHARM REV CODE 250 ALT 637 W/ HCPCS: Mod: NTX | Performed by: NURSE PRACTITIONER

## 2023-10-27 PROCEDURE — 25000242 PHARM REV CODE 250 ALT 637 W/ HCPCS: Mod: NTX | Performed by: HOSPITALIST

## 2023-10-27 PROCEDURE — 84295 ASSAY OF SERUM SODIUM: CPT | Mod: NTX

## 2023-10-27 PROCEDURE — P9047 ALBUMIN (HUMAN), 25%, 50ML: HCPCS | Mod: JG,NTX

## 2023-10-27 PROCEDURE — 99900035 HC TECH TIME PER 15 MIN (STAT): Mod: NTX

## 2023-10-27 PROCEDURE — 82330 ASSAY OF CALCIUM: CPT | Mod: NTX

## 2023-10-27 PROCEDURE — 84132 ASSAY OF SERUM POTASSIUM: CPT | Mod: NTX

## 2023-10-27 PROCEDURE — 99291 CRITICAL CARE FIRST HOUR: CPT | Mod: NTX,,, | Performed by: PSYCHIATRY & NEUROLOGY

## 2023-10-27 PROCEDURE — 87106 FUNGI IDENTIFICATION YEAST: CPT | Mod: NTX

## 2023-10-27 PROCEDURE — 99232 SBSQ HOSP IP/OBS MODERATE 35: CPT | Mod: NTX,,, | Performed by: INTERNAL MEDICINE

## 2023-10-27 PROCEDURE — 25000003 PHARM REV CODE 250: Mod: NTX | Performed by: PHYSICIAN ASSISTANT

## 2023-10-27 PROCEDURE — 25000003 PHARM REV CODE 250: Mod: NTX | Performed by: HOSPITALIST

## 2023-10-27 PROCEDURE — 85014 HEMATOCRIT: CPT | Mod: NTX

## 2023-10-27 PROCEDURE — 63600175 PHARM REV CODE 636 W HCPCS: Mod: JG,NTX

## 2023-10-27 PROCEDURE — 94640 AIRWAY INHALATION TREATMENT: CPT | Mod: NTX

## 2023-10-27 PROCEDURE — 25000003 PHARM REV CODE 250: Mod: NTX

## 2023-10-27 PROCEDURE — 87205 SMEAR GRAM STAIN: CPT | Mod: NTX

## 2023-10-27 PROCEDURE — 99222 PR INITIAL HOSPITAL CARE,LEVL II: ICD-10-PCS | Mod: NTX,,, | Performed by: INTERNAL MEDICINE

## 2023-10-27 PROCEDURE — 63700000 PHARM REV CODE 250 ALT 637 W/O HCPCS: Mod: NTX | Performed by: PSYCHIATRY & NEUROLOGY

## 2023-10-27 PROCEDURE — 87070 CULTURE OTHR SPECIMN AEROBIC: CPT | Mod: NTX

## 2023-10-27 PROCEDURE — 80048 BASIC METABOLIC PNL TOTAL CA: CPT | Mod: NTX,XB

## 2023-10-27 PROCEDURE — 85610 PROTHROMBIN TIME: CPT | Mod: NTX | Performed by: STUDENT IN AN ORGANIZED HEALTH CARE EDUCATION/TRAINING PROGRAM

## 2023-10-27 PROCEDURE — 36600 WITHDRAWAL OF ARTERIAL BLOOD: CPT | Mod: NTX

## 2023-10-27 PROCEDURE — 95720 EEG PHY/QHP EA INCR W/VEEG: CPT | Mod: NTX,,, | Performed by: PSYCHIATRY & NEUROLOGY

## 2023-10-27 PROCEDURE — 95714 VEEG EA 12-26 HR UNMNTR: CPT | Mod: NTX

## 2023-10-27 PROCEDURE — 94761 N-INVAS EAR/PLS OXIMETRY MLT: CPT | Mod: NTX

## 2023-10-27 PROCEDURE — 99222 1ST HOSP IP/OBS MODERATE 55: CPT | Mod: NTX,,, | Performed by: INTERNAL MEDICINE

## 2023-10-27 PROCEDURE — 85025 COMPLETE CBC W/AUTO DIFF WBC: CPT | Mod: NTX | Performed by: HOSPITALIST

## 2023-10-27 PROCEDURE — 99900026 HC AIRWAY MAINTENANCE (STAT): Mod: NTX

## 2023-10-27 PROCEDURE — 99291 PR CRITICAL CARE, E/M 30-74 MINUTES: ICD-10-PCS | Mod: NTX,,, | Performed by: PSYCHIATRY & NEUROLOGY

## 2023-10-27 RX ORDER — IPRATROPIUM BROMIDE AND ALBUTEROL SULFATE 2.5; .5 MG/3ML; MG/3ML
3 SOLUTION RESPIRATORY (INHALATION) EVERY 6 HOURS
Status: DISCONTINUED | OUTPATIENT
Start: 2023-10-27 | End: 2023-10-27

## 2023-10-27 RX ORDER — POTASSIUM CHLORIDE 7.45 MG/ML
80 INJECTION INTRAVENOUS
Status: DISCONTINUED | OUTPATIENT
Start: 2023-10-27 | End: 2023-11-24

## 2023-10-27 RX ORDER — FUROSEMIDE 10 MG/ML
40 INJECTION INTRAMUSCULAR; INTRAVENOUS DAILY
Status: DISCONTINUED | OUTPATIENT
Start: 2023-10-27 | End: 2023-10-28

## 2023-10-27 RX ORDER — CALCIUM GLUCONATE 20 MG/ML
2 INJECTION, SOLUTION INTRAVENOUS
Status: DISCONTINUED | OUTPATIENT
Start: 2023-10-27 | End: 2023-11-24

## 2023-10-27 RX ORDER — SODIUM CHLORIDE FOR INHALATION 3 %
4 VIAL, NEBULIZER (ML) INHALATION EVERY 6 HOURS
Status: DISCONTINUED | OUTPATIENT
Start: 2023-10-27 | End: 2023-10-27

## 2023-10-27 RX ORDER — IPRATROPIUM BROMIDE AND ALBUTEROL SULFATE 2.5; .5 MG/3ML; MG/3ML
3 SOLUTION RESPIRATORY (INHALATION) EVERY 6 HOURS
Status: DISCONTINUED | OUTPATIENT
Start: 2023-10-27 | End: 2023-11-01

## 2023-10-27 RX ORDER — LEVETIRACETAM 500 MG/5ML
1000 INJECTION, SOLUTION, CONCENTRATE INTRAVENOUS ONCE
Status: COMPLETED | OUTPATIENT
Start: 2023-10-27 | End: 2023-10-27

## 2023-10-27 RX ORDER — LEVETIRACETAM 100 MG/ML
1500 SOLUTION ORAL 2 TIMES DAILY
Status: DISCONTINUED | OUTPATIENT
Start: 2023-10-27 | End: 2023-11-06

## 2023-10-27 RX ORDER — ACETYLCYSTEINE 200 MG/ML
2 SOLUTION ORAL; RESPIRATORY (INHALATION) EVERY 6 HOURS
Status: DISCONTINUED | OUTPATIENT
Start: 2023-10-27 | End: 2023-10-27

## 2023-10-27 RX ORDER — ALBUMIN HUMAN 250 G/1000ML
12.5 SOLUTION INTRAVENOUS ONCE
Status: COMPLETED | OUTPATIENT
Start: 2023-10-27 | End: 2023-10-27

## 2023-10-27 RX ORDER — MAGNESIUM SULFATE HEPTAHYDRATE 40 MG/ML
4 INJECTION, SOLUTION INTRAVENOUS
Status: DISCONTINUED | OUTPATIENT
Start: 2023-10-27 | End: 2023-11-24

## 2023-10-27 RX ORDER — CALCIUM GLUCONATE 20 MG/ML
3 INJECTION, SOLUTION INTRAVENOUS
Status: DISCONTINUED | OUTPATIENT
Start: 2023-10-27 | End: 2023-11-24

## 2023-10-27 RX ORDER — MAGNESIUM SULFATE HEPTAHYDRATE 40 MG/ML
2 INJECTION, SOLUTION INTRAVENOUS
Status: DISCONTINUED | OUTPATIENT
Start: 2023-10-27 | End: 2023-11-24

## 2023-10-27 RX ORDER — POTASSIUM CHLORIDE 7.45 MG/ML
40 INJECTION INTRAVENOUS
Status: DISCONTINUED | OUTPATIENT
Start: 2023-10-27 | End: 2023-11-24

## 2023-10-27 RX ORDER — POTASSIUM CHLORIDE 7.45 MG/ML
60 INJECTION INTRAVENOUS
Status: DISCONTINUED | OUTPATIENT
Start: 2023-10-27 | End: 2023-11-24

## 2023-10-27 RX ORDER — ACETYLCYSTEINE 200 MG/ML
2 SOLUTION ORAL; RESPIRATORY (INHALATION) EVERY 6 HOURS
Status: DISCONTINUED | OUTPATIENT
Start: 2023-10-27 | End: 2023-10-30

## 2023-10-27 RX ORDER — DEXTROSE MONOHYDRATE 50 MG/ML
INJECTION, SOLUTION INTRAVENOUS CONTINUOUS
Status: ACTIVE | OUTPATIENT
Start: 2023-10-27 | End: 2023-10-27

## 2023-10-27 RX ORDER — CALCIUM GLUCONATE 20 MG/ML
1 INJECTION, SOLUTION INTRAVENOUS
Status: DISCONTINUED | OUTPATIENT
Start: 2023-10-27 | End: 2023-11-24

## 2023-10-27 RX ORDER — FLUCONAZOLE 100 MG/1
400 TABLET ORAL ONCE
Status: COMPLETED | OUTPATIENT
Start: 2023-10-27 | End: 2023-10-27

## 2023-10-27 RX ADMIN — POTASSIUM CHLORIDE 10 MEQ: 7.46 INJECTION, SOLUTION INTRAVENOUS at 10:10

## 2023-10-27 RX ADMIN — LACTULOSE 200 G: 10 SOLUTION ORAL at 01:10

## 2023-10-27 RX ADMIN — THIAMINE HYDROCHLORIDE 500 MG: 100 INJECTION, SOLUTION INTRAMUSCULAR; INTRAVENOUS at 04:10

## 2023-10-27 RX ADMIN — MUPIROCIN: 20 OINTMENT TOPICAL at 08:10

## 2023-10-27 RX ADMIN — RIFAXIMIN 550 MG: 550 TABLET ORAL at 08:10

## 2023-10-27 RX ADMIN — LACTULOSE 30 G: 20 SOLUTION ORAL at 08:10

## 2023-10-27 RX ADMIN — IPRATROPIUM BROMIDE AND ALBUTEROL SULFATE 3 ML: .5; 3 SOLUTION RESPIRATORY (INHALATION) at 12:10

## 2023-10-27 RX ADMIN — FOLIC ACID 1 MG: 5 INJECTION, SOLUTION INTRAMUSCULAR; INTRAVENOUS; SUBCUTANEOUS at 11:10

## 2023-10-27 RX ADMIN — PIPERACILLIN SODIUM AND TAZOBACTAM SODIUM 4.5 G: 4; .5 INJECTION, POWDER, FOR SOLUTION INTRAVENOUS at 05:10

## 2023-10-27 RX ADMIN — VANCOMYCIN HYDROCHLORIDE 1000 MG: 1 INJECTION, POWDER, LYOPHILIZED, FOR SOLUTION INTRAVENOUS at 11:10

## 2023-10-27 RX ADMIN — POTASSIUM CHLORIDE 10 MEQ: 7.46 INJECTION, SOLUTION INTRAVENOUS at 11:10

## 2023-10-27 RX ADMIN — VANCOMYCIN HYDROCHLORIDE 750 MG: 750 INJECTION, POWDER, LYOPHILIZED, FOR SOLUTION INTRAVENOUS at 10:10

## 2023-10-27 RX ADMIN — PIPERACILLIN SODIUM AND TAZOBACTAM SODIUM 4.5 G: 4; .5 INJECTION, POWDER, FOR SOLUTION INTRAVENOUS at 11:10

## 2023-10-27 RX ADMIN — PHYTONADIONE 10 MG: 10 INJECTION, EMULSION INTRAMUSCULAR; INTRAVENOUS; SUBCUTANEOUS at 09:10

## 2023-10-27 RX ADMIN — FUROSEMIDE 40 MG: 10 INJECTION, SOLUTION INTRAVENOUS at 02:10

## 2023-10-27 RX ADMIN — LACOSAMIDE 100 MG: 10 INJECTION INTRAVENOUS at 08:10

## 2023-10-27 RX ADMIN — THIAMINE HYDROCHLORIDE 500 MG: 100 INJECTION, SOLUTION INTRAMUSCULAR; INTRAVENOUS at 08:10

## 2023-10-27 RX ADMIN — DEXTROSE MONOHYDRATE: 50 INJECTION, SOLUTION INTRAVENOUS at 03:10

## 2023-10-27 RX ADMIN — MUPIROCIN: 20 OINTMENT TOPICAL at 10:10

## 2023-10-27 RX ADMIN — SODIUM CHLORIDE SOLN NEBU 3% 4 ML: 3 NEBU SOLN at 12:10

## 2023-10-27 RX ADMIN — LEVETIRACETAM 1000 MG: 100 INJECTION INTRAVENOUS at 05:10

## 2023-10-27 RX ADMIN — POTASSIUM CHLORIDE 10 MEQ: 7.46 INJECTION, SOLUTION INTRAVENOUS at 08:10

## 2023-10-27 RX ADMIN — THERA TABS 1 TABLET: TAB at 11:10

## 2023-10-27 RX ADMIN — LACOSAMIDE 200 MG: 10 INJECTION INTRAVENOUS at 10:10

## 2023-10-27 RX ADMIN — IPRATROPIUM BROMIDE AND ALBUTEROL SULFATE 3 ML: .5; 3 SOLUTION RESPIRATORY (INHALATION) at 09:10

## 2023-10-27 RX ADMIN — LEVETIRACETAM 1500 MG: 500 SOLUTION ORAL at 08:10

## 2023-10-27 RX ADMIN — LACTULOSE 30 G: 20 SOLUTION ORAL at 02:10

## 2023-10-27 RX ADMIN — POTASSIUM CHLORIDE 10 MEQ: 7.46 INJECTION, SOLUTION INTRAVENOUS at 09:10

## 2023-10-27 RX ADMIN — FLUCONAZOLE 400 MG: 100 TABLET ORAL at 06:10

## 2023-10-27 RX ADMIN — ALBUMIN (HUMAN) 12.5 G: 12.5 SOLUTION INTRAVENOUS at 02:10

## 2023-10-27 RX ADMIN — IPRATROPIUM BROMIDE AND ALBUTEROL SULFATE 3 ML: .5; 3 SOLUTION RESPIRATORY (INHALATION) at 08:10

## 2023-10-27 RX ADMIN — SODIUM CHLORIDE SOLN NEBU 3% 4 ML: 3 NEBU SOLN at 09:10

## 2023-10-27 RX ADMIN — ACETYLCYSTEINE 2 ML: 200 SOLUTION ORAL; RESPIRATORY (INHALATION) at 08:10

## 2023-10-27 NOTE — CONSULTS
Pharmacokinetic Initial Assessment: IV Vancomycin    Assessment/Plan:    Initiate intravenous vancomycin with loading dose of 1000 mg once followed by a maintenance dose of vancomycin 750mg IV every 12 hours  Desired empiric serum trough concentration is 15 to 20 mcg/mL  Draw vancomycin trough level 60 min prior to third dose on 10/28 at approximately 0815  Pharmacy will continue to follow and monitor vancomycin.      Thank you for the consult,   Obdulia Wilcox, PharmD, [facility]  [id number]     Patient brief summary:  Mara Mojica is a 58 y.o. female initiated on antimicrobial therapy with IV Vancomycin for treatment of suspected lower respiratory infection    Drug Allergies:   Review of patient's allergies indicates:  No Known Allergies    Actual Body Weight:   56kg    Renal Function:   Estimated Creatinine Clearance: 53.8 mL/min (based on SCr of 0.9 mg/dL).,       CBC (last 72 hours):  Recent Labs   Lab Result Units 10/24/23  1023 10/25/23  1338 10/26/23  0416 10/27/23  0529   WBC K/uL 6.69 6.19 6.52 6.62   Hemoglobin g/dL 7.4* 8.2* 7.8* 7.7*   Hematocrit % 23.7* 26.9* 24.8* 25.3*   Platelets K/uL 76* 143* 137* 104*   Gran % % 57.5 57.2 51.9 57.8   Lymph % % 23.6 21.8 24.5 19.5   Mono % % 15.2* 16.2* 17.0* 17.1*   Eosinophil % % 1.5 1.8 4.0 4.2   Basophil % % 1.0 1.5 1.7 0.8   Differential Method  Automated Automated Automated Automated       Metabolic Panel (last 72 hours):  Recent Labs   Lab Result Units 10/24/23  1023 10/25/23  1338 10/25/23  1522 10/26/23  0416 10/27/23  0529   Sodium mmol/L 144 147*  --  147* 148*   Potassium mmol/L 3.9 3.8  --  3.5 3.4*   Chloride mmol/L 116* 116*  --  117* 119*   CO2 mmol/L 19* 20*  --  20* 19*   Glucose mg/dL 69* 68*  --  79 107   Glucose, UA   --   --  Negative  --   --    BUN mg/dL 27* 29*  --  31* 26*   Creatinine mg/dL 0.8 0.9  --  0.9 0.9   Creatinine, Urine mg/dL  --   --  229.0  --   --    Albumin g/dL 2.1* 2.5*  --  2.5* 2.2*   Total Bilirubin mg/dL 4.9* 5.6*  --  5.3*  "4.4*   Alkaline Phosphatase U/L 69 76  --  75 93   AST U/L 75* 77*  --  81* 85*   ALT U/L 41 40  --  38 39   Magnesium mg/dL  --   --   --   --  2.0   Phosphorus mg/dL  --   --   --   --  1.8*       Drug levels (last 3 results):  No results for input(s): "VANCOMYCINRA", "VANCORANDOM", "VANCOMYCINPE", "VANCOPEAK", "VANCOMYCINTR", "VANCOTROUGH" in the last 72 hours.    Microbiologic Results:  Microbiology Results (last 7 days)       Procedure Component Value Units Date/Time    Blood culture [[id number]] Collected: 10/25/23 1337    Order Status: Completed Specimen: Blood from Antecubital, Left Updated: 10/26/23 1612     Blood Culture, Routine No Growth to date      No Growth to date    Blood culture [[id number]] Collected: 10/25/23 1338    Order Status: Completed Specimen: Blood Updated: 10/26/23 1612     Blood Culture, Routine No Growth to date      No Growth to date    Urine culture [[id number]] Collected: 10/25/23 1522    Order Status: No result Specimen: Urine Updated: 10/25/23 0320            "

## 2023-10-27 NOTE — PROGRESS NOTES
[facility]  Hepatology  Progress Note    Patient Name: Mara Mojica  MRN: [id number]  Admission Date: 10/25/2023  Hospital Length of Stay: 2 days    Subjective:     Interval History:  Preliminary EEG yesterday showed epileptiform activity.  Patient was initially given IV Ativan and started on IV Keppra.  Overnight, her mentation has worsened and she no longer opens her eyes.  Patient was transferred to neuro-critical ICU this morning.    Had 4 bowel movements yesterday and 1 this morning.    Objective:     Vitals:    10/27/23 0947   BP:    Pulse: 110   Resp: 16   Temp:          Constitutional: Ill appearing. Does not respond to verbal commands.   HENT: Head: Normal, normocephalic, atraumatic. EEG leads noted.   Eyes: Icteric sclera  GI: soft, non-tender, without masses or organomegaly and distended  Neurological: Disoriented, not responding to commands      Significant Labs:  Recent Labs   Lab 10/25/23  1338 10/26/23  0416 10/27/23  0529   HGB 8.2* 7.8* 7.7*       Lab Results   Component Value Date    WBC 6.62 10/27/2023    HGB 7.7 (L) 10/27/2023    HCT 25.3 (L) 10/27/2023     (H) 10/27/2023     (L) 10/27/2023       Lab Results   Component Value Date     (H) 10/27/2023    K 3.4 (L) 10/27/2023     (H) 10/27/2023    CO2 19 (L) 10/27/2023    BUN 26 (H) 10/27/2023    CREATININE 0.9 10/27/2023    CALCIUM 9.2 10/27/2023    ANIONGAP 10 10/27/2023       Lab Results   Component Value Date    ALT 39 10/27/2023    AST 85 (H) 10/27/2023    ALKPHOS 93 10/27/2023    BILITOT 4.4 (H) 10/27/2023       Lab Results   Component Value Date    INR 1.8 (H) 10/27/2023    INR 1.8 (H) 10/25/2023    INR 2.2 (H) 10/24/2023       Significant Imaging:  Reviewed pertinent radiology findings.       Assessment/Plan:     Mara Mojica is a 58 y.o. female with history of alcoholic cirrhosis with ascites, s/p Ex Lap with bowel resection for incarcerated hernia, recent SBO, who presented to [facility] on  10/18 with AMS. EtOH level < 10. CTH/MRI brain unremarkable. EEG showed mild generalized nonspecific cerebral dysfunction with no indication of seizure. Ammonia level WNL; not improving with lactulose in spite of large BMs. She was seen by Dr. Reardon in October 2023 for decompensated cirrhosis and it was recommended to proceed with liver transplant evaluation.  EGD in September 2023 showed normal esophagus and portal hypertensive gastropathy. Now with worsening mentation and seizure activity on EEG; moved to neurocritical ICU.      Problem List:  Alcoholic cirrhosis of liver with ascites   AMS - not readily attributable to HE (differentials include encephalitis v/s seizure)  MELD 3.0: 22 at 10/27/2023  5:29 AM  MELD-Na: 19 at 10/27/2023  5:29 AM  Calculated from:  Serum Creatinine: 0.9 mg/dL (Using min of 1 mg/dL) at 10/27/2023  5:29 AM  Serum Sodium: 148 mmol/L (Using max of 137 mmol/L) at 10/27/2023  5:29 AM  Total Bilirubin: 4.4 mg/dL at 10/27/2023  5:29 AM  Serum Albumin: 2.2 g/dL at 10/27/2023  5:29 AM  INR(ratio): 1.8 at 10/27/2023  5:29 AM  Age at listing (hypothetical): 58 years  Sex: Female at 10/27/2023  5:29 AM       Recommendations:  - Await recs from neurology regarding management of epilepsy.   - Start lactulose via NG tube TID (titrate till 3-4 daily BM achieved). Continue Xifaxin.   - Avoid opiates and benzodiazepines, if able.   - IV thiamine and folate supplementation  - IV Rocephin for SBP prophylaxis  - We are not initiating liver transplant evaluation at this time. Patient will need to be conscious and able to participate in a discussion with us before this is possible.   - Will follow [facility]. Daily CBC, CMP & INR.     Thank you for involving us in the care of Mara Mojica. Please call with any additional questions, concerns or changes in the patient's clinical status. We will continue to follow.     Lv Resendiz MD  Gastroenterology Fellow PGY IV   [facility]

## 2023-10-27 NOTE — HPI
This is a 58-year-old female with a past medical history of alcoholic cirrhosis, s/p ex-lap w/ bowel resection for incarcerated hernia, who initially presented on 10/18 to [facility] with acute encephalopathy.  Her  found her on the floor at home with evidence of fecal/urine incontinence with confusion and slurred speech. Possible reported tongue injury in chart. Reported concern L sided weakness and down drift of L arm however CT head without evidence of acute abnormalities, MRI brain with only small vessel vascular changes without evidence of territorial infarct.     Hospital Course: EEG done on 10/19 with mild diffuse nonspecific background slowing, no potential epileptiform activity. Repeat MRI brain with contrast on 10/23 unchanged from initial MRI. Became more lethargic and was no longer following commands or answering questions. Due to worsening hepatic encephalopathy in setting of hepatic cirrhosis, patient transferred to [facility] for management with transplant team. Has remained on antibiotics, lactulose and rifaximin for treatment of UTI with pansensitive Ecoli, HE, and presumed SBP. Neurology consulted for management recommendations regarding persistent AMS. Had repeat EEG done on 10/24 with similar findings to prior EEG showing mild generalized non-specific cerebral dysfunction and no electrographic seizures or indication of seizure tendency. Additional CT head w/o contrast on 10/25 without evidence of acute issues. Remains confused and unable to answer questions on exam. Not withdrawing to painful stimuli. Was able to awaken to verbal stimuli in AM however when reevaluated in afternoon unresponsive however was after receiving Ativan.     On admission to [facility]:  Patient had EEG running, and was noted to have seizures at 7:30 a.m., 8:00 a.m. in, and 10:00 a.m. was noted to be in focal status prior to receiving Vimpat.  Patient was noted to have stridor, worsening secretions with suspicion for  aspiration, decreased airway protection, and rapids was called due to low GCS score of 6. Antibiotics broadened to Vanc/Zosyn. Clinical picture of mental status confounded with episodes of seizures, infection, and hepatic encephalopathy.

## 2023-10-27 NOTE — CARE UPDATE
"RAPID RESPONSE NURSE CHART REVIEW        Chart Reviewed: 10/27/2023, 3:57 AM    MRN: [id number]  Bed: 35086/28508 A    Dx: Acute encephalopathy    Mara Mojica has a past medical history of Alcoholic cirrhosis of liver, Kidney failure, and Unilateral inguinal hernia, without obstruction or gangrene, not specified as recurrent.    Last VS: BP (!) 107/55 (BP Location: Left arm, Patient Position: Lying)   Pulse 93   Temp 99.1 °F (37.3 °C) (Axillary)   Resp 18   Ht 4' 11" (1.499 m)   Wt 56.2 kg (123 lb 14.4 oz)   LMP  (LMP Unknown)   SpO2 (!) 91%   BMI 25.02 kg/m²     24H Vital Sign Range:  Temp:  [97.9 °F (36.6 °C)-99.1 °F (37.3 °C)]   Pulse:  [82-98]   Resp:  [15-19]   BP: (106-146)/(55-64)   SpO2:  [91 %-99 %]     Level of Consciousness (AVPU): unresponsive    Recent Labs     10/24/23  1023 10/24/23  1102 10/25/23  1338 10/26/23  0416   WBC 6.69  --  6.19 6.52   HGB 7.4*  --  8.2* 7.8*   HCT 23.7* 20* 26.9* 24.8*   PLT 76*  --  143* 137*       Recent Labs     10/24/23  1023 10/25/23  1338 10/26/23  0416    147* 147*   K 3.9 3.8 3.5   * 116* 117*   CO2 19* 20* 20*   BUN 27* 29* 31*   CREATININE 0.8 0.9 0.9   GLU 69* 68* 79        Recent Labs     10/24/23  1102   PH 7.450   PCO2 30.7*   PO2 73*   HCO3 21.4*   POCSATURATED 95   BE -3*        OXYGEN:             MEWS score: 4    charge Annabel ALMEIDA  contacted for patient charted as unresponsive. RN also charting against scheduled lactulose enemas as well as lactulose via NGT. Charge RN to clarify, will check on patient. No additional concerns verbalized at this time. Instructed to call [facility] for further concerns or assistance.    SANTIAGO Parson RN       "

## 2023-10-27 NOTE — PLAN OF CARE
[facility] - Neuro Critical Care  Discharge Reassessment    Primary Care Provider: Osiris Nevarez NP    Expected Discharge Date: 11/3/2023    Per MD, patient transferred to [facility]  S/p rapid response team --patient with increased lethargy and breakthrough seizures   Patient not medically ready for discharge.     Discharge Plan A and Plan B have been determined by review of patient's clinical status, future medical and therapeutic needs, and coverage/benefits for post-acute care in coordination with multidisciplinary team members.    Reassessment (most recent)       Discharge Reassessment - 10/27/23 1559          Discharge Reassessment    Assessment Type Discharge Planning Reassessment     Did the patient's condition or plan change since previous assessment? Yes     Communicated ASHELY with patient/caregiver Date not available/Unable to determine     Discharge Plan A Home Health     Discharge Plan B Rehab     DME Needed Upon Discharge  other (see comments)   tbd    Why the patient remains in the hospital Requires continued medical care                   May Rosario RN, CCRN-K, [facility]  Neuro-Critical Care   [phone]

## 2023-10-27 NOTE — ASSESSMENT & PLAN NOTE
This is a 58-year-old woman with a history of decompensated alcoholic cirrhosis.  Suspect thrombocytopenia is likely secondary to chronic alcohol use and is consumptive in nature. If trend worsens, we will consider consulting Hematology.    · Daily CBC

## 2023-10-27 NOTE — ASSESSMENT & PLAN NOTE
· Hepatology following PEth. Daily CBC, CMP & INR.   · Not currently being evaluated for transplant due to clinical status  · Vitamin K given for chronic coagulopathy

## 2023-10-27 NOTE — SUBJECTIVE & OBJECTIVE
Past Medical History:   Diagnosis Date    Alcoholic cirrhosis of liver     Kidney failure     Unilateral inguinal hernia, without obstruction or gangrene, not specified as recurrent        Past Surgical History:   Procedure Laterality Date    ESOPHAGOGASTRODUODENOSCOPY N/A 09/21/2023    Procedure: EGD (ESOPHAGOGASTRODUODENOSCOPY);  Surgeon: Melissa Edwards MD;  Location: [facility];  Service: Endoscopy;  Laterality: N/A;    HERNIA REPAIR      TONSILLECTOMY         Review of patient's allergies indicates:  No Known Allergies  Current Facility-Administered Medications   Medication Frequency    acetaminophen suppository 650 mg Q6H PRN    albuterol-ipratropium 2.5 mg-0.5 mg/3 mL nebulizer solution 3 mL Q6H    calcium gluconate 1 g in NS IVPB (premixed) PRN    calcium gluconate 1 g in NS IVPB (premixed) PRN    calcium gluconate 1 g in NS IVPB (premixed) PRN    dextrose 5 % (D5W) infusion Continuous    furosemide injection 40 mg Daily    lacosamide (VIMPAT) 100 mg in sodium chloride 0.9% 100 mL IVPB Q12H    lactulose 20 gram/30 mL solution Soln 30 g TID    levetiracetam 500 mg/5 mL (5 mL) liquid Soln 1,500 mg BID    magnesium sulfate 2g in water 50mL IVPB (premix) PRN    magnesium sulfate 2g in water 50mL IVPB (premix) PRN    multivitamin tablet Daily    mupirocin 2 % ointment BID    ondansetron injection 4 mg Q8H PRN    piperacillin-tazobactam (ZOSYN) 4.5 g in dextrose 5 % in water (D5W) 100 mL IVPB (MB+) Q8H    potassium chloride 10 mEq in 100 mL IVPB PRN    And    potassium chloride 10 mEq in 100 mL IVPB PRN    And    potassium chloride 10 mEq in 100 mL IVPB PRN    rifAXIMin tablet 550 mg BID    sodium chloride 0.9% flush 10 mL PRN    sodium chloride 3% nebulizer solution 4 mL Q6H    sodium phosphate 15 mmol in dextrose 5 % (D5W) 250 mL IVPB PRN    sodium phosphate 20.01 mmol in dextrose 5 % (D5W) 250 mL IVPB PRN    sodium phosphate 30 mmol in dextrose 5 % (D5W) 250 mL IVPB PRN    [START ON 10/28/2023] thiamine  (B-1) 250 mg in dextrose 5 % (D5W) 100 mL IVPB Daily    thiamine (B-1) 500 mg in dextrose 5 % (D5W) 100 mL IVPB TID    vancomycin - pharmacy to dose pharmacy to manage frequency    vancomycin 750 mg in dextrose 5 % (D5W) 250 mL IVPB (Vial-Mate) Q12H     Family History       Problem Relation (Age of Onset)    Ovarian cancer Mother    Seizures Father          Tobacco Use    Smoking status: Every Day     Current packs/day: 0.50     Average packs/day: 0.5 packs/day for 25.0 years (12.5 ttl pk-yrs)     Types: Cigarettes     Start date: 10/18/1998     Passive exposure: Never    Smokeless tobacco: Never   Substance and Sexual Activity    Alcohol use: Not Currently    Drug use: Never    Sexual activity: Not on file     Review of Systems   Unable to perform ROS: Mental status change     Objective:     Vital Signs (Most Recent):  Temp: 98.5 °F (36.9 °C) (10/27/23 1101)  Pulse: 100 (10/27/23 1301)  Resp: 16 (10/27/23 1301)  BP: (!) 141/69 (10/27/23 1301)  SpO2: 99 % (10/27/23 1301) Vital Signs (24h Range):  Temp:  [98.1 °F (36.7 °C)-99.1 °F (37.3 °C)] 98.5 °F (36.9 °C)  Pulse:  [] 100  Resp:  [14-26] 16  SpO2:  [91 %-100 %] 99 %  BP: (106-154)/(55-77) 141/69     Weight: 56.2 kg (123 lb 14.4 oz) (10/26/23 0923)  Body mass index is 25.02 kg/m².  Body surface area is 1.53 meters squared.    I/O last 3 completed shifts:  In: 300 [NG/GT:300]  Out: 150 [Urine:150]     Physical Exam  Vitals and nursing note reviewed.   Constitutional:       Appearance: She is ill-appearing.   HENT:      Head: Normocephalic and atraumatic.      Comments: EEG bandage      Nose:      Comments: NG tube in place     Mouth/Throat:      Mouth: Mucous membranes are moist.      Comments: Poor dentition  Eyes:      Pupils: Pupils are equal, round, and reactive to light.   Cardiovascular:      Rate and Rhythm: Regular rhythm. Tachycardia present.      Pulses: Normal pulses.      Heart sounds: Normal heart sounds.   Pulmonary:      Effort: Respiratory  distress present.      Breath sounds: Stridor present. Wheezing present.      Comments: Wet cough, thick secretions suctioned  Abdominal:      General: Bowel sounds are normal. There is no distension.      Palpations: Abdomen is soft.   Musculoskeletal:         General: No swelling or deformity.      Cervical back: Neck supple.   Skin:     General: Skin is warm and dry.   Neurological:      Mental Status: She is disoriented.      Comments: Not following commands            Significant Labs:  All labs within the past 24 hours have been reviewed.    Significant Imaging:  Labs: Reviewed

## 2023-10-27 NOTE — CARE UPDATE
RAPID RESPONSE NURSE PROACTIVE ROUNDING NOTE       Time of Visit: 0420    Admit Date: 10/25/2023  LOS: 2  Code Status: Full Code   Date of Visit: 10/27/2023  : 1965  Age: 58 y.o.  Sex: female  Race: White  Bed: 13362/25684 A:   MRN: [id number]  Was the patient discharged from an ICU this admission? No   Was the patient discharged from a PACU within last 24 hours? No   Did the patient receive conscious sedation/general anesthesia in last 24 hours? No  Was the patient in the ED within the past 24 hours? No  Was the patient on NIPPV within the past 24 hours? No   Attending Physician: Kate Grubbs MD  Primary Service: [facility] MED    Time spent at the bedside: 45 - 60 min    SITUATION    Notified by charge RN via phone call.  Reason for alert: worsening mental status  Called to evaluate the patient for Neuro    BACKGROUND     Why is the patient in the hospital?: Acute encephalopathy    Patient has a past medical history of Alcoholic cirrhosis of liver, Kidney failure, and Unilateral inguinal hernia, without obstruction or gangrene, not specified as recurrent.    Last Vitals:  Temp: 98.4 °F (36.9 °C) (10/27 0415)  Pulse: 102 (10/27 0510)  Resp: 26 (10/27 0510)  BP: 137/63 (10/27 0510)  SpO2: 96 % (10/27 0510)    24 Hours Vitals Range:  Temp:  [98.2 °F (36.8 °C)-99.1 °F (37.3 °C)]   Pulse:  []   Resp:  [15-26]   BP: (106-137)/(55-65)   SpO2:  [91 %-99 %]     Labs:  Recent Labs     10/24/23  1023 10/24/23  1102 10/25/23  1338 10/26/23  0416 10/27/23  0457   WBC 6.69  --  6.19 6.52  --    HGB 7.4*  --  8.2* 7.8*  --    HCT 23.7*   < > 26.9* 24.8* 23*   PLT 76*  --  143* 137*  --     < > = values in this interval not displayed.       Recent Labs     10/24/23  1023 10/25/23  1338 10/26/23  0416    147* 147*   K 3.9 3.8 3.5   * 116* 117*   CO2 19* 20* 20*   BUN 27* 29* 31*   CREATININE 0.8 0.9 0.9   GLU 69* 68* 79        Recent Labs     10/24/23  1102 10/27/23  0457   PH 7.450 7.456*   PCO2 30.7* 30.6*    PO2 73* 67*   HCO3 21.4* 21.5*   POCSATURATED 95 94   BE -3* -2        ASSESSMENT    Physical Exam  Constitutional:       Appearance: She is ill-appearing.   HENT:      Mouth/Throat:      Dentition: Dental caries present.   Eyes:      General: Scleral icterus present.      Pupils:      Right eye: Pupil is sluggish.      Left eye: Pupil is sluggish.   Cardiovascular:      Rate and Rhythm: Tachycardia present.   Pulmonary:      Effort: Tachypnea present.      Breath sounds: Wheezing and rhonchi present.      Comments: Wet, ronchorous, wheezing breath sounds, primarily in upper airway. Weak cough only with provocation  Skin:     General: Skin is warm.      Capillary Refill: Capillary refill takes 2 to 3 seconds.      Coloration: Skin is jaundiced, pale and sallow.      Findings: Bruising present.   Neurological:      GCS: GCS eye subscore is 1. GCS verbal subscore is 1. GCS motor subscore is 4.      Comments: + cough, gag, corneals       /65 (93)  RR 24, SpO2 95% on 2L NC  Temp 98.4 axillary       Patient lying in bed, EEG cap in place. Very minimally responsive to painful stimuli- does not open eyes or have any verbal response to nailbed pressure, sternal rub. Does have facial grimace and slight shoulder movement to nailbed pressure and sternal rub. Does not withdraw any extremity to noxious stimuli    INTERVENTIONS    The patient was seen for Neurological problem. Staff concerns included decreased responsiveness and seizure-like activity. The following interventions were performed: POCT glucose and continuous cardiac monitoring .  Respiratory problem. Staff concerns included increased WOB and increased oxygen requirements. The following interventions were performed: POCT arterial blood gas , portable chest x-ray, and continuous pulse ox monitoring.    CXR obtained  Tube feeds stopped    NG suction performed, with thick, yellow-tan secretions out. Patient has very weak cough, and only coughs when  suctioned.     ABG with POCT lactate obtained    Spoke to Dr Tyson, who is on-call for epilepsy overnight. He was able to look at the patient's continuous EEG and confirm she has continued to have seizures tonight. He recommended giving her an additional 1g IVP keppra now, and then increasing her scheduled dose to 1500mg BID. He did not recommend giving her any IVP ativan at this time d/t her liver dysfunction and poor neurological status.    Reached out to neuro critical care d/t concern for patient's GCS of 6 in the setting of continued seizures. They recommended following epilepsy's directive at this time.    Spoke to Dr Jean Baptiste on call for [facility], who put in for the increased keppra dose as recommended by Dr Tyson. 1g IVP keppra given at 0505. Tube feeds to remain off for now.    RECOMMENDATIONS    Recommendation was made to both Dr Jean Baptiste and [facility] provider via phone to consider upgrade patient to ICU at this time d/t her GCS of 6, wet, weak cough, concern for continued seizure activity, and concern for patient inability to protect her airway in the event of mucous plug or emesis.   Per both providers, will give additonal keppra and continue to monitor for now    Maintain continuous telemetry, pulse oximetry  Strict aspiration precautions, seizure precations  Keep HOB above 30 degrees at all times  Notify provider, RRN of any worsening mental status or hemodynamic instability    Low threshold to consult ICU    PROVIDER ESCALATION    Yes/No  Yes    Orders received and case discussed with Dr. Jean Baptiste with , Dr Tyson with epilepsy, [facility] provider .    Disposition: Remain in room 10816.    FOLLOW-UP    bedside RNAndree, charge RN Annabel  updated on plan of care. Instructed to call the Rapid Response NurseSANTIAGO RN at [phone] for additional questions or concerns.

## 2023-10-27 NOTE — H&P
[facility] - Neuro Critical Care  Neurocritical Care  History & Physical    Admit Date: 10/25/2023  Service Date: 10/27/2023  Length of Stay: 2    Subjective:     Chief Complaint: Acute encephalopathy    History of Present Illness: This is a 58-year-old female with a past medical history of alcoholic cirrhosis, s/p ex-lap w/ bowel resection for incarcerated hernia, who [place] 10[place]18 [place] [facility] [place].  Her  found her on the floor at home with evidence of fecal/urine incontinence with confusion and slurred speech. Possible reported tongue injury in chart. Reported concern L sided weakness and down drift of L arm however CT head without evidence of acute abnormalities, MRI brain with only small vessel vascular changes without evidence of territorial infarct.     Hospital Course: EEG done on 10/19 with mild diffuse nonspecific background slowing, no potential epileptiform activity. Repeat MRI brain with contrast on 10/23 unchanged from initial MRI. Became more lethargic and was no longer following commands or answering questions. Due to worsening hepatic encephalopathy in setting of hepatic cirrhosis, patient [place] [facility] [place] management with transplant team. Has remained on antibiotics, lactulose and rifaximin for treatment of UTI with pansensitive Ecoli, HE, and presumed SBP. Neurology consulted for management recommendations regarding persistent AMS. Had repeat EEG done on 10/24 with similar findings to prior EEG showing mild generalized non-specific cerebral dysfunction and no electrographic seizures or indication of seizure tendency. Additional CT head w/o contrast on 10/25 without evidence of acute issues. Remains confused and unable to answer questions on exam. Not withdrawing to painful stimuli. Was able to awaken to verbal stimuli in AM however when reevaluated in afternoon unresponsive however was after receiving Ativan.     On admission to [facility]:  Patient had  EEG running, and was noted to have seizures at 7:30 a.m., 8:00 a.m. in, and 10:00 a.m. was noted to be in focal status prior to receiving Vimpat.  Patient was noted to have stridor, worsening secretions with suspicion for aspiration, decreased airway protection, and rapids was called due to low GCS score of 6. Antibiotics broadened to Vanc/Zosyn. Clinical picture of mental status confounded with episodes of seizures, infection, and hepatic encephalopathy.                 Past Medical History:   Diagnosis Date    Alcoholic cirrhosis of liver     Kidney failure     Unilateral inguinal hernia, without obstruction or gangrene, not specified as recurrent      Past Surgical History:   Procedure Laterality Date    ESOPHAGOGASTRODUODENOSCOPY N/A 09/21/2023    Procedure: EGD (ESOPHAGOGASTRODUODENOSCOPY);  Surgeon: Melissa Edwards MD;  Location: [facility];  Service: Endoscopy;  Laterality: N/A;    HERNIA REPAIR      TONSILLECTOMY        No current facility-administered medications on file prior to encounter.     Current Outpatient Medications on File Prior to Encounter   Medication Sig Dispense Refill    furosemide (LASIX) 40 MG tablet Take 1 tablet (40 mg total) by mouth once daily. 30 tablet 5    lactulose (CHRONULAC) 20 gram/30 mL Soln Take 15 mLs (10 g total) by mouth 3 (three) times daily. 1892 mL 5    potassium chloride (KLOR-CON) 10 MEQ TbSR Take 1 tablet (10 mEq total) by mouth once daily. 30 tablet 0    propranoloL (INDERAL) 10 MG tablet Take 10 mg by mouth 2 (two) times daily.      spironolactone (ALDACTONE) 100 MG tablet Take 1 tablet (100 mg total) by mouth once daily. 30 tablet 5      Allergies: Patient has no known allergies.      Social History     Tobacco Use    Smoking status: Every Day     Current packs/day: 0.50     Average packs/day: 0.5 packs/day for 25.0 years (12.5 ttl pk-yrs)     Types: Cigarettes     Start date: 10/18/1998     Passive exposure: Never    Smokeless tobacco: Never   Substance Use  Topics    Alcohol use: Not Currently    Drug use: Never     Review of Systems   Unable to perform ROS: Mental status change     Objective:     Vitals:    Temp: 98.7 °F (37.1 °C)  Pulse: 100  Rhythm: normal sinus rhythm  BP: 136/60  MAP (mmHg): 90  Resp: (!) 24  SpO2: 98 %    Temp  Min: 98.2 °F (36.8 °C)  Max: 99.1 °F (37.3 °C)  Pulse  Min: 82  Max: 106  BP  Min: 106/61  Max: 137/63  MAP (mmHg)  Min: 77  Max: 93  Resp  Min: 16  Max: 26  SpO2  Min: 91 %  Max: 99 %    10/26 0701 - 10/27 0700  In: 150   Out: -    Unmeasured Output  Urine Occurrence: 1  Stool Occurrence: 1        Physical Exam  Vitals and nursing note reviewed.   Constitutional:       Appearance: She is ill-appearing.   HENT:      Head: Normocephalic and atraumatic.      Comments: EEG bandage      Nose:      Comments: NG tube in place     Mouth/Throat:      Mouth: Mucous membranes are moist.      Comments: Poor dentition  Eyes:      Pupils: Pupils are equal, round, and reactive to light.   Cardiovascular:      Rate and Rhythm: Regular rhythm. Tachycardia present.      Pulses: Normal pulses.      Heart sounds: Normal heart sounds.   Pulmonary:      Effort: Respiratory distress present.      Breath sounds: Stridor present. Wheezing present.      Comments: Wet cough, thick secretions suctioned  Abdominal:      General: Bowel sounds are normal. There is no distension.      Palpations: Abdomen is soft.   Musculoskeletal:         General: No swelling or deformity.      Cervical back: Neck supple.   Skin:     General: Skin is warm and dry.   Neurological:      Mental Status: She is disoriented.      GCS: GCS eye subscore is 2. GCS verbal subscore is 2. GCS motor subscore is 3.      Comments: Withdraws to pain in Left leg  Posturing in BUE  Not following commands                Today I personally reviewed pertinent medications, lines/drains/airways, imaging, laboratory results, microbiology results, and prior hospital course.          Assessment/Plan:      Neuro  * Acute encephalopathy  See acute hepatic encephalopathy    Seizure  58 year old presented for altered mental status on 10/18. Clinical picture confounded with hepatic encephalopathy, infection, and seizure activity. Neurology was consulted by primary team prior to admission to [facility].    EEG IMPRESSION:  This is an abnormal EEG during lethargic state.  Diffuse disorganized slowing of the background was noted.  Frequent triphasic waves noted.  Left posterior pseudo periodic epileptiform discharges were noted.  Numerous electrographic seizures emanating from the left posterior region were noted.    vEEG in place  Keppra 1500 BID  Vimpat loaded 10/27, continue 100 mg BID  Breathing watch due to low GCS  Neurochecks q1hr  Vitals q1hr       Renal/  Hypernatremia  Patient with evidence of seizure on EEG.    D5W infusion  Repeat BMP 1700  Trend CMP    ID  Sepsis  58 year old with UA showing pan sensitive E.Coli on 10/18 and repeat UA 10/25 showing Candida albicans.  Patient also has a history of hepatic encephalopathy with decompensated cirrhosis, SBP not ruled out.  Due to encephalopathy, concerns for aspiration.    CXR 10/27: Low lung volumes with mild worsening bibasilar interstitial densities and possible trace pleural effusions.  Aeration is likely mildly worse when compared with recent prior study, though findings could be exaggerated by hypoventilatory state.     Vancomycin, Zosyn for at least 48 hours  Blood cultures  Sputum cultures  Consider lumbar puncture if status worsens  Pulmonary treatments- duonebs, CPT      This patient does have evidence of infective focus  My overall impression is sepsis.  Source: Urinary Tract and Abdominal  Antibiotics given-   Antibiotics (72h ago, onward)      Start     Stop Route Frequency Ordered    10/27/23 2115  vancomycin 750 mg in dextrose 5 % (D5W) 250 mL IVPB (Vial-Mate)        See Hyperspace for full Linked Orders Report.    -- IV Every 12 hours (non-standard  "times) 10/27/23 0812    10/27/23 0915  piperacillin-tazobactam (ZOSYN) 4.5 g in dextrose 5 % in water (D5W) 100 mL IVPB (MB+)         -- IV Every 8 hours (non-standard times) 10/27/23 0809    10/27/23 0908  vancomycin - pharmacy to dose  (vancomycin IVPB (PEDS and ADULTS))        See Hyperspace for full Linked Orders Report.    -- IV pharmacy to manage frequency 10/27/23 0809    10/26/23 2100  mupirocin 2 % ointment         10/31/23 2059 Nasl 2 times daily 10/26/23 1240    10/25/23 1315  rifAXIMin tablet 550 mg         -- PER NG TUBE 2 times daily 10/25/23 1303          Latest lactate reviewed-  No results for input(s): "LACTATE" in the last 72 hours.  Organ dysfunction indicated by Encephalopathy    Fluid challenge Not needed - patient is not hypotensive      Post- resuscitation assessment No - Post resuscitation assessment not needed       Will Not start Pressors- Levophed for MAP of 65      Hematology  Thrombocytopenia  This is a 58-year-old woman with a history of decompensated alcoholic cirrhosis.  Suspect thrombocytopenia is likely secondary to chronic alcohol use and is consumptive in nature. If trend worsens, we will consider consulting Hematology.    Daily CBC    Endocrine  Moderate malnutrition  Tube feeds through NG tube held due to concerns for aspiration    Nutrition consulted. Most recent weight and BMI monitored-     Measurements:  Wt Readings from Last 1 Encounters:   10/26/23 56.2 kg (123 lb 14.4 oz)   Body mass index is 25.02 kg/m².    Patient has been screened and assessed by RD.    Malnutrition Type:  Context: social/environmental circumstances  Level: moderate    Malnutrition Characteristic Summary:  Subcutaneous Fat (Malnutrition): mild depletion  Muscle Mass (Malnutrition): mild depletion  Fluid Accumulation (Malnutrition): moderate    Interventions/Recommendations (treatment strategy):  1. When able, initiate TFs. Rec'd Isosource 1.5 @ 50 mL/hr to provide 1800 kcals, 82 g of protein, 917 mL " fluid. 2. RD to monitor & follow-up.    GI  Acute hepatic encephalopathy  This is a 52 year old woman with a history of ethanol cirrhosis who presents after being found on the ground with fecal and urinary incontinence, and altered mental status. Elevated ammonia on admission.    - Continue lactulose via NG tube TID (titrate till 3-4 daily BM achieved). Continue Xifaxin.   - Avoid opiates and benzodiazepines, if able.   - IV thiamine, folate supplementation, multivitamin through NG tube.  - Albumin and lasix 40        Decompensated alcoholic hepatic cirrhosis  Hepatology following PEth. Daily CBC, CMP & INR.   Not currently being evaluated for transplant due to clinical status  Vitamin K given for chronic coagulopathy          The patient is being Prophylaxed for:  Venous Thromboembolism with: None  Stress Ulcer with: None  Ventilator Pneumonia with: not applicable      Activity Orders            Elevate HOB to 30-45 degrees during feeding unless otherwise stated starting at 10/26 1138    Progressive Mobility Protocol (mobilize patient to their highest level of functioning at least twice daily) starting at 10/25 2000    Turn patient starting at 10/25 2000          Full Code    Darion Torres MD  Neurocritical Care  [facility] - Neuro Critical Care

## 2023-10-27 NOTE — ASSESSMENT & PLAN NOTE
58 YOF with alcoholic cirrhosis with ascites, s/p Ex Lap with bowel resection for incarcerated hernia, recent SBO with [facility], adm to [facility] on 10/18 with acute encephalopathy, transferred for transplant for HE. Ucx with pan sensitive e coli on 10/18. Patient has now progressed to having breakthrough seizures while on vimpat. Nephrology was consulted on the floor for hypernatremia.  1.6 L FWD.     Reccomendations   - D5W 500 cc Q6H

## 2023-10-27 NOTE — PROGRESS NOTES
Unable to complete OT evaluation on this day due to patient's transfer to ICU; patient will require new orders before evaluation.

## 2023-10-27 NOTE — ASSESSMENT & PLAN NOTE
Tube feeds through NG tube held due to concerns for aspiration    Nutrition consulted. Most recent weight and BMI monitored-     Measurements:  Wt Readings from Last 1 Encounters:   10/26/23 56.2 kg (123 lb 14.4 oz)   Body mass index is 25.02 kg/m².    Patient has been screened and assessed by RD.    Malnutrition Type:  Context: social/environmental circumstances  Level: moderate    Malnutrition Characteristic Summary:  Subcutaneous Fat (Malnutrition): mild depletion  Muscle Mass (Malnutrition): mild depletion  Fluid Accumulation (Malnutrition): moderate    Interventions/Recommendations (treatment strategy):  1. When able, initiate TFs. Rec'd Isosource 1.5 @ 50 mL/hr to provide 1800 kcals, 82 g of protein, 917 mL fluid. 2. RD to monitor & follow-up.

## 2023-10-27 NOTE — ASSESSMENT & PLAN NOTE
This is a 52 year old woman with a history of ethanol cirrhosis who presents after being found on the ground with fecal and urinary incontinence, and altered mental status. Elevated ammonia on admission.    - Continue lactulose via NG tube TID (titrate till 3-4 daily BM achieved). Continue Xifaxin.   - Avoid opiates and benzodiazepines, if able.   - IV thiamine, folate supplementation, multivitamin through NG tube.  - Albumin and lasix 40

## 2023-10-27 NOTE — PT/OT/SLP PROGRESS
Physical Therapy      Patient Name:  Mara Mojica   MRN:  [id number]    Patient not seen today secondary to Transfer to ICU, await clearance. Will require new PT orders when appropriate.

## 2023-10-27 NOTE — ASSESSMENT & PLAN NOTE
Likely 2/2 seizure in combination of infection SBP and UTI and chronic cirrhosis.   - Admission CT head without evidence of acute abnormalities, MRI brain with only small vessel vascular changes without evidence of territorial infarct.  -short term video EEG done on 10/19 with mild diffuse nonspecific background slowing, no potential epileptiform activity.   - Repeat MRI brain with contrast on 10/23 unchanged from initial MRI.  Subsequent worsening  - Remained on antibiotics, lactulose and rifaximin for treatment of UTI/SBP, HE.    Neurology consulted for management recommendations regarding persistent AMS. Had repeat EEG done on 10/24 with similar findings to prior EEG showing mild generalized non-specific cerebral dysfunction and no electrographic seizures or indication of seizure tendency.  - Additional CT head w/o contrast on 10/25 without evidence of acute issues. Remains confused and unable to answer questions on exam. Not withdrawing to painful stimuli. Was able to awaken to verbal stimuli in AM however when reevaluated in afternoon unresponsive however was after receiving Ativan.  - EEG on 10/26 showed epileptiform activity and neurology recommended 2g Keppra load, will start 750mg BID of keppra for subclinical seizures seen on EEG. Full read awaited  continue thiamine at this time  Pending drug of abuse, PETH, heavy metal screen, RPR, B12, B1, MMA, Ucx (10/25) for other causes of encephalopathy  Will hold off on LP at this time given EEG findings could explain current findings. ID onboard.     Evaluated by hepatology:  Ultrasound of liver performed 10/25 showed cirrhosis with sequelae of portal hypertension.  seen in October 2023 for decompensated cirrhosis and it was recommended to proceed with liver transplant evaluation.  EGD in September 2023 showed normal esophagus and portal hypertensive gastropathy.  AFP in September 2023 was 7.8.    - Start lactulose enemas TID. Continue Xifaxin.   - IV thiamine and  folate supplementation  - IV Rocephin for SBP prophylaxis  - Not initiating liver transplant evaluation at this time.   Will follow [facility].

## 2023-10-27 NOTE — PROCEDURES
DATE: 10/26/23    EEG NUMBER: FH -1    REFERRING PHYSICIAN:  Dr. Mcdonald      This EEG was performed to assess for subclinical seizures      ELECTROENCEPHALOGRAM REPORT     METHODOLOGY:  Electroencephalographic (EEG) recording is with electrodes placed according to the International 10-20 placement system.  Thirty two (32) channels of digital signal are simultaneously recorded from the scalp and may include EKG, EMG, and/or eye monitors.   Recording band pass was 0.1 to 512 hz.  Digital video recording of the patient is simultaneously recorded with the EEG.  The nursing staff report clinical symptoms and may press an event button when the patient has symptoms of clinical interest to the treating physicians.  EEG and video recording is stored and archived in digital format.  The entire recording is visually reviewed, and the times identified by computer analysis as being spikes or seizures are reviewed again.  Activation procedures which include photic stimulation, hyperventilation and instructing patients to perform simple task are done in selected patients.   Compresses spectral analysis (CSA) is also performed on the activity recorded from each individual channel.  This is displayed as a power display of frequencies from 0 to 30 Hz over time.   The CSA analysis is done and displayed continuously.  This is reviewed for asymmetries in power between homologous areas of the scalp and for presence of changes in power which can be seen when seizures occur.  Sections of suspected abnormalities on the CSA is then compared with the original EEG recording.                [employer] software was also utilized in the review of this study.  This software suite analyzes the EEG recording in multiple domains.  Coherence and rhythmicity is computed to identify EEG sections which may contain organized seizures.  Each channel undergoes analysis to detect presence of spike and sharp waves which have special and morphological  characteristic of epileptic activity.  The routine EEG recording is converted from spacial into frequency domain.  This is then displayed comparing homologous areas to identify areas of significant asymmetry.  Algorithm to identify non-cortically generated artifact is used to separate eye movement, EMG and other artifact from the EEG.     Recording times   Start on October 26, 2023 at hour 11 minute 2 seconds 52  End on October 27, 2023 at hours 7 minute 0 seconds 2  The total time of EEG recording for the study was 19 hours and 45 minutes    EEG FINDINGS:  The recording was obtained with a number of standard bipolar and referential montages during lethargic state in this state the background diffusely disorganized.  A nonsustained posterior dominant rhythm of up to 7 hertz was noted which was symmetric.  Mixed theta and delta range slowing was noted in both hemispheres.  The background punctuated by frequent triphasic waves.  In addition occipital maximum sharp waves were also noted which were pseudo periodic.  During this study numerous electrographic seizures were recorded.  The seizures were subclinical.  There were characterized by buildup of low amplitude alpha and beta range activity which was rhythmical in the left posterior region which evolved in morphology and rhythmicity to a rhythmical delta range activity with intermixed epileptiform discharges restricted to the left temporoparietal occipital region.  Initially the seizure burden was approximately 1 seizure every 3 minutes which over the course of the recording gradually reduced to up to every 15 minutes until hour 5 minute 33.  Subsequently 1 additional seizure was noted hours 6 minute 38.  During deeper stages of clinical sleep symmetric spindles were noted.    The EKG channel revealed a sinus rhythm.     IMPRESSION:  This is an abnormal EEG during lethargic state.  Diffuse disorganized slowing of the background was noted.  Frequent triphasic waves  noted.  Left posterior pseudo periodic epileptiform discharges were noted.  Numerous electrographic seizures emanating from the left posterior region were noted.       CLINICAL CORRELATION:  The patient is a 58-year-old female with a history of hepatic dysfunction who is currently maintained on Keppra.  This is an abnormal EEG during lethargic state.  The overall degree of slowing and disorganization along with triphasic waves is suggestive of a moderate degree of encephalopathy likely toxic metabolic encephalopathy.  The presence of left posterior maximum periodic epileptiform discharges is suggestive of an area of cortical irritability with increased risk for focal seizures from this region.  During this study numerous electrographic seizures emanating from the left posterior region were noted initially at a frequency of every 3 minutes which expanded to every 15 minutes until hour 5 minute 33.  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

## 2023-10-27 NOTE — PLAN OF CARE
Plan of care note regarding Miss Mojica. Neurology following for hepatic encephalopathy complicated with seizures    At 8:37 AM of 10/27. I went to see the patient. She was no longer responding to deep painful stimuli as she was doing yesterday. I took a look at the EEG and once again noticed slowing and triphasic waves. I didn't notice obvious seizures for the couple of minutes that I stood there.     I contacted primary team, Dr Grubbs, to give him the update. I advised loading with vimpat, starting scheduled vimpat and consulting NCC. He verbalized understanding. A few minutes after this conversation, he called me back and stated that NCC was on their way to evaluate the patient.     I Believe this patient needs higher care at this time given worsening exam, EEG and needing escalation of AEDs.     If NCC takes the patient, neurology will sign off. Please call if any questions or concerns.

## 2023-10-27 NOTE — ASSESSMENT & PLAN NOTE
58 year old presented for altered mental status on 10/18. Clinical picture confounded with hepatic encephalopathy, infection, and seizure activity. Neurology was consulted by primary team prior to admission to [facility].    EEG IMPRESSION:  This is an abnormal EEG during lethargic state.  Diffuse disorganized slowing of the background was noted.  Frequent triphasic waves noted.  Left posterior pseudo periodic epileptiform discharges were noted.  Numerous electrographic seizures emanating from the left posterior region were noted.    · vEEG in place  · Keppra 1500 BID  · Vimpat loaded 10/27, continue 100 mg BID  · Breathing watch due to low GCS  · Neurochecks q1hr  · Vitals q1hr

## 2023-10-27 NOTE — SUBJECTIVE & OBJECTIVE
Past Medical History:   Diagnosis Date    Alcoholic cirrhosis of liver     Kidney failure     Unilateral inguinal hernia, without obstruction or gangrene, not specified as recurrent      Past Surgical History:   Procedure Laterality Date    ESOPHAGOGASTRODUODENOSCOPY N/A 09/21/2023    Procedure: EGD (ESOPHAGOGASTRODUODENOSCOPY);  Surgeon: Melissa Edwards MD;  Location: [facility];  Service: Endoscopy;  Laterality: N/A;    HERNIA REPAIR      TONSILLECTOMY        No current facility-administered medications on file prior to encounter.     Current Outpatient Medications on File Prior to Encounter   Medication Sig Dispense Refill    furosemide (LASIX) 40 MG tablet Take 1 tablet (40 mg total) by mouth once daily. 30 tablet 5    lactulose (CHRONULAC) 20 gram/30 mL Soln Take 15 mLs (10 g total) by mouth 3 (three) times daily. 1892 mL 5    potassium chloride (KLOR-CON) 10 MEQ TbSR Take 1 tablet (10 mEq total) by mouth once daily. 30 tablet 0    propranoloL (INDERAL) 10 MG tablet Take 10 mg by mouth 2 (two) times daily.      spironolactone (ALDACTONE) 100 MG tablet Take 1 tablet (100 mg total) by mouth once daily. 30 tablet 5      Allergies: Patient has no known allergies.      Social History     Tobacco Use    Smoking status: Every Day     Current packs/day: 0.50     Average packs/day: 0.5 packs/day for 25.0 years (12.5 ttl pk-yrs)     Types: Cigarettes     Start date: 10/18/1998     Passive exposure: Never    Smokeless tobacco: Never   Substance Use Topics    Alcohol use: Not Currently    Drug use: Never     Review of Systems   Unable to perform ROS: Mental status change     Objective:     Vitals:    Temp: 98.7 °F (37.1 °C)  Pulse: 100  Rhythm: normal sinus rhythm  BP: 136/60  MAP (mmHg): 90  Resp: (!) 24  SpO2: 98 %    Temp  Min: 98.2 °F (36.8 °C)  Max: 99.1 °F (37.3 °C)  Pulse  Min: 82  Max: 106  BP  Min: 106/61  Max: 137/63  MAP (mmHg)  Min: 77  Max: 93  Resp  Min: 16  Max: 26  SpO2  Min: 91 %  Max: 99 %    10/26  0701 - 10/27 0700  In: 150   Out: -    Unmeasured Output  Urine Occurrence: 1  Stool Occurrence: 1        Physical Exam  Vitals and nursing note reviewed.   Constitutional:       Appearance: She is ill-appearing.   HENT:      Head: Normocephalic and atraumatic.      Comments: EEG bandage      Nose:      Comments: NG tube in place     Mouth/Throat:      Mouth: Mucous membranes are moist.      Comments: Poor dentition  Eyes:      Pupils: Pupils are equal, round, and reactive to light.   Cardiovascular:      Rate and Rhythm: Regular rhythm. Tachycardia present.      Pulses: Normal pulses.      Heart sounds: Normal heart sounds.   Pulmonary:      Effort: Respiratory distress present.      Breath sounds: Stridor present. Wheezing present.      Comments: Wet cough, thick secretions suctioned  Abdominal:      General: Bowel sounds are normal. There is no distension.      Palpations: Abdomen is soft.   Musculoskeletal:         General: No swelling or deformity.      Cervical back: Neck supple.   Skin:     General: Skin is warm and dry.   Neurological:      Mental Status: She is disoriented.      GCS: GCS eye subscore is 2. GCS verbal subscore is 2. GCS motor subscore is 3.      Comments: Withdraws to pain in Left leg  Posturing in BUE  Not following commands                Today I personally reviewed pertinent medications, lines/drains/airways, imaging, laboratory results, microbiology results, and prior hospital course.

## 2023-10-27 NOTE — HOSPITAL COURSE
57 y/o female with PMHx of alcoholic cirrhosis, s/p ex-lap w/ bowel resection for incarcerated hernia, who was initially admitted on 10/18 to [facility] with acute encephalopathy.   found patient down on the floor at home with evidence of incontinence of stool and urine. Also noted confusion and slurred speech at that time. Appears to have had some concern for L sided weakness and down drift of L arm however CT head without evidence of acute abnormalities, MRI brain with only small vessel vascular changes without evidence of territorial infarct.     Since then, had short term video EEG done on 10/19 with mild diffuse nonspecific background slowing, no potential epileptiform activity. Repeat MRI brain with contrast on 10/23 unchanged from initial MRI. Became more lethargic and was no longer following commands or answering questions. Due to worsening hepatic encephalopathy in setting of hepatic cirrhosis, patient transferred to [facility] 10/25 for management with transplant team. Has remained on antibiotics, lactulose and rifaximin for treatment of UTI, HE.     Neurology consulted for management recommendations regarding persistent AMS. Had repeat EEG done on 10/24 with similar findings to prior EEG showing mild generalized non-specific cerebral dysfunction and no electrographic seizures or indication of seizure tendency. Additional CT head w/o contrast on 10/25 without evidence of acute issues. Remained confused and unable to answer questions on exam. Not withdrawing to painful stimuli. She was started on Keppra for subclinical seizures seen on EEG. LP was deferred due to EEG findings. Ucx with pan sensitive e coli and being treated with ceftriaxone for this and SBP ppx as well. She was also treated with lactulose and rifaximin. Inpatient liver transplant evaluation was considered however deferred due to progressive mental status decline and was transferred to ICU on 10/27.       10/28/2023: Improving GCS from  6 to 10. Continuing pulmonary toilet and deep suctioning for stridor and copious secretions. UA positive for candida. Blood cultures from 10/27 NGTD. Sputum cultures sent. Patient on day 2 of broadened antibiotics vanc/zosyn due to worsening clinical status but will discontinue tomorrow if cultures remain negative. Still hypernatremic, treating with D5W. Patient was transferred with no ordered diuretics, very edematous on physical exam. Adequate stooling on lactulose and rifaximin. Due to increased UOP/stooling will place eckert for one day for irritated skin. EEG update showing no seizures since 10am 10/27. Monitoring CBC for thrombocytopenia and macrocytic anemia. Discussed code status and goals of care with  and best friend at bedside. Trickle feeds resumed.   10/29/2023: No acute events overnight, EEG reportedly without further seizures for ~48 hours can disconnect once formal report is in, neuro status slowly improving as patient now tracking in room, moving extremities, responding with appropriate emotional reactions to her .  Respiratory status largely unchanged with intermittent events of large volume breaths that sound almost stridorous but without actual respiratory distress- gas remains compensated.  UOP low, diuresis resumed.  10/30/2023: d/c mucomyst nebs, add racemic epi scheduled nebs, add budesonide and dex 6 q8 hours, ammonia at 35- continue lactulose and rifaximin, abg reviewed, 40mEq of K once, ENT consulted for stridor, continue eckert catheter in today   10/31/2023 Patient with worsening respiratory status, continued decreased mentation and increased secretions. Plan for elective intubation in controlled setting with anesthesia. Will also recheck eeg, if negative will start to wean AEDs and see if that improves patient's arousal. PLT stabilized, continue to monitor.   11/1/2023 this morning patient's 6.0 ETT exchanged for 7.0 to allow suctioning. EEG with frequent discharges, lowered  vimpat to 50 mg and will follow EEG. Attempting to remove confounding factors for patients mental status. Slow drop in H/H, 1 unit ordered.   11/2/2023 NAEO, cEEG to remain in place, no seizures but is having frequent discharges in runs. Continue lower dose vimpat and 1500 keppra Eye opening this morning which is slight improvement in exam. Failed SBT   11/03/2023: no improvement with decrease in lacosamide, no re-development of seizures, if does not wake up in next 48 hrs off lacosamide or redevelops seizures, prognosis is extremely poor  11/04/2023: improved mental state this am, no seizures overnight  11/05/2023: LOC stable, has cuff leak, trial of extubation  11/06/2023 reintubated for stridor non responsive to med management overnight  11/07/2023 CTH stable. Off of levo. Completed dex (wbc 22, afebrile). CXR w L mildly increasing consolidation. Very thick secretions, added mucomyst and duonebs. Weaning keppra to 750. Pt appearing more edematous today, dc LR. Scheduled tylenol max 2g/d. Scheduled oxy 5q12.  11/08/2023: EEG pending  11/09/2023: EEG slowing  11/10/2023: more alert today  11/11/2023 pressure support trial  11/12/2023 Alert today. Not following commands. On spontaneous overnight, tolerated well. MRI c-spine overnight with spondylosis. No LP at this time. Resume TF. Hold TF tomorrow at 5AM for possible extubation tomorrow.   11/13/2023: patient made DNR, 10 dexamethsone IV one prior extubation with no intent of re-intubation, PRN racemic epi and Bipap were initiated  11/14/2023: Patient tolerated BiPAP overnight and continues to be more alert, and is tracking faces more consistently. She was taken off BiPAP and placed on 4L oxygen via nasal cannula. Trickle feeds were started while off BiPAP. Significant other at bedside updated on clinical course. Midline placed overnight.   11/15/2023: Thick secretions noted when BiPAP removed this morning, starting CPT. PT/OT consulted with improving mental and  respiratory status. Patient more sad and uncomfortable today.   11/16/2023: Advancing tube feeds to goal and encouraging continued work with PT/OT. She has been tolerating nasal cannula oxygenation well and has had a reduction in her secretions.   11/17/2023: Increased secretions overnight and this morning. Continues to work with PT/OT and has increased movement in her head and neck. Tube feed goal adjusted per dietician recommendations. Starting to plan for disposition. Considering ACP and Palliative care talks with family this weekend.   11/18/2023: NAEON. 50g albumin and 40mg lasix given for diuresis.   11/19/2023: H&H dropped overnight, received 1 unit PBRC. Otherwise, NAEON. Stable for transfer to floor with .   11/20/2023 hold transfer to  2/2 respiratory status   11/21/23: GOC tomorrow  11/22/23: GOC today  11/23/2023 Increased work of breathing this AM. CXR w/ volume overload, lasix given. IR consulted for PEG placement.    Transfer to floor on 11/24/2023 and brief encounter with minimal participation as patient was nonverbal.  Alert however not oriented.  2 critical events later at night 11/24/2023 and early morning 11/25/2023 with the night team.  Her NG tube had to be removed with blood clots at its tip also noted to have blood clots the back of her nasopharynx some amenable to suction with oral suctioning. Worsening lactate, respiratory distress needing racemic epinephrine, fluid resuscitation along with diuresis to maintain airway given DNR status.    Her acute encephalopathy thought to be multifactorial in setting of hepatic encephalopathy, infection (E. Coli and candida albicans UTI, Candida PNA) and focal status epilepticus. Hospital course also complicated by failed extubation trial 2/2 profound stridor s/p re-intubation and evaluation by ENT w/o clear pathology, eventually successfully extubated 11/13. Continued failure to thrive thereafter. Also with critical illness  neuromyopathy    11/25/2023 -Comfort measures initiated   11/26/2023 - maintained comfort measures  11/27/2023 - hopice referral and discharge to hospice.

## 2023-10-27 NOTE — CARE UPDATE
RAPID RESPONSE NURSE PROACTIVE ROUNDING NOTE       Time of Visit: 0850    Admit Date: 10/25/2023  LOS: 2  Code Status: Full Code   Date of Visit: 10/27/2023  : 1965  Age: 58 y.o.  Sex: female  Race: White  Bed: 9076/9094 A:   MRN: [id number]  Was the patient discharged from an ICU this admission? No   Was the patient discharged from a PACU within last 24 hours? No   Did the patient receive conscious sedation/general anesthesia in last 24 hours? No  Was the patient in the ED within the past 24 hours? No  Was the patient on NIPPV within the past 24 hours? No   Attending Physician: Lencho Hess DO  Primary Service: [facility]   Time spent at the bedside: 15 -30 min    SITUATION    Notified by previous RRN during handoff.  Reason for alert: AMS and Seizures  Called to evaluate the patient for Neuro    BACKGROUND     Why is the patient in the hospital?: Acute encephalopathy    Patient has a past medical history of Alcoholic cirrhosis of liver, Kidney failure, and Unilateral inguinal hernia, without obstruction or gangrene, not specified as recurrent.    Last Vitals:  Temp: 98.7 °F (37.1 °C) (10/27 0835)  Pulse: 110 (10/27 0947)  Resp: 16 (10/27 0947)  BP: 136/60 (10/27 0835)  SpO2: 99 % (10/27 0947)    24 Hours Vitals Range:  Temp:  [98.2 °F (36.8 °C)-99.1 °F (37.3 °C)]   Pulse:  []   Resp:  [16-26]   BP: (106-137)/(55-65)   SpO2:  [91 %-99 %]     Labs:  Recent Labs     10/25/23  1338 10/26/23  0416 10/27/23  0457 10/27/23  0529   WBC 6.19 6.52  --  6.62   HGB 8.2* 7.8*  --  7.7*   HCT 26.9* 24.8* 23* 25.3*   * 137*  --  104*       Recent Labs     10/25/23  1338 10/26/23  0416 10/27/23  0529   * 147* 148*   K 3.8 3.5 3.4*   * 117* 119*   CO2 20* 20* 19*   BUN 29* 31* 26*   CREATININE 0.9 0.9 0.9   GLU 68* 79 107   PHOS  --   --  1.8*   MG  --   --  2.0        Recent Labs     10/27/23  0457   PH 7.456*   PCO2 30.6*   PO2 67*   HCO3 21.5*   POCSATURATED 94   BE -2         ASSESSMENT    Physical Exam  Constitutional:       Appearance: She is ill-appearing.   HENT:      Mouth/Throat:      Mouth: Mucous membranes are dry.   Eyes:      Pupils: Pupils are equal, round, and reactive to light.   Cardiovascular:      Rate and Rhythm: Tachycardia present.   Pulmonary:      Effort: Bradypnea and accessory muscle usage present.      Breath sounds: Stridor present.   Musculoskeletal:         General: Swelling present.      Right lower leg: Edema present.      Left lower leg: Edema present.   Skin:     General: Skin is dry.   Neurological:      Mental Status: She is lethargic.      GCS: GCS eye subscore is 2. GCS verbal subscore is 2. GCS motor subscore is 4.         INTERVENTIONS    The patient was seen for Neurological problem. Staff concerns included mental status change and seizure-like activity. The following interventions were performed: seizure precautions, neuro critical care consulted, continuous pulse ox monitoring continued , continuous cardiac monitoring continued, and upgrade to ICU.    RECOMMENDATIONS    Upgrade to [facility] for airway watch and seizure management    PROVIDER ESCALATION    Yes/No  Yes    Orders received and case discussed with  Dr. Love and Dr. Hess .    Disposition: Tx in ICU bed 3238.    FOLLOW-UP    Charge RN, Nadeem and Edd  updated on plan of care. Instructed to call the Rapid Response Nurse, Gentry Hood RN at [phone] for additional questions or concerns.

## 2023-10-27 NOTE — PROGRESS NOTES
[facility] - Intensive Care ([address] Medicine  Progress Note    Patient Name: Mara Mojica  MRN: [id number]  Patient Class: IP- Inpatient   Admission Date: 10/25/2023  Length of Stay: 1 days  Attending Physician: Kate Grubbs MD  Primary Care Provider: Osiris Nevarez NP        Subjective:     Principal Problem:Acute encephalopathy        HPI:  57 yo woman with alcoholic cirrhosis with ascites, s/p Ex Lap with bowel resection for incarcerated hernia, recent SBO with SAMRA, adm to [facility] on 10/18 with AMS.  Patient found on the floor confused with slurred speech with incontinence and with questionable tongue injury    On presentation /65, HR 95, RR 18, SpO2 96% (RA).  At that time the patient was alert and oriented.  There was left-sided facial weakness.  Otherwise neurologic exam was unremarkable (? )    Labs (10/18) WBC 6.2, Hb 8.5 (BL 10.1), Plts 66, INR 2.0, Na 136, K 4.3, BUN 16, Cr 1.5 (BL 1.0) bilirubin 6.4, AST 96, ALT 47, ammonia 42.  .  Alcohol not tested (<10 on 10/14) UTOX neg U/A WBC 13, bacteria rare. CTH and MRI brain neg for acute changes.    Patient evaluated by Neurology on 10/19.  Per neurology, at that time she was oriented to person place time and situation and attentive to her environment.  She was able to follow one and two-step commands.  Facial features were symmetrical.  Speech marked by dysarthria.  Most answers are yes/no.  Muscle testing of proximal and distal muscles of upper and LE showed diffuse generalized weakness WO focal or lateralizing findings.  No pathologic reflexes were noted.     Labs 10/18 WBC 6.2 > 6.8, Hb 8.5 > 7.9, Plts 66 > 69, INR > 2.2, Na 136 > 142 Cr 1.5 > 0.8, T bili 6.4 > 5.0, AST 96 > 79, ALT 47 > 44, Ammonia 42 > 28  Presently patient is lethargic and responding to questions with one-word answers.      Her AMS is not readily attributed to HE with ammonia now 28 and CVA seems unlikely with neg MRI brain.  An MRI brain with contrast has  been scheduled.  An EEG has not been done so seizures remain in the differential especially given her presentation.  Patient found on the floor with questionable tongue injury and incontinent of urine and feces.  Patient known to be drinking as recently as July 2023.  More recently patient has said that she is not drinking.  On admission alcohol was < 10.   A PEth has not been done    Dr Shannan Reardon (Juancarlos) ESTELLA at [facility] consulted with Dr Renteria who recommended transfer to [facility].        Overview/Hospital Course:  59 y/o female with PMHx of alcoholic cirrhosis, s/p ex-lap w/ bowel resection for incarcerated hernia, who was initially admitted on 10/18 to [facility] with acute encephalopathy.   found patient down on the floor at home with evidence of incontinence of stool and urine. Also noted confusion and slurred speech at that time. Appears to have had some concern for L sided weakness and down drift of L arm however CT head without evidence of acute abnormalities, MRI brain with only small vessel vascular changes without evidence of territorial infarct.    Since then, had short term video EEG done on 10/19 with mild diffuse nonspecific background slowing, no potential epileptiform activity. Repeat MRI brain with contrast on 10/23 unchanged from initial MRI. Became more lethargic and was no longer following commands or answering questions. Due to worsening hepatic encephalopathy in setting of hepatic cirrhosis, patient transferred to [facility] for management with transplant team. Has remained on antibiotics, lactulose and rifaximin for treatment of UTI, HE.    Neurology consulted for management recommendations regarding persistent AMS. Had repeat EEG done on 10/24 with similar findings to prior EEG showing mild generalized non-specific cerebral dysfunction and no electrographic seizures or indication of seizure tendency. Additional CT head w/o contrast on 10/25 without evidence of acute issues. Remains  confused and unable to answer questions on exam. Not withdrawing to painful stimuli. Was able to awaken to verbal stimuli in AM however when reevaluated in afternoon unresponsive however was after receiving Ativan.    2g Keppra load, will start 750mg BID of keppra for subclinical seizures seen on EEG. Full read awaited  continue thiamine at this time  Pending drug of abuse, PETH, heavy metal screen, RPR, B12, B1, MMA, Ucx (10/25) for other causes of encephalopathy  Will hold off on LP at this time given EEG findings could explain current findings.     Ucx with pan sensitive e coli and being treated with ceftriaxone for this and SBP ppx as well.       Evaluated by hepatology     Ultrasound of liver performed 10/25 showed cirrhosis with sequelae of portal hypertension  seen in October 2023 for decompensated cirrhosis and it was recommended to proceed with liver transplant evaluation.  EGD in September 2023 showed normal esophagus and portal hypertensive gastropathy.  AFP in September 2023 was 7.8.    - Start lactulose enemas TID. Continue Xifaxin.   - IV thiamine and folate supplementation  - IV Rocephin for SBP prophylaxis  - We are not initiating liver transplant evaluation at this time.   Will follow PEt.       Interval History : patient reported to have been on EEG with continued somnolence. She has been incontinent to stools and urine and has pads in place. NG in place with tube feeds ongoing. EEG monitoring continued.     Review of Systems   Reason unable to perform ROS: AMS.     Objective:     Vital Signs (Most Recent):  Temp: 99.1 °F (37.3 °C) (10/26/23 1917)  Pulse: 91 (10/26/23 1917)  Resp: 18 (10/26/23 1917)  BP: (!) 107/55 (10/26/23 1917)  SpO2: (!) 91 % (10/26/23 1917) Vital Signs (24h Range):  Temp:  [97.9 °F (36.6 °C)-99.1 °F (37.3 °C)] 99.1 °F (37.3 °C)  Pulse:  [82-98] 91  Resp:  [15-20] 18  SpO2:  [91 %-99 %] 91 %  BP: (106-146)/(55-64) 107/55     Weight: 56.2 kg (123 lb 14.4 oz)  Body mass index is  25.02 kg/m².    Estimated Creatinine Clearance: 53.8 mL/min (based on SCr of 0.9 mg/dL).     Physical Exam  Constitutional:       Appearance: She is ill-appearing. She is not toxic-appearing.   HENT:      Head: Normocephalic and atraumatic.      Comments: EEG bandage      Nose:      Comments: NG tube in place  Eyes:      Conjunctiva/sclera: Conjunctivae normal.   Cardiovascular:      Rate and Rhythm: Normal rate and regular rhythm.      Pulses: Normal pulses.      Heart sounds: Normal heart sounds.   Pulmonary:      Effort: Pulmonary effort is normal.      Breath sounds: Normal breath sounds.   Abdominal:      General: Bowel sounds are normal.      Palpations: Abdomen is soft.   Musculoskeletal:         General: No swelling or deformity.      Cervical back: Neck supple.   Skin:     General: Skin is warm and dry.   Neurological:      Mental Status: She is disoriented.      Comments: Withdraws to pain  Not following commands            Significant Labs: All pertinent labs within the past 24 hours have been reviewed.  Significant Imaging: I have reviewed all pertinent imaging results/findings within the past 24 hours.      Assessment/Plan:      * Acute encephalopathy  Likely 2/2 seizure in combination of infection SBP and UTI and chronic cirrhosis.   - Admission CT head without evidence of acute abnormalities, MRI brain with only small vessel vascular changes without evidence of territorial infarct.  -short term video EEG done on 10/19 with mild diffuse nonspecific background slowing, no potential epileptiform activity.   - Repeat MRI brain with contrast on 10/23 unchanged from initial MRI.  Subsequent worsening  - Remained on antibiotics, lactulose and rifaximin for treatment of UTI/SBP, HE.    Neurology consulted for management recommendations regarding persistent AMS. Had repeat EEG done on 10/24 with similar findings to prior EEG showing mild generalized non-specific cerebral dysfunction and no electrographic  seizures or indication of seizure tendency.  - Additional CT head w/o contrast on 10/25 without evidence of acute issues. Remains confused and unable to answer questions on exam. Not withdrawing to painful stimuli. Was able to awaken to verbal stimuli in AM however when reevaluated in afternoon unresponsive however was after receiving Ativan.  - EEG on 10/26 showed epileptiform activity and neurology recommended 2g Keppra load, will start 750mg BID of keppra for subclinical seizures seen on EEG. Full read awaited  continue thiamine at this time  Pending drug of abuse, PETH, heavy metal screen, RPR, B12, B1, MMA, Ucx (10/25) for other causes of encephalopathy  Will hold off on LP at this time given EEG findings could explain current findings. ID onboard.     Evaluated by hepatology:  Ultrasound of liver performed 10/25 showed cirrhosis with sequelae of portal hypertension.  seen in October 2023 for decompensated cirrhosis and it was recommended to proceed with liver transplant evaluation.  EGD in September 2023 showed normal esophagus and portal hypertensive gastropathy.  AFP in September 2023 was 7.8.    - Start lactulose enemas TID. Continue Xifaxin.   - IV thiamine and folate supplementation  - IV Rocephin for SBP prophylaxis  - Not initiating liver transplant evaluation at this time.   Will follow [facility].       Decompensated alcoholic hepatic cirrhosis  MELD 3.0: 22 at 10/26/2023  4:16 AM  MELD-Na: 19 at 10/26/2023  4:16 AM  Calculated from:  Serum Creatinine: 0.9 mg/dL (Using min of 1 mg/dL) at 10/26/2023  4:16 AM  Serum Sodium: 147 mmol/L (Using max of 137 mmol/L) at 10/26/2023  4:16 AM  Total Bilirubin: 5.3 mg/dL at 10/26/2023  4:16 AM  Serum Albumin: 2.5 g/dL at 10/26/2023  4:16 AM  INR(ratio): 1.8 at 10/25/2023  1:38 PM  Age at listing (hypothetical): 58 years  Sex: Female at 10/26/2023  4:16 AM    Evaluated by hepatology   Ultrasound of liver performed 10/25 showed cirrhosis with sequelae of portal  hypertension  seen in October 2023 for decompensated cirrhosis and it was recommended to proceed with liver transplant evaluation.  EGD in September 2023 showed normal esophagus and portal hypertensive gastropathy.  AFP in September 2023 was 7.8.    - Started on lactulose enemas TID. Continue Xifaxin.   - IV thiamine and folate supplementation  - IV Rocephin for SBP prophylaxis  - Not initiating liver transplant evaluation at this time.   - follow PEth.       Acute hepatic encephalopathy  See encephalopthy      Moderate malnutrition  Nutrition consulted. Most recent weight and BMI monitored-     Measurements:  Wt Readings from Last 1 Encounters:   10/26/23 56.2 kg (123 lb 14.4 oz)   Body mass index is 25.02 kg/m².    Patient has been screened and assessed by RD.    Malnutrition Type:  Context: social/environmental circumstances  Level: moderate    Malnutrition Characteristic Summary:  Subcutaneous Fat (Malnutrition): mild depletion  Muscle Mass (Malnutrition): mild depletion  Fluid Accumulation (Malnutrition): moderate    Interventions/Recommendations (treatment strategy):  1. When able, initiate TFs. Rec'd Isosource 1.5 @ 50 mL/hr to provide 1800 kcals, 82 g of protein, 917 mL fluid. 2. RD to monitor & follow-up.     Continue feeding via NG tube     Thrombocytopenia    Suspect related to liver disease  If progresses consider Hematology evaluation but monitoring for now in setting of no active bleeding.       VTE Risk Mitigation (From admission, onward)         Ordered     IP VTE LOW RISK PATIENT  Once         10/25/23 1303     Place JOI hose  Until discontinued         10/25/23 1303     Place sequential compression device  Until discontinued         10/25/23 1303                Discharge Planning   ASHELY: 10/28/2023     Code Status: Full Code   Is the patient medically ready for discharge?: No    Reason for patient still in hospital (select all that apply): Patient unstable, Patient new problem, Patient trending  condition, Laboratory test, Treatment, Imaging and Consult recommendations  Discharge Plan A: Other, Home Health (PT/OT to evaluate)   Discharge Delays: (!) Procedure Scheduling (IR, OR, Labs, Echo, Cath, Echo, EEG)              Kate Grubbs MD  Department of Hospital Medicine   [facility] - Intensive Care ([place]-14)

## 2023-10-27 NOTE — PT/OT/SLP PROGRESS
Speech Language Pathology      Mara Mojica  MRN: [id number]    Patient not seen today secondary to pt transferred to a higher level of care. SLP will d/c current orders and will need new orders when medically appropriate.

## 2023-10-27 NOTE — PLAN OF CARE
"Read about overnight events where patient had breakthrough seizures and was lethargic and had residuals in her tube feeds. Tube feeds were held. Was given additional Keppra and dose was increased.    This morning patient had worsening metal status, examined with neurology at bedside and she was only waking up to pain stimulus however not withdrawing. GCS of 6 and was requiring suctioning. Blood pressures stable and O2 saturations > 92%. Rapid response was at bedside and Neuro critical ICU was notified.     EEG read was requested and  "Diffuse disorganized slowing of the background was noted.  Frequent triphasic waves noted.  Left posterior pseudo periodic epileptiform discharges were noted.  Numerous electrographic seizures emanating from the left posterior region were noted."     She is being transferred to neuro critical care unit.   "

## 2023-10-27 NOTE — HPI
Ms. Mojica is a 58 YOF with alcoholic cirrhosis with ascites, s/p Ex Lap with bowel resection for incarcerated hernia, recent SBO with SAMRA, adm to [facility] on 10/18 with acute encephalopathy, transferred for transplant for HE.  Patient found on the floor confused with slurred speech with incontinence and with questionable tongue injury. L sided facial weakness. EEG 10/19 w/o seizures, neg brain MRI, later repeat EEG on keppra load for subclinical seizures. Ucx on 10/25 grew pan sensitivve e coli, started on ceftriaxone. Stepped up to the Neuro ICU for worsening AMS and breakthrough seizures and focal status. Nephrology consulted for hyperNa

## 2023-10-27 NOTE — RESPIRATORY THERAPY
RAPID RESPONSE RESPIRATORY THERAPY PROACTIVE NOTE           Time of visit: 0850     Code Status: Full Code   : 1965  Bed: [address] [place] [address] A:   MRN: [id number]  Time spent at the bedside: 30 - 45 min    SITUATION    Evaluated patient for: Lethargy    BACKGROUND    Why is the patient in the hospital?: Acute encephalopathy    Patient has a past medical history of Alcoholic cirrhosis of liver, Kidney failure, and Unilateral inguinal hernia, without obstruction or gangrene, not specified as recurrent.    24 Hours Vitals Range:  Temp:  [98.2 °F (36.8 °C)-99.1 °F (37.3 °C)]   Pulse:  []   Resp:  [15-26]   BP: (106-137)/(55-65)   SpO2:  [91 %-99 %]     Labs:    Recent Labs     10/25/23  1338 10/26/23  0416 10/27/23  0529   * 147* 148*   K 3.8 3.5 3.4*   * 117* 119*   CO2 20* 20* 19*   BUN 29* 31* 26*   CREATININE 0.9 0.9 0.9   GLU 68* 79 107   PHOS  --   --  1.8*   MG  --   --  2.0        Recent Labs     10/27/23  0457   PH 7.456*   PCO2 30.6*   PO2 67*   HCO3 21.5*   POCSATURATED 94   BE -2       ASSESSMENT/INTERVENTIONS  Patient's respiratory status severe upon arrival to room. On 2L NC SpO2 96%, RR 15, , loud inspiratory stridor, belly breathing, subcostal and supracostal retractions, and gasping from inability to maintain airway. Work of breathing subsides with positioning and jaw thrust however, not suitable for monitoring outside of ICU per Rapid's recommendation. Patient had frequent and productive cough that tends to sit in the back of the throat. Critical care team consulted for concern of respiratory arrest. Administered 0700 respiratory treatments and transported patient to [facility] with no adverse events. NADYA Reed, RRT given report.    Last VS   Temp: 98.7 °F (37.1 °C) (10/27 0835)  Pulse: 110 (10/27 0947)  Resp: 16 (10/27 0947)  BP: 136/60 (10/27 0835)  SpO2: 99 % (10/27 0947)    Level of Consciousness: Level of Consciousness (AVPU): responds to pain  Respiratory Effort: Respiratory Effort:  Mouth breathing, Other (Comment) (congested, coughing,) Expansion/Accessory Muscle Usage: Expansion/Accessory Muscles/Retractions: no retractions  All Lung Field Breath Sounds: All Lung Fields Breath Sounds: Anterior:, Lateral:, coarse  O2 Device/Concentration: 2L NC  NIPPV: No Surgical airway: No  ETCO2 monitored:    Ambu at bedside:      Active Orders   Respiratory Care    ASP/SUCTION NASOTRACHEAL Q6H     Frequency: Q6H     Number of Occurrences: Until Specified    Chest physiotherapy Q6H     Frequency: Q6H     Number of Occurrences: Until Specified     Order Questions:      Indications: COPIOUS SPUTUM PRODUCTION    Inhalation Treatment Q6H     Frequency: Q6H     Number of Occurrences: Until Specified    Inhalation Treatment Q6H     Frequency: Q6H     Number of Occurrences: Until Specified    Inhalation Treatment Q6H     Frequency: Q6H     Number of Occurrences: Until Specified    Oxygen Continuous     Frequency: Continuous     Number of Occurrences: Until Specified     Order Questions:      Device type: Low flow      Device: Nasal Cannula (1- 5 Liters)      LPM: 1-5      Titrate O2 per Oxygen Titration Protocol: Yes      To maintain SpO2 goal of: >= 92%      Notify MD of: Inability to achieve desired SpO2; Sudden change in patient status and requires 20% increase in FiO2; Patient requires >60% FiO2    Pulse Oximetry Continuous     Frequency: Continuous     Number of Occurrences: Until Specified       RECOMMENDATIONS    We recommend: RRT Recs: Continue POC per primary team.    FOLLOW-UP    Please call back the Rapid Response RT, Hilaria Gonzalez RRT at x [phone] for any questions or concerns.

## 2023-10-27 NOTE — CARE UPDATE
Notified by rapid response team patient with increased lethargy and breakthrough seizures on continuous EEG per epilepsy. AB.45/30/67/94% on 2 liter supplemental oxygen. Somnolence likely from ativan 2 mg given around 1400 yesterday for seizure. Tube feeding on hold due to lethargy and risk of aspiration. Will give keppra 1000 mg IV x 1 now and increase oral keppra to 1500 mg bid via NG tube per epilepsy recommendation. Monitor neuro status closely with low threshold for NCC evaluation if worsening mental status.     Roberto Jean Baptiste DO  Staff Physician, Hospital Medicine.

## 2023-10-27 NOTE — SUBJECTIVE & OBJECTIVE
Interval History : patient reported to have been on EEG with continued somnolence. She has been incontinent to stools and urine and has pads in place. NG in place with tube feeds ongoing. EEG monitoring continued.     Review of Systems   Reason unable to perform ROS: AMS.     Objective:     Vital Signs (Most Recent):  Temp: 99.1 °F (37.3 °C) (10/26/23 1917)  Pulse: 91 (10/26/23 1917)  Resp: 18 (10/26/23 1917)  BP: (!) 107/55 (10/26/23 1917)  SpO2: (!) 91 % (10/26/23 1917) Vital Signs (24h Range):  Temp:  [97.9 °F (36.6 °C)-99.1 °F (37.3 °C)] 99.1 °F (37.3 °C)  Pulse:  [82-98] 91  Resp:  [15-20] 18  SpO2:  [91 %-99 %] 91 %  BP: (106-146)/(55-64) 107/55     Weight: 56.2 kg (123 lb 14.4 oz)  Body mass index is 25.02 kg/m².    Estimated Creatinine Clearance: 53.8 mL/min (based on SCr of 0.9 mg/dL).     Physical Exam  Constitutional:       Appearance: She is ill-appearing. She is not toxic-appearing.   HENT:      Head: Normocephalic and atraumatic.      Comments: EEG bandage      Nose:      Comments: NG tube in place  Eyes:      Conjunctiva/sclera: Conjunctivae normal.   Cardiovascular:      Rate and Rhythm: Normal rate and regular rhythm.      Pulses: Normal pulses.      Heart sounds: Normal heart sounds.   Pulmonary:      Effort: Pulmonary effort is normal.      Breath sounds: Normal breath sounds.   Abdominal:      General: Bowel sounds are normal.      Palpations: Abdomen is soft.   Musculoskeletal:         General: No swelling or deformity.      Cervical back: Neck supple.   Skin:     General: Skin is warm and dry.   Neurological:      Mental Status: She is disoriented.      Comments: Withdraws to pain  Not following commands            Significant Labs: All pertinent labs within the past 24 hours have been reviewed.  Significant Imaging: I have reviewed all pertinent imaging results/findings within the past 24 hours.

## 2023-10-27 NOTE — ASSESSMENT & PLAN NOTE
"58 year old with UA showing pan sensitive E.Coli on 10/18 and repeat UA 10/25 showing Candida albicans.  Patient also has a history of hepatic encephalopathy with decompensated cirrhosis, SBP not ruled out.  Due to encephalopathy, concerns for aspiration.    CXR 10/27: Low lung volumes with mild worsening bibasilar interstitial densities and possible trace pleural effusions.  Aeration is likely mildly worse when compared with recent prior study, though findings could be exaggerated by hypoventilatory state.     · Vancomycin, Zosyn for at least 48 hours  · Blood cultures  · Sputum cultures  · Consider lumbar puncture if status worsens      This patient does have evidence of infective focus  My overall impression is sepsis.  Source: Urinary Tract and Abdominal  Antibiotics given-   Antibiotics (72h ago, onward)    Start     Stop Route Frequency Ordered    10/27/23 2115  vancomycin 750 mg in dextrose 5 % (D5W) 250 mL IVPB (Vial-Mate)        See Hyperspace for full Linked Orders Report.    -- IV Every 12 hours (non-standard times) 10/27/23 0812    10/27/23 0915  piperacillin-tazobactam (ZOSYN) 4.5 g in dextrose 5 % in water (D5W) 100 mL IVPB (MB+)         -- IV Every 8 hours (non-standard times) 10/27/23 0809    10/27/23 0908  vancomycin - pharmacy to dose  (vancomycin IVPB (PEDS and ADULTS))        See Hyperspace for full Linked Orders Report.    -- IV pharmacy to manage frequency 10/27/23 0809    10/26/23 2100  mupirocin 2 % ointment         10/31/23 2059 Nasl 2 times daily 10/26/23 1240    10/25/23 1315  rifAXIMin tablet 550 mg         -- PER NG TUBE 2 times daily 10/25/23 1303        Latest lactate reviewed-  No results for input(s): "LACTATE" in the last 72 hours.  Organ dysfunction indicated by Encephalopathy    Fluid challenge Not needed - patient is not hypotensive      Post- resuscitation assessment No - Post resuscitation assessment not needed       Will Not start Pressors- Levophed for MAP of 65    "

## 2023-10-27 NOTE — NURSING
Patient arrived to [facility] from room 1467 (name of hospital or home) by (mode of transportation & company name)    Report received from: RN    Type of stroke/diagnosis:  Seizures    TPA start and end time NA    Thrombectomy start and end time NA    Current symptoms: unarausable , minimal responds to pain. Does not follow commands     Skin Assessment done: y  Wounds noted: old sacral scar  *If wounds noted, was Wound Care consulted? MAGY  *If wounds noted, LDA placed? MAGY  Skin Assessment Verified by:  ELLE Willoughby Completed? Failed    Patient Belongings on Admit: none  NCC notified: name of person notified MD Corby

## 2023-10-27 NOTE — CONSULTS
[facility]  Nephrology  Consult Note    Patient Name: Mara Mojica  MRN: [id number]  Admission Date: 10/25/2023  Hospital Length of Stay: 2 days  Attending Provider: Lencho Hess DO   Primary Care Physician: Osiris Nevarez NP  Principal Problem:Acute encephalopathy    Consults  Subjective:     HPI: Ms. Mojica is a 58 YOF with alcoholic cirrhosis with ascites, s/p Ex Lap with bowel resection for incarcerated hernia, recent SBO with SAMRA, adm to [facility] on 10/18 with acute encephalopathy, transferred for transplant for HE.  Patient found on the floor confused with slurred speech with incontinence and with questionable tongue injury. L sided facial weakness. EEG 10/19 w/o seizures, neg brain MRI, later repeat EEG on keppra load for subclinical seizures. Ucx on 10/25 grew pan sensitivve e coli, started on ceftriaxone. Stepped up to the Neuro ICU for worsening AMS and breakthrough seizures and focal status. Nephrology consulted for hyperNa      Past Medical History:   Diagnosis Date    Alcoholic cirrhosis of liver     Kidney failure     Unilateral inguinal hernia, without obstruction or gangrene, not specified as recurrent        Past Surgical History:   Procedure Laterality Date    ESOPHAGOGASTRODUODENOSCOPY N/A 09/21/2023    Procedure: EGD (ESOPHAGOGASTRODUODENOSCOPY);  Surgeon: Melissa Edwards MD;  Location: [facility];  Service: Endoscopy;  Laterality: N/A;    HERNIA REPAIR      TONSILLECTOMY         Review of patient's allergies indicates:  No Known Allergies  Current Facility-Administered Medications   Medication Frequency    acetaminophen suppository 650 mg Q6H PRN    albuterol-ipratropium 2.5 mg-0.5 mg/3 mL nebulizer solution 3 mL Q6H    calcium gluconate 1 g in NS IVPB (premixed) PRN    calcium gluconate 1 g in NS IVPB (premixed) PRN    calcium gluconate 1 g in NS IVPB (premixed) PRN    dextrose 5 % (D5W) infusion Continuous    furosemide injection 40 mg Daily    lacosamide  (VIMPAT) 100 mg in sodium chloride 0.9% 100 mL IVPB Q12H    lactulose 20 gram/30 mL solution Soln 30 g TID    levetiracetam 500 mg/5 mL (5 mL) liquid Soln 1,500 mg BID    magnesium sulfate 2g in water 50mL IVPB (premix) PRN    magnesium sulfate 2g in water 50mL IVPB (premix) PRN    multivitamin tablet Daily    mupirocin 2 % ointment BID    ondansetron injection 4 mg Q8H PRN    piperacillin-tazobactam (ZOSYN) 4.5 g in dextrose 5 % in water (D5W) 100 mL IVPB (MB+) Q8H    potassium chloride 10 mEq in 100 mL IVPB PRN    And    potassium chloride 10 mEq in 100 mL IVPB PRN    And    potassium chloride 10 mEq in 100 mL IVPB PRN    rifAXIMin tablet 550 mg BID    sodium chloride 0.9% flush 10 mL PRN    sodium chloride 3% nebulizer solution 4 mL Q6H    sodium phosphate 15 mmol in dextrose 5 % (D5W) 250 mL IVPB PRN    sodium phosphate 20.01 mmol in dextrose 5 % (D5W) 250 mL IVPB PRN    sodium phosphate 30 mmol in dextrose 5 % (D5W) 250 mL IVPB PRN    [START ON 10/28/2023] thiamine (B-1) 250 mg in dextrose 5 % (D5W) 100 mL IVPB Daily    thiamine (B-1) 500 mg in dextrose 5 % (D5W) 100 mL IVPB TID    vancomycin - pharmacy to dose pharmacy to manage frequency    vancomycin 750 mg in dextrose 5 % (D5W) 250 mL IVPB (Vial-Mate) Q12H     Family History       Problem Relation (Age of Onset)    Ovarian cancer Mother    Seizures Father          Tobacco Use    Smoking status: Every Day     Current packs/day: 0.50     Average packs/day: 0.5 packs/day for 25.0 years (12.5 ttl pk-yrs)     Types: Cigarettes     Start date: 10/18/1998     Passive exposure: Never    Smokeless tobacco: Never   Substance and Sexual Activity    Alcohol use: Not Currently    Drug use: Never    Sexual activity: Not on file     Review of Systems   Unable to perform ROS: Mental status change     Objective:     Vital Signs (Most Recent):  Temp: 98.5 °F (36.9 °C) (10/27/23 1101)  Pulse: 100 (10/27/23 1301)  Resp: 16 (10/27/23 1301)  BP: (!) 141/69 (10/27/23 1301)  SpO2:  99 % (10/27/23 1301) Vital Signs (24h Range):  Temp:  [98.1 °F (36.7 °C)-99.1 °F (37.3 °C)] 98.5 °F (36.9 °C)  Pulse:  [] 100  Resp:  [14-26] 16  SpO2:  [91 %-100 %] 99 %  BP: (106-154)/(55-77) 141/69     Weight: 56.2 kg (123 lb 14.4 oz) (10/26/23 0923)  Body mass index is 25.02 kg/m².  Body surface area is 1.53 meters squared.    I/O last 3 completed shifts:  In: 300 [NG/GT:300]  Out: 150 [Urine:150]     Physical Exam  Vitals and nursing note reviewed.   Constitutional:       Appearance: She is ill-appearing.   HENT:      Head: Normocephalic and atraumatic.      Comments: EEG bandage      Nose:      Comments: NG tube in place     Mouth/Throat:      Mouth: Mucous membranes are moist.      Comments: Poor dentition  Eyes:      Pupils: Pupils are equal, round, and reactive to light.   Cardiovascular:      Rate and Rhythm: Regular rhythm. Tachycardia present.      Pulses: Normal pulses.      Heart sounds: Normal heart sounds.   Pulmonary:      Effort: Respiratory distress present.      Breath sounds: Stridor present. Wheezing present.      Comments: Wet cough, thick secretions suctioned  Abdominal:      General: Bowel sounds are normal. There is no distension.      Palpations: Abdomen is soft.   Musculoskeletal:         General: No swelling or deformity.      Cervical back: Neck supple.   Skin:     General: Skin is warm and dry.   Neurological:      Mental Status: She is disoriented.      Comments: Not following commands            Significant Labs:  All labs within the past 24 hours have been reviewed.    Significant Imaging:  Labs: Reviewed    Assessment/Plan:     Renal/  Hypernatremia  58 YOF with alcoholic cirrhosis with ascites, s/p Ex Lap with bowel resection for incarcerated hernia, recent SBO with SAMRA, [place] [facility] on 10[place]18 with acute encephalopathy, transferred for transplant for HE. Ucx with pan sensitive e coli on 10/18. Patient has now progressed to having breakthrough seizures while on vimpat.  Nephrology was consulted on the floor for hypernatremia.  1.6 L FWD.     Reccomendations   - D5W 500 cc Q6H       Thank you for your consult. I will sign off. Please contact us if you have any additional questions.    Cee Ramirez, DO  Nephrology  [facility]

## 2023-10-27 NOTE — ASSESSMENT & PLAN NOTE
Nutrition consulted. Most recent weight and BMI monitored-     Measurements:  Wt Readings from Last 1 Encounters:   10/26/23 56.2 kg (123 lb 14.4 oz)   Body mass index is 25.02 kg/m².    Patient has been screened and assessed by RD.    Malnutrition Type:  Context: social/environmental circumstances  Level: moderate    Malnutrition Characteristic Summary:  Subcutaneous Fat (Malnutrition): mild depletion  Muscle Mass (Malnutrition): mild depletion  Fluid Accumulation (Malnutrition): moderate    Interventions/Recommendations (treatment strategy):  1. When able, initiate TFs. Rec'd Isosource 1.5 @ 50 mL/hr to provide 1800 kcals, 82 g of protein, 917 mL fluid. 2. RD to monitor & follow-up.     Continue feeding via NG tube

## 2023-10-27 NOTE — ASSESSMENT & PLAN NOTE
MELD 3.0: 22 at 10/26/2023  4:16 AM  MELD-Na: 19 at 10/26/2023  4:16 AM  Calculated from:  Serum Creatinine: 0.9 mg/dL (Using min of 1 mg/dL) at 10/26/2023  4:16 AM  Serum Sodium: 147 mmol/L (Using max of 137 mmol/L) at 10/26/2023  4:16 AM  Total Bilirubin: 5.3 mg/dL at 10/26/2023  4:16 AM  Serum Albumin: 2.5 g/dL at 10/26/2023  4:16 AM  INR(ratio): 1.8 at 10/25/2023  1:38 PM  Age at listing (hypothetical): 58 years  Sex: Female at 10/26/2023  4:16 AM    Evaluated by hepatology   Ultrasound of liver performed 10/25 showed cirrhosis with sequelae of portal hypertension  seen in October 2023 for decompensated cirrhosis and it was recommended to proceed with liver transplant evaluation.  EGD in September 2023 showed normal esophagus and portal hypertensive gastropathy.  AFP in September 2023 was 7.8.    - Started on lactulose enemas TID. Continue Xifaxin.   - IV thiamine and folate supplementation  - IV Rocephin for SBP prophylaxis  - Not initiating liver transplant evaluation at this time.   - follow PEt.

## 2023-10-28 LAB
ALBUMIN SERPL BCP-MCNC: 2.7 G/DL (ref 3.5–5.2)
ALP SERPL-CCNC: 95 U/L (ref 55–135)
ALT SERPL W/O P-5'-P-CCNC: 45 U/L (ref 10–44)
ANION GAP SERPL CALC-SCNC: 11 MMOL/L (ref 8–16)
ANION GAP SERPL CALC-SCNC: 11 MMOL/L (ref 8–16)
AST SERPL-CCNC: 103 U/L (ref 10–40)
BASOPHILS # BLD AUTO: 0.07 K/UL (ref 0–0.2)
BASOPHILS NFR BLD: 1.1 % (ref 0–1.9)
BILIRUB SERPL-MCNC: 5.1 MG/DL (ref 0.1–1)
BUN SERPL-MCNC: 13 MG/DL (ref 6–20)
BUN SERPL-MCNC: 16 MG/DL (ref 6–20)
CALCIUM SERPL-MCNC: 8.4 MG/DL (ref 8.7–10.5)
CALCIUM SERPL-MCNC: 8.5 MG/DL (ref 8.7–10.5)
CHLORIDE SERPL-SCNC: 111 MMOL/L (ref 95–110)
CHLORIDE SERPL-SCNC: 117 MMOL/L (ref 95–110)
CO2 SERPL-SCNC: 19 MMOL/L (ref 23–29)
CO2 SERPL-SCNC: 23 MMOL/L (ref 23–29)
CREAT SERPL-MCNC: 0.9 MG/DL (ref 0.5–1.4)
CREAT SERPL-MCNC: 1.1 MG/DL (ref 0.5–1.4)
DIFFERENTIAL METHOD: ABNORMAL
EOSINOPHIL # BLD AUTO: 0.3 K/UL (ref 0–0.5)
EOSINOPHIL NFR BLD: 4.4 % (ref 0–8)
ERYTHROCYTE [DISTWIDTH] IN BLOOD BY AUTOMATED COUNT: 17.7 % (ref 11.5–14.5)
EST. GFR  (NO RACE VARIABLE): 59 ML/MIN/1.73 M^2
EST. GFR  (NO RACE VARIABLE): >60 ML/MIN/1.73 M^2
GLUCOSE SERPL-MCNC: 110 MG/DL (ref 70–110)
GLUCOSE SERPL-MCNC: 94 MG/DL (ref 70–110)
HCT VFR BLD AUTO: 23.8 % (ref 37–48.5)
HGB BLD-MCNC: 7.5 G/DL (ref 12–16)
IMM GRANULOCYTES # BLD AUTO: 0.03 K/UL (ref 0–0.04)
IMM GRANULOCYTES NFR BLD AUTO: 0.5 % (ref 0–0.5)
INR PPP: 2.1 (ref 0.8–1.2)
LYMPHOCYTES # BLD AUTO: 1.1 K/UL (ref 1–4.8)
LYMPHOCYTES NFR BLD: 17.9 % (ref 18–48)
MAGNESIUM SERPL-MCNC: 1.6 MG/DL (ref 1.6–2.6)
MCH RBC QN AUTO: 33.3 PG (ref 27–31)
MCHC RBC AUTO-ENTMCNC: 31.5 G/DL (ref 32–36)
MCV RBC AUTO: 106 FL (ref 82–98)
MONOCYTES # BLD AUTO: 0.9 K/UL (ref 0.3–1)
MONOCYTES NFR BLD: 15.3 % (ref 4–15)
NEUTROPHILS # BLD AUTO: 3.7 K/UL (ref 1.8–7.7)
NEUTROPHILS NFR BLD: 60.8 % (ref 38–73)
NRBC BLD-RTO: 0 /100 WBC
PHOSPHATE SERPL-MCNC: 1.7 MG/DL (ref 2.7–4.5)
PLATELET # BLD AUTO: 79 K/UL (ref 150–450)
PMV BLD AUTO: 11 FL (ref 9.2–12.9)
POCT GLUCOSE: 109 MG/DL (ref 70–110)
POCT GLUCOSE: 142 MG/DL (ref 70–110)
POTASSIUM SERPL-SCNC: 3.4 MMOL/L (ref 3.5–5.1)
POTASSIUM SERPL-SCNC: 3.5 MMOL/L (ref 3.5–5.1)
PROT SERPL-MCNC: 5.8 G/DL (ref 6–8.4)
PROTHROMBIN TIME: 21.3 SEC (ref 9–12.5)
RBC # BLD AUTO: 2.25 M/UL (ref 4–5.4)
SODIUM SERPL-SCNC: 145 MMOL/L (ref 136–145)
SODIUM SERPL-SCNC: 147 MMOL/L (ref 136–145)
VANCOMYCIN TROUGH SERPL-MCNC: 12.3 UG/ML (ref 10–22)
WBC # BLD AUTO: 6.14 K/UL (ref 3.9–12.7)

## 2023-10-28 PROCEDURE — 84100 ASSAY OF PHOSPHORUS: CPT | Mod: NTX | Performed by: STUDENT IN AN ORGANIZED HEALTH CARE EDUCATION/TRAINING PROGRAM

## 2023-10-28 PROCEDURE — 85610 PROTHROMBIN TIME: CPT | Mod: NTX | Performed by: STUDENT IN AN ORGANIZED HEALTH CARE EDUCATION/TRAINING PROGRAM

## 2023-10-28 PROCEDURE — 20000000 HC ICU ROOM: Mod: NTX

## 2023-10-28 PROCEDURE — 94668 MNPJ CHEST WALL SBSQ: CPT | Mod: NTX

## 2023-10-28 PROCEDURE — 63600175 PHARM REV CODE 636 W HCPCS: Mod: NTX | Performed by: NURSE PRACTITIONER

## 2023-10-28 PROCEDURE — 94640 AIRWAY INHALATION TREATMENT: CPT | Mod: NTX

## 2023-10-28 PROCEDURE — 99900026 HC AIRWAY MAINTENANCE (STAT): Mod: NTX

## 2023-10-28 PROCEDURE — 25000003 PHARM REV CODE 250: Mod: NTX | Performed by: HOSPITALIST

## 2023-10-28 PROCEDURE — 95714 VEEG EA 12-26 HR UNMNTR: CPT | Mod: NTX

## 2023-10-28 PROCEDURE — 80048 BASIC METABOLIC PNL TOTAL CA: CPT | Mod: NTX,XB

## 2023-10-28 PROCEDURE — 95720 EEG PHY/QHP EA INCR W/VEEG: CPT | Mod: NTX,,, | Performed by: PSYCHIATRY & NEUROLOGY

## 2023-10-28 PROCEDURE — 31720 CLEARANCE OF AIRWAYS: CPT | Mod: NTX

## 2023-10-28 PROCEDURE — 83735 ASSAY OF MAGNESIUM: CPT | Mod: NTX | Performed by: STUDENT IN AN ORGANIZED HEALTH CARE EDUCATION/TRAINING PROGRAM

## 2023-10-28 PROCEDURE — 25000003 PHARM REV CODE 250: Mod: NTX | Performed by: STUDENT IN AN ORGANIZED HEALTH CARE EDUCATION/TRAINING PROGRAM

## 2023-10-28 PROCEDURE — 99291 CRITICAL CARE FIRST HOUR: CPT | Mod: NTX,,, | Performed by: PSYCHIATRY & NEUROLOGY

## 2023-10-28 PROCEDURE — 63600175 PHARM REV CODE 636 W HCPCS: Mod: NTX

## 2023-10-28 PROCEDURE — 85025 COMPLETE CBC W/AUTO DIFF WBC: CPT | Mod: NTX | Performed by: HOSPITALIST

## 2023-10-28 PROCEDURE — 25000242 PHARM REV CODE 250 ALT 637 W/ HCPCS: Mod: NTX | Performed by: NURSE PRACTITIONER

## 2023-10-28 PROCEDURE — 25000003 PHARM REV CODE 250: Mod: NTX | Performed by: PHYSICIAN ASSISTANT

## 2023-10-28 PROCEDURE — 25000003 PHARM REV CODE 250: Mod: NTX | Performed by: NURSE PRACTITIONER

## 2023-10-28 PROCEDURE — 80202 ASSAY OF VANCOMYCIN: CPT | Mod: NTX | Performed by: STUDENT IN AN ORGANIZED HEALTH CARE EDUCATION/TRAINING PROGRAM

## 2023-10-28 PROCEDURE — 99900035 HC TECH TIME PER 15 MIN (STAT): Mod: NTX

## 2023-10-28 PROCEDURE — 99222 1ST HOSP IP/OBS MODERATE 55: CPT | Mod: NTX,,, | Performed by: INTERNAL MEDICINE

## 2023-10-28 PROCEDURE — 63600175 PHARM REV CODE 636 W HCPCS: Mod: NTX | Performed by: STUDENT IN AN ORGANIZED HEALTH CARE EDUCATION/TRAINING PROGRAM

## 2023-10-28 PROCEDURE — 63600175 PHARM REV CODE 636 W HCPCS: Mod: NTX | Performed by: PHYSICIAN ASSISTANT

## 2023-10-28 PROCEDURE — 95720 PR EEG, W/VIDEO, CONT RECORD, I&R, >12<26 HRS: ICD-10-PCS | Mod: NTX,,, | Performed by: PSYCHIATRY & NEUROLOGY

## 2023-10-28 PROCEDURE — 27200966 HC CLOSED SUCTION SYSTEM: Mod: NTX

## 2023-10-28 PROCEDURE — 25000003 PHARM REV CODE 250: Mod: NTX

## 2023-10-28 PROCEDURE — 94761 N-INVAS EAR/PLS OXIMETRY MLT: CPT | Mod: NTX

## 2023-10-28 PROCEDURE — 80053 COMPREHEN METABOLIC PANEL: CPT | Mod: NTX | Performed by: HOSPITALIST

## 2023-10-28 PROCEDURE — 99222 PR INITIAL HOSPITAL CARE,LEVL II: ICD-10-PCS | Mod: NTX,,, | Performed by: INTERNAL MEDICINE

## 2023-10-28 PROCEDURE — 99291 PR CRITICAL CARE, E/M 30-74 MINUTES: ICD-10-PCS | Mod: NTX,,, | Performed by: PSYCHIATRY & NEUROLOGY

## 2023-10-28 PROCEDURE — 27000221 HC OXYGEN, UP TO 24 HOURS: Mod: NTX

## 2023-10-28 PROCEDURE — C9254 INJECTION, LACOSAMIDE: HCPCS | Mod: NTX | Performed by: STUDENT IN AN ORGANIZED HEALTH CARE EDUCATION/TRAINING PROGRAM

## 2023-10-28 RX ORDER — FAMOTIDINE 20 MG/1
20 TABLET, FILM COATED ORAL DAILY
Status: DISCONTINUED | OUTPATIENT
Start: 2023-10-28 | End: 2023-10-29

## 2023-10-28 RX ORDER — DEXTROSE MONOHYDRATE 50 MG/ML
INJECTION, SOLUTION INTRAVENOUS CONTINUOUS
Status: ACTIVE | OUTPATIENT
Start: 2023-10-28 | End: 2023-10-28

## 2023-10-28 RX ORDER — POTASSIUM CHLORIDE 750 MG/1
10 TABLET, EXTENDED RELEASE ORAL DAILY
Status: ON HOLD | COMMUNITY
Start: 2023-10-09 | End: 2023-11-27 | Stop reason: HOSPADM

## 2023-10-28 RX ORDER — FOLIC ACID 1 MG/1
1 TABLET ORAL DAILY
Status: DISCONTINUED | OUTPATIENT
Start: 2023-10-29 | End: 2023-11-25

## 2023-10-28 RX ADMIN — FAMOTIDINE 20 MG: 20 TABLET ORAL at 04:10

## 2023-10-28 RX ADMIN — POTASSIUM CHLORIDE 10 MEQ: 7.46 INJECTION, SOLUTION INTRAVENOUS at 12:10

## 2023-10-28 RX ADMIN — ACETYLCYSTEINE 2 ML: 200 SOLUTION ORAL; RESPIRATORY (INHALATION) at 12:10

## 2023-10-28 RX ADMIN — LEVETIRACETAM 1500 MG: 500 SOLUTION ORAL at 08:10

## 2023-10-28 RX ADMIN — MUPIROCIN: 20 OINTMENT TOPICAL at 09:10

## 2023-10-28 RX ADMIN — IPRATROPIUM BROMIDE AND ALBUTEROL SULFATE 3 ML: .5; 3 SOLUTION RESPIRATORY (INHALATION) at 08:10

## 2023-10-28 RX ADMIN — PIPERACILLIN SODIUM AND TAZOBACTAM SODIUM 4.5 G: 4; .5 INJECTION, POWDER, FOR SOLUTION INTRAVENOUS at 05:10

## 2023-10-28 RX ADMIN — FUROSEMIDE 40 MG: 10 INJECTION, SOLUTION INTRAVENOUS at 09:10

## 2023-10-28 RX ADMIN — IPRATROPIUM BROMIDE AND ALBUTEROL SULFATE 3 ML: .5; 3 SOLUTION RESPIRATORY (INHALATION) at 07:10

## 2023-10-28 RX ADMIN — IPRATROPIUM BROMIDE AND ALBUTEROL SULFATE 3 ML: .5; 3 SOLUTION RESPIRATORY (INHALATION) at 12:10

## 2023-10-28 RX ADMIN — POTASSIUM BICARBONATE 40 MEQ: 391 TABLET, EFFERVESCENT ORAL at 09:10

## 2023-10-28 RX ADMIN — PIPERACILLIN SODIUM AND TAZOBACTAM SODIUM 4.5 G: 4; .5 INJECTION, POWDER, FOR SOLUTION INTRAVENOUS at 09:10

## 2023-10-28 RX ADMIN — LACOSAMIDE 100 MG: 10 INJECTION INTRAVENOUS at 08:10

## 2023-10-28 RX ADMIN — VANCOMYCIN HYDROCHLORIDE 750 MG: 750 INJECTION, POWDER, LYOPHILIZED, FOR SOLUTION INTRAVENOUS at 11:10

## 2023-10-28 RX ADMIN — ACETYLCYSTEINE 2 ML: 200 SOLUTION ORAL; RESPIRATORY (INHALATION) at 07:10

## 2023-10-28 RX ADMIN — RIFAXIMIN 550 MG: 550 TABLET ORAL at 09:10

## 2023-10-28 RX ADMIN — LACOSAMIDE 100 MG: 10 INJECTION INTRAVENOUS at 09:10

## 2023-10-28 RX ADMIN — THERA TABS 1 TABLET: TAB at 09:10

## 2023-10-28 RX ADMIN — VANCOMYCIN HYDROCHLORIDE 750 MG: 750 INJECTION, POWDER, LYOPHILIZED, FOR SOLUTION INTRAVENOUS at 09:10

## 2023-10-28 RX ADMIN — THIAMINE HYDROCHLORIDE 250 MG: 100 INJECTION, SOLUTION INTRAMUSCULAR; INTRAVENOUS at 09:10

## 2023-10-28 RX ADMIN — SODIUM PHOSPHATE, MONOBASIC, MONOHYDRATE AND SODIUM PHOSPHATE, DIBASIC, ANHYDROUS 20.01 MMOL: 142; 276 INJECTION, SOLUTION INTRAVENOUS at 03:10

## 2023-10-28 RX ADMIN — DEXTROSE MONOHYDRATE: 50 INJECTION, SOLUTION INTRAVENOUS at 09:10

## 2023-10-28 RX ADMIN — ACETYLCYSTEINE 2 ML: 200 SOLUTION ORAL; RESPIRATORY (INHALATION) at 08:10

## 2023-10-28 RX ADMIN — PIPERACILLIN SODIUM AND TAZOBACTAM SODIUM 4.5 G: 4; .5 INJECTION, POWDER, FOR SOLUTION INTRAVENOUS at 12:10

## 2023-10-28 RX ADMIN — POTASSIUM CHLORIDE 10 MEQ: 7.46 INJECTION, SOLUTION INTRAVENOUS at 11:10

## 2023-10-28 RX ADMIN — MUPIROCIN: 20 OINTMENT TOPICAL at 08:10

## 2023-10-28 RX ADMIN — LEVETIRACETAM 1500 MG: 500 SOLUTION ORAL at 09:10

## 2023-10-28 RX ADMIN — MAGNESIUM SULFATE HEPTAHYDRATE 2 G: 40 INJECTION, SOLUTION INTRAVENOUS at 01:10

## 2023-10-28 RX ADMIN — RIFAXIMIN 550 MG: 550 TABLET ORAL at 08:10

## 2023-10-28 RX ADMIN — LACTULOSE 30 G: 20 SOLUTION ORAL at 09:10

## 2023-10-28 NOTE — PROGRESS NOTES
[facility]  Neurocritical Care  Progress Note    Admit Date: 10/25/2023  Service Date: 10/28/2023  Length of Stay: 3    Subjective:     Chief Complaint: Acute encephalopathy    History of Present Illness: This is a 58-year-old female with a past medical history of alcoholic cirrhosis, s/p ex-lap w/ bowel resection for incarcerated hernia, who initially presented on 10/18 to [facility] with acute encephalopathy.  Her  found her on the floor at home with evidence of fecal/urine incontinence with confusion and slurred speech. Possible reported tongue injury in chart. Reported concern L sided weakness and down drift of L arm however CT head without evidence of acute abnormalities, MRI brain with only small vessel vascular changes without evidence of territorial infarct.     Hospital Course: EEG done on 10/19 with mild diffuse nonspecific background slowing, no potential epileptiform activity. Repeat MRI brain with contrast on 10/23 unchanged from initial MRI. Became more lethargic and was no longer following commands or answering questions. Due to worsening hepatic encephalopathy in setting of hepatic cirrhosis, patient transferred to [facility] for management with transplant team. Has remained on antibiotics, lactulose and rifaximin for treatment of UTI with pansensitive Ecoli, HE, and presumed SBP. Neurology consulted for management recommendations regarding persistent AMS. Had repeat EEG done on 10/24 with similar findings to prior EEG showing mild generalized non-specific cerebral dysfunction and no electrographic seizures or indication of seizure tendency. Additional CT head w/o contrast on 10/25 without evidence of acute issues. Remains confused and unable to answer questions on exam. Not withdrawing to painful stimuli. Was able to awaken to verbal stimuli in AM however when reevaluated in afternoon unresponsive however was after receiving Ativan.     On admission to [facility]:  Patient had EEG  running, and was noted to have seizures at 7:30 a.m., 8:00 a.m. in, and 10:00 a.m. was noted to be in focal status prior to receiving Vimpat.  Patient was noted to have stridor, worsening secretions with suspicion for aspiration, decreased airway protection, and rapids was called due to low GCS score of 6. Antibiotics broadened to Vanc/Zosyn. Clinical picture of mental status confounded with episodes of seizures, infection, and hepatic encephalopathy.               Hospital Course: 10/28: Improving GCS from 6 to 10. Continuing pulmonary toilet and deep suctioning for stridor and copious secretions. UA positive for candida. Blood cultures from 10/27 NGTD. Sputum cultures sent. Patient on day 2 of broadened antibiotics vanc/zosyn due to worsening clinical status but will discontinue tomorrow if cultures remain negative. Still hypernatremic, treating with D5W. Patient was transferred with no ordered diuretics, very edematous on physical exam. Adequate stooling on lactulose and rifaximin. Due to increased UOP/stooling will place eckert for one day for irritated skin. EEG update showing no seizures since 10am 10/27. Monitoring CBC for thrombocytopenia and macrocytic anemia. Discussed code status and goals of care with  and best friend at bedside. Trickle feeds resumed.       Interval History:  Improvement overnight in GCS from 6 on admission to [facility] yesterday to 10 this morning. No reported seizures overnight. Continued bowel movements with lactulose and rifaximin. No new updates from EEG read with most recent reported seizure at 10am 10/27/23.  updated and at bedside. Patient still saturating at 100% on 2L NC with notable stridor when laying flat. Urine culture yesterday grew candida and she was given one time dose of fluconazole.     Review of Systems   Unable to perform ROS: Mental status change     Objective:     Vitals:  Temp: 97.8 °F (36.6 °C)  Pulse: 80  Rhythm: normal sinus rhythm  BP: (!) 160/72  MAP  (mmHg): 103  Resp: 16  SpO2: 100 %    Temp  Min: 97.7 °F (36.5 °C)  Max: 98.5 °F (36.9 °C)  Pulse  Min: 80  Max: 111  BP  Min: 120/56  Max: 163/71  MAP (mmHg)  Min: 80  Max: 112  Resp  Min: 11  Max: 24  SpO2  Min: 96 %  Max: 100 %    10/27 0701 - 10/28 0700  In: 2281.1 [I.V.:99.8]  Out: 2125 [Urine:1775]   Unmeasured Output  Urine Occurrence: 1  Stool Occurrence: 5        Physical Exam  Vitals and nursing note reviewed.   Constitutional:       Appearance: She is ill-appearing.   HENT:      Head: Normocephalic and atraumatic.      Comments: EEG bandage      Nose:      Comments: NG tube in place     Mouth/Throat:      Mouth: Mucous membranes are moist.      Comments: Poor dentition  Eyes:      Pupils: Pupils are equal, round, and reactive to light.   Cardiovascular:      Rate and Rhythm: Regular rhythm. Tachycardia present.      Pulses: Normal pulses.      Heart sounds: Normal heart sounds.   Pulmonary:      Effort: Respiratory distress present.      Breath sounds: Stridor present. Wheezing present.      Comments: Wet cough, thick secretions suctioned  Abdominal:      General: Bowel sounds are normal. There is no distension.      Palpations: Abdomen is soft.   Musculoskeletal:         General: No swelling or deformity.      Cervical back: Neck supple.      Right lower leg: Edema present.      Left lower leg: Edema present.      Comments: R arm edeam +3 > left arm edema +2    Skin:     General: Skin is warm and dry.   Neurological:      Mental Status: She is disoriented.      GCS: GCS eye subscore is 3. GCS verbal subscore is 2. GCS motor subscore is 5.      Comments: Withdraws to pain all 4 extremities  Opens eyes to name and sternal rub  Incomprehensible mumbling  Not following commands             Medications:  Continuousdextrose 5 % (D5W)    Scheduledacetylcysteine 200 mg/ml (20%), 2 mL, Q6H  albuterol-ipratropium, 3 mL, Q6H  furosemide (LASIX) injection, 40 mg, Daily  lacosamide (VIMPAT) IVPB, 100 mg,  Q12H  lactulose, 30 g, TID  levetiracetam, 1,500 mg, BID  multivitamin, 1 tablet, Daily  mupirocin, , BID  piperacillin-tazobactam (Zosyn) IV (PEDS and ADULTS) (extended infusion is not appropriate), 4.5 g, Q8H  potassium bicarbonate, 40 mEq, Once  rifAXImin, 550 mg, BID  thiamine (B-1) 250 mg in dextrose 5 % (D5W) 100 mL IVPB, 250 mg, Daily  vancomycin (VANCOCIN) IV (PEDS and ADULTS), 15 mg/kg, Q12H    PRNacetaminophen, 650 mg, Q6H PRN  calcium gluconate IVPB, 1 g, PRN  calcium gluconate IVPB, 2 g, PRN  calcium gluconate IVPB, 3 g, PRN  magnesium sulfate IVPB, 2 g, PRN  magnesium sulfate IVPB, 4 g, PRN  ondansetron, 4 mg, Q8H PRN  potassium chloride, 40 mEq, PRN   And  potassium chloride, 60 mEq, PRN   And  potassium chloride, 80 mEq, PRN  sodium chloride 0.9%, 10 mL, PRN  sodium phosphate 15 mmol in dextrose 5 % (D5W) 250 mL IVPB, 15 mmol, PRN  sodium phosphate 20.01 mmol in dextrose 5 % (D5W) 250 mL IVPB, 20.01 mmol, PRN  sodium phosphate 30 mmol in dextrose 5 % (D5W) 250 mL IVPB, 30 mmol, PRN  vancomycin - pharmacy to dose, , pharmacy to manage frequency      Today I personally reviewed pertinent medications, lines/drains/airways, imaging, cardiology results, laboratory results, microbiology results, notably: electrolytes.     Diet  No diet orders on file  No diet orders on file          Assessment/Plan:     Neuro  * Acute encephalopathy  See acute hepatic encephalopathy and seizures    Seizure  58 year old presented for altered mental status on 10/18. Clinical picture confounded with hepatic encephalopathy, infection, and seizure activity. Neurology was consulted by primary team prior to admission to [facility].    10/27 EEG IMPRESSION:  This is an abnormal EEG during lethargic state.  Diffuse disorganized slowing of the background was noted.  Frequent triphasic waves noted.  Left posterior pseudo periodic epileptiform discharges were noted.  Numerous electrographic seizures emanating from the left posterior region  were noted.    10/28: EEG update showing no new activity since 10/27 morning.     · vEEG in place  · Keppra 1500 BID  · Vimpat loaded 10/27, continue 100 mg BID  · Breathing watch due to low GCS  · Neurochecks q1hr  · Vitals q1hr       Renal/  Hypernatremia  Patient with evidence of seizure on EEG.    · D5W infusion  · Repeat BMP  · Trend CMP    ID  Sepsis  58 year old with UA showing pan sensitive E.Coli on 10/18 and repeat UA 10/25 showing Candida albicans.  Patient also has a history of hepatic encephalopathy with decompensated cirrhosis, SBP not ruled out.  Due to encephalopathy, concerns for aspiration.    CXR 10/27: Low lung volumes with mild worsening bibasilar interstitial densities and possible trace pleural effusions.  Aeration is likely mildly worse when compared with recent prior study, though findings could be exaggerated by hypoventilatory state.     · Vancomycin, Zosyn for at least 48 hours  · Blood cultures  · Sputum cultures  · Consider lumbar puncture if status worsens      This patient does have evidence of infective focus  My overall impression is sepsis.  Source: Urinary Tract and Abdominal  Antibiotics given-   Antibiotics (72h ago, onward)    Start     Stop Route Frequency Ordered    10/27/23 2115  vancomycin 750 mg in dextrose 5 % (D5W) 250 mL IVPB (Vial-Mate)        See Hyperspace for full Linked Orders Report.    -- IV Every 12 hours (non-standard times) 10/27/23 0812    10/27/23 0915  piperacillin-tazobactam (ZOSYN) 4.5 g in dextrose 5 % in water (D5W) 100 mL IVPB (MB+)         -- IV Every 8 hours (non-standard times) 10/27/23 0809    10/27/23 0908  vancomycin - pharmacy to dose  (vancomycin IVPB (PEDS and ADULTS))        See Hyperspace for full Linked Orders Report.    -- IV pharmacy to manage frequency 10/27/23 0809    10/26/23 2100  mupirocin 2 % ointment         10/31/23 2059 Nasl 2 times daily 10/26/23 1240    10/25/23 1315  rifAXIMin tablet 550 mg         -- PER NG TUBE 2 times  "daily 10/25/23 1303        Latest lactate reviewed-  No results for input(s): "LACTATE" in the last 72 hours.  Organ dysfunction indicated by Encephalopathy    Fluid challenge Not needed - patient is not hypotensive      Post- resuscitation assessment No - Post resuscitation assessment not needed       Will Not start Pressors- Levophed for MAP of 65      Hematology  Thrombocytopenia  This is a 58-year-old woman with a history of decompensated alcoholic cirrhosis.  Suspect thrombocytopenia is likely secondary to chronic alcohol use and is consumptive in nature. If trend worsens, we will consider consulting Hematology.    · Daily CBC    Endocrine  Moderate malnutrition  Resuming tube feeds at 10 mL/hr    Nutrition consulted. Most recent weight and BMI monitored-     Measurements:  Wt Readings from Last 1 Encounters:   10/26/23 56.2 kg (123 lb 14.4 oz)   Body mass index is 25.02 kg/m².    Patient has been screened and assessed by RD.    Malnutrition Type:  Context: social/environmental circumstances  Level: moderate    Malnutrition Characteristic Summary:  Subcutaneous Fat (Malnutrition): mild depletion  Muscle Mass (Malnutrition): mild depletion  Fluid Accumulation (Malnutrition): moderate    Interventions/Recommendations (treatment strategy):  1. When able, initiate TFs. Rec'd Isosource 1.5 @ 50 mL/hr to provide 1800 kcals, 82 g of protein, 917 mL fluid. 2. RD to monitor & follow-up.    GI  Acute hepatic encephalopathy  This is a 52 year old woman with a history of ethanol cirrhosis who presents after being found on the ground with fecal and urinary incontinence, and altered mental status. Elevated ammonia on admission.    - Continue lactulose via NG tube TID (titrate till 3-4 daily BM achieved). Continue Xifaxin.   - Avoid opiates and benzodiazepines, if able.   - IV thiamine, folate supplementation, multivitamin through NG tube.        Decompensated alcoholic hepatic cirrhosis  · Hepatology following PEth. Daily " CBC, CMP & INR.   · Not currently being evaluated for transplant due to clinical status  · Vitamin K given for chronic coagulopathy  · Continuing lactulose and rifaximin titrated to 3-4 bowel movements.           The patient is being Prophylaxed for:  Venous Thromboembolism with: None  Stress Ulcer with: H2B  Ventilator Pneumonia with: not applicable    Activity Orders          Elevate HOB Elevate (30-45 degrees) Elevate HOB to 30 - 45 degrees during feeding unless otherwise stated starting at 10/28 1218    Elevate HOB to 30-45 degrees during feeding unless otherwise stated starting at 10/26 1138    Progressive Mobility Protocol (mobilize patient to their highest level of functioning at least twice daily) starting at 10/25 2000    Turn patient starting at 10/25 2000        Full Code    Darion Torres MD  Neurocritical Care  [facility]

## 2023-10-28 NOTE — PLAN OF CARE
"[facility] Care Plan    POC reviewed with Mara Mojica and family at 1400. Pt unable to verbalize understanding. Questions and concerns addressed. No acute events today. Pt progressing toward goals. Will continue to monitor. See below and flowsheets for full assessment and VS info.     -Family updated at the bedside.   -EEG monitoring in progress ; no seizures witnessed.   -TF restarted at 10ml/hr.   -pt incontinent with multiple diarrheas, indwelling eckert placed for skin care and accurate I/Os  -Mouth care provided.   -Complete CHG bath given .           Is this a stroke patient? no    Neuro:  Gordo Coma Scale  Best Eye Response: 2-->(E2) to pain  Best Motor Response: 4-->(M4) withdraws from pain  Best Verbal Response: 2-->(V2) incomprehensible speech  Woody Coma Scale Score: 8  Assessment Qualifiers: no eye obstruction present, patient not sedated/intubated  Pupil PERRLA: other (see comments) (laureen)     24 hr Temp:  [97.8 °F (36.6 °C)-98.9 °F (37.2 °C)]     CV:   Rhythm: normal sinus rhythm  BP goals:   SBP < 180  MAP > 65    Resp:           Plan: N/A    GI/:     Diet/Nutrition Received: NPO  Last Bowel Movement: 10/28/23  Voiding Characteristics: external catheter    Intake/Output Summary (Last 24 hours) at 10/28/2023 1730  Last data filed at 10/28/2023 1501  Gross per 24 hour   Intake 1440.96 ml   Output 2325 ml   Net -884.04 ml     Unmeasured Output  Urine Occurrence: 5  Stool Occurrence: 2  Emesis Occurrence: 0  Pad Count: 5    Labs/Accuchecks:  Recent Labs   Lab 10/28/23  0021   WBC 6.14   RBC 2.25*   HGB 7.5*   HCT 23.8*   PLT 79*      Recent Labs   Lab 10/28/23  0021 10/28/23  1516   * 145   K 3.5 3.4*   CO2 19* 23   * 111*   BUN 16 13   CREATININE 1.1 0.9   ALKPHOS 95  --    ALT 45*  --    *  --    BILITOT 5.1*  --       Recent Labs   Lab 10/28/23  0021   INR 2.1*    No results for input(s): "CPK", "CPKMB", "TROPONINI", "MB" in the last 168 hours.    Electrolytes: Electrolytes " replaced  Accuchecks: none    Gtts:      LDA/Wounds:  Lines/Drains/Airways       Drain  Duration                  NG/OG Tube 10/20/23 2000 16 Fr. Left nostril 7 days         Rectal Tube 10/28/23 0530 fecal management system <1 day         Urethral Catheter 10/28/23 1556 <1 day              Peripheral Intravenous Line  Duration                  Peripheral IV - Single Lumen 10/25/23 0004 20 G Left;Posterior Hand 3 days         Peripheral IV - Single Lumen 10/27/23 1300 20 G Anterior;Proximal;Right Forearm 1 day         Peripheral IV - Single Lumen 10/27/23 2051 20 G Anterior;Left;Proximal Forearm <1 day                  Wounds: redness   Wound care consulted: yes

## 2023-10-28 NOTE — ACP (ADVANCE CARE PLANNING)
Advance Care Planning     Today a voluntary meeting took place: ICU    Patient Participation: Patient is unable to participate     Attendees (Name and  Relationship to patient):   & best friend     Staff attendees (Name and  Role): Myself (Lencho Hess DO Neuro Critical Care staff), Darion Torres MD (resident physician)    ACP Conversation (General): Understanding of current condition Discussed patient's declining health over past several years and circumstances of this admission including current critical state although some improvement noted in past 24 hours.  Discussed concerns regarding her ability to tolerate life support should she decline enough to require intubation/mechanical ventilation or even something as extreme as CPR.  We discussed that ideally Mrs Mojica will improve enough in coming days to make own decisions and advocate for herself, but recognize that this may or may not occur during this period of severe illness. Family to discuss amongst selves and decide  what they feel she'd say if she could see herself as now.      Code Status: Full Code pending further discussions amongst family    Goals of care: The family endorses that what is most important right now is to focus on quality of life, even if it means sacrificing a little time and improvement in condition but with limits to invasive therapies but that they are uncomfortable fully committing to a DNR/DNI status until they've had time to discuss amongst themselves and sleep on it.    Accordingly, we have decided that the best plan to meet the patient's goals includes continuing with treatment     Length of ACP   conversation in minutes: 25 minutes     Lencho Hess DO  Neuro Critical Care  10/28/2023

## 2023-10-28 NOTE — ASSESSMENT & PLAN NOTE
Resuming tube feeds at 10 mL/hr    Nutrition consulted. Most recent weight and BMI monitored-     Measurements:  Wt Readings from Last 1 Encounters:   10/26/23 56.2 kg (123 lb 14.4 oz)   Body mass index is 25.02 kg/m².    Patient has been screened and assessed by RD.    Malnutrition Type:  Context: social/environmental circumstances  Level: moderate    Malnutrition Characteristic Summary:  Subcutaneous Fat (Malnutrition): mild depletion  Muscle Mass (Malnutrition): mild depletion  Fluid Accumulation (Malnutrition): moderate    Interventions/Recommendations (treatment strategy):  1. When able, initiate TFs. Rec'd Isosource 1.5 @ 50 mL/hr to provide 1800 kcals, 82 g of protein, 917 mL fluid. 2. RD to monitor & follow-up.

## 2023-10-28 NOTE — PLAN OF CARE
[facility] Care Plan    POC reviewed with Mara Mojica and significant other at 0300. Pt is unable to verbalize understanding. Questions and concerns addressed.     See below and flowsheets for full assessment and VS info.     - 50 of 80 meq potassium given per NP order. Am potassium is 3.5  - Phos and mag replaced.  - Flexi seal became dislodged. 350 ccs in bag - stool and some urine on pad. Flexi replaced. Partial bath, complete linen change and dressed.  - Required frequent suctioning overnight + CPT and mucomyst. Secretions thick and tenacious. Coarse with occasional stridor. O2 sats remaned %.  - Pt at this time withdraws to painful stimuli, RLE< all other extremities.  - Edema present.  - IVABX  - Significant other at bedside.      Is this a stroke patient? no    Neuro:  Lindsay Coma Scale  Best Eye Response: 4-->(E4) spontaneous  Best Motor Response: 4-->(M4) withdraws from pain  Best Verbal Response: 2-->(V2) incomprehensible speech (moaning)  Lindsay Coma Scale Score: 10  Assessment Qualifiers: patient not sedated/intubated, no eye obstruction present  Pupil PERRLA: other (see comments) (laureen)     24hr Temp:  [97.7 °F (36.5 °C)-98.7 °F (37.1 °C)]     CV:   Rhythm: normal sinus rhythm  BP goals:   SBP < 180  MAP > 65    Resp:           Plan: N/A    GI/:     Diet/Nutrition Received: NPO  Last Bowel Movement: 10/28/23  Voiding Characteristics: external catheter    Intake/Output Summary (Last 24 hours) at 10/28/2023 0641  Last data filed at 10/28/2023 0605  Gross per 24 hour   Intake 2281.13 ml   Output 1700 ml   Net 581.13 ml     Unmeasured Output  Urine Occurrence: 1  Stool Occurrence: 5    Labs/Accuchecks:  Recent Labs   Lab 10/28/23  0021   WBC 6.14   RBC 2.25*   HGB 7.5*   HCT 23.8*   PLT 79*      Recent Labs   Lab 10/28/23  0021   *   K 3.5   CO2 19*   *   BUN 16   CREATININE 1.1   ALKPHOS 95   ALT 45*   *   BILITOT 5.1*      Recent Labs   Lab 10/28/23  0021   INR 2.1*      Recent  Labs   Lab 10/21/23  1510   *       Electrolytes: Electrolytes replaced  Accuchecks: Q6H    Gtts:      LDA/Wounds:  Lines/Drains/Airways       Drain  Duration                  NG/OG Tube 10/20/23 2000 16 Fr. Left nostril 7 days    Female External Urinary Catheter 10/25/23 1000 2 days              Peripheral Intravenous Line  Duration                  Peripheral IV - Single Lumen 10/25/23 0004 20 G Left;Posterior Hand 3 days         Peripheral IV - Single Lumen 10/27/23 1300 20 G Anterior;Proximal;Right Forearm <1 day         Peripheral IV - Single Lumen 10/27/23 2051 20 G Anterior;Left;Proximal Forearm <1 day                  Wounds: No  Wound care consulted: No

## 2023-10-28 NOTE — SUBJECTIVE & OBJECTIVE
Interval History:  Improvement overnight in GCS from 6 on admission to [facility] yesterday to 10 this morning. No reported seizures overnight. Continued bowel movements with lactulose and rifaximin. No new updates from EEG read with most recent reported seizure at 10am 10/27/23.  updated and at bedside. Patient still saturating at 100% on 2L NC with notable stridor when laying flat. Urine culture yesterday grew candida and she was given one time dose of fluconazole.     Review of Systems   Unable to perform ROS: Mental status change     Objective:     Vitals:  Temp: 97.8 °F (36.6 °C)  Pulse: 80  Rhythm: normal sinus rhythm  BP: (!) 160/72  MAP (mmHg): 103  Resp: 16  SpO2: 100 %    Temp  Min: 97.7 °F (36.5 °C)  Max: 98.5 °F (36.9 °C)  Pulse  Min: 80  Max: 111  BP  Min: 120/56  Max: 163/71  MAP (mmHg)  Min: 80  Max: 112  Resp  Min: 11  Max: 24  SpO2  Min: 96 %  Max: 100 %    10/27 0701 - 10/28 0700  In: 2281.1 [I.V.:99.8]  Out: 2125 [Urine:1775]   Unmeasured Output  Urine Occurrence: 1  Stool Occurrence: 5        Physical Exam  Vitals and nursing note reviewed.   Constitutional:       Appearance: She is ill-appearing.   HENT:      Head: Normocephalic and atraumatic.      Comments: EEG bandage      Nose:      Comments: NG tube in place     Mouth/Throat:      Mouth: Mucous membranes are moist.      Comments: Poor dentition  Eyes:      Pupils: Pupils are equal, round, and reactive to light.   Cardiovascular:      Rate and Rhythm: Regular rhythm. Tachycardia present.      Pulses: Normal pulses.      Heart sounds: Normal heart sounds.   Pulmonary:      Effort: Respiratory distress present.      Breath sounds: Stridor present. Wheezing present.      Comments: Wet cough, thick secretions suctioned  Abdominal:      General: Bowel sounds are normal. There is no distension.      Palpations: Abdomen is soft.   Musculoskeletal:         General: No swelling or deformity.      Cervical back: Neck supple.      Right lower leg:  Edema present.      Left lower leg: Edema present.      Comments: R arm edeam +3 > left arm edema +2    Skin:     General: Skin is warm and dry.   Neurological:      Mental Status: She is disoriented.      GCS: GCS eye subscore is 3. GCS verbal subscore is 2. GCS motor subscore is 5.      Comments: Withdraws to pain all 4 extremities  Opens eyes to name and sternal rub  Incomprehensible mumbling  Not following commands             Medications:  Continuousdextrose 5 % (D5W)    Scheduledacetylcysteine 200 mg/ml (20%), 2 mL, Q6H  albuterol-ipratropium, 3 mL, Q6H  furosemide (LASIX) injection, 40 mg, Daily  lacosamide (VIMPAT) IVPB, 100 mg, Q12H  lactulose, 30 g, TID  levetiracetam, 1,500 mg, BID  multivitamin, 1 tablet, Daily  mupirocin, , BID  piperacillin-tazobactam (Zosyn) IV (PEDS and ADULTS) (extended infusion is not appropriate), 4.5 g, Q8H  potassium bicarbonate, 40 mEq, Once  rifAXImin, 550 mg, BID  thiamine (B-1) 250 mg in dextrose 5 % (D5W) 100 mL IVPB, 250 mg, Daily  vancomycin (VANCOCIN) IV (PEDS and ADULTS), 15 mg/kg, Q12H    PRNacetaminophen, 650 mg, Q6H PRN  calcium gluconate IVPB, 1 g, PRN  calcium gluconate IVPB, 2 g, PRN  calcium gluconate IVPB, 3 g, PRN  magnesium sulfate IVPB, 2 g, PRN  magnesium sulfate IVPB, 4 g, PRN  ondansetron, 4 mg, Q8H PRN  potassium chloride, 40 mEq, PRN   And  potassium chloride, 60 mEq, PRN   And  potassium chloride, 80 mEq, PRN  sodium chloride 0.9%, 10 mL, PRN  sodium phosphate 15 mmol in dextrose 5 % (D5W) 250 mL IVPB, 15 mmol, PRN  sodium phosphate 20.01 mmol in dextrose 5 % (D5W) 250 mL IVPB, 20.01 mmol, PRN  sodium phosphate 30 mmol in dextrose 5 % (D5W) 250 mL IVPB, 30 mmol, PRN  vancomycin - pharmacy to dose, , pharmacy to manage frequency      Today I personally reviewed pertinent medications, lines/drains/airways, imaging, cardiology results, laboratory results, microbiology results, notably: electrolytes.     Diet  No diet orders on file  No diet orders on  file

## 2023-10-28 NOTE — PROCEDURES
DATE: 10/27/23    EEG NUMBER: FH -2    REFERRING PHYSICIAN:  Dr. Mcdonald      This EEG was performed to assess for subclinical seizures      ELECTROENCEPHALOGRAM REPORT     METHODOLOGY:  Electroencephalographic (EEG) recording is with electrodes placed according to the International 10-20 placement system.  Thirty two (32) channels of digital signal are simultaneously recorded from the scalp and may include EKG, EMG, and/or eye monitors.   Recording band pass was 0.1 to 512 hz.  Digital video recording of the patient is simultaneously recorded with the EEG.  The nursing staff report clinical symptoms and may press an event button when the patient has symptoms of clinical interest to the treating physicians.  EEG and video recording is stored and archived in digital format.  The entire recording is visually reviewed, and the times identified by computer analysis as being spikes or seizures are reviewed again.  Activation procedures which include photic stimulation, hyperventilation and instructing patients to perform simple task are done in selected patients.   Compresses spectral analysis (CSA) is also performed on the activity recorded from each individual channel.  This is displayed as a power display of frequencies from 0 to 30 Hz over time.   The CSA analysis is done and displayed continuously.  This is reviewed for asymmetries in power between homologous areas of the scalp and for presence of changes in power which can be seen when seizures occur.  Sections of suspected abnormalities on the CSA is then compared with the original EEG recording.                [employer] software was also utilized in the review of this study.  This software suite analyzes the EEG recording in multiple domains.  Coherence and rhythmicity is computed to identify EEG sections which may contain organized seizures.  Each channel undergoes analysis to detect presence of spike and sharp waves which have special and morphological  characteristic of epileptic activity.  The routine EEG recording is converted from spacial into frequency domain.  This is then displayed comparing homologous areas to identify areas of significant asymmetry.  Algorithm to identify non-cortically generated artifact is used to separate eye movement, EMG and other artifact from the EEG.     Recording times   Start November 27, 2023 at hours 7 minute 1 seconds 10   End on October 27, 2023 hour 9 minute 7 seconds 48  Restart October 27, 2023 at hour 9 minute 33 seconds 49   End on October 28, 2023 at hours 7 minute 0 seconds 2  The total time of EEG recording for the study was 23 hours and 23 minutes    EEG FINDINGS:  The recording was obtained with a number of standard bipolar and referential montages during lethargic state in this state the background diffusely disorganized.  A nonsustained posterior dominant rhythm of up to 7-8 hertz was noted which was symmetric.  Mixed theta and delta range slowing was noted in both hemispheres.  The background punctuated by rare triphasic waves.  In addition occipital maximum sharp waves were also noted which were pseudo periodic.  During this study several electrographic seizures were recorded.  The seizures were subclinical.  There were characterized by buildup of low amplitude alpha and beta range activity which was rhythmical in the left posterior region which evolved in morphology and rhythmicity to a rhythmical delta range activity with intermixed epileptiform discharges restricted to the left temporoparietal occipital region.  Seizures were recorded at hours 7 minute 26 hour,8 minute 5, hour 10 minute 12.   During deeper stages of clinical sleep symmetric spindles were noted.    The EKG channel revealed a sinus rhythm.     IMPRESSION:  This is an abnormal EEG during lethargic state.  Diffuse disorganized slowing of the background was noted.  Frequent triphasic waves noted.  Left posterior pseudo periodic epileptiform  discharges were noted.  Three electrographic seizures emanating from the left posterior region were noted.     CLINICAL CORRELATION:  The patient is a 58-year-old female with a history of hepatic dysfunction who is currently maintained on Keppra.  This is an abnormal EEG during lethargic state.  The overall degree of slowing and disorganization along with triphasic waves is suggestive of a mild-moderate degree of encephalopathy likely toxic metabolic encephalopathy.  The presence of left posterior maximum periodic epileptiform discharges is suggestive of an area of cortical irritability with increased risk for focal seizures from this region.  During this study 3 electrographic seizures emanating from the left posterior region were noted .  There was an overall improvement in the background relative to the study from October 26, 2023.

## 2023-10-28 NOTE — CONSULTS
Patient was seen by neuro critical Care on-call team.  After evaluation by Dr. Hess, patient was deemed a candidate for the neuro critical Care unit requiring higher level of care.      Please see H&P for full history and admission.

## 2023-10-28 NOTE — ASSESSMENT & PLAN NOTE
58 year old presented for altered mental status on 10/18. Clinical picture confounded with hepatic encephalopathy, infection, and seizure activity. Neurology was consulted by primary team prior to admission to [facility].    10/27 EEG IMPRESSION:  This is an abnormal EEG during lethargic state.  Diffuse disorganized slowing of the background was noted.  Frequent triphasic waves noted.  Left posterior pseudo periodic epileptiform discharges were noted.  Numerous electrographic seizures emanating from the left posterior region were noted.    10/28: EEG update showing no new activity since 10/27 morning.     · vEEG in place  · Keppra 1500 BID  · Vimpat loaded 10/27, continue 100 mg BID  · Breathing watch due to low GCS  · Neurochecks q1hr  · Vitals q1hr

## 2023-10-28 NOTE — ASSESSMENT & PLAN NOTE
This is a 52 year old woman with a history of ethanol cirrhosis who presents after being found on the ground with fecal and urinary incontinence, and altered mental status. Elevated ammonia on admission.    - Continue lactulose via NG tube TID (titrate till 3-4 daily BM achieved). Continue Xifaxin.   - Avoid opiates and benzodiazepines, if able.   - IV thiamine, folate supplementation, multivitamin through NG tube.

## 2023-10-28 NOTE — HOSPITAL COURSE
10/28/2023: Improving GCS from 6 to 10. Continuing pulmonary toilet and deep suctioning for stridor and copious secretions. UA positive for candida. Blood cultures from 10/27 NGTD. Sputum cultures sent. Patient on day 2 of broadened antibiotics vanc/zosyn due to worsening clinical status but will discontinue tomorrow if cultures remain negative. Still hypernatremic, treating with D5W. Patient was transferred with no ordered diuretics, very edematous on physical exam. Adequate stooling on lactulose and rifaximin. Due to increased UOP/stooling will place eckert for one day for irritated skin. EEG update showing no seizures since 10am 10/27. Monitoring CBC for thrombocytopenia and macrocytic anemia. Discussed code status and goals of care with  and best friend at bedside. Trickle feeds resumed.   10/29/2023: No acute events overnight, EEG reportedly without further seizures for ~48 hours can disconnect once formal report is in, neuro status slowly improving as patient now tracking in room, moving extremities, responding with appropriate emotional reactions to her .  Respiratory status largely unchanged with intermittent events of large volume breaths that sound almost stridorous but without actual respiratory distress- gas remains compensated.  UOP low, diuresis resumed.  10/30/2023: d/c mucomyst nebs, add racemic epi scheduled nebs, add budesonide and dex 6 q8 hours, ammonia at 35- continue lactulose and rifaximin, abg reviewed, 40mEq of K once, ENT consulted for stridor, continue eckert catheter in today   10/31/2023 Patient with worsening respiratory status, continued decreased mentation and increased secretions. Plan for elective intubation in controlled setting with anesthesia. Will also recheck eeg, if negative will start to wean AEDs and see if that improves patient's arousal. PLT stabilized, continue to monitor.   11/1/2023 this morning patient's 6.0 ETT exchanged for 7.0 to allow suctioning. EEG  with frequent discharges, lowered vimpat to 50 mg and will follow EEG. Attempting to remove confounding factors for patients mental status. Slow drop in H/H, 1 unit ordered.   11/2/2023 NAEO, cEEG to remain in place, no seizures but is having frequent discharges in runs. Continue lower dose vimpat and 1500 keppra Eye opening this morning which is slight improvement in exam. Failed SBT   11/03/2023: no improvement with decrease in lacosamide, no re-development of seizures, if does not wake up in next 48 hrs off lacosamide or redevelops seizures, prognosis is extremely poor  11/04/2023: improved mental state this am, no seizures overnight  11/05/2023: LOC stable, has cuff leak, trial of extubation  11/06/2023 reintubated for stridor non responsive to med management overnight  11/07/2023 CTH stable. Off of levo. Completed dex (wbc 22, afebrile). CXR w L mildly increasing consolidation. Very thick secretions, added mucomyst and duonebs. Weaning keppra to 750. Pt appearing more edematous today, dc LR. Scheduled tylenol max 2g/d. Scheduled oxy 5q12.  11/08/2023: EEG pending  11/09/2023: EEG slowing  11/10/2023: more alert today  11/11/2023 pressure support trial  11/12/2023 Alert today. Not following commands. On spontaneous overnight, tolerated well. MRI c-spine overnight with spondylosis. No LP at this time. Resume TF. Hold TF tomorrow at 5AM for possible extubation tomorrow.   11/13/2023: patient made DNR, 10 dexamethsone IV one prior extubation with no intent of re-intubation, PRN racemic epi and Bipap were initiated  11/14/2023: Patient tolerated BiPAP overnight and continues to be more alert, and is tracking faces more consistently. She was taken off BiPAP and placed on 4L oxygen via nasal cannula. Trickle feeds were started while off BiPAP. Significant other at bedside updated on clinical course. Midline placed overnight.   11/15/2023: Thick secretions noted when BiPAP removed this morning, starting CPT. PT/OT  consulted with improving mental and respiratory status. Patient more sad and uncomfortable today.   11/16/2023: Advancing tube feeds to goal and encouraging continued work with PT/OT. She has been tolerating nasal cannula oxygenation well and has had a reduction in her secretions.   11/17/2023: Increased secretions overnight and this morning. Continues to work with PT/OT and has increased movement in her head and neck. Tube feed goal adjusted per dietician recommendations. Starting to plan for disposition. Considering ACP and Palliative care talks with family this weekend.   11/18/2023: NAEON. 50g albumin and 40mg lasix given for diuresis.   11/19/2023: H&H dropped overnight, received 1 unit PBRC. Otherwise, NAEON. Stable for transfer to floor with HM.   11/20/2023 hold transfer to  2/2 respiratory status   11/21/23: GOC tomorrow  11/22/23: GOC today  11/23/2023 Increased work of breathing this AM. CXR w/ volume overload, lasix given. IR consulted for PEG placement.

## 2023-10-28 NOTE — PROGRESS NOTES
Pharmacist Renal Dose Adjustment Note    Mara Mojica is a 58 y.o. female being treated with the medication famotidine    Patient Data:    Vital Signs (Most Recent):  Temp: 98 °F (36.7 °C) (10/28/23 1101)  Pulse: 73 (10/28/23 1252)  Resp: (!) 24 (10/28/23 1252)  BP: 137/68 (10/28/23 1201)  SpO2: 100 % (10/28/23 1252) Vital Signs (72h Range):  Temp:  [97.7 °F (36.5 °C)-99.1 °F (37.3 °C)]   Pulse:  []   Resp:  [11-26]   BP: (106-163)/(55-81)   SpO2:  [91 %-100 %]      Recent Labs   Lab 10/27/23  0529 10/27/23  1840 10/28/23  0021   CREATININE 0.9 0.8 1.1     Serum creatinine: 1.1 mg/dL 10/28/23 0021  Estimated creatinine clearance: 44 mL/min  Famotidine 20 mg bid will be changed to famotidine 20 mg qd      Pharmacist's Name: Marisa Unger  Pharmacist's Extension: [id number]

## 2023-10-28 NOTE — PROGRESS NOTES
Pharmacokinetic Assessment Follow Up: IV Vancomycin    Vancomycin serum concentration assessment(s):    Vancomycin trough level resulted at 12.3 mcg/mL, drawn appropriately. Goal level is 10 to 20 mcg/mL.     Drug levels (last 3 results):  Recent Labs   Lab Result Units 10/28/23  0904   Vancomycin-Trough ug/mL 12.3     Vancomycin Regimen Plan:    Continue vancomycin 750 mg IV every 12 hours with next serum trough concentration measured on 10/30 0830.     Pharmacy will continue to follow and monitor vancomycin.    Please contact pharmacy at extension [phone] for questions regarding this assessment.    Thank you for the consult,   Marlene Bergeron, PharmD, BCCCP                 Patient brief summary:  Mara Mojica is a 58 y.o. female initiated on antimicrobial therapy with IV vancomycin for treatment of sepsis    Drug Allergies:   Review of patient's allergies indicates:  No Known Allergies    Actual Body Weight:   56.2 kg     Renal Function:   Estimated Creatinine Clearance: 44 mL/min (based on SCr of 1.1 mg/dL).    Dialysis Method (if applicable):  N/A    CBC (last 72 hours):  Recent Labs   Lab Result Units 10/25/23  1338 10/26/23  0416 10/27/23  0529 10/28/23  0021   WBC K/uL 6.19 6.52 6.62 6.14   Hemoglobin g/dL 8.2* 7.8* 7.7* 7.5*   Hematocrit % 26.9* 24.8* 25.3* 23.8*   Platelets K/uL 143* 137* 104* 79*   Gran % % 57.2 51.9 57.8 60.8   Lymph % % 21.8 24.5 19.5 17.9*   Mono % % 16.2* 17.0* 17.1* 15.3*   Eosinophil % % 1.8 4.0 4.2 4.4   Basophil % % 1.5 1.7 0.8 1.1   Differential Method  Automated Automated Automated Automated       Metabolic Panel (last 72 hours):  Recent Labs   Lab Result Units 10/25/23  1338 10/25/23  1522 10/26/23  0416 10/27/23  0529 10/27/23  1840 10/28/23  0021   Sodium mmol/L 147*  --  147* 148* 146* 147*   Potassium mmol/L 3.8  --  3.5 3.4* 2.5* 3.5   Chloride mmol/L 116*  --  117* 119* 115* 117*   CO2 mmol/L 20*  --  20* 19* 20* 19*   Glucose mg/dL 68*  --  79 107 127* 110   Glucose,  UA   --  Negative  --   --   --   --    BUN mg/dL 29*  --  31* 26* 19 16   Creatinine mg/dL 0.9  --  0.9 0.9 0.8 1.1   Creatinine, Urine mg/dL  --  229.0  --   --   --   --    Albumin g/dL 2.5*  --  2.5* 2.2*  --  2.7*   Total Bilirubin mg/dL 5.6*  --  5.3* 4.4*  --  5.1*   Alkaline Phosphatase U/L 76  --  75 93  --  95   AST U/L 77*  --  81* 85*  --  103*   ALT U/L 40  --  38 39  --  45*   Magnesium mg/dL  --   --   --  2.0  --  1.6   Phosphorus mg/dL  --   --   --  1.8*  --  1.7*       Vancomycin Administrations:  vancomycin given in the last 96 hours                     vancomycin 750 mg in dextrose 5 % (D5W) 250 mL IVPB (Vial-Mate) (mg) 750 mg New Bag 10/27/23 2221    vancomycin (VANCOCIN) 1,000 mg in dextrose 5 % (D5W) 250 mL IVPB (Vial-Mate) (mg) 1,000 mg New Bag 10/27/23 1124                    Microbiologic Results:  Microbiology Results (last 7 days)       Procedure Component Value Units Date/Time    Blood culture [[id number]] Collected: 10/27/23 1125    Order Status: Completed Specimen: Blood from Peripheral, Forearm, Right Updated: 10/27/23 1915     Blood Culture, Routine No Growth to date    Blood culture [[id number]] Collected: 10/27/23 1128    Order Status: Completed Specimen: Blood from Peripheral, Lower Arm, Left Updated: 10/27/23 1915     Blood Culture, Routine No Growth to date    Culture, Respiratory with Gram Stain [[id number]] Collected: 10/27/23 1715    Order Status: Sent Specimen: Respiratory from Sputum Updated: 10/27/23 1849    Blood culture [[id number]] Collected: 10/25/23 1338    Order Status: Completed Specimen: Blood Updated: 10/27/23 1612     Blood Culture, Routine No Growth to date      No Growth to date      No Growth to date    Blood culture [[id number]] Collected: 10/25/23 1337    Order Status: Completed Specimen: Blood from Antecubital, Left Updated: 10/27/23 1612     Blood Culture, Routine No Growth to date      No Growth to date      No Growth to date    Urine culture  [[id number]]  (Abnormal) Collected: 10/25/23 1522    Order Status: Completed Specimen: Urine Updated: 10/27/23 1418     Urine Culture, Routine EUSEBIO ALBICANS  > 100,000 cfu/ml  Treatment of asymptomatic candiduria is not recommended (except for   specific populations). Candida isolated in the urine typically   represents colonization. If an indwelling urinary catheter is present  it should be removed or replaced.      Narrative:      Specimen Source->Urine  add on [facility] #[id number] per garth guerra md  10/25/2023  17:06

## 2023-10-28 NOTE — NURSING
AM potassium is 3.5 - Amarilis NP notified. NP gave verbal order to stop replacing the potassium. 50 meq of 80 meq given.

## 2023-10-28 NOTE — ASSESSMENT & PLAN NOTE
· Hepatology following PEth. Daily CBC, CMP & INR.   · Not currently being evaluated for transplant due to clinical status  · Vitamin K given for chronic coagulopathy  · Continuing lactulose and rifaximin titrated to 3-4 bowel movements.

## 2023-10-29 PROBLEM — R53.81 DEBILITY: Status: ACTIVE | Noted: 2023-10-29

## 2023-10-29 LAB
ALBUMIN SERPL BCP-MCNC: 2.5 G/DL (ref 3.5–5.2)
ALLENS TEST: ABNORMAL
ALP SERPL-CCNC: 98 U/L (ref 55–135)
ALT SERPL W/O P-5'-P-CCNC: 51 U/L (ref 10–44)
ANION GAP SERPL CALC-SCNC: 10 MMOL/L (ref 8–16)
AST SERPL-CCNC: 104 U/L (ref 10–40)
BASOPHILS # BLD AUTO: 0.11 K/UL (ref 0–0.2)
BASOPHILS NFR BLD: 1.8 % (ref 0–1.9)
BILIRUB SERPL-MCNC: 6 MG/DL (ref 0.1–1)
BUN SERPL-MCNC: 14 MG/DL (ref 6–20)
CALCIUM SERPL-MCNC: 8.5 MG/DL (ref 8.7–10.5)
CHLORIDE SERPL-SCNC: 109 MMOL/L (ref 95–110)
CO2 SERPL-SCNC: 22 MMOL/L (ref 23–29)
CREAT SERPL-MCNC: 0.8 MG/DL (ref 0.5–1.4)
DELSYS: ABNORMAL
DIFFERENTIAL METHOD: ABNORMAL
EOSINOPHIL # BLD AUTO: 0.4 K/UL (ref 0–0.5)
EOSINOPHIL NFR BLD: 6.3 % (ref 0–8)
ERYTHROCYTE [DISTWIDTH] IN BLOOD BY AUTOMATED COUNT: 17.3 % (ref 11.5–14.5)
EST. GFR  (NO RACE VARIABLE): >60 ML/MIN/1.73 M^2
GLUCOSE SERPL-MCNC: 79 MG/DL (ref 70–110)
HCO3 UR-SCNC: 26.2 MMOL/L (ref 24–28)
HCT VFR BLD AUTO: 23.8 % (ref 37–48.5)
HCT VFR BLD CALC: 22 %PCV (ref 36–54)
HGB BLD-MCNC: 7.5 G/DL (ref 12–16)
IMM GRANULOCYTES # BLD AUTO: 0.03 K/UL (ref 0–0.04)
IMM GRANULOCYTES NFR BLD AUTO: 0.5 % (ref 0–0.5)
INR PPP: 2.1 (ref 0.8–1.2)
LYMPHOCYTES # BLD AUTO: 1.2 K/UL (ref 1–4.8)
LYMPHOCYTES NFR BLD: 20 % (ref 18–48)
MAGNESIUM SERPL-MCNC: 1.7 MG/DL (ref 1.6–2.6)
MCH RBC QN AUTO: 33.5 PG (ref 27–31)
MCHC RBC AUTO-ENTMCNC: 31.5 G/DL (ref 32–36)
MCV RBC AUTO: 106 FL (ref 82–98)
MONOCYTES # BLD AUTO: 1 K/UL (ref 0.3–1)
MONOCYTES NFR BLD: 16.1 % (ref 4–15)
NEUTROPHILS # BLD AUTO: 3.4 K/UL (ref 1.8–7.7)
NEUTROPHILS NFR BLD: 55.3 % (ref 38–73)
NRBC BLD-RTO: 0 /100 WBC
PCO2 BLDA: 36 MMHG (ref 35–45)
PH SMN: 7.47 [PH] (ref 7.35–7.45)
PHOSPHATE SERPL-MCNC: 2.6 MG/DL (ref 2.7–4.5)
PLATELET # BLD AUTO: 56 K/UL (ref 150–450)
PMV BLD AUTO: 10.5 FL (ref 9.2–12.9)
PO2 BLDA: 67 MMHG (ref 40–60)
POC BE: 2 MMOL/L
POC SATURATED O2: 94 % (ref 95–100)
POC TCO2: 27 MMOL/L (ref 24–29)
POCT GLUCOSE: 104 MG/DL (ref 70–110)
POCT GLUCOSE: 109 MG/DL (ref 70–110)
POCT GLUCOSE: 133 MG/DL (ref 70–110)
POCT GLUCOSE: 138 MG/DL (ref 70–110)
POTASSIUM SERPL-SCNC: 4.1 MMOL/L (ref 3.5–5.1)
PROT SERPL-MCNC: 5.6 G/DL (ref 6–8.4)
PROTHROMBIN TIME: 21.2 SEC (ref 9–12.5)
RBC # BLD AUTO: 2.24 M/UL (ref 4–5.4)
SAMPLE: ABNORMAL
SITE: ABNORMAL
SODIUM SERPL-SCNC: 141 MMOL/L (ref 136–145)
WBC # BLD AUTO: 6.16 K/UL (ref 3.9–12.7)

## 2023-10-29 PROCEDURE — 25000003 PHARM REV CODE 250: Mod: NTX | Performed by: HOSPITALIST

## 2023-10-29 PROCEDURE — 80053 COMPREHEN METABOLIC PANEL: CPT | Mod: NTX | Performed by: HOSPITALIST

## 2023-10-29 PROCEDURE — 83735 ASSAY OF MAGNESIUM: CPT | Mod: NTX | Performed by: STUDENT IN AN ORGANIZED HEALTH CARE EDUCATION/TRAINING PROGRAM

## 2023-10-29 PROCEDURE — 25000242 PHARM REV CODE 250 ALT 637 W/ HCPCS: Mod: NTX | Performed by: NURSE PRACTITIONER

## 2023-10-29 PROCEDURE — 63600175 PHARM REV CODE 636 W HCPCS: Mod: NTX | Performed by: NURSE PRACTITIONER

## 2023-10-29 PROCEDURE — 94640 AIRWAY INHALATION TREATMENT: CPT | Mod: NTX

## 2023-10-29 PROCEDURE — 31720 CLEARANCE OF AIRWAYS: CPT | Mod: NTX

## 2023-10-29 PROCEDURE — 84100 ASSAY OF PHOSPHORUS: CPT | Mod: NTX | Performed by: STUDENT IN AN ORGANIZED HEALTH CARE EDUCATION/TRAINING PROGRAM

## 2023-10-29 PROCEDURE — 63600175 PHARM REV CODE 636 W HCPCS: Mod: NTX | Performed by: PHYSICIAN ASSISTANT

## 2023-10-29 PROCEDURE — 63600175 PHARM REV CODE 636 W HCPCS: Mod: NTX | Performed by: STUDENT IN AN ORGANIZED HEALTH CARE EDUCATION/TRAINING PROGRAM

## 2023-10-29 PROCEDURE — 25000003 PHARM REV CODE 250: Mod: NTX | Performed by: STUDENT IN AN ORGANIZED HEALTH CARE EDUCATION/TRAINING PROGRAM

## 2023-10-29 PROCEDURE — 25000003 PHARM REV CODE 250: Mod: NTX

## 2023-10-29 PROCEDURE — 99291 PR CRITICAL CARE, E/M 30-74 MINUTES: ICD-10-PCS | Mod: NTX,,, | Performed by: PSYCHIATRY & NEUROLOGY

## 2023-10-29 PROCEDURE — 82800 BLOOD PH: CPT | Mod: NTX

## 2023-10-29 PROCEDURE — 27200966 HC CLOSED SUCTION SYSTEM: Mod: NTX

## 2023-10-29 PROCEDURE — 25000003 PHARM REV CODE 250: Mod: NTX | Performed by: PHYSICIAN ASSISTANT

## 2023-10-29 PROCEDURE — 20000000 HC ICU ROOM: Mod: NTX

## 2023-10-29 PROCEDURE — 94761 N-INVAS EAR/PLS OXIMETRY MLT: CPT | Mod: NTX

## 2023-10-29 PROCEDURE — C9254 INJECTION, LACOSAMIDE: HCPCS | Mod: NTX | Performed by: STUDENT IN AN ORGANIZED HEALTH CARE EDUCATION/TRAINING PROGRAM

## 2023-10-29 PROCEDURE — P9047 ALBUMIN (HUMAN), 25%, 50ML: HCPCS | Mod: JG,NTX

## 2023-10-29 PROCEDURE — 94668 MNPJ CHEST WALL SBSQ: CPT | Mod: NTX

## 2023-10-29 PROCEDURE — 85025 COMPLETE CBC W/AUTO DIFF WBC: CPT | Mod: NTX | Performed by: HOSPITALIST

## 2023-10-29 PROCEDURE — 85610 PROTHROMBIN TIME: CPT | Mod: NTX | Performed by: STUDENT IN AN ORGANIZED HEALTH CARE EDUCATION/TRAINING PROGRAM

## 2023-10-29 PROCEDURE — 27000221 HC OXYGEN, UP TO 24 HOURS: Mod: NTX

## 2023-10-29 PROCEDURE — 63600175 PHARM REV CODE 636 W HCPCS: Mod: JG,NTX

## 2023-10-29 PROCEDURE — 99900035 HC TECH TIME PER 15 MIN (STAT): Mod: NTX

## 2023-10-29 PROCEDURE — 99291 CRITICAL CARE FIRST HOUR: CPT | Mod: NTX,,, | Performed by: PSYCHIATRY & NEUROLOGY

## 2023-10-29 PROCEDURE — 25000003 PHARM REV CODE 250: Mod: NTX | Performed by: PSYCHIATRY & NEUROLOGY

## 2023-10-29 RX ORDER — FAMOTIDINE 20 MG/1
20 TABLET, FILM COATED ORAL 2 TIMES DAILY
Status: DISCONTINUED | OUTPATIENT
Start: 2023-10-29 | End: 2023-11-08

## 2023-10-29 RX ORDER — ALBUMIN HUMAN 250 G/1000ML
12.5 SOLUTION INTRAVENOUS ONCE
Status: COMPLETED | OUTPATIENT
Start: 2023-10-29 | End: 2023-10-29

## 2023-10-29 RX ORDER — FUROSEMIDE 10 MG/ML
40 INJECTION INTRAMUSCULAR; INTRAVENOUS ONCE
Status: COMPLETED | OUTPATIENT
Start: 2023-10-29 | End: 2023-10-29

## 2023-10-29 RX ORDER — LACTULOSE 10 G/15ML
30 SOLUTION ORAL 2 TIMES DAILY
Status: DISCONTINUED | OUTPATIENT
Start: 2023-10-29 | End: 2023-11-25

## 2023-10-29 RX ADMIN — LACTULOSE 30 G: 20 SOLUTION ORAL at 08:10

## 2023-10-29 RX ADMIN — LACOSAMIDE 100 MG: 10 INJECTION INTRAVENOUS at 08:10

## 2023-10-29 RX ADMIN — PIPERACILLIN SODIUM AND TAZOBACTAM SODIUM 4.5 G: 4; .5 INJECTION, POWDER, FOR SOLUTION INTRAVENOUS at 12:10

## 2023-10-29 RX ADMIN — LEVETIRACETAM 1500 MG: 500 SOLUTION ORAL at 08:10

## 2023-10-29 RX ADMIN — IPRATROPIUM BROMIDE AND ALBUTEROL SULFATE 3 ML: .5; 3 SOLUTION RESPIRATORY (INHALATION) at 01:10

## 2023-10-29 RX ADMIN — IPRATROPIUM BROMIDE AND ALBUTEROL SULFATE 3 ML: .5; 3 SOLUTION RESPIRATORY (INHALATION) at 12:10

## 2023-10-29 RX ADMIN — FAMOTIDINE 20 MG: 20 TABLET ORAL at 08:10

## 2023-10-29 RX ADMIN — ACETYLCYSTEINE 2 ML: 200 SOLUTION ORAL; RESPIRATORY (INHALATION) at 08:10

## 2023-10-29 RX ADMIN — ACETYLCYSTEINE 2 ML: 200 SOLUTION ORAL; RESPIRATORY (INHALATION) at 01:10

## 2023-10-29 RX ADMIN — FUROSEMIDE 40 MG: 10 INJECTION, SOLUTION INTRAVENOUS at 11:10

## 2023-10-29 RX ADMIN — FOLIC ACID 1 MG: 1 TABLET ORAL at 08:10

## 2023-10-29 RX ADMIN — ALBUMIN (HUMAN) 12.5 G: 12.5 SOLUTION INTRAVENOUS at 11:10

## 2023-10-29 RX ADMIN — PIPERACILLIN SODIUM AND TAZOBACTAM SODIUM 4.5 G: 4; .5 INJECTION, POWDER, FOR SOLUTION INTRAVENOUS at 09:10

## 2023-10-29 RX ADMIN — MUPIROCIN 1 G: 20 OINTMENT TOPICAL at 08:10

## 2023-10-29 RX ADMIN — MUPIROCIN: 20 OINTMENT TOPICAL at 08:10

## 2023-10-29 RX ADMIN — ACETYLCYSTEINE 2 ML: 200 SOLUTION ORAL; RESPIRATORY (INHALATION) at 12:10

## 2023-10-29 RX ADMIN — VANCOMYCIN HYDROCHLORIDE 750 MG: 750 INJECTION, POWDER, LYOPHILIZED, FOR SOLUTION INTRAVENOUS at 08:10

## 2023-10-29 RX ADMIN — THIAMINE HYDROCHLORIDE 250 MG: 100 INJECTION, SOLUTION INTRAMUSCULAR; INTRAVENOUS at 08:10

## 2023-10-29 RX ADMIN — POTASSIUM CHLORIDE 10 MEQ: 7.46 INJECTION, SOLUTION INTRAVENOUS at 01:10

## 2023-10-29 RX ADMIN — PIPERACILLIN SODIUM AND TAZOBACTAM SODIUM 4.5 G: 4; .5 INJECTION, POWDER, FOR SOLUTION INTRAVENOUS at 04:10

## 2023-10-29 RX ADMIN — IPRATROPIUM BROMIDE AND ALBUTEROL SULFATE 3 ML: .5; 3 SOLUTION RESPIRATORY (INHALATION) at 08:10

## 2023-10-29 RX ADMIN — VANCOMYCIN HYDROCHLORIDE 750 MG: 750 INJECTION, POWDER, LYOPHILIZED, FOR SOLUTION INTRAVENOUS at 09:10

## 2023-10-29 RX ADMIN — ACETYLCYSTEINE 2 ML: 200 SOLUTION ORAL; RESPIRATORY (INHALATION) at 07:10

## 2023-10-29 RX ADMIN — THERA TABS 1 TABLET: TAB at 08:10

## 2023-10-29 RX ADMIN — IPRATROPIUM BROMIDE AND ALBUTEROL SULFATE 3 ML: .5; 3 SOLUTION RESPIRATORY (INHALATION) at 07:10

## 2023-10-29 RX ADMIN — RIFAXIMIN 550 MG: 550 TABLET ORAL at 08:10

## 2023-10-29 NOTE — PLAN OF CARE
"[facility] Care Plan    POC reviewed with Mara Mojica and family at 1400. Pt's  verbalized understanding. Questions and concerns addressed. No acute events today. EEG removed, switched to humidified mask, increase TF, diuresed .Will continue to monitor. See below and flowsheets for full assessment and VS info.             Is this a stroke patient? no    Neuro:  [place] Coma Scale  Best Eye Response: 3-->(E3) to speech  Best Motor Response: 4-->(M4) withdraws from pain  Best Verbal Response: 1-->(V1) none  [place] Coma Scale Score: 8  Assessment Qualifiers: no eye obstruction present  Pupil PERRLA: yes     24 hr Temp:  [97.2 °F (36.2 °C)-99.5 °F (37.5 °C)]     CV:   Rhythm: normal sinus rhythm  BP goals:   SBP < 180  MAP > 65    Resp:      Oxygen Concentration (%): 28    Plan: N/A    GI/:     Diet/Nutrition Received: NPO, tube feeding  Last Bowel Movement: 10/29/23  Voiding Characteristics: urethral catheter (bladder)    Intake/Output Summary (Last 24 hours) at 10/29/2023 1659  Last data filed at 10/29/2023 1600  Gross per 24 hour   Intake 1362.19 ml   Output 1995 ml   Net -632.81 ml     Unmeasured Output  Urine Occurrence: 5  Stool Occurrence: 1  Emesis Occurrence: 0  Pad Count: 5    Labs/Accuchecks:  Recent Labs   Lab 10/29/23  0535 10/29/23  1139   WBC 6.16  --    RBC 2.24*  --    HGB 7.5*  --    HCT 23.8* 22*   PLT 56*  --       Recent Labs   Lab 10/29/23  0535      K 4.1   CO2 22*      BUN 14   CREATININE 0.8   ALKPHOS 98   ALT 51*   *   BILITOT 6.0*      Recent Labs   Lab 10/29/23  0535   INR 2.1*    No results for input(s): "CPK", "CPKMB", "TROPONINI", "MB" in the last 168 hours.    Electrolytes: N/A - electrolytes WDL  Accuchecks: Q6H    Gtts:      LDA/Wounds:  Lines/Drains/Airways       Drain  Duration                  NG/OG Tube 10/20/23 2000 16 Fr. Left nostril 8 days         Rectal Tube 10/28/23 0530 fecal management system 1 day         Urethral Catheter 10/28/23 1556 1 day "              Peripheral Intravenous Line  Duration                  Peripheral IV - Single Lumen 10/25/23 0004 20 G Left;Posterior Hand 4 days         Peripheral IV - Single Lumen 10/27/23 1300 20 G Anterior;Proximal;Right Forearm 2 days         Peripheral IV - Single Lumen 10/29/23 1130 20 G Anterior;Left Forearm <1 day                  Wounds: No  Wound care consulted: No

## 2023-10-29 NOTE — NURSING
Dr. Hess and ALEX Fernandez at bedside aware of low urine output, neuro status, all  labs including plt count of 56,000, new orders noted.

## 2023-10-29 NOTE — PROCEDURES
DATE: 10/28/23    EEG NUMBER:  -4,  -5    REFERRING PHYSICIAN:  Dr. Mcdonald      This EEG was performed to assess for subclinical seizures      ELECTROENCEPHALOGRAM REPORT     METHODOLOGY:  Electroencephalographic (EEG) recording is with electrodes placed according to the International 10-20 placement system.  Thirty two (32) channels of digital signal are simultaneously recorded from the scalp and may include EKG, EMG, and/or eye monitors.   Recording band pass was 0.1 to 512 hz.  Digital video recording of the patient is simultaneously recorded with the EEG.  The nursing staff report clinical symptoms and may press an event button when the patient has symptoms of clinical interest to the treating physicians.  EEG and video recording is stored and archived in digital format.  The entire recording is visually reviewed, and the times identified by computer analysis as being spikes or seizures are reviewed again.  Activation procedures which include photic stimulation, hyperventilation and instructing patients to perform simple task are done in selected patients.   Compresses spectral analysis (CSA) is also performed on the activity recorded from each individual channel.  This is displayed as a power display of frequencies from 0 to 30 Hz over time.   The CSA analysis is done and displayed continuously.  This is reviewed for asymmetries in power between homologous areas of the scalp and for presence of changes in power which can be seen when seizures occur.  Sections of suspected abnormalities on the CSA is then compared with the original EEG recording.                Sit[employer]ape software was also utilized in the review of this study.  This software suite analyzes the EEG recording in multiple domains.  Coherence and rhythmicity is computed to identify EEG sections which may contain organized seizures.  Each channel undergoes analysis to detect presence of spike and sharp waves which have special and  morphological characteristic of epileptic activity.  The routine EEG recording is converted from spacial into frequency domain.  This is then displayed comparing homologous areas to identify areas of significant asymmetry.  Algorithm to identify non-cortically generated artifact is used to separate eye movement, EMG and other artifact from the EEG.     Recording times   Start on October 28, 2023 at hours 7 minute 1 seconds 20   End on October 28, 2023 at hours 13 minute 56 seconds 40  Restart on October 29, 2023 at hours 7 minute 1 seconds 0   End on October 29, 2023 at hours 12 minute 40 seconds 12  The total time of EEG recording for the study was 12 hours and 24minutes    EEG FINDINGS:  The recording was obtained with a number of standard bipolar and referential montages during lethargic state in this state the background diffusely disorganized.  A nonsustained posterior dominant rhythm of up to 7-8 hertz was noted which was symmetric.  Mixed theta and delta range slowing was noted in both hemispheres.  The background punctuated by rare triphasic waves.  In addition occipital maximum sharp waves were also noted which were pseudo periodic.  During this study no seizures were recorded. During deeper stages of clinical sleep symmetric spindles were noted.    The EKG channel revealed a sinus rhythm.     IMPRESSION:  This is an abnormal EEG during lethargic state.  Diffuse disorganized slowing of the background was noted.  Left posterior pseudo periodic epileptiform discharges were noted.       CLINICAL CORRELATION:  The patient is a 58-year-old female with a history of hepatic dysfunction who is currently maintained on Keppra.  This is an abnormal EEG during lethargic state.  The overall degree of slowing and disorganization is suggestive of a mild-moderate degree of encephalopathy likely toxic metabolic encephalopathy.  The presence of left posterior maximum periodic epileptiform discharges is suggestive of an area  of cortical irritability with increased risk for focal seizures from this region.  No seizures were recorded.

## 2023-10-29 NOTE — SUBJECTIVE & OBJECTIVE
Objective:     Vitals:  Temp: 97.2 °F (36.2 °C)  Pulse: 100  Rhythm: normal sinus rhythm  BP: (!) 159/68  MAP (mmHg): 98  Resp: (!) 28  SpO2: 100 %  Oxygen Concentration (%): 28    Temp  Min: 97.2 °F (36.2 °C)  Max: 99.5 °F (37.5 °C)  Pulse  Min: 76  Max: 104  BP  Min: 123/90  Max: 174/74  MAP (mmHg)  Min: 81  Max: 106  Resp  Min: 13  Max: 28  SpO2  Min: 97 %  Max: 100 %  Oxygen Concentration (%)  Min: 28  Max: 28    10/28 0701 - 10/29 0700  In: 290   Out: 1735 [Urine:985]   Unmeasured Output  Urine Occurrence: 5  Stool Occurrence: 1  Emesis Occurrence: 0  Pad Count: 5        Physical Exam  Vitals and nursing note reviewed.   Constitutional:       Appearance: She is ill-appearing.   HENT:      Head: Normocephalic and atraumatic.      Comments: EEG bandages in place     Nose:      Comments: NG tube in place     Mouth/Throat:      Mouth: Mucous membranes are moist.      Comments: Poor dentition  Eyes:      Pupils: Pupils are equal, round, and reactive to light.   Cardiovascular:      Rate and Rhythm: Regular rhythm. Tachycardia present.      Pulses: Normal pulses.      Heart sounds: Normal heart sounds.   Pulmonary:      Effort: Respiratory distress present.      Breath sounds: Stridor present. Wheezing present.      Comments: Wet cough, thick secretions suctioned  Abdominal:      General: Bowel sounds are normal. There is no distension.      Palpations: Abdomen is soft.   Musculoskeletal:         General: No swelling or deformity.      Cervical back: Neck supple.      Right lower leg: Edema present.      Left lower leg: Edema present.      Comments: R arm edema +3 > left arm edema +2    Skin:     General: Skin is warm and dry.   Neurological:      Mental Status: Eyes open spontaneously, following in room, still incoherent speech, occasionally seeming to follow commands     GCS: GCS eye subscore is 4. GCS verbal subscore is 2. GCS motor subscore is 6.      Comments: Withdraws to pain all 4 extremities and moves  spontaneously  Opens eyes to name and sternal rub  Incomprehensible mumbling  Face symmetric, pupils intact bilaterally, tongue midline          Medications:  Continuous Scheduledacetylcysteine 200 mg/ml (20%), 2 mL, Q6H  albuterol-ipratropium, 3 mL, Q6H  famotidine, 20 mg, BID  folic acid, 1 mg, Daily  lacosamide (VIMPAT) IVPB, 100 mg, Q12H  lactulose, 30 g, BID  levetiracetam, 1,500 mg, BID  multivitamin, 1 tablet, Daily  mupirocin, , BID  piperacillin-tazobactam (Zosyn) IV (PEDS and ADULTS) (extended infusion is not appropriate), 4.5 g, Q8H  rifAXImin, 550 mg, BID  thiamine (B-1) 250 mg in dextrose 5 % (D5W) 100 mL IVPB, 250 mg, Daily  vancomycin (VANCOCIN) IV (PEDS and ADULTS), 15 mg/kg, Q12H    PRNcalcium gluconate IVPB, 1 g, PRN  calcium gluconate IVPB, 2 g, PRN  calcium gluconate IVPB, 3 g, PRN  magnesium sulfate IVPB, 2 g, PRN  magnesium sulfate IVPB, 4 g, PRN  ondansetron, 4 mg, Q8H PRN  potassium chloride, 40 mEq, PRN   And  potassium chloride, 60 mEq, PRN   And  potassium chloride, 80 mEq, PRN  sodium chloride 0.9%, 10 mL, PRN  sodium phosphate 15 mmol in dextrose 5 % (D5W) 250 mL IVPB, 15 mmol, PRN  sodium phosphate 20.01 mmol in dextrose 5 % (D5W) 250 mL IVPB, 20.01 mmol, PRN  sodium phosphate 30 mmol in dextrose 5 % (D5W) 250 mL IVPB, 30 mmol, PRN  vancomycin - pharmacy to dose, , pharmacy to manage frequency      Today I personally reviewed pertinent medications, lines/drains/airways, imaging, cardiology results, laboratory results, microbiology results,     Diet  No diet orders on file  No diet orders on file

## 2023-10-29 NOTE — ASSESSMENT & PLAN NOTE
This is a 58-year-old woman with a history of decompensated alcoholic cirrhosis.  Suspect thrombocytopenia is likely secondary to chronic alcohol use and is consumptive in nature. If trend worsens, will consider consulting Hematology.    · Daily CBC, transfuse only for active bleeding

## 2023-10-29 NOTE — PLAN OF CARE
"[facility] Care Plan    POC reviewed with Mara Mojica and family at 0650. Pt unable to verbalized understanding. Questions and concerns addressed with  . No acute events overnight. Pt progressing toward goals. Will continue to monitor. See below and flowsheets for full assessment and VS info.     -Trickle TF  -no clinical seizure noted over night        Is this a stroke patient? no    Neuro:  [place] Coma Scale  Best Eye Response: 3-->(E3) to speech  Best Motor Response: 4-->(M4) withdraws from pain  Best Verbal Response: 1-->(V1) none  [place] Coma Scale Score: 8  Assessment Qualifiers: no eye obstruction present, patient not sedated/intubated  Pupil PERRLA: yes     24hr Temp:  [97.8 °F (36.6 °C)-99.5 °F (37.5 °C)]     CV:   Rhythm: normal sinus rhythm  BP goals:   SBP < 180  MAP > 65    Resp:           Plan: Agressive Pulmonary toileting and 2L NC    GI/:     Diet/Nutrition Received: tube feeding  Last Bowel Movement: 10/28/23  Voiding Characteristics: urethral catheter (bladder)    Intake/Output Summary (Last 24 hours) at 10/29/2023 0650  Last data filed at 10/29/2023 0647  Gross per 24 hour   Intake 290 ml   Output 1735 ml   Net -1445 ml     Unmeasured Output  Urine Occurrence: 5  Stool Occurrence: 1  Emesis Occurrence: 0  Pad Count: 5    Labs/Accuchecks:  Recent Labs   Lab 10/28/23  0021   WBC 6.14   RBC 2.25*   HGB 7.5*   HCT 23.8*   PLT 79*      Recent Labs   Lab 10/28/23  0021 10/28/23  1516   * 145   K 3.5 3.4*   CO2 19* 23   * 111*   BUN 16 13   CREATININE 1.1 0.9   ALKPHOS 95  --    ALT 45*  --    *  --    BILITOT 5.1*  --       Recent Labs   Lab 10/28/23  0021   INR 2.1*    No results for input(s): "CPK", "CPKMB", "TROPONINI", "MB" in the last 168 hours.    Electrolytes: Electrolytes replaced  Accuchecks: Q6H    Gtts:      LDA/Wounds:  Lines/Drains/Airways       Drain  Duration                  NG/OG Tube 10/20/23 2000 16 Fr. Left nostril 8 days         Rectal Tube 10/28/23 " 0530 fecal management system 1 day         Urethral Catheter 10/28/23 1556 <1 day              Peripheral Intravenous Line  Duration                  Peripheral IV - Single Lumen 10/25/23 0004 20 G Left;Posterior Hand 4 days         Peripheral IV - Single Lumen 10/27/23 1300 20 G Anterior;Proximal;Right Forearm 1 day                  Wounds: Yes  Wound care consulted: Yes

## 2023-10-29 NOTE — ASSESSMENT & PLAN NOTE
58 year old presented for altered mental status on 10/18. Clinical picture confounded with hepatic encephalopathy, infection, and seizure activity. Neurology was consulted by primary team prior to admission to [facility].    10/27 EEG IMPRESSION:  This is an abnormal EEG during lethargic state.  Diffuse disorganized slowing of the background was noted.  Frequent triphasic waves noted.  Left posterior pseudo periodic epileptiform discharges were noted.  Numerous electrographic seizures emanating from the left posterior region were noted.    10/28: EEG update showing no new activity since 10/27 morning.   10/29: still no seizures per phone report, awaiting formal read and can likely disconnect eeg    · vEEG in place  · Keppra 1500 BID  · Vimpat loaded 10/27, continue 100 mg BID  · Airway monitoring  · Neurochecks q1hr  · Vitals q1hr

## 2023-10-29 NOTE — PROGRESS NOTES
[facility]  Neurocritical Care  Progress Note    Admit Date: 10/25/2023  Service Date: 10/29/2023  Length of Stay: 4    Subjective:     Chief Complaint: Acute encephalopathy    History of Present Illness: This is a 58-year-old female with a past medical history of alcoholic cirrhosis, s/p ex-lap w/ bowel resection for incarcerated hernia, who initially presented on 10/18 to [facility] with acute encephalopathy.  Her  found her on the floor at home with evidence of fecal/urine incontinence with confusion and slurred speech. Possible reported tongue injury in chart. Reported concern L sided weakness and down drift of L arm however CT head without evidence of acute abnormalities, MRI brain with only small vessel vascular changes without evidence of territorial infarct.     Hospital Course: EEG done on 10/19 with mild diffuse nonspecific background slowing, no potential epileptiform activity. Repeat MRI brain with contrast on 10/23 unchanged from initial MRI. Became more lethargic and was no longer following commands or answering questions. Due to worsening hepatic encephalopathy in setting of hepatic cirrhosis, patient transferred to [facility] for management with transplant team. Has remained on antibiotics, lactulose and rifaximin for treatment of UTI with pansensitive Ecoli, HE, and presumed SBP. Neurology consulted for management recommendations regarding persistent AMS. Had repeat EEG done on 10/24 with similar findings to prior EEG showing mild generalized non-specific cerebral dysfunction and no electrographic seizures or indication of seizure tendency. Additional CT head w/o contrast on 10/25 without evidence of acute issues. Remains confused and unable to answer questions on exam. Not withdrawing to painful stimuli. Was able to awaken to verbal stimuli in AM however when reevaluated in afternoon unresponsive however was after receiving Ativan.     On admission to [facility]:  Patient had EEG  running, and was noted to have seizures at 7:30 a.m., 8:00 a.m. in, and 10:00 a.m. was noted to be in focal status prior to receiving Vimpat.  Patient was noted to have stridor, worsening secretions with suspicion for aspiration, decreased airway protection, and rapids was called due to low GCS score of 6. Antibiotics broadened to Vanc/Zosyn. Clinical picture of mental status confounded with episodes of seizures, infection, and hepatic encephalopathy.               Hospital Course: 10/28: Improving GCS from 6 to 10. Continuing pulmonary toilet and deep suctioning for stridor and copious secretions. UA positive for candida. Blood cultures from 10/27 NGTD. Sputum cultures sent. Patient on day 2 of broadened antibiotics vanc/zosyn due to worsening clinical status but will discontinue tomorrow if cultures remain negative. Still hypernatremic, treating with D5W. Patient was transferred with no ordered diuretics, very edematous on physical exam. Adequate stooling on lactulose and rifaximin. Due to increased UOP/stooling will place eckert for one day for irritated skin. EEG update showing no seizures since 10am 10/27. Monitoring CBC for thrombocytopenia and macrocytic anemia. Discussed code status and goals of care with  and best friend at bedside. Trickle feeds resumed.   10/29: No acute events overnight, EEG reportedly without further seizures for ~48 hours can disconnect once formal report is in, neuro status slowly improving as patient now tracking in room, moving extremities, responding with appropriate emotional reactions to her .  Respiratory status largely unchanged with intermittent events of large volume breaths that sound almost stridorous but without actual respiratory distress- gas remains compensated.  UOP low, diuresis resumed.        Objective:     Vitals:  Temp: 97.2 °F (36.2 °C)  Pulse: 100  Rhythm: normal sinus rhythm  BP: (!) 159/68  MAP (mmHg): 98  Resp: (!) 28  SpO2: 100 %  Oxygen  Concentration (%): 28    Temp  Min: 97.2 °F (36.2 °C)  Max: 99.5 °F (37.5 °C)  Pulse  Min: 76  Max: 104  BP  Min: 123/90  Max: 174/74  MAP (mmHg)  Min: 81  Max: 106  Resp  Min: 13  Max: 28  SpO2  Min: 97 %  Max: 100 %  Oxygen Concentration (%)  Min: 28  Max: 28    10/28 0701 - 10/29 0700  In: 290   Out: 1735 [Urine:985]   Unmeasured Output  Urine Occurrence: 5  Stool Occurrence: 1  Emesis Occurrence: 0  Pad Count: 5        Physical Exam  Vitals and nursing note reviewed.   Constitutional:       Appearance: She is ill-appearing.   HENT:      Head: Normocephalic and atraumatic.      Comments: EEG bandages in place     Nose:      Comments: NG tube in place     Mouth/Throat:      Mouth: Mucous membranes are moist.      Comments: Poor dentition  Eyes:      Pupils: Pupils are equal, round, and reactive to light.   Cardiovascular:      Rate and Rhythm: Regular rhythm. Tachycardia present.      Pulses: Normal pulses.      Heart sounds: Normal heart sounds.   Pulmonary:      Effort: Respiratory distress present.      Breath sounds: Stridor present. Wheezing present.      Comments: Wet cough, thick secretions suctioned  Abdominal:      General: Bowel sounds are normal. There is no distension.      Palpations: Abdomen is soft.   Musculoskeletal:         General: No swelling or deformity.      Cervical back: Neck supple.      Right lower leg: Edema present.      Left lower leg: Edema present.      Comments: R arm edema +3 > left arm edema +2    Skin:     General: Skin is warm and dry.   Neurological:      Mental Status: Eyes open spontaneously, following in room, still incoherent speech, occasionally seeming to follow commands     GCS: GCS eye subscore is 4. GCS verbal subscore is 2. GCS motor subscore is 6.      Comments: Withdraws to pain all 4 extremities and moves spontaneously  Opens eyes to name and sternal rub  Incomprehensible mumbling  Face symmetric, pupils intact bilaterally, tongue midline           Medications:  Continuous Scheduledacetylcysteine 200 mg/ml (20%), 2 mL, Q6H  albuterol-ipratropium, 3 mL, Q6H  famotidine, 20 mg, BID  folic acid, 1 mg, Daily  lacosamide (VIMPAT) IVPB, 100 mg, Q12H  lactulose, 30 g, BID  levetiracetam, 1,500 mg, BID  multivitamin, 1 tablet, Daily  mupirocin, , BID  piperacillin-tazobactam (Zosyn) IV (PEDS and ADULTS) (extended infusion is not appropriate), 4.5 g, Q8H  rifAXImin, 550 mg, BID  thiamine (B-1) 250 mg in dextrose 5 % (D5W) 100 mL IVPB, 250 mg, Daily  vancomycin (VANCOCIN) IV (PEDS and ADULTS), 15 mg/kg, Q12H    PRNcalcium gluconate IVPB, 1 g, PRN  calcium gluconate IVPB, 2 g, PRN  calcium gluconate IVPB, 3 g, PRN  magnesium sulfate IVPB, 2 g, PRN  magnesium sulfate IVPB, 4 g, PRN  ondansetron, 4 mg, Q8H PRN  potassium chloride, 40 mEq, PRN   And  potassium chloride, 60 mEq, PRN   And  potassium chloride, 80 mEq, PRN  sodium chloride 0.9%, 10 mL, PRN  sodium phosphate 15 mmol in dextrose 5 % (D5W) 250 mL IVPB, 15 mmol, PRN  sodium phosphate 20.01 mmol in dextrose 5 % (D5W) 250 mL IVPB, 20.01 mmol, PRN  sodium phosphate 30 mmol in dextrose 5 % (D5W) 250 mL IVPB, 30 mmol, PRN  vancomycin - pharmacy to dose, , pharmacy to manage frequency      Today I personally reviewed pertinent medications, lines/drains/airways, imaging, cardiology results, laboratory results, microbiology results,     Diet  No diet orders on file  No diet orders on file          Assessment/Plan:     Neuro  * Acute encephalopathy  See acute hepatic encephalopathy and seizures    Seizure  58 year old presented for altered mental status on 10/18. Clinical picture confounded with hepatic encephalopathy, infection, and seizure activity. Neurology was consulted by primary team prior to admission to [facility].    10/27 EEG IMPRESSION:  This is an abnormal EEG during lethargic state.  Diffuse disorganized slowing of the background was noted.  Frequent triphasic waves noted.  Left posterior pseudo periodic  epileptiform discharges were noted.  Numerous electrographic seizures emanating from the left posterior region were noted.    10/28: EEG update showing no new activity since 10/27 morning.   10/29: still no seizures per phone report, awaiting formal read and can likely disconnect eeg    · vEEG in place  · Keppra 1500 BID  · Vimpat loaded 10/27, continue 100 mg BID  · Airway monitoring  · Neurochecks q1hr  · Vitals q1hr       Renal/  Hypernatremia      · D5W infusion completed  · Repeat BMP  · Trend CMP    ID  Sepsis  58 year old with UA showing pan sensitive E.Coli on 10/18 and repeat UA 10/25 showing Candida albicans.  Patient also has a history of hepatic encephalopathy with decompensated cirrhosis, SBP not ruled out.  Due to encephalopathy, concerns for aspiration.    CXR 10/27: Low lung volumes with mild worsening bibasilar interstitial densities and possible trace pleural effusions.  Aeration is likely mildly worse when compared with recent prior study, though findings could be exaggerated by hypoventilatory state.   10/29 - antibiotics discontinued given negative cultures    · Vancomycin, Zosyn for at least 48 hours - discontinued 10/29  · Blood cultures negative to date  · Sputum cultures pending  · Consider lumbar puncture if status worsens - high risk given coagulopathy      This patient does have evidence of infective focus  My overall impression is sepsis.  Source: Urinary Tract and Abdominal  Antibiotics given-   Antibiotics (72h ago, onward)    Start     Stop Route Frequency Ordered    10/27/23 2115  vancomycin 750 mg in dextrose 5 % (D5W) 250 mL IVPB (Vial-Mate)        See Hyperspace for full Linked Orders Report.    -- IV Every 12 hours (non-standard times) 10/27/23 0812    10/27/23 0915  piperacillin-tazobactam (ZOSYN) 4.5 g in dextrose 5 % in water (D5W) 100 mL IVPB (MB+)         -- IV Every 8 hours (non-standard times) 10/27/23 0809    10/27/23 0908  vancomycin - pharmacy to dose  (vancomycin  "IVPB (PEDS and ADULTS))        See Hyperspace for full Linked Orders Report.    -- IV pharmacy to manage frequency 10/27/23 0809    10/26/23 2100  mupirocin 2 % ointment         10/31/23 2059 Nasl 2 times daily 10/26/23 1240    10/25/23 1315  rifAXIMin tablet 550 mg         -- PER NG TUBE 2 times daily 10/25/23 1303        Latest lactate reviewed-  No results for input(s): "LACTATE" in the last 72 hours.  Organ dysfunction indicated by Encephalopathy    Fluid challenge Not needed - patient is not hypotensive      Post- resuscitation assessment No - Post resuscitation assessment not needed       Will Not start Pressors- Levophed for MAP of 65      Hematology  Thrombocytopenia  This is a 58-year-old woman with a history of decompensated alcoholic cirrhosis.  Suspect thrombocytopenia is likely secondary to chronic alcohol use and is consumptive in nature. If trend worsens, will consider consulting Hematology.    · Daily CBC, transfuse only for active bleeding    Endocrine  Moderate malnutrition  Resuming tube feeds at 10 mL/hr    Nutrition consulted. Most recent weight and BMI monitored-     Measurements:  Wt Readings from Last 1 Encounters:   10/26/23 56.2 kg (123 lb 14.4 oz)   Body mass index is 25.02 kg/m².    Patient has been screened and assessed by RD.    Malnutrition Type:  Context: social/environmental circumstances  Level: moderate    Malnutrition Characteristic Summary:  Subcutaneous Fat (Malnutrition): mild depletion  Muscle Mass (Malnutrition): mild depletion  Fluid Accumulation (Malnutrition): moderate    Interventions/Recommendations (treatment strategy):  1. When able, initiate TFs. Rec'd Isosource 1.5 @ 50 mL/hr to provide 1800 kcals, 82 g of protein, 917 mL fluid. 2. RD to monitor & follow-up.    GI  Acute hepatic encephalopathy  This is a 52 year old woman with a history of ethanol cirrhosis who presents after being found on the ground with fecal and urinary incontinence, and altered mental status. " Elevated ammonia on admission.    - Continue lactulose via NG tube TID (titrate till 3-4 daily BM achieved). Continue Xifaxin.   - Avoid opiates and benzodiazepines, if able.   - IV thiamine, folate supplementation, multivitamin through NG tube.        Decompensated alcoholic hepatic cirrhosis  · Hepatology following PEth. Daily CBC, CMP & INR.   · Not currently being evaluated for transplant due to clinical status  · Vitamin K given for chronic coagulopathy  · Continuing lactulose and rifaximin titrated to 3-4 bowel movements daily    Other  Debility  PT/OT when appropriate          The patient is being Prophylaxed for:  Venous Thromboembolism with: Mechanical  Stress Ulcer with: H2B  Ventilator Pneumonia with: not applicable    Activity Orders          Elevate HOB Elevate (30-45 degrees) Elevate HOB to 30 - 45 degrees during feeding unless otherwise stated starting at 10/28 1218    Elevate HOB to 30-45 degrees during feeding unless otherwise stated starting at 10/26 1138    Progressive Mobility Protocol (mobilize patient to their highest level of functioning at least twice daily) starting at 10/25 2000    Turn patient starting at 10/25 2000        Full Code    Critical condition in that Patient has a condition that poses threat to life and bodily function: see associated documentation     40 minutes of Critical care time was spent personally by me on the following activities: development of treatment plan with patient or surrogate and bedside caregivers, discussions with consultants, evaluation of patient's response to treatment, examination of patient, ordering and performing treatments and interventions, ordering and review of laboratory studies, ordering and review of radiographic studies, pulse oximetry, antibiotic titration if applicable, vasopressor titration if applicable, re-evaluation of patient's condition. This critical care time did not overlap with that of any other provider or involve time for any  procedures. There is high probability for acute neurological change leading to clinical and possibly life-threatening deterioration requiring highest level of physician preparedness for urgent intervention.      Lencho Hess, DO  Neurocritical Care  [facility] - Neuro [facility]

## 2023-10-29 NOTE — ASSESSMENT & PLAN NOTE
"58 year old with UA showing pan sensitive E.Coli on 10/18 and repeat UA 10/25 showing Candida albicans.  Patient also has a history of hepatic encephalopathy with decompensated cirrhosis, SBP not ruled out.  Due to encephalopathy, concerns for aspiration.    CXR 10/27: Low lung volumes with mild worsening bibasilar interstitial densities and possible trace pleural effusions.  Aeration is likely mildly worse when compared with recent prior study, though findings could be exaggerated by hypoventilatory state.   10/29 - antibiotics discontinued given negative cultures    · Vancomycin, Zosyn for at least 48 hours - discontinued 10/29  · Blood cultures negative to date  · Sputum cultures pending  · Consider lumbar puncture if status worsens - high risk given coagulopathy      This patient does have evidence of infective focus  My overall impression is sepsis.  Source: Urinary Tract and Abdominal  Antibiotics given-   Antibiotics (72h ago, onward)    Start     Stop Route Frequency Ordered    10/27/23 2115  vancomycin 750 mg in dextrose 5 % (D5W) 250 mL IVPB (Vial-Mate)        See Hyperspace for full Linked Orders Report.    -- IV Every 12 hours (non-standard times) 10/27/23 0812    10/27/23 0915  piperacillin-tazobactam (ZOSYN) 4.5 g in dextrose 5 % in water (D5W) 100 mL IVPB (MB+)         -- IV Every 8 hours (non-standard times) 10/27/23 0809    10/27/23 0908  vancomycin - pharmacy to dose  (vancomycin IVPB (PEDS and ADULTS))        See Hyperspace for full Linked Orders Report.    -- IV pharmacy to manage frequency 10/27/23 0809    10/26/23 2100  mupirocin 2 % ointment         10/31/23 2059 Nasl 2 times daily 10/26/23 1240    10/25/23 1315  rifAXIMin tablet 550 mg         -- PER NG TUBE 2 times daily 10/25/23 1303        Latest lactate reviewed-  No results for input(s): "LACTATE" in the last 72 hours.  Organ dysfunction indicated by Encephalopathy    Fluid challenge Not needed - patient is not hypotensive      Post- " resuscitation assessment No - Post resuscitation assessment not needed       Will Not start Pressors- Levophed for MAP of 65

## 2023-10-29 NOTE — ASSESSMENT & PLAN NOTE
· Hepatology following PEth. Daily CBC, CMP & INR.   · Not currently being evaluated for transplant due to clinical status  · Vitamin K given for chronic coagulopathy  · Continuing lactulose and rifaximin titrated to 3-4 bowel movements daily

## 2023-10-30 LAB
ALBUMIN SERPL BCP-MCNC: 2.6 G/DL (ref 3.5–5.2)
ALLENS TEST: ABNORMAL
ALP SERPL-CCNC: 112 U/L (ref 55–135)
ALT SERPL W/O P-5'-P-CCNC: 42 U/L (ref 10–44)
AMMONIA PLAS-SCNC: 35 UMOL/L (ref 10–50)
AMPHETAMINES SERPL QL: NEGATIVE
ANION GAP SERPL CALC-SCNC: 8 MMOL/L (ref 8–16)
AST SERPL-CCNC: 86 U/L (ref 10–40)
BACTERIA BLD CULT: NORMAL
BACTERIA SPEC AEROBE CULT: ABNORMAL
BACTERIA SPEC AEROBE CULT: ABNORMAL
BARBITURATES SERPL QL SCN: NEGATIVE
BASOPHILS # BLD AUTO: 0.08 K/UL (ref 0–0.2)
BASOPHILS NFR BLD: 1.2 % (ref 0–1.9)
BENZODIAZ SERPL QL SCN: NEGATIVE
BILIRUB SERPL-MCNC: 5.8 MG/DL (ref 0.1–1)
BUN SERPL-MCNC: 12 MG/DL (ref 6–20)
BZE SERPL QL: NEGATIVE
CALCIUM SERPL-MCNC: 8.5 MG/DL (ref 8.7–10.5)
CARBOXYTHC SERPL QL SCN: NEGATIVE
CHLORIDE SERPL-SCNC: 108 MMOL/L (ref 95–110)
CO2 SERPL-SCNC: 23 MMOL/L (ref 23–29)
CREAT SERPL-MCNC: 0.8 MG/DL (ref 0.5–1.4)
DELSYS: ABNORMAL
DIFFERENTIAL METHOD: ABNORMAL
EOSINOPHIL # BLD AUTO: 0.4 K/UL (ref 0–0.5)
EOSINOPHIL NFR BLD: 5.5 % (ref 0–8)
ERYTHROCYTE [DISTWIDTH] IN BLOOD BY AUTOMATED COUNT: 17.3 % (ref 11.5–14.5)
EST. GFR  (NO RACE VARIABLE): >60 ML/MIN/1.73 M^2
ETHANOL SERPL QL SCN: NEGATIVE
FIO2: 28
FLOW: 5
GLUCOSE SERPL-MCNC: 130 MG/DL (ref 70–110)
GRAM STN SPEC: ABNORMAL
HCO3 UR-SCNC: 27.1 MMOL/L (ref 24–28)
HCT VFR BLD AUTO: 24.2 % (ref 37–48.5)
HGB BLD-MCNC: 7.5 G/DL (ref 12–16)
IMM GRANULOCYTES # BLD AUTO: 0.03 K/UL (ref 0–0.04)
IMM GRANULOCYTES NFR BLD AUTO: 0.5 % (ref 0–0.5)
INR PPP: 1.9 (ref 0.8–1.2)
LYMPHOCYTES # BLD AUTO: 1.4 K/UL (ref 1–4.8)
LYMPHOCYTES NFR BLD: 21.6 % (ref 18–48)
MAGNESIUM SERPL-MCNC: 1.7 MG/DL (ref 1.6–2.6)
MCH RBC QN AUTO: 33 PG (ref 27–31)
MCHC RBC AUTO-ENTMCNC: 31 G/DL (ref 32–36)
MCV RBC AUTO: 107 FL (ref 82–98)
METHADONE SERPL QL SCN: NEGATIVE
METHYLMALONATE SERPL-SCNC: <0.1 UMOL/L
MODE: ABNORMAL
MONOCYTES # BLD AUTO: 1 K/UL (ref 0.3–1)
MONOCYTES NFR BLD: 15.3 % (ref 4–15)
NEUTROPHILS # BLD AUTO: 3.6 K/UL (ref 1.8–7.7)
NEUTROPHILS NFR BLD: 55.9 % (ref 38–73)
NRBC BLD-RTO: 0 /100 WBC
OPIATES SERPL QL SCN: NEGATIVE
PCO2 BLDA: 34.6 MMHG (ref 35–45)
PCP SERPL QL SCN: NEGATIVE
PH SMN: 7.5 [PH] (ref 7.35–7.45)
PHOSPHATE SERPL-MCNC: 2.1 MG/DL (ref 2.7–4.5)
PLATELET # BLD AUTO: 58 K/UL (ref 150–450)
PMV BLD AUTO: 11.4 FL (ref 9.2–12.9)
PO2 BLDA: 85 MMHG (ref 80–100)
POC BE: 4 MMOL/L
POC SATURATED O2: 97 % (ref 95–100)
POC TCO2: 28 MMOL/L (ref 23–27)
POCT GLUCOSE: 113 MG/DL (ref 70–110)
POCT GLUCOSE: 129 MG/DL (ref 70–110)
POCT GLUCOSE: 129 MG/DL (ref 70–110)
POTASSIUM SERPL-SCNC: 3.1 MMOL/L (ref 3.5–5.1)
PROPOXYPH SERPL QL: NEGATIVE
PROT SERPL-MCNC: 5.6 G/DL (ref 6–8.4)
PROTHROMBIN TIME: 20 SEC (ref 9–12.5)
RBC # BLD AUTO: 2.27 M/UL (ref 4–5.4)
SAMPLE: ABNORMAL
SITE: ABNORMAL
SODIUM SERPL-SCNC: 139 MMOL/L (ref 136–145)
SP02: 99
VANCOMYCIN TROUGH SERPL-MCNC: 14.6 UG/ML (ref 10–22)
VIT B1 BLD-MCNC: 76 UG/L (ref 38–122)
WBC # BLD AUTO: 6.52 K/UL (ref 3.9–12.7)

## 2023-10-30 PROCEDURE — 31720 CLEARANCE OF AIRWAYS: CPT | Mod: NTX

## 2023-10-30 PROCEDURE — 99900035 HC TECH TIME PER 15 MIN (STAT): Mod: NTX

## 2023-10-30 PROCEDURE — 85610 PROTHROMBIN TIME: CPT | Mod: NTX | Performed by: STUDENT IN AN ORGANIZED HEALTH CARE EDUCATION/TRAINING PROGRAM

## 2023-10-30 PROCEDURE — 99223 PR INITIAL HOSPITAL CARE,LEVL III: ICD-10-PCS | Mod: 25,NTX,, | Performed by: OTOLARYNGOLOGY

## 2023-10-30 PROCEDURE — 82140 ASSAY OF AMMONIA: CPT | Mod: NTX | Performed by: NURSE PRACTITIONER

## 2023-10-30 PROCEDURE — 99291 PR CRITICAL CARE, E/M 30-74 MINUTES: ICD-10-PCS | Mod: 25,NTX,, | Performed by: NURSE PRACTITIONER

## 2023-10-30 PROCEDURE — 25000003 PHARM REV CODE 250: Mod: NTX | Performed by: PSYCHIATRY & NEUROLOGY

## 2023-10-30 PROCEDURE — 63600175 PHARM REV CODE 636 W HCPCS: Mod: NTX | Performed by: STUDENT IN AN ORGANIZED HEALTH CARE EDUCATION/TRAINING PROGRAM

## 2023-10-30 PROCEDURE — 36600 WITHDRAWAL OF ARTERIAL BLOOD: CPT | Mod: NTX

## 2023-10-30 PROCEDURE — 80202 ASSAY OF VANCOMYCIN: CPT | Mod: NTX | Performed by: PSYCHIATRY & NEUROLOGY

## 2023-10-30 PROCEDURE — 25000003 PHARM REV CODE 250: Mod: NTX | Performed by: STUDENT IN AN ORGANIZED HEALTH CARE EDUCATION/TRAINING PROGRAM

## 2023-10-30 PROCEDURE — 25000003 PHARM REV CODE 250: Mod: NTX

## 2023-10-30 PROCEDURE — 83735 ASSAY OF MAGNESIUM: CPT | Mod: NTX | Performed by: STUDENT IN AN ORGANIZED HEALTH CARE EDUCATION/TRAINING PROGRAM

## 2023-10-30 PROCEDURE — 25000003 PHARM REV CODE 250: Mod: NTX | Performed by: NURSE PRACTITIONER

## 2023-10-30 PROCEDURE — 36620 INSERTION CATHETER ARTERY: CPT | Mod: NTX,,, | Performed by: NURSE PRACTITIONER

## 2023-10-30 PROCEDURE — 25000242 PHARM REV CODE 250 ALT 637 W/ HCPCS: Mod: NTX | Performed by: NURSE PRACTITIONER

## 2023-10-30 PROCEDURE — 63600175 PHARM REV CODE 636 W HCPCS: Mod: NTX | Performed by: PHYSICIAN ASSISTANT

## 2023-10-30 PROCEDURE — 25000003 PHARM REV CODE 250: Mod: NTX | Performed by: HOSPITALIST

## 2023-10-30 PROCEDURE — 27000221 HC OXYGEN, UP TO 24 HOURS: Mod: NTX

## 2023-10-30 PROCEDURE — 94640 AIRWAY INHALATION TREATMENT: CPT | Mod: NTX

## 2023-10-30 PROCEDURE — 80053 COMPREHEN METABOLIC PANEL: CPT | Mod: NTX | Performed by: HOSPITALIST

## 2023-10-30 PROCEDURE — 31575 DIAGNOSTIC LARYNGOSCOPY: CPT | Mod: NTX,,, | Performed by: OTOLARYNGOLOGY

## 2023-10-30 PROCEDURE — 25000003 PHARM REV CODE 250: Mod: NTX | Performed by: PHYSICIAN ASSISTANT

## 2023-10-30 PROCEDURE — 84100 ASSAY OF PHOSPHORUS: CPT | Mod: NTX | Performed by: STUDENT IN AN ORGANIZED HEALTH CARE EDUCATION/TRAINING PROGRAM

## 2023-10-30 PROCEDURE — 99291 CRITICAL CARE FIRST HOUR: CPT | Mod: 25,NTX,, | Performed by: NURSE PRACTITIONER

## 2023-10-30 PROCEDURE — 82803 BLOOD GASES ANY COMBINATION: CPT | Mod: NTX

## 2023-10-30 PROCEDURE — 20000000 HC ICU ROOM: Mod: NTX

## 2023-10-30 PROCEDURE — 99223 1ST HOSP IP/OBS HIGH 75: CPT | Mod: 25,NTX,, | Performed by: OTOLARYNGOLOGY

## 2023-10-30 PROCEDURE — 99232 PR SUBSEQUENT HOSPITAL CARE,LEVL II: ICD-10-PCS | Mod: NTX,,, | Performed by: INTERNAL MEDICINE

## 2023-10-30 PROCEDURE — 94668 MNPJ CHEST WALL SBSQ: CPT | Mod: NTX

## 2023-10-30 PROCEDURE — 31575 PR LARYNGOSCOPY, FLEXIBLE; DIAGNOSTIC: ICD-10-PCS | Mod: NTX,,, | Performed by: OTOLARYNGOLOGY

## 2023-10-30 PROCEDURE — 63600175 PHARM REV CODE 636 W HCPCS: Mod: NTX | Performed by: NURSE PRACTITIONER

## 2023-10-30 PROCEDURE — 63600175 PHARM REV CODE 636 W HCPCS: Mod: NTX | Performed by: PSYCHIATRY & NEUROLOGY

## 2023-10-30 PROCEDURE — 99232 SBSQ HOSP IP/OBS MODERATE 35: CPT | Mod: NTX,,, | Performed by: INTERNAL MEDICINE

## 2023-10-30 PROCEDURE — 36620 ARTERIAL LINE: ICD-10-PCS | Mod: NTX,,, | Performed by: NURSE PRACTITIONER

## 2023-10-30 PROCEDURE — 27200966 HC CLOSED SUCTION SYSTEM: Mod: NTX

## 2023-10-30 PROCEDURE — 94761 N-INVAS EAR/PLS OXIMETRY MLT: CPT | Mod: NTX

## 2023-10-30 PROCEDURE — C9254 INJECTION, LACOSAMIDE: HCPCS | Mod: NTX | Performed by: STUDENT IN AN ORGANIZED HEALTH CARE EDUCATION/TRAINING PROGRAM

## 2023-10-30 PROCEDURE — 85025 COMPLETE CBC W/AUTO DIFF WBC: CPT | Mod: NTX | Performed by: HOSPITALIST

## 2023-10-30 RX ORDER — DEXAMETHASONE SODIUM PHOSPHATE 4 MG/ML
6 INJECTION, SOLUTION INTRA-ARTICULAR; INTRALESIONAL; INTRAMUSCULAR; INTRAVENOUS; SOFT TISSUE EVERY 8 HOURS
Status: COMPLETED | OUTPATIENT
Start: 2023-10-30 | End: 2023-11-01

## 2023-10-30 RX ORDER — LACOSAMIDE 100 MG/1
100 TABLET ORAL EVERY 12 HOURS
Status: DISCONTINUED | OUTPATIENT
Start: 2023-10-30 | End: 2023-11-01

## 2023-10-30 RX ORDER — BUDESONIDE 0.5 MG/2ML
0.5 INHALANT ORAL ONCE
Status: COMPLETED | OUTPATIENT
Start: 2023-10-30 | End: 2023-10-30

## 2023-10-30 RX ADMIN — ACETYLCYSTEINE 2 ML: 200 SOLUTION ORAL; RESPIRATORY (INHALATION) at 12:10

## 2023-10-30 RX ADMIN — MUPIROCIN: 20 OINTMENT TOPICAL at 09:10

## 2023-10-30 RX ADMIN — THERA TABS 1 TABLET: TAB at 08:10

## 2023-10-30 RX ADMIN — RACEPINEPHRINE HYDROCHLORIDE 0.5 ML: 11.25 SOLUTION RESPIRATORY (INHALATION) at 11:10

## 2023-10-30 RX ADMIN — LACTULOSE 30 G: 20 SOLUTION ORAL at 08:10

## 2023-10-30 RX ADMIN — IPRATROPIUM BROMIDE AND ALBUTEROL SULFATE 3 ML: .5; 3 SOLUTION RESPIRATORY (INHALATION) at 12:10

## 2023-10-30 RX ADMIN — PIPERACILLIN SODIUM AND TAZOBACTAM SODIUM 4.5 G: 4; .5 INJECTION, POWDER, FOR SOLUTION INTRAVENOUS at 01:10

## 2023-10-30 RX ADMIN — POTASSIUM CHLORIDE 10 MEQ: 7.46 INJECTION, SOLUTION INTRAVENOUS at 08:10

## 2023-10-30 RX ADMIN — RIFAXIMIN 550 MG: 550 TABLET ORAL at 09:10

## 2023-10-30 RX ADMIN — FAMOTIDINE 20 MG: 20 TABLET ORAL at 09:10

## 2023-10-30 RX ADMIN — THIAMINE HYDROCHLORIDE 250 MG: 100 INJECTION, SOLUTION INTRAMUSCULAR; INTRAVENOUS at 10:10

## 2023-10-30 RX ADMIN — MUPIROCIN: 20 OINTMENT TOPICAL at 08:10

## 2023-10-30 RX ADMIN — RACEPINEPHRINE HYDROCHLORIDE 0.5 ML: 11.25 SOLUTION RESPIRATORY (INHALATION) at 09:10

## 2023-10-30 RX ADMIN — FAMOTIDINE 20 MG: 20 TABLET ORAL at 08:10

## 2023-10-30 RX ADMIN — IPRATROPIUM BROMIDE AND ALBUTEROL SULFATE 3 ML: .5; 3 SOLUTION RESPIRATORY (INHALATION) at 07:10

## 2023-10-30 RX ADMIN — RACEPINEPHRINE HYDROCHLORIDE 0.5 ML: 11.25 SOLUTION RESPIRATORY (INHALATION) at 06:10

## 2023-10-30 RX ADMIN — BUDESONIDE 0.5 MG: 0.5 INHALANT ORAL at 09:10

## 2023-10-30 RX ADMIN — MAGNESIUM SULFATE HEPTAHYDRATE 2 G: 40 INJECTION, SOLUTION INTRAVENOUS at 08:10

## 2023-10-30 RX ADMIN — POTASSIUM CHLORIDE 10 MEQ: 7.46 INJECTION, SOLUTION INTRAVENOUS at 05:10

## 2023-10-30 RX ADMIN — PIPERACILLIN SODIUM AND TAZOBACTAM SODIUM 4.5 G: 4; .5 INJECTION, POWDER, FOR SOLUTION INTRAVENOUS at 08:10

## 2023-10-30 RX ADMIN — LEVETIRACETAM 1500 MG: 500 SOLUTION ORAL at 08:10

## 2023-10-30 RX ADMIN — RIFAXIMIN 550 MG: 550 TABLET ORAL at 08:10

## 2023-10-30 RX ADMIN — ACETYLCYSTEINE 2 ML: 200 SOLUTION ORAL; RESPIRATORY (INHALATION) at 07:10

## 2023-10-30 RX ADMIN — POTASSIUM BICARBONATE 40 MEQ: 391 TABLET, EFFERVESCENT ORAL at 10:10

## 2023-10-30 RX ADMIN — SODIUM PHOSPHATE, MONOBASIC, MONOHYDRATE AND SODIUM PHOSPHATE, DIBASIC, ANHYDROUS 20.01 MMOL: 142; 276 INJECTION, SOLUTION INTRAVENOUS at 05:10

## 2023-10-30 RX ADMIN — LACOSAMIDE 100 MG: 10 INJECTION INTRAVENOUS at 09:10

## 2023-10-30 RX ADMIN — LEVETIRACETAM 1500 MG: 500 SOLUTION ORAL at 09:10

## 2023-10-30 RX ADMIN — DEXAMETHASONE SODIUM PHOSPHATE 6 MG: 4 INJECTION INTRA-ARTICULAR; INTRALESIONAL; INTRAMUSCULAR; INTRAVENOUS; SOFT TISSUE at 09:10

## 2023-10-30 RX ADMIN — FOLIC ACID 1 MG: 1 TABLET ORAL at 08:10

## 2023-10-30 RX ADMIN — LACTULOSE 30 G: 20 SOLUTION ORAL at 09:10

## 2023-10-30 RX ADMIN — DEXAMETHASONE SODIUM PHOSPHATE 6 MG: 4 INJECTION INTRA-ARTICULAR; INTRALESIONAL; INTRAMUSCULAR; INTRAVENOUS; SOFT TISSUE at 10:10

## 2023-10-30 RX ADMIN — IPRATROPIUM BROMIDE AND ALBUTEROL SULFATE 3 ML: .5; 3 SOLUTION RESPIRATORY (INHALATION) at 06:10

## 2023-10-30 RX ADMIN — LACOSAMIDE 100 MG: 100 TABLET, FILM COATED ORAL at 09:10

## 2023-10-30 NOTE — PROCEDURES
"Mara Mojica is a 58 y.o. female patient.    Temp: 98.6 °F (37 °C) (10/30/23 0705)  Pulse: 101 (10/30/23 1108)  Resp: 17 (10/30/23 1108)  BP: 134/63 (10/30/23 1006)  SpO2: 100 % (10/30/23 1108)  Weight: 56.2 kg (123 lb 14.4 oz) (10/26/23 0923)  Height: 4' 11" (149.9 cm) (10/26/23 0923)       Arterial Line    Date/Time: 10/30/2023 11:45 AM  Location procedure was performed: [facility] NEURO [facility]IT[facility]    Performed by: Bambi Urrutia NP  Authorized by: Bambi Urrutia NP  Consent Done: Yes  Consent: Written consent obtained.  Risks and benefits: risks, benefits and alternatives were discussed  Indications: multiple ABGs and hemodynamic monitoring  Location: right brachial  Darron's test normal: yes  Needle gauge: 20  Seldinger technique: Seldinger technique used  Number of attempts: 1  Complications: No  Specimens: No  Implants: No  Post-procedure: dressing applied  Post-procedure CMS: normal  Patient tolerance: Patient tolerated the procedure well with no immediate complications          10/30/2023    "

## 2023-10-30 NOTE — CONSULTS
[facility]  Otorhinolaryngology-Head & Neck Surgery  Consult Note    Patient Name: Mara Mojica  MRN: [id number]  Code Status: Full Code  Admission Date: 10/25/2023  Hospital Length of Stay: 5 days  Attending Physician: Lencho Hess DO  Primary Care Provider: Osiris Nevarez NP    Patient information was obtained from past medical records and primary team.     Inpatient consult to ENT  Consult performed by: Rojas Juan MD  Consult ordered by: Bambi Urrutia NP        Subjective:     Chief Complaint/Reason for Admission: acute encephalopathy; noisy breathing    History of Present Illness: Ms. Mojica is a 58 year-old female with a history of alcoholic cirrhosis who has been was initially admitted to OSH on 10/18 and subsequently transferred to [facility] on 10/25 for persistent encephalopathy. Stepped up to ICU given worsening mental status and concern for noisy breathing. Currently undergoing workup by Neurology and ID. EEG with epileptiform activities. ENT consulted for flexible laryngoscopy.    Per RN, patient currently unable to follow commands. Able to localize to pain and stimuli. On 5L face mask at 28%. Intermittent upper airway breathing. Requiring suctioning PRN.      Medications:  Continuous Infusions:  Scheduled Meds:   albuterol-ipratropium  3 mL Nebulization Q6H    dexAMETHasone  6 mg Intravenous Q8H    famotidine  20 mg Per NG tube BID    folic acid  1 mg Per NG tube Daily    lacosamide  100 mg Per NG tube Q12H    lactulose  30 g Per NG tube BID    levetiracetam  1,500 mg Per NG tube BID    multivitamin  1 tablet Per NG tube Daily    mupirocin   Nasal BID    racepinephrine  0.5 mL Nebulization Q6H    rifAXImin  550 mg Per NG tube BID    thiamine (B-1) 250 mg in dextrose 5 % (D5W) 100 mL IVPB  250 mg Intravenous Daily     PRN Meds:calcium gluconate IVPB, calcium gluconate IVPB, calcium gluconate IVPB, magnesium sulfate IVPB, magnesium sulfate IVPB, ondansetron,  potassium chloride **AND** potassium chloride **AND** potassium chloride, racepinephrine, sodium chloride 0.9%, sodium phosphate 15 mmol in dextrose 5 % (D5W) 250 mL IVPB, sodium phosphate 20.01 mmol in dextrose 5 % (D5W) 250 mL IVPB, sodium phosphate 30 mmol in dextrose 5 % (D5W) 250 mL IVPB     No current facility-administered medications on file prior to encounter.     Current Outpatient Medications on File Prior to Encounter   Medication Sig    furosemide (LASIX) 40 MG tablet Take 1 tablet (40 mg total) by mouth once daily.    lactulose (CHRONULAC) 20 gram/30 mL Soln Take 15 mLs (10 g total) by mouth 3 (three) times daily.    potassium chloride SA (K-DUR,KLOR-CON M) 10 MEQ tablet Take 10 mEq by mouth once daily.    propranoloL (INDERAL) 10 MG tablet Take 10 mg by mouth 2 (two) times daily.    spironolactone (ALDACTONE) 100 MG tablet Take 1 tablet (100 mg total) by mouth once daily.     Review of patient's allergies indicates:  No Known Allergies    Past Medical History:   Diagnosis Date    Alcoholic cirrhosis of liver     Kidney failure     Unilateral inguinal hernia, without obstruction or gangrene, not specified as recurrent      Past Surgical History:   Procedure Laterality Date    ESOPHAGOGASTRODUODENOSCOPY N/A 09/21/2023    Procedure: EGD (ESOPHAGOGASTRODUODENOSCOPY);  Surgeon: Melissa Edwards MD;  Location: [facility];  Service: Endoscopy;  Laterality: N/A;    HERNIA REPAIR      TONSILLECTOMY       Family History       Problem Relation (Age of Onset)    Ovarian cancer Mother    Seizures Father          Tobacco Use    Smoking status: Every Day     Current packs/day: 0.50     Average packs/day: 0.5 packs/day for 25.0 years (12.5 ttl pk-yrs)     Types: Cigarettes     Start date: 10/18/1998     Passive exposure: Never    Smokeless tobacco: Never   Substance and Sexual Activity    Alcohol use: Not Currently    Drug use: Never    Sexual activity: Not on file     Review of Systems  Objective:      Vital Signs (Most Recent):  Temp: 98.7 °F (37.1 °C) (10/30/23 1105)  Pulse: (!) 112 (10/30/23 1305)  Resp: (!) 21 (10/30/23 1305)  BP: (!) 183/82 (10/30/23 1305)  SpO2: 100 % (10/30/23 1305) Vital Signs (24h Range):  Temp:  [97.2 °F (36.2 °C)-98.7 °F (37.1 °C)] 98.7 °F (37.1 °C)  Pulse:  [] 112  Resp:  [14-29] 21  SpO2:  [95 %-100 %] 100 %  BP: (125-189)/(56-85) 183/82  Arterial Line BP: (227)/(128) 227/128     Weight: 56.2 kg (123 lb 14.4 oz)  Body mass index is 25.02 kg/m².    Date 10/30/23 0700 - 10/31/23 0659   Shift 4607-9803 5424-3271 5926-3133 24 Hour Total   INTAKE   I.V.(mL/kg) 49.6(0.9)   49.6(0.9)   NG/   200   IV Piggyback 1157.9   1157.9   Shift Total(mL/kg) 1407.5(25)   1407.5(25)   OUTPUT   Urine(mL/kg/hr) 135   135   Shift Total(mL/kg) 135(2.4)   135(2.4)   Weight (kg) 56.2 56.2 56.2 56.2      Physical Exam  Somnolent, nonresponsive to verbal stimuli  NGT in left naris  Oral cavity and oropharynx with moist mucous membranes    Flexible Laryngoscopy     Nasal Cavity/Nasopharynx: Normal mucosa, inferior turbinates and septum. No evidence of septal deviation or perforation. There are no visible lesions. Melissa within normal limits.  Oropharynx: Pharyngeal wall without edema, lesions, or masses. Bilateral palatine tonsils within normal limits. Base of tongue symmetric without masses or lesions. Lingual tonsil within normal limits.  Hypopharynx: No lesions or masses noted within the piriform sinuses or post cricoid area. Mild pooling of secretions.   Supraglottis: There is no edema of the arytenoids, interarytenoid space or epiglottis. Epiglottis is crisp. There are no masses or lesions noted. False vocal folds without masses or lesions. Overall soft tissue redundancy of the arytenoids.   Glottis: True vocal folds without masses or lesions. Normal mobility and airway patency.        Significant Labs:  BMP:   Recent Labs   Lab 10/30/23  0411   *      CO2 23   BUN 12   CREATININE  0.8   CALCIUM 8.5*   MG 1.7     CBC:   Recent Labs   Lab 10/30/23  0411   WBC 6.52   RBC 2.27*   HGB 7.5*   HCT 24.2*   PLT 58*   *   MCH 33.0*   MCHC 31.0*     Significant Diagnostics:  I have reviewed all pertinent imaging results/findings within the past 24 hours.    Assessment/Plan:     Acute hepatic encephalopathy  58F with acute encephalopathy of unknown origin. Possible epileptic versus hepatic in origin. Flexible laryngoscopy with no evidence of any upper airway masses or true vocal fold immobility. Some pooling of secretions that exacerbates noisy breathing.    -- No acute interventions from ENT  -- Recommend aggressive pulmonary toiletry, frequent back of throat suctioning with Yankauer  -- Currently on face mask; can consider PPV if necessary  -- If continued to have respiratory decline, would be amenable to intubation with 6.0 or 6.5 ETT  -- Please Secure Chat me for any questions, page ENT on-call if unresponsive or urgent      VTE Risk Mitigation (From admission, onward)         Ordered     IP VTE LOW RISK PATIENT  Once         10/25/23 1303     Place JOI hose  Until discontinued         10/25/23 1303     Place sequential compression device  Until discontinued         10/25/23 1303                Thank you for your consult. I will sign off. Please contact us if you have any additional questions.    Rojas Juan MD  Otorhinolaryngology-Head & Neck Surgery  [facility]

## 2023-10-30 NOTE — ASSESSMENT & PLAN NOTE
58F with acute encephalopathy of unknown origin. Possible epileptic versus hepatic in origin. Flexible laryngoscopy with no evidence of any upper airway masses or true vocal fold immobility. Some pooling of secretions that exacerbates noisy breathing.    -- No acute interventions from ENT  -- Recommend aggressive pulmonary toiletry, frequent back of throat suctioning with Yankauer  -- Currently on face mask; can consider PPV if necessary  -- If continued to have respiratory decline, would be amenable to intubation with 6.0 or 6.5 ETT  -- Please Secure Chat me for any questions, page ENT on-call if unresponsive or urgent

## 2023-10-30 NOTE — SUBJECTIVE & OBJECTIVE
Review of Systems  Constitutional: Denies fevers, weight loss, chills, or weakness.  Eyes: Denies changes in vision.  ENT: Denies dysphagia, nasal discharge, ear pain or discharge.  Cardiovascular: Denies chest pain, palpitations, orthopnea, or claudication.  Respiratory: Denies shortness of breath, cough, hemoptysis, or wheezing.  GI: Denies nausea/vomitting, hematochezia, melena, abd pain, or changes in appetite.  : Denies dysuria, incontinence, or hematuria.  Musculoskeletal: Denies joint pain or myalgias.  Skin/breast: Denies rashes, lumps, lesions, or discharge.  Neurologic: Denies headache, dizziness, vertigo, or paresthesias.  Psychiatric: Denies changes in mood or hallucinations.  Endocrine: Denies polyuria, polydipsia, heat/cold intolerance.  Hematologic/Lymph: Denies lymphadenopathy, easy bruising or easy bleeding.  Allergic/Immunologic: Denies rash, rhinitis.    Objective:     Vitals:  Temp: 98.7 °F (37.1 °C)  Pulse: (!) 112  Rhythm: sinus tachycardia  BP: (!) 183/82  MAP (mmHg): 118  Resp: (!) 21  SpO2: 100 %  Oxygen Concentration (%): 28    Temp  Min: 97.2 °F (36.2 °C)  Max: 98.7 °F (37.1 °C)  Pulse  Min: 88  Max: 113  BP  Min: 125/56  Max: 189/74  MAP (mmHg)  Min: 81  Max: 122  Resp  Min: 14  Max: 29  SpO2  Min: 95 %  Max: 100 %  Oxygen Concentration (%)  Min: 25  Max: 28    10/29 0701 - 10/30 0700  In: 1952.2 [I.V.:50]  Out: 1790 [Urine:1740]   Unmeasured Output  Urine Occurrence: 5  Stool Occurrence: 1  Emesis Occurrence: 0  Pad Count: 5        Physical Exam  GA: Alert, comfortable, no acute distress.   HEENT: No scleral icterus or JVD.   Pulmonary: Clear to auscultation A/L. No wheezing, crackles, or rhonchi.  Cardiac: RRR S1 & S2 w/o rubs/murmurs/gallops.   Abdominal: Bowel sounds present x 4. No appreciable hepatosplenomegaly.  Skin: No jaundice, rashes, or visible lesions.  Neuro:  --GCS: E3 V1 M4  --Mental Status:  opens eyes, doesn't follow commands  --CN II-XII grossly intact.   --Pupils  4->3mm, PERRL.   --Corneal reflex, gag, cough intact.  --Withdraws to pain in all extremities       Medications:  Continuous Scheduledalbuterol-ipratropium, 3 mL, Q6H  dexAMETHasone, 6 mg, Q8H  famotidine, 20 mg, BID  folic acid, 1 mg, Daily  lacosamide, 100 mg, Q12H  lactulose, 30 g, BID  levetiracetam, 1,500 mg, BID  multivitamin, 1 tablet, Daily  mupirocin, , BID  racepinephrine, 0.5 mL, Q6H  rifAXImin, 550 mg, BID  thiamine (B-1) 250 mg in dextrose 5 % (D5W) 100 mL IVPB, 250 mg, Daily    PRNcalcium gluconate IVPB, 1 g, PRN  calcium gluconate IVPB, 2 g, PRN  calcium gluconate IVPB, 3 g, PRN  magnesium sulfate IVPB, 2 g, PRN  magnesium sulfate IVPB, 4 g, PRN  ondansetron, 4 mg, Q8H PRN  potassium chloride, 40 mEq, PRN   And  potassium chloride, 60 mEq, PRN   And  potassium chloride, 80 mEq, PRN  racepinephrine, 0.5 mL, Q4H PRN  sodium chloride 0.9%, 10 mL, PRN  sodium phosphate 15 mmol in dextrose 5 % (D5W) 250 mL IVPB, 15 mmol, PRN  sodium phosphate 20.01 mmol in dextrose 5 % (D5W) 250 mL IVPB, 20.01 mmol, PRN  sodium phosphate 30 mmol in dextrose 5 % (D5W) 250 mL IVPB, 30 mmol, PRN      Today I personally reviewed pertinent medications, lines/drains/airways, imaging, cardiology results, laboratory results, microbiology results,     Diet  No diet orders on file  No diet orders on file

## 2023-10-30 NOTE — SUBJECTIVE & OBJECTIVE
Medications:  Continuous Infusions:  Scheduled Meds:   albuterol-ipratropium  3 mL Nebulization Q6H    dexAMETHasone  6 mg Intravenous Q8H    famotidine  20 mg Per NG tube BID    folic acid  1 mg Per NG tube Daily    lacosamide  100 mg Per NG tube Q12H    lactulose  30 g Per NG tube BID    levetiracetam  1,500 mg Per NG tube BID    multivitamin  1 tablet Per NG tube Daily    mupirocin   Nasal BID    racepinephrine  0.5 mL Nebulization Q6H    rifAXImin  550 mg Per NG tube BID    thiamine (B-1) 250 mg in dextrose 5 % (D5W) 100 mL IVPB  250 mg Intravenous Daily     PRN Meds:calcium gluconate IVPB, calcium gluconate IVPB, calcium gluconate IVPB, magnesium sulfate IVPB, magnesium sulfate IVPB, ondansetron, potassium chloride **AND** potassium chloride **AND** potassium chloride, racepinephrine, sodium chloride 0.9%, sodium phosphate 15 mmol in dextrose 5 % (D5W) 250 mL IVPB, sodium phosphate 20.01 mmol in dextrose 5 % (D5W) 250 mL IVPB, sodium phosphate 30 mmol in dextrose 5 % (D5W) 250 mL IVPB     No current facility-administered medications on file prior to encounter.     Current Outpatient Medications on File Prior to Encounter   Medication Sig    furosemide (LASIX) 40 MG tablet Take 1 tablet (40 mg total) by mouth once daily.    lactulose (CHRONULAC) 20 gram/30 mL Soln Take 15 mLs (10 g total) by mouth 3 (three) times daily.    potassium chloride SA (K-DUR,KLOR-CON M) 10 MEQ tablet Take 10 mEq by mouth once daily.    propranoloL (INDERAL) 10 MG tablet Take 10 mg by mouth 2 (two) times daily.    spironolactone (ALDACTONE) 100 MG tablet Take 1 tablet (100 mg total) by mouth once daily.     Review of patient's allergies indicates:  No Known Allergies    Past Medical History:   Diagnosis Date    Alcoholic cirrhosis of liver     Kidney failure     Unilateral inguinal hernia, without obstruction or gangrene, not specified as recurrent      Past Surgical History:   Procedure Laterality Date    ESOPHAGOGASTRODUODENOSCOPY N/A  09/21/2023    Procedure: EGD (ESOPHAGOGASTRODUODENOSCOPY);  Surgeon: Melissa Edwards MD;  Location: [facility];  Service: Endoscopy;  Laterality: N/A;    HERNIA REPAIR      TONSILLECTOMY       Family History       Problem Relation (Age of Onset)    Ovarian cancer Mother    Seizures Father          Tobacco Use    Smoking status: Every Day     Current packs/day: 0.50     Average packs/day: 0.5 packs/day for 25.0 years (12.5 ttl pk-yrs)     Types: Cigarettes     Start date: 10/18/1998     Passive exposure: Never    Smokeless tobacco: Never   Substance and Sexual Activity    Alcohol use: Not Currently    Drug use: Never    Sexual activity: Not on file     Review of Systems  Objective:     Vital Signs (Most Recent):  Temp: 98.7 °F (37.1 °C) (10/30/23 1105)  Pulse: (!) 112 (10/30/23 1305)  Resp: (!) 21 (10/30/23 1305)  BP: (!) 183/82 (10/30/23 1305)  SpO2: 100 % (10/30/23 1305) Vital Signs (24h Range):  Temp:  [97.2 °F (36.2 °C)-98.7 °F (37.1 °C)] 98.7 °F (37.1 °C)  Pulse:  [] 112  Resp:  [14-29] 21  SpO2:  [95 %-100 %] 100 %  BP: (125-189)/(56-85) 183/82  Arterial Line BP: (227)/(128) 227/128     Weight: 56.2 kg (123 lb 14.4 oz)  Body mass index is 25.02 kg/m².    Date 10/30/23 0700 - 10/31/23 0659   Shift 7050-1730 3100-0005 6836-2276 24 Hour Total   INTAKE   I.V.(mL/kg) 49.6(0.9)   49.6(0.9)   NG/   200   IV Piggyback 1157.9   1157.9   Shift Total(mL/kg) 1407.5(25)   1407.5(25)   OUTPUT   Urine(mL/kg/hr) 135   135   Shift Total(mL/kg) 135(2.4)   135(2.4)   Weight (kg) 56.2 56.2 56.2 56.2      Physical Exam  Somnolent, nonresponsive to verbal stimuli  NGT in left naris  Oral cavity and oropharynx with moist mucous membranes    Flexible Laryngoscopy     Nasal Cavity/Nasopharynx: Normal mucosa, inferior turbinates and septum. No evidence of septal deviation or perforation. There are no visible lesions. Melissa within normal limits.  Oropharynx: Pharyngeal wall without edema, lesions, or masses. Bilateral  palatine tonsils within normal limits. Base of tongue symmetric without masses or lesions. Lingual tonsil within normal limits.  Hypopharynx: No lesions or masses noted within the piriform sinuses or post cricoid area. Mild pooling of secretions.   Supraglottis: There is no edema of the arytenoids, interarytenoid space or epiglottis. Epiglottis is crisp. There are no masses or lesions noted. False vocal folds without masses or lesions. Overall soft tissue redundancy of the arytenoids.   Glottis: True vocal folds without masses or lesions. Normal mobility and airway patency.        Significant Labs:  BMP:   Recent Labs   Lab 10/30/23  0411   *      CO2 23   BUN 12   CREATININE 0.8   CALCIUM 8.5*   MG 1.7     CBC:   Recent Labs   Lab 10/30/23  0411   WBC 6.52   RBC 2.27*   HGB 7.5*   HCT 24.2*   PLT 58*   *   MCH 33.0*   MCHC 31.0*     Significant Diagnostics:  I have reviewed all pertinent imaging results/findings within the past 24 hours.

## 2023-10-30 NOTE — NURSING
Wound care consult received for perineal region.  Reviewed chart for this encounter.    Per bedside RN, pt unstable d/t respiratory decline, unable to turn at this time. Per chart review, perirectal region wounds r/t moisture, Triad left at bedside for care in the meantime.   Will attempt wound care assessment tomorrow.

## 2023-10-30 NOTE — PROCEDURES
"Mara Mojica is a 58 y.o. female patient.    Temp: 98.6 °F (37 °C) (10/30/23 0705)  Pulse: 105 (10/30/23 0911)  Resp: 16 (10/30/23 0911)  BP: (!) 150/80 (10/30/23 0705)  SpO2: 100 % (10/30/23 0911)  Weight: 56.2 kg (123 lb 14.4 oz) (10/26/23 0923)  Height: 4' 11" (149.9 cm) (10/26/23 0923)       Arterial Line    Date/Time: 10/30/2023 9:38 AM  Location procedure was performed: [facility] NEURO CRITICAL CARE    Performed by: Bambi Urrutia NP  Authorized by: Bambi Urrutia NP  Preparation: Patient was prepped and draped in the usual sterile fashion.  Indications: multiple ABGs and hemodynamic monitoring  Location: left brachial    Patient sedated: no  Darron's test normal: yes  Seldinger technique: Seldinger technique used  Number of attempts: 1  Complications: No  Specimens: No  Implants: No  Post-procedure: dressing applied  Post-procedure CMS: normal  Patient tolerance: Patient tolerated the procedure well with no immediate complications          10/30/2023    "

## 2023-10-30 NOTE — PROGRESS NOTES
[facility]  Hepatology  Progress Note    Patient Name: Mara Mojica  MRN: [id number]  Admission Date: 10/25/2023  Hospital Length of Stay: 5 days    Subjective:     Interval History:  VSS. Patient has been on Keppra and Vimpat.  No epileptiform activity noted on EEG yesterday.    Diuretics were resumed and she made 1.7 L of urine yesterday.    No family at bedside    Objective:     Vitals:    10/30/23 1227   BP:    Pulse: 104   Resp: (!) 28   Temp:          Constitutional: Ill appearing. Does not respond to verbal commands, but grimaces to painful stimulus.   HENT: Head: Normal, normocephalic, atraumatic.   Eyes: Icteric sclera  GI: soft, non-tender, without masses or organomegaly and distended  Neurological: Disoriented, not responding to commands      Significant Labs:  Recent Labs   Lab 10/28/23  0021 10/29/23  0535 10/30/23  0411   HGB 7.5* 7.5* 7.5*       Lab Results   Component Value Date    WBC 6.52 10/30/2023    HGB 7.5 (L) 10/30/2023    HCT 24.2 (L) 10/30/2023     (H) 10/30/2023    PLT 58 (L) 10/30/2023       Lab Results   Component Value Date     10/30/2023    K 3.1 (L) 10/30/2023     10/30/2023    CO2 23 10/30/2023    BUN 12 10/30/2023    CREATININE 0.8 10/30/2023    CALCIUM 8.5 (L) 10/30/2023    ANIONGAP 8 10/30/2023       Lab Results   Component Value Date    ALT 42 10/30/2023    AST 86 (H) 10/30/2023    ALKPHOS 112 10/30/2023    BILITOT 5.8 (H) 10/30/2023       Lab Results   Component Value Date    INR 1.9 (H) 10/30/2023    INR 2.1 (H) 10/29/2023    INR 2.1 (H) 10/28/2023       Significant Imaging:  Reviewed pertinent radiology findings.       Assessment/Plan:     Mara Mojica is a 58 y.o. female with history of alcoholic cirrhosis with ascites, s/p Ex Lap with bowel resection for incarcerated hernia, recent SBO, who presented to [facility] on 10/18 with AMS. EtOH level < 10. CTH/MRI brain unremarkable. EEG showed mild generalized nonspecific cerebral  dysfunction with no indication of seizure. Ammonia level WNL; not improving with lactulose in spite of large BMs. She was seen by Dr. Reardon in October 2023 for decompensated cirrhosis and it was recommended to proceed with liver transplant evaluation.  EGD in September 2023 showed normal esophagus and portal hypertensive gastropathy. Now with worsening mentation and seizure activity on EEG; moved to neurocritical ICU. Now on Keppra and Vimpat with no seizures, but no improvement in mentation. Optimized on lactulose and Xifaxin.      Problem List:  Alcoholic cirrhosis of liver with ascites   AMS - not readily attributable to HE (differentials include encephalitis v/s seizure)  MELD 3.0: 23 at 10/30/2023  4:11 AM  MELD-Na: 20 at 10/30/2023  4:11 AM  Calculated from:  Serum Creatinine: 0.8 mg/dL (Using min of 1 mg/dL) at 10/30/2023  4:11 AM  Serum Sodium: 139 mmol/L (Using max of 137 mmol/L) at 10/30/2023  4:11 AM  Total Bilirubin: 5.8 mg/dL at 10/30/2023  4:11 AM  Serum Albumin: 2.6 g/dL at 10/30/2023  4:11 AM  INR(ratio): 1.9 at 10/30/2023  4:11 AM  Age at listing (hypothetical): 58 years  Sex: Female at 10/30/2023  4:11 AM       Recommendations:  - Appreciate recs from neurology regarding management of epilepsy.   - Start lactulose via NG tube TID (titrate till 3-4 daily BM achieved; consider rectal lactulose if not able to tolerate PO). Continue Xifaxin.   - Avoid opiates and benzodiazepines, if able.   - IV thiamine and folate supplementation  - IV Rocephin for SBP prophylaxis for a total of 5 days  - We are not initiating liver transplant evaluation at this time. Patient will need to be conscious and able to participate in a discussion with us before this is possible.   - Will follow PEt. Daily CBC, CMP & INR.     Thank you for involving us in the care of Mara Mojica. Please call with any additional questions, concerns or changes in the patient's clinical status. We will continue to follow.     Lv  MD Martín  Gastroenterology Fellow PGY IV   [facility]

## 2023-10-30 NOTE — ASSESSMENT & PLAN NOTE
58 year old presented for altered mental status on 10/18. Clinical picture confounded with hepatic encephalopathy, infection, and seizure activity. Neurology was consulted by primary team prior to admission to [facility].    10/27 EEG IMPRESSION:  This is an abnormal EEG during lethargic state.  Diffuse disorganized slowing of the background was noted.  Frequent triphasic waves noted.  Left posterior pseudo periodic epileptiform discharges were noted.  Numerous electrographic seizures emanating from the left posterior region were noted.    10/28: EEG update showing no new activity since 10/27 morning.   10/29: still no seizures per phone report, awaiting formal read and can likely disconnect eeg    · vEEG in place  · Keppra 1500 BID  · Vimpat loaded 10/27, continue 100 mg BID  · Airway monitoring  · Neurochecks q1hr  · Vitals q1hr

## 2023-10-30 NOTE — HPI
Ms. Mojica is a 58 year-old female with a history of alcoholic cirrhosis who has been was initially admitted to OSH on 10/18 and subsequently transferred to [facility] on 10/25 for persistent encephalopathy. Stepped up to ICU given worsening mental status and concern for noisy breathing. Currently undergoing workup by Neurology and ID. EEG with epileptiform activities. ENT consulted for flexible laryngoscopy.    Per RN, patient currently unable to follow commands. Able to localize to pain and stimuli. On 5L face mask at 28%. Intermittent upper airway breathing. Requiring suctioning PRN.

## 2023-10-30 NOTE — PROGRESS NOTES
VANCOMYCIN DOSING BY PHARMACY DISCONTINUATION NOTE    Mara Mojica is a 58 y.o. female who had been consulted for vancomycin dosing.    The pharmacy consult for vancomycin dosing has been discontinued.     Vancomycin Dosing by Pharmacy Consult will sign-off. Please reconsult if necessary. Thank you for allowing us to participate in this patient's care.     Tanya Ayoub, Pharm.D.,[facility]  Phone: [phone]

## 2023-10-30 NOTE — PLAN OF CARE
"[facility] Care Plan    POC reviewed with Mara Mojica and family at 0558. Pt unable to verbalized understanding. No acute events overnight. Pt progressing toward goals. Will continue to monitor. See below and flowsheets for full assessment and VS info.     -TF at goal  -Q4 free water flushes 100cc      Is this a stroke patient? no    Neuro:  [place] Coma Scale  Best Eye Response: 3-->(E3) to speech  Best Motor Response: 4-->(M4) withdraws from pain  Best Verbal Response: 1-->(V1) none  [place] Coma Scale Score: 8  Assessment Qualifiers: no eye obstruction present, patient not sedated/intubated  Pupil PERRLA: yes     24hr Temp:  [97.2 °F (36.2 °C)-98.5 °F (36.9 °C)]     CV:   Rhythm: normal sinus rhythm  BP goals:   SBP < 180  MAP > 65    Resp:      Oxygen Concentration (%): 25    Plan:  aggressive pulmonary toileting; CPT ; 5L mask     GI/:     Diet/Nutrition Received: NPO, tube feeding, multivitamin  Last Bowel Movement: 10/29/23  Voiding Characteristics: urethral catheter (bladder)    Intake/Output Summary (Last 24 hours) at 10/30/2023 0558  Last data filed at 10/30/2023 0450  Gross per 24 hour   Intake 1952.19 ml   Output 2140 ml   Net -187.81 ml     Unmeasured Output  Urine Occurrence: 5  Stool Occurrence: 1  Emesis Occurrence: 0  Pad Count: 5    Labs/Accuchecks:  Recent Labs   Lab 10/30/23  0411   WBC 6.52   RBC 2.27*   HGB 7.5*   HCT 24.2*   PLT 58*      Recent Labs   Lab 10/30/23  0411      K 3.1*   CO2 23      BUN 12   CREATININE 0.8   ALKPHOS 112   ALT 42   AST 86*   BILITOT 5.8*      Recent Labs   Lab 10/30/23  0411   INR 1.9*    No results for input(s): "CPK", "CPKMB", "TROPONINI", "MB" in the last 168 hours.    Electrolytes: Electrolytes replaced  Accuchecks: Q6H    Gtts:      LDA/Wounds:  Lines/Drains/Airways       Drain  Duration                  NG/OG Tube 10/20/23 2000 16 Fr. Left nostril 9 days         Rectal Tube 10/28/23 0530 fecal management system 2 days         Urethral Catheter " 10/28/23 1556 1 day              Peripheral Intravenous Line  Duration                  Peripheral IV - Single Lumen 10/25/23 0004 20 G Left;Posterior Hand 5 days         Peripheral IV - Single Lumen 10/27/23 1300 20 G Anterior;Proximal;Right Forearm 2 days         Peripheral IV - Single Lumen 10/29/23 1130 20 G Anterior;Left Forearm <1 day                  Wounds: Yes  Wound care consulted: Yes

## 2023-10-30 NOTE — PROGRESS NOTES
[facility] - Neuro Critical Care  Neurocritical Care  Progress Note    Admit Date: 10/25/2023  Service Date: 10/30/2023  Length of Stay: 5    Subjective:     Chief Complaint: Acute encephalopathy    History of Present Illness: This is a 58-year-old female with a past medical history of alcoholic cirrhosis, s/p ex-lap w/ bowel resection for incarcerated hernia, who initially presented on 10/18 to [facility] with acute encephalopathy.  Her  found her on the floor at home with evidence of fecal/urine incontinence with confusion and slurred speech. Possible reported tongue injury in chart. Reported concern L sided weakness and down drift of L arm however CT head without evidence of acute abnormalities, MRI brain with only small vessel vascular changes without evidence of territorial infarct.     Hospital Course: EEG done on 10/19 with mild diffuse nonspecific background slowing, no potential epileptiform activity. Repeat MRI brain with contrast on 10/23 unchanged from initial MRI. Became more lethargic and was no longer following commands or answering questions. Due to worsening hepatic encephalopathy in setting of hepatic cirrhosis, patient transferred [place] [facility] [place] management with transplant team. Has remained on antibiotics, lactulose and rifaximin for treatment of UTI with pansensitive Ecoli, HE, and presumed SBP. Neurology consulted for management recommendations regarding persistent AMS. Had repeat EEG done on 10/24 with similar findings to prior EEG showing mild generalized non-specific cerebral dysfunction and no electrographic seizures or indication of seizure tendency. Additional CT head w/o contrast on 10/25 without evidence of acute issues. Remains confused and unable to answer questions on exam. Not withdrawing to painful stimuli. Was able to awaken to verbal stimuli in AM however when reevaluated in afternoon unresponsive however was after receiving Ativan.     On admission to [facility]:  Patient had EEG  running, and was noted to have seizures at 7:30 a.m., 8:00 a.m. in, and 10:00 a.m. was noted to be in focal status prior to receiving Vimpat.  Patient was noted to have stridor, worsening secretions with suspicion for aspiration, decreased airway protection, and rapids was called due to low GCS score of 6. Antibiotics broadened to Vanc/Zosyn. Clinical picture of mental status confounded with episodes of seizures, infection, and hepatic encephalopathy.               Hospital Course: 10/28: Improving GCS from 6 to 10. Continuing pulmonary toilet and deep suctioning for stridor and copious secretions. UA positive for candida. Blood cultures from 10/27 NGTD. Sputum cultures sent. Patient on day 2 of broadened antibiotics vanc/zosyn due to worsening clinical status but will discontinue tomorrow if cultures remain negative. Still hypernatremic, treating with D5W. Patient was transferred with no ordered diuretics, very edematous on physical exam. Adequate stooling on lactulose and rifaximin. Due to increased UOP/stooling will place eckert for one day for irritated skin. EEG update showing no seizures since 10am 10/27. Monitoring CBC for thrombocytopenia and macrocytic anemia. Discussed code status and goals of care with  and best friend at bedside. Trickle feeds resumed.   10/29: No acute events overnight, EEG reportedly without further seizures for ~48 hours can disconnect once formal report is in, neuro status slowly improving as patient now tracking in room, moving extremities, responding with appropriate emotional reactions to her .  Respiratory status largely unchanged with intermittent events of large volume breaths that sound almost stridorous but without actual respiratory distress- gas remains compensated.  UOP low, diuresis resumed.  10/30/2023: d/c mucomyst nebs, add racemic epi scheduled nebs, add budesonide and dex 6 q8 hours, ammonia at 35- continue lactulose and rifaximin, abg reviewed, 40mEq of  K once, ENT consulted for stridor, continue eckert catheter in today           Review of Systems  Constitutional: Denies fevers, weight loss, chills, or weakness.  Eyes: Denies changes in vision.  ENT: Denies dysphagia, nasal discharge, ear pain or discharge.  Cardiovascular: Denies chest pain, palpitations, orthopnea, or claudication.  Respiratory: Denies shortness of breath, cough, hemoptysis, or wheezing.  GI: Denies nausea/vomitting, hematochezia, melena, abd pain, or changes in appetite.  : Denies dysuria, incontinence, or hematuria.  Musculoskeletal: Denies joint pain or myalgias.  Skin/breast: Denies rashes, lumps, lesions, or discharge.  Neurologic: Denies headache, dizziness, vertigo, or paresthesias.  Psychiatric: Denies changes in mood or hallucinations.  Endocrine: Denies polyuria, polydipsia, heat/cold intolerance.  Hematologic/Lymph: Denies lymphadenopathy, easy bruising or easy bleeding.  Allergic/Immunologic: Denies rash, rhinitis.    Objective:     Vitals:  Temp: 98.7 °F (37.1 °C)  Pulse: (!) 112  Rhythm: sinus tachycardia  BP: (!) 183/82  MAP (mmHg): 118  Resp: (!) 21  SpO2: 100 %  Oxygen Concentration (%): 28    Temp  Min: 97.2 °F (36.2 °C)  Max: 98.7 °F (37.1 °C)  Pulse  Min: 88  Max: 113  BP  Min: 125/56  Max: 189/74  MAP (mmHg)  Min: 81  Max: 122  Resp  Min: 14  Max: 29  SpO2  Min: 95 %  Max: 100 %  Oxygen Concentration (%)  Min: 25  Max: 28    10/29 0701 - 10/30 0700  In: 1952.2 [I.V.:50]  Out: 1790 [Urine:1740]   Unmeasured Output  Urine Occurrence: 5  Stool Occurrence: 1  Emesis Occurrence: 0  Pad Count: 5        Physical Exam  GA: Alert, comfortable, no acute distress.   HEENT: No scleral icterus or JVD.   Pulmonary: Clear to auscultation A/L. No wheezing, crackles, or rhonchi.  Cardiac: RRR S1 & S2 w/o rubs/murmurs/gallops.   Abdominal: Bowel sounds present x 4. No appreciable hepatosplenomegaly.  Skin: No jaundice, rashes, or visible lesions.  Neuro:  --GCS: E3 V1 M4  --Mental Status:   opens eyes, doesn't follow commands  --CN II-XII grossly intact.   --Pupils 4->3mm, PERRL.   --Corneal reflex, gag, cough intact.  --Withdraws to pain in all extremities       Medications:  Continuous Scheduledalbuterol-ipratropium, 3 mL, Q6H  dexAMETHasone, 6 mg, Q8H  famotidine, 20 mg, BID  folic acid, 1 mg, Daily  lacosamide, 100 mg, Q12H  lactulose, 30 g, BID  levetiracetam, 1,500 mg, BID  multivitamin, 1 tablet, Daily  mupirocin, , BID  racepinephrine, 0.5 mL, Q6H  rifAXImin, 550 mg, BID  thiamine (B-1) 250 mg in dextrose 5 % (D5W) 100 mL IVPB, 250 mg, Daily    PRNcalcium gluconate IVPB, 1 g, PRN  calcium gluconate IVPB, 2 g, PRN  calcium gluconate IVPB, 3 g, PRN  magnesium sulfate IVPB, 2 g, PRN  magnesium sulfate IVPB, 4 g, PRN  ondansetron, 4 mg, Q8H PRN  potassium chloride, 40 mEq, PRN   And  potassium chloride, 60 mEq, PRN   And  potassium chloride, 80 mEq, PRN  racepinephrine, 0.5 mL, Q4H PRN  sodium chloride 0.9%, 10 mL, PRN  sodium phosphate 15 mmol in dextrose 5 % (D5W) 250 mL IVPB, 15 mmol, PRN  sodium phosphate 20.01 mmol in dextrose 5 % (D5W) 250 mL IVPB, 20.01 mmol, PRN  sodium phosphate 30 mmol in dextrose 5 % (D5W) 250 mL IVPB, 30 mmol, PRN      Today I personally reviewed pertinent medications, lines/drains/airways, imaging, cardiology results, laboratory results, microbiology results,     Diet  No diet orders on file  No diet orders on file          Assessment/Plan:     Neuro  * Acute encephalopathy  See acute hepatic encephalopathy and seizures    Seizure  58 year old presented for altered mental status on 10/18. Clinical picture confounded with hepatic encephalopathy, infection, and seizure activity. Neurology was consulted by primary team prior to admission to [facility].    10/27 EEG IMPRESSION:  This is an abnormal EEG during lethargic state.  Diffuse disorganized slowing of the background was noted.  Frequent triphasic waves noted.  Left posterior pseudo periodic epileptiform discharges were  noted.  Numerous electrographic seizures emanating from the left posterior region were noted.    10/28: EEG update showing no new activity since 10/27 morning.   10/29: still no seizures per phone report, awaiting formal read and can likely disconnect eeg    · vEEG in place  · Keppra 1500 BID  · Vimpat loaded 10/27, continue 100 mg BID  · Airway monitoring  · Neurochecks q1hr  · Vitals q1hr       Renal/  Hypernatremia      · D5W infusion completed  · Repeat BMP  · Trend CMP    Hematology  Thrombocytopenia  This is a 58-year-old woman with a history of decompensated alcoholic cirrhosis.  Suspect thrombocytopenia is likely secondary to chronic alcohol use and is consumptive in nature. If trend worsens, will consider consulting Hematology.    · Daily CBC, transfuse only for active bleeding    Endocrine  Moderate malnutrition  Resuming tube feeds at 10 mL/hr    Nutrition consulted. Most recent weight and BMI monitored-     Measurements:  Wt Readings from Last 1 Encounters:   10/26/23 56.2 kg (123 lb 14.4 oz)   Body mass index is 25.02 kg/m².    Patient has been screened and assessed by RD.    Malnutrition Type:  Context: social/environmental circumstances  Level: moderate    Malnutrition Characteristic Summary:  Subcutaneous Fat (Malnutrition): mild depletion  Muscle Mass (Malnutrition): mild depletion  Fluid Accumulation (Malnutrition): moderate    Interventions/Recommendations (treatment strategy):  1. When able, initiate TFs. Rec'd Isosource 1.5 @ 50 mL/hr to provide 1800 kcals, 82 g of protein, 917 mL fluid. 2. RD to monitor & follow-up.    GI  Acute hepatic encephalopathy  This is a 52 year old woman with a history of ethanol cirrhosis who presents after being found on the ground with fecal and urinary incontinence, and altered mental status. Elevated ammonia on admission.    - Continue lactulose via NG tube TID (titrate till 3-4 daily BM achieved). Continue Xifaxin.   - Avoid opiates and benzodiazepines, if  able.   - IV thiamine, folate supplementation, multivitamin through NG tube.        Decompensated alcoholic hepatic cirrhosis  · Hepatology following PEth. Daily CBC, CMP & INR.   · Not currently being evaluated for transplant due to clinical status  · Vitamin K given for chronic coagulopathy  · Continuing lactulose and rifaximin titrated to 3-4 bowel movements daily    Other  Debility  PT/OT when appropriate          The patient is being Prophylaxed for:  Venous Thromboembolism with: Mechanical  Stress Ulcer with: H2B  Ventilator Pneumonia with: not applicable    Activity Orders          Elevate HOB Elevate (30-45 degrees) Elevate HOB to 30 - 45 degrees during feeding unless otherwise stated starting at 10/28 1218    Elevate HOB to 30-45 degrees during feeding unless otherwise stated starting at 10/26 1138    Progressive Mobility Protocol (mobilize patient to their highest level of functioning at least twice daily) starting at 10/25 2000    Turn patient starting at 10/25 2000        Full Code    Bambi Urrutia NP  Neurocritical Care  [facility] - Neuro Critical Care      Critical condition in that Patient has a condition that poses threat to life and bodily function: stridor, intubation watch     35 minutes of Critical care time was spent personally by me on the following activities: development of treatment plan with patient or surrogate and bedside caregivers, discussions with consultants, evaluation of patient's response to treatment, examination of patient, ordering and performing treatments and interventions, ordering and review of laboratory studies, ordering and review of radiographic studies, pulse oximetry, antibiotic titration if applicable, vasopressor titration if applicable, re-evaluation of patient's condition. This critical care time did not overlap with that of any other provider or involve time for any procedures. There is high probability for acute neurological change leading to clinical and possibly  life-threatening deterioration requiring highest level of physician preparedness for urgent intervention.

## 2023-10-30 NOTE — PLAN OF CARE
[facility] - Neuro Critical Care  Discharge Reassessment    Primary Care Provider: Osiris Nevarez NP    Expected Discharge Date: 11/8/2023    Per MD, ENT consulted for Stridor.  Patient not medically ready for discharge.     Patient on 5L AFM  Oxygen Concentration (%):  [25-28] 28     Discharge Plan A and Plan B have been determined by review of patient's clinical status, future medical and therapeutic needs, and coverage/benefits for post-acute care in coordination with multidisciplinary team members.]      Reassessment (most recent)       Discharge Reassessment - 10/30/23 1416          Discharge Reassessment    Assessment Type Discharge Planning Reassessment     Did the patient's condition or plan change since previous assessment? No     Communicated ASHELY with patient/caregiver Date not available/Unable to determine     Discharge Plan A Rehab     Discharge Plan B Home Health     DME Needed Upon Discharge  other (see comments)   tbd    Transition of Care Barriers Does not adhere to care plan;Transportation     Why the patient remains in the hospital Requires continued medical care                   May Rosario RN, CCRN-K, [facility]  Neuro-Critical Care   [phone]

## 2023-10-30 NOTE — PLAN OF CARE
"[facility] Care Plan    POC reviewed with Mara Mojica and family at 1400. Pt verbalized understanding. Questions and concerns addressed. No acute events today. Pt progressing toward goals. Will continue to monitor. See below and flowsheets for full assessment and VS info.             Is this a stroke patient? no    Neuro:  [place] Coma Scale  Best Eye Response: 3-->(E3) to speech  Best Motor Response: 4-->(M4) withdraws from pain  Best Verbal Response: 1-->(V1) none  [place] Coma Scale Score: 8  Assessment Qualifiers: patient not sedated/intubated, no eye obstruction present  Pupil PERRLA: yes     24 hr Temp:  [98.3 °F (36.8 °C)-98.7 °F (37.1 °C)]     CV:   Rhythm: sinus tachycardia  BP goals:   SBP < 180  MAP > 65    Resp:      Oxygen Concentration (%): 28    Plan: N/A    GI/:     Diet/Nutrition Received: NPO, tube feeding  Last Bowel Movement: 10/30/23  Voiding Characteristics: urethral catheter (bladder)    Intake/Output Summary (Last 24 hours) at 10/30/2023 1720  Last data filed at 10/30/2023 1605  Gross per 24 hour   Intake 2262.07 ml   Output 515 ml   Net 1747.07 ml     Unmeasured Output  Urine Occurrence: 5  Stool Occurrence: 1  Emesis Occurrence: 0  Pad Count: 5    Labs/Accuchecks:  Recent Labs   Lab 10/30/23  0411   WBC 6.52   RBC 2.27*   HGB 7.5*   HCT 24.2*   PLT 58*      Recent Labs   Lab 10/30/23  0411      K 3.1*   CO2 23      BUN 12   CREATININE 0.8   ALKPHOS 112   ALT 42   AST 86*   BILITOT 5.8*      Recent Labs   Lab 10/30/23  0411   INR 1.9*    No results for input(s): "CPK", "CPKMB", "TROPONINI", "MB" in the last 168 hours.    Electrolytes: Electrolytes replaced  Accuchecks: Q6H    Gtts:      LDA/Wounds:  Lines/Drains/Airways       Drain  Duration                  NG/OG Tube 10/20/23 2000 16 Fr. Left nostril 9 days         Rectal Tube 10/28/23 0530 fecal management system 2 days         Urethral Catheter 10/28/23 1556 2 days              Peripheral Intravenous Line  Duration          "         Peripheral IV - Single Lumen 10/29/23 1130 20 G Anterior;Left Forearm 1 day         Peripheral IV - Single Lumen 10/30/23 1157 20 G;1 3/4 in Left Forearm <1 day                  Wounds: Yes  Wound care consulted: Yes

## 2023-10-31 ENCOUNTER — ANESTHESIA (OUTPATIENT)
Dept: NEUROLOGY | Facility: HOSPITAL | Age: 58
DRG: 100 | End: 2023-10-31
Payer: MEDICAID

## 2023-10-31 ENCOUNTER — ANESTHESIA EVENT (OUTPATIENT)
Dept: NEUROLOGY | Facility: HOSPITAL | Age: 58
DRG: 100 | End: 2023-10-31
Payer: MEDICAID

## 2023-10-31 PROBLEM — E87.0 HYPERNATREMIA: Status: RESOLVED | Noted: 2023-10-27 | Resolved: 2023-10-31

## 2023-10-31 PROBLEM — J96.91 HYPOXIC RESPIRATORY FAILURE: Status: ACTIVE | Noted: 2023-10-31

## 2023-10-31 PROBLEM — J39.8 TRACHEAL STENOSIS: Status: ACTIVE | Noted: 2023-10-31

## 2023-10-31 PROBLEM — A41.9 SEPSIS: Status: RESOLVED | Noted: 2023-10-27 | Resolved: 2023-10-31

## 2023-10-31 LAB
ALBUMIN SERPL BCP-MCNC: 2.9 G/DL (ref 3.5–5.2)
ALP SERPL-CCNC: 116 U/L (ref 55–135)
ALT SERPL W/O P-5'-P-CCNC: 51 U/L (ref 10–44)
ANION GAP SERPL CALC-SCNC: 14 MMOL/L (ref 8–16)
AST SERPL-CCNC: 88 U/L (ref 10–40)
BASOPHILS # BLD AUTO: 0.02 K/UL (ref 0–0.2)
BASOPHILS NFR BLD: 0.2 % (ref 0–1.9)
BILIRUB SERPL-MCNC: 6.8 MG/DL (ref 0.1–1)
BUN SERPL-MCNC: 18 MG/DL (ref 6–20)
CALCIUM SERPL-MCNC: 8.9 MG/DL (ref 8.7–10.5)
CHLORIDE SERPL-SCNC: 106 MMOL/L (ref 95–110)
CO2 SERPL-SCNC: 21 MMOL/L (ref 23–29)
CREAT SERPL-MCNC: 0.7 MG/DL (ref 0.5–1.4)
DIFFERENTIAL METHOD: ABNORMAL
EOSINOPHIL # BLD AUTO: 0 K/UL (ref 0–0.5)
EOSINOPHIL NFR BLD: 0 % (ref 0–8)
ERYTHROCYTE [DISTWIDTH] IN BLOOD BY AUTOMATED COUNT: 17.4 % (ref 11.5–14.5)
EST. GFR  (NO RACE VARIABLE): >60 ML/MIN/1.73 M^2
GLUCOSE SERPL-MCNC: 146 MG/DL (ref 70–110)
HCT VFR BLD AUTO: 25.8 % (ref 37–48.5)
HGB BLD-MCNC: 8.3 G/DL (ref 12–16)
IMM GRANULOCYTES # BLD AUTO: 0.08 K/UL (ref 0–0.04)
IMM GRANULOCYTES NFR BLD AUTO: 0.9 % (ref 0–0.5)
INR PPP: 1.9 (ref 0.8–1.2)
LYMPHOCYTES # BLD AUTO: 1.2 K/UL (ref 1–4.8)
LYMPHOCYTES NFR BLD: 13.2 % (ref 18–48)
MAGNESIUM SERPL-MCNC: 2.2 MG/DL (ref 1.6–2.6)
MANGANESE, SERUM: 0.7 NG/ML (ref 0.5–1.2)
MCH RBC QN AUTO: 33.1 PG (ref 27–31)
MCHC RBC AUTO-ENTMCNC: 32.2 G/DL (ref 32–36)
MCV RBC AUTO: 103 FL (ref 82–98)
MONOCYTES # BLD AUTO: 0.5 K/UL (ref 0.3–1)
MONOCYTES NFR BLD: 5 % (ref 4–15)
NEUTROPHILS # BLD AUTO: 7.2 K/UL (ref 1.8–7.7)
NEUTROPHILS NFR BLD: 80.7 % (ref 38–73)
NRBC BLD-RTO: 0 /100 WBC
PHOSPHATE SERPL-MCNC: 3.2 MG/DL (ref 2.7–4.5)
PLATELET # BLD AUTO: 65 K/UL (ref 150–450)
PMV BLD AUTO: 11.2 FL (ref 9.2–12.9)
POCT GLUCOSE: 134 MG/DL (ref 70–110)
POCT GLUCOSE: 139 MG/DL (ref 70–110)
POCT GLUCOSE: 141 MG/DL (ref 70–110)
POCT GLUCOSE: 162 MG/DL (ref 70–110)
POTASSIUM SERPL-SCNC: 3.5 MMOL/L (ref 3.5–5.1)
PROT SERPL-MCNC: 6.5 G/DL (ref 6–8.4)
PROTHROMBIN TIME: 19.7 SEC (ref 9–12.5)
RBC # BLD AUTO: 2.51 M/UL (ref 4–5.4)
SODIUM SERPL-SCNC: 141 MMOL/L (ref 136–145)
VIT B1 BLD-MCNC: 80 UG/L (ref 38–122)
WBC # BLD AUTO: 8.92 K/UL (ref 3.9–12.7)

## 2023-10-31 PROCEDURE — 99900035 HC TECH TIME PER 15 MIN (STAT): Mod: NTX

## 2023-10-31 PROCEDURE — 85025 COMPLETE CBC W/AUTO DIFF WBC: CPT | Mod: NTX | Performed by: HOSPITALIST

## 2023-10-31 PROCEDURE — 63600175 PHARM REV CODE 636 W HCPCS: Mod: NTX | Performed by: NURSE PRACTITIONER

## 2023-10-31 PROCEDURE — 25000003 PHARM REV CODE 250: Mod: NTX | Performed by: PSYCHIATRY & NEUROLOGY

## 2023-10-31 PROCEDURE — 25000003 PHARM REV CODE 250: Mod: NTX | Performed by: HOSPITALIST

## 2023-10-31 PROCEDURE — 25000003 PHARM REV CODE 250: Mod: NTX

## 2023-10-31 PROCEDURE — 25000003 PHARM REV CODE 250: Mod: NTX | Performed by: STUDENT IN AN ORGANIZED HEALTH CARE EDUCATION/TRAINING PROGRAM

## 2023-10-31 PROCEDURE — 27100171 HC OXYGEN HIGH FLOW UP TO 24 HOURS: Mod: NTX

## 2023-10-31 PROCEDURE — 94640 AIRWAY INHALATION TREATMENT: CPT | Mod: NTX

## 2023-10-31 PROCEDURE — 99291 PR CRITICAL CARE, E/M 30-74 MINUTES: ICD-10-PCS | Mod: 25,NTX,, | Performed by: PSYCHIATRY & NEUROLOGY

## 2023-10-31 PROCEDURE — 36620 INSERTION CATHETER ARTERY: CPT | Mod: NTX

## 2023-10-31 PROCEDURE — 27000221 HC OXYGEN, UP TO 24 HOURS: Mod: NTX

## 2023-10-31 PROCEDURE — 63600175 PHARM REV CODE 636 W HCPCS: Mod: NTX

## 2023-10-31 PROCEDURE — 25000242 PHARM REV CODE 250 ALT 637 W/ HCPCS: Mod: NTX | Performed by: NURSE PRACTITIONER

## 2023-10-31 PROCEDURE — 63600175 PHARM REV CODE 636 W HCPCS: Mod: NTX | Performed by: PSYCHIATRY & NEUROLOGY

## 2023-10-31 PROCEDURE — 99900026 HC AIRWAY MAINTENANCE (STAT): Mod: NTX

## 2023-10-31 PROCEDURE — 99499 NO LOS: ICD-10-PCS | Mod: NTX,,, | Performed by: PHYSICIAN ASSISTANT

## 2023-10-31 PROCEDURE — 63600175 PHARM REV CODE 636 W HCPCS: Mod: NTX | Performed by: PHYSICIAN ASSISTANT

## 2023-10-31 PROCEDURE — 95720 PR EEG, W/VIDEO, CONT RECORD, I&R, >12<26 HRS: ICD-10-PCS | Mod: NTX,,, | Performed by: PSYCHIATRY & NEUROLOGY

## 2023-10-31 PROCEDURE — 94668 MNPJ CHEST WALL SBSQ: CPT | Mod: NTX

## 2023-10-31 PROCEDURE — 36620 ARTERIAL LINE: ICD-10-PCS | Mod: NTX,,, | Performed by: PSYCHIATRY & NEUROLOGY

## 2023-10-31 PROCEDURE — 83735 ASSAY OF MAGNESIUM: CPT | Mod: NTX | Performed by: STUDENT IN AN ORGANIZED HEALTH CARE EDUCATION/TRAINING PROGRAM

## 2023-10-31 PROCEDURE — 36620 INSERTION CATHETER ARTERY: CPT | Mod: NTX,,, | Performed by: PSYCHIATRY & NEUROLOGY

## 2023-10-31 PROCEDURE — 63600175 PHARM REV CODE 636 W HCPCS: Mod: NTX | Performed by: STUDENT IN AN ORGANIZED HEALTH CARE EDUCATION/TRAINING PROGRAM

## 2023-10-31 PROCEDURE — 84100 ASSAY OF PHOSPHORUS: CPT | Mod: NTX | Performed by: STUDENT IN AN ORGANIZED HEALTH CARE EDUCATION/TRAINING PROGRAM

## 2023-10-31 PROCEDURE — 95714 VEEG EA 12-26 HR UNMNTR: CPT | Mod: NTX

## 2023-10-31 PROCEDURE — 80053 COMPREHEN METABOLIC PANEL: CPT | Mod: NTX | Performed by: HOSPITALIST

## 2023-10-31 PROCEDURE — 95700 EEG CONT REC W/VID EEG TECH: CPT | Mod: NTX

## 2023-10-31 PROCEDURE — 99499 UNLISTED E&M SERVICE: CPT | Mod: NTX,,, | Performed by: PHYSICIAN ASSISTANT

## 2023-10-31 PROCEDURE — 94761 N-INVAS EAR/PLS OXIMETRY MLT: CPT | Mod: NTX

## 2023-10-31 PROCEDURE — P9047 ALBUMIN (HUMAN), 25%, 50ML: HCPCS | Mod: JZ,JG,NTX | Performed by: PSYCHIATRY & NEUROLOGY

## 2023-10-31 PROCEDURE — 95720 EEG PHY/QHP EA INCR W/VEEG: CPT | Mod: NTX,,, | Performed by: PSYCHIATRY & NEUROLOGY

## 2023-10-31 PROCEDURE — 85610 PROTHROMBIN TIME: CPT | Mod: NTX | Performed by: STUDENT IN AN ORGANIZED HEALTH CARE EDUCATION/TRAINING PROGRAM

## 2023-10-31 PROCEDURE — 94002 VENT MGMT INPAT INIT DAY: CPT | Mod: NTX

## 2023-10-31 PROCEDURE — 99291 CRITICAL CARE FIRST HOUR: CPT | Mod: 25,NTX,, | Performed by: PSYCHIATRY & NEUROLOGY

## 2023-10-31 PROCEDURE — 20000000 HC ICU ROOM: Mod: NTX

## 2023-10-31 RX ORDER — PROPOFOL 10 MG/ML
VIAL (ML) INTRAVENOUS
Status: COMPLETED
Start: 2023-10-31 | End: 2023-10-31

## 2023-10-31 RX ORDER — EPINEPHRINE 1 MG/ML
INJECTION, SOLUTION, CONCENTRATE INTRAVENOUS
Status: COMPLETED
Start: 2023-10-31 | End: 2023-10-31

## 2023-10-31 RX ORDER — NOREPINEPHRINE BITARTRATE/D5W 4MG/250ML
PLASTIC BAG, INJECTION (ML) INTRAVENOUS
Status: DISPENSED
Start: 2023-10-31 | End: 2023-10-31

## 2023-10-31 RX ORDER — SODIUM CHLORIDE 9 MG/ML
INJECTION, SOLUTION INTRAVENOUS CONTINUOUS
Status: ACTIVE | OUTPATIENT
Start: 2023-10-31 | End: 2023-11-02

## 2023-10-31 RX ORDER — NOREPINEPHRINE BITARTRATE/D5W 4MG/250ML
0-3 PLASTIC BAG, INJECTION (ML) INTRAVENOUS CONTINUOUS
Status: DISCONTINUED | OUTPATIENT
Start: 2023-10-31 | End: 2023-10-31

## 2023-10-31 RX ORDER — PHENYLEPHRINE HCL IN 0.9% NACL 1 MG/10 ML
200 SYRINGE (ML) INTRAVENOUS ONCE
Status: COMPLETED | OUTPATIENT
Start: 2023-10-31 | End: 2023-10-31

## 2023-10-31 RX ORDER — SUCCINYLCHOLINE CHLORIDE 20 MG/ML
200 INJECTION INTRAMUSCULAR; INTRAVENOUS ONCE
Status: COMPLETED | OUTPATIENT
Start: 2023-10-31 | End: 2023-10-31

## 2023-10-31 RX ORDER — SUCCINYLCHOLINE CHLORIDE 20 MG/ML
INJECTION INTRAMUSCULAR; INTRAVENOUS
Status: COMPLETED
Start: 2023-10-31 | End: 2023-10-31

## 2023-10-31 RX ORDER — PROPOFOL 10 MG/ML
INJECTION, EMULSION INTRAVENOUS
Status: COMPLETED
Start: 2023-10-31 | End: 2023-10-31

## 2023-10-31 RX ORDER — PROPOFOL 10 MG/ML
0-50 INJECTION, EMULSION INTRAVENOUS CONTINUOUS
Status: DISCONTINUED | OUTPATIENT
Start: 2023-10-31 | End: 2023-11-01

## 2023-10-31 RX ORDER — ALBUMIN HUMAN 250 G/1000ML
25 SOLUTION INTRAVENOUS ONCE
Status: COMPLETED | OUTPATIENT
Start: 2023-10-31 | End: 2023-10-31

## 2023-10-31 RX ORDER — NOREPINEPHRINE BITARTRATE/D5W 4MG/250ML
0-.2 PLASTIC BAG, INJECTION (ML) INTRAVENOUS CONTINUOUS
Status: DISCONTINUED | OUTPATIENT
Start: 2023-10-31 | End: 2023-10-31

## 2023-10-31 RX ORDER — FENTANYL CITRATE 50 UG/ML
200 INJECTION, SOLUTION INTRAMUSCULAR; INTRAVENOUS ONCE
Status: COMPLETED | OUTPATIENT
Start: 2023-10-31 | End: 2023-10-31

## 2023-10-31 RX ORDER — ROCURONIUM BROMIDE 10 MG/ML
INJECTION, SOLUTION INTRAVENOUS
Status: COMPLETED
Start: 2023-10-31 | End: 2023-10-31

## 2023-10-31 RX ORDER — NOREPINEPHRINE BITARTRATE/D5W 4MG/250ML
0-.2 PLASTIC BAG, INJECTION (ML) INTRAVENOUS CONTINUOUS
Status: DISCONTINUED | OUTPATIENT
Start: 2023-10-31 | End: 2023-11-02

## 2023-10-31 RX ORDER — PROPOFOL 10 MG/ML
INJECTION, EMULSION INTRAVENOUS
Status: DISPENSED
Start: 2023-10-31 | End: 2023-10-31

## 2023-10-31 RX ORDER — ETOMIDATE 2 MG/ML
INJECTION INTRAVENOUS
Status: COMPLETED
Start: 2023-10-31 | End: 2023-10-31

## 2023-10-31 RX ORDER — PROPOFOL 10 MG/ML
200 VIAL (ML) INTRAVENOUS ONCE
Status: COMPLETED | OUTPATIENT
Start: 2023-10-31 | End: 2023-10-31

## 2023-10-31 RX ORDER — PHENYLEPHRINE HCL IN 0.9% NACL 1 MG/10 ML
SYRINGE (ML) INTRAVENOUS
Status: COMPLETED
Start: 2023-10-31 | End: 2023-10-31

## 2023-10-31 RX ADMIN — SODIUM CHLORIDE 500 ML: 0.9 INJECTION, SOLUTION INTRAVENOUS at 11:10

## 2023-10-31 RX ADMIN — LEVETIRACETAM 1500 MG: 500 SOLUTION ORAL at 08:10

## 2023-10-31 RX ADMIN — LACTULOSE 30 G: 20 SOLUTION ORAL at 08:10

## 2023-10-31 RX ADMIN — RACEPINEPHRINE HYDROCHLORIDE 0.5 ML: 11.25 SOLUTION RESPIRATORY (INHALATION) at 07:10

## 2023-10-31 RX ADMIN — Medication 200 MCG: at 12:10

## 2023-10-31 RX ADMIN — ALBUMIN (HUMAN) 25 G: 12.5 SOLUTION INTRAVENOUS at 12:10

## 2023-10-31 RX ADMIN — DEXAMETHASONE SODIUM PHOSPHATE 6 MG: 4 INJECTION INTRA-ARTICULAR; INTRALESIONAL; INTRAMUSCULAR; INTRAVENOUS; SOFT TISSUE at 06:10

## 2023-10-31 RX ADMIN — RACEPINEPHRINE HYDROCHLORIDE 0.5 ML: 11.25 SOLUTION RESPIRATORY (INHALATION) at 12:10

## 2023-10-31 RX ADMIN — MUPIROCIN: 20 OINTMENT TOPICAL at 08:10

## 2023-10-31 RX ADMIN — FENTANYL CITRATE 150 MCG: 50 INJECTION INTRAMUSCULAR; INTRAVENOUS at 03:10

## 2023-10-31 RX ADMIN — FOLIC ACID 1 MG: 1 TABLET ORAL at 08:10

## 2023-10-31 RX ADMIN — POTASSIUM CHLORIDE 10 MEQ: 7.46 INJECTION, SOLUTION INTRAVENOUS at 06:10

## 2023-10-31 RX ADMIN — IPRATROPIUM BROMIDE AND ALBUTEROL SULFATE 3 ML: .5; 3 SOLUTION RESPIRATORY (INHALATION) at 07:10

## 2023-10-31 RX ADMIN — PROPOFOL 50 MCG/KG/MIN: 10 INJECTION, EMULSION INTRAVENOUS at 11:10

## 2023-10-31 RX ADMIN — RIFAXIMIN 550 MG: 550 TABLET ORAL at 08:10

## 2023-10-31 RX ADMIN — DEXAMETHASONE SODIUM PHOSPHATE 6 MG: 4 INJECTION INTRA-ARTICULAR; INTRALESIONAL; INTRAMUSCULAR; INTRAVENOUS; SOFT TISSUE at 09:10

## 2023-10-31 RX ADMIN — IPRATROPIUM BROMIDE AND ALBUTEROL SULFATE 3 ML: .5; 3 SOLUTION RESPIRATORY (INHALATION) at 12:10

## 2023-10-31 RX ADMIN — PROPOFOL 50 MCG/KG/MIN: 10 INJECTION, EMULSION INTRAVENOUS at 04:10

## 2023-10-31 RX ADMIN — POTASSIUM CHLORIDE 10 MEQ: 7.46 INJECTION, SOLUTION INTRAVENOUS at 08:10

## 2023-10-31 RX ADMIN — THERA TABS 1 TABLET: TAB at 08:10

## 2023-10-31 RX ADMIN — FAMOTIDINE 20 MG: 20 TABLET ORAL at 08:10

## 2023-10-31 RX ADMIN — POTASSIUM CHLORIDE 10 MEQ: 7.46 INJECTION, SOLUTION INTRAVENOUS at 04:10

## 2023-10-31 RX ADMIN — SODIUM CHLORIDE: 9 INJECTION, SOLUTION INTRAVENOUS at 07:10

## 2023-10-31 RX ADMIN — DEXAMETHASONE SODIUM PHOSPHATE 6 MG: 4 INJECTION INTRA-ARTICULAR; INTRALESIONAL; INTRAMUSCULAR; INTRAVENOUS; SOFT TISSUE at 02:10

## 2023-10-31 RX ADMIN — Medication 200 MG: at 12:10

## 2023-10-31 RX ADMIN — SUCCINYLCHOLINE CHLORIDE 200 MG: 20 INJECTION, SOLUTION INTRAMUSCULAR; INTRAVENOUS; PARENTERAL at 12:10

## 2023-10-31 RX ADMIN — LACOSAMIDE 100 MG: 100 TABLET, FILM COATED ORAL at 08:10

## 2023-10-31 RX ADMIN — SUCCINYLCHOLINE CHLORIDE 200 MG: 20 INJECTION INTRAMUSCULAR; INTRAVENOUS at 12:10

## 2023-10-31 RX ADMIN — PROPOFOL 10 MCG/KG/MIN: 10 INJECTION, EMULSION INTRAVENOUS at 11:10

## 2023-10-31 RX ADMIN — SODIUM CHLORIDE: 9 INJECTION, SOLUTION INTRAVENOUS at 12:10

## 2023-10-31 RX ADMIN — POTASSIUM CHLORIDE 10 MEQ: 7.46 INJECTION, SOLUTION INTRAVENOUS at 05:10

## 2023-10-31 RX ADMIN — THIAMINE HYDROCHLORIDE 250 MG: 100 INJECTION, SOLUTION INTRAMUSCULAR; INTRAVENOUS at 09:10

## 2023-10-31 RX ADMIN — PROPOFOL 200 MG: 10 INJECTION, EMULSION INTRAVENOUS at 12:10

## 2023-10-31 NOTE — ASSESSMENT & PLAN NOTE
2/2 to tracheal stenosis   -patient with stridor, increased secretions, requiring 5L 28% and inability to protect airway   -intubated by anesthesia team with small ETT    Vent Mode: A/C  Oxygen Concentration (%):  [] 50  Resp Rate Total:  [16 br/min-19 br/min] 16 br/min  Vt Set:  [400 mL] 400 mL  PEEP/CPAP:  [5 cmH20] 5 cmH20  Mean Airway Pressure:  [9.1 cmH20] 9.1 cmH20

## 2023-10-31 NOTE — ASSESSMENT & PLAN NOTE
This is a 58-year-old woman with a history of decompensated alcoholic cirrhosis.  Suspect thrombocytopenia is likely secondary to chronic alcohol use and is consumptive in nature. If trend worsens, will consider consulting Hematology.    · Daily CBC, transfuse only for active bleeding  · 10/31 trended back up today

## 2023-10-31 NOTE — PLAN OF CARE
"[facility] Care Plan    POC reviewed with Mara Mojica and family at 0500. Pt unable to verbalized understanding. Questions and concerns addressed. No acute events overnight. Pt progressing toward goals. Will continue to monitor. See below and flowsheets for full assessment and VS info.     -TF held  -Q4 100cc free water flushes      Is this a stroke patient? no    Neuro:  [place] Coma Scale  Best Eye Response: 2-->(E2) to pain  Best Motor Response: 4-->(M4) withdraws from pain  Best Verbal Response: 1-->(V1) none  [place] Coma Scale Score: 7  Assessment Qualifiers: no eye obstruction present, patient not sedated/intubated  Pupil PERRLA: yes     24hr Temp:  [98.3 °F (36.8 °C)-98.7 °F (37.1 °C)]     CV:   Rhythm: sinus tachycardia  BP goals:   SBP < 180  MAP > 65    Resp:      Oxygen Concentration (%): 28    Plan: Aggressive pulmonary toileting, aerosol face mask    GI/:     Diet/Nutrition Received: NPO, tube feeding  Last Bowel Movement: 10/30/23  Voiding Characteristics: urethral catheter (bladder)    Intake/Output Summary (Last 24 hours) at 10/31/2023 0516  Last data filed at 10/31/2023 0421  Gross per 24 hour   Intake 2042.15 ml   Output 1455 ml   Net 587.15 ml     Unmeasured Output  Urine Occurrence: 5  Stool Occurrence: 1  Emesis Occurrence: 0  Pad Count: 5    Labs/Accuchecks:  Recent Labs   Lab 10/31/23  0200   WBC 8.92   RBC 2.51*   HGB 8.3*   HCT 25.8*   PLT 65*      Recent Labs   Lab 10/31/23  0200      K 3.5   CO2 21*      BUN 18   CREATININE 0.7   ALKPHOS 116   ALT 51*   AST 88*   BILITOT 6.8*      Recent Labs   Lab 10/31/23  0200   INR 1.9*    No results for input(s): "CPK", "CPKMB", "TROPONINI", "MB" in the last 168 hours.    Electrolytes: Electrolytes replaced  Accuchecks: Q6H    Gtts:      LDA/Wounds:  Lines/Drains/Airways       Drain  Duration                  NG/OG Tube 10/20/23 2000 16 Fr. Left nostril 10 days         Rectal Tube 10/28/23 0530 fecal management system 2 days         " Urethral Catheter 10/28/23 1556 2 days              Peripheral Intravenous Line  Duration                  Peripheral IV - Single Lumen 10/29/23 1130 20 G Anterior;Left Forearm 1 day         Peripheral IV - Single Lumen 10/30/23 1157 20 G;1 3/4 in Left Forearm <1 day                  Wounds: Yes  Wound care consulted: Yes

## 2023-10-31 NOTE — ANESTHESIA PROCEDURE NOTES
Ad Hoc Intubation    Date/Time: 10/31/2023 11:35 AM    Performed by: Cristiane Luciano MD  Authorized by: Goyo Venegas DO    Indications:  Respiratory distress, airway protection, pulmonary toilet, hypoxemia and respiratory failure  Diagnosis:  Aspiration  Patient Location:  ICU  Timeout:  10/31/2023 11:30 AM  Procedure Start Time:  10/31/2023 11:33 AM  Procedure End Time:  10/31/2023 11:35 AM  Staff:     Anesthesiologist Present: No    Intubation:     Induction:  Intravenous    Intubated:  Postinduction    Mask Ventilation:  Not attempted    Attempts:  1    Attempted By:  Resident anesthesiologist    Method of Intubation:  Video laryngoscopy    Blade:  Cantu 3    Laryngeal View Grade: Grade I - full view of chords      Difficult Airway Encountered?: No      Complications:  None    Airway Device:  Oral endotracheal tube    Airway Device Size:  6.0    Style/Cuff Inflation:  Cuffed (inflated to minimal occlusive pressure)    Tube secured:  22    Secured at:  The lips    Placement Verified By:  Colorimetric ETCO2 device and Revisualization with laryngoscopy    DIFFICULT INTUBATION DESCRIPTOR: vocal cord and airway edema.    Findings Post-Intubation:  BS equal bilateral and atraumatic/condition of teeth unchanged  Notes:      Called to Neuro ICU for intubation for airway protection for patient who aspirated and developed airway edema with stridor and bulbar muscle weakness.    Gave 120 mg of propofol and 200 mg of succinylcholine for induction.

## 2023-10-31 NOTE — ASSESSMENT & PLAN NOTE
This is a 52 year old woman with a history of ethanol cirrhosis who presents after being found on the ground with fecal and urinary incontinence, and altered mental status. Elevated ammonia on admission.    - Continue lactulose via NG tube TID (titrate till 3-4 daily BM achieved). Continue Xifaxin.   - Avoid opiates and benzodiazepines, if able.   - IV thiamine, folate supplementation, multivitamin through NG tube.  - concern that comatose state is irreversible brain damage, will rule out other causes first, EEG and wean AEDs

## 2023-10-31 NOTE — PROGRESS NOTES
[facility]  Neurocritical Care  Progress Note    Admit Date: 10/25/2023  Service Date: 10/31/2023  Length of Stay: 6    Subjective:     Chief Complaint: Acute encephalopathy    History of Present Illness: This is a 58-year-old female with a past medical history of alcoholic cirrhosis, s/p ex-lap w/ bowel resection for incarcerated hernia, who initially presented on 10/18 to [facility] with acute encephalopathy.  Her  found her on the floor at home with evidence of fecal/urine incontinence with confusion and slurred speech. Possible reported tongue injury in chart. Reported concern L sided weakness and down drift of L arm however CT head without evidence of acute abnormalities, MRI brain with only small vessel vascular changes without evidence of territorial infarct.     Hospital Course: EEG done on 10/19 with mild diffuse nonspecific background slowing, no potential epileptiform activity. Repeat MRI brain with contrast on 10/23 unchanged from initial MRI. Became more lethargic and was no longer following commands or answering questions. Due to worsening hepatic encephalopathy in setting of hepatic cirrhosis, patient transferred to [facility] for management with transplant team. Has remained on antibiotics, lactulose and rifaximin for treatment of UTI with pansensitive Ecoli, HE, and presumed SBP. Neurology consulted for management recommendations regarding persistent AMS. Had repeat EEG done on 10/24 with similar findings to prior EEG showing mild generalized non-specific cerebral dysfunction and no electrographic seizures or indication of seizure tendency. Additional CT head w/o contrast on 10/25 without evidence of acute issues. Remains confused and unable to answer questions on exam. Not withdrawing to painful stimuli. Was able to awaken to verbal stimuli in AM however when reevaluated in afternoon unresponsive however was after receiving Ativan.     On admission to [facility]:  Patient had EEG  running, and was noted to have seizures at 7:30 a.m., 8:00 a.m. in, and 10:00 a.m. was noted to be in focal status prior to receiving Vimpat.  Patient was noted to have stridor, worsening secretions with suspicion for aspiration, decreased airway protection, and rapids was called due to low GCS score of 6. Antibiotics broadened to Vanc/Zosyn. Clinical picture of mental status confounded with episodes of seizures, infection, and hepatic encephalopathy.               Hospital Course: 10/28: Improving GCS from 6 to 10. Continuing pulmonary toilet and deep suctioning for stridor and copious secretions. UA positive for candida. Blood cultures from 10/27 NGTD. Sputum cultures sent. Patient on day 2 of broadened antibiotics vanc/zosyn due to worsening clinical status but will discontinue tomorrow if cultures remain negative. Still hypernatremic, treating with D5W. Patient was transferred with no ordered diuretics, very edematous on physical exam. Adequate stooling on lactulose and rifaximin. Due to increased UOP/stooling will place eckert for one day for irritated skin. EEG update showing no seizures since 10am 10/27. Monitoring CBC for thrombocytopenia and macrocytic anemia. Discussed code status and goals of care with  and best friend at bedside. Trickle feeds resumed.   10/29: No acute events overnight, EEG reportedly without further seizures for ~48 hours can disconnect once formal report is in, neuro status slowly improving as patient now tracking in room, moving extremities, responding with appropriate emotional reactions to her .  Respiratory status largely unchanged with intermittent events of large volume breaths that sound almost stridorous but without actual respiratory distress- gas remains compensated.  UOP low, diuresis resumed.  10/30/2023: d/c mucomyst nebs, add racemic epi scheduled nebs, add budesonide and dex 6 q8 hours, ammonia at 35- continue lactulose and rifaximin, abg reviewed, 40mEq of  K once, ENT consulted for stridor, continue eckert catheter in today   10/31/2023 Patient with worsening respiratory status, continued decreased mentation and increased secretions. Plan for elective intubation in controlled setting with anesthesia. Will also recheck eeg, if negative will start to wean AEDs and see if that improves patient's arousal. PLT stabilized, continue to monitor.       Interval History: see hospital course     Review of Systems   Unable to perform ROS: Mental status change       Objective:     Vitals:  Temp: 98.3 °F (36.8 °C)  Pulse: (!) 115  Rhythm: sinus tachycardia  BP: (!) 161/71  MAP (mmHg): 102  Resp: 16  SpO2: 95 %  Oxygen Concentration (%): 28    Temp  Min: 97.6 °F (36.4 °C)  Max: 98.4 °F (36.9 °C)  Pulse  Min: 75  Max: 115  BP  Min: 92/51  Max: 189/79  MAP (mmHg)  Min: 67  Max: 118  Resp  Min: 11  Max: 36  SpO2  Min: 95 %  Max: 100 %  Oxygen Concentration (%)  Min: 28  Max: 28    10/30 0701 - 10/31 0700  In: 2287.2 [I.V.:49.6]  Out: 1270 [Urine:520]   Unmeasured Output  Urine Occurrence: 5  Stool Occurrence: 1  Emesis Occurrence: 0  Pad Count: 5        Physical Exam  Vitals and nursing note reviewed.   Constitutional:       Appearance: She is ill-appearing and toxic-appearing.   HENT:      Head: Normocephalic and atraumatic.      Mouth/Throat:      Mouth: Mucous membranes are moist.   Eyes:      General: Scleral icterus present.      Extraocular Movements: Extraocular movements intact.      Pupils: Pupils are equal, round, and reactive to light.   Cardiovascular:      Rate and Rhythm: Normal rate and regular rhythm.   Pulmonary:      Breath sounds: Stridor present.   Abdominal:      General: There is distension.      Palpations: Abdomen is soft.      Tenderness: There is no abdominal tenderness.   Skin:     General: Skin is warm.      Coloration: Skin is jaundiced.   Neurological:      Comments: --sedation: none  --GCS:  E2 V1 M4  --Mental Status: diminished GCS and mentation, nothing  purposeful   --CN II-XII grossly intact.   --PERRL              Unable to test orientation, language, memory, judgment, insight, fund of knowledge, hearing, shoulder shrug, tongue protrusion, coordination, gait due to level of consciousness.       Medications:  Continuous Scheduledalbuterol-ipratropium, 3 mL, Q6H  dexAMETHasone, 6 mg, Q8H  etomidate, ,   famotidine, 20 mg, BID  folic acid, 1 mg, Daily  lacosamide, 100 mg, Q12H  lactulose, 30 g, BID  levetiracetam, 1,500 mg, BID  multivitamin, 1 tablet, Daily  phenylephrine HCl in 0.9% NaCl, ,   propofol, ,   racepinephrine, 0.5 mL, Q6H  rifAXImin, 550 mg, BID  rocuronium, ,   succinylcholine, ,   thiamine (B-1) 250 mg in dextrose 5 % (D5W) 100 mL IVPB, 250 mg, Daily    PRNcalcium gluconate IVPB, 1 g, PRN  calcium gluconate IVPB, 2 g, PRN  calcium gluconate IVPB, 3 g, PRN  etomidate, ,   magnesium sulfate IVPB, 2 g, PRN  magnesium sulfate IVPB, 4 g, PRN  ondansetron, 4 mg, Q8H PRN  phenylephrine HCl in 0.9% NaCl, ,   potassium chloride, 40 mEq, PRN   And  potassium chloride, 60 mEq, PRN   And  potassium chloride, 80 mEq, PRN  propofol, ,   racepinephrine, 0.5 mL, Q4H PRN  rocuronium, ,   sodium phosphate 15 mmol in dextrose 5 % (D5W) 250 mL IVPB, 15 mmol, PRN  sodium phosphate 20.01 mmol in dextrose 5 % (D5W) 250 mL IVPB, 20.01 mmol, PRN  sodium phosphate 30 mmol in dextrose 5 % (D5W) 250 mL IVPB, 30 mmol, PRN  succinylcholine, ,       Today I personally reviewed pertinent medications, lines/drains/airways, imaging, cardiology results, laboratory results, notably:    Ammonia 35  Diet  No diet orders on file  No diet orders on file          Assessment/Plan:     Neuro  * Acute encephalopathy  See acute hepatic encephalopathy and seizures    Seizure  58 year old presented for altered mental status on 10/18. Clinical picture confounded with hepatic encephalopathy, infection, and seizure activity. Neurology was consulted by primary team prior to admission to [facility].    10/27  EEG IMPRESSION:  This is an abnormal EEG during lethargic state.  Diffuse disorganized slowing of the background was noted.  Frequent triphasic waves noted.  Left posterior pseudo periodic epileptiform discharges were noted.  Numerous electrographic seizures emanating from the left posterior region were noted.    10/28: EEG update showing no new activity since 10/27 morning.   10/29: still no seizures per phone report, awaiting formal read and can likely disconnect eeg      10-31 rehook to EEG to eval for seizure, abilitly to wean AEDs to see impact on mental status  · vEEG in place  · Keppra 1500 BID  · Vimpat loaded 10/27, continue 100 mg BID  · Airway monitoring  · Neurochecks q1hr  · Vitals q1hr       Pulmonary  Tracheal stenosis  Scope complete by ENT yesterday  -patient on scheduled nebs and racemic epi without improvement in respiratory status   -elective intubation today with 6.0 ETT with anesthesia    Renal/  Hypernatremia-resolved as of 10/31/2023      · D5W infusion completed  · Repeat BMP  · Trend CMP    ID  Sepsis-resolved as of 10/31/2023  58 year old with UA showing pan sensitive E.Coli on 10/18 and repeat UA 10/25 showing Candida albicans.  Patient also has a history of hepatic encephalopathy with decompensated cirrhosis, SBP not ruled out.  Due to encephalopathy, concerns for aspiration.    CXR 10/27: Low lung volumes with mild worsening bibasilar interstitial densities and possible trace pleural effusions.  Aeration is likely mildly worse when compared with recent prior study, though findings could be exaggerated by hypoventilatory state.   10/29 - antibiotics discontinued given negative cultures    · Vancomycin, Zosyn for at least 48 hours - discontinued 10/29  · Blood cultures negative to date  · Sputum cultures pending  · Consider lumbar puncture if status worsens - high risk given coagulopathy      This patient does have evidence of infective focus  My overall impression is sepsis.  Source:  "Urinary Tract and Abdominal  Antibiotics given-   Antibiotics (72h ago, onward)    Start     Stop Route Frequency Ordered    10/25/23 1315  rifAXIMin tablet 550 mg         -- PER NG TUBE 2 times daily 10/25/23 1303        Latest lactate reviewed-  No results for input(s): "LACTATE" in the last 72 hours.  Organ dysfunction indicated by Encephalopathy    Fluid challenge Not needed - patient is not hypotensive      Post- resuscitation assessment No - Post resuscitation assessment not needed       Will Not start Pressors- Levophed for MAP of 65      Hematology  Thrombocytopenia  This is a 58-year-old woman with a history of decompensated alcoholic cirrhosis.  Suspect thrombocytopenia is likely secondary to chronic alcohol use and is consumptive in nature. If trend worsens, will consider consulting Hematology.    · Daily CBC, transfuse only for active bleeding  · 10/31 trended back up today    Endocrine  Moderate malnutrition  Resuming tube feeds at 10 mL/hr    Nutrition consulted. Most recent weight and BMI monitored-     Measurements:  Wt Readings from Last 1 Encounters:   10/26/23 56.2 kg (123 lb 14.4 oz)   Body mass index is 25.02 kg/m².    Patient has been screened and assessed by RD.    Malnutrition Type:  Context: social/environmental circumstances  Level: moderate    Malnutrition Characteristic Summary:  Subcutaneous Fat (Malnutrition): mild depletion  Muscle Mass (Malnutrition): mild depletion  Fluid Accumulation (Malnutrition): moderate    Interventions/Recommendations (treatment strategy):  1. When able, initiate TFs. Rec'd Isosource 1.5 @ 50 mL/hr to provide 1800 kcals, 82 g of protein, 917 mL fluid. 2. RD to monitor & follow-up.    GI  Acute hepatic encephalopathy  This is a 52 year old woman with a history of ethanol cirrhosis who presents after being found on the ground with fecal and urinary incontinence, and altered mental status. Elevated ammonia on admission.    - Continue lactulose via NG tube TID " (titrate till 3-4 daily BM achieved). Continue Xifaxin.   - Avoid opiates and benzodiazepines, if able.   - IV thiamine, folate supplementation, multivitamin through NG tube.  - concern that comatose state is irreversible brain damage, will rule out other causes first, EEG and wean AEDs        Decompensated alcoholic hepatic cirrhosis  · Hepatology following PEth. Daily CBC, CMP & INR.   · Not currently being evaluated for transplant due to clinical status  · Vitamin K given for chronic coagulopathy  · Continuing lactulose and rifaximin titrated to 3-4 bowel movements daily  · Ammonia down to 35    Other  Debility  PT/OT when appropriate          The patient is being Prophylaxed for:  Venous Thromboembolism with: Mechanical - chemical held 2/2 to INR 1.9 and thrombocytopenia  Stress Ulcer with: H2B  Ventilator Pneumonia with: chlorhexidine oral care    Activity Orders          Elevate HOB Elevate (30-45 degrees) Elevate HOB to 30 - 45 degrees during feeding unless otherwise stated starting at 10/28 1218    Elevate HOB to 30-45 degrees during feeding unless otherwise stated starting at 10/26 1138    Progressive Mobility Protocol (mobilize patient to their highest level of functioning at least twice daily) starting at 10/25 2000    Turn patient starting at 10/25 2000        Full Code     Critical condition in that Patient has a condition that poses threat to life and bodily function: hepatic encephalopathy, tracheal stenosis and hypoxic resp failure     45 minutes of Critical care time was spent personally by me on the following activities: development of treatment plan with patient or surrogate and bedside caregivers, discussions with consultants, evaluation of patient's response to treatment, examination of patient, ordering and performing treatments and interventions, ordering and review of laboratory studies, ordering and review of radiographic studies, pulse oximetry, antibiotic titration if applicable,  vasopressor titration if applicable, re-evaluation of patient's condition. This critical care time did not  involve time for any procedures. There is high probability for acute neurological change leading to clinical and possibly life-threatening deterioration requiring highest level of physician preparedness for urgent intervention.         Erica Lewis PA-C  Neurocritical Care  [facility] Neuro [facility]

## 2023-10-31 NOTE — PROCEDURES
"Mara Mojica is a 58 y.o. female patient.    Temp: 98.3 °F (36.8 °C) (10/31/23 1501)  Pulse: 83 (10/31/23 1501)  Resp: 16 (10/31/23 1525)  BP: (!) 90/51 (10/31/23 1501)  SpO2: 100 % (10/31/23 1501)  Weight: 56.2 kg (123 lb 14.4 oz) (10/26/23 0923)  Height: 4' 11" (149.9 cm) (10/26/23 0923)       Arterial Line    Date/Time: 10/31/2023 3:38 PM  Location procedure was performed: [facility] NEURO CRITICAL CARE    Performed by: Chris Alarcon MD  Authorized by: Chris Alarcon MD  Consent Done: Yes  Preparation: Patient was prepped and draped in the usual sterile fashion.  Indications: multiple ABGs and hemodynamic monitoring  Location: right brachial  Needle gauge: 20  Number of attempts: 4  Complications: No  Post-procedure: line sutured and dressing applied  Post-procedure CMS: normal  Patient tolerance: Patient tolerated the procedure well with no immediate complications          10/31/2023    "

## 2023-10-31 NOTE — ASSESSMENT & PLAN NOTE
"58 year old with UA showing pan sensitive E.Coli on 10/18 and repeat UA 10/25 showing Candida albicans.  Patient also has a history of hepatic encephalopathy with decompensated cirrhosis, SBP not ruled out.  Due to encephalopathy, concerns for aspiration.    CXR 10/27: Low lung volumes with mild worsening bibasilar interstitial densities and possible trace pleural effusions.  Aeration is likely mildly worse when compared with recent prior study, though findings could be exaggerated by hypoventilatory state.   10/29 - antibiotics discontinued given negative cultures    · Vancomycin, Zosyn for at least 48 hours - discontinued 10/29  · Blood cultures negative to date  · Sputum cultures pending  · Consider lumbar puncture if status worsens - high risk given coagulopathy      This patient does have evidence of infective focus  My overall impression is sepsis.  Source: Urinary Tract and Abdominal  Antibiotics given-   Antibiotics (72h ago, onward)    Start     Stop Route Frequency Ordered    10/25/23 1315  rifAXIMin tablet 550 mg         -- PER NG TUBE 2 times daily 10/25/23 1303        Latest lactate reviewed-  No results for input(s): "LACTATE" in the last 72 hours.  Organ dysfunction indicated by Encephalopathy    Fluid challenge Not needed - patient is not hypotensive      Post- resuscitation assessment No - Post resuscitation assessment not needed       Will Not start Pressors- Levophed for MAP of 65    "

## 2023-10-31 NOTE — SUBJECTIVE & OBJECTIVE
Interval History: see hospital course     Review of Systems   Unable to perform ROS: Mental status change       Objective:     Vitals:  Temp: 98.3 °F (36.8 °C)  Pulse: (!) 115  Rhythm: sinus tachycardia  BP: (!) 161/71  MAP (mmHg): 102  Resp: 16  SpO2: 95 %  Oxygen Concentration (%): 28    Temp  Min: 97.6 °F (36.4 °C)  Max: 98.4 °F (36.9 °C)  Pulse  Min: 75  Max: 115  BP  Min: 92/51  Max: 189/79  MAP (mmHg)  Min: 67  Max: 118  Resp  Min: 11  Max: 36  SpO2  Min: 95 %  Max: 100 %  Oxygen Concentration (%)  Min: 28  Max: 28    10/30 0701 - 10/31 0700  In: 2287.2 [I.V.:49.6]  Out: 1270 [Urine:520]   Unmeasured Output  Urine Occurrence: 5  Stool Occurrence: 1  Emesis Occurrence: 0  Pad Count: 5        Physical Exam  Vitals and nursing note reviewed.   Constitutional:       Appearance: She is ill-appearing and toxic-appearing.   HENT:      Head: Normocephalic and atraumatic.      Mouth/Throat:      Mouth: Mucous membranes are moist.   Eyes:      General: Scleral icterus present.      Extraocular Movements: Extraocular movements intact.      Pupils: Pupils are equal, round, and reactive to light.   Cardiovascular:      Rate and Rhythm: Normal rate and regular rhythm.   Pulmonary:      Breath sounds: Stridor present.   Abdominal:      General: There is distension.      Palpations: Abdomen is soft.      Tenderness: There is no abdominal tenderness.   Skin:     General: Skin is warm.      Coloration: Skin is jaundiced.   Neurological:      Comments: --sedation: none  --GCS:  E2 V1 M4  --Mental Status: diminished GCS and mentation, nothing purposeful   --CN II-XII grossly intact.   --PERRL              Unable to test orientation, language, memory, judgment, insight, fund of knowledge, hearing, shoulder shrug, tongue protrusion, coordination, gait due to level of consciousness.       Medications:  Continuous Scheduledalbuterol-ipratropium, 3 mL, Q6H  dexAMETHasone, 6 mg, Q8H  etomidate, ,   famotidine, 20 mg, BID  folic acid, 1  mg, Daily  lacosamide, 100 mg, Q12H  lactulose, 30 g, BID  levetiracetam, 1,500 mg, BID  multivitamin, 1 tablet, Daily  phenylephrine HCl in 0.9% NaCl, ,   propofol, ,   racepinephrine, 0.5 mL, Q6H  rifAXImin, 550 mg, BID  rocuronium, ,   succinylcholine, ,   thiamine (B-1) 250 mg in dextrose 5 % (D5W) 100 mL IVPB, 250 mg, Daily    PRNcalcium gluconate IVPB, 1 g, PRN  calcium gluconate IVPB, 2 g, PRN  calcium gluconate IVPB, 3 g, PRN  etomidate, ,   magnesium sulfate IVPB, 2 g, PRN  magnesium sulfate IVPB, 4 g, PRN  ondansetron, 4 mg, Q8H PRN  phenylephrine HCl in 0.9% NaCl, ,   potassium chloride, 40 mEq, PRN   And  potassium chloride, 60 mEq, PRN   And  potassium chloride, 80 mEq, PRN  propofol, ,   racepinephrine, 0.5 mL, Q4H PRN  rocuronium, ,   sodium phosphate 15 mmol in dextrose 5 % (D5W) 250 mL IVPB, 15 mmol, PRN  sodium phosphate 20.01 mmol in dextrose 5 % (D5W) 250 mL IVPB, 20.01 mmol, PRN  sodium phosphate 30 mmol in dextrose 5 % (D5W) 250 mL IVPB, 30 mmol, PRN  succinylcholine, ,       Today I personally reviewed pertinent medications, lines/drains/airways, imaging, cardiology results, laboratory results, notably:    Ammonia 35  Diet  No diet orders on file  No diet orders on file

## 2023-10-31 NOTE — CONSULTS
[facility]  Wound Care    Patient Name:  Mara Mojica   MRN:  [id number]  Date: 10/31/2023  Diagnosis: Acute encephalopathy    History:     Past Medical History:   Diagnosis Date    Alcoholic cirrhosis of liver     Kidney failure     Unilateral inguinal hernia, without obstruction or gangrene, not specified as recurrent        Social History     Socioeconomic History    Marital status:    Tobacco Use    Smoking status: Every Day     Current packs/day: 0.50     Average packs/day: 0.5 packs/day for 25.0 years (12.5 ttl pk-yrs)     Types: Cigarettes     Start date: 10/18/1998     Passive exposure: Never    Smokeless tobacco: Never   Substance and Sexual Activity    Alcohol use: Not Currently    Drug use: Never     Social Determinants of Health     Financial Resource Strain: Low Risk  (10/25/2023)    Overall Financial Resource Strain (CARDIA)     Difficulty of Paying Living Expenses: Not hard at all   Food Insecurity: No Food Insecurity (10/25/2023)    Hunger Vital Sign     Worried About Running Out of Food in the Last Year: Never true     Ran Out of Food in the Last Year: Never true   Transportation Needs: No Transportation Needs (10/25/2023)    PRAPARE - Transportation     Lack of Transportation (Medical): No     Lack of Transportation (Non-Medical): No   Physical Activity: Insufficiently Active (10/25/2023)    Exercise Vital Sign     Days of Exercise per Week: 4 days     Minutes of Exercise per Session: 20 min   Stress: Stress Concern Present (10/25/2023)    [employer] - Occupational Stress Questionnaire     Feeling of Stress : Rather much   Social Connections: Moderately Isolated (10/25/2023)    Social Connection and Isolation Panel [NHANES]     Frequency of Communication with Friends and Family: Three times a week     Frequency of Social Gatherings with Friends and Family: Twice a week     Attends Confucianist Services: Never     Active Member of Clubs or  Organizations: No     Attends Club or Organization Meetings: Never     Marital Status:    Housing Stability: Unknown (10/25/2023)    Housing Stability Vital Sign     Unable to Pay for Housing in the Last Year: No     Unstable Housing in the Last Year: No       Precautions:     Allergies as of 10/23/2023    (No Known Allergies)       [facility] Assessment Details/Treatment     2nd attempt made for wound care assessment of perineal region.  Reviewed chart for this encounter.    Bedside nursing team preparing pt for intubation at this time, unable to complete assessment.  Per order, perirectal wounds are r/t moisture irritation as pt is incontinent, will place temporary orders for [facility] BID/PRN until assessment can be completed.

## 2023-10-31 NOTE — ASSESSMENT & PLAN NOTE
Scope complete by ENT yesterday  -patient on scheduled nebs and racemic epi without improvement in respiratory status   -elective intubation today with 6.0 ETT with anesthesia

## 2023-10-31 NOTE — PLAN OF CARE
"  POC reviewed with Mara Mojica and family at 1400. Pt family verbalized understanding. Questions and concerns addressed. Will continue to monitor. See below and flowsheets for full assessment and VS info.     -Pt intubated this AM with anesthesia  -Propofol and levo gtts initiated  -R brachial arterial line placed  -1.5L NS bolus given, NS started at 100ml/hr, NCC aware of UOP  -cEEG placed      Neuro:  Woody Coma Scale  Best Eye Response: 1-->(E1) none  Best Motor Response: 4-->(M4) withdraws from pain  Best Verbal Response: 1-->(V1) none  Woody Coma Scale Score: 6  Assessment Qualifiers: patient intubated, patient chemically sedated or paralyzed  Pupil PERRLA: yes     24 hr Temp:  [97.6 °F (36.4 °C)-98.4 °F (36.9 °C)]     CV:   Rhythm: normal sinus rhythm  BP goals:   SBP < 180  MAP > 65    Resp:      Vent Mode: A/C  Set Rate: 16 BPM  Oxygen Concentration (%): 50  Vt Set: 400 mL  PEEP/CPAP: 5 cmH20    Plan: wean to extubate    GI/:     Diet/Nutrition Received: NPO  Last Bowel Movement: 10/31/23  Voiding Characteristics: urethral catheter (bladder)    Intake/Output Summary (Last 24 hours) at 10/31/2023 1654  Last data filed at 10/31/2023 1600  Gross per 24 hour   Intake 3341.76 ml   Output 1233 ml   Net 2108.76 ml     Unmeasured Output  Urine Occurrence: 5  Stool Occurrence: 1  Emesis Occurrence: 0  Pad Count: 5    Labs/Accuchecks:  Recent Labs   Lab 10/31/23  0200   WBC 8.92   RBC 2.51*   HGB 8.3*   HCT 25.8*   PLT 65*      Recent Labs   Lab 10/31/23  0200      K 3.5   CO2 21*      BUN 18   CREATININE 0.7   ALKPHOS 116   ALT 51*   AST 88*   BILITOT 6.8*      Recent Labs   Lab 10/31/23  0200   INR 1.9*    No results for input(s): "CPK", "CPKMB", "TROPONINI", "MB" in the last 168 hours.    Electrolytes: Electrolytes replaced  Accuchecks: Q6H    Gtts:   sodium chloride 0.9% 100 mL/hr at 10/31/23 1600    NORepinephrine bitartrate-D5W 0.04 mcg/kg/min (10/31/23 1600)    propofoL 50 mcg/kg/min " (10/31/23 1647)       LDA/Wounds:  Lines/Drains/Airways       Drain  Duration                  NG/OG Tube 10/20/23 2000 16 Fr. Left nostril 10 days         Rectal Tube 10/28/23 0530 fecal management system 3 days         Urethral Catheter 10/28/23 1556 3 days              Airway  Duration                  Airway - Non-Surgical 10/31/23 1140 <1 day       Airway Anesthesia 10/31/23 <1 day              Arterial Line  Duration             Arterial Line 10/31/23 1617 Right Brachial <1 day              Peripheral Intravenous Line  Duration                  Peripheral IV - Single Lumen 10/30/23 1157 20 G;1 3/4 in Left Forearm 1 day         Peripheral IV - Single Lumen 10/31/23 0754 20 G Anterior;Left Ankle <1 day         Peripheral IV - Single Lumen 10/31/23 1221 20 G Right Forearm <1 day                  Wounds: Yes  Wound care consulted: Yes

## 2023-10-31 NOTE — ASSESSMENT & PLAN NOTE
58 year old presented for altered mental status on 10/18. Clinical picture confounded with hepatic encephalopathy, infection, and seizure activity. Neurology was consulted by primary team prior to admission to [facility].    10/27 EEG IMPRESSION:  This is an abnormal EEG during lethargic state.  Diffuse disorganized slowing of the background was noted.  Frequent triphasic waves noted.  Left posterior pseudo periodic epileptiform discharges were noted.  Numerous electrographic seizures emanating from the left posterior region were noted.    10/28: EEG update showing no new activity since 10/27 morning.   10/29: still no seizures per phone report, awaiting formal read and can likely disconnect eeg      10-31 rehook to EEG to eval for seizure, abilitly to wean AEDs to see impact on mental status  · vEEG in place  · Keppra 1500 BID  · Vimpat loaded 10/27, continue 100 mg BID  · Airway monitoring  · Neurochecks q1hr  · Vitals q1hr

## 2023-10-31 NOTE — NURSING
1130 Anesthesia to bedside preparing to intubated    1133 Rubi DO 13 mL of propofol given @ 1133 by MD  1134 Rubi SANDOVAL administered 10 mL of succ    6.0 ett tube placed 22 @ the lip    1136 Rubi DO gave another 7ml of propofol and 100mcg of woody.

## 2023-10-31 NOTE — ASSESSMENT & PLAN NOTE
· Hepatology following PEth. Daily CBC, CMP & INR.   · Not currently being evaluated for transplant due to clinical status  · Vitamin K given for chronic coagulopathy  · Continuing lactulose and rifaximin titrated to 3-4 bowel movements daily  · Ammonia down to 35

## 2023-10-31 NOTE — PLAN OF CARE
Patient brought to ICU after possible aspiration event on floor  Upon arrival, patient found to be stridorous but without substantial hypoxia  Nebs and pulmonary toilet started as well as abx followed by diuresis  Hypoxia has only worsened with increasing atelectasis at bases on CXR due to low inspiratory volumes  Appreciate ENT help: substantial retropharyngeal soft tissue edema including epiglottis likely restricting opening of vocal cords resulting in inspiratory stridor  Changed to racemic/albuterol nebs q6- if problem persists or worsens by tomorrow, will likely require intubation, hold NT suctioning as will only worsen underlying condition that is leading to hypoxemic respiratory failure over last 72 hrs  Addl CRC time 60 min not including procedure time

## 2023-11-01 ENCOUNTER — ANESTHESIA EVENT (OUTPATIENT)
Dept: NEUROLOGY | Facility: HOSPITAL | Age: 58
DRG: 100 | End: 2023-11-01
Payer: MEDICAID

## 2023-11-01 ENCOUNTER — TELEPHONE (OUTPATIENT)
Dept: TRANSPLANT | Facility: CLINIC | Age: 58
End: 2023-11-01
Payer: MEDICAID

## 2023-11-01 ENCOUNTER — ANESTHESIA (OUTPATIENT)
Dept: NEUROLOGY | Facility: HOSPITAL | Age: 58
DRG: 100 | End: 2023-11-01
Payer: MEDICAID

## 2023-11-01 LAB
ABO + RH BLD: ABNORMAL
ALBUMIN SERPL BCP-MCNC: 2.9 G/DL (ref 3.5–5.2)
ALLENS TEST: ABNORMAL
ALP SERPL-CCNC: 77 U/L (ref 55–135)
ALT SERPL W/O P-5'-P-CCNC: 40 U/L (ref 10–44)
ANION GAP SERPL CALC-SCNC: 9 MMOL/L (ref 8–16)
AST SERPL-CCNC: 67 U/L (ref 10–40)
BACTERIA BLD CULT: NORMAL
BACTERIA BLD CULT: NORMAL
BASOPHILS # BLD AUTO: 0.01 K/UL (ref 0–0.2)
BASOPHILS # BLD AUTO: 0.01 K/UL (ref 0–0.2)
BASOPHILS NFR BLD: 0.1 % (ref 0–1.9)
BASOPHILS NFR BLD: 0.1 % (ref 0–1.9)
BILIRUB SERPL-MCNC: 5.7 MG/DL (ref 0.1–1)
BLD GP AB SCN CELLS X3 SERPL QL: ABNORMAL
BLD PROD TYP BPU: NORMAL
BLOOD GROUP ANTIBODIES SERPL: NORMAL
BLOOD UNIT EXPIRATION DATE: NORMAL
BLOOD UNIT TYPE CODE: 600
BLOOD UNIT TYPE: NORMAL
BUN SERPL-MCNC: 29 MG/DL (ref 6–20)
CALCIUM SERPL-MCNC: 8.9 MG/DL (ref 8.7–10.5)
CHLORIDE SERPL-SCNC: 112 MMOL/L (ref 95–110)
CO2 SERPL-SCNC: 20 MMOL/L (ref 23–29)
CODING SYSTEM: NORMAL
CREAT SERPL-MCNC: 0.9 MG/DL (ref 0.5–1.4)
CROSSMATCH INTERPRETATION: NORMAL
DELSYS: ABNORMAL
DIFFERENTIAL METHOD: ABNORMAL
DIFFERENTIAL METHOD: ABNORMAL
DISPENSE STATUS: NORMAL
EOSINOPHIL # BLD AUTO: 0 K/UL (ref 0–0.5)
EOSINOPHIL # BLD AUTO: 0 K/UL (ref 0–0.5)
EOSINOPHIL NFR BLD: 0 % (ref 0–8)
EOSINOPHIL NFR BLD: 0 % (ref 0–8)
ERYTHROCYTE [DISTWIDTH] IN BLOOD BY AUTOMATED COUNT: 18 % (ref 11.5–14.5)
ERYTHROCYTE [DISTWIDTH] IN BLOOD BY AUTOMATED COUNT: 18.1 % (ref 11.5–14.5)
ERYTHROCYTE [SEDIMENTATION RATE] IN BLOOD BY WESTERGREN METHOD: 16 MM/H
EST. GFR  (NO RACE VARIABLE): >60 ML/MIN/1.73 M^2
FIBRINOGEN PPP-MCNC: 106 MG/DL (ref 182–400)
FIO2: 50
GLUCOSE SERPL-MCNC: 137 MG/DL (ref 70–110)
HCO3 UR-SCNC: 22.7 MMOL/L (ref 24–28)
HCT VFR BLD AUTO: 19.7 % (ref 37–48.5)
HCT VFR BLD AUTO: 21.3 % (ref 37–48.5)
HCT VFR BLD AUTO: 25.2 % (ref 37–48.5)
HCT VFR BLD CALC: 18 %PCV (ref 36–54)
HGB BLD-MCNC: 6.2 G/DL (ref 12–16)
HGB BLD-MCNC: 6.7 G/DL (ref 12–16)
HGB BLD-MCNC: 8 G/DL (ref 12–16)
IMM GRANULOCYTES # BLD AUTO: 0.1 K/UL (ref 0–0.04)
IMM GRANULOCYTES # BLD AUTO: 0.1 K/UL (ref 0–0.04)
IMM GRANULOCYTES NFR BLD AUTO: 0.8 % (ref 0–0.5)
IMM GRANULOCYTES NFR BLD AUTO: 0.9 % (ref 0–0.5)
INR PPP: 2.1 (ref 0.8–1.2)
LYMPHOCYTES # BLD AUTO: 1.4 K/UL (ref 1–4.8)
LYMPHOCYTES # BLD AUTO: 1.6 K/UL (ref 1–4.8)
LYMPHOCYTES NFR BLD: 12.3 % (ref 18–48)
LYMPHOCYTES NFR BLD: 13 % (ref 18–48)
MAGNESIUM SERPL-MCNC: 2.2 MG/DL (ref 1.6–2.6)
MCH RBC QN AUTO: 33.2 PG (ref 27–31)
MCH RBC QN AUTO: 33.2 PG (ref 27–31)
MCHC RBC AUTO-ENTMCNC: 31.5 G/DL (ref 32–36)
MCHC RBC AUTO-ENTMCNC: 31.5 G/DL (ref 32–36)
MCV RBC AUTO: 105 FL (ref 82–98)
MCV RBC AUTO: 105 FL (ref 82–98)
MODE: ABNORMAL
MONOCYTES # BLD AUTO: 1 K/UL (ref 0.3–1)
MONOCYTES # BLD AUTO: 1.1 K/UL (ref 0.3–1)
MONOCYTES NFR BLD: 8.6 % (ref 4–15)
MONOCYTES NFR BLD: 8.8 % (ref 4–15)
NEUTROPHILS # BLD AUTO: 8.6 K/UL (ref 1.8–7.7)
NEUTROPHILS # BLD AUTO: 9.7 K/UL (ref 1.8–7.7)
NEUTROPHILS NFR BLD: 77.5 % (ref 38–73)
NEUTROPHILS NFR BLD: 77.9 % (ref 38–73)
NRBC BLD-RTO: 0 /100 WBC
NRBC BLD-RTO: 0 /100 WBC
NUM UNITS TRANS PACKED RBC: NORMAL
PCO2 BLDA: 29.5 MMHG (ref 35–45)
PEEP: 5
PH SMN: 7.5 [PH] (ref 7.35–7.45)
PHOSPHATE SERPL-MCNC: 3.6 MG/DL (ref 2.7–4.5)
PLATELET # BLD AUTO: 68 K/UL (ref 150–450)
PLATELET # BLD AUTO: 74 K/UL (ref 150–450)
PMV BLD AUTO: 11.5 FL (ref 9.2–12.9)
PMV BLD AUTO: 12 FL (ref 9.2–12.9)
PO2 BLDA: 213 MMHG (ref 80–100)
POC BE: -1 MMOL/L
POC SATURATED O2: 100 % (ref 95–100)
POC TCO2: 24 MMOL/L (ref 23–27)
POCT GLUCOSE: 118 MG/DL (ref 70–110)
POCT GLUCOSE: 122 MG/DL (ref 70–110)
POCT GLUCOSE: 124 MG/DL (ref 70–110)
POTASSIUM SERPL-SCNC: 3.5 MMOL/L (ref 3.5–5.1)
PROT SERPL-MCNC: 5.6 G/DL (ref 6–8.4)
PROTHROMBIN TIME: 21.2 SEC (ref 9–12.5)
RBC # BLD AUTO: 1.87 M/UL (ref 4–5.4)
RBC # BLD AUTO: 2.02 M/UL (ref 4–5.4)
SAMPLE: ABNORMAL
SITE: ABNORMAL
SODIUM SERPL-SCNC: 141 MMOL/L (ref 136–145)
SPECIMEN OUTDATE: ABNORMAL
VT: 400
WBC # BLD AUTO: 11.02 K/UL (ref 3.9–12.7)
WBC # BLD AUTO: 12.51 K/UL (ref 3.9–12.7)

## 2023-11-01 PROCEDURE — 25000003 PHARM REV CODE 250: Mod: NTX | Performed by: PHYSICIAN ASSISTANT

## 2023-11-01 PROCEDURE — 99291 CRITICAL CARE FIRST HOUR: CPT | Mod: NTX,,, | Performed by: PHYSICIAN ASSISTANT

## 2023-11-01 PROCEDURE — 36430 TRANSFUSION BLD/BLD COMPNT: CPT

## 2023-11-01 PROCEDURE — 99233 SBSQ HOSP IP/OBS HIGH 50: CPT | Mod: NTX,,, | Performed by: PSYCHIATRY & NEUROLOGY

## 2023-11-01 PROCEDURE — 95714 VEEG EA 12-26 HR UNMNTR: CPT | Mod: NTX

## 2023-11-01 PROCEDURE — 85610 PROTHROMBIN TIME: CPT | Mod: NTX | Performed by: STUDENT IN AN ORGANIZED HEALTH CARE EDUCATION/TRAINING PROGRAM

## 2023-11-01 PROCEDURE — P9016 RBC LEUKOCYTES REDUCED: HCPCS | Mod: NTX | Performed by: PHYSICIAN ASSISTANT

## 2023-11-01 PROCEDURE — 25000242 PHARM REV CODE 250 ALT 637 W/ HCPCS: Mod: NTX | Performed by: NURSE PRACTITIONER

## 2023-11-01 PROCEDURE — 95720 PR EEG, W/VIDEO, CONT RECORD, I&R, >12<26 HRS: ICD-10-PCS | Mod: NTX,,, | Performed by: PSYCHIATRY & NEUROLOGY

## 2023-11-01 PROCEDURE — 80053 COMPREHEN METABOLIC PANEL: CPT | Mod: NTX | Performed by: HOSPITALIST

## 2023-11-01 PROCEDURE — 94003 VENT MGMT INPAT SUBQ DAY: CPT | Mod: NTX

## 2023-11-01 PROCEDURE — 99900026 HC AIRWAY MAINTENANCE (STAT): Mod: NTX

## 2023-11-01 PROCEDURE — 99499 NO LOS: ICD-10-PCS | Mod: NTX,,, | Performed by: PHYSICIAN ASSISTANT

## 2023-11-01 PROCEDURE — 63600175 PHARM REV CODE 636 W HCPCS: Mod: NTX | Performed by: STUDENT IN AN ORGANIZED HEALTH CARE EDUCATION/TRAINING PROGRAM

## 2023-11-01 PROCEDURE — 37799 UNLISTED PX VASCULAR SURGERY: CPT | Mod: NTX

## 2023-11-01 PROCEDURE — 25000003 PHARM REV CODE 250: Mod: NTX | Performed by: PSYCHIATRY & NEUROLOGY

## 2023-11-01 PROCEDURE — 99900035 HC TECH TIME PER 15 MIN (STAT): Mod: NTX

## 2023-11-01 PROCEDURE — 31500 INSERT EMERGENCY AIRWAY: CPT | Mod: NTX,,, | Performed by: ANESTHESIOLOGY

## 2023-11-01 PROCEDURE — 31500 AD HOC INTUBATION: ICD-10-PCS | Mod: NTX,,, | Performed by: ANESTHESIOLOGY

## 2023-11-01 PROCEDURE — 85018 HEMOGLOBIN: CPT | Mod: NTX | Performed by: PSYCHIATRY & NEUROLOGY

## 2023-11-01 PROCEDURE — 25000003 PHARM REV CODE 250: Mod: NTX

## 2023-11-01 PROCEDURE — 25000003 PHARM REV CODE 250: Mod: NTX | Performed by: HOSPITALIST

## 2023-11-01 PROCEDURE — 85014 HEMATOCRIT: CPT | Mod: NTX | Performed by: PSYCHIATRY & NEUROLOGY

## 2023-11-01 PROCEDURE — 99499 UNLISTED E&M SERVICE: CPT | Mod: NTX,,, | Performed by: PHYSICIAN ASSISTANT

## 2023-11-01 PROCEDURE — 63600175 PHARM REV CODE 636 W HCPCS: Mod: NTX

## 2023-11-01 PROCEDURE — 86922 COMPATIBILITY TEST ANTIGLOB: CPT | Mod: NTX | Performed by: PHYSICIAN ASSISTANT

## 2023-11-01 PROCEDURE — 84100 ASSAY OF PHOSPHORUS: CPT | Mod: NTX | Performed by: STUDENT IN AN ORGANIZED HEALTH CARE EDUCATION/TRAINING PROGRAM

## 2023-11-01 PROCEDURE — 82803 BLOOD GASES ANY COMBINATION: CPT | Mod: NTX

## 2023-11-01 PROCEDURE — 99291 PR CRITICAL CARE, E/M 30-74 MINUTES: ICD-10-PCS | Mod: NTX,,, | Performed by: PHYSICIAN ASSISTANT

## 2023-11-01 PROCEDURE — 63600175 PHARM REV CODE 636 W HCPCS: Mod: NTX | Performed by: PHYSICIAN ASSISTANT

## 2023-11-01 PROCEDURE — 85384 FIBRINOGEN ACTIVITY: CPT | Mod: NTX | Performed by: PHYSICIAN ASSISTANT

## 2023-11-01 PROCEDURE — 85025 COMPLETE CBC W/AUTO DIFF WBC: CPT | Mod: NTX | Performed by: HOSPITALIST

## 2023-11-01 PROCEDURE — 86902 BLOOD TYPE ANTIGEN DONOR EA: CPT | Mod: NTX | Performed by: PHYSICIAN ASSISTANT

## 2023-11-01 PROCEDURE — 63600175 PHARM REV CODE 636 W HCPCS: Mod: NTX | Performed by: PSYCHIATRY & NEUROLOGY

## 2023-11-01 PROCEDURE — 85025 COMPLETE CBC W/AUTO DIFF WBC: CPT | Mod: 91,NTX

## 2023-11-01 PROCEDURE — 86901 BLOOD TYPING SEROLOGIC RH(D): CPT | Mod: NTX | Performed by: PSYCHIATRY & NEUROLOGY

## 2023-11-01 PROCEDURE — 86870 RBC ANTIBODY IDENTIFICATION: CPT | Mod: NTX | Performed by: PSYCHIATRY & NEUROLOGY

## 2023-11-01 PROCEDURE — 25000003 PHARM REV CODE 250: Mod: NTX | Performed by: STUDENT IN AN ORGANIZED HEALTH CARE EDUCATION/TRAINING PROGRAM

## 2023-11-01 PROCEDURE — 95720 EEG PHY/QHP EA INCR W/VEEG: CPT | Mod: NTX,,, | Performed by: PSYCHIATRY & NEUROLOGY

## 2023-11-01 PROCEDURE — 94761 N-INVAS EAR/PLS OXIMETRY MLT: CPT | Mod: NTX

## 2023-11-01 PROCEDURE — 83735 ASSAY OF MAGNESIUM: CPT | Mod: NTX | Performed by: STUDENT IN AN ORGANIZED HEALTH CARE EDUCATION/TRAINING PROGRAM

## 2023-11-01 PROCEDURE — 27100171 HC OXYGEN HIGH FLOW UP TO 24 HOURS: Mod: NTX

## 2023-11-01 PROCEDURE — 94640 AIRWAY INHALATION TREATMENT: CPT | Mod: NTX

## 2023-11-01 PROCEDURE — 20000000 HC ICU ROOM: Mod: NTX

## 2023-11-01 PROCEDURE — 63600175 PHARM REV CODE 636 W HCPCS: Mod: NTX | Performed by: NURSE PRACTITIONER

## 2023-11-01 PROCEDURE — 99233 PR SUBSEQUENT HOSPITAL CARE,LEVL III: ICD-10-PCS | Mod: NTX,,, | Performed by: PSYCHIATRY & NEUROLOGY

## 2023-11-01 PROCEDURE — 94668 MNPJ CHEST WALL SBSQ: CPT | Mod: NTX

## 2023-11-01 RX ORDER — LACOSAMIDE 50 MG/1
50 TABLET ORAL EVERY 12 HOURS
Status: DISCONTINUED | OUTPATIENT
Start: 2023-11-01 | End: 2023-11-03

## 2023-11-01 RX ORDER — PROPOFOL 10 MG/ML
INJECTION, EMULSION INTRAVENOUS
Status: COMPLETED
Start: 2023-11-01 | End: 2023-11-01

## 2023-11-01 RX ORDER — FUROSEMIDE 10 MG/ML
20 INJECTION INTRAMUSCULAR; INTRAVENOUS ONCE
Status: COMPLETED | OUTPATIENT
Start: 2023-11-01 | End: 2023-11-01

## 2023-11-01 RX ORDER — FENTANYL CITRATE 50 UG/ML
50 INJECTION, SOLUTION INTRAMUSCULAR; INTRAVENOUS
Status: DISCONTINUED | OUTPATIENT
Start: 2023-11-01 | End: 2023-11-06

## 2023-11-01 RX ORDER — HYDROCODONE BITARTRATE AND ACETAMINOPHEN 500; 5 MG/1; MG/1
TABLET ORAL
Status: DISCONTINUED | OUTPATIENT
Start: 2023-11-01 | End: 2023-11-02

## 2023-11-01 RX ORDER — PROPOFOL 10 MG/ML
50 VIAL (ML) INTRAVENOUS ONCE
Status: COMPLETED | OUTPATIENT
Start: 2023-11-01 | End: 2023-11-01

## 2023-11-01 RX ORDER — PROPOFOL 10 MG/ML
VIAL (ML) INTRAVENOUS
Status: COMPLETED
Start: 2023-11-01 | End: 2023-11-01

## 2023-11-01 RX ORDER — PROPOFOL 10 MG/ML
0-50 INJECTION, EMULSION INTRAVENOUS CONTINUOUS
Status: DISCONTINUED | OUTPATIENT
Start: 2023-11-01 | End: 2023-11-01

## 2023-11-01 RX ORDER — IPRATROPIUM BROMIDE AND ALBUTEROL SULFATE 2.5; .5 MG/3ML; MG/3ML
3 SOLUTION RESPIRATORY (INHALATION) EVERY 6 HOURS PRN
Status: DISCONTINUED | OUTPATIENT
Start: 2023-11-01 | End: 2023-11-03

## 2023-11-01 RX ADMIN — IPRATROPIUM BROMIDE AND ALBUTEROL SULFATE 3 ML: .5; 3 SOLUTION RESPIRATORY (INHALATION) at 07:11

## 2023-11-01 RX ADMIN — SODIUM CHLORIDE: 9 INJECTION, SOLUTION INTRAVENOUS at 05:11

## 2023-11-01 RX ADMIN — POTASSIUM CHLORIDE 10 MEQ: 7.46 INJECTION, SOLUTION INTRAVENOUS at 10:11

## 2023-11-01 RX ADMIN — POTASSIUM CHLORIDE 10 MEQ: 7.46 INJECTION, SOLUTION INTRAVENOUS at 08:11

## 2023-11-01 RX ADMIN — FAMOTIDINE 20 MG: 20 TABLET ORAL at 09:11

## 2023-11-01 RX ADMIN — FENTANYL CITRATE 50 MCG: 50 INJECTION INTRAMUSCULAR; INTRAVENOUS at 01:11

## 2023-11-01 RX ADMIN — RIFAXIMIN 550 MG: 550 TABLET ORAL at 09:11

## 2023-11-01 RX ADMIN — NOREPINEPHRINE BITARTRATE 0.02 MCG/KG/MIN: 4 INJECTION, SOLUTION INTRAVENOUS at 06:11

## 2023-11-01 RX ADMIN — PROPOFOL 30 MCG/KG/MIN: 10 INJECTION, EMULSION INTRAVENOUS at 04:11

## 2023-11-01 RX ADMIN — Medication 50 MG: at 11:11

## 2023-11-01 RX ADMIN — FENTANYL CITRATE 50 MCG: 50 INJECTION INTRAMUSCULAR; INTRAVENOUS at 04:11

## 2023-11-01 RX ADMIN — LACTULOSE 30 G: 20 SOLUTION ORAL at 09:11

## 2023-11-01 RX ADMIN — POTASSIUM CHLORIDE 10 MEQ: 7.46 INJECTION, SOLUTION INTRAVENOUS at 11:11

## 2023-11-01 RX ADMIN — RACEPINEPHRINE HYDROCHLORIDE 0.5 ML: 11.25 SOLUTION RESPIRATORY (INHALATION) at 07:11

## 2023-11-01 RX ADMIN — LEVETIRACETAM 1500 MG: 500 SOLUTION ORAL at 09:11

## 2023-11-01 RX ADMIN — PROPOFOL 40 MCG/KG/MIN: 10 INJECTION, EMULSION INTRAVENOUS at 11:11

## 2023-11-01 RX ADMIN — RIFAXIMIN 550 MG: 550 TABLET ORAL at 08:11

## 2023-11-01 RX ADMIN — IPRATROPIUM BROMIDE AND ALBUTEROL SULFATE 3 ML: .5; 3 SOLUTION RESPIRATORY (INHALATION) at 12:11

## 2023-11-01 RX ADMIN — FAMOTIDINE 20 MG: 20 TABLET ORAL at 08:11

## 2023-11-01 RX ADMIN — FUROSEMIDE 20 MG: 10 INJECTION, SOLUTION INTRAVENOUS at 06:11

## 2023-11-01 RX ADMIN — THERA TABS 1 TABLET: TAB at 08:11

## 2023-11-01 RX ADMIN — PROPOFOL 50 MG: 10 INJECTION, EMULSION INTRAVENOUS at 11:11

## 2023-11-01 RX ADMIN — POTASSIUM CHLORIDE 10 MEQ: 7.46 INJECTION, SOLUTION INTRAVENOUS at 01:11

## 2023-11-01 RX ADMIN — LACTULOSE 30 G: 20 SOLUTION ORAL at 08:11

## 2023-11-01 RX ADMIN — FOLIC ACID 1 MG: 1 TABLET ORAL at 08:11

## 2023-11-01 RX ADMIN — THIAMINE HYDROCHLORIDE 250 MG: 100 INJECTION, SOLUTION INTRAMUSCULAR; INTRAVENOUS at 08:11

## 2023-11-01 RX ADMIN — LACOSAMIDE 50 MG: 50 TABLET, FILM COATED ORAL at 09:11

## 2023-11-01 RX ADMIN — LEVETIRACETAM 1500 MG: 500 SOLUTION ORAL at 08:11

## 2023-11-01 RX ADMIN — LACOSAMIDE 100 MG: 100 TABLET, FILM COATED ORAL at 08:11

## 2023-11-01 RX ADMIN — DEXAMETHASONE SODIUM PHOSPHATE 6 MG: 4 INJECTION INTRA-ARTICULAR; INTRALESIONAL; INTRAMUSCULAR; INTRAVENOUS; SOFT TISSUE at 05:11

## 2023-11-01 RX ADMIN — RACEPINEPHRINE HYDROCHLORIDE 0.5 ML: 11.25 SOLUTION RESPIRATORY (INHALATION) at 12:11

## 2023-11-01 NOTE — SUBJECTIVE & OBJECTIVE
Interval History: see hospital course     Review of Systems   Unable to perform ROS: Mental status change       Objective:     Vitals:  Temp: 97.8 °F (36.6 °C)  Pulse: 65  Rhythm: normal sinus rhythm  BP: 117/60  MAP (mmHg): 86  Resp: 16  SpO2: 100 %  Oxygen Concentration (%): 100  Vent Mode: A/C  Set Rate: 16 BPM  Vt Set: 400 mL  PEEP/CPAP: 5 cmH20  Peak Airway Pressure: 20 cmH20  Mean Airway Pressure: 9.5 cmH20  Plateau Pressure: 0 cmH20    Temp  Min: 97.5 °F (36.4 °C)  Max: 98.3 °F (36.8 °C)  Pulse  Min: 51  Max: 93  BP  Min: 89/48  Max: 164/72  MAP (mmHg)  Min: 66  Max: 104  Resp  Min: 11  Max: 18  SpO2  Min: 100 %  Max: 100 %  Oxygen Concentration (%)  Min: 40  Max: 100    10/31 0701 - 11/01 0700  In: 5553 [I.V.:2958.6]  Out: 1078 [Urine:478]   Unmeasured Output  Urine Occurrence: 5  Stool Occurrence: 1  Emesis Occurrence: 0  Pad Count: 5        Physical Exam  Vitals and nursing note reviewed.   Constitutional:       Appearance: She is ill-appearing and toxic-appearing.   HENT:      Head: Normocephalic and atraumatic.      Mouth/Throat:      Mouth: Mucous membranes are moist.   Eyes:      General: Scleral icterus present.      Extraocular Movements: Extraocular movements intact.      Pupils: Pupils are equal, round, and reactive to light.   Cardiovascular:      Rate and Rhythm: Normal rate and regular rhythm.   Pulmonary:      Effort: No respiratory distress.      Comments: Intubated on vent   Abdominal:      General: There is distension.      Palpations: Abdomen is soft.      Tenderness: There is no abdominal tenderness.   Skin:     General: Skin is warm.      Coloration: Skin is jaundiced.   Neurological:      Comments: --sedation: none  --GCS:  E2 V1 M4  --Mental Status: diminished GCS and mentation, nothing purposeful   --CN II-XII grossly intact.   --PERRL              Unable to test orientation, language, memory, judgment, insight, fund of knowledge, hearing, shoulder shrug, tongue protrusion,  coordination, gait due to level of consciousness.       Medications:  Continuoussodium chloride 0.9%, Last Rate: Stopped (11/01/23 1306)  NORepinephrine bitartrate-D5W, Last Rate: Stopped (11/01/23 1218)  propofoL, Last Rate: 40 mcg/kg/min (11/01/23 1100)    Scheduledfamotidine, 20 mg, BID  folic acid, 1 mg, Daily  lacosamide, 50 mg, Q12H  lactulose, 30 g, BID  levetiracetam, 1,500 mg, BID  multivitamin, 1 tablet, Daily  rifAXImin, 550 mg, BID    PRN0.9%  NaCl infusion (for blood administration), , Q24H PRN  albuterol-ipratropium, 3 mL, Q6H PRN  calcium gluconate IVPB, 1 g, PRN  calcium gluconate IVPB, 2 g, PRN  calcium gluconate IVPB, 3 g, PRN  fentaNYL, 50 mcg, Q2H PRN  magnesium sulfate IVPB, 2 g, PRN  magnesium sulfate IVPB, 4 g, PRN  ondansetron, 4 mg, Q8H PRN  potassium chloride, 40 mEq, PRN   And  potassium chloride, 60 mEq, PRN   And  potassium chloride, 80 mEq, PRN  sodium phosphate 15 mmol in dextrose 5 % (D5W) 250 mL IVPB, 15 mmol, PRN  sodium phosphate 20.01 mmol in dextrose 5 % (D5W) 250 mL IVPB, 20.01 mmol, PRN  sodium phosphate 30 mmol in dextrose 5 % (D5W) 250 mL IVPB, 30 mmol, PRN      Today I personally reviewed pertinent medications, lines/drains/airways, imaging, cardiology results, laboratory results, notably:    Ammonia 35  Diet  No diet orders on file  No diet orders on file

## 2023-11-01 NOTE — NURSING
Propofol restarted at 40mcg/kg/hr per anesthesia MD at bedside for tube exchange.    1115-MD Mustapha at bedside for tube exchange with staff anesthesia MD. Tube exchanged without difficulty to 7.0 tube. 50mg propofol pushed by anesthesia MD during procedure. Pt tolerated well. CXR ordered.

## 2023-11-01 NOTE — ASSESSMENT & PLAN NOTE
58 year old presented for altered mental status on 10/18. Clinical picture confounded with hepatic encephalopathy, infection, and seizure activity. Neurology was consulted by primary team prior to admission to [facility].    10/27 EEG IMPRESSION:  This is an abnormal EEG during lethargic state.  Diffuse disorganized slowing of the background was noted.  Frequent triphasic waves noted.  Left posterior pseudo periodic epileptiform discharges were noted.  Numerous electrographic seizures emanating from the left posterior region were noted.    10/31 - rehook and 11/1 read with frequent discharges, vimpat lowered to 50 given mental status and liver function, will continue to watch on eeg  · vEEG in place  · Keppra 1500 BID  · Vimpat 50 mg BID  · Neurochecks q1hr  · Vitals q1hr

## 2023-11-01 NOTE — ASSESSMENT & PLAN NOTE
58F PMHx etoh cirrhosis with ascites, incarcerated hernia s/p ex lap, and recent SBO who initially presented to [facility] with AMS on 10/18, transferred to [facility] for hepatic encephalopathy and liver transplant evaluation, hospital course c/b UTI, SBI, focal status epilepticus (now resolved), and acute hypoxemic respiratory failure with intubation on 10/31. EEG re-placed on 10/31 for persistent encephalopathy.    Recommendations:  - Continuous vEEG monitoring  - Agree with weaning Propofol  - NCC decreased Lacosamide to 50 mg d/t concerns for hepatic impairment  - Recommend to continue Levetiracetam 1500 mg BID  - Avoid agents that lower seizure threshold  - Management of infectious/metabolic abnormalities per NCC  - Seizure precautions      Discussed plan of care with NCC team. No family at bedside. Will follow, please call with questions.

## 2023-11-01 NOTE — SUBJECTIVE & OBJECTIVE
Interval History: NAEON. EEG with frequent left interictals, no discrete seizures. No family at bedside on rounds.    Current Facility-Administered Medications   Medication Dose Route Frequency Provider Last Rate Last Admin    0.9%  NaCl infusion (for blood administration)   Intravenous Q24H PRN Lopiccolo, Erica G., PA-C        0.9%  NaCl infusion   Intravenous Continuous Chris Alarcon MD   Paused at 11/01/23 1306    albuterol-ipratropium 2.5 mg-0.5 mg/3 mL nebulizer solution 3 mL  3 mL Nebulization Q6H PRN Lopiccolo, Erica G., PA-C        calcium gluconate 1 g in NS IVPB (premixed)  1 g Intravenous PRN Andras, Philippe P., NP        calcium gluconate 1 g in NS IVPB (premixed)  2 g Intravenous PRN Andras, Philippe P., NP        calcium gluconate 1 g in NS IVPB (premixed)  3 g Intravenous PRN Andras, Philippe P., NP        famotidine tablet 20 mg  20 mg Per NG tube BID Lencho Hess DO   20 mg at 11/01/23 0816    fentaNYL 50 mcg/mL injection 50 mcg  50 mcg Intravenous Q2H PRN Lopiccolo, Erica G., PA-C   50 mcg at 11/01/23 1316    folic acid tablet 1 mg  1 mg Per NG tube Daily Darion Torres MD   1 mg at 11/01/23 0817    lacosamide tablet 50 mg  50 mg Per NG tube Q12H Lopiccochang, Erica BROWN, PA-C        lactulose 20 gram/30 mL solution Soln 30 g  30 g Per NG tube BID Rebecca Adames PA-C   30 g at 11/01/23 0817    levetiracetam 500 mg/5 mL (5 mL) liquid Soln 1,500 mg  1,500 mg Per NG tube BID Roberto Jean Baptiste DO   1,500 mg at 11/01/23 0816    magnesium sulfate 2g in water 50mL IVPB (premix)  2 g Intravenous PRN Andras, Philippe P., NP   Stopped at 10/30/23 1022    magnesium sulfate 2g in water 50mL IVPB (premix)  4 g Intravenous PRN Andras, Philippe P., NP        multivitamin tablet  1 tablet Per NG tube Daily Darion Torres MD   1 tablet at 11/01/23 0816    NORepinephrine bitartrate-D5W 4 mg/250 mL (16 mcg/mL) PERIPHERAL access infusion  0-0.2 mcg/kg/min Intravenous Continuous Chris Alarcno MD    Stopped at 11/01/23 1218    ondansetron injection 4 mg  4 mg Intravenous Q8H PRN Aurelia Winston MD        potassium chloride 10 mEq in 100 mL IVPB  40 mEq Intravenous PRN AndPhilippe lema P.,  mL/hr at 11/01/23 1400 Rate Verify at 11/01/23 1400    And    potassium chloride 10 mEq in 100 mL IVPB  60 mEq Intravenous PRN Andtorey Philippe P., NP   Stopped at 10/30/23 0916    And    potassium chloride 10 mEq in 100 mL IVPB  80 mEq Intravenous PRN Andtorey, Philippe P., NP   Stopped at 10/28/23 0138    propofol (DIPRIVAN) 10 mg/mL infusion  0-50 mcg/kg/min Intravenous Continuous Erica Lewis PA-C 13.5 mL/hr at 11/01/23 1100 40 mcg/kg/min at 11/01/23 1100    rifAXIMin tablet 550 mg  550 mg Per NG tube BID Aurelia Winston MD   550 mg at 11/01/23 0816    sodium phosphate 15 mmol in dextrose 5 % (D5W) 250 mL IVPB  15 mmol Intravenous PRN Andtorey, Philippe P., NP        sodium phosphate 20.01 mmol in dextrose 5 % (D5W) 250 mL IVPB  20.01 mmol Intravenous PRN AndSoniya lemay P., NP   Stopped at 10/30/23 2147    sodium phosphate 30 mmol in dextrose 5 % (D5W) 250 mL IVPB  30 mmol Intravenous PRN Andtorey Philippe P., NP         Continuous Infusions:   sodium chloride 0.9% Stopped (11/01/23 1306)    NORepinephrine bitartrate-D5W Stopped (11/01/23 1218)    propofoL 40 mcg/kg/min (11/01/23 1100)       Review of Systems   Unable to perform ROS: Intubated     Objective:     Vital Signs (Most Recent):  Temp: 97.8 °F (36.6 °C) (11/01/23 1400)  Pulse: 65 (11/01/23 1401)  Resp: 16 (11/01/23 1401)  BP: 117/60 (11/01/23 1401)  SpO2: 100 % (11/01/23 1401) Vital Signs (24h Range):  Temp:  [97.5 °F (36.4 °C)-98.3 °F (36.8 °C)] 97.8 °F (36.6 °C)  Pulse:  [51-93] 65  Resp:  [11-18] 16  SpO2:  [100 %] 100 %  BP: ()/(47-72) 117/60  Arterial Line BP: ()/(40-70) 127/53     Weight: 56.2 kg (123 lb 14.4 oz)  Body mass index is 25.02 kg/m².     Physical Exam  Constitutional:       General: She is not in acute distress.     Appearance: She  is not diaphoretic.      Interventions: She is intubated.   HENT:      Head: Normocephalic and atraumatic.      Comments: EEG in place  Eyes:      General: No scleral icterus.        Right eye: No discharge.         Left eye: No discharge.      Conjunctiva/sclera: Conjunctivae normal.      Pupils: Pupils are equal, round, and reactive to light.   Cardiovascular:      Rate and Rhythm: Normal rate.   Pulmonary:      Effort: Pulmonary effort is normal. No respiratory distress. She is intubated.      Comments: Intubated  Abdominal:      General: There is no distension.      Palpations: Abdomen is soft.   Musculoskeletal:         General: No deformity or signs of injury.   Skin:     General: Skin is warm and dry.   Psychiatric:      Comments: Unable to assess            NEUROLOGICAL EXAMINATION:     CRANIAL NERVES     CN III, IV, VI   Pupils are equal, round, and reactive to light.       EMILY OU   Corneal intact OU   No spontaneous eye opening   Face symmetric   Tongue midline   Withdraws to noxious stimuli    Exam findings to suggest seizures:  Myoclonus - no   eye twitching - no   Nystagmus - no   gaze deviation - no   waxy rigidity - no        Significant Labs: All pertinent lab results from the past 24 hours have been reviewed.    Significant Studies: I have reviewed all pertinent imaging results/findings within the past 24 hours.

## 2023-11-01 NOTE — ASSESSMENT & PLAN NOTE
This is a 58-year-old woman with a history of decompensated alcoholic cirrhosis.  Suspect thrombocytopenia is likely secondary to chronic alcohol use and is consumptive in nature. If trend worsens, will consider consulting Hematology.    · Daily CBC, transfuse only for active bleeding  · Continues to trend up

## 2023-11-01 NOTE — ASSESSMENT & PLAN NOTE
· Hepatology following PEth. Daily CBC, CMP & INR.   · Not currently being evaluated for transplant due to clinical status  · Vitamin K given for chronic coagulopathy  · Continuing lactulose and rifaximin titrated to 3-4 bowel movements daily  · Ammonia down

## 2023-11-01 NOTE — ASSESSMENT & PLAN NOTE
2/2 to tracheal stenosis   -patient with stridor, increased secretions, requiring 5L 28% and inability to protect airway   -intubated by anesthesia team with small ETT    Vent Mode: A/C  Oxygen Concentration (%):  [] 100  Resp Rate Total:  [16 br/min-18 br/min] 16 br/min  Vt Set:  [400 mL] 400 mL  PEEP/CPAP:  [5 cmH20] 5 cmH20  Mean Airway Pressure:  [8.9 cmH20-9.5 cmH20] 9.5 cmH20

## 2023-11-01 NOTE — PROGRESS NOTES
[facility] - Neuro Critical Care  Neurocritical Care  Progress Note    Admit Date: 10/25/2023  Service Date: 11/01/2023  Length of Stay: 7    Subjective:     Chief Complaint: Acute encephalopathy    History of Present Illness: This is a 58-year-old female with a past medical history of alcoholic cirrhosis, s/p ex-lap w/ bowel resection for incarcerated hernia, who initially presented on 10/18 to [facility] with acute encephalopathy.  Her  found her on the floor at home with evidence of fecal/urine incontinence with confusion and slurred speech. Possible reported tongue injury in chart. Reported concern L sided weakness and down drift of L arm however CT head without evidence of acute abnormalities, MRI brain with only small vessel vascular changes without evidence of territorial infarct.     Hospital Course: EEG done on 10/19 with mild diffuse nonspecific background slowing, no potential epileptiform activity. Repeat MRI brain with contrast on 10/23 unchanged from initial MRI. Became more lethargic and was no longer following commands or answering questions. Due to worsening hepatic encephalopathy in setting of hepatic cirrhosis, patient transferred to [facility] for management with transplant team. Has remained on antibiotics, lactulose and rifaximin for treatment of UTI with pansensitive Ecoli, HE, and presumed SBP. Neurology consulted for management recommendations regarding persistent AMS. Had repeat EEG done on 10/24 with similar findings to prior EEG showing mild generalized non-specific cerebral dysfunction and no electrographic seizures or indication of seizure tendency. Additional CT head w/o contrast on 10/25 without evidence of acute issues. Remains confused and unable to answer questions on exam. Not withdrawing to painful stimuli. Was able to awaken to verbal stimuli in AM however when reevaluated in afternoon unresponsive however was after receiving Ativan.     On admission to [facility]:  Patient had EEG  running, and was noted to have seizures at 7:30 a.m., 8:00 a.m. in, and 10:00 a.m. was noted to be in focal status prior to receiving Vimpat.  Patient was noted to have stridor, worsening secretions with suspicion for aspiration, decreased airway protection, and rapids was called due to low GCS score of 6. Antibiotics broadened to Vanc/Zosyn. Clinical picture of mental status confounded with episodes of seizures, infection, and hepatic encephalopathy.               Hospital Course: 10/28: Improving GCS from 6 to 10. Continuing pulmonary toilet and deep suctioning for stridor and copious secretions. UA positive for candida. Blood cultures from 10/27 NGTD. Sputum cultures sent. Patient on day 2 of broadened antibiotics vanc/zosyn due to worsening clinical status but will discontinue tomorrow if cultures remain negative. Still hypernatremic, treating with D5W. Patient was transferred with no ordered diuretics, very edematous on physical exam. Adequate stooling on lactulose and rifaximin. Due to increased UOP/stooling will place eckert for one day for irritated skin. EEG update showing no seizures since 10am 10/27. Monitoring CBC for thrombocytopenia and macrocytic anemia. Discussed code status and goals of care with  and best friend at bedside. Trickle feeds resumed.   10/29: No acute events overnight, EEG reportedly without further seizures for ~48 hours can disconnect once formal report is in, neuro status slowly improving as patient now tracking in room, moving extremities, responding with appropriate emotional reactions to her .  Respiratory status largely unchanged with intermittent events of large volume breaths that sound almost stridorous but without actual respiratory distress- gas remains compensated.  UOP low, diuresis resumed.  10/30/2023: d/c mucomyst nebs, add racemic epi scheduled nebs, add budesonide and dex 6 q8 hours, ammonia at 35- continue lactulose and rifaximin, abg reviewed, 40mEq of  K once, ENT consulted for stridor, continue eckert catheter in today   10/31/2023 Patient with worsening respiratory status, continued decreased mentation and increased secretions. Plan for elective intubation in controlled setting with anesthesia. Will also recheck eeg, if negative will start to wean AEDs and see if that improves patient's arousal. PLT stabilized, continue to monitor.   11/1/2023 this morning patient's 6.0 ETT exchanged for 7.0 to allow suctioning. EEG with frequent discharges, lowered vimpat to 50 mg and will follow EEG. Attempting to remove confounding factors for patients mental status. Slow drop in H/H, 1 unit ordered.       Interval History: see hospital course     Review of Systems   Unable to perform ROS: Mental status change       Objective:     Vitals:  Temp: 97.8 °F (36.6 °C)  Pulse: 65  Rhythm: normal sinus rhythm  BP: 117/60  MAP (mmHg): 86  Resp: 16  SpO2: 100 %  Oxygen Concentration (%): 100  Vent Mode: A/C  Set Rate: 16 BPM  Vt Set: 400 mL  PEEP/CPAP: 5 cmH20  Peak Airway Pressure: 20 cmH20  Mean Airway Pressure: 9.5 cmH20  Plateau Pressure: 0 cmH20    Temp  Min: 97.5 °F (36.4 °C)  Max: 98.3 °F (36.8 °C)  Pulse  Min: 51  Max: 93  BP  Min: 89/48  Max: 164/72  MAP (mmHg)  Min: 66  Max: 104  Resp  Min: 11  Max: 18  SpO2  Min: 100 %  Max: 100 %  Oxygen Concentration (%)  Min: 40  Max: 100    10/31 0701 - 11/01 0700  In: 5553 [I.V.:2958.6]  Out: 1078 [Urine:478]   Unmeasured Output  Urine Occurrence: 5  Stool Occurrence: 1  Emesis Occurrence: 0  Pad Count: 5        Physical Exam  Vitals and nursing note reviewed.   Constitutional:       Appearance: She is ill-appearing and toxic-appearing.   HENT:      Head: Normocephalic and atraumatic.      Mouth/Throat:      Mouth: Mucous membranes are moist.   Eyes:      General: Scleral icterus present.      Extraocular Movements: Extraocular movements intact.      Pupils: Pupils are equal, round, and reactive to light.   Cardiovascular:      Rate and  Rhythm: Normal rate and regular rhythm.   Pulmonary:      Effort: No respiratory distress.      Comments: Intubated on vent   Abdominal:      General: There is distension.      Palpations: Abdomen is soft.      Tenderness: There is no abdominal tenderness.   Skin:     General: Skin is warm.      Coloration: Skin is jaundiced.   Neurological:      Comments: --sedation: none  --GCS:  E2 V1 M4  --Mental Status: diminished GCS and mentation, nothing purposeful   --CN II-XII grossly intact.   --PERRL              Unable to test orientation, language, memory, judgment, insight, fund of knowledge, hearing, shoulder shrug, tongue protrusion, coordination, gait due to level of consciousness.       Medications:  Continuoussodium chloride 0.9%, Last Rate: Stopped (11/01/23 1306)  NORepinephrine bitartrate-D5W, Last Rate: Stopped (11/01/23 1218)  propofoL, Last Rate: 40 mcg/kg/min (11/01/23 1100)    Scheduledfamotidine, 20 mg, BID  folic acid, 1 mg, Daily  lacosamide, 50 mg, Q12H  lactulose, 30 g, BID  levetiracetam, 1,500 mg, BID  multivitamin, 1 tablet, Daily  rifAXImin, 550 mg, BID    PRN0.9%  NaCl infusion (for blood administration), , Q24H PRN  albuterol-ipratropium, 3 mL, Q6H PRN  calcium gluconate IVPB, 1 g, PRN  calcium gluconate IVPB, 2 g, PRN  calcium gluconate IVPB, 3 g, PRN  fentaNYL, 50 mcg, Q2H PRN  magnesium sulfate IVPB, 2 g, PRN  magnesium sulfate IVPB, 4 g, PRN  ondansetron, 4 mg, Q8H PRN  potassium chloride, 40 mEq, PRN   And  potassium chloride, 60 mEq, PRN   And  potassium chloride, 80 mEq, PRN  sodium phosphate 15 mmol in dextrose 5 % (D5W) 250 mL IVPB, 15 mmol, PRN  sodium phosphate 20.01 mmol in dextrose 5 % (D5W) 250 mL IVPB, 20.01 mmol, PRN  sodium phosphate 30 mmol in dextrose 5 % (D5W) 250 mL IVPB, 30 mmol, PRN      Today I personally reviewed pertinent medications, lines/drains/airways, imaging, cardiology results, laboratory results, notably:    Ammonia 35  Diet  No diet orders on file  No diet  orders on file          Assessment/Plan:     Neuro  * Acute encephalopathy  See acute hepatic encephalopathy and seizures    Seizure  58 year old presented for altered mental status on 10/18. Clinical picture confounded with hepatic encephalopathy, infection, and seizure activity. Neurology was consulted by primary team prior to admission to [facility].    10/27 EEG IMPRESSION:  This is an abnormal EEG during lethargic state.  Diffuse disorganized slowing of the background was noted.  Frequent triphasic waves noted.  Left posterior pseudo periodic epileptiform discharges were noted.  Numerous electrographic seizures emanating from the left posterior region were noted.    10/31 - rehook and 11/1 read with frequent discharges, vimpat lowered to 50 given mental status and liver function, will continue to watch on eeg  · vEEG in place  · Keppra 1500 BID  · Vimpat 50 mg BID  · Neurochecks q1hr  · Vitals q1hr       Pulmonary  Tracheal stenosis  Scope complete by ENT   -patient on scheduled nebs and racemic epi without improvement in respiratory status   -elective intubation 10/31 with 6.0 ETT with anesthesia  -11/1 tube exchange to 7.0 ETT to allow suctioning, completed steroids.     Hematology  Thrombocytopenia  This is a 58-year-old woman with a history of decompensated alcoholic cirrhosis.  Suspect thrombocytopenia is likely secondary to chronic alcohol use and is consumptive in nature. If trend worsens, will consider consulting Hematology.    · Daily CBC, transfuse only for active bleeding  · Continues to trend up     Endocrine  Moderate malnutrition  Resuming tube feeds at 10 mL/hr    Nutrition consulted. Most recent weight and BMI monitored-     Measurements:  Wt Readings from Last 1 Encounters:   10/26/23 56.2 kg (123 lb 14.4 oz)   Body mass index is 25.02 kg/m².    Patient has been screened and assessed by RD.    Malnutrition Type:  Context: social/environmental circumstances  Level: moderate    Malnutrition  Characteristic Summary:  Subcutaneous Fat (Malnutrition): mild depletion  Muscle Mass (Malnutrition): mild depletion  Fluid Accumulation (Malnutrition): moderate    Interventions/Recommendations (treatment strategy):  1. When able, initiate TFs. Rec'd Isosource 1.5 @ 50 mL/hr to provide 1800 kcals, 82 g of protein, 917 mL fluid. 2. RD to monitor & follow-up.    GI  Acute hepatic encephalopathy  This is a 52 year old woman with a history of ethanol cirrhosis who presents after being found on the ground with fecal and urinary incontinence, and altered mental status. Elevated ammonia on admission.    - Continue lactulose via NG tube TID (titrate till 3-4 daily BM achieved). Continue Xifaxin.   - Avoid opiates and benzodiazepines, if able.   - IV thiamine, folate supplementation, multivitamin through NG tube.  - concern that comatose state is irreversible brain damage, will rule out other causes first, EEG and wean AEDs    -MELD score ~50% survival without transplant    Decompensated alcoholic hepatic cirrhosis  · Hepatology following PEth. Daily CBC, CMP & INR.   · Not currently being evaluated for transplant due to clinical status  · Vitamin K given for chronic coagulopathy  · Continuing lactulose and rifaximin titrated to 3-4 bowel movements daily  · Ammonia down     Other  Hypoxic respiratory failure  2/2 to tracheal stenosis   -patient with stridor, increased secretions, requiring 5L 28% and inability to protect airway   -intubated by anesthesia team with small ETT    Vent Mode: A/C  Oxygen Concentration (%):  [] 100  Resp Rate Total:  [16 br/min-18 br/min] 16 br/min  Vt Set:  [400 mL] 400 mL  PEEP/CPAP:  [5 cmH20] 5 cmH20  Mean Airway Pressure:  [8.9 cmH20-9.5 cmH20] 9.5 cmH20        Debility  PT/OT when appropriate          The patient is being Prophylaxed for:  Venous Thromboembolism with: Mechanical (thrombocytopenia and elevated INR)  Stress Ulcer with: H2B  Ventilator Pneumonia with: chlorhexidine oral  care    Activity Orders          Elevate HOB Elevate (30-45 degrees) Elevate HOB to 30 - 45 degrees during feeding unless otherwise stated starting at 10/28 1218    Elevate HOB to 30-45 degrees during feeding unless otherwise stated starting at 10/26 1138    Progressive Mobility Protocol (mobilize patient to their highest level of functioning at least twice daily) starting at 10/25 2000    Turn patient starting at 10/25 2000        Full Code   Critical condition in that Patient has a condition that poses threat to life and bodily function: hepatic encephalopathy, tracheal stenosis and hypoxic resp failure     45 minutes of Critical care time was spent personally by me on the following activities: development of treatment plan with patient or surrogate and bedside caregivers, discussions with consultants, evaluation of patient's response to treatment, examination of patient, ordering and performing treatments and interventions, ordering and review of laboratory studies, ordering and review of radiographic studies, pulse oximetry, antibiotic titration if applicable, vasopressor titration if applicable, re-evaluation of patient's condition. This critical care time did not  involve time for any procedures. There is high probability for acute neurological change leading to clinical and possibly life-threatening deterioration requiring highest level of physician preparedness for urgent intervention.         Erica Lewis PA-C  Neurocritical Care  [facility] - Neuro Critical Care

## 2023-11-01 NOTE — PROGRESS NOTES
[facility]  Hepatology  Progress Note    Patient Name: Mara Mojica  MRN: [id number]  Admission Date: 10/25/2023  Hospital Length of Stay: 7 days    Subjective:     Interval History:  Yesterday morning, the patient was intubated for respiratory distress.  Following intubation, she became hypotensive with map 49.  This required initiation of Levophed drip.  This morning, she has been weaned off the Levophed drip and is maintaining a MAP in the 80s.    Eeg repeated.  Shows severe diffuse background slowing and frequent interictal epileptiform discharges in the left hemisphere.    No family at bedside.  Attempted to call significant other, no answer.  Left voicemail.    PEth from 10/25 <10    Objective:     Vitals:    11/01/23 1115   BP:    Pulse: 93   Resp: 11   Temp:          Constitutional: Ill appearing. Does not respond to verbal commands, but grimaces to painful stimulus.   HENT: Head: Normal, normocephalic, atraumatic.   Eyes: Icteric sclera  GI: soft, non-tender, without masses or organomegaly and distended  Neurological: Disoriented, not responding to commands      Significant Labs:  Recent Labs   Lab 10/31/23  0200 11/01/23  0312 11/01/23  0442   HGB 8.3* 6.2* 6.7*       Lab Results   Component Value Date    WBC 12.51 11/01/2023    HGB 6.7 (L) 11/01/2023    HCT 21.3 (L) 11/01/2023     (H) 11/01/2023    PLT 74 (L) 11/01/2023       Lab Results   Component Value Date     11/01/2023    K 3.5 11/01/2023     (H) 11/01/2023    CO2 20 (L) 11/01/2023    BUN 29 (H) 11/01/2023    CREATININE 0.9 11/01/2023    CALCIUM 8.9 11/01/2023    ANIONGAP 9 11/01/2023       Lab Results   Component Value Date    ALT 40 11/01/2023    AST 67 (H) 11/01/2023    ALKPHOS 77 11/01/2023    BILITOT 5.7 (H) 11/01/2023       Lab Results   Component Value Date    INR 2.1 (H) 11/01/2023    INR 1.9 (H) 10/31/2023    INR 1.9 (H) 10/30/2023       Significant Imaging:  Reviewed pertinent radiology findings.        Assessment/Plan:     Mara Mojica is a 58 y.o. female with history of alcoholic cirrhosis with ascites, s/p Ex Lap with bowel resection for incarcerated hernia, recent SBO, who presented to [facility] on 10/18 with AMS. EtOH level < 10. CTH/MRI brain unremarkable. EEG showed mild generalized nonspecific cerebral dysfunction with no indication of seizure. Ammonia level WNL; not improving with lactulose in spite of large BMs. She was seen by Dr. Reardon in October 2023 for decompensated cirrhosis and it was recommended to proceed with liver transplant evaluation.  EGD in September 2023 showed normal esophagus and portal hypertensive gastropathy. Now with worsening mentation and seizure activity on EEG; moved to neurocritical ICU. Now on Keppra and Vimpat with no seizures, but no improvement in mentation. Intubated on 10/31/23.Optimized on lactulose and Xifaxin. PEth from 10/25 <10.     Problem List:  Alcoholic cirrhosis of liver with ascites   AMS - not readily attributable to HE (differentials include encephalitis v/s seizure)    MELD 3.0: 23 at 11/1/2023  3:12 AM  MELD-Na: 21 at 11/1/2023  3:12 AM  Calculated from:  Serum Creatinine: 0.9 mg/dL (Using min of 1 mg/dL) at 11/1/2023  3:12 AM  Serum Sodium: 141 mmol/L (Using max of 137 mmol/L) at 11/1/2023  3:12 AM  Total Bilirubin: 5.7 mg/dL at 11/1/2023  3:12 AM  Serum Albumin: 2.9 g/dL at 11/1/2023  3:12 AM  INR(ratio): 2.1 at 11/1/2023  3:12 AM  Age at listing (hypothetical): 58 years  Sex: Female at 11/1/2023  3:12 AM       Recommendations:  - Appreciate recs from neurology regarding management of epilepsy.   - Start lactulose via NG tube TID (titrate till 3-4 daily BM achieved; consider rectal lactulose if not able to tolerate PO). Continue Xifaxin.   - Avoid opiates and benzodiazepines, if able.   - IV thiamine and folate supplementation  - We are not initiating liver transplant evaluation at this time. Patient will need to be conscious and able to  participate in a discussion with us before this is possible.   - Daily CBC, CMP & INR.     Thank you for involving us in the care of Mara Mojica. Please call with any additional questions, concerns or changes in the patient's clinical status. We will continue to follow.     Lv Resendiz MD  Gastroenterology Fellow PGY IV   [facility]

## 2023-11-01 NOTE — PLAN OF CARE
"[facility] Care Plan    POC reviewed with Mara Mojica and family at 0300. Pt verbalized understanding. Questions and concerns addressed. No acute events overnight. Pt progressing toward goals. Will continue to monitor. See below and flowsheets for full assessment and VS info.     Weaning off levo, Prop at 30, NS @ 100      Is this a stroke patient? no    Neuro:  [place] Coma Scale  Best Eye Response: 1-->(E1) none  Best Motor Response: 4-->(M4) withdraws from pain  Best Verbal Response: 1-->(V1) none  [place] Coma Scale Score: 6  Assessment Qualifiers: patient chemically sedated or paralyzed, patient intubated  Pupil PERRLA: yes     24hr Temp:  [97.5 °F (36.4 °C)-98.3 °F (36.8 °C)]     CV:   Rhythm: normal sinus rhythm  BP goals:   SBP < 140  MAP > 65    Resp:      Vent Mode: A/C  Set Rate: 16 BPM  Oxygen Concentration (%): 40  Vt Set: 400 mL  PEEP/CPAP: 5 cmH20    Plan: wean to extubate    GI/:     Diet/Nutrition Received: NPO  Last Bowel Movement: 10/31/23  Voiding Characteristics: urethral catheter (bladder)    Intake/Output Summary (Last 24 hours) at 11/1/2023 0455  Last data filed at 11/1/2023 0401  Gross per 24 hour   Intake 5462.8 ml   Output 1028 ml   Net 4434.8 ml     Unmeasured Output  Urine Occurrence: 5  Stool Occurrence: 1  Emesis Occurrence: 0  Pad Count: 5    Labs/Accuchecks:  Recent Labs   Lab 11/01/23 0312 11/01/23  0417   WBC 11.02  --    RBC 1.87*  --    HGB 6.2*  --    HCT 19.7* 18*   PLT 68*  --       Recent Labs   Lab 11/01/23 0312      K 3.5   CO2 20*   *   BUN 29*   CREATININE 0.9   ALKPHOS 77   ALT 40   AST 67*   BILITOT 5.7*      Recent Labs   Lab 11/01/23 0312   INR 2.1*    No results for input(s): "CPK", "CPKMB", "TROPONINI", "MB" in the last 168 hours.    Electrolytes: Electrolytes replaced  Accuchecks: Q6H    Gtts:   sodium chloride 0.9% 100 mL/hr at 11/01/23 0401    NORepinephrine bitartrate-D5W 0.02 mcg/kg/min (11/01/23 0401)    propofoL 30 mcg/kg/min (11/01/23 0446) "       LDA/Wounds:  Lines/Drains/Airways       Drain  Duration                  NG/OG Tube 10/20/23 2000 16 Fr. Left nostril 11 days         Rectal Tube 10/28/23 0530 fecal management system 3 days         Urethral Catheter 10/28/23 1556 3 days              Airway  Duration                  Airway - Non-Surgical 10/31/23 1140 <1 day       Airway Anesthesia 10/31/23 <1 day              Arterial Line  Duration             Arterial Line 10/31/23 1617 Right Brachial <1 day              Peripheral Intravenous Line  Duration                  Peripheral IV - Single Lumen 10/30/23 1157 20 G;1 3/4 in Left Forearm 1 day         Peripheral IV - Single Lumen 10/31/23 0754 20 G Anterior;Left Ankle <1 day         Peripheral IV - Single Lumen 10/31/23 1221 20 G Right Forearm <1 day                  Wounds: No  Wound care consulted: No   Problem: Adult Inpatient Plan of Care  Goal: Plan of Care Review  Outcome: Ongoing, Progressing  Goal: Patient-Specific Goal (Individualized)  Outcome: Ongoing, Progressing  Goal: Absence of Hospital-Acquired Illness or Injury  Outcome: Ongoing, Progressing  Goal: Optimal Comfort and Wellbeing  Outcome: Ongoing, Progressing  Goal: Readiness for Transition of Care  Outcome: Ongoing, Progressing     Problem: Infection  Goal: Absence of Infection Signs and Symptoms  Outcome: Ongoing, Progressing     Problem: Skin Injury Risk Increased  Goal: Skin Health and Integrity  Outcome: Ongoing, Progressing     Problem: Adjustment to Illness (Sepsis/Septic Shock)  Goal: Optimal Coping  Outcome: Ongoing, Progressing     Problem: Bleeding (Sepsis/Septic Shock)  Goal: Absence of Bleeding  Outcome: Ongoing, Progressing     Problem: Glycemic Control Impaired (Sepsis/Septic Shock)  Goal: Blood Glucose Level Within Desired Range  Outcome: Ongoing, Progressing     Problem: Infection Progression (Sepsis/Septic Shock)  Goal: Absence of Infection Signs and Symptoms  Outcome: Ongoing, Progressing     Problem: Nutrition  Impaired (Sepsis/Septic Shock)  Goal: Optimal Nutrition Intake  Outcome: Ongoing, Progressing     Problem: Fall Injury Risk  Goal: Absence of Fall and Fall-Related Injury  Outcome: Ongoing, Progressing     Problem: Communication Impairment (Mechanical Ventilation, Invasive)  Goal: Effective Communication  Outcome: Ongoing, Progressing     Problem: Device-Related Complication Risk (Mechanical Ventilation, Invasive)  Goal: Optimal Device Function  Outcome: Ongoing, Progressing     Problem: Inability to Wean (Mechanical Ventilation, Invasive)  Goal: Mechanical Ventilation Liberation  Outcome: Ongoing, Progressing     Problem: Nutrition Impairment (Mechanical Ventilation, Invasive)  Goal: Optimal Nutrition Delivery  Outcome: Ongoing, Progressing     Problem: Skin and Tissue Injury (Mechanical Ventilation, Invasive)  Goal: Absence of Device-Related Skin and Tissue Injury  Outcome: Ongoing, Progressing     Problem: Ventilator-Induced Lung Injury (Mechanical Ventilation, Invasive)  Goal: Absence of Ventilator-Induced Lung Injury  Outcome: Ongoing, Progressing     Problem: Restraint, Nonbehavioral (Nonviolent)  Goal: Absence of Harm or Injury  Outcome: Ongoing, Progressing

## 2023-11-01 NOTE — NURSING
Team aware during AM rounds pt UOP has not improved since yesterday, still around 15-30ml/hr, and pt is appearing more edematous. No new orders were given. For both 1700 and 1800, pt has had no UOP. Bladder scan volume=0. ALEX Junior notified. Orders received for 20mg IV lasix.

## 2023-11-01 NOTE — ASSESSMENT & PLAN NOTE
This is a 52 year old woman with a history of ethanol cirrhosis who presents after being found on the ground with fecal and urinary incontinence, and altered mental status. Elevated ammonia on admission.    - Continue lactulose via NG tube TID (titrate till 3-4 daily BM achieved). Continue Xifaxin.   - Avoid opiates and benzodiazepines, if able.   - IV thiamine, folate supplementation, multivitamin through NG tube.  - concern that comatose state is irreversible brain damage, will rule out other causes first, EEG and wean AEDs    -MELD score ~50% survival without transplant

## 2023-11-01 NOTE — PROCEDURES
24 hr. Video EEG Monitoring    Date/Time: 10/25/2023 10:51 AM    Performed by: Jairo Diana MD  Authorized by: Erica Lewis PA-C      ICU EEG/VIDEO MONITORING REPORT    DATE OF SERVICE: 10/31/23-11/1/23  EEG NUMBER: FH [id number]  REQUESTED BY: Debbie  LOCATION OF SERVICE: [facility]    METHODOLOGY   Electroencephalographic (EEG) recording is with electrodes placed according to the International 10-20 placement system.  Thirty two (32) channels of digital signal are simultaneously recorded from the scalp and may include EKG, EMG, and/or eye monitors.   Recording band pass was 0.1 to 512 hz.  Digital video recording of the patient is simultaneously recorded with the EEG.  The nursing staff report clinical symptoms and may press an event button when the patient has symptoms of clinical interest to the treating physicians.  EEG and video recording is stored and archived in digital format.  The entire recording is visually reviewed and the times identified by computer analysis as being spikes or seizures are reviewed again.  Activation procedures which include photic stimulation, hyperventilation and instructing patients to perform simple task are done in selected patients.   Compresses spectral analysis (CSA) is also performed on the activity recorded from each individual channel.  This is displayed as a power display of frequencies from 0 to 30 Hz over time.   The CSA analysis is done and displayed continuously.  This is reviewed for asymmetries in power between homologous areas of the scalp and for presence of changes in power which canbe seen when seizures occur.  Sections of suspected abnormalities on the CSA is then compared with the original EEG recording.     What[employer] in My Handbag software was also utilized in the review of this study.  This software suite analyzes the EEG recording in multiple domains.  Coherence and rhythmicity is computed to identify EEG sections which may contain organized seizures.  Each channel  undergoes analysis to detect presence of spike and sharp waves which have special and morphological characteristic of epileptic activity.  The routine EEG recording is converted from spacial into frequency domain.  This is then displayed comparing homologous areas to identify areas of significant asymmetry.  Algorithm to identify non-cortically generated artifact is used to separate eye movement, EMG and other artifact from the EEG.      Recording Times  Start on 10/31/23 at 15:54:49  Stop on 11/1/23 at hh:mm:ss  A total of 15 hours of EEG was recorded.    EEG FINDINGS  The record shows a poor organization at rest with continuous diffuse delta and theta range background slowing and no discernible posterior dominant rhythm. There are frequent spike and wave discharges over the left posterior quadrant phase reversing over P3/T5.    Drowsiness is characterized by attenuation of the background, vertex waves, and bilateral theta slowing. Stage II sleep is characterized by slowing, vertex waves, and poorly formed sleep spindles.    Provocative maneuvers including hyperventilation and photic stimulation were not performed.     EKG recording shows a sinus rhythm.    There is no push button or clinical event.    IMPRESSION:  Abnormal study due to moderate to severe  diffuse background slowing consistent with diffuse cerebral dysfunction and encephalopathy which may be on the basis of toxic, metabolic, or primary neuronal disorder. There are frequent interictal epileptiform discharges over the left hemisphere consistent with focal cortical irritability and a potential seizure focus.     Jairo Diana MD  Department of Neurology  [facility]

## 2023-11-01 NOTE — PROGRESS NOTES
[facility] - Neuro Critical Care  Neurology-Epilepsy  Progress Note    Patient Name: Mara Mojica  MRN: [id number]  Admission Date: 10/25/2023  Hospital Length of Stay: 7 days  Code Status: Full Code   Attending Provider: Chris Alarcon MD  Primary Care Physician: Osiris Nevarez NP   Principal Problem:Seizure    Subjective:     Hospital Course:   10/19: mild encephalopathy, no epileptiform activity    10/23>10/24: mild encephalopathy, no epileptiform activity     10/26>10/27: moderate encephalopathy, triphasics, left posterior maximum periodic epileptiform discharges, numerous electrographic seizures from left posterior region c/w focal status epilepticus  10/27>10/28: mild-moderate encephalopathy, triphasics, left posterior maximum periodic epileptiform discharges, 3 electrographic seizures from left posterior region, overall improvement from day prior  10/28>10/29: mild-moderate encephalopathy, left posterior maximum periodic epileptiform discharges, no electrographic seizures    10/31>11/1: moderate to severe encephalopathy, frequent interictal epileptiform discharges over left hemisphere, no discrete seizures      Interval History: NAEON. EEG with frequent left interictals, no discrete seizures. No family at bedside on rounds.    Current Facility-Administered Medications   Medication Dose Route Frequency Provider Last Rate Last Admin    0.9%  NaCl infusion (for blood administration)   Intravenous Q24H PRN Erica Lewis PA-C        0.9%  NaCl infusion   Intravenous Continuous Chris Alarcon MD   Paused at 11/01/23 1306    albuterol-ipratropium 2.5 mg-0.5 mg/3 mL nebulizer solution 3 mL  3 mL Nebulization Q6H PRN Erica Lewis PA-C        calcium gluconate 1 g in NS IVPB (premixed)  1 g Intravenous PRN Andras, Philippe P., NP        calcium gluconate 1 g in NS IVPB (premixed)  2 g Intravenous PRN Andras, Philippe P., NP        calcium gluconate 1 g in NS IVPB (premixed)  3 g Intravenous  PRN Philippe Reno PMaria Victoria, NP        famotidine tablet 20 mg  20 mg Per NG tube BID Lencho Hess, DO   20 mg at 11/01/23 0816    fentaNYL 50 mcg/mL injection 50 mcg  50 mcg Intravenous Q2H PRN Erica Lewis PA-C   50 mcg at 11/01/23 1316    folic acid tablet 1 mg  1 mg Per NG tube Daily Darion Torres MD   1 mg at 11/01/23 0817    lacosamide tablet 50 mg  50 mg Per NG tube Q12H Erica Lewis PA-C        lactulose 20 gram/30 mL solution Soln 30 g  30 g Per NG tube BID Rebecca Adames PA-C   30 g at 11/01/23 0817    levetiracetam 500 mg/5 mL (5 mL) liquid Soln 1,500 mg  1,500 mg Per NG tube BID Roberto Jean Baptiste DO   1,500 mg at 11/01/23 0816    magnesium sulfate 2g in water 50mL IVPB (premix)  2 g Intravenous PRN Philippe Reno P., NP   Stopped at 10/30/23 1022    magnesium sulfate 2g in water 50mL IVPB (premix)  4 g Intravenous PRN Philippe Reno, NP        multivitamin tablet  1 tablet Per NG tube Daily Darion Torres MD   1 tablet at 11/01/23 0816    NORepinephrine bitartrate-D5W 4 mg/250 mL (16 mcg/mL) PERIPHERAL access infusion  0-0.2 mcg/kg/min Intravenous Continuous Chris Alarcon MD   Stopped at 11/01/23 1218    ondansetron injection 4 mg  4 mg Intravenous Q8H PRN Aurelia Winston MD        potassium chloride 10 mEq in 100 mL IVPB  40 mEq Intravenous PRN Philippe Reno P.,  mL/hr at 11/01/23 1400 Rate Verify at 11/01/23 1400    And    potassium chloride 10 mEq in 100 mL IVPB  60 mEq Intravenous PRN Philippe Reno P., NP   Stopped at 10/30/23 0916    And    potassium chloride 10 mEq in 100 mL IVPB  80 mEq Intravenous PRN Philippe Reno P., NP   Stopped at 10/28/23 0138    propofol (DIPRIVAN) 10 mg/mL infusion  0-50 mcg/kg/min Intravenous Continuous Lopiccolo, Erica BROWN PA-C 13.5 mL/hr at 11/01/23 1100 40 mcg/kg/min at 11/01/23 1100    rifAXIMin tablet 550 mg  550 mg Per NG tube BID Aurelia Winston MD   550 mg at 11/01/23 0816    sodium phosphate 15 mmol  in dextrose 5 % (D5W) 250 mL IVPB  15 mmol Intravenous PRN Andras, Philippe P., NP        sodium phosphate 20.01 mmol in dextrose 5 % (D5W) 250 mL IVPB  20.01 mmol Intravenous PRN Andras, Philippe P., NP   Stopped at 10/30/23 2147    sodium phosphate 30 mmol in dextrose 5 % (D5W) 250 mL IVPB  30 mmol Intravenous PRN Andras, Philippe P., NP         Continuous Infusions:   sodium chloride 0.9% Stopped (11/01/23 1306)    NORepinephrine bitartrate-D5W Stopped (11/01/23 1218)    propofoL 40 mcg/kg/min (11/01/23 1100)       Review of Systems   Unable to perform ROS: Intubated     Objective:     Vital Signs (Most Recent):  Temp: 97.8 °F (36.6 °C) (11/01/23 1400)  Pulse: 65 (11/01/23 1401)  Resp: 16 (11/01/23 1401)  BP: 117/60 (11/01/23 1401)  SpO2: 100 % (11/01/23 1401) Vital Signs (24h Range):  Temp:  [97.5 °F (36.4 °C)-98.3 °F (36.8 °C)] 97.8 °F (36.6 °C)  Pulse:  [51-93] 65  Resp:  [11-18] 16  SpO2:  [100 %] 100 %  BP: ()/(47-72) 117/60  Arterial Line BP: ()/(40-70) 127/53     Weight: 56.2 kg (123 lb 14.4 oz)  Body mass index is 25.02 kg/m².     Physical Exam  Constitutional:       General: She is not in acute distress.     Appearance: She is not diaphoretic.      Interventions: She is intubated.   HENT:      Head: Normocephalic and atraumatic.      Comments: EEG in place  Eyes:      General: No scleral icterus.        Right eye: No discharge.         Left eye: No discharge.      Conjunctiva/sclera: Conjunctivae normal.      Pupils: Pupils are equal, round, and reactive to light.   Cardiovascular:      Rate and Rhythm: Normal rate.   Pulmonary:      Effort: Pulmonary effort is normal. No respiratory distress. She is intubated.      Comments: Intubated  Abdominal:      General: There is no distension.      Palpations: Abdomen is soft.   Musculoskeletal:         General: No deformity or signs of injury.   Skin:     General: Skin is warm and dry.   Psychiatric:      Comments: Unable to assess             NEUROLOGICAL EXAMINATION:     CRANIAL NERVES     CN III, IV, VI   Pupils are equal, round, and reactive to light.       EMILY OU   Corneal intact OU   No spontaneous eye opening   Face symmetric   Tongue midline   Withdraws to noxious stimuli    Exam findings to suggest seizures:  Myoclonus - no   eye twitching - no   Nystagmus - no   gaze deviation - no   waxy rigidity - no        Significant Labs: All pertinent lab results from the past 24 hours have been reviewed.    Significant Studies: I have reviewed all pertinent imaging results/findings within the past 24 hours.    Assessment and Plan:     * Seizure  58F PMHx etoh cirrhosis with ascites, incarcerated hernia s/p ex lap, and recent SBO who initially presented to [facility] with AMS on 10/18, transferred to [facility] for hepatic encephalopathy and liver transplant evaluation, hospital course c/b UTI, SBI, focal status epilepticus (now resolved), and acute hypoxemic respiratory failure with intubation on 10/31. EEG re-placed on 10/31 for persistent encephalopathy.    Recommendations:  - Continuous vEEG monitoring  - Agree with weaning Propofol  - [facility] decreased Lacosamide to 50 mg d/t concerns for hepatic impairment  - Recommend to continue Levetiracetam 1500 mg BID  - Avoid agents that lower seizure threshold  - Management of infectious/metabolic abnormalities per NCC  - Seizure precautions      Discussed plan of care with NCC team. No family at bedside. Will follow, please call with questions.    Hypoxic respiratory failure  - Intubated 10/31  - Propofol stopped this morning  - Vent management per [facility]    Acute hepatic encephalopathy  - Ammonia 35 on 10/30  - Hepatology rec lactulose TID  - Management per Hepatology, NCC    Decompensated alcoholic hepatic cirrhosis  - Per Hepatology, not initiating liver transplant at this time  - Lactulose TID per Hepatology recs  - Management per Hepatology, NCC      VTE Risk Mitigation (From admission, onward)         Ordered     IP  VTE LOW RISK PATIENT  Once         10/25/23 1303     Place JOI hose  Until discontinued         10/25/23 1303     Place sequential compression device  Until discontinued         10/25/23 1303                Osiris Diana PA-C  Neurology-Epilepsy  [facility]  Staff: Dr. Diana

## 2023-11-01 NOTE — ASSESSMENT & PLAN NOTE
- Per Hepatology, not initiating liver transplant at this time  - Lactulose TID per Hepatology recs  - Management per Hepatology, NCC

## 2023-11-01 NOTE — HOSPITAL COURSE
10/19: mild encephalopathy, no epileptiform activity  OFF EEG  10/23>10/24: mild encephalopathy, no epileptiform activity   OFF EEG  10/26>10/27: moderate encephalopathy, triphasics, left posterior maximum periodic epileptiform discharges, numerous electrographic seizures from left posterior region c/w focal status epilepticus  10/27>10/28: mild-moderate encephalopathy, triphasics, left posterior maximum periodic epileptiform discharges, 3 electrographic seizures from left posterior region, overall improvement from day prior  10/28>10/29: mild-moderate encephalopathy, left posterior maximum periodic epileptiform discharges, no electrographic seizures  OFF EEG  10/31>11/1: moderate to severe encephalopathy, frequent interictal epileptiform discharges over left hemisphere, no discrete seizures  11/1>11/2: moderate to severe encephalopathy, frequent interictal epileptiform discharges over left hemisphere at times becoming continuous indicating increased cortical irritability, no discrete seizures  11/2>11/3: runs of periodic generalized discharges with a triphasic morphology which wax and wane on IIC, pattern frequently seen in setting of hepatic dysfunction as well as multiple other clinical scenarios; independent focal discharges over left posterior quadrant, no discrete seizures, moderate-severe hepatic encephalopathy   11/3>11/4: frequent left epileptiform discharges, severe generalized slowing with abundant triphasic waves, no seizures  11/4>11/5: occasional left epileptiform discharges, moderate generalized slowing, no seizures  OFF EEG  11/8>11/9: mild-moderate encephalopathy, posterior maximum epileptiform discharges, no electrographic seizures

## 2023-11-01 NOTE — ANESTHESIA PROCEDURE NOTES
Ad Hoc Intubation    Date/Time: 11/1/2023 11:20 AM    Performed by: Cristiane Luciano MD  Authorized by: Maury To MD    Indications:  Airway protection  Diagnosis:  Hepatic encephalopathy  Patient Location:  ICU  Timeout:  11/1/2023 11:10 AM  Procedure Start Time:  11/1/2023 11:15 AM  Procedure End Time:  11/1/2023 11:20 AM  Staff:     Anesthesiologist Present: Yes    Intubation:     Induction:  Intravenous    Intubated:  Postinduction    Mask Ventilation:  Not attempted    Attempts:  1    Attempted By:  Resident anesthesiologist    Method of Intubation:  Other (see comments) (tube exchange over cook airway exchange catheter)    Difficult Airway Encountered?: No      Complications:  None    Airway Device:  Oral endotracheal tube    Airway Device Size:  7.0    Style/Cuff Inflation:  Cuffed (inflated to minimal occlusive pressure)    Tube secured:  24    Secured at:  The lips    Placement Verified By:  Colorimetric ETCO2 device    Findings Post-Intubation:  BS equal bilateral and atraumatic/condition of teeth unchanged  Notes:      50 mg of propofol pushed prior to ETT exchange.

## 2023-11-01 NOTE — ASSESSMENT & PLAN NOTE
Scope complete by ENT   -patient on scheduled nebs and racemic epi without improvement in respiratory status   -elective intubation 10/31 with 6.0 ETT with anesthesia  -11/1 tube exchange to 7.0 ETT to allow suctioning, completed steroids.

## 2023-11-01 NOTE — PLAN OF CARE
"  POC reviewed with Mara Mojica and  over the phone. Pt  verbalized understanding. Questions and concerns addressed. No acute events today. Pt progressing toward goals. Will continue to monitor. See below and flowsheets for full assessment and VS info.     -Tube exchange preformed without incident  -Propofol gtt d/c'd, PRN fentanyl pushes as needed for vent comfort  -Levo gtt on/off to maintain MAPs >65  -K replaced  -NS gtt decreased to 50ml/hr  -ALEX Maldonado with NCC aware of low UOP  -1 unit PRBCs given  -TF restarted      Neuro:  [place] Coma Scale  Best Eye Response: 1-->(E1) none  Best Motor Response: 4-->(M4) withdraws from pain  Best Verbal Response: 1-->(V1) none  [place] Coma Scale Score: 6  Assessment Qualifiers: patient intubated, patient chemically sedated or paralyzed  Pupil PERRLA: yes     24 hr Temp:  [97.1 °F (36.2 °C)-97.8 °F (36.6 °C)]     CV:   Rhythm: sinus bradycardia  BP goals:   SBP < 180  MAP > 65    Resp:      Vent Mode: A/C  Set Rate: 16 BPM  Oxygen Concentration (%): 40  Vt Set: 400 mL  PEEP/CPAP: 5 cmH20    Plan: wean to extubate    GI/:     Diet/Nutrition Received: NPO  Last Bowel Movement: 11/01/23  Voiding Characteristics: urethral catheter (bladder)    Intake/Output Summary (Last 24 hours) at 11/1/2023 1617  Last data filed at 11/1/2023 1600  Gross per 24 hour   Intake 4265.34 ml   Output 1025 ml   Net 3240.34 ml     Unmeasured Output  Urine Occurrence: 5  Stool Occurrence: 1  Emesis Occurrence: 0  Pad Count: 5    Labs/Accuchecks:  Recent Labs   Lab 11/01/23  0442   WBC 12.51   RBC 2.02*   HGB 6.7*   HCT 21.3*   PLT 74*      Recent Labs   Lab 11/01/23  0312      K 3.5   CO2 20*   *   BUN 29*   CREATININE 0.9   ALKPHOS 77   ALT 40   AST 67*   BILITOT 5.7*      Recent Labs   Lab 11/01/23 0312   INR 2.1*    No results for input(s): "CPK", "CPKMB", "TROPONINI", "MB" in the last 168 hours.    Electrolytes: Electrolytes replaced  Accuchecks: Q6H    Gtts:   " sodium chloride 0.9% 50 mL/hr at 11/01/23 1600    NORepinephrine bitartrate-D5W Stopped (11/01/23 1218)       LDA/Wounds:  Lines/Drains/Airways       Drain  Duration                  NG/OG Tube 10/20/23 2000 16 Fr. Left nostril 11 days         Rectal Tube 10/28/23 0530 fecal management system 4 days         Urethral Catheter 10/28/23 1556 4 days              Airway  Duration                  Airway - Non-Surgical 11/01/23 1115 Endotracheal Tube <1 day       Airway Anesthesia 11/01/23 <1 day              Arterial Line  Duration             Arterial Line 10/31/23 1617 Right Brachial 1 day              Peripheral Intravenous Line  Duration                  Peripheral IV - Single Lumen 10/30/23 1157 20 G;1 3/4 in Left Forearm 2 days         Peripheral IV - Single Lumen 10/31/23 0754 20 G Anterior;Left Ankle 1 day         Peripheral IV - Single Lumen 10/31/23 1221 20 G Right Forearm 1 day                  Wounds: Yes  Wound care consulted: Yes

## 2023-11-01 NOTE — TELEPHONE ENCOUNTER
----- Message from Osvaldo Gonzalez RN sent at 11/1/2023  4:16 PM CDT -----  Regarding: FW: Returning Missed Call  Contact: DARRON FARIA (Significant other)  Jhonny, wasn't quite sure who to send this message to.  ----- Message -----  From: Osiris German  Sent: 11/1/2023   3:44 PM CDT  To: [facility] Pre-Liver Transplant Clinical  Subject: Returning Missed Call                            Darron  is calling back from a missed call.   Darron  Would like a call back.  He stated  left a VM regarding pt.           Confirmed contact below:   Contact Name:DARRON FARIA (Significant other)   Phone Number: [phone] (Mobile)

## 2023-11-02 LAB
ALBUMIN SERPL BCP-MCNC: 2.7 G/DL (ref 3.5–5.2)
ALLENS TEST: ABNORMAL
ALP SERPL-CCNC: 79 U/L (ref 55–135)
ALT SERPL W/O P-5'-P-CCNC: 47 U/L (ref 10–44)
ANION GAP SERPL CALC-SCNC: 3 MMOL/L (ref 8–16)
AST SERPL-CCNC: 70 U/L (ref 10–40)
BASOPHILS # BLD AUTO: 0.01 K/UL (ref 0–0.2)
BASOPHILS NFR BLD: 0.1 % (ref 0–1.9)
BILIRUB SERPL-MCNC: 4.9 MG/DL (ref 0.1–1)
BUN SERPL-MCNC: 35 MG/DL (ref 6–20)
CALCIUM SERPL-MCNC: 9 MG/DL (ref 8.7–10.5)
CHLORIDE SERPL-SCNC: 116 MMOL/L (ref 95–110)
CO2 SERPL-SCNC: 22 MMOL/L (ref 23–29)
CREAT SERPL-MCNC: 0.8 MG/DL (ref 0.5–1.4)
DELSYS: ABNORMAL
DIFFERENTIAL METHOD: ABNORMAL
EOSINOPHIL # BLD AUTO: 0 K/UL (ref 0–0.5)
EOSINOPHIL NFR BLD: 0 % (ref 0–8)
ERYTHROCYTE [DISTWIDTH] IN BLOOD BY AUTOMATED COUNT: 21 % (ref 11.5–14.5)
ERYTHROCYTE [SEDIMENTATION RATE] IN BLOOD BY WESTERGREN METHOD: 16 MM/H
EST. GFR  (NO RACE VARIABLE): >60 ML/MIN/1.73 M^2
FIO2: 40
GLUCOSE SERPL-MCNC: 121 MG/DL (ref 70–110)
HCO3 UR-SCNC: 22.9 MMOL/L (ref 24–28)
HCT VFR BLD AUTO: 24.3 % (ref 37–48.5)
HCT VFR BLD CALC: 24 %PCV (ref 36–54)
HGB BLD-MCNC: 7.7 G/DL (ref 12–16)
IMM GRANULOCYTES # BLD AUTO: 0.04 K/UL (ref 0–0.04)
IMM GRANULOCYTES NFR BLD AUTO: 0.6 % (ref 0–0.5)
INR PPP: 2 (ref 0.8–1.2)
LYMPHOCYTES # BLD AUTO: 0.8 K/UL (ref 1–4.8)
LYMPHOCYTES NFR BLD: 11.8 % (ref 18–48)
MAGNESIUM SERPL-MCNC: 2.1 MG/DL (ref 1.6–2.6)
MCH RBC QN AUTO: 31.8 PG (ref 27–31)
MCHC RBC AUTO-ENTMCNC: 31.7 G/DL (ref 32–36)
MCV RBC AUTO: 100 FL (ref 82–98)
MODE: ABNORMAL
MONOCYTES # BLD AUTO: 1.4 K/UL (ref 0.3–1)
MONOCYTES NFR BLD: 19.4 % (ref 4–15)
NEUTROPHILS # BLD AUTO: 4.8 K/UL (ref 1.8–7.7)
NEUTROPHILS NFR BLD: 68.1 % (ref 38–73)
NRBC BLD-RTO: 0 /100 WBC
PCO2 BLDA: 30.9 MMHG (ref 35–45)
PEEP: 5
PH SMN: 7.48 [PH] (ref 7.35–7.45)
PHOSPHATE SERPL-MCNC: 3.2 MG/DL (ref 2.7–4.5)
PLATELET # BLD AUTO: 59 K/UL (ref 150–450)
PMV BLD AUTO: 12.3 FL (ref 9.2–12.9)
PO2 BLDA: 123 MMHG (ref 80–100)
POC BE: -1 MMOL/L
POC SATURATED O2: 99 % (ref 95–100)
POC TCO2: 24 MMOL/L (ref 23–27)
POCT GLUCOSE: 157 MG/DL (ref 70–110)
POCT GLUCOSE: 165 MG/DL (ref 70–110)
POTASSIUM SERPL-SCNC: 4.1 MMOL/L (ref 3.5–5.1)
PROT SERPL-MCNC: 5.4 G/DL (ref 6–8.4)
PROTHROMBIN TIME: 20.6 SEC (ref 9–12.5)
RBC # BLD AUTO: 2.42 M/UL (ref 4–5.4)
SAMPLE: ABNORMAL
SITE: ABNORMAL
SODIUM SERPL-SCNC: 141 MMOL/L (ref 136–145)
VT: 400
WBC # BLD AUTO: 7.01 K/UL (ref 3.9–12.7)

## 2023-11-02 PROCEDURE — 94668 MNPJ CHEST WALL SBSQ: CPT | Mod: NTX

## 2023-11-02 PROCEDURE — 63600175 PHARM REV CODE 636 W HCPCS: Mod: NTX | Performed by: PHYSICIAN ASSISTANT

## 2023-11-02 PROCEDURE — 95720 PR EEG, W/VIDEO, CONT RECORD, I&R, >12<26 HRS: ICD-10-PCS | Mod: NTX,,, | Performed by: PSYCHIATRY & NEUROLOGY

## 2023-11-02 PROCEDURE — 99233 SBSQ HOSP IP/OBS HIGH 50: CPT | Mod: NTX,,, | Performed by: PSYCHIATRY & NEUROLOGY

## 2023-11-02 PROCEDURE — 99499 UNLISTED E&M SERVICE: CPT | Mod: NTX,,, | Performed by: PHYSICIAN ASSISTANT

## 2023-11-02 PROCEDURE — 25000003 PHARM REV CODE 250: Mod: NTX | Performed by: HOSPITALIST

## 2023-11-02 PROCEDURE — 99291 PR CRITICAL CARE, E/M 30-74 MINUTES: ICD-10-PCS | Mod: NTX,,, | Performed by: PSYCHIATRY & NEUROLOGY

## 2023-11-02 PROCEDURE — 99900035 HC TECH TIME PER 15 MIN (STAT): Mod: NTX

## 2023-11-02 PROCEDURE — 85025 COMPLETE CBC W/AUTO DIFF WBC: CPT | Mod: NTX | Performed by: HOSPITALIST

## 2023-11-02 PROCEDURE — 25000003 PHARM REV CODE 250: Mod: NTX

## 2023-11-02 PROCEDURE — 99499 NO LOS: ICD-10-PCS | Mod: NTX,,, | Performed by: PHYSICIAN ASSISTANT

## 2023-11-02 PROCEDURE — 99900026 HC AIRWAY MAINTENANCE (STAT): Mod: NTX

## 2023-11-02 PROCEDURE — 83735 ASSAY OF MAGNESIUM: CPT | Mod: NTX | Performed by: STUDENT IN AN ORGANIZED HEALTH CARE EDUCATION/TRAINING PROGRAM

## 2023-11-02 PROCEDURE — 25000003 PHARM REV CODE 250: Mod: NTX | Performed by: PSYCHIATRY & NEUROLOGY

## 2023-11-02 PROCEDURE — 94761 N-INVAS EAR/PLS OXIMETRY MLT: CPT | Mod: NTX

## 2023-11-02 PROCEDURE — 20000000 HC ICU ROOM: Mod: NTX

## 2023-11-02 PROCEDURE — 99291 CRITICAL CARE FIRST HOUR: CPT | Mod: NTX,,, | Performed by: PSYCHIATRY & NEUROLOGY

## 2023-11-02 PROCEDURE — 95720 EEG PHY/QHP EA INCR W/VEEG: CPT | Mod: NTX,,, | Performed by: PSYCHIATRY & NEUROLOGY

## 2023-11-02 PROCEDURE — 85610 PROTHROMBIN TIME: CPT | Mod: NTX | Performed by: STUDENT IN AN ORGANIZED HEALTH CARE EDUCATION/TRAINING PROGRAM

## 2023-11-02 PROCEDURE — 94003 VENT MGMT INPAT SUBQ DAY: CPT | Mod: NTX

## 2023-11-02 PROCEDURE — 27100171 HC OXYGEN HIGH FLOW UP TO 24 HOURS: Mod: NTX

## 2023-11-02 PROCEDURE — 99233 PR SUBSEQUENT HOSPITAL CARE,LEVL III: ICD-10-PCS | Mod: NTX,,, | Performed by: PSYCHIATRY & NEUROLOGY

## 2023-11-02 PROCEDURE — 37799 UNLISTED PX VASCULAR SURGERY: CPT | Mod: NTX

## 2023-11-02 PROCEDURE — 25000003 PHARM REV CODE 250: Mod: NTX | Performed by: PHYSICIAN ASSISTANT

## 2023-11-02 PROCEDURE — 80053 COMPREHEN METABOLIC PANEL: CPT | Mod: NTX | Performed by: HOSPITALIST

## 2023-11-02 PROCEDURE — 82803 BLOOD GASES ANY COMBINATION: CPT | Mod: NTX

## 2023-11-02 PROCEDURE — 95714 VEEG EA 12-26 HR UNMNTR: CPT | Mod: NTX

## 2023-11-02 PROCEDURE — 84100 ASSAY OF PHOSPHORUS: CPT | Mod: NTX | Performed by: STUDENT IN AN ORGANIZED HEALTH CARE EDUCATION/TRAINING PROGRAM

## 2023-11-02 RX ORDER — THIAMINE HCL 100 MG
100 TABLET ORAL DAILY
Status: DISCONTINUED | OUTPATIENT
Start: 2023-11-02 | End: 2023-11-25

## 2023-11-02 RX ADMIN — LACTULOSE 30 G: 20 SOLUTION ORAL at 09:11

## 2023-11-02 RX ADMIN — FENTANYL CITRATE 50 MCG: 50 INJECTION INTRAMUSCULAR; INTRAVENOUS at 02:11

## 2023-11-02 RX ADMIN — THIAMINE HCL TAB 100 MG 100 MG: 100 TAB at 10:11

## 2023-11-02 RX ADMIN — THERA TABS 1 TABLET: TAB at 08:11

## 2023-11-02 RX ADMIN — LACOSAMIDE 50 MG: 50 TABLET, FILM COATED ORAL at 09:11

## 2023-11-02 RX ADMIN — LEVETIRACETAM 1500 MG: 500 SOLUTION ORAL at 08:11

## 2023-11-02 RX ADMIN — LACTULOSE 30 G: 20 SOLUTION ORAL at 08:11

## 2023-11-02 RX ADMIN — LEVETIRACETAM 1500 MG: 500 SOLUTION ORAL at 09:11

## 2023-11-02 RX ADMIN — RIFAXIMIN 550 MG: 550 TABLET ORAL at 09:11

## 2023-11-02 RX ADMIN — FAMOTIDINE 20 MG: 20 TABLET ORAL at 09:11

## 2023-11-02 RX ADMIN — RIFAXIMIN 550 MG: 550 TABLET ORAL at 08:11

## 2023-11-02 RX ADMIN — FOLIC ACID 1 MG: 1 TABLET ORAL at 08:11

## 2023-11-02 RX ADMIN — FAMOTIDINE 20 MG: 20 TABLET ORAL at 08:11

## 2023-11-02 RX ADMIN — LACOSAMIDE 50 MG: 50 TABLET, FILM COATED ORAL at 08:11

## 2023-11-02 NOTE — PROGRESS NOTES
[facility]  Neurology-Epilepsy  Progress Note    Patient Name: Mara Mojica  MRN: [id number]  Admission Date: 10/25/2023  Hospital Length of Stay: 8 days  Code Status: Full Code   Attending Provider: Chris Alarcon MD  Primary Care Physician: Osiris Nevarez NP   Principal Problem:Seizure    Subjective:     Hospital Course:   10/19: mild encephalopathy, no epileptiform activity    10/23>10/24: mild encephalopathy, no epileptiform activity     10/26>10/27: moderate encephalopathy, triphasics, left posterior maximum periodic epileptiform discharges, numerous electrographic seizures from left posterior region c/w focal status epilepticus  10/27>10/28: mild-moderate encephalopathy, triphasics, left posterior maximum periodic epileptiform discharges, 3 electrographic seizures from left posterior region, overall improvement from day prior  10/28>10/29: mild-moderate encephalopathy, left posterior maximum periodic epileptiform discharges, no electrographic seizures    10/31>11/1: moderate to severe encephalopathy, frequent interictal epileptiform discharges over left hemisphere, no discrete seizures  11/1>11/2: moderate to severe encephalopathy, frequent interictal epileptiform discharges over left hemisphere at times becoming continuous indicating increased cortical irritability, no discrete seizures      Interval History: NAEON. EEG with left interictals, now occurring in runs, no discrete seizures. Exam unchanged this morning. No family at bedside.    Current Facility-Administered Medications   Medication Dose Route Frequency Provider Last Rate Last Admin    albuterol-ipratropium 2.5 mg-0.5 mg/3 mL nebulizer solution 3 mL  3 mL Nebulization Q6H PRN Erica Lewis PA-C        calcium gluconate 1 g in NS IVPB (premixed)  1 g Intravenous PRN Andras, Philippe P., NP        calcium gluconate 1 g in NS IVPB (premixed)  2 g Intravenous PRN Andras, Philippe P., NP        calcium gluconate 1 g in  NS IVPB (premixed)  3 g Intravenous PRN Andras, Philippe P., NP        famotidine tablet 20 mg  20 mg Per NG tube BID Lencho Hess DO   20 mg at 11/02/23 0824    fentaNYL 50 mcg/mL injection 50 mcg  50 mcg Intravenous Q2H PRN Lopiccolo, Erica GMaria Victoria, PA-C   50 mcg at 11/01/23 1608    folic acid tablet 1 mg  1 mg Per NG tube Daily Darion Torres MD   1 mg at 11/02/23 0823    lacosamide tablet 50 mg  50 mg Per NG tube Q12H LopiccoloDayErica GMaria Victoria, PA-C   50 mg at 11/02/23 0823    lactulose 20 gram/30 mL solution Soln 30 g  30 g Per NG tube BID Rebecca Adames PA-C   30 g at 11/02/23 0825    levetiracetam 500 mg/5 mL (5 mL) liquid Soln 1,500 mg  1,500 mg Per NG tube BID Roberto Jean Baptiste DO   1,500 mg at 11/02/23 0825    magnesium sulfate 2g in water 50mL IVPB (premix)  2 g Intravenous PRN Andras, Philippe P., NP   Stopped at 10/30/23 1022    magnesium sulfate 2g in water 50mL IVPB (premix)  4 g Intravenous PRN Andras, Philippe P., NP        multivitamin tablet  1 tablet Per NG tube Daily Darion Torres MD   1 tablet at 11/02/23 0823    ondansetron injection 4 mg  4 mg Intravenous Q8H PRN Aurelia Winston MD        potassium chloride 10 mEq in 100 mL IVPB  40 mEq Intravenous PRN Andras, Philippe P., NP   Stopped at 11/01/23 1406    And    potassium chloride 10 mEq in 100 mL IVPB  60 mEq Intravenous PRN Andras, Philippe P., NP   Stopped at 10/30/23 0916    And    potassium chloride 10 mEq in 100 mL IVPB  80 mEq Intravenous PRN Andras, Philippe P., NP   Stopped at 10/28/23 0138    rifAXIMin tablet 550 mg  550 mg Per NG tube BID Aurelia Winston MD   550 mg at 11/02/23 0823    sodium phosphate 15 mmol in dextrose 5 % (D5W) 250 mL IVPB  15 mmol Intravenous PRN AndPhilippe lema P., NP        sodium phosphate 20.01 mmol in dextrose 5 % (D5W) 250 mL IVPB  20.01 mmol Intravenous PRN AndPhilippe lema P., NP   Stopped at 10/30/23 2147    sodium phosphate 30 mmol in dextrose 5 % (D5W) 250 mL IVPB  30 mmol Intravenous PRN  Philippe Reno, NP        thiamine tablet 100 mg  100 mg Per NG tube Daily Erica Lewis PA-C   100 mg at 11/02/23 1028     Continuous Infusions:    Review of Systems   Unable to perform ROS: Intubated     Objective:     Vital Signs (Most Recent):  Temp: 98.2 °F (36.8 °C) (11/02/23 1101)  Pulse: 84 (11/02/23 1101)  Resp: (!) 9 (11/02/23 1101)  BP: (!) 124/57 (11/02/23 1101)  SpO2: 100 % (11/02/23 1101) Vital Signs (24h Range):  Temp:  [97.1 °F (36.2 °C)-98.2 °F (36.8 °C)] 98.2 °F (36.8 °C)  Pulse:  [55-93] 84  Resp:  [9-18] 9  SpO2:  [98 %-100 %] 100 %  BP: ()/(47-80) 124/57  Arterial Line BP: ()/(40-82) 122/48     Weight: 56.2 kg (123 lb 14.4 oz)  Body mass index is 25.02 kg/m².     Physical Exam  Constitutional:       General: She is not in acute distress.     Appearance: She is not diaphoretic.      Interventions: She is intubated.   HENT:      Head: Normocephalic and atraumatic.      Comments: EEG in place  Eyes:      General: No scleral icterus.        Right eye: No discharge.         Left eye: No discharge.      Conjunctiva/sclera: Conjunctivae normal.      Pupils: Pupils are equal, round, and reactive to light.   Cardiovascular:      Rate and Rhythm: Normal rate.   Pulmonary:      Effort: Pulmonary effort is normal. No respiratory distress. She is intubated.      Comments: Intubated  Abdominal:      General: There is no distension.      Palpations: Abdomen is soft.   Musculoskeletal:         General: No deformity or signs of injury.   Skin:     General: Skin is warm and dry.   Psychiatric:      Comments: Unable to assess            NEUROLOGICAL EXAMINATION:     CRANIAL NERVES     CN III, IV, VI   Pupils are equal, round, and reactive to light.       EMILY OU   Corneal intact OU   No spontaneous eye opening   Face symmetric   Tongue midline   Minimal withdraw to noxious stimuli    Exam findings to suggest seizures:  Myoclonus - no   eye twitching - no   Nystagmus - no   gaze deviation -  no   waxy rigidity - no        Significant Labs: All pertinent lab results from the past 24 hours have been reviewed.    Significant Studies: I have reviewed all pertinent imaging results/findings within the past 24 hours.    Assessment and Plan:     * Seizure  58F PMHx etoh cirrhosis with ascites, incarcerated hernia s/p ex lap, and recent SBO who initially presented to [facility] with AMS on 10/18, transferred to [facility] for hepatic encephalopathy and liver transplant evaluation, hospital course c/b UTI, SBI, focal status epilepticus (now resolved), and acute hypoxemic respiratory failure with intubation on 10/31. EEG re-placed on 10/31 for persistent encephalopathy.    Recommendations:  - Continuous vEEG monitoring: left interictals now occurring in runs, no discrete seizures  - [facility] decreased Lacosamide to 50 mg on 11/1 d/t concerns for hepatic impairment  - Recommend to continue Levetiracetam 1500 mg BID  - Propofol weaned off 11/1  - Avoid agents that lower seizure threshold  - Management of infectious/metabolic abnormalities per [facility]  - Seizure precautions      Discussed plan of care with N[facility] team. No family at bedside. Will follow, please call with questions.    Hypoxic respiratory failure  - Intubated 10/31  - Propofol weaned off 11/1 AM  - Vent management per [facility]    Acute hepatic encephalopathy  - Ammonia 35 on 10/30  - Hepatology rec lactulose TID  - Management per Hepatology, N[facility]    Decompensated alcoholic hepatic cirrhosis  - Per Hepatology, not initiating liver transplant at this time  - Hepatology rec lactulose TID   - Management per Hepatology, [facility]      VTE Risk Mitigation (From admission, onward)         Ordered     IP VTE LOW RISK PATIENT  Once         10/25/23 1303     Place JOI hose  Until discontinued         10/25/23 1303     Place sequential compression device  Until discontinued         10/25/23 1303                Osiris Diana PA-C  Neurology-Epilepsy  [facility] - [facility]  Staff: Dr. Diana

## 2023-11-02 NOTE — PLAN OF CARE
"[facility] Care Plan    POC reviewed with Mara Mojica and family at 0300. Pt verbalized understanding. Questions and concerns addressed. No acute events overnight. Pt progressing toward goals. Will continue to monitor. See below and flowsheets for full assessment and VS info.   -NAEON   -EEG in place, no overt seizure activity noted   -pt tolerating TF @ goal     Is this a stroke patient? no    Neuro:  [place] Coma Scale  Best Eye Response: 2-->(E2) to pain  Best Motor Response: 4-->(M4) withdraws from pain  Best Verbal Response: 1-->(V1) none  [place] Coma Scale Score: 7  Assessment Qualifiers: patient intubated  Pupil PERRLA: yes     24hr Temp:  [97.1 °F (36.2 °C)-97.8 °F (36.6 °C)]     CV:   Rhythm: normal sinus rhythm  BP goals:   SBP < 180  MAP > 65    Resp:      Vent Mode: A/C  Set Rate: 16 BPM  Oxygen Concentration (%): 40  Vt Set: 400 mL  PEEP/CPAP: 5 cmH20    Plan: wean to extubate    GI/:     Diet/Nutrition Received: tube feeding  Last Bowel Movement: 11/01/23  Voiding Characteristics: urethral catheter (bladder)    Intake/Output Summary (Last 24 hours) at 11/2/2023 0551  Last data filed at 11/2/2023 0501  Gross per 24 hour   Intake 2787.32 ml   Output 2240 ml   Net 547.32 ml     Unmeasured Output  Urine Occurrence: 5  Stool Occurrence: 1  Emesis Occurrence: 0  Pad Count: 5    Labs/Accuchecks:  Recent Labs   Lab 11/02/23 0308 11/02/23 0513   WBC 7.01  --    RBC 2.42*  --    HGB 7.7*  --    HCT 24.3* 24*   PLT 59*  --       Recent Labs   Lab 11/02/23 0308      K 4.1   CO2 22*   *   BUN 35*   CREATININE 0.8   ALKPHOS 79   ALT 47*   AST 70*   BILITOT 4.9*      Recent Labs   Lab 11/02/23 0308   INR 2.0*    No results for input(s): "CPK", "CPKMB", "TROPONINI", "MB" in the last 168 hours.    Electrolytes: N/A - electrolytes WDL  Accuchecks: Q6H    Gtts:   sodium chloride 0.9% 50 mL/hr at 11/02/23 0323    NORepinephrine bitartrate-D5W Stopped (11/01/23 3847) "       LDA/Wounds:  Lines/Drains/Airways       Drain  Duration                  NG/OG Tube 10/20/23 2000 16 Fr. Left nostril 12 days         Rectal Tube 10/28/23 0530 fecal management system 5 days         Urethral Catheter 10/28/23 1556 4 days              Airway  Duration                  Airway - Non-Surgical 11/01/23 1115 Endotracheal Tube <1 day       Airway Anesthesia 11/01/23 <1 day              Arterial Line  Duration             Arterial Line 10/31/23 1617 Right Brachial 1 day              Peripheral Intravenous Line  Duration                  Peripheral IV - Single Lumen 10/30/23 1157 20 G;1 3/4 in Left Forearm 2 days         Peripheral IV - Single Lumen 10/31/23 0754 20 G Anterior;Left Ankle 1 day         Peripheral IV - Single Lumen 10/31/23 1221 20 G Right Forearm 1 day                  Wounds: Yes  Wound care consulted: Yes   Problem: Adult Inpatient Plan of Care  Goal: Plan of Care Review  Flowsheets (Taken 11/2/2023 0549)  Plan of Care Reviewed With: patient     Problem: Infection  Goal: Absence of Infection Signs and Symptoms  Intervention: Prevent or Manage Infection  Flowsheets (Taken 11/2/2023 0549)  Fever Reduction/Comfort Measures:   lightweight bedding   lightweight clothing  Infection Management: aseptic technique maintained  Isolation Precautions: precautions maintained     Problem: Skin Injury Risk Increased  Goal: Skin Health and Integrity  Intervention: Optimize Skin Protection  Flowsheets (Taken 11/2/2023 0549)  Pressure Reduction Techniques: frequent weight shift encouraged  Pressure Reduction Devices: specialty bed utilized  Skin Protection:   adhesive use limited   incontinence pads utilized   skin-to-skin areas padded   skin-to-device areas padded  Head of Bed (HOB) Positioning: HOB at 30-45 degrees

## 2023-11-02 NOTE — PLAN OF CARE
"  POC reviewed with Mara Mojica and  over the phone at 1400. Pt  verbalized understanding. Questions and concerns addressed. No acute events today. Pt progressing toward goals. Will continue to monitor. See below and flowsheets for full assessment and VS info.     -Tolerating SBT  -Levo gtt d/c'd  -cEEG in place      Neuro:  Woody Coma Scale  Best Eye Response: 1-->(E1) none  Best Motor Response: 4-->(M4) withdraws from pain  Best Verbal Response: 1-->(V1) none  [place] Coma Scale Score: 6  Assessment Qualifiers: patient intubated, patient chemically sedated or paralyzed  Pupil PERRLA: yes     24 hr Temp:  [97.4 °F (36.3 °C)-99 °F (37.2 °C)]     CV:   Rhythm: normal sinus rhythm  BP goals:   SBP < 180  MAP > 65    Resp:      Vent Mode: Spont  Set Rate: 16 BPM  Oxygen Concentration (%): 40  Vt Set: 400 mL  PEEP/CPAP: 5 cmH20  Pressure Support: 10 cmH20    Plan: wean to extubate    GI/:     Diet/Nutrition Received: tube feeding  Last Bowel Movement: 11/02/23  Voiding Characteristics: urethral catheter (bladder)    Intake/Output Summary (Last 24 hours) at 11/2/2023 1734  Last data filed at 11/2/2023 1700  Gross per 24 hour   Intake 2307.22 ml   Output 1930 ml   Net 377.22 ml     Unmeasured Output  Urine Occurrence: 5  Stool Occurrence: 0  Emesis Occurrence: 0  Pad Count: 5    Labs/Accuchecks:  Recent Labs   Lab 11/02/23 0308 11/02/23  0513   WBC 7.01  --    RBC 2.42*  --    HGB 7.7*  --    HCT 24.3* 24*   PLT 59*  --       Recent Labs   Lab 11/02/23 0308      K 4.1   CO2 22*   *   BUN 35*   CREATININE 0.8   ALKPHOS 79   ALT 47*   AST 70*   BILITOT 4.9*      Recent Labs   Lab 11/02/23 0308   INR 2.0*    No results for input(s): "CPK", "CPKMB", "TROPONINI", "MB" in the last 168 hours.    Electrolytes: N/A - electrolytes WDL  Accuchecks: Q6H    Gtts:      LDA/Wounds:  Lines/Drains/Airways       Drain  Duration                  NG/OG Tube 10/20/23 2000 16 Fr. Left nostril 12 days       "   Rectal Tube 10/28/23 0530 fecal management system 5 days         Urethral Catheter 10/28/23 1556 5 days              Airway  Duration                  Airway - Non-Surgical 11/01/23 1115 Endotracheal Tube 1 day       Airway Anesthesia 11/01/23 1 day              Arterial Line  Duration             Arterial Line 10/31/23 1617 Right Brachial 2 days              Peripheral Intravenous Line  Duration                  Peripheral IV - Single Lumen 10/30/23 1157 20 G;1 3/4 in Left Forearm 3 days         Peripheral IV - Single Lumen 10/31/23 0754 20 G Anterior;Left Ankle 2 days         Peripheral IV - Single Lumen 10/31/23 1221 20 G Right Forearm 2 days                  Wounds: No  Wound care consulted: Yes

## 2023-11-02 NOTE — PROGRESS NOTES
[facility] - Neuro Critical Care  Neurocritical Care  Progress Note    Admit Date: 10/25/2023  Service Date: 11/02/2023  Length of Stay: 8    Subjective:     Chief Complaint: Acute encephalopathy    History of Present Illness: This is a 58-year-old female with a past medical history of alcoholic cirrhosis, s/p ex-lap w/ bowel resection for incarcerated hernia, who initially presented on 10/18 to [facility] with acute encephalopathy.  Her  found her on the floor at home with evidence of fecal/urine incontinence with confusion and slurred speech. Possible reported tongue injury in chart. Reported concern L sided weakness and down drift of L arm however CT head without evidence of acute abnormalities, MRI brain with only small vessel vascular changes without evidence of territorial infarct.     Hospital Course: EEG done on 10/19 with mild diffuse nonspecific background slowing, no potential epileptiform activity. Repeat MRI brain with contrast on 10/23 unchanged from initial MRI. Became more lethargic and was no longer following commands or answering questions. Due to worsening hepatic encephalopathy in setting of hepatic cirrhosis, patient transferred to [facility] for management with transplant team. Has remained on antibiotics, lactulose and rifaximin for treatment of UTI with pansensitive Ecoli, HE, and presumed SBP. Neurology consulted for management recommendations regarding persistent AMS. Had repeat EEG done on 10/24 with similar findings to prior EEG showing mild generalized non-specific cerebral dysfunction and no electrographic seizures or indication of seizure tendency. Additional CT head w/o contrast on 10/25 without evidence of acute issues. Remains confused and unable to answer questions on exam. Not withdrawing to painful stimuli. Was able to awaken to verbal stimuli in AM however when reevaluated in afternoon unresponsive however was after receiving Ativan.     On admission to [facility]:  Patient had EEG  running, and was noted to have seizures at 7:30 a.m., 8:00 a.m. in, and 10:00 a.m. was noted to be in focal status prior to receiving Vimpat.  Patient was noted to have stridor, worsening secretions with suspicion for aspiration, decreased airway protection, and rapids was called due to low GCS score of 6. Antibiotics broadened to Vanc/Zosyn. Clinical picture of mental status confounded with episodes of seizures, infection, and hepatic encephalopathy.               Hospital Course: 10/28: Improving GCS from 6 to 10. Continuing pulmonary toilet and deep suctioning for stridor and copious secretions. UA positive for candida. Blood cultures from 10/27 NGTD. Sputum cultures sent. Patient on day 2 of broadened antibiotics vanc/zosyn due to worsening clinical status but will discontinue tomorrow if cultures remain negative. Still hypernatremic, treating with D5W. Patient was transferred with no ordered diuretics, very edematous on physical exam. Adequate stooling on lactulose and rifaximin. Due to increased UOP/stooling will place eckert for one day for irritated skin. EEG update showing no seizures since 10am 10/27. Monitoring CBC for thrombocytopenia and macrocytic anemia. Discussed code status and goals of care with  and best friend at bedside. Trickle feeds resumed.   10/29: No acute events overnight, EEG reportedly without further seizures for ~48 hours can disconnect once formal report is in, neuro status slowly improving as patient now tracking in room, moving extremities, responding with appropriate emotional reactions to her .  Respiratory status largely unchanged with intermittent events of large volume breaths that sound almost stridorous but without actual respiratory distress- gas remains compensated.  UOP low, diuresis resumed.  10/30/2023: d/c mucomyst nebs, add racemic epi scheduled nebs, add budesonide and dex 6 q8 hours, ammonia at 35- continue lactulose and rifaximin, abg reviewed, 40mEq of  K once, ENT consulted for stridor, continue eckert catheter in today   10/31/2023 Patient with worsening respiratory status, continued decreased mentation and increased secretions. Plan for elective intubation in controlled setting with anesthesia. Will also recheck eeg, if negative will start to wean AEDs and see if that improves patient's arousal. PLT stabilized, continue to monitor.   11/1/2023 this morning patient's 6.0 ETT exchanged for 7.0 to allow suctioning. EEG with frequent discharges, lowered vimpat to 50 mg and will follow EEG. Attempting to remove confounding factors for patients mental status. Slow drop in H/H, 1 unit ordered.   11/2/2023 NAEO, cEEG to remain in place, no seizures but is having frequent discharges in runs. Continue lower dose vimpat and 1500 keppra Eye opening this morning which is slight improvement in exam. Failed SBT       Interval History: see hospital course     Review of Systems   Unable to perform ROS: Mental status change       Objective:     Vitals:  Temp: 98.2 °F (36.8 °C)  Pulse: 74  Rhythm: normal sinus rhythm  BP: (!) 122/58  MAP (mmHg): 83  Resp: 14  SpO2: 99 %  Oxygen Concentration (%): 40  Vent Mode: Spont  Set Rate: 16 BPM  Vt Set: 400 mL  Pressure Support: 10 cmH20  PEEP/CPAP: 5 cmH20  Peak Airway Pressure: 15 cmH20  Mean Airway Pressure: 7.6 cmH20  Plateau Pressure: 0 cmH20    Temp  Min: 97.1 °F (36.2 °C)  Max: 98.2 °F (36.8 °C)  Pulse  Min: 55  Max: 93  BP  Min: 92/55  Max: 173/80  MAP (mmHg)  Min: 72  Max: 115  Resp  Min: 9  Max: 18  SpO2  Min: 98 %  Max: 100 %  Oxygen Concentration (%)  Min: 40  Max: 100    11/01 0701 - 11/02 0700  In: 2942.4 [I.V.:1054.6]  Out: 2225 [Urine:1125]   Unmeasured Output  Urine Occurrence: 5  Stool Occurrence: 0  Emesis Occurrence: 0  Pad Count: 5        Physical Exam  Vitals and nursing note reviewed.   Constitutional:       Appearance: She is ill-appearing and toxic-appearing.   HENT:      Head: Normocephalic and atraumatic.       Mouth/Throat:      Mouth: Mucous membranes are moist.   Eyes:      General: Scleral icterus present.      Extraocular Movements: Extraocular movements intact.      Pupils: Pupils are equal, round, and reactive to light.   Cardiovascular:      Rate and Rhythm: Normal rate and regular rhythm.   Pulmonary:      Effort: No respiratory distress.      Comments: Intubated on vent   Abdominal:      General: There is distension.      Palpations: Abdomen is soft.      Tenderness: There is no abdominal tenderness.   Skin:     General: Skin is warm.      Coloration: Skin is jaundiced.   Neurological:      Comments: --sedation: none  --GCS:  E4 V1 M4  --Mental Status: diminished GCS and mentation, nothing purposeful except eye opening  --CN II-XII grossly intact.   --PERRL              Unable to test orientation, language, memory, judgment, insight, fund of knowledge, hearing, shoulder shrug, tongue protrusion, coordination, gait due to level of consciousness.       Medications:  Continuous   Scheduledfamotidine, 20 mg, BID  folic acid, 1 mg, Daily  lacosamide, 50 mg, Q12H  lactulose, 30 g, BID  levetiracetam, 1,500 mg, BID  multivitamin, 1 tablet, Daily  rifAXImin, 550 mg, BID  thiamine, 100 mg, Daily    PRNalbuterol-ipratropium, 3 mL, Q6H PRN  calcium gluconate IVPB, 1 g, PRN  calcium gluconate IVPB, 2 g, PRN  calcium gluconate IVPB, 3 g, PRN  fentaNYL, 50 mcg, Q2H PRN  magnesium sulfate IVPB, 2 g, PRN  magnesium sulfate IVPB, 4 g, PRN  ondansetron, 4 mg, Q8H PRN  potassium chloride, 40 mEq, PRN   And  potassium chloride, 60 mEq, PRN   And  potassium chloride, 80 mEq, PRN  sodium phosphate 15 mmol in dextrose 5 % (D5W) 250 mL IVPB, 15 mmol, PRN  sodium phosphate 20.01 mmol in dextrose 5 % (D5W) 250 mL IVPB, 20.01 mmol, PRN  sodium phosphate 30 mmol in dextrose 5 % (D5W) 250 mL IVPB, 30 mmol, PRN      Today I personally reviewed pertinent medications, lines/drains/airways, imaging, cardiology results, laboratory results,    Diet  No diet orders on file  No diet orders on file          Assessment/Plan:     Neuro  * Acute encephalopathy  See acute hepatic encephalopathy and seizures    Seizure  58 year old presented for altered mental status on 10/18. Clinical picture confounded with hepatic encephalopathy, infection, and seizure activity. Neurology was consulted by primary team prior to admission to [facility].    10/27 EEG IMPRESSION:  This is an abnormal EEG during lethargic state.  Diffuse disorganized slowing of the background was noted.  Frequent triphasic waves noted.  Left posterior pseudo periodic epileptiform discharges were noted.  Numerous electrographic seizures emanating from the left posterior region were noted.    10/31 - rehook and 11/1 read with frequent discharges, vimpat lowered to 50 given mental status and liver function, will continue to watch on eeg  · vEEG in place  · Keppra 1500 BID  · Vimpat 50 mg BID  · Neurochecks q1hr  · Vitals q1hr       Pulmonary  Tracheal stenosis  Scope complete by ENT   -patient on scheduled nebs and racemic epi without improvement in respiratory status   -elective intubation 10/31 with 6.0 ETT with anesthesia  -11/1 tube exchange to 7.0 ETT to allow suctioning, completed steroids.     Hematology  Thrombocytopenia  This is a 58-year-old woman with a history of decompensated alcoholic cirrhosis.  Suspect thrombocytopenia is likely secondary to chronic alcohol use and is consumptive in nature. If trend worsens, will consider consulting Hematology.    · Daily CBC, transfuse only for active bleeding      Endocrine  Moderate malnutrition  Resuming tube feeds at 10 mL/hr    Nutrition consulted. Most recent weight and BMI monitored-     Measurements:  Wt Readings from Last 1 Encounters:   10/26/23 56.2 kg (123 lb 14.4 oz)   Body mass index is 25.02 kg/m².    Patient has been screened and assessed by RD.    Malnutrition Type:  Context: social/environmental circumstances  Level:  moderate    Malnutrition Characteristic Summary:  Subcutaneous Fat (Malnutrition): mild depletion  Muscle Mass (Malnutrition): mild depletion  Fluid Accumulation (Malnutrition): moderate    Interventions/Recommendations (treatment strategy):  1. When able, initiate TFs. Rec'd Isosource 1.5 @ 50 mL/hr to provide 1800 kcals, 82 g of protein, 917 mL fluid. 2. RD to monitor & follow-up.    GI  Acute hepatic encephalopathy  This is a 52 year old woman with a history of ethanol cirrhosis who presents after being found on the ground with fecal and urinary incontinence, and altered mental status. Elevated ammonia on admission.    - Continue lactulose via NG tube TID (titrate till 3-4 daily BM achieved). Continue Xifaxin.   - Avoid opiates and benzodiazepines, if able.   - IV thiamine, folate supplementation, multivitamin through NG tube.  - concern that comatose state is irreversible brain damage, will rule out other causes first, EEG and wean AEDs    -MELD score ~50% survival without transplant    Decompensated alcoholic hepatic cirrhosis  · Hepatology following PEth. Daily CBC, CMP & INR.   · Not currently being evaluated for transplant due to clinical status  · Vitamin K given for chronic coagulopathy  · Continuing lactulose and rifaximin titrated to 3-4 bowel movements daily  · Ammonia down     Other  Hypoxic respiratory failure  2/2 to tracheal stenosis   -patient with stridor, increased secretions, requiring 5L 28% and inability to protect airway   -intubated by anesthesia team with small ETT    Vent Mode: Spont  Oxygen Concentration (%):  [] 40  Resp Rate Total:  [7.4 br/min-20 br/min] 7.4 br/min  Vt Set:  [400 mL] 400 mL  PEEP/CPAP:  [5 cmH20] 5 cmH20  Pressure Support:  [10 cmH20] 10 cmH20  Mean Airway Pressure:  [7.6 cmH20-10 cmH20] 7.6 cmH20        Debility  PT/OT when appropriate          The patient is being Prophylaxed for:  Venous Thromboembolism with: Mechanical elevated INR and  thrombocytopenia  Stress Ulcer with: H2B  Ventilator Pneumonia with: chlorhexidine oral care    Activity Orders          Elevate HOB Elevate (30-45 degrees) Elevate HOB to 30 - 45 degrees during feeding unless otherwise stated starting at 10/28 1218    Elevate HOB to 30-45 degrees during feeding unless otherwise stated starting at 10/26 1138    Progressive Mobility Protocol (mobilize patient to their highest level of functioning at least twice daily) starting at 10/25 2000    Turn patient starting at 10/25 2000        Full Code     Critical condition in that Patient has a condition that poses threat to life and bodily function: hepatic encephalopathy, tracheal stenosis and hypoxic resp failure     40 minutes of Critical care time was spent personally by me on the following activities: development of treatment plan with patient or surrogate and bedside caregivers, discussions with consultants, evaluation of patient's response to treatment, examination of patient, ordering and performing treatments and interventions, ordering and review of laboratory studies, ordering and review of radiographic studies, pulse oximetry, antibiotic titration if applicable, vasopressor titration if applicable, re-evaluation of patient's condition. This critical care time did not  involve time for any procedures. There is high probability for acute neurological change leading to clinical and possibly life-threatening deterioration requiring highest level of physician preparedness for urgent intervention.       Erica Lewis PA-C  Neurocritical Care  [facility] - Neuro Critical Care

## 2023-11-02 NOTE — PLAN OF CARE
[facility] - Neuro Critical Care  Discharge Reassessment    Primary Care Provider: Osiris Nevarez NP    Expected Discharge Date: 11/8/2023    Patient intubated on vent.  Not medically stable for discharge.    Discharge Plan A and Plan B have been determined by review of patient's clinical status, future medical and therapeutic needs, and coverage/benefits for post-acute care in coordination with multidisciplinary team members.      Reassessment (most recent)       Discharge Reassessment - 11/02/23 0572          Discharge Reassessment    Assessment Type Discharge Planning Reassessment     Did the patient's condition or plan change since previous assessment? No     Communicated ASHELY with patient/caregiver Date not available/Unable to determine     Discharge Plan A Rehab     Discharge Plan B Long-term acute care facility (LTAC)     DME Needed Upon Discharge  none     Transition of Care Barriers Does not adhere to care plan;Transportation     Why the patient remains in the hospital Requires continued medical care                     May Rosario RN, CCRN-K, [facility]  Neuro-Critical Care   [phone]

## 2023-11-02 NOTE — SUBJECTIVE & OBJECTIVE
Interval History: see hospital course     Review of Systems   Unable to perform ROS: Mental status change       Objective:     Vitals:  Temp: 98.2 °F (36.8 °C)  Pulse: 74  Rhythm: normal sinus rhythm  BP: (!) 122/58  MAP (mmHg): 83  Resp: 14  SpO2: 99 %  Oxygen Concentration (%): 40  Vent Mode: Spont  Set Rate: 16 BPM  Vt Set: 400 mL  Pressure Support: 10 cmH20  PEEP/CPAP: 5 cmH20  Peak Airway Pressure: 15 cmH20  Mean Airway Pressure: 7.6 cmH20  Plateau Pressure: 0 cmH20    Temp  Min: 97.1 °F (36.2 °C)  Max: 98.2 °F (36.8 °C)  Pulse  Min: 55  Max: 93  BP  Min: 92/55  Max: 173/80  MAP (mmHg)  Min: 72  Max: 115  Resp  Min: 9  Max: 18  SpO2  Min: 98 %  Max: 100 %  Oxygen Concentration (%)  Min: 40  Max: 100    11/01 0701 - 11/02 0700  In: 2942.4 [I.V.:1054.6]  Out: 2225 [Urine:1125]   Unmeasured Output  Urine Occurrence: 5  Stool Occurrence: 0  Emesis Occurrence: 0  Pad Count: 5        Physical Exam  Vitals and nursing note reviewed.   Constitutional:       Appearance: She is ill-appearing and toxic-appearing.   HENT:      Head: Normocephalic and atraumatic.      Mouth/Throat:      Mouth: Mucous membranes are moist.   Eyes:      General: Scleral icterus present.      Extraocular Movements: Extraocular movements intact.      Pupils: Pupils are equal, round, and reactive to light.   Cardiovascular:      Rate and Rhythm: Normal rate and regular rhythm.   Pulmonary:      Effort: No respiratory distress.      Comments: Intubated on vent   Abdominal:      General: There is distension.      Palpations: Abdomen is soft.      Tenderness: There is no abdominal tenderness.   Skin:     General: Skin is warm.      Coloration: Skin is jaundiced.   Neurological:      Comments: --sedation: none  --GCS:  E4 V1 M4  --Mental Status: diminished GCS and mentation, nothing purposeful except eye opening  --CN II-XII grossly intact.   --PERRL              Unable to test orientation, language, memory, judgment, insight, fund of knowledge,  hearing, shoulder shrug, tongue protrusion, coordination, gait due to level of consciousness.       Medications:  Continuous   Scheduledfamotidine, 20 mg, BID  folic acid, 1 mg, Daily  lacosamide, 50 mg, Q12H  lactulose, 30 g, BID  levetiracetam, 1,500 mg, BID  multivitamin, 1 tablet, Daily  rifAXImin, 550 mg, BID  thiamine, 100 mg, Daily    PRNalbuterol-ipratropium, 3 mL, Q6H PRN  calcium gluconate IVPB, 1 g, PRN  calcium gluconate IVPB, 2 g, PRN  calcium gluconate IVPB, 3 g, PRN  fentaNYL, 50 mcg, Q2H PRN  magnesium sulfate IVPB, 2 g, PRN  magnesium sulfate IVPB, 4 g, PRN  ondansetron, 4 mg, Q8H PRN  potassium chloride, 40 mEq, PRN   And  potassium chloride, 60 mEq, PRN   And  potassium chloride, 80 mEq, PRN  sodium phosphate 15 mmol in dextrose 5 % (D5W) 250 mL IVPB, 15 mmol, PRN  sodium phosphate 20.01 mmol in dextrose 5 % (D5W) 250 mL IVPB, 20.01 mmol, PRN  sodium phosphate 30 mmol in dextrose 5 % (D5W) 250 mL IVPB, 30 mmol, PRN      Today I personally reviewed pertinent medications, lines/drains/airways, imaging, cardiology results, laboratory results,   Diet  No diet orders on file  No diet orders on file

## 2023-11-02 NOTE — PROGRESS NOTES
[facility] - Neuro Critical Care  Adult Nutrition  Progress Note    SUMMARY       Recommendations    1. Continue TF regimen of Isosource 1.5 @ 50 mL/hr to provide 1800 kcals, 82 g of protein, 917 mL fluid.   2. RD to monitor & follow-up.    Goals: Meet % EEN, EPN by RD f/u date  Nutrition Goal Status: goal met  Communication of RD Recs: reviewed with RN    Assessment and Plan    Moderate malnutrition    Nutrition Problem:  Moderate Protein-Calorie Malnutrition  Malnutrition in the context of Social/Environmental Circumstances    Related to (etiology):  Inability to consume sufficient energy     Signs and Symptoms (as evidenced by):  Body Fat Depletion: mild depletion of orbitals and triceps   Muscle Mass Depletion: mild depletion of temples, clavicle region and lower extremities   Fluid Accumulation: mild and moderate    Interventions(treatment strategy):  Collaboration of nutrition care w/ other providers  EN    Nutrition Diagnosis Status:  Continues    Malnutrition Assessment  Malnutrition Context: social/environmental circumstances  Malnutrition Level: moderate    Subcutaneous Fat (Malnutrition): mild depletion  Muscle Mass (Malnutrition): mild depletion  Fluid Accumulation (Malnutrition): moderate     Reason for Assessment    Reason For Assessment: RD follow-up  Diagnosis: other (see comments) (Encephalopathy)  Relevant Medical History: Alcoholic cirrhosis, Bowel resection  Interdisciplinary Rounds: did not attend  General Information Comments: RD f/u. Noted pt intubated 10/31. Remains NPO w/ NGT present- noted current TF regimen running. Per chart review, pt w/ UBW of 120-130#; CBW remains 123# (currently w/ +3 and +4 edema). Mild wasting found on physical exam on 10/26. RD feels pt meets criteria for moderate malnutrition - please see PES statement for details. LBM 11/2.  Nutrition Discharge Planning: Pending clinical course    Nutrition Risk Screen    Nutrition Risk Screen: tube feeding or parenteral  "nutrition, difficulty chewing/swallowing    Nutrition/Diet History    Spiritual, Cultural Beliefs, Hindu Practices, Values that Affect Care: no  Food Allergies: NKFA  Factors Affecting Nutritional Intake: NPO, on mechanical ventilation, difficulty/impaired swallowing    Anthropometrics    Temp: 99 °F (37.2 °C)  Height: 4' 11" (149.9 cm)  Height (inches): 59 in  Weight Method: Bed Scale  Weight: 56.2 kg (123 lb 14.4 oz)  Weight (lb): 123.9 lb  Ideal Body Weight (IBW), Female: 95 lb  % Ideal Body Weight, Female (lb): 130.42 %  BMI (Calculated): 25  BMI Grade: 25 - 29.9 - overweight    Lab/Procedures/Meds    Pertinent Labs Reviewed: reviewed  Pertinent Labs Comments: BUN 35, Glucose 121, Albumin 2.7, T Bili 4.9, AST 70, ALT 47, A1C <4.0%  Pertinent Medications Reviewed: reviewed  Pertinent Medications Comments: Famotidine, folic acid, lactulose, MVI, thiamine    Physical Findings/Assessment         Estimated/Assessed Needs    Weight Used For Calorie Calculations: 56.2 kg (123 lb 14.4 oz)  Energy Calorie Requirements (kcal): 6915-5496 kcal/d  Energy Need Method: Kcal/kg (30-35 kcal/kg)  Protein Requirements: 73-84 g/d (1.3-1.5 g/kg)  Weight Used For Protein Calculations: 56.2 kg (123 lb 14.4 oz)  Fluid Requirements (mL): 1 mL/kcal or fluid per MD  Estimated Fluid Requirement Method: RDA Method  RDA Method (mL): 1686    Nutrition Prescription Ordered    Current Diet Order: NPO  Current Nutrition Support Formula Ordered: Isosource 1.5  Current Nutrition Support Rate Ordered: 50 (ml)  Current Nutrition Support Frequency Ordered: mL/hr    Evaluation of Received Nutrient/Fluid Intake    Enteral Calories (kcal): 1800  Enteral Protein (gm): 82  Enteral (Free Water) Fluid (mL): 917  % Kcal Needs: 100%  % Protein Needs: 100%  I/O: +5.4 L since admit  Energy Calories Required: meeting needs  Protein Required: meeting needs  Tolerance: tolerating  % Intake of Estimated Energy Needs: 100%  % Meal Intake: 0%    Nutrition " Risk    Level of Risk/Frequency of Follow-up:  (1x/week)     Monitor and Evaluation    Food and Nutrient Intake: enteral nutrition intake, food and beverage intake, energy intake  Food and Nutrient Adminstration: enteral and parenteral nutrition administration, diet order  Knowledge/Beliefs/Attitudes: beliefs and attitudes, food and nutrition knowledge/skill  Physical Activity and Function: nutrition-related ADLs and IADLs  Anthropometric Measurements: weight, weight change  Biochemical Data, Medical Tests and Procedures: inflammatory profile, lipid profile, glucose/endocrine profile, gastrointestinal profile, electrolyte and renal panel  Nutrition-Focused Physical Findings: overall appearance     Nutrition Follow-Up    RD Follow-up?: Yes    Opal Colon RDN,LDN

## 2023-11-02 NOTE — TREATMENT PLAN
[facility]  Hepatology  Progress Note    Patient Name: Mara Mojica  MRN: [id number]  Admission Date: 10/25/2023  Hospital Length of Stay: 8 days    Subjective:     Interval History:  Patient has been off sedation since yesterday afternoon. She is intubated & mechanically ventilated. Unfortunately, no improvement in mentation. She is off pressors this AM.     No family at bedside.  Called significant other & explained the situation to him. He thanked me for my call.     PEth from 10/25 <10    Objective:     Vitals:    11/02/23 1201   BP:    Pulse: 74   Resp:    Temp:          Constitutional: Ill appearing. Does not respond to verbal commands, but grimaces to painful stimulus.   HENT: Head: Normal, normocephalic, atraumatic. Intubated and mechanically ventilated.   Eyes: Icteric sclera  GI: soft, non-tender, without masses or organomegaly and distended  Neurological: Disoriented, not responding to commands      Significant Labs:  Recent Labs   Lab 11/01/23  0442 11/01/23  1612 11/02/23  0308   HGB 6.7* 8.0* 7.7*       Lab Results   Component Value Date    WBC 7.01 11/02/2023    HGB 7.7 (L) 11/02/2023    HCT 24 (L) 11/02/2023     (H) 11/02/2023    PLT 59 (L) 11/02/2023       Lab Results   Component Value Date     11/02/2023    K 4.1 11/02/2023     (H) 11/02/2023    CO2 22 (L) 11/02/2023    BUN 35 (H) 11/02/2023    CREATININE 0.8 11/02/2023    CALCIUM 9.0 11/02/2023    ANIONGAP 3 (L) 11/02/2023       Lab Results   Component Value Date    ALT 47 (H) 11/02/2023    AST 70 (H) 11/02/2023    ALKPHOS 79 11/02/2023    BILITOT 4.9 (H) 11/02/2023       Lab Results   Component Value Date    INR 2.0 (H) 11/02/2023    INR 2.1 (H) 11/01/2023    INR 1.9 (H) 10/31/2023       Significant Imaging:  Reviewed pertinent radiology findings.       Assessment/Plan:     Mara Mojica is a 58 y.o. female with history of alcoholic cirrhosis with ascites, s/p Ex Lap with bowel resection for  incarcerated hernia, recent SBO, who presented to [facility] on 10/18 with AMS. EtOH level < 10. CTH/MRI brain unremarkable. EEG showed mild generalized nonspecific cerebral dysfunction with no indication of seizure. Ammonia level WNL; not improving with lactulose in spite of large BMs. She was seen by Dr. Reardon in October 2023 for decompensated cirrhosis and it was recommended to proceed with liver transplant evaluation.  EGD in September 2023 showed normal esophagus and portal hypertensive gastropathy. Now with worsening mentation and seizure activity on EEG; moved to neurocritical ICU. Now on Keppra and Vimpat with no seizures, but no improvement in mentation. Intubated on 10/31/23.Optimized on lactulose and Xifaxin. PEth from 10/25 <10. Mentation not improving, but may need more time to observe.      Problem List:  Alcoholic cirrhosis of liver with ascites   AMS - not readily attributable to HE (differentials include encephalitis v/s seizure)    MELD 3.0: 22 at 11/2/2023  3:08 AM  MELD-Na: 20 at 11/2/2023  3:08 AM  Calculated from:  Serum Creatinine: 0.8 mg/dL (Using min of 1 mg/dL) at 11/2/2023  3:08 AM  Serum Sodium: 141 mmol/L (Using max of 137 mmol/L) at 11/2/2023  3:08 AM  Total Bilirubin: 4.9 mg/dL at 11/2/2023  3:08 AM  Serum Albumin: 2.7 g/dL at 11/2/2023  3:08 AM  INR(ratio): 2.0 at 11/2/2023  3:08 AM  Age at listing (hypothetical): 58 years  Sex: Female at 11/2/2023  3:08 AM       Recommendations:  - Appreciate recs from neurology regarding management of epilepsy. Continue supportive care.   - Start lactulose via NG tube TID (titrate till 3-4 daily BM achieved; consider rectal lactulose if not able to tolerate PO). Continue Xifaxin.   - Avoid sedatives, opiates and benzodiazepines, if able.   - IV thiamine and folate supplementation  - We are not initiating liver transplant evaluation at this time. Patient will need to be conscious and able to participate in a discussion with us before this is possible.    - Daily CBC, CMP & INR.     Thank you for involving us in the care of Mara Mojica. Please call with any additional questions, concerns or changes in the patient's clinical status. We will continue to follow.     Lv Resendiz MD  Gastroenterology Fellow PGY IV   [facility]

## 2023-11-02 NOTE — PROCEDURES
24 hr. Video EEG Monitoring    Date/Time: 10/25/2023 10:51 AM    Performed by: Jairo Diana MD  Authorized by: Aurelia Winston MD      ICU EEG/VIDEO MONITORING REPORT    DATE OF SERVICE: 11/1/23-11/2/23  EEG NUMBER: [id number]    REQUESTED BY: Debbie  LOCATION OF SERVICE: [facility]    METHODOLOGY   Electroencephalographic (EEG) recording is with electrodes placed according to the International 10-20 placement system.  Thirty two (32) channels of digital signal are simultaneously recorded from the scalp and may include EKG, EMG, and/or eye monitors.   Recording band pass was 0.1 to 512 hz.  Digital video recording of the patient is simultaneously recorded with the EEG.  The nursing staff report clinical symptoms and may press an event button when the patient has symptoms of clinical interest to the treating physicians.  EEG and video recording is stored and archived in digital format.  The entire recording is visually reviewed and the times identified by computer analysis as being spikes or seizures are reviewed again.  Activation procedures which include photic stimulation, hyperventilation and instructing patients to perform simple task are done in selected patients.   Compresses spectral analysis (CSA) is also performed on the activity recorded from each individual channel.  This is displayed as a power display of frequencies from 0 to 30 Hz over time.   The CSA analysis is done and displayed continuously.  This is reviewed for asymmetries in power between homologous areas of the scalp and for presence of changes in power which canbe seen when seizures occur.  Sections of suspected abnormalities on the CSA is then compared with the original EEG recording.     Cover software was also utilized in the review of this study.  This software suite analyzes the EEG recording in multiple domains.  Coherence and rhythmicity is computed to identify EEG sections which may contain organized seizures.  Each channel  undergoes analysis to detect presence of spike and sharp waves which have special and morphological characteristic of epileptic activity.  The routine EEG recording is converted from spacial into frequency domain.  This is then displayed comparing homologous areas to identify areas of significant asymmetry.  Algorithm to identify non-cortically generated artifact is used to separate eye movement, EMG and other artifact from the EEG.      Recording Times  Start on 11/1/23 at 15:54:49  Stop on 11/2/23 at hh:mm:ss  A total of 23 hours of EEG was recorded.    EEG FINDINGS  The record shows a poor organization at rest with continuous diffuse delta and theta range background slowing and no discernible posterior dominant rhythm. There are frequent spike and wave discharges over the left posterior quadrant phase reversing over P3/T5 at times becoming continuous.     Drowsiness is characterized by attenuation of the background, vertex waves, and bilateral theta slowing. Stage II sleep is characterized by slowing, vertex waves, and poorly formed sleep spindles.    Provocative maneuvers including hyperventilation and photic stimulation were not performed.     EKG recording shows a sinus rhythm.    There is no push button or clinical event.    IMPRESSION:  Abnormal study due to moderate to severe  diffuse background slowing consistent with diffuse cerebral dysfunction and encephalopathy which may be on the basis of toxic, metabolic, or primary neuronal disorder. There are frequent interictal epileptiform discharges over the left hemisphere consistent with focal cortical irritability and a potential seizure focus at times becoming continuous indicating increased cortical irritability.    Jairo Diana MD  Department of Neurology  [facility]

## 2023-11-02 NOTE — SUBJECTIVE & OBJECTIVE
Interval History: NAEON. EEG with left interictals, now occurring in runs, no discrete seizures. Exam unchanged this morning. No family at bedside.    Current Facility-Administered Medications   Medication Dose Route Frequency Provider Last Rate Last Admin    albuterol-ipratropium 2.5 mg-0.5 mg/3 mL nebulizer solution 3 mL  3 mL Nebulization Q6H PRN Erica Lewis PA-C        calcium gluconate 1 g in NS IVPB (premixed)  1 g Intravenous PRN Andras, Philippe P., NP        calcium gluconate 1 g in NS IVPB (premixed)  2 g Intravenous PRN Andras, Philippe P., NP        calcium gluconate 1 g in NS IVPB (premixed)  3 g Intravenous PRN Andras, Philippe P., NP        famotidine tablet 20 mg  20 mg Per NG tube BID Lencho Hess, DO   20 mg at 11/02/23 0824    fentaNYL 50 mcg/mL injection 50 mcg  50 mcg Intravenous Q2H PRN Erica Lewis PA-C   50 mcg at 11/01/23 1608    folic acid tablet 1 mg  1 mg Per NG tube Daily Darion Torres MD   1 mg at 11/02/23 0823    lacosamide tablet 50 mg  50 mg Per NG tube Q12H Erica Lewis PA-C   50 mg at 11/02/23 0823    lactulose 20 gram/30 mL solution Soln 30 g  30 g Per NG tube BID Rebecca Adames PA-C   30 g at 11/02/23 0825    levetiracetam 500 mg/5 mL (5 mL) liquid Soln 1,500 mg  1,500 mg Per NG tube BID Roberto Jean Baptiste DO   1,500 mg at 11/02/23 0825    magnesium sulfate 2g in water 50mL IVPB (premix)  2 g Intravenous PRN Andtorey Philippe P., NP   Stopped at 10/30/23 1022    magnesium sulfate 2g in water 50mL IVPB (premix)  4 g Intravenous PRN Andtorey, Philippe P., NP        multivitamin tablet  1 tablet Per NG tube Daily Darion Torres MD   1 tablet at 11/02/23 0823    ondansetron injection 4 mg  4 mg Intravenous Q8H PRN Aurelia Winston MD        potassium chloride 10 mEq in 100 mL IVPB  40 mEq Intravenous PRN Philippe Reno, NP   Stopped at 11/01/23 1406    And    potassium chloride 10 mEq in 100 mL IVPB  60 mEq Intravenous PRN Philippe Reno, BURTON   Stopped  at 10/30/23 0916    And    potassium chloride 10 mEq in 100 mL IVPB  80 mEq Intravenous PRN Andras, Philippe P., NP   Stopped at 10/28/23 0138    rifAXIMin tablet 550 mg  550 mg Per NG tube BID Aurelia Winston MD   550 mg at 11/02/23 0823    sodium phosphate 15 mmol in dextrose 5 % (D5W) 250 mL IVPB  15 mmol Intravenous PRN Andras, Philippe P., NP        sodium phosphate 20.01 mmol in dextrose 5 % (D5W) 250 mL IVPB  20.01 mmol Intravenous PRN Andras, Philippe P., NP   Stopped at 10/30/23 2147    sodium phosphate 30 mmol in dextrose 5 % (D5W) 250 mL IVPB  30 mmol Intravenous PRN Andras, Philippe P., NP        thiamine tablet 100 mg  100 mg Per NG tube Daily Erica Lewis PA-C   100 mg at 11/02/23 1028     Continuous Infusions:    Review of Systems   Unable to perform ROS: Intubated     Objective:     Vital Signs (Most Recent):  Temp: 98.2 °F (36.8 °C) (11/02/23 1101)  Pulse: 84 (11/02/23 1101)  Resp: (!) 9 (11/02/23 1101)  BP: (!) 124/57 (11/02/23 1101)  SpO2: 100 % (11/02/23 1101) Vital Signs (24h Range):  Temp:  [97.1 °F (36.2 °C)-98.2 °F (36.8 °C)] 98.2 °F (36.8 °C)  Pulse:  [55-93] 84  Resp:  [9-18] 9  SpO2:  [98 %-100 %] 100 %  BP: ()/(47-80) 124/57  Arterial Line BP: ()/(40-82) 122/48     Weight: 56.2 kg (123 lb 14.4 oz)  Body mass index is 25.02 kg/m².     Physical Exam  Constitutional:       General: She is not in acute distress.     Appearance: She is not diaphoretic.      Interventions: She is intubated.   HENT:      Head: Normocephalic and atraumatic.      Comments: EEG in place  Eyes:      General: No scleral icterus.        Right eye: No discharge.         Left eye: No discharge.      Conjunctiva/sclera: Conjunctivae normal.      Pupils: Pupils are equal, round, and reactive to light.   Cardiovascular:      Rate and Rhythm: Normal rate.   Pulmonary:      Effort: Pulmonary effort is normal. No respiratory distress. She is intubated.      Comments: Intubated  Abdominal:      General: There is  no distension.      Palpations: Abdomen is soft.   Musculoskeletal:         General: No deformity or signs of injury.   Skin:     General: Skin is warm and dry.   Psychiatric:      Comments: Unable to assess            NEUROLOGICAL EXAMINATION:     CRANIAL NERVES     CN III, IV, VI   Pupils are equal, round, and reactive to light.       EMILY OU   Corneal intact OU   No spontaneous eye opening   Face symmetric   Tongue midline   Minimal withdraw to noxious stimuli    Exam findings to suggest seizures:  Myoclonus - no   eye twitching - no   Nystagmus - no   gaze deviation - no   waxy rigidity - no        Significant Labs: All pertinent lab results from the past 24 hours have been reviewed.    Significant Studies: I have reviewed all pertinent imaging results/findings within the past 24 hours.

## 2023-11-02 NOTE — NURSING
Pt with acute rise in HR and BP. No seizure like activity noted, although button pressed on EEG. ALEX Maldonado notified and PRN fentanyl given.

## 2023-11-02 NOTE — ASSESSMENT & PLAN NOTE
2/2 to tracheal stenosis   -patient with stridor, increased secretions, requiring 5L 28% and inability to protect airway   -intubated by anesthesia team with small ETT    Vent Mode: Spont  Oxygen Concentration (%):  [] 40  Resp Rate Total:  [7.4 br/min-20 br/min] 7.4 br/min  Vt Set:  [400 mL] 400 mL  PEEP/CPAP:  [5 cmH20] 5 cmH20  Pressure Support:  [10 cmH20] 10 cmH20  Mean Airway Pressure:  [7.6 cmH20-10 cmH20] 7.6 cmH20

## 2023-11-02 NOTE — ASSESSMENT & PLAN NOTE
58F PMHx etoh cirrhosis with ascites, incarcerated hernia s/p ex lap, and recent SBO who initially presented to [facility] with AMS on 10/18, transferred to [facility] for hepatic encephalopathy and liver transplant evaluation, hospital course c/b UTI, SBI, focal status epilepticus (now resolved), and acute hypoxemic respiratory failure with intubation on 10/31. EEG re-placed on 10/31 for persistent encephalopathy.    Recommendations:  - Continuous vEEG monitoring: left interictals now occurring in runs, no discrete seizures  - NCC decreased Lacosamide to 50 mg on 11/1 d/t concerns for hepatic impairment  - Recommend to continue Levetiracetam 1500 mg BID  - Propofol weaned off 11/1  - Avoid agents that lower seizure threshold  - Management of infectious/metabolic abnormalities per NCC  - Seizure precautions      Discussed plan of care with NCC team. No family at bedside. Will follow, please call with questions.

## 2023-11-02 NOTE — ASSESSMENT & PLAN NOTE
58 year old presented for altered mental status on 10/18. Clinical picture confounded with hepatic encephalopathy, infection, and seizure activity. Neurology was consulted by primary team prior to admission to [facility].    10/27 EEG IMPRESSION:  This is an abnormal EEG during lethargic state.  Diffuse disorganized slowing of the background was noted.  Frequent triphasic waves noted.  Left posterior pseudo periodic epileptiform discharges were noted.  Numerous electrographic seizures emanating from the left posterior region were noted.    10/31 - rehook and 11/1 read with frequent discharges, vimpat lowered to 50 given mental status and liver function, will continue to watch on eeg  · vEEG in place  · Keppra 1500 BID  · Vimpat 50 mg BID  · Neurochecks q1hr  · Vitals q1hr

## 2023-11-02 NOTE — PLAN OF CARE
Recommendations     1. Continue TF regimen of Isosource 1.5 @ 50 mL/hr to provide 1800 kcals, 82 g of protein, 917 mL fluid.   2. RD to monitor & follow-up.     Goals: Meet % EEN, EPN by RD f/u date  Nutrition Goal Status: goal met  Communication of RD Recs: reviewed with ELLE Adame RDN,LDN

## 2023-11-02 NOTE — ASSESSMENT & PLAN NOTE
- Per Hepatology, not initiating liver transplant at this time  - Hepatology rec lactulose TID   - Management per Hepatology, NCC

## 2023-11-03 LAB
ALBUMIN SERPL BCP-MCNC: 2.6 G/DL (ref 3.5–5.2)
ALLENS TEST: ABNORMAL
ALP SERPL-CCNC: 150 U/L (ref 55–135)
ALT SERPL W/O P-5'-P-CCNC: 56 U/L (ref 10–44)
ANION GAP SERPL CALC-SCNC: 3 MMOL/L (ref 8–16)
ANISOCYTOSIS BLD QL SMEAR: SLIGHT
AST SERPL-CCNC: 82 U/L (ref 10–40)
BASO STIPL BLD QL SMEAR: ABNORMAL
BASOPHILS # BLD AUTO: 0.01 K/UL (ref 0–0.2)
BASOPHILS NFR BLD: 0.1 % (ref 0–1.9)
BILIRUB SERPL-MCNC: 4.6 MG/DL (ref 0.1–1)
BUN SERPL-MCNC: 38 MG/DL (ref 6–20)
BURR CELLS BLD QL SMEAR: ABNORMAL
CALCIUM SERPL-MCNC: 8.9 MG/DL (ref 8.7–10.5)
CHLORIDE SERPL-SCNC: 119 MMOL/L (ref 95–110)
CO2 SERPL-SCNC: 24 MMOL/L (ref 23–29)
CREAT SERPL-MCNC: 0.8 MG/DL (ref 0.5–1.4)
DELSYS: ABNORMAL
DIFFERENTIAL METHOD: ABNORMAL
EOSINOPHIL # BLD AUTO: 0 K/UL (ref 0–0.5)
EOSINOPHIL NFR BLD: 0 % (ref 0–8)
ERYTHROCYTE [DISTWIDTH] IN BLOOD BY AUTOMATED COUNT: 20.5 % (ref 11.5–14.5)
EST. GFR  (NO RACE VARIABLE): >60 ML/MIN/1.73 M^2
FIO2: 40
GLUCOSE SERPL-MCNC: 152 MG/DL (ref 70–110)
HCO3 UR-SCNC: 22.2 MMOL/L (ref 24–28)
HCO3 UR-SCNC: 26.4 MMOL/L (ref 24–28)
HCO3 UR-SCNC: 26.6 MMOL/L (ref 24–28)
HCT VFR BLD AUTO: 26.1 % (ref 37–48.5)
HCT VFR BLD CALC: 24 %PCV (ref 36–54)
HCT VFR BLD CALC: 24 %PCV (ref 36–54)
HGB BLD-MCNC: 8.2 G/DL (ref 12–16)
IMM GRANULOCYTES # BLD AUTO: 0.13 K/UL (ref 0–0.04)
IMM GRANULOCYTES NFR BLD AUTO: 1.3 % (ref 0–0.5)
INR PPP: 1.9 (ref 0.8–1.2)
LYMPHOCYTES # BLD AUTO: 0.9 K/UL (ref 1–4.8)
LYMPHOCYTES NFR BLD: 8.5 % (ref 18–48)
MAGNESIUM SERPL-MCNC: 2.3 MG/DL (ref 1.6–2.6)
MCH RBC QN AUTO: 32 PG (ref 27–31)
MCHC RBC AUTO-ENTMCNC: 31.4 G/DL (ref 32–36)
MCV RBC AUTO: 102 FL (ref 82–98)
MODE: ABNORMAL
MONOCYTES # BLD AUTO: 3.1 K/UL (ref 0.3–1)
MONOCYTES NFR BLD: 30.9 % (ref 4–15)
NEUTROPHILS # BLD AUTO: 5.9 K/UL (ref 1.8–7.7)
NEUTROPHILS NFR BLD: 59.2 % (ref 38–73)
NRBC BLD-RTO: 0 /100 WBC
OVALOCYTES BLD QL SMEAR: ABNORMAL
PCO2 BLDA: 23.5 MMHG (ref 35–45)
PCO2 BLDA: 32.9 MMHG (ref 35–45)
PCO2 BLDA: 33.5 MMHG (ref 35–45)
PEEP: 5
PH SMN: 7.5 [PH] (ref 7.35–7.45)
PH SMN: 7.51 [PH] (ref 7.35–7.45)
PH SMN: 7.58 [PH] (ref 7.35–7.45)
PHOSPHATE SERPL-MCNC: 2.6 MG/DL (ref 2.7–4.5)
PLATELET # BLD AUTO: 70 K/UL (ref 150–450)
PLATELET BLD QL SMEAR: ABNORMAL
PMV BLD AUTO: 12.2 FL (ref 9.2–12.9)
PO2 BLDA: 164 MMHG (ref 80–100)
PO2 BLDA: 71 MMHG (ref 80–100)
PO2 BLDA: 92 MMHG (ref 80–100)
POC BE: 0 MMOL/L
POC BE: 3 MMOL/L
POC BE: 4 MMOL/L
POC SATURATED O2: 100 % (ref 95–100)
POC SATURATED O2: 97 % (ref 95–100)
POC SATURATED O2: 98 % (ref 95–100)
POC TCO2: 23 MMOL/L (ref 23–27)
POC TCO2: 27 MMOL/L (ref 23–27)
POC TCO2: 28 MMOL/L (ref 23–27)
POCT GLUCOSE: 124 MG/DL (ref 70–110)
POCT GLUCOSE: 127 MG/DL (ref 70–110)
POCT GLUCOSE: 146 MG/DL (ref 70–110)
POCT GLUCOSE: 164 MG/DL (ref 70–110)
POIKILOCYTOSIS BLD QL SMEAR: ABNORMAL
POLYCHROMASIA BLD QL SMEAR: ABNORMAL
POTASSIUM SERPL-SCNC: 4.6 MMOL/L (ref 3.5–5.1)
PROT SERPL-MCNC: 5.5 G/DL (ref 6–8.4)
PROTHROMBIN TIME: 19.5 SEC (ref 9–12.5)
PS: 10
PS: 12
PS: 8
RBC # BLD AUTO: 2.56 M/UL (ref 4–5.4)
SAMPLE: ABNORMAL
SITE: ABNORMAL
SODIUM SERPL-SCNC: 146 MMOL/L (ref 136–145)
SP02: 100
WBC # BLD AUTO: 9.99 K/UL (ref 3.9–12.7)

## 2023-11-03 PROCEDURE — 25000003 PHARM REV CODE 250: Mod: NTX | Performed by: HOSPITALIST

## 2023-11-03 PROCEDURE — 25000003 PHARM REV CODE 250: Mod: NTX | Performed by: PSYCHIATRY & NEUROLOGY

## 2023-11-03 PROCEDURE — 85610 PROTHROMBIN TIME: CPT | Mod: NTX | Performed by: STUDENT IN AN ORGANIZED HEALTH CARE EDUCATION/TRAINING PROGRAM

## 2023-11-03 PROCEDURE — 20000000 HC ICU ROOM: Mod: NTX

## 2023-11-03 PROCEDURE — 25000003 PHARM REV CODE 250: Mod: NTX

## 2023-11-03 PROCEDURE — 99900035 HC TECH TIME PER 15 MIN (STAT): Mod: NTX

## 2023-11-03 PROCEDURE — 99900026 HC AIRWAY MAINTENANCE (STAT): Mod: NTX

## 2023-11-03 PROCEDURE — 99291 PR CRITICAL CARE, E/M 30-74 MINUTES: ICD-10-PCS | Mod: NTX,,, | Performed by: PSYCHIATRY & NEUROLOGY

## 2023-11-03 PROCEDURE — 51798 US URINE CAPACITY MEASURE: CPT | Mod: NTX

## 2023-11-03 PROCEDURE — 99499 UNLISTED E&M SERVICE: CPT | Mod: NTX,,, | Performed by: PSYCHIATRY & NEUROLOGY

## 2023-11-03 PROCEDURE — 82803 BLOOD GASES ANY COMBINATION: CPT | Mod: NTX

## 2023-11-03 PROCEDURE — 94003 VENT MGMT INPAT SUBQ DAY: CPT | Mod: NTX

## 2023-11-03 PROCEDURE — 37799 UNLISTED PX VASCULAR SURGERY: CPT | Mod: NTX

## 2023-11-03 PROCEDURE — 95714 VEEG EA 12-26 HR UNMNTR: CPT | Mod: NTX

## 2023-11-03 PROCEDURE — 94761 N-INVAS EAR/PLS OXIMETRY MLT: CPT | Mod: NTX

## 2023-11-03 PROCEDURE — 99233 PR SUBSEQUENT HOSPITAL CARE,LEVL III: ICD-10-PCS | Mod: FS,NTX,, | Performed by: PHYSICIAN ASSISTANT

## 2023-11-03 PROCEDURE — 80053 COMPREHEN METABOLIC PANEL: CPT | Mod: NTX | Performed by: HOSPITALIST

## 2023-11-03 PROCEDURE — 99499 NO LOS: ICD-10-PCS | Mod: NTX,,, | Performed by: PSYCHIATRY & NEUROLOGY

## 2023-11-03 PROCEDURE — 95720 EEG PHY/QHP EA INCR W/VEEG: CPT | Mod: NTX,,, | Performed by: PSYCHIATRY & NEUROLOGY

## 2023-11-03 PROCEDURE — 25000003 PHARM REV CODE 250: Mod: NTX | Performed by: PHYSICIAN ASSISTANT

## 2023-11-03 PROCEDURE — 99291 CRITICAL CARE FIRST HOUR: CPT | Mod: NTX,,, | Performed by: PSYCHIATRY & NEUROLOGY

## 2023-11-03 PROCEDURE — 27100171 HC OXYGEN HIGH FLOW UP TO 24 HOURS: Mod: NTX

## 2023-11-03 PROCEDURE — 63600175 PHARM REV CODE 636 W HCPCS: Mod: NTX | Performed by: STUDENT IN AN ORGANIZED HEALTH CARE EDUCATION/TRAINING PROGRAM

## 2023-11-03 PROCEDURE — 99233 SBSQ HOSP IP/OBS HIGH 50: CPT | Mod: FS,NTX,, | Performed by: PHYSICIAN ASSISTANT

## 2023-11-03 PROCEDURE — 63600175 PHARM REV CODE 636 W HCPCS: Mod: NTX | Performed by: NURSE PRACTITIONER

## 2023-11-03 PROCEDURE — 94668 MNPJ CHEST WALL SBSQ: CPT | Mod: NTX

## 2023-11-03 PROCEDURE — 84100 ASSAY OF PHOSPHORUS: CPT | Mod: NTX | Performed by: STUDENT IN AN ORGANIZED HEALTH CARE EDUCATION/TRAINING PROGRAM

## 2023-11-03 PROCEDURE — 95720 PR EEG, W/VIDEO, CONT RECORD, I&R, >12<26 HRS: ICD-10-PCS | Mod: NTX,,, | Performed by: PSYCHIATRY & NEUROLOGY

## 2023-11-03 PROCEDURE — 25000003 PHARM REV CODE 250: Mod: NTX | Performed by: NURSE PRACTITIONER

## 2023-11-03 PROCEDURE — 83735 ASSAY OF MAGNESIUM: CPT | Mod: NTX | Performed by: STUDENT IN AN ORGANIZED HEALTH CARE EDUCATION/TRAINING PROGRAM

## 2023-11-03 PROCEDURE — 85025 COMPLETE CBC W/AUTO DIFF WBC: CPT | Mod: NTX | Performed by: HOSPITALIST

## 2023-11-03 RX ORDER — HYDRALAZINE HYDROCHLORIDE 20 MG/ML
10 INJECTION INTRAMUSCULAR; INTRAVENOUS EVERY 6 HOURS PRN
Status: DISCONTINUED | OUTPATIENT
Start: 2023-11-03 | End: 2023-11-04

## 2023-11-03 RX ORDER — FUROSEMIDE 10 MG/ML
40 INJECTION INTRAMUSCULAR; INTRAVENOUS ONCE
Status: COMPLETED | OUTPATIENT
Start: 2023-11-03 | End: 2023-11-03

## 2023-11-03 RX ADMIN — LACOSAMIDE 50 MG: 50 TABLET, FILM COATED ORAL at 09:11

## 2023-11-03 RX ADMIN — SODIUM PHOSPHATE, MONOBASIC, MONOHYDRATE AND SODIUM PHOSPHATE, DIBASIC, ANHYDROUS 15 MMOL: 142; 276 INJECTION, SOLUTION INTRAVENOUS at 04:11

## 2023-11-03 RX ADMIN — RIFAXIMIN 550 MG: 550 TABLET ORAL at 09:11

## 2023-11-03 RX ADMIN — THERA TABS 1 TABLET: TAB at 09:11

## 2023-11-03 RX ADMIN — LACTULOSE 30 G: 20 SOLUTION ORAL at 09:11

## 2023-11-03 RX ADMIN — THIAMINE HCL TAB 100 MG 100 MG: 100 TAB at 09:11

## 2023-11-03 RX ADMIN — HYDRALAZINE HYDROCHLORIDE 10 MG: 20 INJECTION, SOLUTION INTRAMUSCULAR; INTRAVENOUS at 10:11

## 2023-11-03 RX ADMIN — FUROSEMIDE 40 MG: 10 INJECTION, SOLUTION INTRAVENOUS at 10:11

## 2023-11-03 RX ADMIN — FOLIC ACID 1 MG: 1 TABLET ORAL at 09:11

## 2023-11-03 RX ADMIN — LEVETIRACETAM 1500 MG: 500 SOLUTION ORAL at 09:11

## 2023-11-03 RX ADMIN — FAMOTIDINE 20 MG: 20 TABLET ORAL at 09:11

## 2023-11-03 NOTE — PLAN OF CARE
"[facility] Care Plan    POC reviewed with Mara Mojica at 1500. Pt intubated and unable to verbalize understanding. No acute events today. Pt progressing toward goals. Will continue to monitor. See below and flowsheets for full assessment and VS info.     - Kang removed        Is this a stroke patient? no    Neuro:  Woody Coma Scale  Best Eye Response: 4-->(E4) spontaneous  Best Motor Response: 4-->(M4) withdraws from pain  Best Verbal Response: 1-->(V1) none  Hobbs Coma Scale Score: 9  Assessment Qualifiers: patient intubated  Pupil PERRLA: yes     24 hr Temp:  [98.2 °F (36.8 °C)-99.2 °F (37.3 °C)]     CV:   Rhythm: normal sinus rhythm  BP goals:   SBP < 180  MAP > 65    Resp:      Vent Mode: Spont  Set Rate: 12 BPM  Oxygen Concentration (%): 40  Vt Set: 400 mL  PEEP/CPAP: 5 cmH20  Pressure Support: 8 cmH20    Plan: wean to extubate    GI/:     Diet/Nutrition Received: tube feeding  Last Bowel Movement: 11/03/23  Voiding Characteristics: urethral catheter (bladder)    Intake/Output Summary (Last 24 hours) at 11/3/2023 1838  Last data filed at 11/3/2023 1801  Gross per 24 hour   Intake 2289.17 ml   Output 3020 ml   Net -730.83 ml     Unmeasured Output  Urine Occurrence: 5  Stool Occurrence: 0  Emesis Occurrence: 0  Pad Count: 5    Labs/Accuchecks:  Recent Labs   Lab 11/03/23  0018 11/03/23  1202 11/03/23  1414   WBC 9.99  --   --    RBC 2.56*  --   --    HGB 8.2*  --   --    HCT 26.1*   < > 24*   PLT 70*  --   --     < > = values in this interval not displayed.      Recent Labs   Lab 11/03/23  0018   *   K 4.6   CO2 24   *   BUN 38*   CREATININE 0.8   ALKPHOS 150*   ALT 56*   AST 82*   BILITOT 4.6*      Recent Labs   Lab 11/03/23  0018   INR 1.9*    No results for input(s): "CPK", "CPKMB", "TROPONINI", "MB" in the last 168 hours.    Electrolytes: Electrolytes replaced  Accuchecks: Q6H    Gtts:      LDA/Wounds:  Lines/Drains/Airways       Drain  Duration                  NG/OG Tube 10/20/23 2000 16 " Fr. Left nostril 13 days         Rectal Tube 10/28/23 0530 fecal management system 6 days    Female External Urinary Catheter 11/03/23 1812 <1 day              Airway  Duration                  Airway - Non-Surgical 11/01/23 1115 Endotracheal Tube 2 days       Airway Anesthesia 11/01/23 2 days              Arterial Line  Duration             Arterial Line 10/31/23 1617 Right Brachial 3 days              Peripheral Intravenous Line  Duration                  Peripheral IV - Single Lumen 10/30/23 1157 20 G;1 3/4 in Left Forearm 4 days         Peripheral IV - Single Lumen 10/31/23 0754 20 G Anterior;Left Ankle 3 days         Peripheral IV - Single Lumen 10/31/23 1221 20 G Right Forearm 3 days                  Wounds: No  Wound care consulted: No

## 2023-11-03 NOTE — PROGRESS NOTES
[facility] - Neuro Critical Care  Neurocritical Care  Progress Note    Admit Date: 10/25/2023  Service Date: 11/03/2023  Length of Stay: 9    Subjective:     Chief Complaint: Acute encephalopathy    History of Present Illness: This is a 58-year-old female with a past medical history of alcoholic cirrhosis, s/p ex-lap w/ bowel resection for incarcerated hernia, who initially presented on 10/18 to [facility] with acute encephalopathy.  Her  found her on the floor at home with evidence of fecal/urine incontinence with confusion and slurred speech. Possible reported tongue injury in chart. Reported concern L sided weakness and down drift of L arm however CT head without evidence of acute abnormalities, MRI brain with only small vessel vascular changes without evidence of territorial infarct.     Hospital Course: EEG done on 10/19 with mild diffuse nonspecific background slowing, no potential epileptiform activity. Repeat MRI brain with contrast on 10/23 unchanged from initial MRI. Became more lethargic and was no longer following commands or answering questions. Due to worsening hepatic encephalopathy in setting of hepatic cirrhosis, patient transferred to [facility] for management with transplant team. Has remained on antibiotics, lactulose and rifaximin for treatment of UTI with pansensitive Ecoli, HE, and presumed SBP. Neurology consulted for management recommendations regarding persistent AMS. Had repeat EEG done on 10/24 with similar findings to prior EEG showing mild generalized non-specific cerebral dysfunction and no electrographic seizures or indication of seizure tendency. Additional CT head w/o contrast on 10/25 without evidence of acute issues. Remains confused and unable to answer questions on exam. Not withdrawing to painful stimuli. Was able to awaken to verbal stimuli in AM however when reevaluated in afternoon unresponsive however was after receiving Ativan.     On admission to [facility]:  Patient had EEG  running, and was noted to have seizures at 7:30 a.m., 8:00 a.m. in, and 10:00 a.m. was noted to be in focal status prior to receiving Vimpat.  Patient was noted to have stridor, worsening secretions with suspicion for aspiration, decreased airway protection, and rapids was called due to low GCS score of 6. Antibiotics broadened to Vanc/Zosyn. Clinical picture of mental status confounded with episodes of seizures, infection, and hepatic encephalopathy.             Hospital Course: 10/28: Improving GCS from 6 to 10. Continuing pulmonary toilet and deep suctioning for stridor and copious secretions. UA positive for candida. Blood cultures from 10/27 NGTD. Sputum cultures sent. Patient on day 2 of broadened antibiotics vanc/zosyn due to worsening clinical status but will discontinue tomorrow if cultures remain negative. Still hypernatremic, treating with D5W. Patient was transferred with no ordered diuretics, very edematous on physical exam. Adequate stooling on lactulose and rifaximin. Due to increased UOP/stooling will place eckert for one day for irritated skin. EEG update showing no seizures since 10am 10/27. Monitoring CBC for thrombocytopenia and macrocytic anemia. Discussed code status and goals of care with  and best friend at bedside. Trickle feeds resumed.   10/29: No acute events overnight, EEG reportedly without further seizures for ~48 hours can disconnect once formal report is in, neuro status slowly improving as patient now tracking in room, moving extremities, responding with appropriate emotional reactions to her .  Respiratory status largely unchanged with intermittent events of large volume breaths that sound almost stridorous but without actual respiratory distress- gas remains compensated.  UOP low, diuresis resumed.  10/30/2023: d/c mucomyst nebs, add racemic epi scheduled nebs, add budesonide and dex 6 q8 hours, ammonia at 35- continue lactulose and rifaximin, abg reviewed, 40mEq of K  once, ENT consulted for stridor, continue eckert catheter in today   10/31/2023 Patient with worsening respiratory status, continued decreased mentation and increased secretions. Plan for elective intubation in controlled setting with anesthesia. Will also recheck eeg, if negative will start to wean AEDs and see if that improves patient's arousal. PLT stabilized, continue to monitor.   11/1/2023 this morning patient's 6.0 ETT exchanged for 7.0 to allow suctioning. EEG with frequent discharges, lowered vimpat to 50 mg and will follow EEG. Attempting to remove confounding factors for patients mental status. Slow drop in H/H, 1 unit ordered.   11/2/2023 NAEO, cEEG to remain in place, no seizures but is having frequent discharges in runs. Continue lower dose vimpat and 1500 keppra Eye opening this morning which is slight improvement in exam. Failed SBT   11/3/2023: no improvement with decrease in lacosamide, no re-development of seizures, if does not wake up in next 48 hrs off lacosamide or redevelops seizures, prognosis is extremely poor    Interval History:     Review of Systems   Unable to perform ROS: Intubated       Objective:     Vitals:  Temp: 98.3 °F (36.8 °C)  Pulse: 105  Rhythm: sinus tachycardia  BP: 138/63  MAP (mmHg): 92  Resp: 10  SpO2: 99 %  Oxygen Concentration (%): 40  Vent Mode: Spont  Set Rate: 12 BPM  Pressure Support: 12 cmH20  PEEP/CPAP: 5 cmH20  Peak Airway Pressure: 18 cmH20  Mean Airway Pressure: 7.4 cmH20  Plateau Pressure: 0 cmH20    Temp  Min: 98.2 °F (36.8 °C)  Max: 99.2 °F (37.3 °C)  Pulse  Min: 70  Max: 105  BP  Min: 109/58  Max: 168/72  MAP (mmHg)  Min: 78  Max: 103  Resp  Min: 8  Max: 16  SpO2  Min: 96 %  Max: 100 %  Oxygen Concentration (%)  Min: 40  Max: 40    11/02 0701 - 11/03 0700  In: 1898.9 [I.V.:68.9]  Out: 1700 [Urine:700]   Unmeasured Output  Urine Occurrence: 5  Stool Occurrence: 0  Emesis Occurrence: 0  Pad Count: 5        Physical Exam  Constitutional:       Comments: Eye  opening, occasional blink to threat   HENT:      Head: Normocephalic.   Eyes:      Pupils: Pupils are equal, round, and reactive to light.      Comments: Easily induced oculocpehalics with vertical upgaze during testing   Cardiovascular:      Rate and Rhythm: Normal rate.      Heart sounds: Normal heart sounds.   Pulmonary:      Breath sounds: Normal breath sounds.   Abdominal:      Palpations: Abdomen is soft.   Musculoskeletal:         General: Swelling present.   Skin:     General: Skin is warm.   Neurological:      Comments: Withdrawal all 4 limbs              Medications:  Continuous Scheduledfamotidine, 20 mg, BID  folic acid, 1 mg, Daily  lactulose, 30 g, BID  levetiracetam, 1,500 mg, BID  multivitamin, 1 tablet, Daily  rifAXImin, 550 mg, BID  thiamine, 100 mg, Daily    PRNcalcium gluconate IVPB, 1 g, PRN  calcium gluconate IVPB, 2 g, PRN  calcium gluconate IVPB, 3 g, PRN  fentaNYL, 50 mcg, Q2H PRN  magnesium sulfate IVPB, 2 g, PRN  magnesium sulfate IVPB, 4 g, PRN  ondansetron, 4 mg, Q8H PRN  potassium chloride, 40 mEq, PRN   And  potassium chloride, 60 mEq, PRN   And  potassium chloride, 80 mEq, PRN  sodium phosphate 15 mmol in dextrose 5 % (D5W) 250 mL IVPB, 15 mmol, PRN  sodium phosphate 20.01 mmol in dextrose 5 % (D5W) 250 mL IVPB, 20.01 mmol, PRN  sodium phosphate 30 mmol in dextrose 5 % (D5W) 250 mL IVPB, 30 mmol, PRN      Today I personally reviewed pertinent medications, lines/drains/airways, imaging, laboratory results, notably:    Diet  No diet orders on file  No diet orders on file        Assessment/Plan:     Neuro  Seizure  58 year old presented for altered mental status on 10/18. Clinical picture confounded with hepatic encephalopathy, infection, and seizure activity. Neurology was consulted by primary team prior to admission to [facility].    10/27 EEG IMPRESSION:  This is an abnormal EEG during lethargic state.  Diffuse disorganized slowing of the background was noted.  Frequent triphasic waves  noted.  Left posterior pseudo periodic epileptiform discharges were noted.  Numerous electrographic seizures emanating from the left posterior region were noted.    10/31 - rehook and 11/1 read with frequent discharges, vimpat lowered to 50 given mental status and liver function, will continue to watch on eeg  vEEG in place  Keppra 1500 BID  Vimpat 50 mg BID  Neurochecks q1hr  Vitals q1hr   11/03: EEG remains negative with lacosamide taper, DC and monitor for seizure recurrence    Pulmonary  Tracheal stenosis  Scope complete by ENT   -patient on scheduled nebs and racemic epi without improvement in respiratory status   -elective intubation 10/31 with 6.0 ETT with anesthesia  -11/1 tube exchange to 7.0 ETT to allow suctioning, completed steroids.  11/3: continue sbt trials, extubation attempt if able to awaken further     Hematology  Thrombocytopenia  This is a 58-year-old woman with a history of decompensated alcoholic cirrhosis.  Suspect thrombocytopenia is likely secondary to chronic alcohol use and is consumptive in nature. If trend worsens, will consider consulting Hematology.    Daily CBC, transfuse only for active bleeding      GI  Acute hepatic encephalopathy  This is a 52 year old woman with a history of ethanol cirrhosis who presents after being found on the ground with fecal and urinary incontinence, and altered mental status. Elevated ammonia on admission.    - Continue lactulose via NG tube TID (titrate till 3-4 daily BM achieved). Continue Xifaxin.   - Avoid opiates and benzodiazepines, if able.   - IV thiamine, folate supplementation, multivitamin through NG tube.  - concern that comatose state is irreversible brain damage, will rule out other causes first, EEG and wean AEDs    -MELD score ~50% survival without transplant  11/3: if no improvement off lacosamide, prognosis for return to reasonable conscious state is small          The patient is being Prophylaxed for:  Venous Thromboembolism with:  Mechanical  Stress Ulcer with: H2B  Ventilator Pneumonia with: chlorhexidine oral care    Activity Orders            Elevate HOB Elevate (30-45 degrees) Elevate HOB to 30 - 45 degrees during feeding unless otherwise stated starting at 10/28 1218    Elevate HOB to 30-45 degrees during feeding unless otherwise stated starting at 10/26 1138    Progressive Mobility Protocol (mobilize patient to their highest level of functioning at least twice daily) starting at 10/25 2000    Turn patient starting at 10/25 2000        CRC 32 min  Full Code    Chris Luna MD  Neurocritical Care  [facility] - Neuro Critical Care

## 2023-11-03 NOTE — ASSESSMENT & PLAN NOTE
58F PMHx etoh cirrhosis with ascites, incarcerated hernia s/p ex lap, and recent SBO who initially presented to [facility] with AMS on 10/18, transferred to [facility] for hepatic encephalopathy and liver transplant evaluation, hospital course c/b UTI, SBI, focal status epilepticus (now resolved), and acute hypoxemic respiratory failure with intubation on 10/31. EEG re-placed on 10/31 for persistent encephalopathy.    Recommendations:  - Continuous vEEG monitoring  - Recommend to continue Levetiracetam 1500 mg BID  - Lacosamide weaned off d/t concerns for hepatic impairment  - Propofol weaned off 11/1  - Avoid agents that lower seizure threshold  - Management of infectious/metabolic abnormalities per NCC  - Seizure precautions      Discussed plan of care with NCC team. No family at bedside. Will follow EEG, please call with questions.

## 2023-11-03 NOTE — PROGRESS NOTES
[facility] - Neuro Critical Care  Neurology-Epilepsy  Progress Note    Patient Name: Mara Mojica  MRN: [id number]  Admission Date: 10/25/2023  Hospital Length of Stay: 9 days  Code Status: Full Code   Attending Provider: Chris Alarcon MD  Primary Care Physician: Osiris Nevarez NP   Principal Problem:Seizure    Subjective:     Hospital Course:   10/19: mild encephalopathy, no epileptiform activity    10/23>10/24: mild encephalopathy, no epileptiform activity     10/26>10/27: moderate encephalopathy, triphasics, left posterior maximum periodic epileptiform discharges, numerous electrographic seizures from left posterior region c/w focal status epilepticus  10/27>10/28: mild-moderate encephalopathy, triphasics, left posterior maximum periodic epileptiform discharges, 3 electrographic seizures from left posterior region, overall improvement from day prior  10/28>10/29: mild-moderate encephalopathy, left posterior maximum periodic epileptiform discharges, no electrographic seizures    10/31>11/1: moderate to severe encephalopathy, frequent interictal epileptiform discharges over left hemisphere, no discrete seizures  11/1>11/2: moderate to severe encephalopathy, frequent interictal epileptiform discharges over left hemisphere at times becoming continuous indicating increased cortical irritability, no discrete seizures  11/2>11/3: runs of periodic generalized discharges with a triphasic morphology which wax and wane on IIC, pattern frequently seen in setting of hepatic dysfunction as well as multiple other clinical scenarios; independent focal discharges over left posterior quadrant, no discrete seizures, moderate-severe hepatic encephalopathy       Interval History: NAEON. No discrete seizures on EEG. Exam unchanged. No family at bedside on rounds.    Current Facility-Administered Medications   Medication Dose Route Frequency Provider Last Rate Last Admin    calcium gluconate 1 g in NS IVPB (premixed)  1 g  Intravenous PRN Andras, Philippe P., NP        calcium gluconate 1 g in NS IVPB (premixed)  2 g Intravenous PRN Andras, Philippe P., NP        calcium gluconate 1 g in NS IVPB (premixed)  3 g Intravenous PRN Andras, Philippe P., NP        famotidine tablet 20 mg  20 mg Per NG tube BID Lencho Hess DO   20 mg at 11/03/23 0944    fentaNYL 50 mcg/mL injection 50 mcg  50 mcg Intravenous Q2H PRN Erica Lewis PA-C   50 mcg at 11/02/23 1406    folic acid tablet 1 mg  1 mg Per NG tube Daily Darion Torres MD   1 mg at 11/03/23 0944    lactulose 20 gram/30 mL solution Soln 30 g  30 g Per NG tube BID Rebecca Adames PA-C   30 g at 11/03/23 0945    levetiracetam 500 mg/5 mL (5 mL) liquid Soln 1,500 mg  1,500 mg Per NG tube BID Roberto Jean Baptiste DO   1,500 mg at 11/03/23 0944    magnesium sulfate 2g in water 50mL IVPB (premix)  2 g Intravenous PRN Andtorey, Philippe P., NP   Stopped at 10/30/23 1022    magnesium sulfate 2g in water 50mL IVPB (premix)  4 g Intravenous PRN Andras, Philippe P., NP        multivitamin tablet  1 tablet Per NG tube Daily Darion Torres MD   1 tablet at 11/03/23 0944    ondansetron injection 4 mg  4 mg Intravenous Q8H PRN Aurelia Winston MD        potassium chloride 10 mEq in 100 mL IVPB  40 mEq Intravenous PRN Andras, Philippe P., NP   Stopped at 11/01/23 1406    And    potassium chloride 10 mEq in 100 mL IVPB  60 mEq Intravenous PRN Andras, Philippe P., NP   Stopped at 10/30/23 0916    And    potassium chloride 10 mEq in 100 mL IVPB  80 mEq Intravenous PRN Andras, Philippe P., NP   Stopped at 10/28/23 0138    rifAXIMin tablet 550 mg  550 mg Per NG tube BID Aurelia Winston MD   550 mg at 11/03/23 0943    sodium phosphate 15 mmol in dextrose 5 % (D5W) 250 mL IVPB  15 mmol Intravenous PRN AndPhilippe lema, NP   Stopped at 11/03/23 0706    sodium phosphate 20.01 mmol in dextrose 5 % (D5W) 250 mL IVPB  20.01 mmol Intravenous PRN AndPhilippe lema, NP   Stopped at 10/30/23 8900     sodium phosphate 30 mmol in dextrose 5 % (D5W) 250 mL IVPB  30 mmol Intravenous PRN Philippe Reno, NP        thiamine tablet 100 mg  100 mg Per NG tube Daily Erica Lewis PA-C   100 mg at 11/03/23 0944     Continuous Infusions:    Review of Systems   Unable to perform ROS: Intubated     Objective:     Vital Signs (Most Recent):  Temp: 98.3 °F (36.8 °C) (11/03/23 1101)  Pulse: 102 (11/03/23 1301)  Resp: 14 (11/03/23 1301)  BP: 133/63 (11/03/23 1301)  SpO2: 100 % (11/03/23 1301) Vital Signs (24h Range):  Temp:  [98.2 °F (36.8 °C)-99.2 °F (37.3 °C)] 98.3 °F (36.8 °C)  Pulse:  [] 102  Resp:  [8-16] 14  SpO2:  [96 %-100 %] 100 %  BP: (109-168)/(54-72) 133/63  Arterial Line BP: ()/(44-68) 147/56     Weight: 56.2 kg (123 lb 14.4 oz)  Body mass index is 25.02 kg/m².     Physical Exam  Constitutional:       General: She is not in acute distress.     Appearance: She is not diaphoretic.      Interventions: She is intubated.   HENT:      Head: Normocephalic and atraumatic.      Comments: EEG in place  Eyes:      General: Scleral icterus present.         Right eye: No discharge.         Left eye: No discharge.      Conjunctiva/sclera: Conjunctivae normal.      Pupils: Pupils are equal, round, and reactive to light.   Cardiovascular:      Rate and Rhythm: Normal rate.   Pulmonary:      Effort: Pulmonary effort is normal. No respiratory distress. She is intubated.      Comments: Intubated  Abdominal:      General: There is no distension.      Palpations: Abdomen is soft.   Musculoskeletal:         General: No deformity or signs of injury.   Skin:     General: Skin is warm and dry.   Psychiatric:      Comments: Unable to assess            NEUROLOGICAL EXAMINATION:     CRANIAL NERVES     CN III, IV, VI   Pupils are equal, round, and reactive to light.       EMILY OU   Corneal intact OU   No blink to threat  Face symmetric   Tongue midline   Minimal withdraw to noxious stimuli  No spontaneous movements  Does  not follow commands    Exam findings to suggest seizures:  Myoclonus - no   eye twitching - no   Nystagmus - no   gaze deviation - no   waxy rigidity - no        Significant Labs: All pertinent lab results from the past 24 hours have been reviewed.    Significant Studies: I have reviewed all pertinent imaging results/findings within the past 24 hours.    Assessment and Plan:     * Seizure  58F PMHx etoh cirrhosis with ascites, incarcerated hernia s/p ex lap, and recent SBO who initially [place] [facility] [place] on 10/18, [place] [facility] [place] and liver transplant evaluation, hospital course c/b UTI, SBI, focal status epilepticus (now resolved), and acute hypoxemic respiratory failure with intubation on 10/31. EEG re-placed on 10/31 for persistent encephalopathy.    Recommendations:  - Continuous vEEG monitoring  - Recommend to continue Levetiracetam 1500 mg BID  - Lacosamide weaned off d/t concerns for hepatic impairment  - Propofol weaned off 11/1  - Avoid agents that lower seizure threshold  - Management of infectious/metabolic abnormalities per NCC  - Seizure precautions      Discussed plan of care with NCC team. No family at bedside. Will follow EEG, please call with questions.    Hypoxic respiratory failure  - Intubated 10/31  - Propofol weaned off 11/1 AM  - Vent management per [facility]    Acute hepatic encephalopathy  - Ammonia 35 on 10/30  - Hepatology rec lactulose TID  - Management per Hepatology, NCC    Decompensated alcoholic hepatic cirrhosis  - Per Hepatology, not initiating liver transplant at this time  - Hepatology rec lactulose TID   - Management per Hepatology, NCC      VTE Risk Mitigation (From admission, onward)         Ordered     IP VTE LOW RISK PATIENT  Once         10/25/23 1303     Place JOI hose  Until discontinued         10/25/23 1303     Place sequential compression device  Until discontinued         10/25/23 1303                Osiris Diana  AMOS  Neurology-Epilepsy  [facility]  Staff: Dr. Baez

## 2023-11-03 NOTE — PLAN OF CARE
"[facility] Care Plan    POC reviewed with Mara Mojica and family at 0200 via phone. Pt's significant other verbalized understanding, questions and concerns addressed. No acute events overnight. Pt progressing toward goals. Will continue to monitor. See below and flowsheets for full assessment and VS info.     - neuro exam unchanged  - SBP and MAP WNL  - vent setting remained on spontaneous overnight, no issues. AM gas completed, see results  - UO 20-60/hr  - flexi output ~700ml  - replacing phos 2.6    Is this a stroke patient? no    Neuro:  Woody Coma Scale  Best Eye Response: 2-->(E2) to pain  Best Motor Response: 4-->(M4) withdraws from pain  Best Verbal Response: 1-->(V1) none  [place] Coma Scale Score: 7  Assessment Qualifiers: patient intubated  Pupil PERRLA: yes     24hr Temp:  [97.6 °F (36.4 °C)-99.2 °F (37.3 °C)]     CV:   Rhythm: normal sinus rhythm  BP goals:   SBP < 180  MAP > 65    Resp:      Vent Mode: Spont  Set Rate: 16 BPM  Oxygen Concentration (%): 40  Vt Set: 400 mL  PEEP/CPAP: 5 cmH20  Pressure Support: 10 cmH20    Plan: wean to extubate    GI/:     Diet/Nutrition Received: tube feeding  Last Bowel Movement: 11/02/23  Voiding Characteristics: urethral catheter (bladder)    Intake/Output Summary (Last 24 hours) at 11/3/2023 0612  Last data filed at 11/3/2023 0601  Gross per 24 hour   Intake 2125.84 ml   Output 1730 ml   Net 395.84 ml     Unmeasured Output  Urine Occurrence: 5  Stool Occurrence: 0  Emesis Occurrence: 0  Pad Count: 5    Labs/Accuchecks:  Recent Labs   Lab 11/03/23  0018   WBC 9.99   RBC 2.56*   HGB 8.2*   HCT 26.1*   PLT 70*      Recent Labs   Lab 11/03/23  0018   *   K 4.6   CO2 24   *   BUN 38*   CREATININE 0.8   ALKPHOS 150*   ALT 56*   AST 82*   BILITOT 4.6*      Recent Labs   Lab 11/03/23  0018   INR 1.9*    No results for input(s): "CPK", "CPKMB", "TROPONINI", "MB" in the last 168 hours.    Electrolytes: Electrolytes replaced  Accuchecks: " Q6H    Gtts:      LDA/Wounds:  Lines/Drains/Airways       Drain  Duration                  NG/OG Tube 10/20/23 2000 16 Fr. Left nostril 13 days         Rectal Tube 10/28/23 0530 fecal management system 6 days         Urethral Catheter 10/28/23 1556 5 days              Airway  Duration                  Airway - Non-Surgical 11/01/23 1115 Endotracheal Tube 1 day       Airway Anesthesia 11/01/23 1 day              Arterial Line  Duration             Arterial Line 10/31/23 1617 Right Brachial 2 days              Peripheral Intravenous Line  Duration                  Peripheral IV - Single Lumen 10/30/23 1157 20 G;1 3/4 in Left Forearm 3 days         Peripheral IV - Single Lumen 10/31/23 0754 20 G Anterior;Left Ankle 2 days         Peripheral IV - Single Lumen 10/31/23 1221 20 G Right Forearm 2 days                  Wounds: No  Wound care consulted: No

## 2023-11-03 NOTE — ASSESSMENT & PLAN NOTE
This is a 52 year old woman with a history of ethanol cirrhosis who presents after being found on the ground with fecal and urinary incontinence, and altered mental status. Elevated ammonia on admission.    - Continue lactulose via NG tube TID (titrate till 3-4 daily BM achieved). Continue Xifaxin.   - Avoid opiates and benzodiazepines, if able.   - IV thiamine, folate supplementation, multivitamin through NG tube.  - concern that comatose state is irreversible brain damage, will rule out other causes first, EEG and wean AEDs    -MELD score ~50% survival without transplant  11/3: if no improvement off lacosamide, prognosis for return to reasonable conscious state is small

## 2023-11-03 NOTE — ASSESSMENT & PLAN NOTE
This is a 58-year-old woman with a history of decompensated alcoholic cirrhosis.  Suspect thrombocytopenia is likely secondary to chronic alcohol use and is consumptive in nature. If trend worsens, will consider consulting Hematology.    Daily CBC, transfuse only for active bleeding

## 2023-11-03 NOTE — PROCEDURES
[facility] EEG/VIDEO MONITORING REPORT  Mara Mojica  [id number]  1965    DATE OF SERVICE:  11/02/2023  DATE OF ADMISSION: 10/25/2023 10:51 AM    ADMITTING/REQUESTING PROVIDER: Ahsan Cunningham MD    REASON FOR CONSULT:  58-year-old woman with cirrhosis found down with prolonged decreased responsiveness.  Evaluate for evidence of epileptiform activity.    METHODOLOGY   Electroencephalographic (EEG) recording is with electrodes placed according to the International 10-20 placement system.  Thirty two (32) channels of digital signal (sampling rate of 512/sec) including T1 and T2 was simultaneously recorded from the scalp and may include  EKG, EMG, and/or eye monitors.  Recording band pass was 0.1 to 512 hz.  Digital video recording of the patient is simultaneously recorded with the EEG.  The patient is instructed report clinical symptoms which may occur during the recording session.  EEG and video recording is stored and archived in digital format.  Activation procedures which include photic stimulation, hyperventilation and instructing patients to perform simple task are done in selected patients.   The EEG is displayed on a monitor screen and can be reviewed using different montages.  Computer assisted analysis is employed to detect spike and electrographic seizure activity.   The entire record is submitted for computer analysis.  The entire recording is visually reviewed and the times identified by computer analysis as being spikes or seizures are reviewed again.  Compresses spectral analysis (CSA) is also performed on the activity recorded from each individual channel.  This is displayed as a power display of frequencies from 0 to 30 Hz over time.   The CSA is reviewed looking for asymmetries in power between homologous areas of the scalp and then compared with the original EEG recording.     Gema software is also utilized in the review of this study.  This software suite analyzes the EEG recording in multiple  domains.  Coherence and rhythmicity is computed to identify EEG sections which may contain organized seizures.  Each channel undergoes analysis to detect presence of spike and sharp waves which have special and morphological characteristic of epileptic activity.  The routine EEG recording is converted from spacial into frequency domain.  This is then displayed comparing homologous areas to identify areas of significant asymmetry.  Algorithm to identify non-cortically generated artifact is used to separate eye movement, EMG and other artifact from the EEG.      RECORDING TIMES  Start on 11/02/2023 at 07:01 a.m.  Stop on 11/03/2023 at 07:00 a.m.  A total of 23 hours and 58 minutes of EEG recording is obtained.    EEG FINDINGS  Background activity:   The background is continuous and disorganized mixed frequency theta/delta activity.  There are runs of periodic generalized discharges with a triphasic morphology which wax and wane.  There are also frequent independent jagged sporadic left posterior quadrant discharges which do not organize. There is plenty of generalized and multifocal slowing seen bilaterally, left>right.      There are no purposeful pushbutton activations.    Sleep:  There is evidence of state changes with less prominent discharges when sleep architecture is apparent    Activation procedures:   Hyperventilation is not performed  Photic stimulation is not performed    Cardiac Monitor:   Heart rate appears generally regular on a single lead EKG when not obscured.    Impression:   This is an abnormal continuous EEG monitoring study because of a slow and disorganized background with runs of periodic generalized discharges with a triphasic morphology which wax and wane which is a pattern on the ictal/interictal continuum that is frequently seen in the setting of hepatic dysfunction, as well as with multiple other clinical scenarios including renal dysfunction, some proconvulsant toxins/medications, and  certain infections.  There are independent focal discharges over the left posterior quadrant consistent with focal cortical dysfunction/irritation and a potential seizure focus in this region.  Compared to previous recording sessions, there are no discrete electrographic seizures which is an overall interval improvement however the background remains most consistent with a moderate-severe hepatic encephalopathy.    Daily Baez MD PhD [facility]  Neurology-Epilepsy  [facility].

## 2023-11-03 NOTE — TREATMENT PLAN
[facility]  Hepatology  Progress Note    Patient Name: Mara Mojica  MRN: [id number]  Admission Date: 10/25/2023  Hospital Length of Stay: 9 days    Subjective:     Interval History:  Patient has been off sedation since yesterday afternoon. She is intubated & mechanically ventilated. Unfortunately, no improvement in mentation. She is off pressors this AM.     No family at bedside.  Called significant other on 11/2 & explained the situation to him. He thanked me for my call.     PEth from 10/25 <10    Objective:     Vitals:    11/03/23 0746   BP:    Pulse: 88   Resp: 15   Temp:          Constitutional: Ill appearing. Does not respond to verbal commands, but grimaces to painful stimulus.   HENT: Head: Normal, normocephalic, atraumatic. Intubated and mechanically ventilated.   Eyes: Icteric sclera  GI: soft, non-tender, without masses or organomegaly and distended  Neurological: Disoriented, not responding to commands      Significant Labs:  Recent Labs   Lab 11/01/23  1612 11/02/23  0308 11/03/23  0018   HGB 8.0* 7.7* 8.2*       Lab Results   Component Value Date    WBC 9.99 11/03/2023    HGB 8.2 (L) 11/03/2023    HCT 26.1 (L) 11/03/2023     (H) 11/03/2023    PLT 70 (L) 11/03/2023       Lab Results   Component Value Date     (H) 11/03/2023    K 4.6 11/03/2023     (H) 11/03/2023    CO2 24 11/03/2023    BUN 38 (H) 11/03/2023    CREATININE 0.8 11/03/2023    CALCIUM 8.9 11/03/2023    ANIONGAP 3 (L) 11/03/2023       Lab Results   Component Value Date    ALT 56 (H) 11/03/2023    AST 82 (H) 11/03/2023    ALKPHOS 150 (H) 11/03/2023    BILITOT 4.6 (H) 11/03/2023       Lab Results   Component Value Date    INR 1.9 (H) 11/03/2023    INR 2.0 (H) 11/02/2023    INR 2.1 (H) 11/01/2023       Significant Imaging:  Reviewed pertinent radiology findings.       Assessment/Plan:     Mara Mojica is a 58 y.o. female with history of alcoholic cirrhosis with ascites, s/p Ex Lap with bowel  resection for incarcerated hernia, recent SBO, who presented to [facility] on 10/18 with AMS. EtOH level < 10. CTH/MRI brain unremarkable. EEG showed mild generalized nonspecific cerebral dysfunction with no indication of seizure. Ammonia level WNL; not improving with lactulose in spite of large BMs. She was seen by Dr. Reardon in October 2023 for decompensated cirrhosis and it was recommended to proceed with liver transplant evaluation.  EGD in September 2023 showed normal esophagus and portal hypertensive gastropathy. Now with worsening mentation and seizure activity on EEG; moved to neurocritical ICU. Now on Keppra and Vimpat with no seizures, but no improvement in mentation. Intubated on 10/31/23.Optimized on lactulose and Xifaxin. PEth from 10/25 <10. Mentation not improving, but may need more time to observe.      Problem List:  Alcoholic cirrhosis of liver with ascites   AMS - not readily attributable to HE (differentials include encephalitis v/s seizure)    MELD 3.0: 22 at 11/3/2023 12:18 AM  MELD-Na: 19 at 11/3/2023 12:18 AM  Calculated from:  Serum Creatinine: 0.8 mg/dL (Using min of 1 mg/dL) at 11/3/2023 12:18 AM  Serum Sodium: 146 mmol/L (Using max of 137 mmol/L) at 11/3/2023 12:18 AM  Total Bilirubin: 4.6 mg/dL at 11/3/2023 12:18 AM  Serum Albumin: 2.6 g/dL at 11/3/2023 12:18 AM  INR(ratio): 1.9 at 11/3/2023 12:18 AM  Age at listing (hypothetical): 58 years  Sex: Female at 11/3/2023 12:18 AM       Recommendations:  - Appreciate recs from neurology regarding management of epilepsy. Continue supportive care.   - Start lactulose via NG tube TID (titrate till 3-4 daily BM achieved; consider rectal lactulose if not able to tolerate PO). Continue Xifaxin.   - Avoid sedatives, opiates and benzodiazepines, if able.   - IV thiamine and folate supplementation  - We are not initiating liver transplant evaluation at this time. Patient will need to be conscious and able to participate in a discussion with us before this  is possible.   - Daily CBC, CMP & INR.     Thank you for involving us in the care of Mara Mojica. Please call with any additional questions, concerns or changes in the patient's clinical status. We will continue to follow.     Lv Resendiz MD  Gastroenterology Fellow PGY V   [facility]

## 2023-11-03 NOTE — SUBJECTIVE & OBJECTIVE
Interval History:     Review of Systems   Unable to perform ROS: Intubated       Objective:     Vitals:  Temp: 98.3 °F (36.8 °C)  Pulse: 105  Rhythm: sinus tachycardia  BP: 138/63  MAP (mmHg): 92  Resp: 10  SpO2: 99 %  Oxygen Concentration (%): 40  Vent Mode: Spont  Set Rate: 12 BPM  Pressure Support: 12 cmH20  PEEP/CPAP: 5 cmH20  Peak Airway Pressure: 18 cmH20  Mean Airway Pressure: 7.4 cmH20  Plateau Pressure: 0 cmH20    Temp  Min: 98.2 °F (36.8 °C)  Max: 99.2 °F (37.3 °C)  Pulse  Min: 70  Max: 105  BP  Min: 109/58  Max: 168/72  MAP (mmHg)  Min: 78  Max: 103  Resp  Min: 8  Max: 16  SpO2  Min: 96 %  Max: 100 %  Oxygen Concentration (%)  Min: 40  Max: 40    11/02 0701 - 11/03 0700  In: 1898.9 [I.V.:68.9]  Out: 1700 [Urine:700]   Unmeasured Output  Urine Occurrence: 5  Stool Occurrence: 0  Emesis Occurrence: 0  Pad Count: 5        Physical Exam  Constitutional:       Comments: Eye opening, occasional blink to threat   HENT:      Head: Normocephalic.   Eyes:      Pupils: Pupils are equal, round, and reactive to light.      Comments: Easily induced oculocpehalics with vertical upgaze during testing   Cardiovascular:      Rate and Rhythm: Normal rate.      Heart sounds: Normal heart sounds.   Pulmonary:      Breath sounds: Normal breath sounds.   Abdominal:      Palpations: Abdomen is soft.   Musculoskeletal:         General: Swelling present.   Skin:     General: Skin is warm.   Neurological:      Comments: Withdrawal all 4 limbs              Medications:  Continuous Scheduledfamotidine, 20 mg, BID  folic acid, 1 mg, Daily  lactulose, 30 g, BID  levetiracetam, 1,500 mg, BID  multivitamin, 1 tablet, Daily  rifAXImin, 550 mg, BID  thiamine, 100 mg, Daily    PRNcalcium gluconate IVPB, 1 g, PRN  calcium gluconate IVPB, 2 g, PRN  calcium gluconate IVPB, 3 g, PRN  fentaNYL, 50 mcg, Q2H PRN  magnesium sulfate IVPB, 2 g, PRN  magnesium sulfate IVPB, 4 g, PRN  ondansetron, 4 mg, Q8H PRN  potassium chloride, 40 mEq, PRN    And  potassium chloride, 60 mEq, PRN   And  potassium chloride, 80 mEq, PRN  sodium phosphate 15 mmol in dextrose 5 % (D5W) 250 mL IVPB, 15 mmol, PRN  sodium phosphate 20.01 mmol in dextrose 5 % (D5W) 250 mL IVPB, 20.01 mmol, PRN  sodium phosphate 30 mmol in dextrose 5 % (D5W) 250 mL IVPB, 30 mmol, PRN      Today I personally reviewed pertinent medications, lines/drains/airways, imaging, laboratory results, notably:    Diet  No diet orders on file  No diet orders on file

## 2023-11-03 NOTE — SUBJECTIVE & OBJECTIVE
Interval History: NAEON. No discrete seizures on EEG. Exam unchanged. No family at bedside on rounds.    Current Facility-Administered Medications   Medication Dose Route Frequency Provider Last Rate Last Admin    calcium gluconate 1 g in NS IVPB (premixed)  1 g Intravenous PRN Andras, Philippe P., NP        calcium gluconate 1 g in NS IVPB (premixed)  2 g Intravenous PRN Andras, Philippe P., NP        calcium gluconate 1 g in NS IVPB (premixed)  3 g Intravenous PRN Andras, Philippe P., NP        famotidine tablet 20 mg  20 mg Per NG tube BID Lencho Hess DO   20 mg at 11/03/23 0944    fentaNYL 50 mcg/mL injection 50 mcg  50 mcg Intravenous Q2H PRN Erica Lewis PA-C   50 mcg at 11/02/23 1406    folic acid tablet 1 mg  1 mg Per NG tube Daily Darion Torres MD   1 mg at 11/03/23 0944    lactulose 20 gram/30 mL solution Soln 30 g  30 g Per NG tube BID Rebecca Adames PA-C   30 g at 11/03/23 0945    levetiracetam 500 mg/5 mL (5 mL) liquid Soln 1,500 mg  1,500 mg Per NG tube BID Roberto Jean Baptiste DO   1,500 mg at 11/03/23 0944    magnesium sulfate 2g in water 50mL IVPB (premix)  2 g Intravenous PRN Andras, Philippe P., NP   Stopped at 10/30/23 1022    magnesium sulfate 2g in water 50mL IVPB (premix)  4 g Intravenous PRN Andras, Philippe P., NP        multivitamin tablet  1 tablet Per NG tube Daily Darion Torrse MD   1 tablet at 11/03/23 0944    ondansetron injection 4 mg  4 mg Intravenous Q8H PRN Aurelia Winston MD        potassium chloride 10 mEq in 100 mL IVPB  40 mEq Intravenous PRN Andras, Philippe P., NP   Stopped at 11/01/23 1406    And    potassium chloride 10 mEq in 100 mL IVPB  60 mEq Intravenous PRN Andras, Philippe P., NP   Stopped at 10/30/23 0916    And    potassium chloride 10 mEq in 100 mL IVPB  80 mEq Intravenous PRN Andras, Philippe P., NP   Stopped at 10/28/23 0138    rifAXIMin tablet 550 mg  550 mg Per NG tube BID Aurelia Winston MD   550 mg at 11/03/23 0943    sodium phosphate 15 mmol in  dextrose 5 % (D5W) 250 mL IVPB  15 mmol Intravenous PRN Andras, Philippe P., NP   Stopped at 11/03/23 0706    sodium phosphate 20.01 mmol in dextrose 5 % (D5W) 250 mL IVPB  20.01 mmol Intravenous PRN Andras, Philippe P., NP   Stopped at 10/30/23 2147    sodium phosphate 30 mmol in dextrose 5 % (D5W) 250 mL IVPB  30 mmol Intravenous PRN Andras, Philippe P., NP        thiamine tablet 100 mg  100 mg Per NG tube Daily Erica Lewis PA-C   100 mg at 11/03/23 0944     Continuous Infusions:    Review of Systems   Unable to perform ROS: Intubated     Objective:     Vital Signs (Most Recent):  Temp: 98.3 °F (36.8 °C) (11/03/23 1101)  Pulse: 102 (11/03/23 1301)  Resp: 14 (11/03/23 1301)  BP: 133/63 (11/03/23 1301)  SpO2: 100 % (11/03/23 1301) Vital Signs (24h Range):  Temp:  [98.2 °F (36.8 °C)-99.2 °F (37.3 °C)] 98.3 °F (36.8 °C)  Pulse:  [] 102  Resp:  [8-16] 14  SpO2:  [96 %-100 %] 100 %  BP: (109-168)/(54-72) 133/63  Arterial Line BP: ()/(44-68) 147/56     Weight: 56.2 kg (123 lb 14.4 oz)  Body mass index is 25.02 kg/m².     Physical Exam  Constitutional:       General: She is not in acute distress.     Appearance: She is not diaphoretic.      Interventions: She is intubated.   HENT:      Head: Normocephalic and atraumatic.      Comments: EEG in place  Eyes:      General: Scleral icterus present.         Right eye: No discharge.         Left eye: No discharge.      Conjunctiva/sclera: Conjunctivae normal.      Pupils: Pupils are equal, round, and reactive to light.   Cardiovascular:      Rate and Rhythm: Normal rate.   Pulmonary:      Effort: Pulmonary effort is normal. No respiratory distress. She is intubated.      Comments: Intubated  Abdominal:      General: There is no distension.      Palpations: Abdomen is soft.   Musculoskeletal:         General: No deformity or signs of injury.   Skin:     General: Skin is warm and dry.   Psychiatric:      Comments: Unable to assess            NEUROLOGICAL EXAMINATION:      CRANIAL NERVES     CN III, IV, VI   Pupils are equal, round, and reactive to light.       EMILY OU   Corneal intact OU   No blink to threat  Face symmetric   Tongue midline   Minimal withdraw to noxious stimuli  No spontaneous movements  Does not follow commands    Exam findings to suggest seizures:  Myoclonus - no   eye twitching - no   Nystagmus - no   gaze deviation - no   waxy rigidity - no        Significant Labs: All pertinent lab results from the past 24 hours have been reviewed.    Significant Studies: I have reviewed all pertinent imaging results/findings within the past 24 hours.

## 2023-11-03 NOTE — ASSESSMENT & PLAN NOTE
Scope complete by ENT   -patient on scheduled nebs and racemic epi without improvement in respiratory status   -elective intubation 10/31 with 6.0 ETT with anesthesia  -11/1 tube exchange to 7.0 ETT to allow suctioning, completed steroids.  11/3: continue sbt trials, extubation attempt if able to awaken further

## 2023-11-03 NOTE — ASSESSMENT & PLAN NOTE
58 year old presented for altered mental status on 10/18. Clinical picture confounded with hepatic encephalopathy, infection, and seizure activity. Neurology was consulted by primary team prior to admission to [facility].    10/27 EEG IMPRESSION:  This is an abnormal EEG during lethargic state.  Diffuse disorganized slowing of the background was noted.  Frequent triphasic waves noted.  Left posterior pseudo periodic epileptiform discharges were noted.  Numerous electrographic seizures emanating from the left posterior region were noted.    10/31 - rehook and 11/1 read with frequent discharges, vimpat lowered to 50 given mental status and liver function, will continue to watch on eeg  vEEG in place  Keppra 1500 BID  Vimpat 50 mg BID  Neurochecks q1hr  Vitals q1hr   11/03: EEG remains negative with lacosamide taper, DC and monitor for seizure recurrence

## 2023-11-04 ENCOUNTER — DOCUMENTATION ONLY (OUTPATIENT)
Dept: NEUROLOGY | Facility: CLINIC | Age: 58
End: 2023-11-04
Payer: MEDICAID

## 2023-11-04 LAB
ALBUMIN SERPL BCP-MCNC: 2.8 G/DL (ref 3.5–5.2)
ALP SERPL-CCNC: 187 U/L (ref 55–135)
ALT SERPL W/O P-5'-P-CCNC: 67 U/L (ref 10–44)
ANION GAP SERPL CALC-SCNC: 7 MMOL/L (ref 8–16)
ANISOCYTOSIS BLD QL SMEAR: ABNORMAL
AST SERPL-CCNC: 97 U/L (ref 10–40)
BASO STIPL BLD QL SMEAR: ABNORMAL
BASOPHILS # BLD AUTO: 0.03 K/UL (ref 0–0.2)
BASOPHILS NFR BLD: 0.3 % (ref 0–1.9)
BILIRUB SERPL-MCNC: 6 MG/DL (ref 0.1–1)
BUN SERPL-MCNC: 37 MG/DL (ref 6–20)
BURR CELLS BLD QL SMEAR: ABNORMAL
CALCIUM SERPL-MCNC: 9.1 MG/DL (ref 8.7–10.5)
CHLORIDE SERPL-SCNC: 118 MMOL/L (ref 95–110)
CO2 SERPL-SCNC: 22 MMOL/L (ref 23–29)
CREAT SERPL-MCNC: 0.8 MG/DL (ref 0.5–1.4)
DACRYOCYTES BLD QL SMEAR: ABNORMAL
DIFFERENTIAL METHOD: ABNORMAL
EOSINOPHIL # BLD AUTO: 0.2 K/UL (ref 0–0.5)
EOSINOPHIL NFR BLD: 1.6 % (ref 0–8)
ERYTHROCYTE [DISTWIDTH] IN BLOOD BY AUTOMATED COUNT: 19.6 % (ref 11.5–14.5)
EST. GFR  (NO RACE VARIABLE): >60 ML/MIN/1.73 M^2
GLUCOSE SERPL-MCNC: 114 MG/DL (ref 70–110)
HCT VFR BLD AUTO: 28.7 % (ref 37–48.5)
HGB BLD-MCNC: 9.2 G/DL (ref 12–16)
HOWELL-JOLLY BOD BLD QL SMEAR: ABNORMAL
HYPOCHROMIA BLD QL SMEAR: ABNORMAL
IMM GRANULOCYTES # BLD AUTO: 0.23 K/UL (ref 0–0.04)
IMM GRANULOCYTES NFR BLD AUTO: 1.9 % (ref 0–0.5)
INR PPP: 1.8 (ref 0.8–1.2)
LYMPHOCYTES # BLD AUTO: 2.2 K/UL (ref 1–4.8)
LYMPHOCYTES NFR BLD: 18.3 % (ref 18–48)
MAGNESIUM SERPL-MCNC: 2.1 MG/DL (ref 1.6–2.6)
MCH RBC QN AUTO: 32.3 PG (ref 27–31)
MCHC RBC AUTO-ENTMCNC: 32.1 G/DL (ref 32–36)
MCV RBC AUTO: 101 FL (ref 82–98)
MONOCYTES # BLD AUTO: 2.6 K/UL (ref 0.3–1)
MONOCYTES NFR BLD: 22 % (ref 4–15)
NEUTROPHILS # BLD AUTO: 6.6 K/UL (ref 1.8–7.7)
NEUTROPHILS NFR BLD: 55.9 % (ref 38–73)
NRBC BLD-RTO: 0 /100 WBC
OVALOCYTES BLD QL SMEAR: ABNORMAL
PHOSPHATE SERPL-MCNC: 2.9 MG/DL (ref 2.7–4.5)
PLATELET # BLD AUTO: 73 K/UL (ref 150–450)
PLATELET BLD QL SMEAR: ABNORMAL
PMV BLD AUTO: 11.8 FL (ref 9.2–12.9)
POCT GLUCOSE: 102 MG/DL (ref 70–110)
POCT GLUCOSE: 112 MG/DL (ref 70–110)
POCT GLUCOSE: 116 MG/DL (ref 70–110)
POCT GLUCOSE: 133 MG/DL (ref 70–110)
POIKILOCYTOSIS BLD QL SMEAR: ABNORMAL
POLYCHROMASIA BLD QL SMEAR: ABNORMAL
POTASSIUM SERPL-SCNC: 4 MMOL/L (ref 3.5–5.1)
PROT SERPL-MCNC: 5.9 G/DL (ref 6–8.4)
PROTHROMBIN TIME: 19.1 SEC (ref 9–12.5)
RBC # BLD AUTO: 2.85 M/UL (ref 4–5.4)
SCHISTOCYTES BLD QL SMEAR: ABNORMAL
SCHISTOCYTES BLD QL SMEAR: PRESENT
SODIUM SERPL-SCNC: 147 MMOL/L (ref 136–145)
TARGETS BLD QL SMEAR: ABNORMAL
WBC # BLD AUTO: 11.83 K/UL (ref 3.9–12.7)

## 2023-11-04 PROCEDURE — 95720 PR EEG, W/VIDEO, CONT RECORD, I&R, >12<26 HRS: ICD-10-PCS | Mod: NTX,,, | Performed by: PSYCHIATRY & NEUROLOGY

## 2023-11-04 PROCEDURE — 25000003 PHARM REV CODE 250: Mod: NTX | Performed by: PSYCHIATRY & NEUROLOGY

## 2023-11-04 PROCEDURE — 85610 PROTHROMBIN TIME: CPT | Mod: NTX | Performed by: STUDENT IN AN ORGANIZED HEALTH CARE EDUCATION/TRAINING PROGRAM

## 2023-11-04 PROCEDURE — 25000003 PHARM REV CODE 250: Mod: NTX | Performed by: PHYSICIAN ASSISTANT

## 2023-11-04 PROCEDURE — 63600175 PHARM REV CODE 636 W HCPCS: Mod: NTX | Performed by: NURSE PRACTITIONER

## 2023-11-04 PROCEDURE — 25000003 PHARM REV CODE 250: Mod: NTX | Performed by: HOSPITALIST

## 2023-11-04 PROCEDURE — 84100 ASSAY OF PHOSPHORUS: CPT | Mod: NTX | Performed by: STUDENT IN AN ORGANIZED HEALTH CARE EDUCATION/TRAINING PROGRAM

## 2023-11-04 PROCEDURE — 95714 VEEG EA 12-26 HR UNMNTR: CPT | Mod: NTX

## 2023-11-04 PROCEDURE — 94668 MNPJ CHEST WALL SBSQ: CPT | Mod: NTX

## 2023-11-04 PROCEDURE — 20000000 HC ICU ROOM: Mod: NTX

## 2023-11-04 PROCEDURE — 99233 PR SUBSEQUENT HOSPITAL CARE,LEVL III: ICD-10-PCS | Mod: NTX,,, | Performed by: PSYCHIATRY & NEUROLOGY

## 2023-11-04 PROCEDURE — 99233 SBSQ HOSP IP/OBS HIGH 50: CPT | Mod: NTX,,, | Performed by: PSYCHIATRY & NEUROLOGY

## 2023-11-04 PROCEDURE — 25000003 PHARM REV CODE 250: Mod: NTX

## 2023-11-04 PROCEDURE — 94003 VENT MGMT INPAT SUBQ DAY: CPT | Mod: NTX

## 2023-11-04 PROCEDURE — 99900035 HC TECH TIME PER 15 MIN (STAT): Mod: NTX

## 2023-11-04 PROCEDURE — 63600175 PHARM REV CODE 636 W HCPCS: Mod: NTX | Performed by: STUDENT IN AN ORGANIZED HEALTH CARE EDUCATION/TRAINING PROGRAM

## 2023-11-04 PROCEDURE — 95720 EEG PHY/QHP EA INCR W/VEEG: CPT | Mod: NTX,,, | Performed by: PSYCHIATRY & NEUROLOGY

## 2023-11-04 PROCEDURE — 27100171 HC OXYGEN HIGH FLOW UP TO 24 HOURS: Mod: NTX

## 2023-11-04 PROCEDURE — 80053 COMPREHEN METABOLIC PANEL: CPT | Mod: NTX | Performed by: HOSPITALIST

## 2023-11-04 PROCEDURE — 83735 ASSAY OF MAGNESIUM: CPT | Mod: NTX | Performed by: STUDENT IN AN ORGANIZED HEALTH CARE EDUCATION/TRAINING PROGRAM

## 2023-11-04 PROCEDURE — 63600175 PHARM REV CODE 636 W HCPCS: Mod: NTX

## 2023-11-04 PROCEDURE — 85025 COMPLETE CBC W/AUTO DIFF WBC: CPT | Mod: NTX | Performed by: HOSPITALIST

## 2023-11-04 PROCEDURE — 63600175 PHARM REV CODE 636 W HCPCS: Mod: NTX | Performed by: PHYSICIAN ASSISTANT

## 2023-11-04 PROCEDURE — 94761 N-INVAS EAR/PLS OXIMETRY MLT: CPT | Mod: NTX

## 2023-11-04 PROCEDURE — 99900026 HC AIRWAY MAINTENANCE (STAT): Mod: NTX

## 2023-11-04 RX ORDER — HYDRALAZINE HYDROCHLORIDE 20 MG/ML
10 INJECTION INTRAMUSCULAR; INTRAVENOUS EVERY 4 HOURS PRN
Status: DISCONTINUED | OUTPATIENT
Start: 2023-11-04 | End: 2023-11-24

## 2023-11-04 RX ORDER — LABETALOL HCL 20 MG/4 ML
10 SYRINGE (ML) INTRAVENOUS EVERY 6 HOURS PRN
Status: DISCONTINUED | OUTPATIENT
Start: 2023-11-04 | End: 2023-11-24

## 2023-11-04 RX ADMIN — FOLIC ACID 1 MG: 1 TABLET ORAL at 09:11

## 2023-11-04 RX ADMIN — LACTULOSE 30 G: 20 SOLUTION ORAL at 09:11

## 2023-11-04 RX ADMIN — HYDRALAZINE HYDROCHLORIDE 10 MG: 20 INJECTION, SOLUTION INTRAMUSCULAR; INTRAVENOUS at 09:11

## 2023-11-04 RX ADMIN — FAMOTIDINE 20 MG: 20 TABLET ORAL at 09:11

## 2023-11-04 RX ADMIN — POTASSIUM CHLORIDE 40 MEQ: 7.46 INJECTION, SOLUTION INTRAVENOUS at 04:11

## 2023-11-04 RX ADMIN — LEVETIRACETAM 1500 MG: 500 SOLUTION ORAL at 09:11

## 2023-11-04 RX ADMIN — THERA TABS 1 TABLET: TAB at 09:11

## 2023-11-04 RX ADMIN — RIFAXIMIN 550 MG: 550 TABLET ORAL at 09:11

## 2023-11-04 RX ADMIN — FENTANYL CITRATE 50 MCG: 50 INJECTION INTRAMUSCULAR; INTRAVENOUS at 07:11

## 2023-11-04 RX ADMIN — LABETALOL HYDROCHLORIDE 10 MG: 5 INJECTION, SOLUTION INTRAVENOUS at 10:11

## 2023-11-04 RX ADMIN — THIAMINE HCL TAB 100 MG 100 MG: 100 TAB at 09:11

## 2023-11-04 NOTE — ASSESSMENT & PLAN NOTE
58 year old presented for altered mental status on 10/18. Clinical picture confounded with hepatic encephalopathy, infection, and seizure activity. Neurology was consulted by primary team prior to admission to [facility].    10/27 EEG IMPRESSION:  This is an abnormal EEG during lethargic state.  Diffuse disorganized slowing of the background was noted.  Frequent triphasic waves noted.  Left posterior pseudo periodic epileptiform discharges were noted.  Numerous electrographic seizures emanating from the left posterior region were noted.    10/31 - rehook and 11/1 read with frequent discharges, vimpat lowered to 50 given mental status and liver function, will continue to watch on eeg  · vEEG in place  · Keppra 1500 BID  · Vimpat 50 mg BID  · Neurochecks q1hr  · Vitals q1hr   11/03: EEG remains negative with lacosamide taper, DC and monitor for seizure recurrence  11/04: stable EEG, beginning to awaken

## 2023-11-04 NOTE — ASSESSMENT & PLAN NOTE
Scope complete by ENT   -patient on scheduled nebs and racemic epi without improvement in respiratory status   -elective intubation 10/31 with 6.0 ETT with anesthesia  -11/1 tube exchange to 7.0 ETT to allow suctioning, completed steroids.  11/3: continue sbt trials, extubation attempt if able to awaken further   11/4: tolerating sbt, hold TFs at midnight

## 2023-11-04 NOTE — PLAN OF CARE
"[facility] Care Plan    POC reviewed with Mara Mojica and family at 0300. Pt verbalized understanding. Questions and concerns addressed. No acute events overnight. Pt progressing toward goals. Will continue to monitor. See below and flowsheets for full assessment and VS info.     - neuro unchanged  - red button pressed when pt hypertensive and tachycardic  - hydralazine given x1 for htn  - vent on spontaneous, no issues  - TF at goal  - rectal tube in place  - 1 urine occurrence, unmeasured  - replacing K 4.0    Is this a stroke patient? no    Neuro:  [place] Coma Scale  Best Eye Response: 2-->(E2) to pain  Best Motor Response: 4-->(M4) withdraws from pain  Best Verbal Response: 1-->(V1) none  [place] Coma Scale Score: 7  Assessment Qualifiers: patient intubated  Pupil PERRLA: yes     24hr Temp:  [98.1 °F (36.7 °C)-99.2 °F (37.3 °C)]     CV:   Rhythm: normal sinus rhythm  BP goals:   SBP < 180  MAP > 65    Resp:      Vent Mode: Spont  Set Rate: 12 BPM  Oxygen Concentration (%): 40  Vt Set: 400 mL  PEEP/CPAP: 5 cmH20  Pressure Support: 8 cmH20    Plan: wean to extubate    GI/:     Diet/Nutrition Received: tube feeding  Last Bowel Movement: 11/04/23  Voiding Characteristics: urethral catheter (bladder)    Intake/Output Summary (Last 24 hours) at 11/4/2023 0645  Last data filed at 11/4/2023 0601  Gross per 24 hour   Intake 2159.17 ml   Output 2475 ml   Net -315.83 ml     Unmeasured Output  Urine Occurrence: 1  Stool Occurrence: 0  Emesis Occurrence: 0  Pad Count: 1    Labs/Accuchecks:  Recent Labs   Lab 11/04/23 0034   WBC 11.83   RBC 2.85*   HGB 9.2*   HCT 28.7*   PLT 73*      Recent Labs   Lab 11/04/23 0034   *   K 4.0   CO2 22*   *   BUN 37*   CREATININE 0.8   ALKPHOS 187*   ALT 67*   AST 97*   BILITOT 6.0*      Recent Labs   Lab 11/04/23 0034   INR 1.8*    No results for input(s): "CPK", "CPKMB", "TROPONINI", "MB" in the last 168 hours.    Electrolytes: Electrolytes replaced  Accuchecks: " Q6H    Gtts:      LDA/Wounds:  Lines/Drains/Airways       Drain  Duration                  NG/OG Tube 10/20/23 2000 16 Fr. Left nostril 14 days         Rectal Tube 10/28/23 0530 fecal management system 7 days    Female External Urinary Catheter 11/03/23 1812 <1 day              Airway  Duration                  Airway - Non-Surgical 11/01/23 1115 Endotracheal Tube 2 days       Airway Anesthesia 11/01/23 2 days              Arterial Line  Duration             Arterial Line 10/31/23 1617 Right Brachial 3 days              Peripheral Intravenous Line  Duration                  Peripheral IV - Single Lumen 10/30/23 1157 20 G;1 3/4 in Left Forearm 4 days         Peripheral IV - Single Lumen 10/31/23 0754 20 G Anterior;Left Ankle 3 days         Peripheral IV - Single Lumen 10/31/23 1221 20 G Right Forearm 3 days                  Wounds: No  Wound care consulted: No

## 2023-11-04 NOTE — PROCEDURES
ICU EEG/VIDEO MONITORING REPORT    Mara Mojica  [id number]  1965    DATE OF SERVICE: 11/3-4/2023    DATE OF ADMISSION: 10/25/2023 10:51 AM    ADMITTING PROVIDER: Ahsan Cunningham MD    METHODOLOGY   Electroencephalographic (EEG) recording is with electrodes placed according to the International 10-20 placement system.  Thirty two (32) channels of digital signal are simultaneously recorded from the scalp and may include EKG, EMG, and/or eye monitors.   Recording band pass was 0.1 to 512 hz.  Digital video recording of the patient is simultaneously recorded with the EEG.  The nursing staff report clinical symptoms and may press an event button when the patient has symptoms of clinical interest to the treating physicians.  EEG and video recording is stored and archived in digital format.  The entire recording is visually reviewed and the times identified by computer analysis as being spikes or seizures are reviewed again.  Activation procedures which include photic stimulation, hyperventilation and instructing patients to perform simple task are done in selected patients.   Compresses spectral analysis (CSA) is also performed on the activity recorded from each individual channel.  This is displayed as a power display of frequencies from 0 to 30 Hz over time.   The CSA analysis is done and displayed continuously.  This is reviewed for asymmetries in power between homologous areas of the scalp and for presence of changes in power which canbe seen when seizures occur.  Sections of suspected abnormalities on the CSA is then compared with the original EEG recording.     bitFlyer software was also utilized in the review of this study.  This software suite analyzes the EEG recording in multiple domains.  Coherence and rhythmicity is computed to identify EEG sections which may contain organized seizures.  Each channel undergoes analysis to detect presence of spike and sharp waves which have special and morphological  characteristic of epileptic activity.  The routine EEG recording is converted from spacial into frequency domain.  This is then displayed comparing homologous areas to identify areas of significant asymmetry.  Algorithm to identify non-cortically generated artifact is used to separate eye movement, EMG and other artifact from the EEG.      Recording Times  Start on 11/3/2023, 07:00  Stop on 11/4/2023, 07:00    A total of 24 hours of EEG was recorded.    EEG FINDINGS - the study is done without sedation  Background activity:   The background rhythm was characterized by sharply contoured delta-theta (2-5 Hz) activity   No posterior dominant rhythm seen.   Symmetry and continuity: the background was continuous and symmetric        Sleep:   Not seen.    Activation procedures:   Not done    Abnormal activity:   Abundant generalized sharp waves of triphasic morphology is noted throughout the study.        Frequent left sided (maximal C3) epileptiform activity (sharp waves) is also noted without evolution into seizures        EKG:   - unable to assess    IMPRESSION:   This C-EEG done without sedation is abnormal due to:  1) frequent left sided epileptiform activity seen, suggestive of underlying epileptiform potential  2) severe generalized slowing with abundant triphasic waves seen, suggestive of severe diffuse cerebral dysfunction, likely of metabolic etiology.  No electrographic seizures seen.    CLINICAL CORRELATION IS RECOMMENDED.    Jess Chand MD, EDY(), FACNS, SHARRON.  Neurology-Epilepsy.  [facility].

## 2023-11-04 NOTE — NURSING
7327  Ebony Kurtz NP viewed the KUB, verified NGT placement, and permit NGT usage.     0920 Medication and water bolus given via NGT as ordered. Tube feed restarted.

## 2023-11-04 NOTE — ASSESSMENT & PLAN NOTE
This is a 52 year old woman with a history of ethanol cirrhosis who presents after being found on the ground with fecal and urinary incontinence, and altered mental status. Elevated ammonia on admission.    - Continue lactulose via NG tube TID (titrate till 3-4 daily BM achieved). Continue Xifaxin.   - Avoid opiates and benzodiazepines, if able.   - IV thiamine, folate supplementation, multivitamin through NG tube.  - concern that comatose state is irreversible brain damage, will rule out other causes first, EEG and wean AEDs    -MELD score ~50% survival without transplant  11/3: if no improvement off lacosamide, prognosis for return to reasonable conscious state is small  11/4: cont max hepatic encephalopathy treatment

## 2023-11-04 NOTE — SUBJECTIVE & OBJECTIVE
Interval History:      Review of Systems   Unable to perform ROS: Intubated       Objective:     Vitals:  Temp: 99.1 °F (37.3 °C)  Pulse: 102  Rhythm: sinus tachycardia  BP: (!) 175/76  MAP (mmHg): 109  Resp: 16  SpO2: 100 %  Oxygen Concentration (%): 40  Vent Mode: Spont  Pressure Support: 8 cmH20  PEEP/CPAP: 5 cmH20  Peak Airway Pressure: 14 cmH20  Mean Airway Pressure: 7.1 cmH20  Plateau Pressure: 5.6 cmH20    Temp  Min: 97.5 °F (36.4 °C)  Max: 99.2 °F (37.3 °C)  Pulse  Min: 91  Max: 112  BP  Min: 126/56  Max: 191/84  MAP (mmHg)  Min: 81  Max: 125  Resp  Min: 8  Max: 17  SpO2  Min: 98 %  Max: 100 %  Oxygen Concentration (%)  Min: 40  Max: 40    11/03 0701 - 11/04 0700  In: 2159.2 [I.V.:100]  Out: 2475 [Urine:1675]   Unmeasured Output  Urine Occurrence: 1  Stool Occurrence: 0  Emesis Occurrence: 0  Pad Count: 1        Physical Exam  Constitutional:       Comments: Eye opening, some tracking of visual stimulus   HENT:      Head: Normocephalic.   Eyes:      Pupils: Pupils are equal, round, and reactive to light.      Comments: Easily induced oculocpehalics with vertical upgaze during testing   Cardiovascular:      Rate and Rhythm: Normal rate.      Heart sounds: Normal heart sounds.   Pulmonary:      Breath sounds: Normal breath sounds.   Abdominal:      Palpations: Abdomen is soft.   Musculoskeletal:         General: Swelling present.   Skin:     General: Skin is warm.   Neurological:      Comments: Beginning to move spontaneously              Medications:  Continuous Scheduledfamotidine, 20 mg, BID  folic acid, 1 mg, Daily  lactulose, 30 g, BID  levetiracetam, 1,500 mg, BID  multivitamin, 1 tablet, Daily  rifAXImin, 550 mg, BID  thiamine, 100 mg, Daily    PRNcalcium gluconate IVPB, 1 g, PRN  calcium gluconate IVPB, 2 g, PRN  calcium gluconate IVPB, 3 g, PRN  fentaNYL, 50 mcg, Q2H PRN  hydrALAZINE, 10 mg, Q6H PRN  magnesium sulfate IVPB, 2 g, PRN  magnesium sulfate IVPB, 4 g, PRN  ondansetron, 4 mg, Q8H  PRN  potassium chloride, 40 mEq, PRN   And  potassium chloride, 60 mEq, PRN   And  potassium chloride, 80 mEq, PRN  sodium phosphate 15 mmol in dextrose 5 % (D5W) 250 mL IVPB, 15 mmol, PRN  sodium phosphate 20.01 mmol in dextrose 5 % (D5W) 250 mL IVPB, 20.01 mmol, PRN  sodium phosphate 30 mmol in dextrose 5 % (D5W) 250 mL IVPB, 30 mmol, PRN      Today I personally reviewed pertinent medications, lines/drains/airways, imaging, laboratory results, notably:    Diet  Diet NPO  Diet NPO

## 2023-11-04 NOTE — TREATMENT PLAN
[facility]  Hepatology  Progress Note    Patient Name: Mara Mojica  MRN: [id number]  Admission Date: 10/25/2023  Hospital Length of Stay: 10 days    Subjective:     Interval History:  No change in mentation.     EEG showing frequent left sided epileptiform activity; generalized slowing with abundant triphasic waves.     Holding Vimpat per neurology.     Objective:     Vitals:    11/04/23 1154   BP:    Pulse: 101   Resp: 14   Temp:            Significant Labs:  Recent Labs   Lab 11/02/23  0308 11/03/23  0018 11/04/23  0034   HGB 7.7* 8.2* 9.2*       Lab Results   Component Value Date    WBC 11.83 11/04/2023    HGB 9.2 (L) 11/04/2023    HCT 28.7 (L) 11/04/2023     (H) 11/04/2023    PLT 73 (L) 11/04/2023       Lab Results   Component Value Date     (H) 11/04/2023    K 4.0 11/04/2023     (H) 11/04/2023    CO2 22 (L) 11/04/2023    BUN 37 (H) 11/04/2023    CREATININE 0.8 11/04/2023    CALCIUM 9.1 11/04/2023    ANIONGAP 7 (L) 11/04/2023       Lab Results   Component Value Date    ALT 67 (H) 11/04/2023    AST 97 (H) 11/04/2023    ALKPHOS 187 (H) 11/04/2023    BILITOT 6.0 (H) 11/04/2023       Lab Results   Component Value Date    INR 1.8 (H) 11/04/2023    INR 1.9 (H) 11/03/2023    INR 2.0 (H) 11/02/2023       Significant Imaging:  Reviewed pertinent radiology findings.       Assessment/Plan:     Mara Mojica is a 58 y.o. female with history of alcoholic cirrhosis with ascites, s/p Ex Lap with bowel resection for incarcerated hernia, recent SBO, who presented to [facility] on 10/18 with AMS. EtOH level < 10. CTH/MRI brain unremarkable. EEG showed mild generalized nonspecific cerebral dysfunction with no indication of seizure. Ammonia level WNL; not improving with lactulose in spite of large BMs. She was seen by Dr. Reardon in October 2023 for decompensated cirrhosis and it was recommended to proceed with liver transplant evaluation.  EGD in September 2023 showed normal esophagus and  portal hypertensive gastropathy. Now with worsening mentation and seizure activity on EEG; moved to neurocritical ICU. Now on Keppra and Vimpat with no seizures, but no improvement in mentation. Intubated on 10/31/23.Optimized on lactulose and Xifaxin. PEth from 10/25 <10. Mentation not improving, but may need more time to observe.      Problem List:  Alcoholic cirrhosis of liver with ascites   AMS - not readily attributable to HE (differentials include encephalitis v/s seizure)    MELD 3.0: 22 at 11/4/2023 12:34 AM  MELD-Na: 20 at 11/4/2023 12:34 AM  Calculated from:  Serum Creatinine: 0.8 mg/dL (Using min of 1 mg/dL) at 11/4/2023 12:34 AM  Serum Sodium: 147 mmol/L (Using max of 137 mmol/L) at 11/4/2023 12:34 AM  Total Bilirubin: 6.0 mg/dL at 11/4/2023 12:34 AM  Serum Albumin: 2.8 g/dL at 11/4/2023 12:34 AM  INR(ratio): 1.8 at 11/4/2023 12:34 AM  Age at listing (hypothetical): 58 years  Sex: Female at 11/4/2023 12:34 AM       Recommendations:  - Appreciate recs from neurology regarding management of epilepsy. Continue supportive care.   - Start lactulose via NG tube TID (titrate till 3-4 daily BM achieved; consider rectal lactulose if not able to tolerate PO). Continue Xifaxin.   - Avoid sedatives, opiates and benzodiazepines, if able.   - IV thiamine and folate supplementation  - We are not initiating liver transplant evaluation at this time. Patient will need to be conscious and able to participate in a discussion with us before this is possible.   - Daily CBC, CMP & INR.   - We will sign off. Please consult when patient is more oriented.     Thank you for involving us in the care of Mara Mojica. Please call with any additional questions, concerns or changes in the patient's clinical status. We will continue to follow.     Lv Resendiz MD  Gastroenterology Fellow PGY V   [facility]

## 2023-11-04 NOTE — PROGRESS NOTES
[facility] - Neuro Critical Care  Neurocritical Care  Progress Note    Admit Date: 10/25/2023  Service Date: 11/04/2023  Length of Stay: 10    Subjective:     Chief Complaint: Acute encephalopathy    History of Present Illness: This is a 58-year-old female with a past medical history of alcoholic cirrhosis, s/p ex-lap w/ bowel resection for incarcerated hernia, who initially presented on 10/18 to [facility] with acute encephalopathy.  Her  found her on the floor at home with evidence of fecal/urine incontinence with confusion and slurred speech. Possible reported tongue injury in chart. Reported concern L sided weakness and down drift of L arm however CT head without evidence of acute abnormalities, MRI brain with only small vessel vascular changes without evidence of territorial infarct.     Hospital Course: EEG done on 10/19 with mild diffuse nonspecific background slowing, no potential epileptiform activity. Repeat MRI brain with contrast on 10/23 unchanged from initial MRI. Became more lethargic and was no longer following commands or answering questions. Due to worsening hepatic encephalopathy in setting of hepatic cirrhosis, patient transferred to [facility] for management with transplant team. Has remained on antibiotics, lactulose and rifaximin for treatment of UTI with pansensitive Ecoli, HE, and presumed SBP. Neurology consulted for management recommendations regarding persistent AMS. Had repeat EEG done on 10/24 with similar findings to prior EEG showing mild generalized non-specific cerebral dysfunction and no electrographic seizures or indication of seizure tendency. Additional CT head w/o contrast on 10/25 without evidence of acute issues. Remains confused and unable to answer questions on exam. Not withdrawing to painful stimuli. Was able to awaken to verbal stimuli in AM however when reevaluated in afternoon unresponsive however was after receiving Ativan.     On admission to [facility]:  Patient had EEG  running, and was noted to have seizures at 7:30 a.m., 8:00 a.m. in, and 10:00 a.m. was noted to be in focal status prior to receiving Vimpat.  Patient was noted to have stridor, worsening secretions with suspicion for aspiration, decreased airway protection, and rapids was called due to low GCS score of 6. Antibiotics broadened to Vanc/Zosyn. Clinical picture of mental status confounded with episodes of seizures, infection, and hepatic encephalopathy.               Hospital Course: 10/28: Improving GCS from 6 to 10. Continuing pulmonary toilet and deep suctioning for stridor and copious secretions. UA positive for candida. Blood cultures from 10/27 NGTD. Sputum cultures sent. Patient on day 2 of broadened antibiotics vanc/zosyn due to worsening clinical status but will discontinue tomorrow if cultures remain negative. Still hypernatremic, treating with D5W. Patient was transferred with no ordered diuretics, very edematous on physical exam. Adequate stooling on lactulose and rifaximin. Due to increased UOP/stooling will place eckert for one day for irritated skin. EEG update showing no seizures since 10am 10/27. Monitoring CBC for thrombocytopenia and macrocytic anemia. Discussed code status and goals of care with  and best friend at bedside. Trickle feeds resumed.   10/29: No acute events overnight, EEG reportedly without further seizures for ~48 hours can disconnect once formal report is in, neuro status slowly improving as patient now tracking in room, moving extremities, responding with appropriate emotional reactions to her .  Respiratory status largely unchanged with intermittent events of large volume breaths that sound almost stridorous but without actual respiratory distress- gas remains compensated.  UOP low, diuresis resumed.  10/30/2023: d/c mucomyst nebs, add racemic epi scheduled nebs, add budesonide and dex 6 q8 hours, ammonia at 35- continue lactulose and rifaximin, abg reviewed, 40mEq of  K once, ENT consulted for stridor, continue eckert catheter in today   10/31/2023 Patient with worsening respiratory status, continued decreased mentation and increased secretions. Plan for elective intubation in controlled setting with anesthesia. Will also recheck eeg, if negative will start to wean AEDs and see if that improves patient's arousal. PLT stabilized, continue to monitor.   11/1/2023 this morning patient's 6.0 ETT exchanged for 7.0 to allow suctioning. EEG with frequent discharges, lowered vimpat to 50 mg and will follow EEG. Attempting to remove confounding factors for patients mental status. Slow drop in H/H, 1 unit ordered.   11/2/2023 NAEO, cEEG to remain in place, no seizures but is having frequent discharges in runs. Continue lower dose vimpat and 1500 keppra Eye opening this morning which is slight improvement in exam. Failed SBT   11/3/2023: no improvement with decrease in lacosamide, no re-development of seizures, if does not wake up in next 48 hrs off lacosamide or redevelops seizures, prognosis is extremely poor  11/4/2023: improved mental state this am, no seizures overnight      Interval History:      Review of Systems   Unable to perform ROS: Intubated       Objective:     Vitals:  Temp: 99.1 °F (37.3 °C)  Pulse: 102  Rhythm: sinus tachycardia  BP: (!) 175/76  MAP (mmHg): 109  Resp: 16  SpO2: 100 %  Oxygen Concentration (%): 40  Vent Mode: Spont  Pressure Support: 8 cmH20  PEEP/CPAP: 5 cmH20  Peak Airway Pressure: 14 cmH20  Mean Airway Pressure: 7.1 cmH20  Plateau Pressure: 5.6 cmH20    Temp  Min: 97.5 °F (36.4 °C)  Max: 99.2 °F (37.3 °C)  Pulse  Min: 91  Max: 112  BP  Min: 126/56  Max: 191/84  MAP (mmHg)  Min: 81  Max: 125  Resp  Min: 8  Max: 17  SpO2  Min: 98 %  Max: 100 %  Oxygen Concentration (%)  Min: 40  Max: 40    11/03 0701 - 11/04 0700  In: 2159.2 [I.V.:100]  Out: 2475 [Urine:1675]   Unmeasured Output  Urine Occurrence: 1  Stool Occurrence: 0  Emesis Occurrence: 0  Pad Count: 1         Physical Exam  Constitutional:       Comments: Eye opening, some tracking of visual stimulus   HENT:      Head: Normocephalic.   Eyes:      Pupils: Pupils are equal, round, and reactive to light.      Comments: Easily induced oculocpehalics with vertical upgaze during testing   Cardiovascular:      Rate and Rhythm: Normal rate.      Heart sounds: Normal heart sounds.   Pulmonary:      Breath sounds: Normal breath sounds.   Abdominal:      Palpations: Abdomen is soft.   Musculoskeletal:         General: Swelling present.   Skin:     General: Skin is warm.   Neurological:      Comments: Beginning to move spontaneously              Medications:  Continuous Scheduledfamotidine, 20 mg, BID  folic acid, 1 mg, Daily  lactulose, 30 g, BID  levetiracetam, 1,500 mg, BID  multivitamin, 1 tablet, Daily  rifAXImin, 550 mg, BID  thiamine, 100 mg, Daily    PRNcalcium gluconate IVPB, 1 g, PRN  calcium gluconate IVPB, 2 g, PRN  calcium gluconate IVPB, 3 g, PRN  fentaNYL, 50 mcg, Q2H PRN  hydrALAZINE, 10 mg, Q6H PRN  magnesium sulfate IVPB, 2 g, PRN  magnesium sulfate IVPB, 4 g, PRN  ondansetron, 4 mg, Q8H PRN  potassium chloride, 40 mEq, PRN   And  potassium chloride, 60 mEq, PRN   And  potassium chloride, 80 mEq, PRN  sodium phosphate 15 mmol in dextrose 5 % (D5W) 250 mL IVPB, 15 mmol, PRN  sodium phosphate 20.01 mmol in dextrose 5 % (D5W) 250 mL IVPB, 20.01 mmol, PRN  sodium phosphate 30 mmol in dextrose 5 % (D5W) 250 mL IVPB, 30 mmol, PRN      Today I personally reviewed pertinent medications, lines/drains/airways, imaging, laboratory results, notably:    Diet  Diet NPO  Diet NPO          Assessment/Plan:     Neuro  Seizure  58 year old presented for altered mental status on 10/18. Clinical picture confounded with hepatic encephalopathy, infection, and seizure activity. Neurology was consulted by primary team prior to admission to [facility].    10/27 EEG IMPRESSION:  This is an abnormal EEG during lethargic state.  Diffuse  disorganized slowing of the background was noted.  Frequent triphasic waves noted.  Left posterior pseudo periodic epileptiform discharges were noted.  Numerous electrographic seizures emanating from the left posterior region were noted.    10/31 - rehook and 11/1 read with frequent discharges, vimpat lowered to 50 given mental status and liver function, will continue to watch on eeg  · vEEG in place  · Keppra 1500 BID  · Vimpat 50 mg BID  · Neurochecks q1hr  · Vitals q1hr   11/03: EEG remains negative with lacosamide taper, DC and monitor for seizure recurrence  11/04: stable EEG, beginning to awaken    Pulmonary  Tracheal stenosis  Scope complete by ENT   -patient on scheduled nebs and racemic epi without improvement in respiratory status   -elective intubation 10/31 with 6.0 ETT with anesthesia  -11/1 tube exchange to 7.0 ETT to allow suctioning, completed steroids.  11/3: continue sbt trials, extubation attempt if able to awaken further   11/4: tolerating sbt, hold TFs at midnight    GI  Acute hepatic encephalopathy  This is a 52 year old woman with a history of ethanol cirrhosis who presents after being found on the ground with fecal and urinary incontinence, and altered mental status. Elevated ammonia on admission.    - Continue lactulose via NG tube TID (titrate till 3-4 daily BM achieved). Continue Xifaxin.   - Avoid opiates and benzodiazepines, if able.   - IV thiamine, folate supplementation, multivitamin through NG tube.  - concern that comatose state is irreversible brain damage, will rule out other causes first, EEG and wean AEDs    -MELD score ~50% survival without transplant  11/3: if no improvement off lacosamide, prognosis for return to reasonable conscious state is small  11/4: cont max hepatic encephalopathy treatment          The patient is being Prophylaxed for:  Venous Thromboembolism with: Mechanical  Stress Ulcer with: H2B  Ventilator Pneumonia with: chlorhexidine oral care    Activity Orders           Diet NPO: NPO starting at 11/05 0500    Elevate HOB Elevate (30-45 degrees) Elevate HOB to 30 - 45 degrees during feeding unless otherwise stated starting at 10/28 1218    Elevate HOB to 30-45 degrees during feeding unless otherwise stated starting at 10/26 1138    Progressive Mobility Protocol (mobilize patient to their highest level of functioning at least twice daily) starting at 10/25 2000    Turn patient starting at 10/25 2000        Full Code    Chris Luna MD  Neurocritical Care  [facility] - Neuro Critical Care

## 2023-11-05 ENCOUNTER — ANESTHESIA EVENT (OUTPATIENT)
Dept: NEUROLOGY | Facility: HOSPITAL | Age: 58
DRG: 100 | End: 2023-11-05
Payer: MEDICAID

## 2023-11-05 ENCOUNTER — ANESTHESIA (OUTPATIENT)
Dept: NEUROLOGY | Facility: HOSPITAL | Age: 58
DRG: 100 | End: 2023-11-05
Payer: MEDICAID

## 2023-11-05 LAB
ABO + RH BLD: ABNORMAL
ALBUMIN SERPL BCP-MCNC: 2.6 G/DL (ref 3.5–5.2)
ALLENS TEST: ABNORMAL
ALLENS TEST: ABNORMAL
ALP SERPL-CCNC: 177 U/L (ref 55–135)
ALT SERPL W/O P-5'-P-CCNC: 59 U/L (ref 10–44)
ANION GAP SERPL CALC-SCNC: 9 MMOL/L (ref 8–16)
ANISOCYTOSIS BLD QL SMEAR: ABNORMAL
AST SERPL-CCNC: 85 U/L (ref 10–40)
BASO STIPL BLD QL SMEAR: ABNORMAL
BASOPHILS # BLD AUTO: 0.02 K/UL (ref 0–0.2)
BASOPHILS NFR BLD: 0.1 % (ref 0–1.9)
BILIRUB SERPL-MCNC: 6.1 MG/DL (ref 0.1–1)
BLD GP AB SCN CELLS X3 SERPL QL: ABNORMAL
BUN SERPL-MCNC: 31 MG/DL (ref 6–20)
BURR CELLS BLD QL SMEAR: ABNORMAL
CALCIUM SERPL-MCNC: 9 MG/DL (ref 8.7–10.5)
CHLORIDE SERPL-SCNC: 118 MMOL/L (ref 95–110)
CO2 SERPL-SCNC: 20 MMOL/L (ref 23–29)
CREAT SERPL-MCNC: 0.7 MG/DL (ref 0.5–1.4)
DACRYOCYTES BLD QL SMEAR: ABNORMAL
DELSYS: ABNORMAL
DELSYS: ABNORMAL
DIFFERENTIAL METHOD: ABNORMAL
EOSINOPHIL # BLD AUTO: 0.7 K/UL (ref 0–0.5)
EOSINOPHIL NFR BLD: 5.3 % (ref 0–8)
ERYTHROCYTE [DISTWIDTH] IN BLOOD BY AUTOMATED COUNT: 19.1 % (ref 11.5–14.5)
EST. GFR  (NO RACE VARIABLE): >60 ML/MIN/1.73 M^2
FIO2: 30
FIO2: 30
GLUCOSE SERPL-MCNC: 133 MG/DL (ref 70–110)
HCO3 UR-SCNC: 25.9 MMOL/L (ref 24–28)
HCO3 UR-SCNC: 26.2 MMOL/L (ref 24–28)
HCT VFR BLD AUTO: 27.8 % (ref 37–48.5)
HGB BLD-MCNC: 8.6 G/DL (ref 12–16)
HYPOCHROMIA BLD QL SMEAR: ABNORMAL
IMM GRANULOCYTES # BLD AUTO: 0.22 K/UL (ref 0–0.04)
IMM GRANULOCYTES NFR BLD AUTO: 1.6 % (ref 0–0.5)
INR PPP: 1.7 (ref 0.8–1.2)
LYMPHOCYTES # BLD AUTO: 1.8 K/UL (ref 1–4.8)
LYMPHOCYTES NFR BLD: 13 % (ref 18–48)
MAGNESIUM SERPL-MCNC: 2.2 MG/DL (ref 1.6–2.6)
MCH RBC QN AUTO: 31.7 PG (ref 27–31)
MCHC RBC AUTO-ENTMCNC: 30.9 G/DL (ref 32–36)
MCV RBC AUTO: 103 FL (ref 82–98)
MODE: ABNORMAL
MODE: ABNORMAL
MONOCYTES # BLD AUTO: 2.7 K/UL (ref 0.3–1)
MONOCYTES NFR BLD: 19.8 % (ref 4–15)
NEUTROPHILS # BLD AUTO: 8.2 K/UL (ref 1.8–7.7)
NEUTROPHILS NFR BLD: 60.2 % (ref 38–73)
NRBC BLD-RTO: 0 /100 WBC
OVALOCYTES BLD QL SMEAR: ABNORMAL
PCO2 BLDA: 34.6 MMHG (ref 35–45)
PCO2 BLDA: 35.8 MMHG (ref 35–45)
PEEP: 5
PEEP: 5
PH SMN: 7.47 [PH] (ref 7.35–7.45)
PH SMN: 7.48 [PH] (ref 7.35–7.45)
PHOSPHATE SERPL-MCNC: 3.2 MG/DL (ref 2.7–4.5)
PLATELET # BLD AUTO: 57 K/UL (ref 150–450)
PLATELET BLD QL SMEAR: ABNORMAL
PMV BLD AUTO: 11.9 FL (ref 9.2–12.9)
PO2 BLDA: 124 MMHG (ref 80–100)
PO2 BLDA: 142 MMHG (ref 80–100)
POC BE: 2 MMOL/L
POC BE: 3 MMOL/L
POC SATURATED O2: 99 % (ref 95–100)
POC SATURATED O2: 99 % (ref 95–100)
POC TCO2: 27 MMOL/L (ref 23–27)
POC TCO2: 27 MMOL/L (ref 23–27)
POCT GLUCOSE: 105 MG/DL (ref 70–110)
POCT GLUCOSE: 115 MG/DL (ref 70–110)
POCT GLUCOSE: 131 MG/DL (ref 70–110)
POCT GLUCOSE: 141 MG/DL (ref 70–110)
POIKILOCYTOSIS BLD QL SMEAR: ABNORMAL
POLYCHROMASIA BLD QL SMEAR: ABNORMAL
POTASSIUM SERPL-SCNC: 4.5 MMOL/L (ref 3.5–5.1)
PROT SERPL-MCNC: 5.5 G/DL (ref 6–8.4)
PROTHROMBIN TIME: 18 SEC (ref 9–12.5)
PS: 2
PS: 8
RBC # BLD AUTO: 2.71 M/UL (ref 4–5.4)
SAMPLE: ABNORMAL
SAMPLE: ABNORMAL
SCHISTOCYTES BLD QL SMEAR: ABNORMAL
SITE: ABNORMAL
SITE: ABNORMAL
SODIUM SERPL-SCNC: 147 MMOL/L (ref 136–145)
SPECIMEN OUTDATE: ABNORMAL
WBC # BLD AUTO: 13.6 K/UL (ref 3.9–12.7)

## 2023-11-05 PROCEDURE — 86850 RBC ANTIBODY SCREEN: CPT | Mod: NTX

## 2023-11-05 PROCEDURE — 99291 PR CRITICAL CARE, E/M 30-74 MINUTES: ICD-10-PCS | Mod: NTX,,,

## 2023-11-05 PROCEDURE — 25000003 PHARM REV CODE 250: Mod: NTX | Performed by: HOSPITALIST

## 2023-11-05 PROCEDURE — 63600175 PHARM REV CODE 636 W HCPCS: Mod: NTX

## 2023-11-05 PROCEDURE — 25000003 PHARM REV CODE 250: Mod: NTX

## 2023-11-05 PROCEDURE — 31500 INSERT EMERGENCY AIRWAY: CPT | Mod: NTX,,, | Performed by: ANESTHESIOLOGY

## 2023-11-05 PROCEDURE — 27100171 HC OXYGEN HIGH FLOW UP TO 24 HOURS: Mod: NTX

## 2023-11-05 PROCEDURE — 94002 VENT MGMT INPAT INIT DAY: CPT | Mod: NTX

## 2023-11-05 PROCEDURE — 25000242 PHARM REV CODE 250 ALT 637 W/ HCPCS: Mod: NTX

## 2023-11-05 PROCEDURE — 95718 PR EEG, W/VIDEO, CONT RECORD, I&R, 2-12 HRS: ICD-10-PCS | Mod: NTX,,, | Performed by: PSYCHIATRY & NEUROLOGY

## 2023-11-05 PROCEDURE — 37799 UNLISTED PX VASCULAR SURGERY: CPT | Mod: NTX

## 2023-11-05 PROCEDURE — 25000003 PHARM REV CODE 250: Mod: NTX | Performed by: PHYSICIAN ASSISTANT

## 2023-11-05 PROCEDURE — 99900026 HC AIRWAY MAINTENANCE (STAT): Mod: NTX

## 2023-11-05 PROCEDURE — 83735 ASSAY OF MAGNESIUM: CPT | Mod: NTX | Performed by: STUDENT IN AN ORGANIZED HEALTH CARE EDUCATION/TRAINING PROGRAM

## 2023-11-05 PROCEDURE — 84100 ASSAY OF PHOSPHORUS: CPT | Mod: NTX | Performed by: STUDENT IN AN ORGANIZED HEALTH CARE EDUCATION/TRAINING PROGRAM

## 2023-11-05 PROCEDURE — 94761 N-INVAS EAR/PLS OXIMETRY MLT: CPT | Mod: NTX

## 2023-11-05 PROCEDURE — 25000003 PHARM REV CODE 250: Mod: NTX | Performed by: PSYCHIATRY & NEUROLOGY

## 2023-11-05 PROCEDURE — 82803 BLOOD GASES ANY COMBINATION: CPT | Mod: NTX

## 2023-11-05 PROCEDURE — 31500 AD HOC INTUBATION: ICD-10-PCS | Mod: NTX,,, | Performed by: ANESTHESIOLOGY

## 2023-11-05 PROCEDURE — 99291 CRITICAL CARE FIRST HOUR: CPT | Mod: NTX,,,

## 2023-11-05 PROCEDURE — 94668 MNPJ CHEST WALL SBSQ: CPT | Mod: NTX

## 2023-11-05 PROCEDURE — 85610 PROTHROMBIN TIME: CPT | Mod: NTX | Performed by: STUDENT IN AN ORGANIZED HEALTH CARE EDUCATION/TRAINING PROGRAM

## 2023-11-05 PROCEDURE — 80053 COMPREHEN METABOLIC PANEL: CPT | Mod: NTX | Performed by: HOSPITALIST

## 2023-11-05 PROCEDURE — 85025 COMPLETE CBC W/AUTO DIFF WBC: CPT | Mod: NTX | Performed by: HOSPITALIST

## 2023-11-05 PROCEDURE — 99233 PR SUBSEQUENT HOSPITAL CARE,LEVL III: ICD-10-PCS | Mod: NTX,,, | Performed by: PSYCHIATRY & NEUROLOGY

## 2023-11-05 PROCEDURE — 86902 BLOOD TYPE ANTIGEN DONOR EA: CPT | Mod: NTX

## 2023-11-05 PROCEDURE — 63600175 PHARM REV CODE 636 W HCPCS: Mod: NTX | Performed by: PHYSICIAN ASSISTANT

## 2023-11-05 PROCEDURE — 99900035 HC TECH TIME PER 15 MIN (STAT): Mod: NTX

## 2023-11-05 PROCEDURE — 95718 EEG PHYS/QHP 2-12 HR W/VEEG: CPT | Mod: NTX,,, | Performed by: PSYCHIATRY & NEUROLOGY

## 2023-11-05 PROCEDURE — 20000000 HC ICU ROOM: Mod: NTX

## 2023-11-05 PROCEDURE — 99233 SBSQ HOSP IP/OBS HIGH 50: CPT | Mod: NTX,,, | Performed by: PSYCHIATRY & NEUROLOGY

## 2023-11-05 PROCEDURE — 36600 WITHDRAWAL OF ARTERIAL BLOOD: CPT | Mod: NTX

## 2023-11-05 RX ORDER — NOREPINEPHRINE BITARTRATE/D5W 4MG/250ML
PLASTIC BAG, INJECTION (ML) INTRAVENOUS
Status: DISPENSED
Start: 2023-11-05 | End: 2023-11-06

## 2023-11-05 RX ORDER — PHENYLEPHRINE HCL IN 0.9% NACL 1 MG/10 ML
600 SYRINGE (ML) INTRAVENOUS ONCE
Status: COMPLETED | OUTPATIENT
Start: 2023-11-05 | End: 2023-11-05

## 2023-11-05 RX ORDER — PROPOFOL 10 MG/ML
VIAL (ML) INTRAVENOUS
Status: COMPLETED
Start: 2023-11-05 | End: 2023-11-05

## 2023-11-05 RX ORDER — DEXAMETHASONE SODIUM PHOSPHATE 4 MG/ML
4 INJECTION, SOLUTION INTRA-ARTICULAR; INTRALESIONAL; INTRAMUSCULAR; INTRAVENOUS; SOFT TISSUE EVERY 6 HOURS
Status: COMPLETED | OUTPATIENT
Start: 2023-11-06 | End: 2023-11-07

## 2023-11-05 RX ORDER — DEXAMETHASONE SODIUM PHOSPHATE 4 MG/ML
4 INJECTION, SOLUTION INTRA-ARTICULAR; INTRALESIONAL; INTRAMUSCULAR; INTRAVENOUS; SOFT TISSUE EVERY 6 HOURS
Status: DISCONTINUED | OUTPATIENT
Start: 2023-11-06 | End: 2023-11-05

## 2023-11-05 RX ORDER — IPRATROPIUM BROMIDE AND ALBUTEROL SULFATE 2.5; .5 MG/3ML; MG/3ML
3 SOLUTION RESPIRATORY (INHALATION) EVERY 4 HOURS PRN
Status: DISCONTINUED | OUTPATIENT
Start: 2023-11-05 | End: 2023-11-07

## 2023-11-05 RX ORDER — SUCCINYLCHOLINE CHLORIDE 20 MG/ML
INJECTION INTRAMUSCULAR; INTRAVENOUS
Status: COMPLETED
Start: 2023-11-05 | End: 2023-11-05

## 2023-11-05 RX ORDER — SUCCINYLCHOLINE CHLORIDE 20 MG/ML
70 INJECTION INTRAMUSCULAR; INTRAVENOUS ONCE
Status: COMPLETED | OUTPATIENT
Start: 2023-11-05 | End: 2023-11-05

## 2023-11-05 RX ORDER — ETOMIDATE 2 MG/ML
INJECTION INTRAVENOUS
Status: DISCONTINUED
Start: 2023-11-05 | End: 2023-11-05 | Stop reason: WASHOUT

## 2023-11-05 RX ORDER — DEXAMETHASONE SODIUM PHOSPHATE 4 MG/ML
10 INJECTION, SOLUTION INTRA-ARTICULAR; INTRALESIONAL; INTRAMUSCULAR; INTRAVENOUS; SOFT TISSUE ONCE
Status: COMPLETED | OUTPATIENT
Start: 2023-11-05 | End: 2023-11-05

## 2023-11-05 RX ORDER — NOREPINEPHRINE BITARTRATE/D5W 4MG/250ML
0-.2 PLASTIC BAG, INJECTION (ML) INTRAVENOUS CONTINUOUS
Status: DISCONTINUED | OUTPATIENT
Start: 2023-11-05 | End: 2023-11-06

## 2023-11-05 RX ORDER — PROPOFOL 10 MG/ML
100 VIAL (ML) INTRAVENOUS ONCE
Status: COMPLETED | OUTPATIENT
Start: 2023-11-05 | End: 2023-11-05

## 2023-11-05 RX ORDER — ROCURONIUM BROMIDE 10 MG/ML
INJECTION, SOLUTION INTRAVENOUS
Status: DISCONTINUED
Start: 2023-11-05 | End: 2023-11-06

## 2023-11-05 RX ORDER — PHENYLEPHRINE HCL IN 0.9% NACL 1 MG/10 ML
SYRINGE (ML) INTRAVENOUS
Status: COMPLETED
Start: 2023-11-05 | End: 2023-11-05

## 2023-11-05 RX ADMIN — THERA TABS 1 TABLET: TAB at 08:11

## 2023-11-05 RX ADMIN — LACTULOSE 30 G: 20 SOLUTION ORAL at 09:11

## 2023-11-05 RX ADMIN — LACTULOSE 30 G: 20 SOLUTION ORAL at 08:11

## 2023-11-05 RX ADMIN — Medication 600 MCG: at 10:11

## 2023-11-05 RX ADMIN — FAMOTIDINE 20 MG: 20 TABLET ORAL at 09:11

## 2023-11-05 RX ADMIN — SUCCINYLCHOLINE CHLORIDE 70 MG: 20 INJECTION, SOLUTION INTRAMUSCULAR; INTRAVENOUS; PARENTERAL at 10:11

## 2023-11-05 RX ADMIN — HYDRALAZINE HYDROCHLORIDE 10 MG: 20 INJECTION, SOLUTION INTRAMUSCULAR; INTRAVENOUS at 09:11

## 2023-11-05 RX ADMIN — RIFAXIMIN 550 MG: 550 TABLET ORAL at 08:11

## 2023-11-05 RX ADMIN — LEVETIRACETAM 1500 MG: 500 SOLUTION ORAL at 08:11

## 2023-11-05 RX ADMIN — PROPOFOL 100 MG: 10 INJECTION, EMULSION INTRAVENOUS at 10:11

## 2023-11-05 RX ADMIN — Medication 100 MG: at 10:11

## 2023-11-05 RX ADMIN — RIFAXIMIN 550 MG: 550 TABLET ORAL at 09:11

## 2023-11-05 RX ADMIN — LABETALOL HYDROCHLORIDE 10 MG: 5 INJECTION, SOLUTION INTRAVENOUS at 05:11

## 2023-11-05 RX ADMIN — FAMOTIDINE 20 MG: 20 TABLET ORAL at 08:11

## 2023-11-05 RX ADMIN — DEXAMETHASONE SODIUM PHOSPHATE 10 MG: 4 INJECTION INTRA-ARTICULAR; INTRALESIONAL; INTRAMUSCULAR; INTRAVENOUS; SOFT TISSUE at 09:11

## 2023-11-05 RX ADMIN — RACEPINEPHRINE HYDROCHLORIDE 0.5 ML: 11.25 SOLUTION RESPIRATORY (INHALATION) at 09:11

## 2023-11-05 RX ADMIN — THIAMINE HCL TAB 100 MG 100 MG: 100 TAB at 08:11

## 2023-11-05 RX ADMIN — NOREPINEPHRINE BITARTRATE 0.02 MCG/KG/MIN: 4 INJECTION, SOLUTION INTRAVENOUS at 10:11

## 2023-11-05 RX ADMIN — FENTANYL CITRATE 50 MCG: 50 INJECTION INTRAMUSCULAR; INTRAVENOUS at 10:11

## 2023-11-05 RX ADMIN — FOLIC ACID 1 MG: 1 TABLET ORAL at 08:11

## 2023-11-05 RX ADMIN — HYDRALAZINE HYDROCHLORIDE 10 MG: 20 INJECTION, SOLUTION INTRAMUSCULAR; INTRAVENOUS at 03:11

## 2023-11-05 RX ADMIN — LEVETIRACETAM 1500 MG: 500 SOLUTION ORAL at 09:11

## 2023-11-05 RX ADMIN — SODIUM CHLORIDE 500 ML: 9 INJECTION, SOLUTION INTRAVENOUS at 10:11

## 2023-11-05 RX ADMIN — SUCCINYLCHOLINE CHLORIDE 70 MG: 20 INJECTION INTRAMUSCULAR; INTRAVENOUS at 10:11

## 2023-11-05 NOTE — PROCEDURES
ICU EEG/VIDEO MONITORING REPORT    Mara Mojica  [id number]  1965    DATE OF SERVICE: 11/4-5/2023    DATE OF ADMISSION: 10/25/2023 10:51 AM    ADMITTING PROVIDER: Ahsan Cunningham MD    METHODOLOGY   Electroencephalographic (EEG) recording is with electrodes placed according to the International 10-20 placement system.  Thirty two (32) channels of digital signal are simultaneously recorded from the scalp and may include EKG, EMG, and/or eye monitors.   Recording band pass was 0.1 to 512 hz.  Digital video recording of the patient is simultaneously recorded with the EEG.  The nursing staff report clinical symptoms and may press an event button when the patient has symptoms of clinical interest to the treating physicians.  EEG and video recording is stored and archived in digital format.  The entire recording is visually reviewed and the times identified by computer analysis as being spikes or seizures are reviewed again.  Activation procedures which include photic stimulation, hyperventilation and instructing patients to perform simple task are done in selected patients.   Compresses spectral analysis (CSA) is also performed on the activity recorded from each individual channel.  This is displayed as a power display of frequencies from 0 to 30 Hz over time.   The CSA analysis is done and displayed continuously.  This is reviewed for asymmetries in power between homologous areas of the scalp and for presence of changes in power which canbe seen when seizures occur.  Sections of suspected abnormalities on the CSA is then compared with the original EEG recording.     Sporthold software was also utilized in the review of this study.  This software suite analyzes the EEG recording in multiple domains.  Coherence and rhythmicity is computed to identify EEG sections which may contain organized seizures.  Each channel undergoes analysis to detect presence of spike and sharp waves which have special and morphological  characteristic of epileptic activity.  The routine EEG recording is converted from spacial into frequency domain.  This is then displayed comparing homologous areas to identify areas of significant asymmetry.  Algorithm to identify non-cortically generated artifact is used to separate eye movement, EMG and other artifact from the EEG.      Recording Times  Start on 11/4/2023, 07:00  Stop on 11/5/2023,11:16    A total of 28 hours and 16 minutes of EEG was recorded.    EEG FINDINGS - the study is done without sedation  Background activity:   The background rhythm was characterized by sharply contoured delta-theta (4-7 Hz) activity   No posterior dominant rhythm seen.   Symmetry and continuity: the background was continuous and symmetric        Sleep:   Not seen.    Activation procedures:   Reactivity is seen with stimulation.    Abnormal activity:   Occasional left sided (maximal C3) epileptiform activity (sharp waves) is also noted without evolution into seizures    EKG:   Regular rhythm when interpretable    IMPRESSION:   This C-EEG done without sedation is abnormal due to:  1) occasional left sided epileptiform activity seen, suggestive of underlying epileptiform potential  2) moderate generalized slowing seen, suggestive of moderate diffuse cerebral dysfunction.  No electrographic seizures seen.    Improved from prior day.  CLINICAL CORRELATION IS RECOMMENDED.    Jess Chand MD, EDY(), FACNS, SHARRON.  Neurology-Epilepsy.  [facility].

## 2023-11-05 NOTE — PROGRESS NOTES
[facility] - Neuro Critical Care  Neurocritical Care  Progress Note    Admit Date: 10/25/2023  Service Date: 11/05/2023  Length of Stay: 11    Subjective:     Chief Complaint: Acute encephalopathy    History of Present Illness: This is a 58-year-old female with a past medical history of alcoholic cirrhosis, s/p ex-lap w/ bowel resection for incarcerated hernia, who initially presented on 10/18 to [facility] with acute encephalopathy.  Her  found her on the floor at home with evidence of fecal/urine incontinence with confusion and slurred speech. Possible reported tongue injury in chart. Reported concern L sided weakness and down drift of L arm however CT head without evidence of acute abnormalities, MRI brain with only small vessel vascular changes without evidence of territorial infarct.     Hospital Course: EEG done on 10/19 with mild diffuse nonspecific background slowing, no potential epileptiform activity. Repeat MRI brain with contrast on 10/23 unchanged from initial MRI. Became more lethargic and was no longer following commands or answering questions. Due to worsening hepatic encephalopathy in setting of hepatic cirrhosis, patient transferred to [facility] for management with transplant team. Has remained on antibiotics, lactulose and rifaximin for treatment of UTI with pansensitive Ecoli, HE, and presumed SBP. Neurology consulted for management recommendations regarding persistent AMS. Had repeat EEG done on 10/24 with similar findings to prior EEG showing mild generalized non-specific cerebral dysfunction and no electrographic seizures or indication of seizure tendency. Additional CT head w/o contrast on 10/25 without evidence of acute issues. Remains confused and unable to answer questions on exam. Not withdrawing to painful stimuli. Was able to awaken to verbal stimuli in AM however when reevaluated in afternoon unresponsive however was after receiving Ativan.     On admission to [facility]:  Patient had EEG  running, and was noted to have seizures at 7:30 a.m., 8:00 a.m. in, and 10:00 a.m. was noted to be in focal status prior to receiving Vimpat.  Patient was noted to have stridor, worsening secretions with suspicion for aspiration, decreased airway protection, and rapids was called due to low GCS score of 6. Antibiotics broadened to Vanc/Zosyn. Clinical picture of mental status confounded with episodes of seizures, infection, and hepatic encephalopathy.               Hospital Course: 10/28: Improving GCS from 6 to 10. Continuing pulmonary toilet and deep suctioning for stridor and copious secretions. UA positive for candida. Blood cultures from 10/27 NGTD. Sputum cultures sent. Patient on day 2 of broadened antibiotics vanc/zosyn due to worsening clinical status but will discontinue tomorrow if cultures remain negative. Still hypernatremic, treating with D5W. Patient was transferred with no ordered diuretics, very edematous on physical exam. Adequate stooling on lactulose and rifaximin. Due to increased UOP/stooling will place eckert for one day for irritated skin. EEG update showing no seizures since 10am 10/27. Monitoring CBC for thrombocytopenia and macrocytic anemia. Discussed code status and goals of care with  and best friend at bedside. Trickle feeds resumed.   10/29: No acute events overnight, EEG reportedly without further seizures for ~48 hours can disconnect once formal report is in, neuro status slowly improving as patient now tracking in room, moving extremities, responding with appropriate emotional reactions to her .  Respiratory status largely unchanged with intermittent events of large volume breaths that sound almost stridorous but without actual respiratory distress- gas remains compensated.  UOP low, diuresis resumed.  10/30/2023: d/c mucomyst nebs, add racemic epi scheduled nebs, add budesonide and dex 6 q8 hours, ammonia at 35- continue lactulose and rifaximin, abg reviewed, 40mEq of  K once, ENT consulted for stridor, continue eckert catheter in today   10/31/2023 Patient with worsening respiratory status, continued decreased mentation and increased secretions. Plan for elective intubation in controlled setting with anesthesia. Will also recheck eeg, if negative will start to wean AEDs and see if that improves patient's arousal. PLT stabilized, continue to monitor.   11/1/2023 this morning patient's 6.0 ETT exchanged for 7.0 to allow suctioning. EEG with frequent discharges, lowered vimpat to 50 mg and will follow EEG. Attempting to remove confounding factors for patients mental status. Slow drop in H/H, 1 unit ordered.   11/2/2023 NAEO, cEEG to remain in place, no seizures but is having frequent discharges in runs. Continue lower dose vimpat and 1500 keppra Eye opening this morning which is slight improvement in exam. Failed SBT   11/3/2023: no improvement with decrease in lacosamide, no re-development of seizures, if does not wake up in next 48 hrs off lacosamide or redevelops seizures, prognosis is extremely poor  11/4/2023: improved mental state this am, no seizures overnight  11/5/2023: LOC stable, has cuff leak, trial of extubation      Interval History:      Review of Systems   Unable to perform ROS: Intubated       Objective:     Vitals:  Temp: 99.2 °F (37.3 °C)  Pulse: 99  Rhythm: sinus tachycardia, normal sinus rhythm  BP: 126/60  MAP (mmHg): 86  Resp: 12  SpO2: 98 %  Oxygen Concentration (%): 30  Vent Mode: Spont  Pressure Support: 8 cmH20  PEEP/CPAP: 5 cmH20  Peak Airway Pressure: 14 cmH20  Mean Airway Pressure: 7 cmH20  Plateau Pressure: 5.6 cmH20    Temp  Min: 98.1 °F (36.7 °C)  Max: 99.2 °F (37.3 °C)  Pulse  Min: 75  Max: 109  BP  Min: 123/60  Max: 190/86  MAP (mmHg)  Min: 82  Max: 124  Resp  Min: 5  Max: 17  SpO2  Min: 94 %  Max: 100 %  Oxygen Concentration (%)  Min: 30  Max: 40    11/04 0701 - 11/05 0700  In: 1910 [I.V.:30]  Out: 1705 [Urine:980]   Unmeasured Output  Urine  Occurrence: 1  Stool Occurrence: 0  Emesis Occurrence: 0  Pad Count: 1        Physical Exam  Constitutional:       Comments: Eye opening, some tracking of visual stimulus   HENT:      Head: Normocephalic.   Eyes:      Pupils: Pupils are equal, round, and reactive to light.      Comments: Easily induced oculocpehalics with vertical upgaze during testing   Cardiovascular:      Rate and Rhythm: Normal rate.      Heart sounds: Normal heart sounds.   Pulmonary:      Breath sounds: Normal breath sounds.   Abdominal:      Palpations: Abdomen is soft.   Musculoskeletal:         General: Swelling present.   Skin:     General: Skin is warm.   Neurological:      Comments: Beginning to move spontaneously              Medications:  Continuous Scheduledfamotidine, 20 mg, BID  folic acid, 1 mg, Daily  lactulose, 30 g, BID  levetiracetam, 1,500 mg, BID  multivitamin, 1 tablet, Daily  rifAXImin, 550 mg, BID  thiamine, 100 mg, Daily    PRNcalcium gluconate IVPB, 1 g, PRN  calcium gluconate IVPB, 2 g, PRN  calcium gluconate IVPB, 3 g, PRN  fentaNYL, 50 mcg, Q2H PRN  hydrALAZINE, 10 mg, Q4H PRN  labetalol, 10 mg, Q6H PRN  magnesium sulfate IVPB, 2 g, PRN  magnesium sulfate IVPB, 4 g, PRN  ondansetron, 4 mg, Q8H PRN  potassium chloride, 40 mEq, PRN   And  potassium chloride, 60 mEq, PRN   And  potassium chloride, 80 mEq, PRN  sodium phosphate 15 mmol in dextrose 5 % (D5W) 250 mL IVPB, 15 mmol, PRN  sodium phosphate 20.01 mmol in dextrose 5 % (D5W) 250 mL IVPB, 20.01 mmol, PRN  sodium phosphate 30 mmol in dextrose 5 % (D5W) 250 mL IVPB, 30 mmol, PRN      Today I personally reviewed pertinent medications, lines/drains/airways, imaging, laboratory results, notably:    Diet  Diet NPO  Diet NPO          Assessment/Plan:     Neuro  Seizure  58 year old presented for altered mental status on 10/18. Clinical picture confounded with hepatic encephalopathy, infection, and seizure activity. Neurology was consulted by primary team prior to  admission to [facility].    10/27 EEG IMPRESSION:  This is an abnormal EEG during lethargic state.  Diffuse disorganized slowing of the background was noted.  Frequent triphasic waves noted.  Left posterior pseudo periodic epileptiform discharges were noted.  Numerous electrographic seizures emanating from the left posterior region were noted.    10/31 - rehook and 11/1 read with frequent discharges, vimpat lowered to 50 given mental status and liver function, will continue to watch on eeg  · vEEG in place  · Keppra 1500 BID  · Vimpat 50 mg BID  · Neurochecks q1hr  · Vitals q1hr   11/03: EEG remains negative with lacosamide taper, DC and monitor for seizure recurrence  11/04: stable EEG, beginning to awaken  11/5: remains without seizures    Pulmonary  Tracheal stenosis  Scope complete by ENT   -patient on scheduled nebs and racemic epi without improvement in respiratory status   -elective intubation 10/31 with 6.0 ETT with anesthesia  -11/1 tube exchange to 7.0 ETT to allow suctioning, completed steroids.  11/3: continue sbt trials, extubation attempt if able to awaken further   11/4: tolerating sbt, hold TFs at midnight  11/5: has cuff leak, extubate    GI  Acute hepatic encephalopathy  This is a 52 year old woman with a history of ethanol cirrhosis who presents after being found on the ground with fecal and urinary incontinence, and altered mental status. Elevated ammonia on admission.    - Continue lactulose via NG tube TID (titrate till 3-4 daily BM achieved). Continue Xifaxin.   - Avoid opiates and benzodiazepines, if able.   - IV thiamine, folate supplementation, multivitamin through NG tube.  - concern that comatose state is irreversible brain damage, will rule out other causes first, EEG and wean AEDs    -MELD score ~50% survival without transplant  11/3: if no improvement off lacosamide, prognosis for return to reasonable conscious state is small  11/4: cont max hepatic encephalopathy treatment  11/5: stable with  brief ability to arouse          The patient is being Prophylaxed for:  Venous Thromboembolism with: Mechanical  Stress Ulcer with: H2B  Ventilator Pneumonia with: chlorhexidine oral care    Activity Orders          Diet NPO: NPO starting at 11/05 0500    Elevate HOB Elevate (30-45 degrees) Elevate HOB to 30 - 45 degrees during feeding unless otherwise stated starting at 10/28 1218    Elevate HOB to 30-45 degrees during feeding unless otherwise stated starting at 10/26 1138    Progressive Mobility Protocol (mobilize patient to their highest level of functioning at least twice daily) starting at 10/25 2000    Turn patient starting at 10/25 2000        Full Code    Chris Luna MD  Neurocritical Care  [facility] - Neuro Critical Care

## 2023-11-05 NOTE — ASSESSMENT & PLAN NOTE
This is a 52 year old woman with a history of ethanol cirrhosis who presents after being found on the ground with fecal and urinary incontinence, and altered mental status. Elevated ammonia on admission.    - Continue lactulose via NG tube TID (titrate till 3-4 daily BM achieved). Continue Xifaxin.   - Avoid opiates and benzodiazepines, if able.   - IV thiamine, folate supplementation, multivitamin through NG tube.  - concern that comatose state is irreversible brain damage, will rule out other causes first, EEG and wean AEDs    -MELD score ~50% survival without transplant  11/3: if no improvement off lacosamide, prognosis for return to reasonable conscious state is small  11/4: cont max hepatic encephalopathy treatment  11/5: stable with brief ability to arouse

## 2023-11-05 NOTE — PLAN OF CARE
"[facility] Care Plan    POC reviewed with Mara Mojica and family at 0300. Pt's significant other verbalized understanding. Questions and concerns addressed. No acute events overnight. Will continue to monitor. See below and flowsheets for full assessment and VS info.       Is this a stroke patient? no    Neuro:  [place] Coma Scale  Best Eye Response: 4-->(E4) spontaneous  Best Motor Response: 4-->(M4) withdraws from pain  Best Verbal Response: 1-->(V1) none  [place]pton Coma Scale Score: 9  Assessment Qualifiers: patient intubated  Pupil PERRLA: yes     24hr Temp:  [97.5 °F (36.4 °C)-99.2 °F (37.3 °C)]     CV:   Rhythm: normal sinus rhythm  BP goals:   SBP < 180  MAP > 65    Resp:      Vent Mode: Spont  Set Rate: 12 BPM  Oxygen Concentration (%): 30  Vt Set: 400 mL  PEEP/CPAP: 5 cmH20  Pressure Support: 8 cmH20    Plan: wean to extubate    GI/:     Diet/Nutrition Received: tube feeding  Last Bowel Movement: 11/05/23  Voiding Characteristics: urethral catheter (bladder)    Intake/Output Summary (Last 24 hours) at 11/5/2023 0643  Last data filed at 11/5/2023 0601  Gross per 24 hour   Intake 1909.96 ml   Output 1705 ml   Net 204.96 ml     Unmeasured Output  Urine Occurrence: 1  Stool Occurrence: 0  Emesis Occurrence: 0  Pad Count: 1    Labs/Accuchecks:  Recent Labs   Lab 11/05/23  0016   WBC 13.60*   RBC 2.71*   HGB 8.6*   HCT 27.8*   PLT 57*      Recent Labs   Lab 11/05/23  0016   *   K 4.5   CO2 20*   *   BUN 31*   CREATININE 0.7   ALKPHOS 177*   ALT 59*   AST 85*   BILITOT 6.1*      Recent Labs   Lab 11/05/23  0016   INR 1.7*    No results for input(s): "CPK", "CPKMB", "TROPONINI", "MB" in the last 168 hours.    Electrolytes: N/A - electrolytes WDL  Accuchecks: Q6H    Gtts:      LDA/Wounds:  Lines/Drains/Airways       Drain  Duration                  NG/OG Tube 10/20/23 2000 16 Fr. Left nostril 15 days         Rectal Tube 10/28/23 0530 fecal management system 8 days    Female External Urinary Catheter " 11/03/23 1812 1 day              Airway  Duration                  Airway - Non-Surgical 11/01/23 1115 Endotracheal Tube 3 days       Airway Anesthesia 11/01/23 3 days              Arterial Line  Duration             Arterial Line 10/31/23 1617 Right Brachial 4 days              Peripheral Intravenous Line  Duration                  Peripheral IV - Single Lumen 11/05/23 0017 20 G Posterior;Right Hand <1 day         Peripheral IV - Single Lumen 11/05/23 0017 20 G;1 1/4 in Left;Posterior Forearm <1 day                  Wounds: No  Wound care consulted: No

## 2023-11-05 NOTE — PLAN OF CARE
"[facility] Care Plan    POC reviewed with Mara Mojica and family at 1500. Pt intubated and unable to verbalize understanding. Questions and concerns by family addressed. No acute events today. Pt progressing toward goals. Will continue to monitor. See below and flowsheets for full assessment and VS info.             Is this a stroke patient? no    Neuro:  Woody Coma Scale  Best Eye Response: 3-->(E3) to speech  Best Motor Response: 4-->(M4) withdraws from pain  Best Verbal Response: 1-->(V1) none  Woody Coma Scale Score: 8  Assessment Qualifiers: patient intubated  Pupil PERRLA: yes     24 hr Temp:  [97.5 °F (36.4 °C)-99.2 °F (37.3 °C)]     CV:   Rhythm: sinus tachycardia, normal sinus rhythm  BP goals:   SBP < 180  MAP > 65    Resp:      Vent Mode: Spont  Set Rate: 12 BPM  Oxygen Concentration (%): 40  Vt Set: 400 mL  PEEP/CPAP: 5 cmH20  Pressure Support: 8 cmH20    Plan: wean to extubate    GI/:     Diet/Nutrition Received: tube feeding  Last Bowel Movement: 11/04/23  Voiding Characteristics: external catheter    Intake/Output Summary (Last 24 hours) at 11/4/2023 1955  Last data filed at 11/4/2023 1801  Gross per 24 hour   Intake 1709.96 ml   Output 1355 ml   Net 354.96 ml     Unmeasured Output  Urine Occurrence: 1  Stool Occurrence: 0  Emesis Occurrence: 0  Pad Count: 1    Labs/Accuchecks:  Recent Labs   Lab 11/04/23 0034   WBC 11.83   RBC 2.85*   HGB 9.2*   HCT 28.7*   PLT 73*      Recent Labs   Lab 11/04/23 0034   *   K 4.0   CO2 22*   *   BUN 37*   CREATININE 0.8   ALKPHOS 187*   ALT 67*   AST 97*   BILITOT 6.0*      Recent Labs   Lab 11/04/23 0034   INR 1.8*    No results for input(s): "CPK", "CPKMB", "TROPONINI", "MB" in the last 168 hours.    Electrolytes: Electrolytes replaced  Accuchecks: Q6H    Gtts:      LDA/Wounds:  Lines/Drains/Airways       Drain  Duration                  NG/OG Tube 10/20/23 2000 16 Fr. Left nostril 14 days         Rectal Tube 10/28/23 0530 fecal management " system 7 days    Female External Urinary Catheter 11/03/23 1812 1 day              Airway  Duration                  Airway - Non-Surgical 11/01/23 1115 Endotracheal Tube 3 days       Airway Anesthesia 11/01/23 3 days              Arterial Line  Duration             Arterial Line 10/31/23 1617 Right Brachial 4 days              Peripheral Intravenous Line  Duration                  Peripheral IV - Single Lumen 10/31/23 0754 20 G Anterior;Left Ankle 4 days         Peripheral IV - Single Lumen 10/31/23 1221 20 G Right Forearm 4 days                  Wounds: No  Wound care consulted: No

## 2023-11-05 NOTE — ASSESSMENT & PLAN NOTE
Scope complete by ENT   -patient on scheduled nebs and racemic epi without improvement in respiratory status   -elective intubation 10/31 with 6.0 ETT with anesthesia  -11/1 tube exchange to 7.0 ETT to allow suctioning, completed steroids.  11/3: continue sbt trials, extubation attempt if able to awaken further   11/4: tolerating sbt, hold TFs at midnight  11/5: has cuff leak, extubate

## 2023-11-05 NOTE — SUBJECTIVE & OBJECTIVE
Interval History:      Review of Systems   Unable to perform ROS: Intubated       Objective:     Vitals:  Temp: 99.2 °F (37.3 °C)  Pulse: 99  Rhythm: sinus tachycardia, normal sinus rhythm  BP: 126/60  MAP (mmHg): 86  Resp: 12  SpO2: 98 %  Oxygen Concentration (%): 30  Vent Mode: Spont  Pressure Support: 8 cmH20  PEEP/CPAP: 5 cmH20  Peak Airway Pressure: 14 cmH20  Mean Airway Pressure: 7 cmH20  Plateau Pressure: 5.6 cmH20    Temp  Min: 98.1 °F (36.7 °C)  Max: 99.2 °F (37.3 °C)  Pulse  Min: 75  Max: 109  BP  Min: 123/60  Max: 190/86  MAP (mmHg)  Min: 82  Max: 124  Resp  Min: 5  Max: 17  SpO2  Min: 94 %  Max: 100 %  Oxygen Concentration (%)  Min: 30  Max: 40    11/04 0701 - 11/05 0700  In: 1910 [I.V.:30]  Out: 1705 [Urine:980]   Unmeasured Output  Urine Occurrence: 1  Stool Occurrence: 0  Emesis Occurrence: 0  Pad Count: 1        Physical Exam  Constitutional:       Comments: Eye opening, some tracking of visual stimulus   HENT:      Head: Normocephalic.   Eyes:      Pupils: Pupils are equal, round, and reactive to light.      Comments: Easily induced oculocpehalics with vertical upgaze during testing   Cardiovascular:      Rate and Rhythm: Normal rate.      Heart sounds: Normal heart sounds.   Pulmonary:      Breath sounds: Normal breath sounds.   Abdominal:      Palpations: Abdomen is soft.   Musculoskeletal:         General: Swelling present.   Skin:     General: Skin is warm.   Neurological:      Comments: Beginning to move spontaneously              Medications:  Continuous Scheduledfamotidine, 20 mg, BID  folic acid, 1 mg, Daily  lactulose, 30 g, BID  levetiracetam, 1,500 mg, BID  multivitamin, 1 tablet, Daily  rifAXImin, 550 mg, BID  thiamine, 100 mg, Daily    PRNcalcium gluconate IVPB, 1 g, PRN  calcium gluconate IVPB, 2 g, PRN  calcium gluconate IVPB, 3 g, PRN  fentaNYL, 50 mcg, Q2H PRN  hydrALAZINE, 10 mg, Q4H PRN  labetalol, 10 mg, Q6H PRN  magnesium sulfate IVPB, 2 g, PRN  magnesium sulfate IVPB, 4 g,  PRN  ondansetron, 4 mg, Q8H PRN  potassium chloride, 40 mEq, PRN   And  potassium chloride, 60 mEq, PRN   And  potassium chloride, 80 mEq, PRN  sodium phosphate 15 mmol in dextrose 5 % (D5W) 250 mL IVPB, 15 mmol, PRN  sodium phosphate 20.01 mmol in dextrose 5 % (D5W) 250 mL IVPB, 20.01 mmol, PRN  sodium phosphate 30 mmol in dextrose 5 % (D5W) 250 mL IVPB, 30 mmol, PRN      Today I personally reviewed pertinent medications, lines/drains/airways, imaging, laboratory results, notably:    Diet  Diet NPO  Diet NPO

## 2023-11-05 NOTE — ASSESSMENT & PLAN NOTE
58 year old presented for altered mental status on 10/18. Clinical picture confounded with hepatic encephalopathy, infection, and seizure activity. Neurology was consulted by primary team prior to admission to [facility].    10/27 EEG IMPRESSION:  This is an abnormal EEG during lethargic state.  Diffuse disorganized slowing of the background was noted.  Frequent triphasic waves noted.  Left posterior pseudo periodic epileptiform discharges were noted.  Numerous electrographic seizures emanating from the left posterior region were noted.    10/31 - rehook and 11/1 read with frequent discharges, vimpat lowered to 50 given mental status and liver function, will continue to watch on eeg  · vEEG in place  · Keppra 1500 BID  · Vimpat 50 mg BID  · Neurochecks q1hr  · Vitals q1hr   11/03: EEG remains negative with lacosamide taper, DC and monitor for seizure recurrence  11/04: stable EEG, beginning to awaken  11/5: remains without seizures

## 2023-11-06 LAB
ALBUMIN SERPL BCP-MCNC: 2.2 G/DL (ref 3.5–5.2)
ALBUMIN SERPL BCP-MCNC: 2.4 G/DL (ref 3.5–5.2)
ALLENS TEST: ABNORMAL
ALP SERPL-CCNC: 112 U/L (ref 55–135)
ALP SERPL-CCNC: 125 U/L (ref 55–135)
ALT SERPL W/O P-5'-P-CCNC: 43 U/L (ref 10–44)
ALT SERPL W/O P-5'-P-CCNC: 52 U/L (ref 10–44)
AMMONIA PLAS-SCNC: 59 UMOL/L (ref 10–50)
ANION GAP SERPL CALC-SCNC: 4 MMOL/L (ref 8–16)
ANION GAP SERPL CALC-SCNC: 8 MMOL/L (ref 8–16)
AST SERPL-CCNC: 63 U/L (ref 10–40)
AST SERPL-CCNC: 81 U/L (ref 10–40)
BASOPHILS # BLD AUTO: 0.01 K/UL (ref 0–0.2)
BASOPHILS # BLD AUTO: 0.01 K/UL (ref 0–0.2)
BASOPHILS NFR BLD: 0.1 % (ref 0–1.9)
BASOPHILS NFR BLD: 0.1 % (ref 0–1.9)
BILIRUB SERPL-MCNC: 6 MG/DL (ref 0.1–1)
BILIRUB SERPL-MCNC: 6.9 MG/DL (ref 0.1–1)
BLD PROD TYP BPU: NORMAL
BLOOD UNIT EXPIRATION DATE: NORMAL
BLOOD UNIT TYPE CODE: 600
BLOOD UNIT TYPE: NORMAL
BUN SERPL-MCNC: 33 MG/DL (ref 6–20)
BUN SERPL-MCNC: 46 MG/DL (ref 6–20)
CALCIUM SERPL-MCNC: 8.7 MG/DL (ref 8.7–10.5)
CALCIUM SERPL-MCNC: 8.8 MG/DL (ref 8.7–10.5)
CHLORIDE SERPL-SCNC: 120 MMOL/L (ref 95–110)
CHLORIDE SERPL-SCNC: 121 MMOL/L (ref 95–110)
CO2 SERPL-SCNC: 19 MMOL/L (ref 23–29)
CO2 SERPL-SCNC: 22 MMOL/L (ref 23–29)
CODING SYSTEM: NORMAL
CREAT SERPL-MCNC: 0.7 MG/DL (ref 0.5–1.4)
CREAT SERPL-MCNC: 0.7 MG/DL (ref 0.5–1.4)
CROSSMATCH INTERPRETATION: NORMAL
DELSYS: ABNORMAL
DIFFERENTIAL METHOD: ABNORMAL
DIFFERENTIAL METHOD: ABNORMAL
DISPENSE STATUS: NORMAL
EOSINOPHIL # BLD AUTO: 0 K/UL (ref 0–0.5)
EOSINOPHIL # BLD AUTO: 0.4 K/UL (ref 0–0.5)
EOSINOPHIL NFR BLD: 0 % (ref 0–8)
EOSINOPHIL NFR BLD: 3.3 % (ref 0–8)
ERYTHROCYTE [DISTWIDTH] IN BLOOD BY AUTOMATED COUNT: 18.8 % (ref 11.5–14.5)
ERYTHROCYTE [DISTWIDTH] IN BLOOD BY AUTOMATED COUNT: 19.3 % (ref 11.5–14.5)
ERYTHROCYTE [SEDIMENTATION RATE] IN BLOOD BY WESTERGREN METHOD: 16 MM/H
EST. GFR  (NO RACE VARIABLE): >60 ML/MIN/1.73 M^2
EST. GFR  (NO RACE VARIABLE): >60 ML/MIN/1.73 M^2
FIO2: 50
GLUCOSE SERPL-MCNC: 123 MG/DL (ref 70–110)
GLUCOSE SERPL-MCNC: 137 MG/DL (ref 70–110)
HCO3 UR-SCNC: 24.7 MMOL/L (ref 24–28)
HCT VFR BLD AUTO: 21.8 % (ref 37–48.5)
HCT VFR BLD AUTO: 25.7 % (ref 37–48.5)
HGB BLD-MCNC: 6.7 G/DL (ref 12–16)
HGB BLD-MCNC: 8 G/DL (ref 12–16)
IMM GRANULOCYTES # BLD AUTO: 0.13 K/UL (ref 0–0.04)
IMM GRANULOCYTES # BLD AUTO: 0.21 K/UL (ref 0–0.04)
IMM GRANULOCYTES NFR BLD AUTO: 1.3 % (ref 0–0.5)
IMM GRANULOCYTES NFR BLD AUTO: 1.9 % (ref 0–0.5)
INR PPP: 1.8 (ref 0.8–1.2)
LACTATE SERPL-SCNC: 1.2 MMOL/L (ref 0.5–2.2)
LYMPHOCYTES # BLD AUTO: 0.8 K/UL (ref 1–4.8)
LYMPHOCYTES # BLD AUTO: 0.8 K/UL (ref 1–4.8)
LYMPHOCYTES NFR BLD: 7.4 % (ref 18–48)
LYMPHOCYTES NFR BLD: 8.1 % (ref 18–48)
MAGNESIUM SERPL-MCNC: 2.2 MG/DL (ref 1.6–2.6)
MCH RBC QN AUTO: 31.8 PG (ref 27–31)
MCH RBC QN AUTO: 32.5 PG (ref 27–31)
MCHC RBC AUTO-ENTMCNC: 30.7 G/DL (ref 32–36)
MCHC RBC AUTO-ENTMCNC: 31.1 G/DL (ref 32–36)
MCV RBC AUTO: 103 FL (ref 82–98)
MCV RBC AUTO: 105 FL (ref 82–98)
MODE: ABNORMAL
MONOCYTES # BLD AUTO: 1.1 K/UL (ref 0.3–1)
MONOCYTES # BLD AUTO: 1.3 K/UL (ref 0.3–1)
MONOCYTES NFR BLD: 10.9 % (ref 4–15)
MONOCYTES NFR BLD: 11.2 % (ref 4–15)
NEUTROPHILS # BLD AUTO: 7.7 K/UL (ref 1.8–7.7)
NEUTROPHILS # BLD AUTO: 8.6 K/UL (ref 1.8–7.7)
NEUTROPHILS NFR BLD: 76.1 % (ref 38–73)
NEUTROPHILS NFR BLD: 79.6 % (ref 38–73)
NRBC BLD-RTO: 0 /100 WBC
NRBC BLD-RTO: 0 /100 WBC
PCO2 BLDA: 35.5 MMHG (ref 35–45)
PEEP: 5
PH SMN: 7.45 [PH] (ref 7.35–7.45)
PHOSPHATE SERPL-MCNC: 4.1 MG/DL (ref 2.7–4.5)
PLATELET # BLD AUTO: 40 K/UL (ref 150–450)
PLATELET # BLD AUTO: 54 K/UL (ref 150–450)
PMV BLD AUTO: 12.7 FL (ref 9.2–12.9)
PMV BLD AUTO: 12.7 FL (ref 9.2–12.9)
PO2 BLDA: 179 MMHG (ref 80–100)
POC BE: 1 MMOL/L
POC SATURATED O2: 100 % (ref 95–100)
POC TCO2: 26 MMOL/L (ref 23–27)
POCT GLUCOSE: 118 MG/DL (ref 70–110)
POCT GLUCOSE: 129 MG/DL (ref 70–110)
POCT GLUCOSE: 158 MG/DL (ref 70–110)
POCT GLUCOSE: 173 MG/DL (ref 70–110)
POCT GLUCOSE: 229 MG/DL (ref 70–110)
POTASSIUM SERPL-SCNC: 4.5 MMOL/L (ref 3.5–5.1)
POTASSIUM SERPL-SCNC: 4.6 MMOL/L (ref 3.5–5.1)
PROT SERPL-MCNC: 4.7 G/DL (ref 6–8.4)
PROT SERPL-MCNC: 5.3 G/DL (ref 6–8.4)
PROTHROMBIN TIME: 18.3 SEC (ref 9–12.5)
RBC # BLD AUTO: 2.11 M/UL (ref 4–5.4)
RBC # BLD AUTO: 2.46 M/UL (ref 4–5.4)
SAMPLE: ABNORMAL
SITE: ABNORMAL
SODIUM SERPL-SCNC: 147 MMOL/L (ref 136–145)
SODIUM SERPL-SCNC: 147 MMOL/L (ref 136–145)
TRANS ERYTHROCYTES VOL PATIENT: NORMAL ML
VT: 380
WBC # BLD AUTO: 11.29 K/UL (ref 3.9–12.7)
WBC # BLD AUTO: 9.73 K/UL (ref 3.9–12.7)

## 2023-11-06 PROCEDURE — 99900035 HC TECH TIME PER 15 MIN (STAT): Mod: NTX

## 2023-11-06 PROCEDURE — P9021 RED BLOOD CELLS UNIT: HCPCS | Mod: NTX | Performed by: PHYSICIAN ASSISTANT

## 2023-11-06 PROCEDURE — 99499 UNLISTED E&M SERVICE: CPT | Mod: NTX,,, | Performed by: PHYSICIAN ASSISTANT

## 2023-11-06 PROCEDURE — 99291 CRITICAL CARE FIRST HOUR: CPT | Mod: FS,NTX,, | Performed by: PSYCHIATRY & NEUROLOGY

## 2023-11-06 PROCEDURE — 83735 ASSAY OF MAGNESIUM: CPT | Mod: NTX | Performed by: STUDENT IN AN ORGANIZED HEALTH CARE EDUCATION/TRAINING PROGRAM

## 2023-11-06 PROCEDURE — 63600175 PHARM REV CODE 636 W HCPCS: Mod: NTX

## 2023-11-06 PROCEDURE — 85025 COMPLETE CBC W/AUTO DIFF WBC: CPT | Mod: NTX | Performed by: HOSPITALIST

## 2023-11-06 PROCEDURE — 94668 MNPJ CHEST WALL SBSQ: CPT | Mod: NTX

## 2023-11-06 PROCEDURE — 36430 TRANSFUSION BLD/BLD COMPNT: CPT

## 2023-11-06 PROCEDURE — 27100171 HC OXYGEN HIGH FLOW UP TO 24 HOURS: Mod: NTX

## 2023-11-06 PROCEDURE — 99900026 HC AIRWAY MAINTENANCE (STAT): Mod: NTX

## 2023-11-06 PROCEDURE — 63600175 PHARM REV CODE 636 W HCPCS: Mod: NTX | Performed by: PHYSICIAN ASSISTANT

## 2023-11-06 PROCEDURE — 86922 COMPATIBILITY TEST ANTIGLOB: CPT | Mod: NTX | Performed by: PHYSICIAN ASSISTANT

## 2023-11-06 PROCEDURE — 83605 ASSAY OF LACTIC ACID: CPT | Mod: NTX | Performed by: PHYSICIAN ASSISTANT

## 2023-11-06 PROCEDURE — 25000003 PHARM REV CODE 250: Mod: NTX | Performed by: PSYCHIATRY & NEUROLOGY

## 2023-11-06 PROCEDURE — 94003 VENT MGMT INPAT SUBQ DAY: CPT | Mod: NTX

## 2023-11-06 PROCEDURE — 25000003 PHARM REV CODE 250: Mod: NTX | Performed by: HOSPITALIST

## 2023-11-06 PROCEDURE — 82803 BLOOD GASES ANY COMBINATION: CPT | Mod: NTX

## 2023-11-06 PROCEDURE — 20000000 HC ICU ROOM: Mod: NTX

## 2023-11-06 PROCEDURE — 27200966 HC CLOSED SUCTION SYSTEM: Mod: NTX

## 2023-11-06 PROCEDURE — 37799 UNLISTED PX VASCULAR SURGERY: CPT | Mod: NTX

## 2023-11-06 PROCEDURE — 84100 ASSAY OF PHOSPHORUS: CPT | Mod: NTX | Performed by: STUDENT IN AN ORGANIZED HEALTH CARE EDUCATION/TRAINING PROGRAM

## 2023-11-06 PROCEDURE — 25000003 PHARM REV CODE 250: Mod: NTX

## 2023-11-06 PROCEDURE — 82800 BLOOD PH: CPT | Mod: NTX

## 2023-11-06 PROCEDURE — 85025 COMPLETE CBC W/AUTO DIFF WBC: CPT | Mod: 91,NTX | Performed by: PHYSICIAN ASSISTANT

## 2023-11-06 PROCEDURE — 99499 NO LOS: ICD-10-PCS | Mod: NTX,,, | Performed by: PHYSICIAN ASSISTANT

## 2023-11-06 PROCEDURE — 99291 PR CRITICAL CARE, E/M 30-74 MINUTES: ICD-10-PCS | Mod: FS,NTX,, | Performed by: PSYCHIATRY & NEUROLOGY

## 2023-11-06 PROCEDURE — 94761 N-INVAS EAR/PLS OXIMETRY MLT: CPT | Mod: NTX

## 2023-11-06 PROCEDURE — 85610 PROTHROMBIN TIME: CPT | Mod: NTX | Performed by: STUDENT IN AN ORGANIZED HEALTH CARE EDUCATION/TRAINING PROGRAM

## 2023-11-06 PROCEDURE — 25000003 PHARM REV CODE 250: Mod: NTX | Performed by: PHYSICIAN ASSISTANT

## 2023-11-06 PROCEDURE — 80053 COMPREHEN METABOLIC PANEL: CPT | Mod: 91,NTX | Performed by: PHYSICIAN ASSISTANT

## 2023-11-06 PROCEDURE — 80053 COMPREHEN METABOLIC PANEL: CPT | Mod: NTX | Performed by: HOSPITALIST

## 2023-11-06 PROCEDURE — 82140 ASSAY OF AMMONIA: CPT | Mod: NTX | Performed by: PHYSICIAN ASSISTANT

## 2023-11-06 RX ORDER — DEXTROSE MONOHYDRATE 100 MG/ML
INJECTION, SOLUTION INTRAVENOUS CONTINUOUS PRN
Status: DISCONTINUED | OUTPATIENT
Start: 2023-11-06 | End: 2023-11-16

## 2023-11-06 RX ORDER — INSULIN ASPART 100 [IU]/ML
0-10 INJECTION, SOLUTION INTRAVENOUS; SUBCUTANEOUS EVERY 4 HOURS PRN
Status: DISCONTINUED | OUTPATIENT
Start: 2023-11-06 | End: 2023-11-16

## 2023-11-06 RX ORDER — HYDROMORPHONE HYDROCHLORIDE 1 MG/ML
0.2 INJECTION, SOLUTION INTRAMUSCULAR; INTRAVENOUS; SUBCUTANEOUS EVERY 4 HOURS PRN
Status: DISCONTINUED | OUTPATIENT
Start: 2023-11-06 | End: 2023-11-07

## 2023-11-06 RX ORDER — HYDROMORPHONE HYDROCHLORIDE 1 MG/ML
0.1 INJECTION, SOLUTION INTRAMUSCULAR; INTRAVENOUS; SUBCUTANEOUS EVERY 4 HOURS PRN
Status: DISCONTINUED | OUTPATIENT
Start: 2023-11-06 | End: 2023-11-06

## 2023-11-06 RX ORDER — LORAZEPAM 2 MG/ML
INJECTION INTRAMUSCULAR
Status: DISPENSED
Start: 2023-11-06 | End: 2023-11-07

## 2023-11-06 RX ORDER — HEPARIN SODIUM 5000 [USP'U]/ML
5000 INJECTION, SOLUTION INTRAVENOUS; SUBCUTANEOUS EVERY 12 HOURS
Status: DISCONTINUED | OUTPATIENT
Start: 2023-11-06 | End: 2023-11-08

## 2023-11-06 RX ORDER — HYDROCODONE BITARTRATE AND ACETAMINOPHEN 500; 5 MG/1; MG/1
TABLET ORAL
Status: DISCONTINUED | OUTPATIENT
Start: 2023-11-06 | End: 2023-11-07

## 2023-11-06 RX ORDER — LEVETIRACETAM 500 MG/5ML
INJECTION, SOLUTION, CONCENTRATE INTRAVENOUS
Status: DISCONTINUED
Start: 2023-11-06 | End: 2023-11-07

## 2023-11-06 RX ORDER — SODIUM CHLORIDE, SODIUM LACTATE, POTASSIUM CHLORIDE, CALCIUM CHLORIDE 600; 310; 30; 20 MG/100ML; MG/100ML; MG/100ML; MG/100ML
INJECTION, SOLUTION INTRAVENOUS CONTINUOUS
Status: DISCONTINUED | OUTPATIENT
Start: 2023-11-06 | End: 2023-11-07

## 2023-11-06 RX ORDER — GLUCAGON 1 MG
1 KIT INJECTION
Status: DISCONTINUED | OUTPATIENT
Start: 2023-11-06 | End: 2023-11-16

## 2023-11-06 RX ORDER — LEVETIRACETAM 100 MG/ML
1000 SOLUTION ORAL 2 TIMES DAILY
Status: DISCONTINUED | OUTPATIENT
Start: 2023-11-06 | End: 2023-11-07

## 2023-11-06 RX ORDER — NOREPINEPHRINE BITARTRATE/D5W 4MG/250ML
PLASTIC BAG, INJECTION (ML) INTRAVENOUS
Status: COMPLETED
Start: 2023-11-06 | End: 2023-11-06

## 2023-11-06 RX ORDER — NOREPINEPHRINE BITARTRATE/D5W 4MG/250ML
0-3 PLASTIC BAG, INJECTION (ML) INTRAVENOUS CONTINUOUS
Status: DISCONTINUED | OUTPATIENT
Start: 2023-11-06 | End: 2023-11-08

## 2023-11-06 RX ORDER — LORAZEPAM 2 MG/ML
2 INJECTION INTRAMUSCULAR ONCE
Status: COMPLETED | OUTPATIENT
Start: 2023-11-06 | End: 2023-11-06

## 2023-11-06 RX ADMIN — THERA TABS 1 TABLET: TAB at 08:11

## 2023-11-06 RX ADMIN — FAMOTIDINE 20 MG: 20 TABLET ORAL at 09:11

## 2023-11-06 RX ADMIN — RIFAXIMIN 550 MG: 550 TABLET ORAL at 08:11

## 2023-11-06 RX ADMIN — LACTULOSE 30 G: 20 SOLUTION ORAL at 09:11

## 2023-11-06 RX ADMIN — LACTULOSE 30 G: 20 SOLUTION ORAL at 08:11

## 2023-11-06 RX ADMIN — DEXAMETHASONE SODIUM PHOSPHATE 4 MG: 4 INJECTION INTRA-ARTICULAR; INTRALESIONAL; INTRAMUSCULAR; INTRAVENOUS; SOFT TISSUE at 11:11

## 2023-11-06 RX ADMIN — Medication 0.06 MCG/KG/MIN: at 05:11

## 2023-11-06 RX ADMIN — INSULIN ASPART 1 UNITS: 100 INJECTION, SOLUTION INTRAVENOUS; SUBCUTANEOUS at 08:11

## 2023-11-06 RX ADMIN — HYDROMORPHONE HYDROCHLORIDE 0.1 MG: 1 INJECTION, SOLUTION INTRAMUSCULAR; INTRAVENOUS; SUBCUTANEOUS at 01:11

## 2023-11-06 RX ADMIN — HYDROMORPHONE HYDROCHLORIDE 0.2 MG: 1 INJECTION, SOLUTION INTRAMUSCULAR; INTRAVENOUS; SUBCUTANEOUS at 03:11

## 2023-11-06 RX ADMIN — SODIUM CHLORIDE, POTASSIUM CHLORIDE, SODIUM LACTATE AND CALCIUM CHLORIDE: 600; 310; 30; 20 INJECTION, SOLUTION INTRAVENOUS at 07:11

## 2023-11-06 RX ADMIN — DEXAMETHASONE SODIUM PHOSPHATE 4 MG: 4 INJECTION INTRA-ARTICULAR; INTRALESIONAL; INTRAMUSCULAR; INTRAVENOUS; SOFT TISSUE at 05:11

## 2023-11-06 RX ADMIN — INSULIN ASPART 4 UNITS: 100 INJECTION, SOLUTION INTRAVENOUS; SUBCUTANEOUS at 12:11

## 2023-11-06 RX ADMIN — FOLIC ACID 1 MG: 1 TABLET ORAL at 08:11

## 2023-11-06 RX ADMIN — DEXAMETHASONE SODIUM PHOSPHATE 4 MG: 4 INJECTION INTRA-ARTICULAR; INTRALESIONAL; INTRAMUSCULAR; INTRAVENOUS; SOFT TISSUE at 02:11

## 2023-11-06 RX ADMIN — RIFAXIMIN 550 MG: 550 TABLET ORAL at 09:11

## 2023-11-06 RX ADMIN — LEVETIRACETAM 1500 MG: 500 SOLUTION ORAL at 09:11

## 2023-11-06 RX ADMIN — LEVETIRACETAM 1000 MG: 500 SOLUTION ORAL at 09:11

## 2023-11-06 RX ADMIN — LORAZEPAM 2 MG: 2 INJECTION INTRAMUSCULAR; INTRAVENOUS at 04:11

## 2023-11-06 RX ADMIN — FENTANYL CITRATE 50 MCG: 50 INJECTION INTRAMUSCULAR; INTRAVENOUS at 08:11

## 2023-11-06 RX ADMIN — HEPARIN SODIUM 5000 UNITS: 5000 INJECTION INTRAVENOUS; SUBCUTANEOUS at 11:11

## 2023-11-06 RX ADMIN — THIAMINE HCL TAB 100 MG 100 MG: 100 TAB at 08:11

## 2023-11-06 RX ADMIN — NOREPINEPHRINE BITARTRATE 0.06 MCG/KG/MIN: 4 INJECTION, SOLUTION INTRAVENOUS at 05:11

## 2023-11-06 RX ADMIN — HEPARIN SODIUM 5000 UNITS: 5000 INJECTION INTRAVENOUS; SUBCUTANEOUS at 09:11

## 2023-11-06 RX ADMIN — FAMOTIDINE 20 MG: 20 TABLET ORAL at 08:11

## 2023-11-06 NOTE — PROCEDURES
"Mara Mojica is a 58 y.o. female patient.    Temp: 98 °F (36.7 °C) (11/06/23 1505)  Pulse: 94 (11/06/23 1522)  Resp: 18 (11/06/23 1522)  BP: 130/75 (11/06/23 1505)  SpO2: 98 % (11/06/23 1522)  Weight: 56.2 kg (123 lb 14.4 oz) (10/26/23 0923)  Height: 4' 11" (149.9 cm) (11/06/23 1522)       Arterial Line    Date/Time: 11/6/2023 5:43 PM  Location procedure was performed: [facility] NEURO CRITICAL CARE    Performed by: Roselia Haynes MD  Authorized by: Roselia Haynes MD  Consent Done: Yes  Consent: Written consent obtained.  Risks and benefits: risks, benefits and alternatives were discussed  Consent given by: guardian  Patient understanding: patient states understanding of the procedure being performed  Required items: required blood products, implants, devices, and special equipment available  Patient identity confirmed: name  Preparation: Patient was prepped and draped in the usual sterile fashion.  Indications: hemodynamic monitoring  Location: left radial    Patient sedated: yes  Darron's test normal: yes  Needle gauge: 22  Seldinger technique: Seldinger technique used  Number of attempts: 1  Complications: No  Estimated blood loss (mL): 1  Specimens: No  Implants: No  Post-procedure: line sutured and dressing applied  Post-procedure CMS: normal  Patient tolerance: Patient tolerated the procedure well with no immediate complications          11/6/2023    "

## 2023-11-06 NOTE — ANESTHESIA PROCEDURE NOTES
Ad Hoc Intubation    Date/Time: 11/5/2023 10:15 PM    Performed by: Ricardo Guzmán MD  Authorized by: Elizabeth Lundberg MD    Indications:  Respiratory distress and respiratory failure  Diagnosis:  Respiratory distress  Provider Requesting Consult:  Dr. Alarcon  Patient Location:  ICU  Timeout:  11/5/2023 10:00 PM  Procedure Start Time:  11/5/2023 10:01 PM  Procedure End Time:  11/5/2023 10:15 PM  Staff:     Anesthesiologist Present: Yes    Intubation:     Induction:  Intravenous    Intubated:  Postinduction    Mask Ventilation:  N/a    Attempts:  1    Attempted By:  Resident anesthesiologist    Method of Intubation:  Direct    Blade:  Cantu 3    Laryngeal View Grade: Grade I - full view of chords      Difficult Airway Encountered?: No      Complications:  None    Airway Device:  Oral endotracheal tube    Airway Device Size:  7.0    Style/Cuff Inflation:  Cuffed    Tube secured:  20    Secured at:  The lips    Placement Verified By:  Capnometry    Complicating Factors:  None    Findings Post-Intubation:  BS equal bilateral and atraumatic/condition of teeth unchanged  Notes:      Given 100mg of Propofol  70mg of succhinylcholi  And 200mcg of phenylephrine

## 2023-11-06 NOTE — SIGNIFICANT EVENT
Patient extubated today on day shift. Called to bedside upon shift change with concerns of inspiratory stridor, though satting 100%. Patient received racemic epi and duo neb treatments, though with worsening stridor. CXR ordered. Attempted 10 of dex but with continued stridor and increased work of breathing. Anesthesia called to bedside to intubate. Continue Dex 4q6 x 24 hours.     Discussed with Dr. Fournier.     35 minutes of Critical care time was spent personally by me on the following activities: development of treatment plan with patient or surrogate and bedside caregivers, discussions with consultants, evaluation of patient's response to treatment, examination of patient, ordering and performing treatments and interventions, ordering and review of laboratory studies, ordering and review of radiographic studies, pulse oximetry, antibiotic titration if applicable, vasopressor titration if applicable, re-evaluation of patient's condition. This critical care time did not overlap with that of any other provider or involve time for any procedures. There is high probability for acute neurological change leading to clinical and possibly life-threatening deterioration requiring highest level of provider preparedness for urgent intervention.

## 2023-11-06 NOTE — ASSESSMENT & PLAN NOTE
58 year old presented for altered mental status on 10/18. Clinical picture confounded with hepatic encephalopathy, infection, and seizure activity. Neurology was consulted by primary team prior to admission to [facility].    10/27 EEG IMPRESSION:  This is an abnormal EEG during lethargic state.  Diffuse disorganized slowing of the background was noted.  Frequent triphasic waves noted.  Left posterior pseudo periodic epileptiform discharges were noted.  Numerous electrographic seizures emanating from the left posterior region were noted.    10/31 - rehook and 11/1 read with frequent discharges, vimpat lowered to 50 given mental status and liver function, will continue to watch on eeg  vEEG in place  Keppra 1500 BID  Vimpat 50 mg BID  Neurochecks q1hr  Vitals q1hr   11/03: EEG remains negative with lacosamide taper, DC and monitor for seizure recurrence  11/04: stable EEG, beginning to awaken  11/5: remains without seizures    Wean keppra today

## 2023-11-06 NOTE — ASSESSMENT & PLAN NOTE
Scope complete by ENT   -patient on scheduled nebs and racemic epi without improvement in respiratory status   -elective intubation 10/31 with 6.0 ETT with anesthesia  -11/1 tube exchange to 7.0 ETT to allow suctioning, completed steroids.  11/3: continue sbt trials, extubation attempt if able to awaken further   11/4: tolerating sbt, hold TFs at midnight  11/5: has cuff leak, extubate    Reintubated overnight for stridor   Dexamethasone x 24 h

## 2023-11-06 NOTE — NURSING
Intubation performed by MD Arielle    2203 succ 70, propofol 100  2205 200cc woody  2207 200cc woody  ETT 20 @ lip  2209 BP 90/44  2209 200cc woody  2210 500 bolus given. Pt in trendelenburg. BP 91/50  2212 BP 91/53  2216 /56 (80)

## 2023-11-06 NOTE — ASSESSMENT & PLAN NOTE
2/2 to tracheal stenosis   -patient with stridor, increased secretions, requiring 5L 28% and inability to protect airway   -intubated by anesthesia team with small ETT    Vent Mode: A/C  Oxygen Concentration (%):  [30-50] 30  Resp Rate Total:  [16 br/min-28 br/min] 22 br/min  Vt Set:  [380 mL] 380 mL  PEEP/CPAP:  [5 cmH20] 5 cmH20  Mean Airway Pressure:  [1.8 cmH20-10 cmH20] 9.4 cmH20      Reintubated after trial extubation 2/2 stridor non responsive to med management  Dexamethasone 10 mg x1, followed by 4 mg q6 hours

## 2023-11-06 NOTE — ASSESSMENT & PLAN NOTE
This is a 58-year-old woman with a history of decompensated alcoholic cirrhosis.  Suspect thrombocytopenia is likely secondary to chronic alcohol use and is consumptive in nature. If trend worsens, will consider consulting Hematology.    Daily CBC, transfuse only for active bleeding    Begin heparin ppx

## 2023-11-06 NOTE — PLAN OF CARE
"[facility] Care Plan    POC reviewed with Mara Mojica and significant other at 1500. Pt's significant other verbalized understanding. Questions and concerns addressed. No acute events today. Pt progressing toward goals. Will continue to monitor. See below and flowsheets for full assessment and VS info.     -Pt extubated without incident. On 6L nasal cannula. Oral care given and tube feed restarted.         Is this a stroke patient? no    Neuro:  [place] Coma Scale  Best Eye Response: 4-->(E4) spontaneous  Best Motor Response: 5-->(M5) localizes pain  Best Verbal Response: 1-->(V1) none  [place] Coma Scale Score: 10  Assessment Qualifiers: patient not sedated/intubated  Pupil PERRLA: yes     24 hr Temp:  [98.1 °F (36.7 °C)-99.2 °F (37.3 °C)]     CV:   Rhythm: sinus tachycardia, normal sinus rhythm  BP goals:   SBP < 180  MAP > 65    Resp:      Vent Mode: Spont  Set Rate: 12 BPM  Oxygen Concentration (%): 30  Vt Set: 400 mL  PEEP/CPAP: 5 cmH20  Pressure Support: 8 cmH20    Plan:  wean from nasal cannula    GI/:     Diet/Nutrition Received: tube feeding  Last Bowel Movement: 11/05/23  Voiding Characteristics: external catheter    Intake/Output Summary (Last 24 hours) at 11/5/2023 1843  Last data filed at 11/5/2023 1801  Gross per 24 hour   Intake 1630 ml   Output 2020 ml   Net -390 ml     Unmeasured Output  Urine Occurrence: 1  Stool Occurrence: 0  Emesis Occurrence: 0  Pad Count: 1    Labs/Accuchecks:  Recent Labs   Lab 11/05/23  0016   WBC 13.60*   RBC 2.71*   HGB 8.6*   HCT 27.8*   PLT 57*      Recent Labs   Lab 11/05/23  0016   *   K 4.5   CO2 20*   *   BUN 31*   CREATININE 0.7   ALKPHOS 177*   ALT 59*   AST 85*   BILITOT 6.1*      Recent Labs   Lab 11/05/23  0016   INR 1.7*    No results for input(s): "CPK", "CPKMB", "TROPONINI", "MB" in the last 168 hours.    Electrolytes: N/A - electrolytes WDL  Accuchecks: Q6H    Gtts:      LDA/Wounds:  Lines/Drains/Airways       Drain  Duration                  " NG/OG Tube 10/20/23 2000 16 Fr. Left nostril 15 days         Rectal Tube 10/28/23 0530 fecal management system 8 days    Female External Urinary Catheter 11/03/23 1812 2 days              Arterial Line  Duration             Arterial Line 10/31/23 1617 Right Brachial 5 days              Peripheral Intravenous Line  Duration                  Peripheral IV - Single Lumen 11/05/23 0017 20 G Posterior;Right Hand <1 day         Peripheral IV - Single Lumen 11/05/23 0017 20 G;1 1/4 in Left;Posterior Forearm <1 day                  Wounds: No  Wound care consulted: No

## 2023-11-06 NOTE — NURSING
ALEX oFrman notified of pt's increased work of breathing and stridor. Duoneb treatments, racemic epi, and CXR ordered. Dexamethasone 10mg ordered now, and 4mg q6. Aerosol facemask in place. Tubefeeds off at this time.    WCTM

## 2023-11-06 NOTE — PROGRESS NOTES
[facility] - Neuro Critical Care  Neurocritical Care  Progress Note    Admit Date: 10/25/2023  Service Date: 11/06/2023  Length of Stay: 12    Subjective:     Chief Complaint: Acute encephalopathy    History of Present Illness: This is a 58-year-old female with a past medical history of alcoholic cirrhosis, s/p ex-lap w/ bowel resection for incarcerated hernia, who initially presented on 10/18 to [facility] with acute encephalopathy.  Her  found her on the floor at home with evidence of fecal/urine incontinence with confusion and slurred speech. Possible reported tongue injury in chart. Reported concern L sided weakness and down drift of L arm however CT head without evidence of acute abnormalities, MRI brain with only small vessel vascular changes without evidence of territorial infarct.     Hospital Course: EEG done on 10/19 with mild diffuse nonspecific background slowing, no potential epileptiform activity. Repeat MRI brain with contrast on 10/23 unchanged from initial MRI. Became more lethargic and was no longer following commands or answering questions. Due to worsening hepatic encephalopathy in setting of hepatic cirrhosis, patient transferred to [facility] for management with transplant team. Has remained on antibiotics, lactulose and rifaximin for treatment of UTI with pansensitive Ecoli, HE, and presumed SBP. Neurology consulted for management recommendations regarding persistent AMS. Had repeat EEG done on 10/24 with similar findings to prior EEG showing mild generalized non-specific cerebral dysfunction and no electrographic seizures or indication of seizure tendency. Additional CT head w/o contrast on 10/25 without evidence of acute issues. Remains confused and unable to answer questions on exam. Not withdrawing to painful stimuli. Was able to awaken to verbal stimuli in AM however when reevaluated in afternoon unresponsive however was after receiving Ativan.     On admission to [facility]:  Patient had EEG  running, and was noted to have seizures at 7:30 a.m., 8:00 a.m. in, and 10:00 a.m. was noted to be in focal status prior to receiving Vimpat.  Patient was noted to have stridor, worsening secretions with suspicion for aspiration, decreased airway protection, and rapids was called due to low GCS score of 6. Antibiotics broadened to Vanc/Zosyn. Clinical picture of mental status confounded with episodes of seizures, infection, and hepatic encephalopathy.             Hospital Course: 10/28: Improving GCS from 6 to 10. Continuing pulmonary toilet and deep suctioning for stridor and copious secretions. UA positive for candida. Blood cultures from 10/27 NGTD. Sputum cultures sent. Patient on day 2 of broadened antibiotics vanc/zosyn due to worsening clinical status but will discontinue tomorrow if cultures remain negative. Still hypernatremic, treating with D5W. Patient was transferred with no ordered diuretics, very edematous on physical exam. Adequate stooling on lactulose and rifaximin. Due to increased UOP/stooling will place eckert for one day for irritated skin. EEG update showing no seizures since 10am 10/27. Monitoring CBC for thrombocytopenia and macrocytic anemia. Discussed code status and goals of care with  and best friend at bedside. Trickle feeds resumed.   10/29: No acute events overnight, EEG reportedly without further seizures for ~48 hours can disconnect once formal report is in, neuro status slowly improving as patient now tracking in room, moving extremities, responding with appropriate emotional reactions to her .  Respiratory status largely unchanged with intermittent events of large volume breaths that sound almost stridorous but without actual respiratory distress- gas remains compensated.  UOP low, diuresis resumed.  10/30/2023: d/c mucomyst nebs, add racemic epi scheduled nebs, add budesonide and dex 6 q8 hours, ammonia at 35- continue lactulose and rifaximin, abg reviewed, 40mEq of K  once, ENT consulted for stridor, continue eckert catheter in today   10/31/2023 Patient with worsening respiratory status, continued decreased mentation and increased secretions. Plan for elective intubation in controlled setting with anesthesia. Will also recheck eeg, if negative will start to wean AEDs and see if that improves patient's arousal. PLT stabilized, continue to monitor.   11/1/2023 this morning patient's 6.0 ETT exchanged for 7.0 to allow suctioning. EEG with frequent discharges, lowered vimpat to 50 mg and will follow EEG. Attempting to remove confounding factors for patients mental status. Slow drop in H/H, 1 unit ordered.   11/2/2023 NAEO, cEEG to remain in place, no seizures but is having frequent discharges in runs. Continue lower dose vimpat and 1500 keppra Eye opening this morning which is slight improvement in exam. Failed SBT   11/3/2023: no improvement with decrease in lacosamide, no re-development of seizures, if does not wake up in next 48 hrs off lacosamide or redevelops seizures, prognosis is extremely poor  11/4/2023: improved mental state this am, no seizures overnight  11/5/2023: LOC stable, has cuff leak, trial of extubation  11/06/2023 reintubated for stridor non responsive to med management overnight    Interval History: Reintubated overnight for stridor non responsive to med management.  Continue dexamethasone for 24h. Wean keppra. Consider spot EEG later in the week.    Review of Systems:Unable to obtain a complete ROS due to level of consciousness.     Vitals:   Temp: 98 °F (36.7 °C)  Pulse: 94  Rhythm: normal sinus rhythm  BP: (!) 148/63  MAP (mmHg): 91  Resp: (!) 23  SpO2: 100 %  Oxygen Concentration (%): 30  Vent Mode: A/C  Set Rate: 16 BPM  Vt Set: 380 mL  PEEP/CPAP: 5 cmH20  Peak Airway Pressure: 20 cmH20  Mean Airway Pressure: 9.4 cmH20  Plateau Pressure: 0 cmH20    Temp  Min: 97.5 °F (36.4 °C)  Max: 98.6 °F (37 °C)  Pulse  Min: 63  Max: 117  BP  Min: 98/53  Max: 194/75  MAP  (mmHg)  Min: 72  Max: 110  Resp  Min: 16  Max: 38  SpO2  Min: 95 %  Max: 100 %  Oxygen Concentration (%)  Min: 30  Max: 50    11/05 0701 - 11/06 0700  In: 1821.9 [I.V.:92.6]  Out: 1595 [Urine:620; Drains:150]   Unmeasured Output  Urine Occurrence: 1  Stool Occurrence: 0  Emesis Occurrence: 0  Pad Count: 1     Examination:   Constitutional: Well-nourished and -developed. No apparent distress.   Eyes: Conjunctiva clear, anicteric. Lids no lesions.  Head/Ears/Nose/Mouth/Throat/Neck: Moist mucous membranes. External ears, nose atraumatic.   Cardiovascular: Regular rhythm. No murmurs. No leg edema.  Respiratory: Mechanical respirations. Clear to auscultation.  Gastrointestinal: No hernia. Soft, nondistended, nontender. + bowel sounds.    Neurologic:  -GCS [id number]  -Cranial nerves II-XII grossly intact, particularly   -Motor withdraws to noxious stimuli in all four extremities  -Sensation intact      Medications:   Continuousdextrose 10 % in water (D10W)    ScheduleddexAMETHasone, 4 mg, Q6H  famotidine, 20 mg, BID  folic acid, 1 mg, Daily  heparin (porcine), 5,000 Units, Q12H  lactulose, 30 g, BID  levetiracetam, 1,000 mg, BID  multivitamin, 1 tablet, Daily  rifAXImin, 550 mg, BID  thiamine, 100 mg, Daily    PRNalbuterol-ipratropium, 3 mL, Q4H PRN  calcium gluconate IVPB, 1 g, PRN  calcium gluconate IVPB, 2 g, PRN  calcium gluconate IVPB, 3 g, PRN  dextrose 10 % in water (D10W), , Continuous PRN  dextrose 10%, 12.5 g, PRN  dextrose 10%, 25 g, PRN  glucagon (human recombinant), 1 mg, PRN  hydrALAZINE, 10 mg, Q4H PRN  HYDROmorphone, 0.1 mg, Q4H PRN  insulin aspart U-100, 0-10 Units, Q4H PRN  labetalol, 10 mg, Q6H PRN  magnesium sulfate IVPB, 2 g, PRN  magnesium sulfate IVPB, 4 g, PRN  ondansetron, 4 mg, Q8H PRN  potassium chloride, 40 mEq, PRN   And  potassium chloride, 60 mEq, PRN   And  potassium chloride, 80 mEq, PRN  racepinephrine, 0.5 mL, Q4H PRN  sodium phosphate 15 mmol in dextrose 5 % (D5W) 250 mL IVPB, 15 mmol,  PRN  sodium phosphate 20.01 mmol in dextrose 5 % (D5W) 250 mL IVPB, 20.01 mmol, PRN  sodium phosphate 30 mmol in dextrose 5 % (D5W) 250 mL IVPB, 30 mmol, PRN       Today I independently reviewed pertinent medications, lines/drains/airways, imaging, cardiology results, laboratory results, microbiology results,     ISTAT:   Recent Labs   Lab 11/06/23  0409   PH 7.451*   PCO2 35.5   PO2 179*   POCSATURATED 100   HCO3 24.7   BE 1   POCTCO2 26   SAMPLE ARTERIAL      Chem:   Recent Labs   Lab 11/06/23  0026   *   K 4.6   *   CO2 19*   *   BUN 33*   CREATININE 0.7   CALCIUM 8.8   MG 2.2   PHOS 4.1   ANIONGAP 8   PROT 5.3*   ALBUMIN 2.4*   BILITOT 6.9*   ALKPHOS 125   AST 81*   ALT 52*     Heme:   Recent Labs   Lab 11/06/23  0026   WBC 11.29   HGB 8.0*   HCT 25.7*   PLT 54*   INR 1.8*     Endo:   Recent Labs   Lab 11/06/23  0025 11/06/23  0607 11/06/23  1123   POCTGLUCOSE 118* 173* 229*          Assessment/Plan:     Neuro  * Acute encephalopathy  See acute hepatic encephalopathy and seizures    Seizure  58 year old presented for altered mental status on 10/18. Clinical picture confounded with hepatic encephalopathy, infection, and seizure activity. Neurology was consulted by primary team prior to admission to [facility].    10/27 EEG IMPRESSION:  This is an abnormal EEG during lethargic state.  Diffuse disorganized slowing of the background was noted.  Frequent triphasic waves noted.  Left posterior pseudo periodic epileptiform discharges were noted.  Numerous electrographic seizures emanating from the left posterior region were noted.    10/31 - rehook and 11/1 read with frequent discharges, vimpat lowered to 50 given mental status and liver function, will continue to watch on eeg  vEEG in place  Keppra 1500 BID  Vimpat 50 mg BID  Neurochecks q1hr  Vitals q1hr   11/03: EEG remains negative with lacosamide taper, DC and monitor for seizure recurrence  11/04: stable EEG, beginning to awaken  11/5: remains without  seizures    Wean keppra today    Pulmonary  Tracheal stenosis  Scope complete by ENT   -patient on scheduled nebs and racemic epi without improvement in respiratory status   -elective intubation 10/31 with 6.0 ETT with anesthesia  -11/1 tube exchange to 7.0 ETT to allow suctioning, completed steroids.  11/3: continue sbt trials, extubation attempt if able to awaken further   11/4: tolerating sbt, hold TFs at midnight  11/5: has cuff leak, extubate    Reintubated overnight for stridor   Dexamethasone x 24 h    Hematology  Thrombocytopenia  This is a 58-year-old woman with a history of decompensated alcoholic cirrhosis.  Suspect thrombocytopenia is likely secondary to chronic alcohol use and is consumptive in nature. If trend worsens, will consider consulting Hematology.    Daily CBC, transfuse only for active bleeding    Begin heparin ppx      GI  Acute hepatic encephalopathy  This is a 52 year old woman with a history of ethanol cirrhosis who presents after being found on the ground with fecal and urinary incontinence, and altered mental status. Elevated ammonia on admission.    - Continue lactulose via NG tube TID (titrate till 3-4 daily BM achieved). Continue Xifaxin.   - Avoid opiates and benzodiazepines, if able.   - IV thiamine, folate supplementation, multivitamin through NG tube.  - concern that comatose state is irreversible brain damage, will rule out other causes first, EEG and wean AEDs    -MELD score ~50% survival without transplant  11/3: if no improvement off lacosamide, prognosis for return to reasonable conscious state is small  11/4: cont max hepatic encephalopathy treatment  11/5: stable with brief ability to arouse    Decompensated alcoholic hepatic cirrhosis  Hepatology following PEth. Daily CBC, CMP & INR.   Not currently being evaluated for transplant due to clinical status  Vitamin K given for chronic coagulopathy  Continuing lactulose and rifaximin titrated to 3-4 bowel movements  daily  Ammonia 59 on am labs    Other  Hypoxic respiratory failure  2/2 to tracheal stenosis   -patient with stridor, increased secretions, requiring 5L 28% and inability to protect airway   -intubated by anesthesia team with small ETT    Vent Mode: A/C  Oxygen Concentration (%):  [30-50] 30  Resp Rate Total:  [16 br/min-28 br/min] 22 br/min  Vt Set:  [380 mL] 380 mL  PEEP/CPAP:  [5 cmH20] 5 cmH20  Mean Airway Pressure:  [1.8 cmH20-10 cmH20] 9.4 cmH20      Reintubated after trial extubation 2/2 stridor non responsive to med management  Dexamethasone 10 mg x1, followed by 4 mg q6 hours      Debility  PT/OT when appropriate          The patient is being Prophylaxed for:  Venous Thromboembolism with: Chemical  Stress Ulcer with: H2B  Ventilator Pneumonia with: chlorhexidine oral care    Activity Orders            Diet NPO: NPO starting at 11/05 0500    Elevate HOB Elevate (30-45 degrees) Elevate HOB to 30 - 45 degrees during feeding unless otherwise stated starting at 10/28 1218    Elevate HOB to 30-45 degrees during feeding unless otherwise stated starting at 10/26 1138    Progressive Mobility Protocol (mobilize patient to their highest level of functioning at least twice daily) starting at 10/25 2000    Turn patient starting at 10/25 2000          Full Code    Critical care time: 32 minutes    Elizabeth Gaspar PA-C  Neurocritical Care  [facility] - Neuro Critical Care

## 2023-11-06 NOTE — ASSESSMENT & PLAN NOTE
Hepatology following PEth. Daily CBC, CMP & INR.   Not currently being evaluated for transplant due to clinical status  Vitamin K given for chronic coagulopathy  Continuing lactulose and rifaximin titrated to 3-4 bowel movements daily  Ammonia 59 on am labs

## 2023-11-06 NOTE — PLAN OF CARE
"[facility] Care Plan    POC reviewed with Mara Mojica and significant other at 0300. Pt's significant other verbalized understanding. Questions and concerns addressed.  See below and flowsheets for full assessment and VS info.     - pt reintubated, see notes  - 500ml NS bolus given for hypotension  - levophed on low dose for hypotension  - TF on at goal    Is this a stroke patient? no    Neuro:  [place] Coma Scale  Best Eye Response: 2-->(E2) to pain  Best Motor Response: 4-->(M4) withdraws from pain  Best Verbal Response: 1-->(V1) none  [place] Coma Scale Score: 7  Assessment Qualifiers: patient intubated  Pupil PERRLA: yes     24hr Temp:  [97.5 °F (36.4 °C)-99.2 °F (37.3 °C)]     CV:   Rhythm: normal sinus rhythm  BP goals:   SBP < 180  MAP > 65    Resp:      Vent Mode: A/C  Set Rate: 16 BPM  Oxygen Concentration (%): 30  Vt Set: 380 mL  PEEP/CPAP: 5 cmH20  Pressure Support: 8 cmH20    Plan: wean to extubate    GI/:     Diet/Nutrition Received: tube feeding  Last Bowel Movement: 11/05/23  Voiding Characteristics: external catheter    Intake/Output Summary (Last 24 hours) at 11/6/2023 0642  Last data filed at 11/6/2023 0601  Gross per 24 hour   Intake 1821.94 ml   Output 1595 ml   Net 226.94 ml     Unmeasured Output  Urine Occurrence: 1  Stool Occurrence: 0  Emesis Occurrence: 0  Pad Count: 1    Labs/Accuchecks:  Recent Labs   Lab 11/06/23  0026   WBC 11.29   RBC 2.46*   HGB 8.0*   HCT 25.7*   PLT 54*      Recent Labs   Lab 11/06/23  0026   *   K 4.6   CO2 19*   *   BUN 33*   CREATININE 0.7   ALKPHOS 125   ALT 52*   AST 81*   BILITOT 6.9*      Recent Labs   Lab 11/06/23  0026   INR 1.8*    No results for input(s): "CPK", "CPKMB", "TROPONINI", "MB" in the last 168 hours.    Electrolytes: N/A - electrolytes WDL  Accuchecks: Q6H    Gtts:   NORepinephrine bitartrate-D5W 0.04 mcg/kg/min (11/06/23 0601)       LDA/Wounds:  Lines/Drains/Airways       Drain  Duration                  NG/OG Tube 10/20/23 2000 16 " Fr. Left nostril 16 days         Rectal Tube 10/28/23 0530 fecal management system 9 days    Female External Urinary Catheter 11/03/23 1812 2 days              Airway  Duration                  Airway - Non-Surgical 11/05/23 2000 <1 day       Airway Anesthesia 11/05/23 <1 day              Arterial Line  Duration             Arterial Line 10/31/23 1617 Right Brachial 5 days              Peripheral Intravenous Line  Duration                  Peripheral IV - Single Lumen 11/05/23 0017 20 G Posterior;Right Hand 1 day         Peripheral IV - Single Lumen 11/05/23 0017 20 G;1 1/4 in Left;Posterior Forearm 1 day                  Wounds: No  Wound care consulted: No

## 2023-11-07 LAB
ALBUMIN SERPL BCP-MCNC: 2.4 G/DL (ref 3.5–5.2)
ALLENS TEST: ABNORMAL
ALP SERPL-CCNC: 130 U/L (ref 55–135)
ALT SERPL W/O P-5'-P-CCNC: 48 U/L (ref 10–44)
ANION GAP SERPL CALC-SCNC: 5 MMOL/L (ref 8–16)
AST SERPL-CCNC: 73 U/L (ref 10–40)
BASOPHILS # BLD AUTO: 0.02 K/UL (ref 0–0.2)
BASOPHILS NFR BLD: 0.1 % (ref 0–1.9)
BILIRUB SERPL-MCNC: 7.6 MG/DL (ref 0.1–1)
BUN SERPL-MCNC: 54 MG/DL (ref 6–20)
CALCIUM SERPL-MCNC: 9.3 MG/DL (ref 8.7–10.5)
CHLORIDE SERPL-SCNC: 119 MMOL/L (ref 95–110)
CO2 SERPL-SCNC: 21 MMOL/L (ref 23–29)
CREAT SERPL-MCNC: 0.8 MG/DL (ref 0.5–1.4)
DELSYS: ABNORMAL
DIFFERENTIAL METHOD: ABNORMAL
EOSINOPHIL # BLD AUTO: 0 K/UL (ref 0–0.5)
EOSINOPHIL NFR BLD: 0 % (ref 0–8)
ERYTHROCYTE [DISTWIDTH] IN BLOOD BY AUTOMATED COUNT: 19 % (ref 11.5–14.5)
ERYTHROCYTE [SEDIMENTATION RATE] IN BLOOD BY WESTERGREN METHOD: 16 MM/H
EST. GFR  (NO RACE VARIABLE): >60 ML/MIN/1.73 M^2
FIO2: 30
GLUCOSE SERPL-MCNC: 196 MG/DL (ref 70–110)
HCO3 UR-SCNC: 22.3 MMOL/L (ref 24–28)
HCT VFR BLD AUTO: 29.3 % (ref 37–48.5)
HGB BLD-MCNC: 9.2 G/DL (ref 12–16)
IMM GRANULOCYTES # BLD AUTO: 0.39 K/UL (ref 0–0.04)
IMM GRANULOCYTES NFR BLD AUTO: 1.7 % (ref 0–0.5)
INR PPP: 1.7 (ref 0.8–1.2)
LYMPHOCYTES # BLD AUTO: 0.7 K/UL (ref 1–4.8)
LYMPHOCYTES NFR BLD: 3.1 % (ref 18–48)
MAGNESIUM SERPL-MCNC: 2.5 MG/DL (ref 1.6–2.6)
MCH RBC QN AUTO: 31.7 PG (ref 27–31)
MCHC RBC AUTO-ENTMCNC: 31.4 G/DL (ref 32–36)
MCV RBC AUTO: 101 FL (ref 82–98)
MIN VOL: 7.02
MODE: ABNORMAL
MONOCYTES # BLD AUTO: 1.9 K/UL (ref 0.3–1)
MONOCYTES NFR BLD: 8.2 % (ref 4–15)
NEUTROPHILS # BLD AUTO: 19.9 K/UL (ref 1.8–7.7)
NEUTROPHILS NFR BLD: 86.9 % (ref 38–73)
NRBC BLD-RTO: 0 /100 WBC
PCO2 BLDA: 33 MMHG (ref 35–45)
PEEP: 5
PH SMN: 7.44 [PH] (ref 7.35–7.45)
PHOSPHATE SERPL-MCNC: 3.7 MG/DL (ref 2.7–4.5)
PIP: 19
PLATELET # BLD AUTO: 46 K/UL (ref 150–450)
PMV BLD AUTO: 12.4 FL (ref 9.2–12.9)
PO2 BLDA: 101 MMHG (ref 80–100)
POC BE: -2 MMOL/L
POC SATURATED O2: 98 % (ref 95–100)
POC TCO2: 23 MMOL/L (ref 23–27)
POCT GLUCOSE: 154 MG/DL (ref 70–110)
POCT GLUCOSE: 174 MG/DL (ref 70–110)
POCT GLUCOSE: 181 MG/DL (ref 70–110)
POCT GLUCOSE: 187 MG/DL (ref 70–110)
POCT GLUCOSE: 197 MG/DL (ref 70–110)
POCT GLUCOSE: 200 MG/DL (ref 70–110)
POTASSIUM SERPL-SCNC: 4.5 MMOL/L (ref 3.5–5.1)
PROT SERPL-MCNC: 5.4 G/DL (ref 6–8.4)
PROTHROMBIN TIME: 17.9 SEC (ref 9–12.5)
RBC # BLD AUTO: 2.9 M/UL (ref 4–5.4)
SAMPLE: ABNORMAL
SITE: ABNORMAL
SODIUM SERPL-SCNC: 145 MMOL/L (ref 136–145)
SP02: 98
VT: 380
WBC # BLD AUTO: 22.91 K/UL (ref 3.9–12.7)

## 2023-11-07 PROCEDURE — 20000000 HC ICU ROOM: Mod: NTX

## 2023-11-07 PROCEDURE — 94003 VENT MGMT INPAT SUBQ DAY: CPT | Mod: NTX

## 2023-11-07 PROCEDURE — 99291 CRITICAL CARE FIRST HOUR: CPT | Mod: FS,NTX,,

## 2023-11-07 PROCEDURE — 27100171 HC OXYGEN HIGH FLOW UP TO 24 HOURS: Mod: NTX

## 2023-11-07 PROCEDURE — 63600175 PHARM REV CODE 636 W HCPCS: Mod: NTX | Performed by: PHYSICIAN ASSISTANT

## 2023-11-07 PROCEDURE — 51798 US URINE CAPACITY MEASURE: CPT | Mod: NTX

## 2023-11-07 PROCEDURE — 25000003 PHARM REV CODE 250: Mod: NTX

## 2023-11-07 PROCEDURE — 99499 UNLISTED E&M SERVICE: CPT | Mod: NTX,,, | Performed by: PSYCHIATRY & NEUROLOGY

## 2023-11-07 PROCEDURE — 99900035 HC TECH TIME PER 15 MIN (STAT): Mod: NTX

## 2023-11-07 PROCEDURE — 99900026 HC AIRWAY MAINTENANCE (STAT): Mod: NTX

## 2023-11-07 PROCEDURE — 25000242 PHARM REV CODE 250 ALT 637 W/ HCPCS: Mod: NTX

## 2023-11-07 PROCEDURE — 94668 MNPJ CHEST WALL SBSQ: CPT | Mod: NTX

## 2023-11-07 PROCEDURE — 25000003 PHARM REV CODE 250: Mod: NTX | Performed by: HOSPITALIST

## 2023-11-07 PROCEDURE — 27200966 HC CLOSED SUCTION SYSTEM: Mod: NTX

## 2023-11-07 PROCEDURE — 63600175 PHARM REV CODE 636 W HCPCS: Mod: NTX

## 2023-11-07 PROCEDURE — 25000003 PHARM REV CODE 250: Mod: NTX | Performed by: PHYSICIAN ASSISTANT

## 2023-11-07 PROCEDURE — 37799 UNLISTED PX VASCULAR SURGERY: CPT | Mod: NTX

## 2023-11-07 PROCEDURE — 25000242 PHARM REV CODE 250 ALT 637 W/ HCPCS: Mod: NTX | Performed by: PSYCHIATRY & NEUROLOGY

## 2023-11-07 PROCEDURE — 99499 NO LOS: ICD-10-PCS | Mod: NTX,,, | Performed by: PSYCHIATRY & NEUROLOGY

## 2023-11-07 PROCEDURE — 85025 COMPLETE CBC W/AUTO DIFF WBC: CPT | Mod: NTX | Performed by: HOSPITALIST

## 2023-11-07 PROCEDURE — 84100 ASSAY OF PHOSPHORUS: CPT | Mod: NTX | Performed by: STUDENT IN AN ORGANIZED HEALTH CARE EDUCATION/TRAINING PROGRAM

## 2023-11-07 PROCEDURE — 80053 COMPREHEN METABOLIC PANEL: CPT | Mod: NTX | Performed by: HOSPITALIST

## 2023-11-07 PROCEDURE — 83735 ASSAY OF MAGNESIUM: CPT | Mod: NTX | Performed by: STUDENT IN AN ORGANIZED HEALTH CARE EDUCATION/TRAINING PROGRAM

## 2023-11-07 PROCEDURE — 99291 PR CRITICAL CARE, E/M 30-74 MINUTES: ICD-10-PCS | Mod: FS,NTX,,

## 2023-11-07 PROCEDURE — 82803 BLOOD GASES ANY COMBINATION: CPT | Mod: NTX

## 2023-11-07 PROCEDURE — 94640 AIRWAY INHALATION TREATMENT: CPT | Mod: NTX

## 2023-11-07 PROCEDURE — 94761 N-INVAS EAR/PLS OXIMETRY MLT: CPT | Mod: NTX

## 2023-11-07 PROCEDURE — 25000003 PHARM REV CODE 250: Mod: NTX | Performed by: PSYCHIATRY & NEUROLOGY

## 2023-11-07 PROCEDURE — 85610 PROTHROMBIN TIME: CPT | Mod: NTX | Performed by: STUDENT IN AN ORGANIZED HEALTH CARE EDUCATION/TRAINING PROGRAM

## 2023-11-07 RX ORDER — ACETAMINOPHEN 650 MG/20.3ML
500 LIQUID ORAL EVERY 6 HOURS
Status: DISCONTINUED | OUTPATIENT
Start: 2023-11-07 | End: 2023-11-08

## 2023-11-07 RX ORDER — LEVETIRACETAM 100 MG/ML
750 SOLUTION ORAL 2 TIMES DAILY
Status: DISCONTINUED | OUTPATIENT
Start: 2023-11-07 | End: 2023-11-09

## 2023-11-07 RX ORDER — ACETYLCYSTEINE 100 MG/ML
4 SOLUTION ORAL; RESPIRATORY (INHALATION) 4 TIMES DAILY
Status: DISCONTINUED | OUTPATIENT
Start: 2023-11-07 | End: 2023-11-09

## 2023-11-07 RX ORDER — IPRATROPIUM BROMIDE AND ALBUTEROL SULFATE 2.5; .5 MG/3ML; MG/3ML
3 SOLUTION RESPIRATORY (INHALATION) EVERY 6 HOURS
Status: DISCONTINUED | OUTPATIENT
Start: 2023-11-07 | End: 2023-11-07

## 2023-11-07 RX ORDER — IPRATROPIUM BROMIDE AND ALBUTEROL SULFATE 2.5; .5 MG/3ML; MG/3ML
3 SOLUTION RESPIRATORY (INHALATION) 4 TIMES DAILY
Status: DISCONTINUED | OUTPATIENT
Start: 2023-11-07 | End: 2023-11-10

## 2023-11-07 RX ORDER — HYDROMORPHONE HYDROCHLORIDE 1 MG/ML
0.2 INJECTION, SOLUTION INTRAMUSCULAR; INTRAVENOUS; SUBCUTANEOUS EVERY 4 HOURS PRN
Status: DISCONTINUED | OUTPATIENT
Start: 2023-11-07 | End: 2023-11-08

## 2023-11-07 RX ORDER — OXYCODONE HYDROCHLORIDE 5 MG/1
5 TABLET ORAL EVERY 12 HOURS PRN
Status: DISCONTINUED | OUTPATIENT
Start: 2023-11-07 | End: 2023-11-08

## 2023-11-07 RX ADMIN — LACTULOSE 30 G: 20 SOLUTION ORAL at 08:11

## 2023-11-07 RX ADMIN — IPRATROPIUM BROMIDE AND ALBUTEROL SULFATE 3 ML: .5; 3 SOLUTION RESPIRATORY (INHALATION) at 08:11

## 2023-11-07 RX ADMIN — OXYCODONE 5 MG: 5 TABLET ORAL at 10:11

## 2023-11-07 RX ADMIN — HEPARIN SODIUM 5000 UNITS: 5000 INJECTION INTRAVENOUS; SUBCUTANEOUS at 09:11

## 2023-11-07 RX ADMIN — ACETYLCYSTEINE 4 ML: 100 INHALANT RESPIRATORY (INHALATION) at 12:11

## 2023-11-07 RX ADMIN — FAMOTIDINE 20 MG: 20 TABLET ORAL at 08:11

## 2023-11-07 RX ADMIN — ACETAMINOPHEN 499.51 MG: 650 SOLUTION ORAL at 12:11

## 2023-11-07 RX ADMIN — ACETYLCYSTEINE 4 ML: 100 INHALANT RESPIRATORY (INHALATION) at 08:11

## 2023-11-07 RX ADMIN — LEVETIRACETAM 750 MG: 500 SOLUTION ORAL at 09:11

## 2023-11-07 RX ADMIN — INSULIN ASPART 2 UNITS: 100 INJECTION, SOLUTION INTRAVENOUS; SUBCUTANEOUS at 07:11

## 2023-11-07 RX ADMIN — INSULIN ASPART 2 UNITS: 100 INJECTION, SOLUTION INTRAVENOUS; SUBCUTANEOUS at 12:11

## 2023-11-07 RX ADMIN — DEXAMETHASONE SODIUM PHOSPHATE 4 MG: 4 INJECTION INTRA-ARTICULAR; INTRALESIONAL; INTRAMUSCULAR; INTRAVENOUS; SOFT TISSUE at 12:11

## 2023-11-07 RX ADMIN — ACETYLCYSTEINE 4 ML: 100 INHALANT RESPIRATORY (INHALATION) at 04:11

## 2023-11-07 RX ADMIN — HEPARIN SODIUM 5000 UNITS: 5000 INJECTION INTRAVENOUS; SUBCUTANEOUS at 08:11

## 2023-11-07 RX ADMIN — THERA TABS 1 TABLET: TAB at 08:11

## 2023-11-07 RX ADMIN — INSULIN ASPART 2 UNITS: 100 INJECTION, SOLUTION INTRAVENOUS; SUBCUTANEOUS at 04:11

## 2023-11-07 RX ADMIN — INSULIN ASPART 1 UNITS: 100 INJECTION, SOLUTION INTRAVENOUS; SUBCUTANEOUS at 09:11

## 2023-11-07 RX ADMIN — LACTULOSE 30 G: 20 SOLUTION ORAL at 09:11

## 2023-11-07 RX ADMIN — LEVETIRACETAM 1000 MG: 500 SOLUTION ORAL at 08:11

## 2023-11-07 RX ADMIN — RIFAXIMIN 550 MG: 550 TABLET ORAL at 09:11

## 2023-11-07 RX ADMIN — THIAMINE HCL TAB 100 MG 100 MG: 100 TAB at 08:11

## 2023-11-07 RX ADMIN — INSULIN ASPART 1 UNITS: 100 INJECTION, SOLUTION INTRAVENOUS; SUBCUTANEOUS at 12:11

## 2023-11-07 RX ADMIN — FAMOTIDINE 20 MG: 20 TABLET ORAL at 09:11

## 2023-11-07 RX ADMIN — INSULIN ASPART 2 UNITS: 100 INJECTION, SOLUTION INTRAVENOUS; SUBCUTANEOUS at 03:11

## 2023-11-07 RX ADMIN — RIFAXIMIN 550 MG: 550 TABLET ORAL at 08:11

## 2023-11-07 RX ADMIN — ACETAMINOPHEN 499.51 MG: 650 SOLUTION ORAL at 07:11

## 2023-11-07 RX ADMIN — IPRATROPIUM BROMIDE AND ALBUTEROL SULFATE 3 ML: .5; 3 SOLUTION RESPIRATORY (INHALATION) at 04:11

## 2023-11-07 RX ADMIN — IPRATROPIUM BROMIDE AND ALBUTEROL SULFATE 3 ML: .5; 3 SOLUTION RESPIRATORY (INHALATION) at 12:11

## 2023-11-07 RX ADMIN — FOLIC ACID 1 MG: 1 TABLET ORAL at 08:11

## 2023-11-07 NOTE — PLAN OF CARE
"[facility] Care Plan    POC reviewed with Mara Mojica at 1400. Pt unable to verbalize understanding. No acute events today. Pt progressing toward goals. Will continue to monitor. See below and flowsheets for full assessment and VS info.             Is this a stroke patient? no    Neuro:  [place] Coma Scale  Best Eye Response: 1-->(E1) none  Best Motor Response: 4-->(M4) withdraws from pain  Best Verbal Response: 1-->(V1) none  [place] Coma Scale Score: 6  Assessment Qualifiers: patient intubated, no eye obstruction present  Pupil PERRLA: yes     24 hr Temp:  [94.3 °F (34.6 °C)-97.9 °F (36.6 °C)]     CV:   Rhythm: normal sinus rhythm  BP goals:   SBP < 180  MAP > 65    Resp:      Vent Mode: A/C  Set Rate: 16 BPM  Oxygen Concentration (%): 30  Vt Set: 380 mL  PEEP/CPAP: 5 cmH20  Pressure Support: 8 cmH20    Plan: wean to extubate    GI/:     Diet/Nutrition Received: NPO, tube feeding  Last Bowel Movement: 11/06/23  Voiding Characteristics: external catheter    Intake/Output Summary (Last 24 hours) at 11/7/2023 1603  Last data filed at 11/7/2023 1537  Gross per 24 hour   Intake 3940.79 ml   Output 1250 ml   Net 2690.79 ml     Unmeasured Output  Urine Occurrence: 1  Stool Occurrence: 0  Emesis Occurrence: 0  Pad Count: 1    Labs/Accuchecks:  Recent Labs   Lab 11/07/23  0017   WBC 22.91*   RBC 2.90*   HGB 9.2*   HCT 29.3*   PLT 46*      Recent Labs   Lab 11/07/23  0017      K 4.5   CO2 21*   *   BUN 54*   CREATININE 0.8   ALKPHOS 130   ALT 48*   AST 73*   BILITOT 7.6*      Recent Labs   Lab 11/07/23  0017   INR 1.7*    No results for input(s): "CPK", "CPKMB", "TROPONINI", "MB" in the last 168 hours.    Electrolytes: N/A - electrolytes WDL  Accuchecks: Q4H    Gtts:   dextrose 10 % in water (D10W)      NORepinephrine bitartrate-D5W 0.06 mcg/kg/min (11/07/23 0601)       LDA/Wounds:  Lines/Drains/Airways       Drain  Duration                  NG/OG Tube 10/20/23 2000 16 Fr. Left nostril 17 days         Rectal " Tube 10/28/23 0530 fecal management system 10 days    Female External Urinary Catheter 11/03/23 1812 3 days              Airway  Duration                  Airway - Non-Surgical 11/05/23 2000 1 day       Airway Anesthesia 11/05/23 1 day              Arterial Line  Duration             Arterial Line 11/06/23 1728 Left Radial <1 day              Peripheral Intravenous Line  Duration                  Peripheral IV - Single Lumen 11/05/23 0017 20 G Posterior;Right Hand 2 days         Peripheral IV - Single Lumen 11/05/23 0017 20 G;1 1/4 in Left;Posterior Forearm 2 days         Peripheral IV - Single Lumen 11/06/23 1910 20 G Anterior;Left Upper Arm <1 day                  Wounds: Yes  Wound care consulted: Yes

## 2023-11-07 NOTE — PLAN OF CARE
"[facility] Care Plan    POC reviewed with Mara Mojica at 0300. Pt unable to verbalize understanding. Questions and concerns addressed. No acute events overnight. Pt progressing toward goals. Will continue to monitor. See below and flowsheets for full assessment and VS info.     CT completed  Levo on/off  TF at goal  one urine occurrence, ~300/shift  Liquid BM, rectal tube remains in place      Is this a stroke patient? no    Neuro:  [place] Coma Scale  Best Eye Response: 2-->(E2) to pain  Best Motor Response: 4-->(M4) withdraws from pain  Best Verbal Response: 1-->(V1) none  [place] Coma Scale Score: 7  Assessment Qualifiers: patient intubated  Pupil PERRLA: yes     24hr Temp:  [94.3 °F (34.6 °C)-98 °F (36.7 °C)]     CV:   Rhythm: normal sinus rhythm  BP goals:   SBP < 180  MAP > 65    Resp:      Vent Mode: A/C  Set Rate: 16 BPM  Oxygen Concentration (%): 30  Vt Set: 380 mL  PEEP/CPAP: 5 cmH20  Pressure Support: 8 cmH20    Plan: wean to extubate    GI/:     Diet/Nutrition Received: tube feeding  Last Bowel Movement: 11/06/23  Voiding Characteristics: external catheter    Intake/Output Summary (Last 24 hours) at 11/7/2023 0646  Last data filed at 11/7/2023 0601  Gross per 24 hour   Intake 4105.79 ml   Output 1250 ml   Net 2855.79 ml     Unmeasured Output  Urine Occurrence: 1  Stool Occurrence: 0  Emesis Occurrence: 0  Pad Count: 1    Labs/Accuchecks:  Recent Labs   Lab 11/07/23  0017   WBC 22.91*   RBC 2.90*   HGB 9.2*   HCT 29.3*   PLT 46*      Recent Labs   Lab 11/07/23  0017      K 4.5   CO2 21*   *   BUN 54*   CREATININE 0.8   ALKPHOS 130   ALT 48*   AST 73*   BILITOT 7.6*      Recent Labs   Lab 11/07/23  0017   INR 1.7*    No results for input(s): "CPK", "CPKMB", "TROPONINI", "MB" in the last 168 hours.    Electrolytes: N/A - electrolytes WDL  Accuchecks: Q4H    Gtts:   dextrose 10 % in water (D10W)      lactated ringers 50 mL/hr at 11/07/23 0601    NORepinephrine bitartrate-D5W 0.06 mcg/kg/min " (11/07/23 0601)       LDA/Wounds:  Lines/Drains/Airways       Drain  Duration                  NG/OG Tube 10/20/23 2000 16 Fr. Left nostril 17 days         Rectal Tube 10/28/23 0530 fecal management system 10 days    Female External Urinary Catheter 11/03/23 1812 3 days              Airway  Duration                  Airway - Non-Surgical 11/05/23 2000 1 day       Airway Anesthesia 11/05/23 1 day              Arterial Line  Duration             Arterial Line 11/06/23 1728 Left Radial <1 day              Peripheral Intravenous Line  Duration                  Peripheral IV - Single Lumen 11/05/23 0017 20 G Posterior;Right Hand 2 days         Peripheral IV - Single Lumen 11/05/23 0017 20 G;1 1/4 in Left;Posterior Forearm 2 days         Peripheral IV - Single Lumen 11/06/23 1910 20 G Anterior;Left Upper Arm <1 day                  Wounds: No  Wound care consulted: No

## 2023-11-07 NOTE — PLAN OF CARE
"[facility] Care Plan    POC reviewed with Mara Mojica at 1400. Pt unable to verbalize understanding. Will continue to monitor. See below and flowsheets for full assessment and VS info.             Is this a stroke patient? no    Neuro:  [place] Coma Scale  Best Eye Response: 2-->(E2) to pain  Best Motor Response: 4-->(M4) withdraws from pain  Best Verbal Response: 1-->(V1) none  [place] Coma Scale Score: 7  Assessment Qualifiers: patient intubated, no eye obstruction present  Pupil PERRLA: yes     24 hr Temp:  [97 °F (36.1 °C)-98.4 °F (36.9 °C)]     CV:   Rhythm: normal sinus rhythm  BP goals:   SBP < 180  MAP > 65    Resp:      Vent Mode: A/C  Set Rate: 16 BPM  Oxygen Concentration (%): 30  Vt Set: 380 mL  PEEP/CPAP: 5 cmH20  Pressure Support: 8 cmH20    Plan: wean to extubate    GI/:     Diet/Nutrition Received: NPO, tube feeding  Last Bowel Movement: 11/06/23  Voiding Characteristics: external catheter    Intake/Output Summary (Last 24 hours) at 11/6/2023 1800  Last data filed at 11/6/2023 1605  Gross per 24 hour   Intake 2661.94 ml   Output 1365 ml   Net 1296.94 ml     Unmeasured Output  Urine Occurrence: 1  Stool Occurrence: 0  Emesis Occurrence: 0  Pad Count: 1    Labs/Accuchecks:  Recent Labs   Lab 11/06/23  1646   WBC 9.73   RBC 2.11*   HGB 6.7*   HCT 21.8*   PLT 40*      Recent Labs   Lab 11/06/23  1646   *   K 4.5   CO2 22*   *   BUN 46*   CREATININE 0.7   ALKPHOS 112   ALT 43   AST 63*   BILITOT 6.0*      Recent Labs   Lab 11/06/23  0026   INR 1.8*    No results for input(s): "CPK", "CPKMB", "TROPONINI", "MB" in the last 168 hours.    Electrolytes: N/A - electrolytes WDL  Accuchecks: Q4H    Gtts:   dextrose 10 % in water (D10W)      NORepinephrine bitartrate-D5W 0.06 mcg/kg/min (11/06/23 1745)       LDA/Wounds:  Lines/Drains/Airways       Drain  Duration                  NG/OG Tube 10/20/23 2000 16 Fr. Left nostril 16 days         Rectal Tube 10/28/23 0530 fecal management system 9 days    " Female External Urinary Catheter 11/03/23 1812 3 days              Airway  Duration                  Airway - Non-Surgical 11/05/23 2000 <1 day       Airway Anesthesia 11/05/23 <1 day              Arterial Line  Duration             Arterial Line 10/31/23 1617 Right Brachial 6 days    Arterial Line 11/06/23 1728 Left Radial <1 day              Peripheral Intravenous Line  Duration                  Peripheral IV - Single Lumen 11/05/23 0017 20 G Posterior;Right Hand 1 day         Peripheral IV - Single Lumen 11/05/23 0017 20 G;1 1/4 in Left;Posterior Forearm 1 day                  Wounds: No  Wound care consulted: No

## 2023-11-07 NOTE — PLAN OF CARE
[facility] - Neuro Critical Care  Discharge Reassessment    Primary Care Provider: Osiris Nevarez NP    Expected Discharge Date: 11/15/2023    Patient intubated on vent.  Not medically stable for discharge.    Discharge Plan A and Plan B have been determined by review of patient's clinical status, future medical and therapeutic needs, and coverage/benefits for post-acute care in coordination with multidisciplinary team members.      Reassessment (most recent)       Discharge Reassessment - 11/07/23 1539          Discharge Reassessment    Assessment Type Discharge Planning Reassessment     Did the patient's condition or plan change since previous assessment? No     Communicated ASHELY with patient/caregiver Date not available/Unable to determine     Discharge Plan A Rehab     Discharge Plan B Long-term acute care facility (LTAC)     DME Needed Upon Discharge  none     Transition of Care Barriers Underinsured     Why the patient remains in the hospital Requires continued medical care                   May Rosario RN, CCRN-K, [facility]  Neuro-Critical Care   X [phone]

## 2023-11-07 NOTE — NURSING
Traveled to and from CT with RN and RT on portable monitor and vent. No adverse events, placed back on monitor in [facility] [id number]. WCTM

## 2023-11-08 ENCOUNTER — DOCUMENTATION ONLY (OUTPATIENT)
Dept: NEUROLOGY | Facility: CLINIC | Age: 58
End: 2023-11-08
Payer: MEDICAID

## 2023-11-08 PROBLEM — K13.70 ORAL MUCOSAL LESION: Status: ACTIVE | Noted: 2023-11-08

## 2023-11-08 LAB
ALBUMIN SERPL BCP-MCNC: 2.3 G/DL (ref 3.5–5.2)
ALLENS TEST: ABNORMAL
ALP SERPL-CCNC: 176 U/L (ref 55–135)
ALT SERPL W/O P-5'-P-CCNC: 65 U/L (ref 10–44)
AMMONIA PLAS-SCNC: 47 UMOL/L (ref 10–50)
ANION GAP SERPL CALC-SCNC: 6 MMOL/L (ref 8–16)
ANISOCYTOSIS BLD QL SMEAR: ABNORMAL
AST SERPL-CCNC: 94 U/L (ref 10–40)
BASO STIPL BLD QL SMEAR: ABNORMAL
BASOPHILS # BLD AUTO: 0.02 K/UL (ref 0–0.2)
BASOPHILS NFR BLD: 0.2 % (ref 0–1.9)
BILIRUB SERPL-MCNC: 5.5 MG/DL (ref 0.1–1)
BUN SERPL-MCNC: 65 MG/DL (ref 6–20)
BURR CELLS BLD QL SMEAR: ABNORMAL
CALCIUM SERPL-MCNC: 9.7 MG/DL (ref 8.7–10.5)
CHLORIDE SERPL-SCNC: 115 MMOL/L (ref 95–110)
CO2 SERPL-SCNC: 21 MMOL/L (ref 23–29)
CREAT SERPL-MCNC: 1 MG/DL (ref 0.5–1.4)
DACRYOCYTES BLD QL SMEAR: ABNORMAL
DELSYS: ABNORMAL
DIFFERENTIAL METHOD: ABNORMAL
EOSINOPHIL # BLD AUTO: 0 K/UL (ref 0–0.5)
EOSINOPHIL NFR BLD: 0 % (ref 0–8)
ERYTHROCYTE [DISTWIDTH] IN BLOOD BY AUTOMATED COUNT: 19.1 % (ref 11.5–14.5)
ERYTHROCYTE [SEDIMENTATION RATE] IN BLOOD BY WESTERGREN METHOD: 16 MM/H
EST. GFR  (NO RACE VARIABLE): >60 ML/MIN/1.73 M^2
FIO2: 30
GIANT PLATELETS BLD QL SMEAR: PRESENT
GLUCOSE SERPL-MCNC: 170 MG/DL (ref 70–110)
HCO3 UR-SCNC: 22 MMOL/L (ref 24–28)
HCT VFR BLD AUTO: 26 % (ref 37–48.5)
HGB BLD-MCNC: 8.3 G/DL (ref 12–16)
HYPOCHROMIA BLD QL SMEAR: ABNORMAL
IMM GRANULOCYTES # BLD AUTO: 0.33 K/UL (ref 0–0.04)
IMM GRANULOCYTES NFR BLD AUTO: 2.5 % (ref 0–0.5)
INR PPP: 1.8 (ref 0.8–1.2)
LYMPHOCYTES # BLD AUTO: 0.6 K/UL (ref 1–4.8)
LYMPHOCYTES NFR BLD: 4.5 % (ref 18–48)
MAGNESIUM SERPL-MCNC: 2.6 MG/DL (ref 1.6–2.6)
MCH RBC QN AUTO: 31.3 PG (ref 27–31)
MCHC RBC AUTO-ENTMCNC: 31.9 G/DL (ref 32–36)
MCV RBC AUTO: 98 FL (ref 82–98)
MODE: ABNORMAL
MONOCYTES # BLD AUTO: 2 K/UL (ref 0.3–1)
MONOCYTES NFR BLD: 15.2 % (ref 4–15)
NEUTROPHILS # BLD AUTO: 10.3 K/UL (ref 1.8–7.7)
NEUTROPHILS NFR BLD: 77.6 % (ref 38–73)
NRBC BLD-RTO: 0 /100 WBC
OVALOCYTES BLD QL SMEAR: ABNORMAL
PCO2 BLDA: 32.5 MMHG (ref 35–45)
PEEP: 5
PH SMN: 7.44 [PH] (ref 7.35–7.45)
PHOSPHATE SERPL-MCNC: 4.3 MG/DL (ref 2.7–4.5)
PLATELET # BLD AUTO: 37 K/UL (ref 150–450)
PLATELET BLD QL SMEAR: ABNORMAL
PMV BLD AUTO: 13.5 FL (ref 9.2–12.9)
PO2 BLDA: 116 MMHG (ref 80–100)
POC BE: -2 MMOL/L
POC SATURATED O2: 99 % (ref 95–100)
POC TCO2: 23 MMOL/L (ref 23–27)
POCT GLUCOSE: 121 MG/DL (ref 70–110)
POCT GLUCOSE: 126 MG/DL (ref 70–110)
POCT GLUCOSE: 146 MG/DL (ref 70–110)
POCT GLUCOSE: 147 MG/DL (ref 70–110)
POCT GLUCOSE: 180 MG/DL (ref 70–110)
POCT GLUCOSE: 190 MG/DL (ref 70–110)
POCT GLUCOSE: 85 MG/DL (ref 70–110)
POIKILOCYTOSIS BLD QL SMEAR: ABNORMAL
POLYCHROMASIA BLD QL SMEAR: ABNORMAL
POTASSIUM SERPL-SCNC: 5 MMOL/L (ref 3.5–5.1)
PROT SERPL-MCNC: 5 G/DL (ref 6–8.4)
PROTHROMBIN TIME: 18.2 SEC (ref 9–12.5)
RBC # BLD AUTO: 2.65 M/UL (ref 4–5.4)
SAMPLE: ABNORMAL
SCHISTOCYTES BLD QL SMEAR: ABNORMAL
SCHISTOCYTES BLD QL SMEAR: PRESENT
SITE: ABNORMAL
SODIUM SERPL-SCNC: 142 MMOL/L (ref 136–145)
TARGETS BLD QL SMEAR: ABNORMAL
VT: 380
WBC # BLD AUTO: 13.2 K/UL (ref 3.9–12.7)

## 2023-11-08 PROCEDURE — 25000003 PHARM REV CODE 250: Mod: NTX

## 2023-11-08 PROCEDURE — 95714 VEEG EA 12-26 HR UNMNTR: CPT | Mod: NTX

## 2023-11-08 PROCEDURE — 83735 ASSAY OF MAGNESIUM: CPT | Mod: NTX | Performed by: STUDENT IN AN ORGANIZED HEALTH CARE EDUCATION/TRAINING PROGRAM

## 2023-11-08 PROCEDURE — 25000003 PHARM REV CODE 250: Mod: NTX | Performed by: HOSPITALIST

## 2023-11-08 PROCEDURE — 25000003 PHARM REV CODE 250: Mod: NTX | Performed by: PSYCHIATRY & NEUROLOGY

## 2023-11-08 PROCEDURE — 82803 BLOOD GASES ANY COMBINATION: CPT | Mod: NTX

## 2023-11-08 PROCEDURE — 25000242 PHARM REV CODE 250 ALT 637 W/ HCPCS: Mod: NTX

## 2023-11-08 PROCEDURE — 82140 ASSAY OF AMMONIA: CPT | Mod: NTX | Performed by: NURSE PRACTITIONER

## 2023-11-08 PROCEDURE — 94668 MNPJ CHEST WALL SBSQ: CPT | Mod: NTX

## 2023-11-08 PROCEDURE — 99222 1ST HOSP IP/OBS MODERATE 55: CPT | Mod: NTX,,, | Performed by: NURSE PRACTITIONER

## 2023-11-08 PROCEDURE — 85610 PROTHROMBIN TIME: CPT | Mod: NTX | Performed by: STUDENT IN AN ORGANIZED HEALTH CARE EDUCATION/TRAINING PROGRAM

## 2023-11-08 PROCEDURE — 85025 COMPLETE CBC W/AUTO DIFF WBC: CPT | Mod: NTX | Performed by: HOSPITALIST

## 2023-11-08 PROCEDURE — 25000003 PHARM REV CODE 250: Mod: NTX | Performed by: NURSE PRACTITIONER

## 2023-11-08 PROCEDURE — 95700 EEG CONT REC W/VID EEG TECH: CPT | Mod: NTX

## 2023-11-08 PROCEDURE — 94761 N-INVAS EAR/PLS OXIMETRY MLT: CPT | Mod: NTX

## 2023-11-08 PROCEDURE — 95720 EEG PHY/QHP EA INCR W/VEEG: CPT | Mod: NTX,,, | Performed by: PSYCHIATRY & NEUROLOGY

## 2023-11-08 PROCEDURE — 25000242 PHARM REV CODE 250 ALT 637 W/ HCPCS: Mod: NTX | Performed by: PSYCHIATRY & NEUROLOGY

## 2023-11-08 PROCEDURE — 94003 VENT MGMT INPAT SUBQ DAY: CPT | Mod: NTX

## 2023-11-08 PROCEDURE — 94640 AIRWAY INHALATION TREATMENT: CPT | Mod: NTX

## 2023-11-08 PROCEDURE — 99222 PR INITIAL HOSPITAL CARE,LEVL II: ICD-10-PCS | Mod: NTX,,, | Performed by: NURSE PRACTITIONER

## 2023-11-08 PROCEDURE — 80053 COMPREHEN METABOLIC PANEL: CPT | Mod: NTX | Performed by: HOSPITALIST

## 2023-11-08 PROCEDURE — 99291 CRITICAL CARE FIRST HOUR: CPT | Mod: NTX,,, | Performed by: NURSE PRACTITIONER

## 2023-11-08 PROCEDURE — 99291 PR CRITICAL CARE, E/M 30-74 MINUTES: ICD-10-PCS | Mod: NTX,,, | Performed by: NURSE PRACTITIONER

## 2023-11-08 PROCEDURE — 20000000 HC ICU ROOM: Mod: NTX

## 2023-11-08 PROCEDURE — 82800 BLOOD PH: CPT | Mod: NTX

## 2023-11-08 PROCEDURE — 84100 ASSAY OF PHOSPHORUS: CPT | Mod: NTX | Performed by: STUDENT IN AN ORGANIZED HEALTH CARE EDUCATION/TRAINING PROGRAM

## 2023-11-08 PROCEDURE — 37799 UNLISTED PX VASCULAR SURGERY: CPT | Mod: NTX

## 2023-11-08 PROCEDURE — 63600175 PHARM REV CODE 636 W HCPCS: Mod: NTX | Performed by: NURSE PRACTITIONER

## 2023-11-08 PROCEDURE — 95720 PR EEG, W/VIDEO, CONT RECORD, I&R, >12<26 HRS: ICD-10-PCS | Mod: NTX,,, | Performed by: PSYCHIATRY & NEUROLOGY

## 2023-11-08 PROCEDURE — 25000003 PHARM REV CODE 250: Mod: NTX | Performed by: PHYSICIAN ASSISTANT

## 2023-11-08 PROCEDURE — 27100171 HC OXYGEN HIGH FLOW UP TO 24 HOURS: Mod: NTX

## 2023-11-08 PROCEDURE — 99900026 HC AIRWAY MAINTENANCE (STAT): Mod: NTX

## 2023-11-08 PROCEDURE — 99900035 HC TECH TIME PER 15 MIN (STAT): Mod: NTX

## 2023-11-08 RX ORDER — ACETAMINOPHEN 650 MG/20.3ML
500 LIQUID ORAL EVERY 6 HOURS
Status: COMPLETED | OUTPATIENT
Start: 2023-11-08 | End: 2023-11-10

## 2023-11-08 RX ORDER — FAMOTIDINE 20 MG/1
20 TABLET, FILM COATED ORAL DAILY
Status: DISCONTINUED | OUTPATIENT
Start: 2023-11-09 | End: 2023-11-11

## 2023-11-08 RX ORDER — FUROSEMIDE 10 MG/ML
40 INJECTION INTRAMUSCULAR; INTRAVENOUS ONCE
Status: COMPLETED | OUTPATIENT
Start: 2023-11-08 | End: 2023-11-08

## 2023-11-08 RX ADMIN — IPRATROPIUM BROMIDE AND ALBUTEROL SULFATE 3 ML: .5; 3 SOLUTION RESPIRATORY (INHALATION) at 08:11

## 2023-11-08 RX ADMIN — FUROSEMIDE 40 MG: 10 INJECTION, SOLUTION INTRAVENOUS at 11:11

## 2023-11-08 RX ADMIN — ACETYLCYSTEINE 4 ML: 100 INHALANT RESPIRATORY (INHALATION) at 04:11

## 2023-11-08 RX ADMIN — ACETAMINOPHEN 499.51 MG: 160 SOLUTION ORAL at 07:11

## 2023-11-08 RX ADMIN — FOLIC ACID 1 MG: 1 TABLET ORAL at 08:11

## 2023-11-08 RX ADMIN — ACETYLCYSTEINE 4 ML: 100 INHALANT RESPIRATORY (INHALATION) at 12:11

## 2023-11-08 RX ADMIN — LEVETIRACETAM 750 MG: 500 SOLUTION ORAL at 08:11

## 2023-11-08 RX ADMIN — IPRATROPIUM BROMIDE AND ALBUTEROL SULFATE 3 ML: .5; 3 SOLUTION RESPIRATORY (INHALATION) at 04:11

## 2023-11-08 RX ADMIN — ACETYLCYSTEINE 4 ML: 100 INHALANT RESPIRATORY (INHALATION) at 09:11

## 2023-11-08 RX ADMIN — ACETAMINOPHEN 499.51 MG: 160 SOLUTION ORAL at 12:11

## 2023-11-08 RX ADMIN — THERA TABS 1 TABLET: TAB at 08:11

## 2023-11-08 RX ADMIN — INSULIN ASPART 1 UNITS: 100 INJECTION, SOLUTION INTRAVENOUS; SUBCUTANEOUS at 12:11

## 2023-11-08 RX ADMIN — RIFAXIMIN 550 MG: 550 TABLET ORAL at 08:11

## 2023-11-08 RX ADMIN — LACTULOSE 30 G: 20 SOLUTION ORAL at 08:11

## 2023-11-08 RX ADMIN — THIAMINE HCL TAB 100 MG 100 MG: 100 TAB at 08:11

## 2023-11-08 RX ADMIN — ACETAMINOPHEN 499.51 MG: 650 SOLUTION ORAL at 01:11

## 2023-11-08 RX ADMIN — IPRATROPIUM BROMIDE AND ALBUTEROL SULFATE 3 ML: .5; 3 SOLUTION RESPIRATORY (INHALATION) at 12:11

## 2023-11-08 RX ADMIN — INSULIN ASPART 2 UNITS: 100 INJECTION, SOLUTION INTRAVENOUS; SUBCUTANEOUS at 04:11

## 2023-11-08 RX ADMIN — ACETYLCYSTEINE 4 ML: 100 INHALANT RESPIRATORY (INHALATION) at 08:11

## 2023-11-08 RX ADMIN — FAMOTIDINE 20 MG: 20 TABLET ORAL at 08:11

## 2023-11-08 RX ADMIN — IPRATROPIUM BROMIDE AND ALBUTEROL SULFATE 3 ML: .5; 3 SOLUTION RESPIRATORY (INHALATION) at 09:11

## 2023-11-08 NOTE — PROCEDURES
Prelim EEG   1-1.5 Hz triphasic waves, diffuse slow background.   Suggestive of metabolic encephalopathy   No seizures     Dr. Samuel

## 2023-11-08 NOTE — ASSESSMENT & PLAN NOTE
Resuming tube feeds at 10 mL/hr    Nutrition consulted. Most recent weight and BMI monitored-     Measurements:  Wt Readings from Last 1 Encounters:   10/26/23 56.2 kg (123 lb 14.4 oz)   Body mass index is 25.01 kg/m².    Patient has been screened and assessed by RD.    Malnutrition Type:  Context: social/environmental circumstances  Level: moderate    Malnutrition Characteristic Summary:  Subcutaneous Fat (Malnutrition): mild depletion  Muscle Mass (Malnutrition): mild depletion  Fluid Accumulation (Malnutrition): moderate    Interventions/Recommendations (treatment strategy):  1. When able, initiate TFs. Rec'd Isosource 1.5 @ 50 mL/hr to provide 1800 kcals, 82 g of protein, 917 mL fluid. 2. RD to monitor & follow-up.

## 2023-11-08 NOTE — SUBJECTIVE & OBJECTIVE
Review of Systems   Unable to perform ROS: Intubated     Objective:     Vitals:  Temp: 97.7 °F (36.5 °C)  Pulse: 81  Rhythm: normal sinus rhythm  BP: (!) 120/55  MAP (mmHg): 79  Resp: 16  SpO2: 100 %  Oxygen Concentration (%): 30  Vent Mode: A/C  Set Rate: 16 BPM  Vt Set: 380 mL  PEEP/CPAP: 5 cmH20  Peak Airway Pressure: 21 cmH20  Mean Airway Pressure: 8.8 cmH20  Plateau Pressure: 0 cmH20    Temp  Min: 94.3 °F (34.6 °C)  Max: 97.9 °F (36.6 °C)  Pulse  Min: 59  Max: 100  BP  Min: 97/49  Max: 150/69  MAP (mmHg)  Min: 70  Max: 99  Resp  Min: 15  Max: 30  SpO2  Min: 98 %  Max: 100 %  Oxygen Concentration (%)  Min: 30  Max: 30    11/06 0701 - 11/07 0700  In: 4105.8 [I.V.:643.1]  Out: 1250 [Urine:650]   Unmeasured Output  Urine Occurrence: 1  Stool Occurrence: 0  Emesis Occurrence: 0  Pad Count: 1        Physical Exam  Vitals and nursing note reviewed.   Constitutional:       Appearance: She is ill-appearing.   HENT:      Head: Normocephalic.      Right Ear: External ear normal.      Left Ear: External ear normal.   Eyes:      Pupils: Pupils are equal, round, and reactive to light.   Cardiovascular:      Rate and Rhythm: Normal rate.      Heart sounds: Normal heart sounds.   Pulmonary:      Breath sounds: No stridor. No wheezing.      Comments: Intubated  Bl coarse BS  Abdominal:      Palpations: Abdomen is soft.      Tenderness: There is no abdominal tenderness.      Comments: No appreciable ascites    Musculoskeletal:         General: Swelling present.   Skin:     General: Skin is warm.   Neurological:      Comments: No sedation  E2 V1T M4  Obtunded. Not following commands.   CN II-XII:  PERRL.   No gaze today  No facial asymmetry  + cough, gag  Motor:  Withdraws to noxious stimuli x4  Sensation intact to noxious stimuli x4              Medications:  Continuousdextrose 10 % in water (D10W)  NORepinephrine bitartrate-D5W, Last Rate: 0.06 mcg/kg/min (11/07/23 0601)    Scheduledacetaminophen, 499.5074 mg,  Q6H  acetylcysteine 100 mg/ml (10%), 4 mL, QID  albuterol-ipratropium, 3 mL, QID  famotidine, 20 mg, BID  folic acid, 1 mg, Daily  heparin (porcine), 5,000 Units, Q12H  lactulose, 30 g, BID  levetiracetam, 750 mg, BID  multivitamin, 1 tablet, Daily  rifAXImin, 550 mg, BID  thiamine, 100 mg, Daily    PRNcalcium gluconate IVPB, 1 g, PRN  calcium gluconate IVPB, 2 g, PRN  calcium gluconate IVPB, 3 g, PRN  dextrose 10 % in water (D10W), , Continuous PRN  dextrose 10%, 12.5 g, PRN  dextrose 10%, 25 g, PRN  glucagon (human recombinant), 1 mg, PRN  hydrALAZINE, 10 mg, Q4H PRN  HYDROmorphone, 0.2 mg, Q4H PRN  insulin aspart U-100, 0-10 Units, Q4H PRN  labetalol, 10 mg, Q6H PRN  magnesium sulfate IVPB, 2 g, PRN  magnesium sulfate IVPB, 4 g, PRN  ondansetron, 4 mg, Q8H PRN  oxyCODONE, 5 mg, Q12H PRN  potassium chloride, 40 mEq, PRN   And  potassium chloride, 60 mEq, PRN   And  potassium chloride, 80 mEq, PRN  sodium phosphate 15 mmol in dextrose 5 % (D5W) 250 mL IVPB, 15 mmol, PRN  sodium phosphate 20.01 mmol in dextrose 5 % (D5W) 250 mL IVPB, 20.01 mmol, PRN  sodium phosphate 30 mmol in dextrose 5 % (D5W) 250 mL IVPB, 30 mmol, PRN      Today I personally reviewed pertinent medications, lines/drains/airways, imaging, laboratory results, notably:    CTH   No acute intracranial process.  Recommend follow-up as clinically indicated.     Diet  Diet NPO  Diet NPO

## 2023-11-08 NOTE — PLAN OF CARE
"[facility] Care Plan    POC reviewed with Mara Mojica and family at 1400. Pt verbalized understanding. Questions and concerns addressed. No acute events today. Pt progressing toward goals. Will continue to monitor. See below and flowsheets for full assessment and VS info.     -pt no longer withdrawing to pain in all extremities; provider placed orders for EEG. See previous note.  -Pt on spontaneous setting on vent; tolerating well   -Lasix ordered and given   -TF continues at 50/hr          Is this a stroke patient? no    Neuro:  [place] Coma Scale  Best Eye Response: 2-->(E2) to pain  Best Motor Response: 1-->(M1) none  Best Verbal Response: 1-->(V1) none  [place] Coma Scale Score: 4  Assessment Qualifiers: patient intubated  Pupil PERRLA: yes     24 hr Temp:  [97.5 °F (36.4 °C)-98.6 °F (37 °C)]     CV:   Rhythm: normal sinus rhythm  BP goals:   SBP < 180  MAP > 65    Resp:      Vent Mode: Spont  Set Rate: 16 BPM  Oxygen Concentration (%): 30  Vt Set: 380 mL  PEEP/CPAP: 5 cmH20  Pressure Support: 8 cmH20    Plan: wean to extubate    GI/:     Diet/Nutrition Received: tube feeding  Last Bowel Movement: 11/08/23  Voiding Characteristics: external catheter    Intake/Output Summary (Last 24 hours) at 11/8/2023 1452  Last data filed at 11/8/2023 1215  Gross per 24 hour   Intake 1975 ml   Output 1400 ml   Net 575 ml     Unmeasured Output  Urine Occurrence: 1  Stool Occurrence: 1  Emesis Occurrence: 0  Pad Count: 2    Labs/Accuchecks:  Recent Labs   Lab 11/08/23  0025   WBC 13.20*   RBC 2.65*   HGB 8.3*   HCT 26.0*   PLT 37*      Recent Labs   Lab 11/08/23  0025      K 5.0   CO2 21*   *   BUN 65*   CREATININE 1.0   ALKPHOS 176*   ALT 65*   AST 94*   BILITOT 5.5*      Recent Labs   Lab 11/08/23  0025   INR 1.8*    No results for input(s): "CPK", "CPKMB", "TROPONINI", "MB" in the last 168 hours.    Electrolytes: N/A - electrolytes WDL  Accuchecks: Q4H    Gtts:   dextrose 10 % in water (D10W)   "       LDA/Wounds:  Lines/Drains/Airways       Drain  Duration                  NG/OG Tube 10/20/23 2000 16 Fr. Left nostril 18 days         Rectal Tube 10/28/23 0530 fecal management system 11 days    Female External Urinary Catheter 11/03/23 1812 4 days              Airway  Duration                  Airway - Non-Surgical 11/05/23 2000 2 days       Airway Anesthesia 11/05/23 2 days              Arterial Line  Duration             Arterial Line 11/06/23 1728 Left Radial 1 day              Peripheral Intravenous Line  Duration                  Peripheral IV - Single Lumen 11/05/23 0017 20 G Posterior;Right Hand 3 days         Peripheral IV - Single Lumen 11/05/23 0017 20 G;1 1/4 in Left;Posterior Forearm 3 days         Peripheral IV - Single Lumen 11/06/23 1910 20 G Anterior;Left Upper Arm 1 day                  Wounds: Yes  Wound care consulted: Yes

## 2023-11-08 NOTE — ASSESSMENT & PLAN NOTE
This is a 58-year-old woman with a history of decompensated alcoholic cirrhosis.  Suspect thrombocytopenia is likely secondary to chronic alcohol use and is consumptive in nature. If trend worsens, will consider consulting Hematology.    Daily CBC, transfuse only for active bleeding  SCDs; sqh

## 2023-11-08 NOTE — ASSESSMENT & PLAN NOTE
58 year old presented for altered mental status on 10/18. Clinical picture confounded with hepatic encephalopathy, infection, and seizure activity. Neurology was consulted by primary team prior to admission to [facility].    10/27 EEG IMPRESSION:  This is an abnormal EEG during lethargic state.  Diffuse disorganized slowing of the background was noted.  Frequent triphasic waves noted.  Left posterior pseudo periodic epileptiform discharges were noted.  Numerous electrographic seizures emanating from the left posterior region were noted.    10/31 - rehook and 11/1 read with frequent discharges, vimpat lowered to 50 given mental status and liver function, will continue to watch on eeg  vEEG in place  Keppra 1000 BID decreased to 750 BID  Neurochecks q4h  Vitals q1hr   11/03: EEG remains negative with lacosamide taper, DC and monitor for seizure recurrence  11/04: stable EEG, beginning to awaken  11/5: remains without seizures  11/8/23: EEG pending

## 2023-11-08 NOTE — ASSESSMENT & PLAN NOTE
Scope complete by ENT   -patient on scheduled nebs and racemic epi without improvement in respiratory status   -elective intubation 10/31 with 6.0 ETT with anesthesia  -11/1 tube exchange to 7.0 ETT to allow suctioning, completed steroids.  11/3: continue sbt trials, extubation attempt if able to awaken further   11/4: tolerating sbt, hold TFs at midnight  11/5: has cuff leak, extubate  Reintubated for stridor   Dexamethasone x 24 h completed 11/7

## 2023-11-08 NOTE — NURSING
Pt no longer opening eyes spontaneously and no longer withdrawing to noxious stimuli. Pt with involuntary movements of trunk and R arm. Brain stem reflexes intact. Pupils 4 mm, brisk, equal and reactive to light.  NCC providers aware. EEG ordered and ammonia level drawn and sent to lab (awaiting results). AZAEL notified d/t criteria of GCS <5. WCTM.

## 2023-11-08 NOTE — ASSESSMENT & PLAN NOTE
2/2 to tracheal stenosis   -patient with stridor, increased secretions, requiring 5L 28% and inability to protect airway   -intubated by anesthesia team with small ETT    Vent Mode: Spont  Oxygen Concentration (%):  [30] 30  Resp Rate Total:  [8.5 br/min-22 br/min] 13 br/min  Vt Set:  [380 mL] 380 mL  PEEP/CPAP:  [5 cmH20] 5 cmH20  Pressure Support:  [8 cmH20] 8 cmH20  Mean Airway Pressure:  [6.5 cmH20-9.4 cmH20] 6.5 cmH20      Reintubated after trial extubation 2/2 stridor non responsive to med management  Dexamethasone 10 mg x1, followed by 4 mg q6 hours  Passed SBT

## 2023-11-08 NOTE — ASSESSMENT & PLAN NOTE
2/2 to tracheal stenosis   -patient with stridor, increased secretions, requiring 5L 28% and inability to protect airway   -intubated by anesthesia team with small ETT    Vent Mode: A/C  Oxygen Concentration (%):  [30] 30  Resp Rate Total:  [16 br/min-21 br/min] 17 br/min  Vt Set:  [380 mL] 380 mL  PEEP/CPAP:  [5 cmH20] 5 cmH20  Mean Airway Pressure:  [7.6 crT30-60 cmH20] 8.8 cmH20      Reintubated after trial extubation 2/2 stridor non responsive to med management  Dexamethasone 10 mg x1, followed by 4 mg q6 hours

## 2023-11-08 NOTE — ASSESSMENT & PLAN NOTE
- consult received for NDNQI evaluation of lip injury.  - purple/maroon discoloration to lip lesion considered a mucosal membrane tissue injury.  - continue to assess under respiratory devices and change positions of devices frequently.  - nursing to maintain pressure injury prevention measures and continue wound care per orders.

## 2023-11-08 NOTE — ASSESSMENT & PLAN NOTE
Hepatology following PEth. Daily CBC, CMP & INR.   Not currently being evaluated for transplant due to clinical status  Vitamin K given for chronic coagulopathy  Continuing lactulose and rifaximin titrated to 3-4 bowel movements daily  Ammonia WNL

## 2023-11-08 NOTE — CONSULTS
[facility] - Neuro Critical Care  Skin Integrity KARI  Consult Note    Patient Name: Mara Mojica  MRN: [id number]  Admission Date: 10/25/2023  Hospital Length of Stay: 14 days  Attending Physician: Holli Mena MD  Primary Care Provider: Osiris Nevarez NP     Inpatient consult to Skin Integrity  Practitioner  Consult performed by: Dione Rao, BURTON  Consult ordered by: Dione Rao NP        Subjective:     History of Present Illness:  Mara Mojica is a 58 year old female with alcoholic cirrhosis with ascites, s/p Ex Lap with bowel resection for incarcerated hernia, recent SBO with SAMRA, adm to [facility] on 10/18 with AMS.  Patient found on the floor confused with slurred speech with incontinence and with questionable tongue injury.     On presentation /65, HR 95, RR 18, SpO2 96% (RA).  At that time the patient was alert and oriented.  There was left-sided facial weakness.  Otherwise neurologic exam was unremarkable (? )     Labs (10/18) WBC 6.2, Hb 8.5 (BL 10.1), Plts 66, INR 2.0, Na 136, K 4.3, BUN 16, Cr 1.5 (BL 1.0) bilirubin 6.4, AST 96, ALT 47, ammonia 42.  .  Alcohol not tested (<10 on 10/14) UTOX neg U/A WBC 13, bacteria rare. CTH and MRI brain neg for acute changes.     Patient evaluated by Neurology on 10/19.  Per neurology, at that time she was oriented to person place time and situation and attentive to her environment.  She was able to follow one and two-step commands.  Facial features were symmetrical.  Speech marked by dysarthria.  Most answers are yes/no.  Muscle testing of proximal and distal muscles of upper and LE showed diffuse generalized weakness WO focal or lateralizing findings.  No pathologic reflexes were noted.      Labs 10/18 WBC 6.2 > 6.8, Hb 8.5 > 7.9, Plts 66 > 69, INR > 2.2, Na 136 > 142 Cr 1.5 > 0.8, T bili 6.4 > 5.0, AST 96 > 79, ALT 47 > 44, Ammonia 42 > 28  Presently patient is lethargic and responding to questions with one-word answers.       Her AMS is not  readily attributed to HE with ammonia now 28 and CVA seems unlikely with neg MRI brain.  An MRI brain with contrast has been scheduled.  An EEG has not been done so seizures remain in the differential especially given her presentation.  Patient found on the floor with questionable tongue injury and incontinent of urine and feces.  Patient known to be drinking as recently as July 2023.  More recently patient has said that she is not drinking.  On admission alcohol was < 10.   A PEth has not been done.     Dr Shannan Reardon ([facility]) ESTELLA at [facility] consulted with Dr Renteria who recommended transfer to [facility]. Patient admitted to hospital medicine service for further management. Skin integrity KARI consulted for evaluation of skin injury.      Scheduled Meds:   acetaminophen  499.5074 mg Per NG tube Q6H    acetylcysteine 100 mg/ml (10%)  4 mL Nebulization QID    albuterol-ipratropium  3 mL Nebulization QID    [START ON 11/9/2023] famotidine  20 mg Per NG tube Daily    folic acid  1 mg Per NG tube Daily    lactulose  30 g Per NG tube BID    levetiracetam  750 mg Per NG tube BID    multivitamin  1 tablet Per NG tube Daily    rifAXImin  550 mg Per NG tube BID    thiamine  100 mg Per NG tube Daily     Continuous Infusions:   dextrose 10 % in water (D10W)       PRN Meds:calcium gluconate IVPB, calcium gluconate IVPB, calcium gluconate IVPB, dextrose 10 % in water (D10W), dextrose 10%, dextrose 10%, glucagon (human recombinant), hydrALAZINE, insulin aspart U-100, labetalol, magnesium sulfate IVPB, magnesium sulfate IVPB, ondansetron, potassium chloride **AND** potassium chloride **AND** potassium chloride, sodium phosphate 15 mmol in dextrose 5 % (D5W) 250 mL IVPB, sodium phosphate 20.01 mmol in dextrose 5 % (D5W) 250 mL IVPB, sodium phosphate 30 mmol in dextrose 5 % (D5W) 250 mL IVPB    Review of patient's allergies indicates:  No Known Allergies     Past Medical History:   Diagnosis Date    Alcoholic cirrhosis of  liver     Kidney failure     Unilateral inguinal hernia, without obstruction or gangrene, not specified as recurrent      Past Surgical History:   Procedure Laterality Date    ESOPHAGOGASTRODUODENOSCOPY N/A 09/21/2023    Procedure: EGD (ESOPHAGOGASTRODUODENOSCOPY);  Surgeon: Melissa Edwards MD;  Location: [facility];  Service: Endoscopy;  Laterality: N/A;    HERNIA REPAIR      TONSILLECTOMY         Family History       Problem Relation (Age of Onset)    Ovarian cancer Mother    Seizures Father          Tobacco Use    Smoking status: Every Day     Current packs/day: 0.50     Average packs/day: 0.5 packs/day for 25.1 years (12.5 ttl pk-yrs)     Types: Cigarettes     Start date: 10/18/1998     Passive exposure: Never    Smokeless tobacco: Never   Substance and Sexual Activity    Alcohol use: Not Currently    Drug use: Never    Sexual activity: Not on file     Review of Systems   Skin:  Positive for wound.       Objective:     Vital Signs (Most Recent):  Temp: 98.6 °F (37 °C) (11/08/23 1101)  Pulse: (!) 120 (11/08/23 1301)  Resp: 14 (11/08/23 1301)  BP: (!) 168/74 (11/08/23 1301)  SpO2: 99 % (11/08/23 1301) Vital Signs (24h Range):  Temp:  [97.5 °F (36.4 °C)-98.6 °F (37 °C)] 98.6 °F (37 °C)  Pulse:  [] 120  Resp:  [13-24] 14  SpO2:  [95 %-100 %] 99 %  BP: ()/(46-74) 168/74  Arterial Line BP: ()/(42-70) 136/61     Weight: 56.2 kg (123 lb 14.4 oz)  Body mass index is 25.01 kg/m².     Physical Exam  Constitutional:       Appearance: Normal appearance.   Skin:     General: Skin is warm and dry.      Findings: Lesion present.   Neurological:      Mental Status: She is alert.          Laboratory:  All pertinent labs reviewed within the last 24 hours.    Diagnostic Results:  None        Assessment/Plan:         KARI Skin Integrity Evaluation      Skin Integrity KARI evaluation of patient as part of the comprehensive skin care team.     She has been admitted for 14 days. Skin injury was noted on  11/8/23. POA No.    Lower lip              ENT  Oral mucosal lesion  - consult received for NDN evaluation of lip injury.  - purple/maroon discoloration to lip lesion considered a mucosal membrane tissue injury.  - continue to assess under respiratory devices and change positions of devices frequently.  - nursing to maintain pressure injury prevention measures and continue wound care per orders.         Thank you for your consult. I will follow-up with patient. Please contact us if you have any additional questions.       Dione Rao NP  Skin Integrity KARI  [facility] - [facility]

## 2023-11-08 NOTE — SUBJECTIVE & OBJECTIVE
Scheduled Meds:   acetaminophen  499.5074 mg Per NG tube Q6H    acetylcysteine 100 mg/ml (10%)  4 mL Nebulization QID    albuterol-ipratropium  3 mL Nebulization QID    [START ON 11/9/2023] famotidine  20 mg Per NG tube Daily    folic acid  1 mg Per NG tube Daily    lactulose  30 g Per NG tube BID    levetiracetam  750 mg Per NG tube BID    multivitamin  1 tablet Per NG tube Daily    rifAXImin  550 mg Per NG tube BID    thiamine  100 mg Per NG tube Daily     Continuous Infusions:   dextrose 10 % in water (D10W)       PRN Meds:calcium gluconate IVPB, calcium gluconate IVPB, calcium gluconate IVPB, dextrose 10 % in water (D10W), dextrose 10%, dextrose 10%, glucagon (human recombinant), hydrALAZINE, insulin aspart U-100, labetalol, magnesium sulfate IVPB, magnesium sulfate IVPB, ondansetron, potassium chloride **AND** potassium chloride **AND** potassium chloride, sodium phosphate 15 mmol in dextrose 5 % (D5W) 250 mL IVPB, sodium phosphate 20.01 mmol in dextrose 5 % (D5W) 250 mL IVPB, sodium phosphate 30 mmol in dextrose 5 % (D5W) 250 mL IVPB    Review of patient's allergies indicates:  No Known Allergies     Past Medical History:   Diagnosis Date    Alcoholic cirrhosis of liver     Kidney failure     Unilateral inguinal hernia, without obstruction or gangrene, not specified as recurrent      Past Surgical History:   Procedure Laterality Date    ESOPHAGOGASTRODUODENOSCOPY N/A 09/21/2023    Procedure: EGD (ESOPHAGOGASTRODUODENOSCOPY);  Surgeon: Melissa Edwards MD;  Location: [facility];  Service: Endoscopy;  Laterality: N/A;    HERNIA REPAIR      TONSILLECTOMY         Family History       Problem Relation (Age of Onset)    Ovarian cancer Mother    Seizures Father          Tobacco Use    Smoking status: Every Day     Current packs/day: 0.50     Average packs/day: 0.5 packs/day for 25.1 years (12.5 ttl pk-yrs)     Types: Cigarettes     Start date: 10/18/1998     Passive exposure: Never    Smokeless tobacco: Never    Substance and Sexual Activity    Alcohol use: Not Currently    Drug use: Never    Sexual activity: Not on file     Review of Systems   Skin:  Positive for wound.       Objective:     Vital Signs (Most Recent):  Temp: 98.6 °F (37 °C) (11/08/23 1101)  Pulse: (!) 120 (11/08/23 1301)  Resp: 14 (11/08/23 1301)  BP: (!) 168/74 (11/08/23 1301)  SpO2: 99 % (11/08/23 1301) Vital Signs (24h Range):  Temp:  [97.5 °F (36.4 °C)-98.6 °F (37 °C)] 98.6 °F (37 °C)  Pulse:  [] 120  Resp:  [13-24] 14  SpO2:  [95 %-100 %] 99 %  BP: ()/(46-74) 168/74  Arterial Line BP: ()/(42-70) 136/61     Weight: 56.2 kg (123 lb 14.4 oz)  Body mass index is 25.01 kg/m².     Physical Exam  Constitutional:       Appearance: Normal appearance.   Skin:     General: Skin is warm and dry.      Findings: Lesion present.   Neurological:      Mental Status: She is alert.          Laboratory:  All pertinent labs reviewed within the last 24 hours.    Diagnostic Results:  None

## 2023-11-08 NOTE — PROGRESS NOTES
[facility] Neuro [facility]  Neurocritical Care  Progress Note    Admit Date: 10/25/2023  Service Date: 11/08/2023  Length of Stay: 14    Subjective:     Chief Complaint: Acute hepatic encephalopathy    History of Present Illness: This is a 58-year-old female with a past medical history of alcoholic cirrhosis, s/p ex-lap w/ bowel resection for incarcerated hernia, who initially presented on 10/18 to [facility] with acute encephalopathy.  Her  found her on the floor at home with evidence of fecal/urine incontinence with confusion and slurred speech. Possible reported tongue injury in chart. Reported concern L sided weakness and down drift of L arm however CT head without evidence of acute abnormalities, MRI brain with only small vessel vascular changes without evidence of territorial infarct.     Hospital Course: EEG done on 10/19 with mild diffuse nonspecific background slowing, no potential epileptiform activity. Repeat MRI brain with contrast on 10/23 unchanged from initial MRI. Became more lethargic and was no longer following commands or answering questions. Due to worsening hepatic encephalopathy in setting of hepatic cirrhosis, patient transferred to [facility] for management with transplant team. Has remained on antibiotics, lactulose and rifaximin for treatment of UTI with pansensitive Ecoli, HE, and presumed SBP. Neurology consulted for management recommendations regarding persistent AMS. Had repeat EEG done on 10/24 with similar findings to prior EEG showing mild generalized non-specific cerebral dysfunction and no electrographic seizures or indication of seizure tendency. Additional CT head w/o contrast on 10/25 without evidence of acute issues. Remains confused and unable to answer questions on exam. Not withdrawing to painful stimuli. Was able to awaken to verbal stimuli in AM however when reevaluated in afternoon unresponsive however was after receiving Ativan.     On admission to [facility]:  Patient  had EEG running, and was noted to have seizures at 7:30 a.m., 8:00 a.m. in, and 10:00 a.m. was noted to be in focal status prior to receiving Vimpat.  Patient was noted to have stridor, worsening secretions with suspicion for aspiration, decreased airway protection, and rapids was called due to low GCS score of 6. Antibiotics broadened to Vanc/Zosyn. Clinical picture of mental status confounded with episodes of seizures, infection, and hepatic encephalopathy.             Hospital Course: 10/28: Improving GCS from 6 to 10. Continuing pulmonary toilet and deep suctioning for stridor and copious secretions. UA positive for candida. Blood cultures from 10/27 NGTD. Sputum cultures sent. Patient on day 2 of broadened antibiotics vanc/zosyn due to worsening clinical status but will discontinue tomorrow if cultures remain negative. Still hypernatremic, treating with D5W. Patient was transferred with no ordered diuretics, very edematous on physical exam. Adequate stooling on lactulose and rifaximin. Due to increased UOP/stooling will place eckert for one day for irritated skin. EEG update showing no seizures since 10am 10/27. Monitoring CBC for thrombocytopenia and macrocytic anemia. Discussed code status and goals of care with  and best friend at bedside. Trickle feeds resumed.   10/29: No acute events overnight, EEG reportedly without further seizures for ~48 hours can disconnect once formal report is in, neuro status slowly improving as patient now tracking in room, moving extremities, responding with appropriate emotional reactions to her .  Respiratory status largely unchanged with intermittent events of large volume breaths that sound almost stridorous but without actual respiratory distress- gas remains compensated.  UOP low, diuresis resumed.  10/30/2023: d/c mucomyst nebs, add racemic epi scheduled nebs, add budesonide and dex 6 q8 hours, ammonia at 35- continue lactulose and rifaximin, abg reviewed,  40mEq of K once, ENT consulted for stridor, continue eckert catheter in today   10/31/2023 Patient with worsening respiratory status, continued decreased mentation and increased secretions. Plan for elective intubation in controlled setting with anesthesia. Will also recheck eeg, if negative will start to wean AEDs and see if that improves patient's arousal. PLT stabilized, continue to monitor.   11/1/2023 this morning patient's 6.0 ETT exchanged for 7.0 to allow suctioning. EEG with frequent discharges, lowered vimpat to 50 mg and will follow EEG. Attempting to remove confounding factors for patients mental status. Slow drop in H/H, 1 unit ordered.   11/2/2023 NAEO, cEEG to remain in place, no seizures but is having frequent discharges in runs. Continue lower dose vimpat and 1500 keppra Eye opening this morning which is slight improvement in exam. Failed SBT   11/3/2023: no improvement with decrease in lacosamide, no re-development of seizures, if does not wake up in next 48 hrs off lacosamide or redevelops seizures, prognosis is extremely poor  11/4/2023: improved mental state this am, no seizures overnight  11/5/2023: LOC stable, has cuff leak, trial of extubation  11/06/2023 reintubated for stridor non responsive to med management overnight  11/07/2023 CTH stable. Off of levo. Completed dex (wbc 22, afebrile). CXR w L mildly increasing consolidation. Very thick secretions, added mucomyst and duonebs. Weaning keppra to 750. Pt appearing more edematous today, dc LR. Scheduled tylenol max 2g/d. Scheduled oxy 5q12.  11/8/23: EEG pending    Interval History:  EEG pending, ammonia WNL, SBT, Lasix 40 mg once,     Review of Systems   Reason unable to perform ROS: decrease LOC.     2 systems   Objective:     Vitals:  Temp: 98.6 °F (37 °C)  Pulse: (!) 120  Rhythm: normal sinus rhythm  BP: (!) 168/74  MAP (mmHg): 106  Resp: 14  SpO2: 99 %  Oxygen Concentration (%): 30  Vent Mode: Spont  Set Rate: 16 BPM  Vt Set: 380  mL  Pressure Support: 8 cmH20  PEEP/CPAP: 5 cmH20  Peak Airway Pressure: 13 cmH20  Mean Airway Pressure: 6.5 cmH20  Plateau Pressure: 20 cmH20    Temp  Min: 97.5 °F (36.4 °C)  Max: 98.6 °F (37 °C)  Pulse  Min: 72  Max: 120  BP  Min: 96/46  Max: 171/72  MAP (mmHg)  Min: 66  Max: 106  Resp  Min: 13  Max: 24  SpO2  Min: 95 %  Max: 100 %  Oxygen Concentration (%)  Min: 30  Max: 30    11/07 0701 - 11/08 0700  In: 2205   Out: 1200 [Urine:400]   Unmeasured Output  Urine Occurrence: 1  Stool Occurrence: 1  Emesis Occurrence: 0  Pad Count: 2        Physical Exam  Vitals and nursing note reviewed.   Constitutional:       Appearance: She is ill-appearing.   HENT:      Head: Normocephalic.      Nose: Nose normal.      Mouth/Throat:      Mouth: Mucous membranes are moist.      Pharynx: Oropharynx is clear.   Eyes:      Pupils: Pupils are equal, round, and reactive to light.   Cardiovascular:      Rate and Rhythm: Normal rate and regular rhythm.      Pulses: Normal pulses.      Heart sounds: Normal heart sounds.   Pulmonary:      Effort: Pulmonary effort is normal.      Breath sounds: Normal breath sounds.   Abdominal:      General: Bowel sounds are normal.      Palpations: Abdomen is soft.   Musculoskeletal:         General: Normal range of motion.   Skin:     General: Skin is warm and dry.      Capillary Refill: Capillary refill takes 2 to 3 seconds.   Neurological:      Comments: No sedation  E2 V1T M4  Obtunded. Not following commands.   CN II-XII:  PERRL.   No gaze today  No facial asymmetry  + cough, gag  Motor:  No Withdraws to noxious stimuli x4  Sensation intact to noxious stimuli x4          Unable to test orientation, language, memory, judgment, insight, fund of knowledge, hearing, shoulder shrug, tongue protrusion, coordination, gait due to level of consciousness.       Medications:  Continuousdextrose 10 % in water (D10W)    Scheduledacetaminophen, 499.5074 mg, Q6H  acetylcysteine 100 mg/ml (10%), 4 mL,  QID  albuterol-ipratropium, 3 mL, QID  [START ON 11/9/2023] famotidine, 20 mg, Daily  folic acid, 1 mg, Daily  lactulose, 30 g, BID  levetiracetam, 750 mg, BID  multivitamin, 1 tablet, Daily  rifAXImin, 550 mg, BID  thiamine, 100 mg, Daily    PRNcalcium gluconate IVPB, 1 g, PRN  calcium gluconate IVPB, 2 g, PRN  calcium gluconate IVPB, 3 g, PRN  dextrose 10 % in water (D10W), , Continuous PRN  dextrose 10%, 12.5 g, PRN  dextrose 10%, 25 g, PRN  glucagon (human recombinant), 1 mg, PRN  hydrALAZINE, 10 mg, Q4H PRN  insulin aspart U-100, 0-10 Units, Q4H PRN  labetalol, 10 mg, Q6H PRN  magnesium sulfate IVPB, 2 g, PRN  magnesium sulfate IVPB, 4 g, PRN  ondansetron, 4 mg, Q8H PRN  potassium chloride, 40 mEq, PRN   And  potassium chloride, 60 mEq, PRN   And  potassium chloride, 80 mEq, PRN  sodium phosphate 15 mmol in dextrose 5 % (D5W) 250 mL IVPB, 15 mmol, PRN  sodium phosphate 20.01 mmol in dextrose 5 % (D5W) 250 mL IVPB, 20.01 mmol, PRN  sodium phosphate 30 mmol in dextrose 5 % (D5W) 250 mL IVPB, 30 mmol, PRN      Today I personally reviewed pertinent medications, lines/drains/airways, imaging, cardiology results, laboratory results, microbiology results, notably:    Diet  Diet NPO  Diet NPO        Assessment/Plan:     Neuro  Seizure  58 year old presented for altered mental status on 10/18. Clinical picture confounded with hepatic encephalopathy, infection, and seizure activity. Neurology was consulted by primary team prior to admission to [facility].    10/27 EEG IMPRESSION:  This is an abnormal EEG during lethargic state.  Diffuse disorganized slowing of the background was noted.  Frequent triphasic waves noted.  Left posterior pseudo periodic epileptiform discharges were noted.  Numerous electrographic seizures emanating from the left posterior region were noted.    10/31 - rehook and 11/1 read with frequent discharges, vimpat lowered to 50 given mental status and liver function, will continue to watch on eeg  vEEG in  place  Keppra 1000 BID decreased to 750 BID  Neurochecks q4h  Vitals q1hr   11/03: EEG remains negative with lacosamide taper, DC and monitor for seizure recurrence  11/04: stable EEG, beginning to awaken  11/5: remains without seizures  11/8/23: EEG pending      Hematology  Thrombocytopenia  This is a 58-year-old woman with a history of decompensated alcoholic cirrhosis.  Suspect thrombocytopenia is likely secondary to chronic alcohol use and is consumptive in nature. If trend worsens, will consider consulting Hematology.    Daily CBC, transfuse only for active bleeding  SCDs; sqh      Endocrine  Moderate malnutrition  Resuming tube feeds at 10 mL/hr    Nutrition consulted. Most recent weight and BMI monitored-     Measurements:  Wt Readings from Last 1 Encounters:   10/26/23 56.2 kg (123 lb 14.4 oz)   Body mass index is 25.01 kg/m².    Patient has been screened and assessed by RD.    Malnutrition Type:  Context: social/environmental circumstances  Level: moderate    Malnutrition Characteristic Summary:  Subcutaneous Fat (Malnutrition): mild depletion  Muscle Mass (Malnutrition): mild depletion  Fluid Accumulation (Malnutrition): moderate    Interventions/Recommendations (treatment strategy):  1. When able, initiate TFs. Rec'd Isosource 1.5 @ 50 mL/hr to provide 1800 kcals, 82 g of protein, 917 mL fluid. 2. RD to monitor & follow-up.    GI  * Acute hepatic encephalopathy  This is a 52 year old woman with a history of ethanol cirrhosis who presents after being found on the ground with fecal and urinary incontinence, and altered mental status. Elevated ammonia on admission.    - Continue lactulose via NG tube TID (titrate till 3-4 daily BM achieved). Continue Xifaxin.   - Avoid opiates and benzodiazepines, if able.   - IV thiamine, folate supplementation, multivitamin through NG tube.  - concern that comatose state is irreversible brain damage, will rule out other causes first, EEG and wean AEDs  - Weaned Keppra to  750   -11/7: CTH stable    -MELD score ~50% survival without transplant  11/3: if no improvement off lacosamide, prognosis for return to reasonable conscious state is small  11/4: cont max hepatic encephalopathy treatment  11/5: stable with brief ability to arouse    Decompensated alcoholic hepatic cirrhosis  Hepatology following PEth. Daily CBC, CMP & INR.   Not currently being evaluated for transplant due to clinical status  Vitamin K given for chronic coagulopathy  Continuing lactulose and rifaximin titrated to 3-4 bowel movements daily  Ammonia WNL    Other  Hypoxic respiratory failure  2/2 to tracheal stenosis   -patient with stridor, increased secretions, requiring 5L 28% and inability to protect airway   -intubated by anesthesia team with small ETT    Vent Mode: Spont  Oxygen Concentration (%):  [30] 30  Resp Rate Total:  [8.5 br/min-22 br/min] 13 br/min  Vt Set:  [380 mL] 380 mL  PEEP/CPAP:  [5 cmH20] 5 cmH20  Pressure Support:  [8 cmH20] 8 cmH20  Mean Airway Pressure:  [6.5 cmH20-9.4 cmH20] 6.5 cmH20      Reintubated after trial extubation 2/2 stridor non responsive to med management  Dexamethasone 10 mg x1, followed by 4 mg q6 hours  Passed SBT      Debility  PT/OT when appropriate          The patient is being Prophylaxed for:  Venous Thromboembolism with: Mechanical or Chemical  Stress Ulcer with: H2B  Ventilator Pneumonia with: chlorhexidine oral care    Activity Orders            Diet NPO: NPO starting at 11/05 0500    Elevate HOB Elevate (30-45 degrees) Elevate HOB to 30 - 45 degrees during feeding unless otherwise stated starting at 10/28 1218    Elevate HOB to 30-45 degrees during feeding unless otherwise stated starting at 10/26 1138    Progressive Mobility Protocol (mobilize patient to their highest level of functioning at least twice daily) starting at 10/25 2000    Turn patient starting at 10/25 2000          Full Code  Critical care time 40 mins  Ebony Kurtz NP  Neurocritical Care  [facility] -  Neuro Critical Care

## 2023-11-08 NOTE — ASSESSMENT & PLAN NOTE
This is a 52 year old woman with a history of ethanol cirrhosis who presents after being found on the ground with fecal and urinary incontinence, and altered mental status. Elevated ammonia on admission.    - Continue lactulose via NG tube TID (titrate till 3-4 daily BM achieved). Continue Xifaxin.   - Avoid opiates and benzodiazepines, if able.   - IV thiamine, folate supplementation, multivitamin through NG tube.  - concern that comatose state is irreversible brain damage, will rule out other causes first, EEG and wean AEDs  - Weaned Keppra to 750   -11/7: CTH stable    -MELD score ~50% survival without transplant  11/3: if no improvement off lacosamide, prognosis for return to reasonable conscious state is small  11/4: cont max hepatic encephalopathy treatment  11/5: stable with brief ability to arouse

## 2023-11-08 NOTE — SUBJECTIVE & OBJECTIVE
Interval History:  EEG pending, ammonia WNL, SBT, Lasix 40 mg once,     Review of Systems   Reason unable to perform ROS: decrease LOC.     2 systems   Objective:     Vitals:  Temp: 98.6 °F (37 °C)  Pulse: (!) 120  Rhythm: normal sinus rhythm  BP: (!) 168/74  MAP (mmHg): 106  Resp: 14  SpO2: 99 %  Oxygen Concentration (%): 30  Vent Mode: Spont  Set Rate: 16 BPM  Vt Set: 380 mL  Pressure Support: 8 cmH20  PEEP/CPAP: 5 cmH20  Peak Airway Pressure: 13 cmH20  Mean Airway Pressure: 6.5 cmH20  Plateau Pressure: 20 cmH20    Temp  Min: 97.5 °F (36.4 °C)  Max: 98.6 °F (37 °C)  Pulse  Min: 72  Max: 120  BP  Min: 96/46  Max: 171/72  MAP (mmHg)  Min: 66  Max: 106  Resp  Min: 13  Max: 24  SpO2  Min: 95 %  Max: 100 %  Oxygen Concentration (%)  Min: 30  Max: 30    11/07 0701 - 11/08 0700  In: 2205   Out: 1200 [Urine:400]   Unmeasured Output  Urine Occurrence: 1  Stool Occurrence: 1  Emesis Occurrence: 0  Pad Count: 2        Physical Exam  Vitals and nursing note reviewed.   Constitutional:       Appearance: She is ill-appearing.   HENT:      Head: Normocephalic.      Nose: Nose normal.      Mouth/Throat:      Mouth: Mucous membranes are moist.      Pharynx: Oropharynx is clear.   Eyes:      Pupils: Pupils are equal, round, and reactive to light.   Cardiovascular:      Rate and Rhythm: Normal rate and regular rhythm.      Pulses: Normal pulses.      Heart sounds: Normal heart sounds.   Pulmonary:      Effort: Pulmonary effort is normal.      Breath sounds: Normal breath sounds.   Abdominal:      General: Bowel sounds are normal.      Palpations: Abdomen is soft.   Musculoskeletal:         General: Normal range of motion.   Skin:     General: Skin is warm and dry.      Capillary Refill: Capillary refill takes 2 to 3 seconds.   Neurological:      Comments: No sedation  E2 V1T M4  Obtunded. Not following commands.   CN II-XII:  PERRL.   No gaze today  No facial asymmetry  + cough, gag  Motor:  No Withdraws to noxious stimuli  x4  Sensation intact to noxious stimuli x4          Unable to test orientation, language, memory, judgment, insight, fund of knowledge, hearing, shoulder shrug, tongue protrusion, coordination, gait due to level of consciousness.       Medications:  Continuousdextrose 10 % in water (D10W)    Scheduledacetaminophen, 499.5074 mg, Q6H  acetylcysteine 100 mg/ml (10%), 4 mL, QID  albuterol-ipratropium, 3 mL, QID  [START ON 11/9/2023] famotidine, 20 mg, Daily  folic acid, 1 mg, Daily  lactulose, 30 g, BID  levetiracetam, 750 mg, BID  multivitamin, 1 tablet, Daily  rifAXImin, 550 mg, BID  thiamine, 100 mg, Daily    PRNcalcium gluconate IVPB, 1 g, PRN  calcium gluconate IVPB, 2 g, PRN  calcium gluconate IVPB, 3 g, PRN  dextrose 10 % in water (D10W), , Continuous PRN  dextrose 10%, 12.5 g, PRN  dextrose 10%, 25 g, PRN  glucagon (human recombinant), 1 mg, PRN  hydrALAZINE, 10 mg, Q4H PRN  insulin aspart U-100, 0-10 Units, Q4H PRN  labetalol, 10 mg, Q6H PRN  magnesium sulfate IVPB, 2 g, PRN  magnesium sulfate IVPB, 4 g, PRN  ondansetron, 4 mg, Q8H PRN  potassium chloride, 40 mEq, PRN   And  potassium chloride, 60 mEq, PRN   And  potassium chloride, 80 mEq, PRN  sodium phosphate 15 mmol in dextrose 5 % (D5W) 250 mL IVPB, 15 mmol, PRN  sodium phosphate 20.01 mmol in dextrose 5 % (D5W) 250 mL IVPB, 20.01 mmol, PRN  sodium phosphate 30 mmol in dextrose 5 % (D5W) 250 mL IVPB, 30 mmol, PRN      Today I personally reviewed pertinent medications, lines/drains/airways, imaging, cardiology results, laboratory results, microbiology results, notably:    Diet  Diet NPO  Diet NPO

## 2023-11-08 NOTE — CONSULTS
[facility] - Neuro Critical Care  Wound Care    Patient Name:  Mara Mojica   MRN:  [id number]  Date: 11/8/2023  Diagnosis: Acute hepatic encephalopathy    History:     Past Medical History:   Diagnosis Date    Alcoholic cirrhosis of liver     Kidney failure     Unilateral inguinal hernia, without obstruction or gangrene, not specified as recurrent        Social History     Socioeconomic History    Marital status:    Tobacco Use    Smoking status: Every Day     Current packs/day: 0.50     Average packs/day: 0.5 packs/day for 25.1 years (12.5 ttl pk-yrs)     Types: Cigarettes     Start date: 10/18/1998     Passive exposure: Never    Smokeless tobacco: Never   Substance and Sexual Activity    Alcohol use: Not Currently    Drug use: Never     Social Determinants of Health     Financial Resource Strain: Low Risk  (10/25/2023)    Overall Financial Resource Strain (CARDIA)     Difficulty of Paying Living Expenses: Not hard at all   Food Insecurity: No Food Insecurity (10/25/2023)    Hunger Vital Sign     Worried About Running Out of Food in the Last Year: Never true     Ran Out of Food in the Last Year: Never true   Transportation Needs: No Transportation Needs (10/25/2023)    PRAPARE - Transportation     Lack of Transportation (Medical): No     Lack of Transportation (Non-Medical): No   Physical Activity: Insufficiently Active (10/25/2023)    Exercise Vital Sign     Days of Exercise per Week: 4 days     Minutes of Exercise per Session: 20 min   Stress: Stress Concern Present (10/25/2023)    [employer] - Occupational Stress Questionnaire     Feeling of Stress : Rather much   Social Connections: Moderately Isolated (10/25/2023)    Social Connection and Isolation Panel [NHANES]     Frequency of Communication with Friends and Family: Three times a week     Frequency of Social Gatherings with Friends and Family: Twice a week     Attends Shinto Services: Never     Active Member of Clubs  or Organizations: No     Attends Club or Organization Meetings: Never     Marital Status:    Housing Stability: Unknown (10/25/2023)    Housing Stability Vital Sign     Unable to Pay for Housing in the Last Year: No     Unstable Housing in the Last Year: No       Precautions:     Allergies as of 10/23/2023    (No Known Allergies)       [facility] Assessment Details/Treatment     Patient seen for NDN wound care consultation for perineum, lip.   Reviewed chart for this encounter.   See Flow Sheet for findings.    Pt found lying in bed, OK for care at this time. Pt w/ red/maroon circular mucosal injury to R lower lip - recommend moisturizing w/ routine oral care, reposition ETT if possible. Pt turned into lateral position for perineal assessment. Perineum/perirectal region remains moist w/ scattered and irregular partial thickness skin loss r/t incontinence - Triad applied.    RECOMMENDATIONS:  R lower lip - recommend moisturizing w/ routine oral care, reposition ETT if possible.   Continue Triad BID/PRN for perineal region    Discussed POC with patient and primary nurse.   See EMR for orders & patient education.      Nursing to continue care & monitoring.  Nursing to maintain pressure injury prevention interventions. Skin integrity KARI notified.       11/08/23 1146   WOCN Assessment   WOCN Total Time (mins) 15   Visit Date 11/08/23   Visit Time 1146   Consult Type New   WOCN Speciality Wound   Intervention assessed;changed;applied;chart review;coordination of care;orders   Teaching on-going        Altered Skin Integrity 11/07/23 2013 Perineum   Date First Assessed/Time First Assessed: 11/07/23 2013   Altered Skin Integrity Present on Admission - Did Patient arrive to the hospital with altered skin?: yes  Location: Perineum   Wound Image    Description of Altered Skin Integrity Partial thickness tissue loss. Shallow open ulcer with a red or pink wound bed, without slough. Intact or Open/Ruptured Serum-filled blister.    Dressing Appearance Open to air   Drainage Amount None   Drainage Characteristics/Odor No odor   Appearance Pink;Red;Moist   Tissue loss description Partial thickness   Periwound Area Intact;Moist   Wound Edges Irregular   Care Cleansed with:;Soap and water;Applied:;Skin Barrier        Altered Skin Integrity 11/08/23 0155 Right lower Lip Mucosal membrane tissue injury with history of medical device use   Date First Assessed/Time First Assessed: 11/08/23 0155   Altered Skin Integrity Present on Admission - Did Patient arrive to the hospital with altered skin?: suspected hospital acquired  Side: Right  Orientation: lower  Location: Lip  Description of A...   Wound Image    Description of Altered Skin Integrity Mucosal membrane tissue injury with history of medical device use   Dressing Appearance Open to air   Drainage Amount None   Drainage Characteristics/Odor No odor   Appearance Pink;Red;Maroon   Periwound Area Intact

## 2023-11-08 NOTE — PLAN OF CARE
"[facility] Care Plan    POC reviewed with Mara Mojica and family at 0300. Pt verbalized understanding. Questions and concerns addressed. No acute events overnight. Pt progressing toward goals. Will continue to monitor. See below and flowsheets for full assessment and VS info.           Is this a stroke patient? no    Neuro:  Woody Coma Scale  Best Eye Response: 2-->(E2) to pain  Best Motor Response: 4-->(M4) withdraws from pain  Best Verbal Response: 1-->(V1) none  Woody Coma Scale Score: 7  Assessment Qualifiers: patient intubated  Pupil PERRLA: yes     24hr Temp:  [97.5 °F (36.4 °C)-97.9 °F (36.6 °C)]     CV:   Rhythm: normal sinus rhythm  BP goals:   SBP < 180  MAP > 65    Resp:      Vent Mode: A/C  Set Rate: 16 BPM  Oxygen Concentration (%): 30  Vt Set: 380 mL  PEEP/CPAP: 5 cmH20  Pressure Support: 8 cmH20    Plan: wean to extubate and trach/PEG discussions    GI/:     Diet/Nutrition Received: tube feeding  Last Bowel Movement: 11/06/23  Voiding Characteristics: external catheter    Intake/Output Summary (Last 24 hours) at 11/8/2023 0628  Last data filed at 11/8/2023 0601  Gross per 24 hour   Intake 2205 ml   Output 1200 ml   Net 1005 ml     Unmeasured Output  Urine Occurrence: 1  Stool Occurrence: 0  Emesis Occurrence: 0  Pad Count: 1    Labs/Accuchecks:  Recent Labs   Lab 11/08/23  0025   WBC 13.20*   RBC 2.65*   HGB 8.3*   HCT 26.0*   PLT 37*      Recent Labs   Lab 11/08/23  0025      K 5.0   CO2 21*   *   BUN 65*   CREATININE 1.0   ALKPHOS 176*   ALT 65*   AST 94*   BILITOT 5.5*      Recent Labs   Lab 11/08/23  0025   INR 1.8*    No results for input(s): "CPK", "CPKMB", "TROPONINI", "MB" in the last 168 hours.    Electrolytes: N/A - electrolytes WDL  Accuchecks: Q4H    Gtts:   dextrose 10 % in water (D10W)      NORepinephrine bitartrate-D5W 0.06 mcg/kg/min (11/07/23 0601)       LDA/Wounds:  Lines/Drains/Airways       Drain  Duration                  NG/OG Tube 10/20/23 2000 16 Fr. Left " nostril 18 days         Rectal Tube 10/28/23 0530 fecal management system 11 days    Female External Urinary Catheter 11/03/23 1812 4 days              Airway  Duration                  Airway - Non-Surgical 11/05/23 2000 2 days       Airway Anesthesia 11/05/23 2 days              Arterial Line  Duration             Arterial Line 11/06/23 1728 Left Radial 1 day              Peripheral Intravenous Line  Duration                  Peripheral IV - Single Lumen 11/05/23 0017 20 G Posterior;Right Hand 3 days         Peripheral IV - Single Lumen 11/05/23 0017 20 G;1 1/4 in Left;Posterior Forearm 3 days         Peripheral IV - Single Lumen 11/06/23 1910 20 G Anterior;Left Upper Arm 1 day                  Wounds: Yes  Wound care consulted: Yes

## 2023-11-08 NOTE — HPI
Mara Mojica is a 58 year old female with alcoholic cirrhosis with ascites, s/p Ex Lap with bowel resection for incarcerated hernia, recent SBO with SAMRA, adm to [facility] on 10/18 with AMS.  Patient found on the floor confused with slurred speech with incontinence and with questionable tongue injury.     On presentation /65, HR 95, RR 18, SpO2 96% (RA).  At that time the patient was alert and oriented.  There was left-sided facial weakness.  Otherwise neurologic exam was unremarkable (? )     Labs (10/18) WBC 6.2, Hb 8.5 (BL 10.1), Plts 66, INR 2.0, Na 136, K 4.3, BUN 16, Cr 1.5 (BL 1.0) bilirubin 6.4, AST 96, ALT 47, ammonia 42.  .  Alcohol not tested (<10 on 10/14) UTOX neg U/A WBC 13, bacteria rare. CTH and MRI brain neg for acute changes.     Patient evaluated by Neurology on 10/19.  Per neurology, at that time she was oriented to person place time and situation and attentive to her environment.  She was able to follow one and two-step commands.  Facial features were symmetrical.  Speech marked by dysarthria.  Most answers are yes/no.  Muscle testing of proximal and distal muscles of upper and LE showed diffuse generalized weakness WO focal or lateralizing findings.  No pathologic reflexes were noted.      Labs 10/18 WBC 6.2 > 6.8, Hb 8.5 > 7.9, Plts 66 > 69, INR > 2.2, Na 136 > 142 Cr 1.5 > 0.8, T bili 6.4 > 5.0, AST 96 > 79, ALT 47 > 44, Ammonia 42 > 28  Presently patient is lethargic and responding to questions with one-word answers.       Her AMS is not readily attributed to HE with ammonia now 28 and CVA seems unlikely with neg MRI brain.  An MRI brain with contrast has been scheduled.  An EEG has not been done so seizures remain in the differential especially given her presentation.  Patient found on the floor with questionable tongue injury and incontinent of urine and feces.  Patient known to be drinking as recently as July 2023.  More recently patient has said that she is not drinking.  On  admission alcohol was < 10.   A PEth has not been done.     Dr Shannan Reardon ([facility]) [facility] [facility] [place] Renteria who recommended transfer to [facility]. Patient admitted to hospital medicine service for further management. Skin integrity KARI consulted for evaluation of skin injury.

## 2023-11-08 NOTE — PROGRESS NOTES
[facility] Neuro [facility]  Neurocritical Care  Progress Note    Admit Date: 10/25/2023  Service Date: 11/07/2023  Length of Stay: 13    Subjective:     Chief Complaint: Acute hepatic encephalopathy    History of Present Illness: This is a 58-year-old female with a past medical history of alcoholic cirrhosis, s/p ex-lap w/ bowel resection for incarcerated hernia, who initially presented on 10/18 to [facility] with acute encephalopathy.  Her  found her on the floor at home with evidence of fecal/urine incontinence with confusion and slurred speech. Possible reported tongue injury in chart. Reported concern L sided weakness and down drift of L arm however CT head without evidence of acute abnormalities, MRI brain with only small vessel vascular changes without evidence of territorial infarct.     Hospital Course: EEG done on 10/19 with mild diffuse nonspecific background slowing, no potential epileptiform activity. Repeat MRI brain with contrast on 10/23 unchanged from initial MRI. Became more lethargic and was no longer following commands or answering questions. Due to worsening hepatic encephalopathy in setting of hepatic cirrhosis, patient transferred to [facility] for management with transplant team. Has remained on antibiotics, lactulose and rifaximin for treatment of UTI with pansensitive Ecoli, HE, and presumed SBP. Neurology consulted for management recommendations regarding persistent AMS. Had repeat EEG done on 10/24 with similar findings to prior EEG showing mild generalized non-specific cerebral dysfunction and no electrographic seizures or indication of seizure tendency. Additional CT head w/o contrast on 10/25 without evidence of acute issues. Remains confused and unable to answer questions on exam. Not withdrawing to painful stimuli. Was able to awaken to verbal stimuli in AM however when reevaluated in afternoon unresponsive however was after receiving Ativan.     On admission to [facility]:  Patient  had EEG running, and was noted to have seizures at 7:30 a.m., 8:00 a.m. in, and 10:00 a.m. was noted to be in focal status prior to receiving Vimpat.  Patient was noted to have stridor, worsening secretions with suspicion for aspiration, decreased airway protection, and rapids was called due to low GCS score of 6. Antibiotics broadened to Vanc/Zosyn. Clinical picture of mental status confounded with episodes of seizures, infection, and hepatic encephalopathy.             Hospital Course: 10/28: Improving GCS from 6 to 10. Continuing pulmonary toilet and deep suctioning for stridor and copious secretions. UA positive for candida. Blood cultures from 10/27 NGTD. Sputum cultures sent. Patient on day 2 of broadened antibiotics vanc/zosyn due to worsening clinical status but will discontinue tomorrow if cultures remain negative. Still hypernatremic, treating with D5W. Patient was transferred with no ordered diuretics, very edematous on physical exam. Adequate stooling on lactulose and rifaximin. Due to increased UOP/stooling will place eckert for one day for irritated skin. EEG update showing no seizures since 10am 10/27. Monitoring CBC for thrombocytopenia and macrocytic anemia. Discussed code status and goals of care with  and best friend at bedside. Trickle feeds resumed.   10/29: No acute events overnight, EEG reportedly without further seizures for ~48 hours can disconnect once formal report is in, neuro status slowly improving as patient now tracking in room, moving extremities, responding with appropriate emotional reactions to her .  Respiratory status largely unchanged with intermittent events of large volume breaths that sound almost stridorous but without actual respiratory distress- gas remains compensated.  UOP low, diuresis resumed.  10/30/2023: d/c mucomyst nebs, add racemic epi scheduled nebs, add budesonide and dex 6 q8 hours, ammonia at 35- continue lactulose and rifaximin, abg reviewed,  40mEq of K once, ENT consulted for stridor, continue eckert catheter in today   10/31/2023 Patient with worsening respiratory status, continued decreased mentation and increased secretions. Plan for elective intubation in controlled setting with anesthesia. Will also recheck eeg, if negative will start to wean AEDs and see if that improves patient's arousal. PLT stabilized, continue to monitor.   11/1/2023 this morning patient's 6.0 ETT exchanged for 7.0 to allow suctioning. EEG with frequent discharges, lowered vimpat to 50 mg and will follow EEG. Attempting to remove confounding factors for patients mental status. Slow drop in H/H, 1 unit ordered.   11/2/2023 NAEO, cEEG to remain in place, no seizures but is having frequent discharges in runs. Continue lower dose vimpat and 1500 keppra Eye opening this morning which is slight improvement in exam. Failed SBT   11/3/2023: no improvement with decrease in lacosamide, no re-development of seizures, if does not wake up in next 48 hrs off lacosamide or redevelops seizures, prognosis is extremely poor  11/4/2023: improved mental state this am, no seizures overnight  11/5/2023: LOC stable, has cuff leak, trial of extubation  11/06/2023 reintubated for stridor non responsive to med management overnight  11/07/2023 CTH stable. Off of levo. Completed dex (wbc 22, afebrile). CXR w L mildly increasing consolidation. Very thick secretions, added mucomyst and duonebs. Weaning keppra to 750. Pt appearing more edematous today, dc LR. Scheduled tylenol max 2g/d. Scheduled oxy 5q12.    Review of Systems   Unable to perform ROS: Intubated     Objective:     Vitals:  Temp: 97.7 °F (36.5 °C)  Pulse: 81  Rhythm: normal sinus rhythm  BP: (!) 120/55  MAP (mmHg): 79  Resp: 16  SpO2: 100 %  Oxygen Concentration (%): 30  Vent Mode: A/C  Set Rate: 16 BPM  Vt Set: 380 mL  PEEP/CPAP: 5 cmH20  Peak Airway Pressure: 21 cmH20  Mean Airway Pressure: 8.8 cmH20  Plateau Pressure: 0 cmH20    Temp  Min:  94.3 °F (34.6 °C)  Max: 97.9 °F (36.6 °C)  Pulse  Min: 59  Max: 100  BP  Min: 97/49  Max: 150/69  MAP (mmHg)  Min: 70  Max: 99  Resp  Min: 15  Max: 30  SpO2  Min: 98 %  Max: 100 %  Oxygen Concentration (%)  Min: 30  Max: 30    11/06 0701 - 11/07 0700  In: 4105.8 [I.V.:643.1]  Out: 1250 [Urine:650]   Unmeasured Output  Urine Occurrence: 1  Stool Occurrence: 0  Emesis Occurrence: 0  Pad Count: 1        Physical Exam  Vitals and nursing note reviewed.   Constitutional:       Appearance: She is ill-appearing.   HENT:      Head: Normocephalic.      Right Ear: External ear normal.      Left Ear: External ear normal.   Eyes:      Pupils: Pupils are equal, round, and reactive to light.   Cardiovascular:      Rate and Rhythm: Normal rate.      Heart sounds: Normal heart sounds.   Pulmonary:      Breath sounds: No stridor. No wheezing.      Comments: Intubated  Bl coarse BS  Abdominal:      Palpations: Abdomen is soft.      Tenderness: There is no abdominal tenderness.      Comments: No appreciable ascites    Musculoskeletal:         General: Swelling present.   Skin:     General: Skin is warm.   Neurological:      Comments: No sedation  E2 V1T M4  Obtunded. Not following commands.   CN II-XII:  PERRL.   No gaze today  No facial asymmetry  + cough, gag  Motor:  Withdraws to noxious stimuli x4  Sensation intact to noxious stimuli x4              Medications:  Continuousdextrose 10 % in water (D10W)  NORepinephrine bitartrate-D5W, Last Rate: 0.06 mcg/kg/min (11/07/23 0601)    Scheduledacetaminophen, 499.5074 mg, Q6H  acetylcysteine 100 mg/ml (10%), 4 mL, QID  albuterol-ipratropium, 3 mL, QID  famotidine, 20 mg, BID  folic acid, 1 mg, Daily  heparin (porcine), 5,000 Units, Q12H  lactulose, 30 g, BID  levetiracetam, 750 mg, BID  multivitamin, 1 tablet, Daily  rifAXImin, 550 mg, BID  thiamine, 100 mg, Daily    PRNcalcium gluconate IVPB, 1 g, PRN  calcium gluconate IVPB, 2 g, PRN  calcium gluconate IVPB, 3 g, PRN  dextrose 10 % in  water (D10W), , Continuous PRN  dextrose 10%, 12.5 g, PRN  dextrose 10%, 25 g, PRN  glucagon (human recombinant), 1 mg, PRN  hydrALAZINE, 10 mg, Q4H PRN  HYDROmorphone, 0.2 mg, Q4H PRN  insulin aspart U-100, 0-10 Units, Q4H PRN  labetalol, 10 mg, Q6H PRN  magnesium sulfate IVPB, 2 g, PRN  magnesium sulfate IVPB, 4 g, PRN  ondansetron, 4 mg, Q8H PRN  oxyCODONE, 5 mg, Q12H PRN  potassium chloride, 40 mEq, PRN   And  potassium chloride, 60 mEq, PRN   And  potassium chloride, 80 mEq, PRN  sodium phosphate 15 mmol in dextrose 5 % (D5W) 250 mL IVPB, 15 mmol, PRN  sodium phosphate 20.01 mmol in dextrose 5 % (D5W) 250 mL IVPB, 20.01 mmol, PRN  sodium phosphate 30 mmol in dextrose 5 % (D5W) 250 mL IVPB, 30 mmol, PRN      Today I personally reviewed pertinent medications, lines/drains/airways, imaging, laboratory results, notably:    CTH   No acute intracranial process.  Recommend follow-up as clinically indicated.     Diet  Diet NPO  Diet NPO      Assessment/Plan:     Neuro  Seizure  58 year old presented for altered mental status on 10/18. Clinical picture confounded with hepatic encephalopathy, infection, and seizure activity. Neurology was consulted by primary team prior to admission to [facility].    10/27 EEG IMPRESSION:  This is an abnormal EEG during lethargic state.  Diffuse disorganized slowing of the background was noted.  Frequent triphasic waves noted.  Left posterior pseudo periodic epileptiform discharges were noted.  Numerous electrographic seizures emanating from the left posterior region were noted.    10/31 - rehook and 11/1 read with frequent discharges, vimpat lowered to 50 given mental status and liver function, will continue to watch on eeg  vEEG in place  Keppra 1000 BID decreased to 750 BID  Neurochecks q4h  Vitals q1hr   11/03: EEG remains negative with lacosamide taper, DC and monitor for seizure recurrence  11/04: stable EEG, beginning to awaken  11/5: remains without seizures      Acute  encephalopathy  See acute hepatic encephalopathy and seizures    Pulmonary  Tracheal stenosis  Scope complete by ENT   -patient on scheduled nebs and racemic epi without improvement in respiratory status   -elective intubation 10/31 with 6.0 ETT with anesthesia  -11/1 tube exchange to 7.0 ETT to allow suctioning, completed steroids.  11/3: continue sbt trials, extubation attempt if able to awaken further   11/4: tolerating sbt, hold TFs at midnight  11/5: has cuff leak, extubate  Reintubated for stridor   Dexamethasone x 24 h completed 11/7    Hematology  Thrombocytopenia  This is a 58-year-old woman with a history of decompensated alcoholic cirrhosis.  Suspect thrombocytopenia is likely secondary to chronic alcohol use and is consumptive in nature. If trend worsens, will consider consulting Hematology.    Daily CBC, transfuse only for active bleeding  SCDs; sqh      Endocrine  Moderate malnutrition  Resuming tube feeds at 10 mL/hr    Nutrition consulted. Most recent weight and BMI monitored-     Measurements:  Wt Readings from Last 1 Encounters:   10/26/23 56.2 kg (123 lb 14.4 oz)   Body mass index is 25.02 kg/m².    Patient has been screened and assessed by RD.    Malnutrition Type:  Context: social/environmental circumstances  Level: moderate    Malnutrition Characteristic Summary:  Subcutaneous Fat (Malnutrition): mild depletion  Muscle Mass (Malnutrition): mild depletion  Fluid Accumulation (Malnutrition): moderate    Interventions/Recommendations (treatment strategy):  1. When able, initiate TFs. Rec'd Isosource 1.5 @ 50 mL/hr to provide 1800 kcals, 82 g of protein, 917 mL fluid. 2. RD to monitor & follow-up.    GI  * Acute hepatic encephalopathy  This is a 52 year old woman with a history of ethanol cirrhosis who presents after being found on the ground with fecal and urinary incontinence, and altered mental status. Elevated ammonia on admission.    - Continue lactulose via NG tube TID (titrate till 3-4 daily  BM achieved). Continue Xifaxin.   - Avoid opiates and benzodiazepines, if able.   - IV thiamine, folate supplementation, multivitamin through NG tube.  - concern that comatose state is irreversible brain damage, will rule out other causes first, EEG and wean AEDs  - Weaned Keppra to 750   -11/7: CTH stable    -MELD score ~50% survival without transplant  11/3: if no improvement off lacosamide, prognosis for return to reasonable conscious state is small  11/4: cont max hepatic encephalopathy treatment  11/5: stable with brief ability to arouse    Decompensated alcoholic hepatic cirrhosis  Hepatology following PEth. Daily CBC, CMP & INR.   Not currently being evaluated for transplant due to clinical status  Vitamin K given for chronic coagulopathy  Continuing lactulose and rifaximin titrated to 3-4 bowel movements daily  Ammonia 59 on am labs    Other  Hypoxic respiratory failure  2/2 to tracheal stenosis   -patient with stridor, increased secretions, requiring 5L 28% and inability to protect airway   -intubated by anesthesia team with small ETT    Vent Mode: A/C  Oxygen Concentration (%):  [30] 30  Resp Rate Total:  [16 br/min-21 br/min] 17 br/min  Vt Set:  [380 mL] 380 mL  PEEP/CPAP:  [5 cmH20] 5 cmH20  Mean Airway Pressure:  [7.6 ciM66-16 cmH20] 8.8 cmH20      Reintubated after trial extubation 2/2 stridor non responsive to med management  Dexamethasone 10 mg x1, followed by 4 mg q6 hours      Debility  PT/OT when appropriate          The patient is being Prophylaxed for:  Venous Thromboembolism with: Mechanical or Chemical  Stress Ulcer with: H2B  Ventilator Pneumonia with: chlorhexidine oral care    Activity Orders            Diet NPO: NPO starting at 11/05 0500    Elevate HOB Elevate (30-45 degrees) Elevate HOB to 30 - 45 degrees during feeding unless otherwise stated starting at 10/28 1218    Elevate HOB to 30-45 degrees during feeding unless otherwise stated starting at 10/26 1138    Progressive Mobility  Protocol (mobilize patient to their highest level of functioning at least twice daily) starting at 10/25 2000    Turn patient starting at 10/25 2000          Full Code    Critical care time spent on the evaluation and treatment of severe organ dysfunction, review of pertinent labs and imaging studies, discussions with consulting providers and discussions with patient/family: 45 minutes.    Inderjit Chappell PA-C  Neurocritical Care  [facility] - Neuro [facility]

## 2023-11-08 NOTE — ASSESSMENT & PLAN NOTE
58 year old presented for altered mental status on 10/18. Clinical picture confounded with hepatic encephalopathy, infection, and seizure activity. Neurology was consulted by primary team prior to admission to [facility].    10/27 EEG IMPRESSION:  This is an abnormal EEG during lethargic state.  Diffuse disorganized slowing of the background was noted.  Frequent triphasic waves noted.  Left posterior pseudo periodic epileptiform discharges were noted.  Numerous electrographic seizures emanating from the left posterior region were noted.    10/31 - rehook and 11/1 read with frequent discharges, vimpat lowered to 50 given mental status and liver function, will continue to watch on eeg  vEEG in place  Keppra 1000 BID decreased to 750 BID  Neurochecks q4h  Vitals q1hr   11/03: EEG remains negative with lacosamide taper, DC and monitor for seizure recurrence  11/04: stable EEG, beginning to awaken  11/5: remains without seizures

## 2023-11-09 LAB
ALBUMIN SERPL BCP-MCNC: 2.5 G/DL (ref 3.5–5.2)
ALLENS TEST: ABNORMAL
ALP SERPL-CCNC: 170 U/L (ref 55–135)
ALT SERPL W/O P-5'-P-CCNC: 90 U/L (ref 10–44)
ANION GAP SERPL CALC-SCNC: 7 MMOL/L (ref 8–16)
ANISOCYTOSIS BLD QL SMEAR: SLIGHT
AST SERPL-CCNC: 140 U/L (ref 10–40)
BASO STIPL BLD QL SMEAR: ABNORMAL
BASOPHILS # BLD AUTO: 0.03 K/UL (ref 0–0.2)
BASOPHILS NFR BLD: 0.2 % (ref 0–1.9)
BILIRUB SERPL-MCNC: 6.4 MG/DL (ref 0.1–1)
BUN SERPL-MCNC: 51 MG/DL (ref 6–20)
BURR CELLS BLD QL SMEAR: ABNORMAL
CALCIUM SERPL-MCNC: 9.8 MG/DL (ref 8.7–10.5)
CHLORIDE SERPL-SCNC: 115 MMOL/L (ref 95–110)
CK SERPL-CCNC: 319 U/L (ref 20–180)
CO2 SERPL-SCNC: 23 MMOL/L (ref 23–29)
CREAT SERPL-MCNC: 0.9 MG/DL (ref 0.5–1.4)
DELSYS: ABNORMAL
DIFFERENTIAL METHOD: ABNORMAL
EOSINOPHIL # BLD AUTO: 0 K/UL (ref 0–0.5)
EOSINOPHIL NFR BLD: 0.1 % (ref 0–8)
ERYTHROCYTE [DISTWIDTH] IN BLOOD BY AUTOMATED COUNT: 19.9 % (ref 11.5–14.5)
EST. GFR  (NO RACE VARIABLE): >60 ML/MIN/1.73 M^2
FIO2: 30
GLUCOSE SERPL-MCNC: 120 MG/DL (ref 70–110)
HCO3 UR-SCNC: 23.9 MMOL/L (ref 24–28)
HCT VFR BLD AUTO: 27 % (ref 37–48.5)
HGB BLD-MCNC: 8.8 G/DL (ref 12–16)
HYPOCHROMIA BLD QL SMEAR: ABNORMAL
IMM GRANULOCYTES # BLD AUTO: 0.49 K/UL (ref 0–0.04)
IMM GRANULOCYTES NFR BLD AUTO: 2.7 % (ref 0–0.5)
INR PPP: 1.8 (ref 0.8–1.2)
LYMPHOCYTES # BLD AUTO: 2.1 K/UL (ref 1–4.8)
LYMPHOCYTES NFR BLD: 11.5 % (ref 18–48)
MAGNESIUM SERPL-MCNC: 2.3 MG/DL (ref 1.6–2.6)
MCH RBC QN AUTO: 32 PG (ref 27–31)
MCHC RBC AUTO-ENTMCNC: 32.6 G/DL (ref 32–36)
MCV RBC AUTO: 98 FL (ref 82–98)
MODE: ABNORMAL
MONOCYTES # BLD AUTO: 2.1 K/UL (ref 0.3–1)
MONOCYTES NFR BLD: 11.7 % (ref 4–15)
NEUTROPHILS # BLD AUTO: 13.4 K/UL (ref 1.8–7.7)
NEUTROPHILS NFR BLD: 73.8 % (ref 38–73)
NRBC BLD-RTO: 0 /100 WBC
OVALOCYTES BLD QL SMEAR: ABNORMAL
PCO2 BLDA: 26.4 MMHG (ref 35–45)
PH SMN: 7.57 [PH] (ref 7.35–7.45)
PHOSPHATE SERPL-MCNC: 3.1 MG/DL (ref 2.7–4.5)
PLATELET # BLD AUTO: 40 K/UL (ref 150–450)
PLATELET BLD QL SMEAR: ABNORMAL
PMV BLD AUTO: 13.6 FL (ref 9.2–12.9)
PO2 BLDA: 72 MMHG (ref 80–100)
POC BE: 2 MMOL/L
POC SATURATED O2: 97 % (ref 95–100)
POC TCO2: 25 MMOL/L (ref 23–27)
POCT GLUCOSE: 121 MG/DL (ref 70–110)
POCT GLUCOSE: 123 MG/DL (ref 70–110)
POCT GLUCOSE: 124 MG/DL (ref 70–110)
POCT GLUCOSE: 130 MG/DL (ref 70–110)
POCT GLUCOSE: 147 MG/DL (ref 70–110)
POIKILOCYTOSIS BLD QL SMEAR: ABNORMAL
POTASSIUM SERPL-SCNC: 4.5 MMOL/L (ref 3.5–5.1)
PROCALCITONIN SERPL IA-MCNC: 0.22 NG/ML
PROT SERPL-MCNC: 5.4 G/DL (ref 6–8.4)
PROTHROMBIN TIME: 18.8 SEC (ref 9–12.5)
RBC # BLD AUTO: 2.75 M/UL (ref 4–5.4)
SAMPLE: ABNORMAL
SITE: ABNORMAL
SODIUM SERPL-SCNC: 145 MMOL/L (ref 136–145)
WBC # BLD AUTO: 18.15 K/UL (ref 3.9–12.7)

## 2023-11-09 PROCEDURE — 25000003 PHARM REV CODE 250: Mod: NTX

## 2023-11-09 PROCEDURE — 99291 CRITICAL CARE FIRST HOUR: CPT | Mod: NTX,,, | Performed by: NURSE PRACTITIONER

## 2023-11-09 PROCEDURE — 25000242 PHARM REV CODE 250 ALT 637 W/ HCPCS: Mod: NTX | Performed by: NURSE PRACTITIONER

## 2023-11-09 PROCEDURE — 83735 ASSAY OF MAGNESIUM: CPT | Mod: NTX | Performed by: STUDENT IN AN ORGANIZED HEALTH CARE EDUCATION/TRAINING PROGRAM

## 2023-11-09 PROCEDURE — 99900035 HC TECH TIME PER 15 MIN (STAT): Mod: NTX

## 2023-11-09 PROCEDURE — 25000242 PHARM REV CODE 250 ALT 637 W/ HCPCS: Mod: NTX | Performed by: PSYCHIATRY & NEUROLOGY

## 2023-11-09 PROCEDURE — 94761 N-INVAS EAR/PLS OXIMETRY MLT: CPT | Mod: NTX

## 2023-11-09 PROCEDURE — 99499 UNLISTED E&M SERVICE: CPT | Mod: NTX,,, | Performed by: PHYSICIAN ASSISTANT

## 2023-11-09 PROCEDURE — 99900026 HC AIRWAY MAINTENANCE (STAT): Mod: NTX

## 2023-11-09 PROCEDURE — 99499 NO LOS: ICD-10-PCS | Mod: NTX,,, | Performed by: PHYSICIAN ASSISTANT

## 2023-11-09 PROCEDURE — 94003 VENT MGMT INPAT SUBQ DAY: CPT | Mod: NTX

## 2023-11-09 PROCEDURE — 82800 BLOOD PH: CPT | Mod: NTX

## 2023-11-09 PROCEDURE — 25000003 PHARM REV CODE 250: Mod: NTX | Performed by: HOSPITALIST

## 2023-11-09 PROCEDURE — 25000003 PHARM REV CODE 250: Mod: NTX | Performed by: NURSE PRACTITIONER

## 2023-11-09 PROCEDURE — 82550 ASSAY OF CK (CPK): CPT | Mod: NTX | Performed by: NURSE PRACTITIONER

## 2023-11-09 PROCEDURE — 25000003 PHARM REV CODE 250: Mod: NTX | Performed by: PSYCHIATRY & NEUROLOGY

## 2023-11-09 PROCEDURE — 82803 BLOOD GASES ANY COMBINATION: CPT | Mod: NTX

## 2023-11-09 PROCEDURE — 84100 ASSAY OF PHOSPHORUS: CPT | Mod: NTX | Performed by: STUDENT IN AN ORGANIZED HEALTH CARE EDUCATION/TRAINING PROGRAM

## 2023-11-09 PROCEDURE — 94668 MNPJ CHEST WALL SBSQ: CPT | Mod: NTX

## 2023-11-09 PROCEDURE — 25000003 PHARM REV CODE 250: Mod: NTX | Performed by: PHYSICIAN ASSISTANT

## 2023-11-09 PROCEDURE — 95714 VEEG EA 12-26 HR UNMNTR: CPT | Mod: NTX

## 2023-11-09 PROCEDURE — 99233 SBSQ HOSP IP/OBS HIGH 50: CPT | Mod: FS,,, | Performed by: PSYCHIATRY & NEUROLOGY

## 2023-11-09 PROCEDURE — 99233 PR SUBSEQUENT HOSPITAL CARE,LEVL III: ICD-10-PCS | Mod: FS,,, | Performed by: PSYCHIATRY & NEUROLOGY

## 2023-11-09 PROCEDURE — 99291 PR CRITICAL CARE, E/M 30-74 MINUTES: ICD-10-PCS | Mod: NTX,,, | Performed by: NURSE PRACTITIONER

## 2023-11-09 PROCEDURE — 84145 PROCALCITONIN (PCT): CPT | Mod: NTX | Performed by: NURSE PRACTITIONER

## 2023-11-09 PROCEDURE — 80053 COMPREHEN METABOLIC PANEL: CPT | Mod: NTX | Performed by: HOSPITALIST

## 2023-11-09 PROCEDURE — 85610 PROTHROMBIN TIME: CPT | Mod: NTX | Performed by: STUDENT IN AN ORGANIZED HEALTH CARE EDUCATION/TRAINING PROGRAM

## 2023-11-09 PROCEDURE — 27100171 HC OXYGEN HIGH FLOW UP TO 24 HOURS: Mod: NTX

## 2023-11-09 PROCEDURE — 20000000 HC ICU ROOM: Mod: NTX

## 2023-11-09 PROCEDURE — 87205 SMEAR GRAM STAIN: CPT | Mod: NTX | Performed by: NURSE PRACTITIONER

## 2023-11-09 PROCEDURE — 94640 AIRWAY INHALATION TREATMENT: CPT | Mod: NTX

## 2023-11-09 PROCEDURE — 85025 COMPLETE CBC W/AUTO DIFF WBC: CPT | Mod: NTX | Performed by: HOSPITALIST

## 2023-11-09 PROCEDURE — 25000242 PHARM REV CODE 250 ALT 637 W/ HCPCS: Mod: NTX

## 2023-11-09 PROCEDURE — 87070 CULTURE OTHR SPECIMN AEROBIC: CPT | Mod: NTX | Performed by: NURSE PRACTITIONER

## 2023-11-09 PROCEDURE — 37799 UNLISTED PX VASCULAR SURGERY: CPT | Mod: NTX

## 2023-11-09 RX ORDER — LEVETIRACETAM 100 MG/ML
500 SOLUTION ORAL 2 TIMES DAILY
Status: DISCONTINUED | OUTPATIENT
Start: 2023-11-09 | End: 2023-11-09

## 2023-11-09 RX ORDER — ACETYLCYSTEINE 100 MG/ML
4 SOLUTION ORAL; RESPIRATORY (INHALATION) 4 TIMES DAILY
Status: COMPLETED | OUTPATIENT
Start: 2023-11-09 | End: 2023-11-10

## 2023-11-09 RX ORDER — LEVETIRACETAM 100 MG/ML
500 SOLUTION ORAL 2 TIMES DAILY
Status: DISCONTINUED | OUTPATIENT
Start: 2023-11-09 | End: 2023-11-24

## 2023-11-09 RX ADMIN — IPRATROPIUM BROMIDE AND ALBUTEROL SULFATE 3 ML: .5; 3 SOLUTION RESPIRATORY (INHALATION) at 08:11

## 2023-11-09 RX ADMIN — IPRATROPIUM BROMIDE AND ALBUTEROL SULFATE 3 ML: .5; 3 SOLUTION RESPIRATORY (INHALATION) at 12:11

## 2023-11-09 RX ADMIN — LEVETIRACETAM 500 MG: 100 SOLUTION ORAL at 10:11

## 2023-11-09 RX ADMIN — FAMOTIDINE 20 MG: 20 TABLET ORAL at 10:11

## 2023-11-09 RX ADMIN — ACETYLCYSTEINE 4 ML: 100 INHALANT RESPIRATORY (INHALATION) at 04:11

## 2023-11-09 RX ADMIN — ACETYLCYSTEINE 4 ML: 100 INHALANT RESPIRATORY (INHALATION) at 08:11

## 2023-11-09 RX ADMIN — ACETAMINOPHEN 499.51 MG: 160 SOLUTION ORAL at 01:11

## 2023-11-09 RX ADMIN — IPRATROPIUM BROMIDE AND ALBUTEROL SULFATE 3 ML: .5; 3 SOLUTION RESPIRATORY (INHALATION) at 04:11

## 2023-11-09 RX ADMIN — LACTULOSE 30 G: 20 SOLUTION ORAL at 10:11

## 2023-11-09 RX ADMIN — ACETAMINOPHEN 499.51 MG: 160 SOLUTION ORAL at 07:11

## 2023-11-09 RX ADMIN — LACTULOSE 30 G: 20 SOLUTION ORAL at 08:11

## 2023-11-09 RX ADMIN — RIFAXIMIN 550 MG: 550 TABLET ORAL at 08:11

## 2023-11-09 RX ADMIN — RIFAXIMIN 550 MG: 550 TABLET ORAL at 10:11

## 2023-11-09 RX ADMIN — LEVETIRACETAM 500 MG: 100 SOLUTION ORAL at 08:11

## 2023-11-09 RX ADMIN — THERA TABS 1 TABLET: TAB at 10:11

## 2023-11-09 RX ADMIN — ACETYLCYSTEINE 4 ML: 100 INHALANT RESPIRATORY (INHALATION) at 12:11

## 2023-11-09 RX ADMIN — ACETYLCYSTEINE 4 ML: 100 INHALANT RESPIRATORY (INHALATION) at 09:11

## 2023-11-09 RX ADMIN — THIAMINE HCL TAB 100 MG 100 MG: 100 TAB at 10:11

## 2023-11-09 RX ADMIN — FOLIC ACID 1 MG: 1 TABLET ORAL at 10:11

## 2023-11-09 NOTE — PLAN OF CARE
ecommendations    1. Continue TF regimen of Isosource 1.5 @ 50 mL/hr to provide 1800 kcals, 82 g of protein, 917 mL fluid.   2. RD to monitor & follow-up.  .  Goals: Meet % EEN, EPN by RD f/u date  Nutrition Goal Status: goal met  Communication of RD Recs:  (POC)    Opal Colon RDN,LDN

## 2023-11-09 NOTE — LOPA/MORA/SWTA/AOC/AEB
[facility] ([facility])  On-Site Evaluation  [facility] Contact # [phone]        Thank you for the referral of this patient to determine suitability for organ, tissue, and eye donation.  A chart review has been conducted 11/9/23 at 1030.    \[facility]\"" findings are as follows:    ? Potential candidate for organ donation - [facility] following patient. Any changes in patients condition, discussion of withdrawing the vent or brain death exams, or family mention of donation immediately call [phone].    ? Potential for candidate for tissue and eye donation- call [facility] at [phone] within 2 hours of death for screening as a potential tissue and/or eye donor.         [facility] Representative: BRADEN DumontN, RN. CCRN    [facility] Referral Number:  [id number]

## 2023-11-09 NOTE — SUBJECTIVE & OBJECTIVE
Interval History:  EEG pending slowing. CK pending, nerve conduction    Review of Systems   Reason unable to perform ROS: decrease LOC.     2 systems   Objective:     Vitals:  Temp: 100.1 °F (37.8 °C)  Pulse: 107  Rhythm: sinus tachycardia  BP: (!) 152/67  MAP (mmHg): 97  Resp: (!) 21  SpO2: 97 %  Oxygen Concentration (%): 40  Vent Mode: Spont  Pressure Support: 8 cmH20  PEEP/CPAP: 5 cmH20  Peak Airway Pressure: 13 cmH20  Mean Airway Pressure: 8.1 cmH20  Plateau Pressure: 0 cmH20    Temp  Min: 98.3 °F (36.8 °C)  Max: 100.1 °F (37.8 °C)  Pulse  Min: 102  Max: 128  BP  Min: 113/56  Max: 173/59  MAP (mmHg)  Min: 80  Max: 106  Resp  Min: 10  Max: 27  SpO2  Min: 93 %  Max: 100 %  Oxygen Concentration (%)  Min: 30  Max: 40    11/08 0701 - 11/09 0700  In: 2495   Out: 1200 [Urine:400]   Unmeasured Output  Urine Occurrence: 1  Stool Occurrence: 1  Emesis Occurrence: 0  Pad Count: 2        Physical Exam  Vitals and nursing note reviewed.   Constitutional:       Appearance: She is ill-appearing.   HENT:      Head: Normocephalic.      Nose: Nose normal.      Mouth/Throat:      Mouth: Mucous membranes are moist.      Pharynx: Oropharynx is clear.   Eyes:      Pupils: Pupils are equal, round, and reactive to light.   Cardiovascular:      Rate and Rhythm: Normal rate and regular rhythm.      Pulses: Normal pulses.      Heart sounds: Normal heart sounds.   Pulmonary:      Effort: Pulmonary effort is normal.      Breath sounds: Normal breath sounds.   Abdominal:      General: Bowel sounds are normal.      Palpations: Abdomen is soft.   Musculoskeletal:         General: Normal range of motion.   Skin:     General: Skin is warm and dry.      Capillary Refill: Capillary refill takes 2 to 3 seconds.   Neurological:      Comments: No sedation  E2 V1T M4  Obtunded. Not following commands.   CN II-XII:  PERRL.   No gaze today  No facial asymmetry  + cough, gag  Motor:  No Withdraws to noxious stimuli x4  Sensation intact to noxious stimuli  x4            Unable to test orientation, language, memory, judgment, insight, fund of knowledge, hearing, shoulder shrug, tongue protrusion, coordination, gait due to level of consciousness.       Medications:  Continuousdextrose 10 % in water (D10W)    Scheduledacetaminophen, 499.5074 mg, Q6H  acetylcysteine 100 mg/ml (10%), 4 mL, QID  albuterol-ipratropium, 3 mL, QID  famotidine, 20 mg, Daily  folic acid, 1 mg, Daily  lactulose, 30 g, BID  levetiracetam, 500 mg, BID  multivitamin, 1 tablet, Daily  rifAXImin, 550 mg, BID  thiamine, 100 mg, Daily    PRNcalcium gluconate IVPB, 1 g, PRN  calcium gluconate IVPB, 2 g, PRN  calcium gluconate IVPB, 3 g, PRN  dextrose 10 % in water (D10W), , Continuous PRN  dextrose 10%, 12.5 g, PRN  dextrose 10%, 25 g, PRN  glucagon (human recombinant), 1 mg, PRN  hydrALAZINE, 10 mg, Q4H PRN  insulin aspart U-100, 0-10 Units, Q4H PRN  labetalol, 10 mg, Q6H PRN  magnesium sulfate IVPB, 2 g, PRN  magnesium sulfate IVPB, 4 g, PRN  ondansetron, 4 mg, Q8H PRN  potassium chloride, 40 mEq, PRN   And  potassium chloride, 60 mEq, PRN   And  potassium chloride, 80 mEq, PRN  sodium phosphate 15 mmol in dextrose 5 % (D5W) 250 mL IVPB, 15 mmol, PRN  sodium phosphate 20.01 mmol in dextrose 5 % (D5W) 250 mL IVPB, 20.01 mmol, PRN  sodium phosphate 30 mmol in dextrose 5 % (D5W) 250 mL IVPB, 30 mmol, PRN      Today I personally reviewed pertinent medications, lines/drains/airways, imaging, cardiology results, laboratory results, microbiology results, notably:    Diet  Diet NPO  Diet NPO

## 2023-11-09 NOTE — PLAN OF CARE
"[facility] Care Plan    POC reviewed with Mara Mojica and family at 0300. Pt unable to verbalize understanding. Questions and concerns addressed. No acute events overnight. Pt progressing toward goals. Will continue to monitor. See below and flowsheets for full assessment and VS info.     100.1 Tmax        Is this a stroke patient? no    Neuro:  [facility] Coma Scale  Best Eye Response: 1-->(E1) none  Best Motor Response: 1-->(M1) none  Best Verbal Response: 1-->(V1) none  [facility] Coma Scale Score: 3  Assessment Qualifiers: patient intubated  Pupil PERRLA: yes     24hr Temp:  [97.5 °F (36.4 °C)-100.1 °F (37.8 °C)]     CV:   Rhythm: normal sinus rhythm  BP goals:   SBP < 180  MAP > 65    Resp:      Vent Mode: Spont  Set Rate: 16 BPM  Oxygen Concentration (%): 40  Vt Set: 380 mL  PEEP/CPAP: 5 cmH20  Pressure Support: 8 cmH20    Plan: wean to extubate    GI/:     Diet/Nutrition Received: tube feeding  Last Bowel Movement: 11/08/23  Voiding Characteristics: external catheter    Intake/Output Summary (Last 24 hours) at 11/9/2023 0545  Last data filed at 11/9/2023 0501  Gross per 24 hour   Intake 2495 ml   Output 1350 ml   Net 1145 ml     Unmeasured Output  Urine Occurrence: 1  Stool Occurrence: 1  Emesis Occurrence: 0  Pad Count: 3    Labs/Accuchecks:  Recent Labs   Lab 11/09/23 0131   WBC 18.15*   RBC 2.75*   HGB 8.8*   HCT 27.0*   PLT 40*      Recent Labs   Lab 11/09/23 0131      K 4.5   CO2 23   *   BUN 51*   CREATININE 0.9   ALKPHOS 170*   ALT 90*   *   BILITOT 6.4*      Recent Labs   Lab 11/09/23 0131   INR 1.8*    No results for input(s): "CPK", "CPKMB", "TROPONINI", "MB" in the last 168 hours.    Electrolytes: N/A - electrolytes WDL  Accuchecks: Q4H    Gtts:   dextrose 10 % in water (D10W)         LDA/Wounds:  Lines/Drains/Airways       Drain  Duration                  NG/OG Tube 10/20/23 2000 16 Fr. Left nostril 19 days    Female External Urinary Catheter 11/03/23 1812 5 days              " Airway  Duration                  Airway - Non-Surgical 11/05/23 2000 3 days       Airway Anesthesia 11/05/23 3 days              Arterial Line  Duration             Arterial Line 11/06/23 1728 Left Radial 2 days              Peripheral Intravenous Line  Duration                  Peripheral IV - Single Lumen 11/05/23 0017 20 G Posterior;Right Hand 4 days         Peripheral IV - Single Lumen 11/05/23 0017 20 G;1 1/4 in Left;Posterior Forearm 4 days         Peripheral IV - Single Lumen 11/06/23 1910 20 G Anterior;Left Upper Arm 2 days                  Wounds: Yes  Wound care consulted: No       Problem: Adult Inpatient Plan of Care  Goal: Plan of Care Review  Outcome: Ongoing, Progressing  Goal: Patient-Specific Goal (Individualized)  Outcome: Ongoing, Progressing  Goal: Readiness for Transition of Care  Outcome: Ongoing, Progressing     Problem: Infection  Goal: Absence of Infection Signs and Symptoms  Outcome: Ongoing, Progressing     Problem: Adjustment to Illness (Sepsis/Septic Shock)  Goal: Optimal Coping  Outcome: Ongoing, Progressing     Problem: Infection Progression (Sepsis/Septic Shock)  Goal: Absence of Infection Signs and Symptoms  Outcome: Ongoing, Progressing

## 2023-11-09 NOTE — ASSESSMENT & PLAN NOTE
58 year old presented for altered mental status on 10/18. Clinical picture confounded with hepatic encephalopathy, infection, and seizure activity. Neurology was consulted by primary team prior to admission to [facility].    10/27 EEG IMPRESSION:  This is an abnormal EEG during lethargic state.  Diffuse disorganized slowing of the background was noted.  Frequent triphasic waves noted.  Left posterior pseudo periodic epileptiform discharges were noted.  Numerous electrographic seizures emanating from the left posterior region were noted.    10/31 - rehook and 11/1 read with frequent discharges, vimpat lowered to 50 given mental status and liver function, will continue to watch on eeg  vEEG in place  Keppra decrease 500 mg BID  Neurochecks q4h  Vitals q1hr   11/03: EEG remains negative with lacosamide taper, DC and monitor for seizure recurrence  11/04: stable EEG, beginning to awaken  11/5: remains without seizures  11/8/23: EEG pending

## 2023-11-09 NOTE — PLAN OF CARE
MICU DAILY GOALS     Family/Goals of care/Code Status   Code Status: Full Code    24H Vital Sign Range  Temp:  [98.3 °F (36.8 °C)-100.1 °F (37.8 °C)]   Pulse:  [102-128]   Resp:  [12-27]   BP: (113-173)/(56-73)   SpO2:  [93 %-100 %]   Arterial Line BP: ()/(41-61)      Shift Events   Patient remains intubated and unresponsive, she will open her eyes but does not track or follow commands.     AWAKE RASS: Goal - RASS Goal: 0-->alert and calm  Actual - RASS (Gonzalez Agitation-Sedation Scale): deep sedation    Restraint necessity: Treatment interference   BREATHE SBT: Fail    Coordinate A & B, analgesics/sedatives Pain: managed   SAT: Fail   Delirium CAM-ICU: Overall CAM-ICU: Positive   Early(intubated/ Progressive (non-intubated) Mobility MOVE Screen (INTUBATED ONLY): Fail    Activity: Activity Management: Rolling - L1   Feeding/Nutrition Diet order: Diet/Nutrition Received: NPO, tube feeding, Specialty Diet/Nutrition Received: other (see comments) (Isosource)   Thrombus DVT prophylaxis: VTE Required Core Measure: (SCDs) Sequential compression device initiated/maintained   HOB Elevation Head of Bed (HOB) Positioning: HOB at 30-45 degrees   Ulcer Prophylaxis GI: yes   Glucose control managed Glycemic Management: blood glucose monitored   Skin Skin assessed during: Daily Assessment    Sacrum intact? Yes  Heels intact? Yes  Surgical wound? No    [] No Altered Skin Integrity Present    []Prevention Measures Documented    [x] Altered Skin Integrity Present or Discovered   [] LDA present /added in EPIC   [] Wound Image Taken     Wound Care Consulted? No    Attending Nurse:  Lucila Pena RN/Staff Member:  Tia   Bowel Function no issues    Indwelling Catheter Necessity [REMOVED]      Urethral Catheter 10/28/23 9475-Reason for Continuing Urinary Catheterization: Critically ill in ICU and requiring hourly monitoring of intake/output          De-escalation Antibiotics Yes       VS and assessment per flow sheet, patient  progressing towards goals as tolerated, plan of care reviewed with family, all concerns addressed, will continue to monitor.

## 2023-11-09 NOTE — SUBJECTIVE & OBJECTIVE
Interval History: No seizures on EEG. No family at bedside on rounds.    Current Facility-Administered Medications   Medication Dose Route Frequency Provider Last Rate Last Admin    acetaminophen oral solution 499.5074 mg  499.5074 mg Per NG tube Q6H Ebony Kurtz L, NP   499.5074 mg at 11/09/23 0700    acetylcysteine 100 mg/ml (10%) solution 4 mL  4 mL Nebulization QID Ebony Kurtz L, NP   4 mL at 11/09/23 1200    albuterol-ipratropium 2.5 mg-0.5 mg/3 mL nebulizer solution 3 mL  3 mL Nebulization QID Holli Mena MD   3 mL at 11/09/23 1200    calcium gluconate 1 g in NS IVPB (premixed)  1 g Intravenous PRN Andras, Philippe P., NP        calcium gluconate 1 g in NS IVPB (premixed)  2 g Intravenous PRN Andras Philippe P., NP        calcium gluconate 1 g in NS IVPB (premixed)  3 g Intravenous PRN Andtorey, Philippe P., NP        dextrose 10 % infusion   Intravenous Continuous PRN Elizabeth Gaspar PA-C        dextrose 10% bolus 125 mL 125 mL  12.5 g Intravenous PRN Elizabeth Gaspar PA-KAYLA        dextrose 10% bolus 250 mL 250 mL  25 g Intravenous PRN Elizabeth Gaspar PA-C        famotidine tablet 20 mg  20 mg Per NG tube Daily Holli Mena MD   20 mg at 11/09/23 1033    folic acid tablet 1 mg  1 mg Per NG tube Daily Darion Torres MD   1 mg at 11/09/23 1033    glucagon (human recombinant) injection 1 mg  1 mg Intramuscular PRN Elizabeth Gaspar PA-C        hydrALAZINE injection 10 mg  10 mg Intravenous Q4H PRN Kiara Forman PA-KAYLA   10 mg at 11/05/23 2140    insulin aspart U-100 pen 0-10 Units  0-10 Units Subcutaneous Q4H PRN Elizabeth Gaspar PA-KAYLA   2 Units at 11/08/23 0432    labetalol 20 mg/4 mL (5 mg/mL) IV syring  10 mg Intravenous Q6H PRN Kiara Forman PA-C   10 mg at 11/05/23 1745    lactulose 20 gram/30 mL solution Soln 30 g  30 g Per NG tube BID Rebecca Adames PA-C   30 g at 11/09/23 1032    levetiracetam 500 mg/5 mL (5 mL) liquid Soln 500 mg  500 mg Per NG tube BID Ebony Kurtz NP   500 mg  at 11/09/23 1033    magnesium sulfate 2g in water 50mL IVPB (premix)  2 g Intravenous PRN AndPhilippe lema P., NP   Stopped at 10/30/23 1022    magnesium sulfate 2g in water 50mL IVPB (premix)  4 g Intravenous PRN AndPhilippe lema P., NP        multivitamin tablet  1 tablet Per NG tube Daily Darion Torres MD   1 tablet at 11/09/23 1032    ondansetron injection 4 mg  4 mg Intravenous Q8H PRN Aurelia Winston MD        potassium chloride 10 mEq in 100 mL IVPB  40 mEq Intravenous PRN AndPhilippe lema P., NP   Stopped at 11/04/23 0853    And    potassium chloride 10 mEq in 100 mL IVPB  60 mEq Intravenous PRN AndPhilippe lema P., NP   Stopped at 10/30/23 0916    And    potassium chloride 10 mEq in 100 mL IVPB  80 mEq Intravenous PRN AndPhilippe lema P., NP   Stopped at 10/28/23 0138    rifAXIMin tablet 550 mg  550 mg Per NG tube BID Aurelia Winston MD   550 mg at 11/09/23 1033    sodium phosphate 15 mmol in dextrose 5 % (D5W) 250 mL IVPB  15 mmol Intravenous PRN AndPhilippe lema P., NP   Stopped at 11/03/23 0706    sodium phosphate 20.01 mmol in dextrose 5 % (D5W) 250 mL IVPB  20.01 mmol Intravenous PRN AndPhilippe lema P., NP   Stopped at 10/30/23 2147    sodium phosphate 30 mmol in dextrose 5 % (D5W) 250 mL IVPB  30 mmol Intravenous PRN Philippe Reno PMaria Victoria, NP        thiamine tablet 100 mg  100 mg Per NG tube Daily Erica Lewis PA-C   100 mg at 11/09/23 1033     Continuous Infusions:   dextrose 10 % in water (D10W)         Review of Systems  Objective:     Vital Signs (Most Recent):  Temp: 100.1 °F (37.8 °C) (11/09/23 1105)  Pulse: 107 (11/09/23 1205)  Resp: (!) 21 (11/09/23 1205)  BP: (!) 152/67 (11/09/23 1205)  SpO2: 97 % (11/09/23 1205) Vital Signs (24h Range):  Temp:  [98.3 °F (36.8 °C)-100.1 °F (37.8 °C)] 100.1 °F (37.8 °C)  Pulse:  [102-128] 107  Resp:  [10-27] 21  SpO2:  [93 %-100 %] 97 %  BP: (113-173)/(56-78) 152/67  Arterial Line BP: ()/(41-64) 130/57     Weight: 56.2 kg (123 lb 14.4 oz)  Body mass index is  25.02 kg/m².     Physical Exam  Constitutional:       General: She is not in acute distress.     Appearance: She is ill-appearing. She is not diaphoretic.      Interventions: She is intubated.   HENT:      Head: Normocephalic and atraumatic.      Comments: EEG in place  Eyes:      General:         Right eye: No discharge.         Left eye: No discharge.      Conjunctiva/sclera: Conjunctivae normal.      Pupils: Pupils are equal, round, and reactive to light.   Cardiovascular:      Rate and Rhythm: Normal rate.   Pulmonary:      Effort: Pulmonary effort is normal. No respiratory distress. She is intubated.      Comments: Intubated  Musculoskeletal:         General: No deformity or signs of injury.   Skin:     General: Skin is warm and dry.   Psychiatric:      Comments: Unable to assess            NEUROLOGICAL EXAMINATION:     CRANIAL NERVES     CN III, IV, VI   Pupils are equal, round, and reactive to light.       EMILY OU   Corneal intact OU   No spontaneous eye opening   Face symmetric   Tongue midline   Involuntary movements of RUE  Does not withdraw to noxious stimuli  Does not follow commands    Exam findings to suggest seizures:  Myoclonus - no   eye twitching - no   Nystagmus - no   gaze deviation - no   waxy rigidity - no        Significant Labs: All pertinent lab results from the past 24 hours have been reviewed.    Significant Studies: I have reviewed all pertinent imaging results/findings within the past 24 hours.

## 2023-11-09 NOTE — ASSESSMENT & PLAN NOTE
2/2 to tracheal stenosis   -patient with stridor, increased secretions, requiring 5L 28% and inability to protect airway   -intubated by anesthesia team with small ETT    Vent Mode: Spont  Oxygen Concentration (%):  [30-40] 40  Resp Rate Total:  [8.2 br/min-25 br/min] 25 br/min  PEEP/CPAP:  [5 cmH20] 5 cmH20  Pressure Support:  [8 cmH20] 8 cmH20  Mean Airway Pressure:  [6.7 cmH20-8.1 cmH20] 8.1 cmH20      Reintubated after trial extubation 2/2 stridor non responsive to med management  Dexamethasone 10 mg x1, followed by 4 mg q6 hours  Passed SBT

## 2023-11-09 NOTE — PROGRESS NOTES
[facility] - Cardiac Medical ICU  Neurocritical Care  Progress Note    Admit Date: 10/25/2023  Service Date: 11/09/2023  Length of Stay: 15    Subjective:     Chief Complaint: Acute hepatic encephalopathy    History of Present Illness: This is a 58-year-old female with a past medical history of alcoholic cirrhosis, s/p ex-lap w/ bowel resection for incarcerated hernia, who initially presented on 10/18 to [facility] with acute encephalopathy.  Her  found her on the floor at home with evidence of fecal/urine incontinence with confusion and slurred speech. Possible reported tongue injury in chart. Reported concern L sided weakness and down drift of L arm however CT head without evidence of acute abnormalities, MRI brain with only small vessel vascular changes without evidence of territorial infarct.     Hospital Course: EEG done on 10/19 with mild diffuse nonspecific background slowing, no potential epileptiform activity. Repeat MRI brain with contrast on 10/23 unchanged from initial MRI. Became more lethargic and was no longer following commands or answering questions. Due to worsening hepatic encephalopathy in setting of hepatic cirrhosis, patient transferred to [facility] for management with transplant team. Has remained on antibiotics, lactulose and rifaximin for treatment of UTI with pansensitive Ecoli, HE, and presumed SBP. Neurology consulted for management recommendations regarding persistent AMS. Had repeat EEG done on 10/24 with similar findings to prior EEG showing mild generalized non-specific cerebral dysfunction and no electrographic seizures or indication of seizure tendency. Additional CT head w/o contrast on 10/25 without evidence of acute issues. Remains confused and unable to answer questions on exam. Not withdrawing to painful stimuli. Was able to awaken to verbal stimuli in AM however when reevaluated in afternoon unresponsive however was after receiving Ativan.     On admission to [facility]:  Patient  had EEG running, and was noted to have seizures at 7:30 a.m., 8:00 a.m. in, and 10:00 a.m. was noted to be in focal status prior to receiving Vimpat.  Patient was noted to have stridor, worsening secretions with suspicion for aspiration, decreased airway protection, and rapids was called due to low GCS score of 6. Antibiotics broadened to Vanc/Zosyn. Clinical picture of mental status confounded with episodes of seizures, infection, and hepatic encephalopathy.             Hospital Course: 10/28: Improving GCS from 6 to 10. Continuing pulmonary toilet and deep suctioning for stridor and copious secretions. UA positive for candida. Blood cultures from 10/27 NGTD. Sputum cultures sent. Patient on day 2 of broadened antibiotics vanc/zosyn due to worsening clinical status but will discontinue tomorrow if cultures remain negative. Still hypernatremic, treating with D5W. Patient was transferred with no ordered diuretics, very edematous on physical exam. Adequate stooling on lactulose and rifaximin. Due to increased UOP/stooling will place eckert for one day for irritated skin. EEG update showing no seizures since 10am 10/27. Monitoring CBC for thrombocytopenia and macrocytic anemia. Discussed code status and goals of care with  and best friend at bedside. Trickle feeds resumed.   10/29: No acute events overnight, EEG reportedly without further seizures for ~48 hours can disconnect once formal report is in, neuro status slowly improving as patient now tracking in room, moving extremities, responding with appropriate emotional reactions to her .  Respiratory status largely unchanged with intermittent events of large volume breaths that sound almost stridorous but without actual respiratory distress- gas remains compensated.  UOP low, diuresis resumed.  10/30/2023: d/c mucomyst nebs, add racemic epi scheduled nebs, add budesonide and dex 6 q8 hours, ammonia at 35- continue lactulose and rifaximin, abg reviewed,  40mEq of K once, ENT consulted for stridor, continue eckert catheter in today   10/31/2023 Patient with worsening respiratory status, continued decreased mentation and increased secretions. Plan for elective intubation in controlled setting with anesthesia. Will also recheck eeg, if negative will start to wean AEDs and see if that improves patient's arousal. PLT stabilized, continue to monitor.   11/1/2023 this morning patient's 6.0 ETT exchanged for 7.0 to allow suctioning. EEG with frequent discharges, lowered vimpat to 50 mg and will follow EEG. Attempting to remove confounding factors for patients mental status. Slow drop in H/H, 1 unit ordered.   11/2/2023 NAEO, cEEG to remain in place, no seizures but is having frequent discharges in runs. Continue lower dose vimpat and 1500 keppra Eye opening this morning which is slight improvement in exam. Failed SBT   11/3/2023: no improvement with decrease in lacosamide, no re-development of seizures, if does not wake up in next 48 hrs off lacosamide or redevelops seizures, prognosis is extremely poor  11/4/2023: improved mental state this am, no seizures overnight  11/5/2023: LOC stable, has cuff leak, trial of extubation  11/06/2023 reintubated for stridor non responsive to med management overnight  11/07/2023 CTH stable. Off of levo. Completed dex (wbc 22, afebrile). CXR w L mildly increasing consolidation. Very thick secretions, added mucomyst and duonebs. Weaning keppra to 750. Pt appearing more edematous today, dc LR. Scheduled tylenol max 2g/d. Scheduled oxy 5q12.  11/8/23: EEG pending  11/9/23: EEG slowing    Interval History:  EEG pending slowing. CK pending, nerve conduction    Review of Systems   Reason unable to perform ROS: decrease LOC.     2 systems   Objective:     Vitals:  Temp: 100.1 °F (37.8 °C)  Pulse: 107  Rhythm: sinus tachycardia  BP: (!) 152/67  MAP (mmHg): 97  Resp: (!) 21  SpO2: 97 %  Oxygen Concentration (%): 40  Vent Mode: Spont  Pressure  Support: 8 cmH20  PEEP/CPAP: 5 cmH20  Peak Airway Pressure: 13 cmH20  Mean Airway Pressure: 8.1 cmH20  Plateau Pressure: 0 cmH20    Temp  Min: 98.3 °F (36.8 °C)  Max: 100.1 °F (37.8 °C)  Pulse  Min: 102  Max: 128  BP  Min: 113/56  Max: 173/59  MAP (mmHg)  Min: 80  Max: 106  Resp  Min: 10  Max: 27  SpO2  Min: 93 %  Max: 100 %  Oxygen Concentration (%)  Min: 30  Max: 40    11/08 0701 - 11/09 0700  In: 2495   Out: 1200 [Urine:400]   Unmeasured Output  Urine Occurrence: 1  Stool Occurrence: 1  Emesis Occurrence: 0  Pad Count: 2        Physical Exam  Vitals and nursing note reviewed.   Constitutional:       Appearance: She is ill-appearing.   HENT:      Head: Normocephalic.      Nose: Nose normal.      Mouth/Throat:      Mouth: Mucous membranes are moist.      Pharynx: Oropharynx is clear.   Eyes:      Pupils: Pupils are equal, round, and reactive to light.   Cardiovascular:      Rate and Rhythm: Normal rate and regular rhythm.      Pulses: Normal pulses.      Heart sounds: Normal heart sounds.   Pulmonary:      Effort: Pulmonary effort is normal.      Breath sounds: Normal breath sounds.   Abdominal:      General: Bowel sounds are normal.      Palpations: Abdomen is soft.   Musculoskeletal:         General: Normal range of motion.   Skin:     General: Skin is warm and dry.      Capillary Refill: Capillary refill takes 2 to 3 seconds.   Neurological:      Comments: No sedation  E2 V1T M4  Obtunded. Not following commands.   CN II-XII:  PERRL.   No gaze today  No facial asymmetry  + cough, gag  Motor:  No Withdraws to noxious stimuli x4  Sensation intact to noxious stimuli x4            Unable to test orientation, language, memory, judgment, insight, fund of knowledge, hearing, shoulder shrug, tongue protrusion, coordination, gait due to level of consciousness.       Medications:  Continuousdextrose 10 % in water (D10W)    Scheduledacetaminophen, 499.5074 mg, Q6H  acetylcysteine 100 mg/ml (10%), 4 mL,  QID  albuterol-ipratropium, 3 mL, QID  famotidine, 20 mg, Daily  folic acid, 1 mg, Daily  lactulose, 30 g, BID  levetiracetam, 500 mg, BID  multivitamin, 1 tablet, Daily  rifAXImin, 550 mg, BID  thiamine, 100 mg, Daily    PRNcalcium gluconate IVPB, 1 g, PRN  calcium gluconate IVPB, 2 g, PRN  calcium gluconate IVPB, 3 g, PRN  dextrose 10 % in water (D10W), , Continuous PRN  dextrose 10%, 12.5 g, PRN  dextrose 10%, 25 g, PRN  glucagon (human recombinant), 1 mg, PRN  hydrALAZINE, 10 mg, Q4H PRN  insulin aspart U-100, 0-10 Units, Q4H PRN  labetalol, 10 mg, Q6H PRN  magnesium sulfate IVPB, 2 g, PRN  magnesium sulfate IVPB, 4 g, PRN  ondansetron, 4 mg, Q8H PRN  potassium chloride, 40 mEq, PRN   And  potassium chloride, 60 mEq, PRN   And  potassium chloride, 80 mEq, PRN  sodium phosphate 15 mmol in dextrose 5 % (D5W) 250 mL IVPB, 15 mmol, PRN  sodium phosphate 20.01 mmol in dextrose 5 % (D5W) 250 mL IVPB, 20.01 mmol, PRN  sodium phosphate 30 mmol in dextrose 5 % (D5W) 250 mL IVPB, 30 mmol, PRN      Today I personally reviewed pertinent medications, lines/drains/airways, imaging, cardiology results, laboratory results, microbiology results, notably:    Diet  Diet NPO  Diet NPO        Assessment/Plan:     Neuro  Seizure  58 year old presented for altered mental status on 10/18. Clinical picture confounded with hepatic encephalopathy, infection, and seizure activity. Neurology was consulted by primary team prior to admission to [facility].    10/27 EEG IMPRESSION:  This is an abnormal EEG during lethargic state.  Diffuse disorganized slowing of the background was noted.  Frequent triphasic waves noted.  Left posterior pseudo periodic epileptiform discharges were noted.  Numerous electrographic seizures emanating from the left posterior region were noted.    10/31 - rehook and 11/1 read with frequent discharges, vimpat lowered to 50 given mental status and liver function, will continue to watch on eeg  vEEG in place  Keppra decrease  500 mg BID  Neurochecks q4h  Vitals q1hr   11/03: EEG remains negative with lacosamide taper, DC and monitor for seizure recurrence  11/04: stable EEG, beginning to awaken  11/5: remains without seizures  11/8/23: EEG pending      Endocrine  Moderate malnutrition  Resuming tube feeds at 10 mL/hr    Nutrition consulted. Most recent weight and BMI monitored-     Measurements:  Wt Readings from Last 1 Encounters:   10/26/23 56.2 kg (123 lb 14.4 oz)   Body mass index is 25.02 kg/m².    Patient has been screened and assessed by RD.    Malnutrition Type:  Context: social/environmental circumstances  Level: moderate    Malnutrition Characteristic Summary:  Subcutaneous Fat (Malnutrition): mild depletion  Muscle Mass (Malnutrition): mild depletion  Fluid Accumulation (Malnutrition): moderate    Interventions/Recommendations (treatment strategy):  1. When able, initiate TFs. Rec'd Isosource 1.5 @ 50 mL/hr to provide 1800 kcals, 82 g of protein, 917 mL fluid. 2. RD to monitor & follow-up.    GI  * Acute hepatic encephalopathy  This is a 52 year old woman with a history of ethanol cirrhosis who presents after being found on the ground with fecal and urinary incontinence, and altered mental status. Elevated ammonia on admission.    - Continue lactulose via NG tube TID (titrate till 3-4 daily BM achieved). Continue Xifaxin.   - Avoid opiates and benzodiazepines, if able.   - IV thiamine, folate supplementation, multivitamin through NG tube.  - concern that comatose state is irreversible brain damage, will rule out other causes first, EEG and wean AEDs  - Weaned Keppra to 750   -11/7: CTH stable  - CK pending  -MELD score ~50% survival without transplant  11/3: if no improvement off lacosamide, prognosis for return to reasonable conscious state is small  11/4: cont max hepatic encephalopathy treatment  11/5: stable with brief ability to arouse  11/9/23: continue EEG,     Decompensated alcoholic hepatic cirrhosis  Hepatology  following PEth. Daily CBC, CMP & INR.   Not currently being evaluated for transplant due to clinical status  Vitamin K given for chronic coagulopathy  Continuing lactulose and rifaximin titrated to 3-4 bowel movements daily  Ammonia WNL    Other  Hypoxic respiratory failure  2/2 to tracheal stenosis   -patient with stridor, increased secretions, requiring 5L 28% and inability to protect airway   -intubated by anesthesia team with small ETT    Vent Mode: Spont  Oxygen Concentration (%):  [30-40] 40  Resp Rate Total:  [8.2 br/min-25 br/min] 25 br/min  PEEP/CPAP:  [5 cmH20] 5 cmH20  Pressure Support:  [8 cmH20] 8 cmH20  Mean Airway Pressure:  [6.7 cmH20-8.1 cmH20] 8.1 cmH20      Reintubated after trial extubation 2/2 stridor non responsive to med management  Dexamethasone 10 mg x1, followed by 4 mg q6 hours  Passed SBT      Debility  PT/OT when appropriate          The patient is being Prophylaxed for:  Venous Thromboembolism with: Mechanical or Chemical  Stress Ulcer with: Not Applicable   Ventilator Pneumonia with: not applicable    Activity Orders            Diet NPO: NPO starting at 11/05 0500    Elevate HOB Elevate (30-45 degrees) Elevate HOB to 30 - 45 degrees during feeding unless otherwise stated starting at 10/28 1218    Elevate HOB to 30-45 degrees during feeding unless otherwise stated starting at 10/26 1138    Progressive Mobility Protocol (mobilize patient to their highest level of functioning at least twice daily) starting at 10/25 2000    Turn patient starting at 10/25 2000          Full Code  Critical care time 45 mins  Ebony Kurtz NP  Neurocritical Care  [facility] - Cardiac Medical ICU

## 2023-11-09 NOTE — ASSESSMENT & PLAN NOTE
- Per Hepatology, not initiating liver transplant at this time  - On Lactulose, Rifaximin, Thiamine, Folate  - Management per Hepatology, NCC

## 2023-11-09 NOTE — ASSESSMENT & PLAN NOTE
58F PMHx etoh cirrhosis with ascites, incarcerated hernia s/p ex lap, and recent SBO who initially presented to [facility] with AMS on 10/18, transferred to [facility] for hepatic encephalopathy and liver transplant evaluation, hospital course c/b UTI, SBI, focal status epilepticus (now resolved), and acute hypoxemic respiratory failure with intubation on 10/31. EEG re-placed on 10/31 for persistent encephalopathy.    Recommendations:  - Continuous vEEG monitoring, plan to continue until 11/10  - NCC weaning Levetiracetam to encourage arousal  - Avoid agents that lower seizure threshold  - Management of infectious/metabolic abnormalities per NCC  - Seizure precautions      Discussed plan of care with NCC team. No family at bedside. Will follow EEG, please call with questions.

## 2023-11-09 NOTE — ASSESSMENT & PLAN NOTE
This is a 52 year old woman with a history of ethanol cirrhosis who presents after being found on the ground with fecal and urinary incontinence, and altered mental status. Elevated ammonia on admission.    - Continue lactulose via NG tube TID (titrate till 3-4 daily BM achieved). Continue Xifaxin.   - Avoid opiates and benzodiazepines, if able.   - IV thiamine, folate supplementation, multivitamin through NG tube.  - concern that comatose state is irreversible brain damage, will rule out other causes first, EEG and wean AEDs  - Weaned Keppra to 750   -11/7: CTH stable  - CK pending  -MELD score ~50% survival without transplant  11/3: if no improvement off lacosamide, prognosis for return to reasonable conscious state is small  11/4: cont max hepatic encephalopathy treatment  11/5: stable with brief ability to arouse  11/9/23: continue EEG,

## 2023-11-09 NOTE — PROCEDURES
DATE: 11/8/23    EEG NUMBER: FH -1    REFERRING PHYSICIAN:  Dr. Mena      This EEG was performed to assess for subclinical seizures      ELECTROENCEPHALOGRAM REPORT     METHODOLOGY:  Electroencephalographic (EEG) recording is with electrodes placed according to the International 10-20 placement system.  Thirty two (32) channels of digital signal are simultaneously recorded from the scalp and may include EKG, EMG, and/or eye monitors.   Recording band pass was 0.1 to 512 hz.  Digital video recording of the patient is simultaneously recorded with the EEG.  The nursing staff report clinical symptoms and may press an event button when the patient has symptoms of clinical interest to the treating physicians.  EEG and video recording is stored and archived in digital format.  The entire recording is visually reviewed, and the times identified by computer analysis as being spikes or seizures are reviewed again.  Activation procedures which include photic stimulation, hyperventilation and instructing patients to perform simple task are done in selected patients.   Compresses spectral analysis (CSA) is also performed on the activity recorded from each individual channel.  This is displayed as a power display of frequencies from 0 to 30 Hz over time.   The CSA analysis is done and displayed continuously.  This is reviewed for asymmetries in power between homologous areas of the scalp and for presence of changes in power which can be seen when seizures occur.  Sections of suspected abnormalities on the CSA is then compared with the original EEG recording.                [employer] software was also utilized in the review of this study.  This software suite analyzes the EEG recording in multiple domains.  Coherence and rhythmicity is computed to identify EEG sections which may contain organized seizures.  Each channel undergoes analysis to detect presence of spike and sharp waves which have special and morphological  characteristic of epileptic activity.  The routine EEG recording is converted from spacial into frequency domain.  This is then displayed comparing homologous areas to identify areas of significant asymmetry.  Algorithm to identify non-cortically generated artifact is used to separate eye movement, EMG and other artifact from the EEG.     Recording times   Start on November 8, 2023 at hours 16 minute 15 seconds 40   End on November 9, 2023 at hours 7 minute 0 seconds 3  The total time of EEG recording for the study was 14 hours and 39 minutes    EEG FINDINGS:  The recording was obtained with a number of standard bipolar and referential montages during lethargic state in this state the background diffusely disorganized.  Generalized synchronous semi rhythmical delta activity was noted.  Pseudo periodic triphasic waves at a frequency of 1-1.5 hertz were noted.  During deeper stages of clinical sleep spindles were noted which was symmetric.  During this study no seizures were recorded.  Frequent occipital maximum low-amplitude sharp waves were noted which appeared symmetric    The EKG channel revealed a sinus tachycardia     IMPRESSION:  This is an abnormal EEG during lethargic state.  Diffuse disorganized slowing of the background was noted.  Left posterior pseudo periodic epileptiform discharges were noted.  Frequent occipital maximum low-amplitude sharp waves were noted which appeared symmetric     CLINICAL CORRELATION:  The patient is a 58-year-old female with a history of hepatic dysfunction who is currently maintained on Keppra.  This is an abnormal EEG during lethargic state.  The overall degree of slowing and disorganization is suggestive of a mild-moderate degree of encephalopathy likely toxic metabolic encephalopathy.  The presence of posterior maximum epileptiform discharges is suggestive of an increased risk of focal seizures from the occipital region. No seizures were recorded.

## 2023-11-09 NOTE — PROGRESS NOTES
[facility] - [facility]  Adult Nutrition  Progress Note    SUMMARY       Recommendations    1. Continue TF regimen of Isosource 1.5 @ 50 mL/hr to provide 1800 kcals, 82 g of protein, 917 mL fluid.   2. RD to monitor & follow-up.  .  Goals: Meet % EEN, EPN by RD f/u date  Nutrition Goal Status: goal met  Communication of RD Recs:  (POC)    Assessment and Plan    Moderate malnutrition    Nutrition Problem:  Moderate Protein-Calorie Malnutrition  Malnutrition in the context of Social/Environmental Circumstances    Related to (etiology):  Inability to consume sufficient energy     Signs and Symptoms (as evidenced by):  Body Fat Depletion: mild depletion of orbitals and triceps   Muscle Mass Depletion: mild depletion of temples, clavicle region and lower extremities   Fluid Accumulation: mild and moderate    Interventions(treatment strategy):  Collaboration of nutrition care w/ other providers  EN    Nutrition Diagnosis Status:  Continues    Malnutrition Assessment  Malnutrition Context: social/environmental circumstances  Malnutrition Level: moderate    Subcutaneous Fat (Malnutrition): mild depletion  Muscle Mass (Malnutrition): mild depletion  Fluid Accumulation (Malnutrition): moderate     Reason for Assessment    Reason For Assessment: RD follow-up  Diagnosis: other (see comments) (Encephalopathy)  Relevant Medical History: Alcoholic cirrhosis, Bowel resection  Interdisciplinary Rounds: did not attend  General Information Comments: RD f/u. Noted pt remains intubated. Remains NPO- noted current TF regimen running. Per chart review, pt w/ UBW of 120-130#; CBW remains 123# (currently w/ +3 edema in ULEs and BLEs). Mild wasting found on physical exam on 10/26. RD feels pt meets criteria for moderate malnutrition - please see PES statement for details. LBM 11/8.   Nutrition Discharge Planning: Pending clinical course    Nutrition Risk Screen    Nutrition Risk Screen: tube feeding or parenteral nutrition,  "difficulty chewing/swallowing    Nutrition/Diet History    Spiritual, Cultural Beliefs, Congregation Practices, Values that Affect Care: no  Food Allergies: NKFA  Factors Affecting Nutritional Intake: NPO, on mechanical ventilation, difficulty/impaired swallowing    Anthropometrics    Temp: 99.3 °F (37.4 °C)  Height: 4' 11" (149.9 cm)  Height (inches): 59 in  Weight Method: Bed Scale  Weight: 56.2 kg (123 lb 14.4 oz)  Weight (lb): 123.9 lb  Ideal Body Weight (IBW), Female: 95 lb  % Ideal Body Weight, Female (lb): 130.42 %  BMI (Calculated): 25  BMI Grade: 25 - 29.9 - overweight    Lab/Procedures/Meds    Pertinent Labs Reviewed: reviewed  Pertinent Labs Comments: BUN 51, Glucose 120, , Albumin 2.5, T Bili 6.4, , ALT 90, A1C ,4.0%  Pertinent Medications Reviewed: reviewed  Pertinent Medications Comments: Famotidine, folic acid, lactulose, MVI, thiamine    Estimated/Assessed Needs    Weight Used For Calorie Calculations: 56.2 kg (123 lb 14.4 oz)  Energy Calorie Requirements (kcal): 3404-3201 kcal/d  Energy Need Method: Kcal/kg (30-35 kcal/kg)  Protein Requirements: 73-84 g/d (1.3-1.5 g/kg)  Weight Used For Protein Calculations: 56.2 kg (123 lb 14.4 oz)  Fluid Requirements (mL): 1 mL/kcal or fluid per MD  Estimated Fluid Requirement Method: RDA Method  RDA Method (mL): 1686    Nutrition Prescription Ordered    Current Diet Order: NPO  Current Nutrition Support Formula Ordered: Isosource 1.5  Current Nutrition Support Rate Ordered: 50 (ml)  Current Nutrition Support Frequency Ordered: mL/hr    Evaluation of Received Nutrient/Fluid Intake    Enteral Calories (kcal): 1800  Enteral Protein (gm): 82  Enteral (Free Water) Fluid (mL): 917  % Kcal Needs: 100%  % Protein Needs: 100%  I/O: +10.5 L since 10/26  Energy Calories Required: meeting needs  Protein Required: meeting needs  Tolerance: tolerating  % Intake of Estimated Energy Needs: 100%  % Meal Intake: NPO    Nutrition Risk    Level of Risk/Frequency of " Follow-up:  (1x/week)     Monitor and Evaluation    Food and Nutrient Intake: enteral nutrition intake, food and beverage intake, energy intake  Food and Nutrient Adminstration: enteral and parenteral nutrition administration, diet order  Knowledge/Beliefs/Attitudes: beliefs and attitudes, food and nutrition knowledge/skill  Physical Activity and Function: nutrition-related ADLs and IADLs  Anthropometric Measurements: weight, weight change  Biochemical Data, Medical Tests and Procedures: inflammatory profile, lipid profile, glucose/endocrine profile, gastrointestinal profile, electrolyte and renal panel  Nutrition-Focused Physical Findings: overall appearance     Nutrition Follow-Up    RD Follow-up?: Yes    Opal Colon RDN,LDN

## 2023-11-09 NOTE — PROGRESS NOTES
[facility] - [facility]  Neurology-Epilepsy  Progress Note    Patient Name: Mara Mojica  MRN: [id number]  Admission Date: 10/25/2023  Hospital Length of Stay: 15 days  Code Status: Full Code   Attending Provider: Holli Mena MD  Primary Care Physician: Osiris Nevarez NP   Principal Problem:Seizure    Subjective:     Hospital Course:   10/19: mild encephalopathy, no epileptiform activity  OFF EEG  10/23>10/24: mild encephalopathy, no epileptiform activity   OFF EEG  10/26>10/27: moderate encephalopathy, triphasics, left posterior maximum periodic epileptiform discharges, numerous electrographic seizures from left posterior region c/w focal status epilepticus  10/27>10/28: mild-moderate encephalopathy, triphasics, left posterior maximum periodic epileptiform discharges, 3 electrographic seizures from left posterior region, overall improvement from day prior  10/28>10/29: mild-moderate encephalopathy, left posterior maximum periodic epileptiform discharges, no electrographic seizures  OFF EEG  10/31>11/1: moderate to severe encephalopathy, frequent interictal epileptiform discharges over left hemisphere, no discrete seizures  11/1>11/2: moderate to severe encephalopathy, frequent interictal epileptiform discharges over left hemisphere at times becoming continuous indicating increased cortical irritability, no discrete seizures  11/2>11/3: runs of periodic generalized discharges with a triphasic morphology which wax and wane on IIC, pattern frequently seen in setting of hepatic dysfunction as well as multiple other clinical scenarios; independent focal discharges over left posterior quadrant, no discrete seizures, moderate-severe hepatic encephalopathy   11/3>11/4: frequent left epileptiform discharges, severe generalized slowing with abundant triphasic waves, no seizures  11/4>11/5: occasional left epileptiform discharges, moderate generalized slowing, no seizures  OFF EEG  11/8>11/9: mild-moderate encephalopathy,  posterior maximum epileptiform discharges, no electrographic seizures      Interval History: No seizures on EEG. No family at bedside on rounds.    Current Facility-Administered Medications   Medication Dose Route Frequency Provider Last Rate Last Admin    acetaminophen oral solution 499.5074 mg  499.5074 mg Per NG tube Q6H Ebony Kurtz L, NP   499.5074 mg at 11/09/23 0700    acetylcysteine 100 mg/ml (10%) solution 4 mL  4 mL Nebulization QID Ebony Kurtz L, NP   4 mL at 11/09/23 1200    albuterol-ipratropium 2.5 mg-0.5 mg/3 mL nebulizer solution 3 mL  3 mL Nebulization QID Holli Mena MD   3 mL at 11/09/23 1200    calcium gluconate 1 g in NS IVPB (premixed)  1 g Intravenous PRN Andras, Philippe P., NP        calcium gluconate 1 g in NS IVPB (premixed)  2 g Intravenous PRN Andras, Philippe P., NP        calcium gluconate 1 g in NS IVPB (premixed)  3 g Intravenous PRN Andras, Philippe P., NP        dextrose 10 % infusion   Intravenous Continuous PRN Elizabeth Gaspar PA-KAYLA        dextrose 10% bolus 125 mL 125 mL  12.5 g Intravenous PRN Elizabeth Gaspar PA-KAYLA        dextrose 10% bolus 250 mL 250 mL  25 g Intravenous PRN Elizabeth Gaspar PA-KAYLA        famotidine tablet 20 mg  20 mg Per NG tube Daily Holli Mena MD   20 mg at 11/09/23 1033    folic acid tablet 1 mg  1 mg Per NG tube Daily Darion Torres MD   1 mg at 11/09/23 1033    glucagon (human recombinant) injection 1 mg  1 mg Intramuscular PRN Elizabeth Gaspar PA-KAYLA        hydrALAZINE injection 10 mg  10 mg Intravenous Q4H PRN Kiara Forman PA-KAYLA   10 mg at 11/05/23 2140    insulin aspart U-100 pen 0-10 Units  0-10 Units Subcutaneous Q4H PRN Elizabeth Gaspar PA-KAYLA   2 Units at 11/08/23 0432    labetalol 20 mg/4 mL (5 mg/mL) IV syring  10 mg Intravenous Q6H PRN iKara Forman PA-KAYLA   10 mg at 11/05/23 1745    lactulose 20 gram/30 mL solution Soln 30 g  30 g Per NG tube BID Rebecca Adames PA-C   30 g at 11/09/23 1032     levetiracetam 500 mg/5 mL (5 mL) liquid Soln 500 mg  500 mg Per NG tube BID Ebony Kurtz NP   500 mg at 11/09/23 1033    magnesium sulfate 2g in water 50mL IVPB (premix)  2 g Intravenous PRN AndPhilippe lema P., NP   Stopped at 10/30/23 1022    magnesium sulfate 2g in water 50mL IVPB (premix)  4 g Intravenous PRN AndPhilippe lema P., NP        multivitamin tablet  1 tablet Per NG tube Daily Darion Torres MD   1 tablet at 11/09/23 1032    ondansetron injection 4 mg  4 mg Intravenous Q8H PRN Aurelia Winston MD        potassium chloride 10 mEq in 100 mL IVPB  40 mEq Intravenous PRN AndPhilippe lema P., NP   Stopped at 11/04/23 0853    And    potassium chloride 10 mEq in 100 mL IVPB  60 mEq Intravenous PRN AndPhilippe lema P., NP   Stopped at 10/30/23 0916    And    potassium chloride 10 mEq in 100 mL IVPB  80 mEq Intravenous PRN Andtorey, Philippe P., NP   Stopped at 10/28/23 0138    rifAXIMin tablet 550 mg  550 mg Per NG tube BID Aurelia Winston MD   550 mg at 11/09/23 1033    sodium phosphate 15 mmol in dextrose 5 % (D5W) 250 mL IVPB  15 mmol Intravenous PRN AndSoniya lemay P., NP   Stopped at 11/03/23 0706    sodium phosphate 20.01 mmol in dextrose 5 % (D5W) 250 mL IVPB  20.01 mmol Intravenous PRN AndSoniya lemay P., NP   Stopped at 10/30/23 2147    sodium phosphate 30 mmol in dextrose 5 % (D5W) 250 mL IVPB  30 mmol Intravenous PRN Andras, Philippe P., NP        thiamine tablet 100 mg  100 mg Per NG tube Daily Erica Lewis PA-C   100 mg at 11/09/23 1033     Continuous Infusions:   dextrose 10 % in water (D10W)         Review of Systems  Objective:     Vital Signs (Most Recent):  Temp: 100.1 °F (37.8 °C) (11/09/23 1105)  Pulse: 107 (11/09/23 1205)  Resp: (!) 21 (11/09/23 1205)  BP: (!) 152/67 (11/09/23 1205)  SpO2: 97 % (11/09/23 1205) Vital Signs (24h Range):  Temp:  [98.3 °F (36.8 °C)-100.1 °F (37.8 °C)] 100.1 °F (37.8 °C)  Pulse:  [102-128] 107  Resp:  [10-27] 21  SpO2:  [93 %-100 %] 97 %  BP:  (113-173)/(56-78) 152/67  Arterial Line BP: ()/(41-64) 130/57     Weight: 56.2 kg (123 lb 14.4 oz)  Body mass index is 25.02 kg/m².     Physical Exam  Constitutional:       General: She is not in acute distress.     Appearance: She is ill-appearing. She is not diaphoretic.      Interventions: She is intubated.   HENT:      Head: Normocephalic and atraumatic.      Comments: EEG in place  Eyes:      General:         Right eye: No discharge.         Left eye: No discharge.      Conjunctiva/sclera: Conjunctivae normal.      Pupils: Pupils are equal, round, and reactive to light.   Cardiovascular:      Rate and Rhythm: Normal rate.   Pulmonary:      Effort: Pulmonary effort is normal. No respiratory distress. She is intubated.      Comments: Intubated  Psychiatric:      Comments: Unable to assess            NEUROLOGICAL EXAMINATION:     CRANIAL NERVES     CN III, IV, VI   Pupils are equal, round, and reactive to light.       MEILY OU   Corneal intact OU   No spontaneous eye opening   Face symmetric   Tongue midline   Involuntary movements of RUE  Does not withdraw to noxious stimuli  Does not follow commands    Exam findings to suggest seizures:  Myoclonus - no   eye twitching - no   Nystagmus - no   gaze deviation - no   waxy rigidity - no        Significant Labs: All pertinent lab results from the past 24 hours have been reviewed.    Significant Studies: I have reviewed all pertinent imaging results/findings within the past 24 hours.    Assessment and Plan:     * Seizure  58F PMHx etoh cirrhosis with ascites, incarcerated hernia s/p ex lap, and recent SBO who initially presented to [facility] with AMS on 10/18, transferred to [facility] for hepatic encephalopathy and liver transplant evaluation, hospital course c/b UTI, SBI, focal status epilepticus (now resolved), and acute hypoxemic respiratory failure with intubation on 10/31. EEG re-placed on 10/31 for persistent encephalopathy.    Recommendations:  - Continuous vEEG  monitoring, plan to continue until 11/10  - [facility] weaning Levetiracetam to 500 mg BID to encourage arousal  - Avoid agents that lower seizure threshold  - Management of infectious/metabolic abnormalities per NCC  - Seizure precautions      Discussed plan of care with NCC team. No family at bedside. Will follow EEG, please call with questions.    Hypoxic respiratory failure  - Intubated 10/31  - Propofol off 11/1   - Vent management per [facility]    Acute hepatic encephalopathy  - Ammonia 47 on 11/8  - Management per Hepatology, [facility]    Decompensated alcoholic hepatic cirrhosis  - Per Hepatology, not initiating liver transplant at this time  - On Lactulose, Rifaximin, Thiamine, Folate  - Management per Hepatology, NCC      VTE Risk Mitigation (From admission, onward)         Ordered     IP VTE LOW RISK PATIENT  Once         10/25/23 1303     Place JOI hose  Until discontinued         10/25/23 1303     Place sequential compression device  Until discontinued         10/25/23 1303                Osiris Diana PA-C  Neurology-Epilepsy  [facility]  Staff: Dr. Samuel

## 2023-11-09 NOTE — NURSING
Nursing Transfer Note       Transfer To [id number]    Transfer via bed    Transfer with RT bagging patient, cardiac monitoring    Transported by Hilaria ALMEIDA and RT x2    Medicines sent: Yes    Chart sent with patient: Yes    Belongings sent with patient: card    Notified: spouse    Bedside Neuro assessment performed: Yes    Bedside Handoff given to: ELLE Larsen    Upon arrival to floor: cardiac monitor applied, call bell in reach, and bed in lowest position

## 2023-11-10 LAB
ALBUMIN SERPL BCP-MCNC: 2.4 G/DL (ref 3.5–5.2)
ALP SERPL-CCNC: 151 U/L (ref 55–135)
ALT SERPL W/O P-5'-P-CCNC: 89 U/L (ref 10–44)
ANION GAP SERPL CALC-SCNC: 5 MMOL/L (ref 8–16)
ANISOCYTOSIS BLD QL SMEAR: SLIGHT
AST SERPL-CCNC: 144 U/L (ref 10–40)
BASO STIPL BLD QL SMEAR: ABNORMAL
BASOPHILS # BLD AUTO: 0.01 K/UL (ref 0–0.2)
BASOPHILS NFR BLD: 0.1 % (ref 0–1.9)
BILIRUB SERPL-MCNC: 6.5 MG/DL (ref 0.1–1)
BUN SERPL-MCNC: 41 MG/DL (ref 6–20)
BURR CELLS BLD QL SMEAR: ABNORMAL
CALCIUM SERPL-MCNC: 9.5 MG/DL (ref 8.7–10.5)
CHLORIDE SERPL-SCNC: 113 MMOL/L (ref 95–110)
CO2 SERPL-SCNC: 23 MMOL/L (ref 23–29)
CREAT SERPL-MCNC: 0.7 MG/DL (ref 0.5–1.4)
DIFFERENTIAL METHOD: ABNORMAL
EOSINOPHIL # BLD AUTO: 0.3 K/UL (ref 0–0.5)
EOSINOPHIL NFR BLD: 1.5 % (ref 0–8)
ERYTHROCYTE [DISTWIDTH] IN BLOOD BY AUTOMATED COUNT: 19.6 % (ref 11.5–14.5)
EST. GFR  (NO RACE VARIABLE): >60 ML/MIN/1.73 M^2
GLUCOSE SERPL-MCNC: 131 MG/DL (ref 70–110)
HCT VFR BLD AUTO: 25.8 % (ref 37–48.5)
HGB BLD-MCNC: 8.2 G/DL (ref 12–16)
HYPOCHROMIA BLD QL SMEAR: ABNORMAL
IMM GRANULOCYTES # BLD AUTO: 0.29 K/UL (ref 0–0.04)
IMM GRANULOCYTES NFR BLD AUTO: 1.6 % (ref 0–0.5)
INR PPP: 1.8 (ref 0.8–1.2)
LYMPHOCYTES # BLD AUTO: 2 K/UL (ref 1–4.8)
LYMPHOCYTES NFR BLD: 11 % (ref 18–48)
MAGNESIUM SERPL-MCNC: 2.2 MG/DL (ref 1.6–2.6)
MCH RBC QN AUTO: 31.8 PG (ref 27–31)
MCHC RBC AUTO-ENTMCNC: 31.8 G/DL (ref 32–36)
MCV RBC AUTO: 100 FL (ref 82–98)
MONOCYTES # BLD AUTO: 2.3 K/UL (ref 0.3–1)
MONOCYTES NFR BLD: 12.5 % (ref 4–15)
NEUTROPHILS # BLD AUTO: 13.3 K/UL (ref 1.8–7.7)
NEUTROPHILS NFR BLD: 73.3 % (ref 38–73)
NRBC BLD-RTO: 0 /100 WBC
OVALOCYTES BLD QL SMEAR: ABNORMAL
PHOSPHATE SERPL-MCNC: 2.8 MG/DL (ref 2.7–4.5)
PLATELET # BLD AUTO: 30 K/UL (ref 150–450)
PLATELET BLD QL SMEAR: ABNORMAL
PMV BLD AUTO: 13.7 FL (ref 9.2–12.9)
POCT GLUCOSE: 105 MG/DL (ref 70–110)
POCT GLUCOSE: 118 MG/DL (ref 70–110)
POCT GLUCOSE: 122 MG/DL (ref 70–110)
POCT GLUCOSE: 170 MG/DL (ref 70–110)
POIKILOCYTOSIS BLD QL SMEAR: SLIGHT
POLYCHROMASIA BLD QL SMEAR: ABNORMAL
POTASSIUM SERPL-SCNC: 4.6 MMOL/L (ref 3.5–5.1)
PROT SERPL-MCNC: 5.4 G/DL (ref 6–8.4)
PROTHROMBIN TIME: 18.2 SEC (ref 9–12.5)
RBC # BLD AUTO: 2.58 M/UL (ref 4–5.4)
SODIUM SERPL-SCNC: 141 MMOL/L (ref 136–145)
WBC # BLD AUTO: 18.11 K/UL (ref 3.9–12.7)

## 2023-11-10 PROCEDURE — 25000003 PHARM REV CODE 250: Mod: NTX | Performed by: NURSE PRACTITIONER

## 2023-11-10 PROCEDURE — 25000003 PHARM REV CODE 250: Mod: NTX | Performed by: PHYSICIAN ASSISTANT

## 2023-11-10 PROCEDURE — 94640 AIRWAY INHALATION TREATMENT: CPT | Mod: NTX

## 2023-11-10 PROCEDURE — 25000003 PHARM REV CODE 250: Mod: NTX

## 2023-11-10 PROCEDURE — 25000242 PHARM REV CODE 250 ALT 637 W/ HCPCS: Mod: NTX | Performed by: NURSE PRACTITIONER

## 2023-11-10 PROCEDURE — 83735 ASSAY OF MAGNESIUM: CPT | Mod: NTX | Performed by: STUDENT IN AN ORGANIZED HEALTH CARE EDUCATION/TRAINING PROGRAM

## 2023-11-10 PROCEDURE — 99900035 HC TECH TIME PER 15 MIN (STAT): Mod: NTX

## 2023-11-10 PROCEDURE — 27100171 HC OXYGEN HIGH FLOW UP TO 24 HOURS: Mod: NTX

## 2023-11-10 PROCEDURE — 25000003 PHARM REV CODE 250: Mod: NTX | Performed by: PSYCHIATRY & NEUROLOGY

## 2023-11-10 PROCEDURE — 27200966 HC CLOSED SUCTION SYSTEM: Mod: NTX

## 2023-11-10 PROCEDURE — 85610 PROTHROMBIN TIME: CPT | Mod: NTX | Performed by: STUDENT IN AN ORGANIZED HEALTH CARE EDUCATION/TRAINING PROGRAM

## 2023-11-10 PROCEDURE — 99291 PR CRITICAL CARE, E/M 30-74 MINUTES: ICD-10-PCS | Mod: NTX,,, | Performed by: NURSE PRACTITIONER

## 2023-11-10 PROCEDURE — 80053 COMPREHEN METABOLIC PANEL: CPT | Mod: NTX | Performed by: HOSPITALIST

## 2023-11-10 PROCEDURE — 99291 CRITICAL CARE FIRST HOUR: CPT | Mod: NTX,,, | Performed by: NURSE PRACTITIONER

## 2023-11-10 PROCEDURE — 27000221 HC OXYGEN, UP TO 24 HOURS: Mod: NTX

## 2023-11-10 PROCEDURE — 20000000 HC ICU ROOM: Mod: NTX

## 2023-11-10 PROCEDURE — 84100 ASSAY OF PHOSPHORUS: CPT | Mod: NTX | Performed by: STUDENT IN AN ORGANIZED HEALTH CARE EDUCATION/TRAINING PROGRAM

## 2023-11-10 PROCEDURE — 99900026 HC AIRWAY MAINTENANCE (STAT): Mod: NTX

## 2023-11-10 PROCEDURE — 25000242 PHARM REV CODE 250 ALT 637 W/ HCPCS: Mod: NTX | Performed by: PSYCHIATRY & NEUROLOGY

## 2023-11-10 PROCEDURE — 85025 COMPLETE CBC W/AUTO DIFF WBC: CPT | Mod: NTX | Performed by: HOSPITALIST

## 2023-11-10 PROCEDURE — 25000003 PHARM REV CODE 250: Mod: NTX | Performed by: HOSPITALIST

## 2023-11-10 PROCEDURE — 94003 VENT MGMT INPAT SUBQ DAY: CPT | Mod: NTX

## 2023-11-10 PROCEDURE — 94761 N-INVAS EAR/PLS OXIMETRY MLT: CPT | Mod: NTX

## 2023-11-10 RX ORDER — IPRATROPIUM BROMIDE AND ALBUTEROL SULFATE 2.5; .5 MG/3ML; MG/3ML
3 SOLUTION RESPIRATORY (INHALATION) EVERY 4 HOURS
Status: DISCONTINUED | OUTPATIENT
Start: 2023-11-10 | End: 2023-11-10

## 2023-11-10 RX ORDER — IPRATROPIUM BROMIDE AND ALBUTEROL SULFATE 2.5; .5 MG/3ML; MG/3ML
3 SOLUTION RESPIRATORY (INHALATION)
Status: DISCONTINUED | OUTPATIENT
Start: 2023-11-10 | End: 2023-11-16

## 2023-11-10 RX ADMIN — THERA TABS 1 TABLET: TAB at 08:11

## 2023-11-10 RX ADMIN — THIAMINE HCL TAB 100 MG 100 MG: 100 TAB at 08:11

## 2023-11-10 RX ADMIN — RIFAXIMIN 550 MG: 550 TABLET ORAL at 08:11

## 2023-11-10 RX ADMIN — IPRATROPIUM BROMIDE AND ALBUTEROL SULFATE 3 ML: .5; 3 SOLUTION RESPIRATORY (INHALATION) at 08:11

## 2023-11-10 RX ADMIN — INSULIN ASPART 2 UNITS: 100 INJECTION, SOLUTION INTRAVENOUS; SUBCUTANEOUS at 11:11

## 2023-11-10 RX ADMIN — LEVETIRACETAM 500 MG: 100 SOLUTION ORAL at 08:11

## 2023-11-10 RX ADMIN — FOLIC ACID 1 MG: 1 TABLET ORAL at 08:11

## 2023-11-10 RX ADMIN — ACETAMINOPHEN 499.51 MG: 160 SOLUTION ORAL at 12:11

## 2023-11-10 RX ADMIN — IPRATROPIUM BROMIDE AND ALBUTEROL SULFATE 3 ML: .5; 3 SOLUTION RESPIRATORY (INHALATION) at 01:11

## 2023-11-10 RX ADMIN — LACTULOSE 30 G: 20 SOLUTION ORAL at 09:11

## 2023-11-10 RX ADMIN — ACETYLCYSTEINE 4 ML: 100 INHALANT RESPIRATORY (INHALATION) at 08:11

## 2023-11-10 RX ADMIN — RIFAXIMIN 550 MG: 550 TABLET ORAL at 09:11

## 2023-11-10 RX ADMIN — LEVETIRACETAM 500 MG: 100 SOLUTION ORAL at 09:11

## 2023-11-10 RX ADMIN — ACETAMINOPHEN 499.51 MG: 160 SOLUTION ORAL at 06:11

## 2023-11-10 RX ADMIN — FAMOTIDINE 20 MG: 20 TABLET ORAL at 08:11

## 2023-11-10 RX ADMIN — LACTULOSE 30 G: 20 SOLUTION ORAL at 08:11

## 2023-11-10 RX ADMIN — IPRATROPIUM BROMIDE AND ALBUTEROL SULFATE 3 ML: .5; 3 SOLUTION RESPIRATORY (INHALATION) at 07:11

## 2023-11-10 NOTE — ASSESSMENT & PLAN NOTE
58 year old presented for altered mental status on 10/18. Clinical picture confounded with hepatic encephalopathy, infection, and seizure activity. Neurology was consulted by primary team prior to admission to [facility].    10/27 EEG IMPRESSION:  This is an abnormal EEG during lethargic state.  Diffuse disorganized slowing of the background was noted.  Frequent triphasic waves noted.  Left posterior pseudo periodic epileptiform discharges were noted.  Numerous electrographic seizures emanating from the left posterior region were noted.    10/31 - rehook and 11/1 read with frequent discharges, vimpat lowered to 50 given mental status and liver function, will continue to watch on eeg  vEEG in place  Keppra decrease 500 mg BID  Neurochecks q4h  Vitals q1hr   11/03: EEG remains negative with lacosamide taper, DC and monitor for seizure recurrence  11/04: stable EEG, beginning to awaken  11/5: remains without seizures  11/8/23: EEG pending  11/9/23: EEG slowing, Keprpa 500 mg BID

## 2023-11-10 NOTE — PLAN OF CARE
[facility] - Cardiac Medical ICU  Discharge Reassessment    Primary Care Provider: Osiris Nevarez NP    Expected Discharge Date: 11/15/2023    Reassessment (most recent)       Discharge Reassessment - 11/10/23 1128          Discharge Reassessment    Assessment Type Discharge Planning Reassessment     Did the patient's condition or plan change since previous assessment? Yes     Discharge Plan discussed with: Spouse/sig other     Name(s) and Number(s) Darron Kelly, s/o/cp# [phone]     Communicated ASHELY with patient/caregiver Date not available/Unable to determine     Discharge Plan A Comfort care/withdrawal     Discharge Plan B Inpatient Hospice     DME Needed Upon Discharge  none     Transition of Care Barriers Underinsured     Why the patient remains in the hospital Other (see comment)   No medical progress       Post-Acute Status    Post-Acute Authorization Hospice     Hospice Status Pending medical clearance/testing     Coverage Medicaid - [facility]     Discharge Delays None known at this time                   Patient not stable for discharge for facility/home transfer.  Discharge Plan A and Plan B have been determined by review of patient's clinical status, future medical and therapeutic needs, and coverage/benefits for post-acute care in coordination with multidisciplinary team members.    SW remains available for any family concerns or needs.    Anastacia Riley LMSW  [facility]  [phone]

## 2023-11-10 NOTE — ASSESSMENT & PLAN NOTE
2/2 to tracheal stenosis   -patient with stridor, increased secretions, requiring 5L 28% and inability to protect airway   -intubated by anesthesia team with small ETT    Vent Mode: Spont  Oxygen Concentration (%):  [40] 40  Resp Rate Total:  [13 br/min-25 br/min] 19 br/min  PEEP/CPAP:  [5 cmH20] 5 cmH20  Pressure Support:  [8 cmH20] 8 cmH20  Mean Airway Pressure:  [7.3 cmH20-8.2 cmH20] 7.4 cmH20      Reintubated after trial extubation 2/2 stridor non responsive to med management  Dexamethasone 10 mg x1, followed by 4 mg q6 hours  Passed SBT

## 2023-11-10 NOTE — PROGRESS NOTES
[facility] - Cardiac Medical ICU  Neurocritical Care  Progress Note    Admit Date: 10/25/2023  Service Date: 11/10/2023  Length of Stay: 16    Subjective:     Chief Complaint: Acute hepatic encephalopathy    History of Present Illness: This is a 58-year-old female with a past medical history of alcoholic cirrhosis, s/p ex-lap w/ bowel resection for incarcerated hernia, who initially presented on 10/18 to [facility] with acute encephalopathy.  Her  found her on the floor at home with evidence of fecal/urine incontinence with confusion and slurred speech. Possible reported tongue injury in chart. Reported concern L sided weakness and down drift of L arm however CT head without evidence of acute abnormalities, MRI brain with only small vessel vascular changes without evidence of territorial infarct.     Hospital Course: EEG done on 10/19 with mild diffuse nonspecific background slowing, no potential epileptiform activity. Repeat MRI brain with contrast on 10/23 unchanged from initial MRI. Became more lethargic and was no longer following commands or answering questions. Due to worsening hepatic encephalopathy in setting of hepatic cirrhosis, patient transferred to [facility] for management with transplant team. Has remained on antibiotics, lactulose and rifaximin for treatment of UTI with pansensitive Ecoli, HE, and presumed SBP. Neurology consulted for management recommendations regarding persistent AMS. Had repeat EEG done on 10/24 with similar findings to prior EEG showing mild generalized non-specific cerebral dysfunction and no electrographic seizures or indication of seizure tendency. Additional CT head w/o contrast on 10/25 without evidence of acute issues. Remains confused and unable to answer questions on exam. Not withdrawing to painful stimuli. Was able to awaken to verbal stimuli in AM however when reevaluated in afternoon unresponsive however was after receiving Ativan.     On admission to [facility]:  Patient  had EEG running, and was noted to have seizures at 7:30 a.m., 8:00 a.m. in, and 10:00 a.m. was noted to be in focal status prior to receiving Vimpat.  Patient was noted to have stridor, worsening secretions with suspicion for aspiration, decreased airway protection, and rapids was called due to low GCS score of 6. Antibiotics broadened to Vanc/Zosyn. Clinical picture of mental status confounded with episodes of seizures, infection, and hepatic encephalopathy.             Hospital Course: 10/28: Improving GCS from 6 to 10. Continuing pulmonary toilet and deep suctioning for stridor and copious secretions. UA positive for candida. Blood cultures from 10/27 NGTD. Sputum cultures sent. Patient on day 2 of broadened antibiotics vanc/zosyn due to worsening clinical status but will discontinue tomorrow if cultures remain negative. Still hypernatremic, treating with D5W. Patient was transferred with no ordered diuretics, very edematous on physical exam. Adequate stooling on lactulose and rifaximin. Due to increased UOP/stooling will place eckert for one day for irritated skin. EEG update showing no seizures since 10am 10/27. Monitoring CBC for thrombocytopenia and macrocytic anemia. Discussed code status and goals of care with  and best friend at bedside. Trickle feeds resumed.   10/29: No acute events overnight, EEG reportedly without further seizures for ~48 hours can disconnect once formal report is in, neuro status slowly improving as patient now tracking in room, moving extremities, responding with appropriate emotional reactions to her .  Respiratory status largely unchanged with intermittent events of large volume breaths that sound almost stridorous but without actual respiratory distress- gas remains compensated.  UOP low, diuresis resumed.  10/30/2023: d/c mucomyst nebs, add racemic epi scheduled nebs, add budesonide and dex 6 q8 hours, ammonia at 35- continue lactulose and rifaximin, abg reviewed,  40mEq of K once, ENT consulted for stridor, continue eckert catheter in today   10/31/2023 Patient with worsening respiratory status, continued decreased mentation and increased secretions. Plan for elective intubation in controlled setting with anesthesia. Will also recheck eeg, if negative will start to wean AEDs and see if that improves patient's arousal. PLT stabilized, continue to monitor.   11/1/2023 this morning patient's 6.0 ETT exchanged for 7.0 to allow suctioning. EEG with frequent discharges, lowered vimpat to 50 mg and will follow EEG. Attempting to remove confounding factors for patients mental status. Slow drop in H/H, 1 unit ordered.   11/2/2023 NAEO, cEEG to remain in place, no seizures but is having frequent discharges in runs. Continue lower dose vimpat and 1500 keppra Eye opening this morning which is slight improvement in exam. Failed SBT   11/3/2023: no improvement with decrease in lacosamide, no re-development of seizures, if does not wake up in next 48 hrs off lacosamide or redevelops seizures, prognosis is extremely poor  11/4/2023: improved mental state this am, no seizures overnight  11/5/2023: LOC stable, has cuff leak, trial of extubation  11/06/2023 reintubated for stridor non responsive to med management overnight  11/07/2023 CTH stable. Off of levo. Completed dex (wbc 22, afebrile). CXR w L mildly increasing consolidation. Very thick secretions, added mucomyst and duonebs. Weaning keppra to 750. Pt appearing more edematous today, dc LR. Scheduled tylenol max 2g/d. Scheduled oxy 5q12.  11/8/23: EEG pending  11/9/23: EEG slowing  11/10/23: more alert today    Interval History:  Nerve conduction testing pending, more alert this am, tolerate SBT    Review of Systems   Reason unable to perform ROS: decrease LOC.     2 systems   Objective:     Vitals:  Temp: 98.6 °F (37 °C)  Pulse: 97  Rhythm: normal sinus rhythm  BP: 134/64  MAP (mmHg): 91  Resp: (!) 22  SpO2: 100 %  Oxygen Concentration  (%): 40  Vent Mode: Spont  Pressure Support: 8 cmH20  PEEP/CPAP: 5 cmH20  Peak Airway Pressure: 13 cmH20  Mean Airway Pressure: 7.4 cmH20  Plateau Pressure: 0 cmH20    Temp  Min: 98 °F (36.7 °C)  Max: 100 °F (37.8 °C)  Pulse  Min: 89  Max: 115  BP  Min: 116/56  Max: 163/73  MAP (mmHg)  Min: 80  Max: 117  Resp  Min: 13  Max: 23  SpO2  Min: 94 %  Max: 100 %  Oxygen Concentration (%)  Min: 40  Max: 40    11/09 0701 - 11/10 0700  In: 2981.2 [I.V.:331.2]  Out: -    Unmeasured Output  Urine Occurrence: 1  Stool Occurrence: 1  Emesis Occurrence: 0  Pad Count: 1        Physical Exam  Vitals and nursing note reviewed.   Constitutional:       Appearance: She is ill-appearing.   HENT:      Head: Normocephalic.      Nose: Nose normal.      Mouth/Throat:      Mouth: Mucous membranes are moist.      Pharynx: Oropharynx is clear.   Eyes:      Pupils: Pupils are equal, round, and reactive to light.   Cardiovascular:      Rate and Rhythm: Normal rate and regular rhythm.      Pulses: Normal pulses.      Heart sounds: Normal heart sounds.   Pulmonary:      Effort: Pulmonary effort is normal.      Breath sounds: Normal breath sounds.   Abdominal:      General: Bowel sounds are normal.      Palpations: Abdomen is soft.   Musculoskeletal:         General: Normal range of motion.   Skin:     General: Skin is warm and dry.      Capillary Refill: Capillary refill takes 2 to 3 seconds.   Neurological:      Comments: No sedation  E4 V1T M2  Obtunded. Not following commands.   CN II-XII:  PERRL.   No gaze today  No facial asymmetry  + cough, gag  Motor:   Slight Withdraws to noxious stimuli x4         Unable to test orientation, language, memory, judgment, insight, fund of knowledge, hearing, shoulder shrug, tongue protrusion, coordination, gait due to level of consciousness.       Medications:  Continuousdextrose 10 % in water (D10W)    Scheduledalbuterol-ipratropium, 3 mL, QID  famotidine, 20 mg, Daily  folic acid, 1 mg, Daily  lactulose, 30  g, BID  levetiracetam, 500 mg, BID  multivitamin, 1 tablet, Daily  rifAXImin, 550 mg, BID  thiamine, 100 mg, Daily    PRNcalcium gluconate IVPB, 1 g, PRN  calcium gluconate IVPB, 2 g, PRN  calcium gluconate IVPB, 3 g, PRN  dextrose 10 % in water (D10W), , Continuous PRN  dextrose 10%, 12.5 g, PRN  dextrose 10%, 25 g, PRN  glucagon (human recombinant), 1 mg, PRN  hydrALAZINE, 10 mg, Q4H PRN  insulin aspart U-100, 0-10 Units, Q4H PRN  labetalol, 10 mg, Q6H PRN  magnesium sulfate IVPB, 2 g, PRN  magnesium sulfate IVPB, 4 g, PRN  ondansetron, 4 mg, Q8H PRN  potassium chloride, 40 mEq, PRN   And  potassium chloride, 60 mEq, PRN   And  potassium chloride, 80 mEq, PRN  sodium phosphate 15 mmol in dextrose 5 % (D5W) 250 mL IVPB, 15 mmol, PRN  sodium phosphate 20.01 mmol in dextrose 5 % (D5W) 250 mL IVPB, 20.01 mmol, PRN  sodium phosphate 30 mmol in dextrose 5 % (D5W) 250 mL IVPB, 30 mmol, PRN      Today I personally reviewed pertinent medications, lines/drains/airways, imaging, cardiology results, laboratory results, microbiology results, notably:    Diet  Diet NPO  Diet NPO        Assessment/Plan:     Neuro  Seizure  58 year old presented for altered mental status on 10/18. Clinical picture confounded with hepatic encephalopathy, infection, and seizure activity. Neurology was consulted by primary team prior to admission to [facility].    10/27 EEG IMPRESSION:  This is an abnormal EEG during lethargic state.  Diffuse disorganized slowing of the background was noted.  Frequent triphasic waves noted.  Left posterior pseudo periodic epileptiform discharges were noted.  Numerous electrographic seizures emanating from the left posterior region were noted.    10/31 - rehook and 11/1 read with frequent discharges, vimpat lowered to 50 given mental status and liver function, will continue to watch on eeg  vEEG in place  Keppra decrease 500 mg BID  Neurochecks q4h  Vitals q1hr   11/03: EEG remains negative with lacosamide taper, DC and  monitor for seizure recurrence  11/04: stable EEG, beginning to awaken  11/5: remains without seizures  11/8/23: EEG pending  11/9/23: EEG slowing, Keprpa 500 mg BID      Hematology  Thrombocytopenia  This is a 58-year-old woman with a history of decompensated alcoholic cirrhosis.  Suspect thrombocytopenia is likely secondary to chronic alcohol use and is consumptive in nature. If trend worsens, will consider consulting Hematology.    Daily CBC, transfuse only for active bleeding  SCDs; sqh      GI  * Acute hepatic encephalopathy  This is a 52 year old woman with a history of ethanol cirrhosis who presents after being found on the ground with fecal and urinary incontinence, and altered mental status. Elevated ammonia on admission.    - Continue lactulose via NG tube TID (titrate till 3-4 daily BM achieved). Continue Xifaxin.   - Avoid opiates and benzodiazepines, if able.   - IV thiamine, folate supplementation, multivitamin through NG tube.  - concern that comatose state is irreversible brain damage, will rule out other causes first, EEG and wean AEDs  - Weaned Keppra to 750   -11/7: CTH stable  - CK pending  -MELD score ~50% survival without transplant  11/3: if no improvement off lacosamide, prognosis for return to reasonable conscious state is small  11/4: cont max hepatic encephalopathy treatment  11/5: stable with brief ability to arouse  11/9/23: continue EEG,   11/10/23 Wean keppra 500 mg BID    Decompensated alcoholic hepatic cirrhosis  Hepatology following PEth. Daily CBC, CMP & INR.   Not currently being evaluated for transplant due to clinical status  Vitamin K given for chronic coagulopathy  Continuing lactulose and rifaximin titrated to 3-4 bowel movements daily  Ammonia WNL    Other  Hypoxic respiratory failure  2/2 to tracheal stenosis   -patient with stridor, increased secretions, requiring 5L 28% and inability to protect airway   -intubated by anesthesia team with small ETT    Vent Mode:  Spont  Oxygen Concentration (%):  [40] 40  Resp Rate Total:  [13 br/min-25 br/min] 19 br/min  PEEP/CPAP:  [5 cmH20] 5 cmH20  Pressure Support:  [8 cmH20] 8 cmH20  Mean Airway Pressure:  [7.3 cmH20-8.2 cmH20] 7.4 cmH20      Reintubated after trial extubation 2/2 stridor non responsive to med management  Dexamethasone 10 mg x1, followed by 4 mg q6 hours  Passed SBT      Debility  PT/OT when appropriate          The patient is being Prophylaxed for:  Venous Thromboembolism with: Mechanical or Chemical  Stress Ulcer with: H2B  Ventilator Pneumonia with: chlorhexidine oral care    Activity Orders            Diet NPO: NPO starting at 11/05 0500    Elevate HOB Elevate (30-45 degrees) Elevate HOB to 30 - 45 degrees during feeding unless otherwise stated starting at 10/28 1218    Elevate HOB to 30-45 degrees during feeding unless otherwise stated starting at 10/26 1138    Progressive Mobility Protocol (mobilize patient to their highest level of functioning at least twice daily) starting at 10/25 2000    Turn patient starting at 10/25 2000          Full Code  Critical care time 45 mins  Ebony Kurtz NP  Neurocritical Care  [facility] - Cardiac Medical ICU

## 2023-11-10 NOTE — ASSESSMENT & PLAN NOTE
This is a 52 year old woman with a history of ethanol cirrhosis who presents after being found on the ground with fecal and urinary incontinence, and altered mental status. Elevated ammonia on admission.    - Continue lactulose via NG tube TID (titrate till 3-4 daily BM achieved). Continue Xifaxin.   - Avoid opiates and benzodiazepines, if able.   - IV thiamine, folate supplementation, multivitamin through NG tube.  - concern that comatose state is irreversible brain damage, will rule out other causes first, EEG and wean AEDs  - Weaned Keppra to 750   -11/7: CTH stable  - CK pending  -MELD score ~50% survival without transplant  11/3: if no improvement off lacosamide, prognosis for return to reasonable conscious state is small  11/4: cont max hepatic encephalopathy treatment  11/5: stable with brief ability to arouse  11/9/23: continue EEG,   11/10/23 Wean keppra 500 mg BID

## 2023-11-10 NOTE — PLAN OF CARE
Problem: Adult Inpatient Plan of Care  Goal: Plan of Care Review  Outcome: Ongoing, Progressing  Goal: Patient-Specific Goal (Individualized)  Outcome: Ongoing, Progressing  Goal: Absence of Hospital-Acquired Illness or Injury  Outcome: Ongoing, Progressing  Goal: Optimal Comfort and Wellbeing  Outcome: Ongoing, Progressing  Goal: Readiness for Transition of Care  Outcome: Ongoing, Progressing     Problem: Infection  Goal: Absence of Infection Signs and Symptoms  Outcome: Ongoing, Progressing     Problem: Skin Injury Risk Increased  Goal: Skin Health and Integrity  Outcome: Ongoing, Progressing     Problem: Adjustment to Illness (Sepsis/Septic Shock)  Goal: Optimal Coping  Outcome: Ongoing, Progressing     Problem: Bleeding (Sepsis/Septic Shock)  Goal: Absence of Bleeding  Outcome: Ongoing, Progressing     Problem: Glycemic Control Impaired (Sepsis/Septic Shock)  Goal: Blood Glucose Level Within Desired Range  Outcome: Ongoing, Progressing     Problem: Infection Progression (Sepsis/Septic Shock)  Goal: Absence of Infection Signs and Symptoms  Outcome: Ongoing, Progressing     Problem: Nutrition Impaired (Sepsis/Septic Shock)  Goal: Optimal Nutrition Intake  Outcome: Ongoing, Progressing     No acute events throughout shift.  Assessment and VS per flow sheet, pt progressing towards goals as tolerated, plan of care reviewed with pt, all concerns addressed. Will continue to monitor.

## 2023-11-10 NOTE — SUBJECTIVE & OBJECTIVE
Interval History:  Nerve conduction testing pending, more alert this am, tolerate SBT    Review of Systems   Reason unable to perform ROS: decrease LOC.     2 systems   Objective:     Vitals:  Temp: 98.6 °F (37 °C)  Pulse: 97  Rhythm: normal sinus rhythm  BP: 134/64  MAP (mmHg): 91  Resp: (!) 22  SpO2: 100 %  Oxygen Concentration (%): 40  Vent Mode: Spont  Pressure Support: 8 cmH20  PEEP/CPAP: 5 cmH20  Peak Airway Pressure: 13 cmH20  Mean Airway Pressure: 7.4 cmH20  Plateau Pressure: 0 cmH20    Temp  Min: 98 °F (36.7 °C)  Max: 100 °F (37.8 °C)  Pulse  Min: 89  Max: 115  BP  Min: 116/56  Max: 163/73  MAP (mmHg)  Min: 80  Max: 117  Resp  Min: 13  Max: 23  SpO2  Min: 94 %  Max: 100 %  Oxygen Concentration (%)  Min: 40  Max: 40    11/09 0701 - 11/10 0700  In: 2981.2 [I.V.:331.2]  Out: -    Unmeasured Output  Urine Occurrence: 1  Stool Occurrence: 1  Emesis Occurrence: 0  Pad Count: 1        Physical Exam  Vitals and nursing note reviewed.   Constitutional:       Appearance: She is ill-appearing.   HENT:      Head: Normocephalic.      Nose: Nose normal.      Mouth/Throat:      Mouth: Mucous membranes are moist.      Pharynx: Oropharynx is clear.   Eyes:      Pupils: Pupils are equal, round, and reactive to light.   Cardiovascular:      Rate and Rhythm: Normal rate and regular rhythm.      Pulses: Normal pulses.      Heart sounds: Normal heart sounds.   Pulmonary:      Effort: Pulmonary effort is normal.      Breath sounds: Normal breath sounds.   Abdominal:      General: Bowel sounds are normal.      Palpations: Abdomen is soft.   Musculoskeletal:         General: Normal range of motion.   Skin:     General: Skin is warm and dry.      Capillary Refill: Capillary refill takes 2 to 3 seconds.   Neurological:      Comments: No sedation  E4 V1T M2  Obtunded. Not following commands.   CN II-XII:  PERRL.   No gaze today  No facial asymmetry  + cough, gag  Motor:   Slight Withdraws to noxious stimuli x4         Unable to test  orientation, language, memory, judgment, insight, fund of knowledge, hearing, shoulder shrug, tongue protrusion, coordination, gait due to level of consciousness.       Medications:  Continuousdextrose 10 % in water (D10W)    Scheduledalbuterol-ipratropium, 3 mL, QID  famotidine, 20 mg, Daily  folic acid, 1 mg, Daily  lactulose, 30 g, BID  levetiracetam, 500 mg, BID  multivitamin, 1 tablet, Daily  rifAXImin, 550 mg, BID  thiamine, 100 mg, Daily    PRNcalcium gluconate IVPB, 1 g, PRN  calcium gluconate IVPB, 2 g, PRN  calcium gluconate IVPB, 3 g, PRN  dextrose 10 % in water (D10W), , Continuous PRN  dextrose 10%, 12.5 g, PRN  dextrose 10%, 25 g, PRN  glucagon (human recombinant), 1 mg, PRN  hydrALAZINE, 10 mg, Q4H PRN  insulin aspart U-100, 0-10 Units, Q4H PRN  labetalol, 10 mg, Q6H PRN  magnesium sulfate IVPB, 2 g, PRN  magnesium sulfate IVPB, 4 g, PRN  ondansetron, 4 mg, Q8H PRN  potassium chloride, 40 mEq, PRN   And  potassium chloride, 60 mEq, PRN   And  potassium chloride, 80 mEq, PRN  sodium phosphate 15 mmol in dextrose 5 % (D5W) 250 mL IVPB, 15 mmol, PRN  sodium phosphate 20.01 mmol in dextrose 5 % (D5W) 250 mL IVPB, 20.01 mmol, PRN  sodium phosphate 30 mmol in dextrose 5 % (D5W) 250 mL IVPB, 30 mmol, PRN      Today I personally reviewed pertinent medications, lines/drains/airways, imaging, cardiology results, laboratory results, microbiology results, notably:    Diet  Diet NPO  Diet NPO

## 2023-11-11 LAB
ALBUMIN SERPL BCP-MCNC: 2.2 G/DL (ref 3.5–5.2)
ALLENS TEST: ABNORMAL
ALP SERPL-CCNC: 189 U/L (ref 55–135)
ALT SERPL W/O P-5'-P-CCNC: 87 U/L (ref 10–44)
ANION GAP SERPL CALC-SCNC: 4 MMOL/L (ref 8–16)
ANISOCYTOSIS BLD QL SMEAR: SLIGHT
AST SERPL-CCNC: 168 U/L (ref 10–40)
BACTERIA SPEC AEROBE CULT: NORMAL
BACTERIA SPEC AEROBE CULT: NORMAL
BASO STIPL BLD QL SMEAR: ABNORMAL
BASOPHILS NFR BLD: 0 % (ref 0–1.9)
BILIRUB SERPL-MCNC: 6 MG/DL (ref 0.1–1)
BUN SERPL-MCNC: 37 MG/DL (ref 6–20)
BURR CELLS BLD QL SMEAR: ABNORMAL
CALCIUM SERPL-MCNC: 9.2 MG/DL (ref 8.7–10.5)
CHLORIDE SERPL-SCNC: 111 MMOL/L (ref 95–110)
CO2 SERPL-SCNC: 23 MMOL/L (ref 23–29)
CREAT SERPL-MCNC: 0.7 MG/DL (ref 0.5–1.4)
DIFFERENTIAL METHOD: ABNORMAL
EOSINOPHIL NFR BLD: 2 % (ref 0–8)
ERYTHROCYTE [DISTWIDTH] IN BLOOD BY AUTOMATED COUNT: 19.5 % (ref 11.5–14.5)
EST. GFR  (NO RACE VARIABLE): >60 ML/MIN/1.73 M^2
GLUCOSE SERPL-MCNC: 111 MG/DL (ref 70–110)
GRAM STN SPEC: NORMAL
HCO3 UR-SCNC: 24.5 MMOL/L (ref 24–28)
HCT VFR BLD AUTO: 24.7 % (ref 37–48.5)
HGB BLD-MCNC: 8.1 G/DL (ref 12–16)
IMM GRANULOCYTES # BLD AUTO: ABNORMAL K/UL (ref 0–0.04)
IMM GRANULOCYTES NFR BLD AUTO: ABNORMAL % (ref 0–0.5)
INR PPP: 1.6 (ref 0.8–1.2)
LYMPHOCYTES NFR BLD: 6 % (ref 18–48)
MAGNESIUM SERPL-MCNC: 2.1 MG/DL (ref 1.6–2.6)
MCH RBC QN AUTO: 33.1 PG (ref 27–31)
MCHC RBC AUTO-ENTMCNC: 32.8 G/DL (ref 32–36)
MCV RBC AUTO: 101 FL (ref 82–98)
MONOCYTES NFR BLD: 8 % (ref 4–15)
MYELOCYTES NFR BLD MANUAL: 0.7 %
NEUTROPHILS NFR BLD: 82.6 % (ref 38–73)
NEUTS BAND NFR BLD MANUAL: 0.7 %
NRBC BLD-RTO: 0 /100 WBC
OVALOCYTES BLD QL SMEAR: ABNORMAL
PCO2 BLDA: 31.9 MMHG (ref 35–45)
PH SMN: 7.49 [PH] (ref 7.35–7.45)
PHOSPHATE SERPL-MCNC: 3.1 MG/DL (ref 2.7–4.5)
PLATELET # BLD AUTO: 28 K/UL (ref 150–450)
PLATELET BLD QL SMEAR: ABNORMAL
PMV BLD AUTO: 14.5 FL (ref 9.2–12.9)
PO2 BLDA: 77 MMHG (ref 80–100)
POC BE: 1 MMOL/L
POC SATURATED O2: 96 % (ref 95–100)
POC TCO2: 26 MMOL/L (ref 23–27)
POCT GLUCOSE: 100 MG/DL (ref 70–110)
POCT GLUCOSE: 108 MG/DL (ref 70–110)
POCT GLUCOSE: 108 MG/DL (ref 70–110)
POIKILOCYTOSIS BLD QL SMEAR: SLIGHT
POLYCHROMASIA BLD QL SMEAR: ABNORMAL
POTASSIUM SERPL-SCNC: 4.8 MMOL/L (ref 3.5–5.1)
PROT SERPL-MCNC: 5.1 G/DL (ref 6–8.4)
PROTHROMBIN TIME: 17.1 SEC (ref 9–12.5)
RBC # BLD AUTO: 2.45 M/UL (ref 4–5.4)
SAMPLE: ABNORMAL
SITE: ABNORMAL
SODIUM SERPL-SCNC: 138 MMOL/L (ref 136–145)
WBC # BLD AUTO: 16.23 K/UL (ref 3.9–12.7)

## 2023-11-11 PROCEDURE — 94640 AIRWAY INHALATION TREATMENT: CPT | Mod: NTX

## 2023-11-11 PROCEDURE — 85007 BL SMEAR W/DIFF WBC COUNT: CPT | Mod: NTX | Performed by: HOSPITALIST

## 2023-11-11 PROCEDURE — 99900026 HC AIRWAY MAINTENANCE (STAT): Mod: NTX

## 2023-11-11 PROCEDURE — 94003 VENT MGMT INPAT SUBQ DAY: CPT | Mod: NTX

## 2023-11-11 PROCEDURE — 25000003 PHARM REV CODE 250: Mod: NTX

## 2023-11-11 PROCEDURE — 27100171 HC OXYGEN HIGH FLOW UP TO 24 HOURS: Mod: NTX

## 2023-11-11 PROCEDURE — 83735 ASSAY OF MAGNESIUM: CPT | Mod: NTX | Performed by: STUDENT IN AN ORGANIZED HEALTH CARE EDUCATION/TRAINING PROGRAM

## 2023-11-11 PROCEDURE — 25000003 PHARM REV CODE 250: Mod: NTX | Performed by: HOSPITALIST

## 2023-11-11 PROCEDURE — 25000003 PHARM REV CODE 250: Mod: NTX | Performed by: PHYSICIAN ASSISTANT

## 2023-11-11 PROCEDURE — 99291 PR CRITICAL CARE, E/M 30-74 MINUTES: ICD-10-PCS | Mod: NTX,,, | Performed by: INTERNAL MEDICINE

## 2023-11-11 PROCEDURE — 84100 ASSAY OF PHOSPHORUS: CPT | Mod: NTX | Performed by: STUDENT IN AN ORGANIZED HEALTH CARE EDUCATION/TRAINING PROGRAM

## 2023-11-11 PROCEDURE — 94761 N-INVAS EAR/PLS OXIMETRY MLT: CPT | Mod: NTX

## 2023-11-11 PROCEDURE — 94668 MNPJ CHEST WALL SBSQ: CPT | Mod: NTX

## 2023-11-11 PROCEDURE — 99900035 HC TECH TIME PER 15 MIN (STAT): Mod: NTX

## 2023-11-11 PROCEDURE — 20000000 HC ICU ROOM: Mod: NTX

## 2023-11-11 PROCEDURE — 80053 COMPREHEN METABOLIC PANEL: CPT | Mod: NTX | Performed by: HOSPITALIST

## 2023-11-11 PROCEDURE — 25000003 PHARM REV CODE 250: Mod: NTX | Performed by: PSYCHIATRY & NEUROLOGY

## 2023-11-11 PROCEDURE — 25000003 PHARM REV CODE 250: Mod: NTX | Performed by: NURSE PRACTITIONER

## 2023-11-11 PROCEDURE — 99291 CRITICAL CARE FIRST HOUR: CPT | Mod: NTX,,, | Performed by: INTERNAL MEDICINE

## 2023-11-11 PROCEDURE — 36600 WITHDRAWAL OF ARTERIAL BLOOD: CPT | Mod: NTX

## 2023-11-11 PROCEDURE — 85610 PROTHROMBIN TIME: CPT | Mod: NTX | Performed by: STUDENT IN AN ORGANIZED HEALTH CARE EDUCATION/TRAINING PROGRAM

## 2023-11-11 PROCEDURE — 85027 COMPLETE CBC AUTOMATED: CPT | Mod: NTX | Performed by: HOSPITALIST

## 2023-11-11 PROCEDURE — 82803 BLOOD GASES ANY COMBINATION: CPT | Mod: NTX

## 2023-11-11 PROCEDURE — 25000242 PHARM REV CODE 250 ALT 637 W/ HCPCS: Mod: NTX | Performed by: PSYCHIATRY & NEUROLOGY

## 2023-11-11 RX ORDER — FAMOTIDINE 20 MG/1
20 TABLET, FILM COATED ORAL 2 TIMES DAILY
Status: DISCONTINUED | OUTPATIENT
Start: 2023-11-11 | End: 2023-11-13

## 2023-11-11 RX ORDER — FENTANYL CITRATE 50 UG/ML
25 INJECTION, SOLUTION INTRAMUSCULAR; INTRAVENOUS
Status: DISCONTINUED | OUTPATIENT
Start: 2023-11-11 | End: 2023-11-13

## 2023-11-11 RX ADMIN — THIAMINE HCL TAB 100 MG 100 MG: 100 TAB at 09:11

## 2023-11-11 RX ADMIN — FAMOTIDINE 20 MG: 20 TABLET ORAL at 09:11

## 2023-11-11 RX ADMIN — RIFAXIMIN 550 MG: 550 TABLET ORAL at 09:11

## 2023-11-11 RX ADMIN — IPRATROPIUM BROMIDE AND ALBUTEROL SULFATE 3 ML: .5; 3 SOLUTION RESPIRATORY (INHALATION) at 07:11

## 2023-11-11 RX ADMIN — LACTULOSE 30 G: 20 SOLUTION ORAL at 09:11

## 2023-11-11 RX ADMIN — THERA TABS 1 TABLET: TAB at 09:11

## 2023-11-11 RX ADMIN — FOLIC ACID 1 MG: 1 TABLET ORAL at 09:11

## 2023-11-11 RX ADMIN — IPRATROPIUM BROMIDE AND ALBUTEROL SULFATE 3 ML: .5; 3 SOLUTION RESPIRATORY (INHALATION) at 12:11

## 2023-11-11 RX ADMIN — LEVETIRACETAM 500 MG: 100 SOLUTION ORAL at 09:11

## 2023-11-11 RX ADMIN — IPRATROPIUM BROMIDE AND ALBUTEROL SULFATE 3 ML: .5; 3 SOLUTION RESPIRATORY (INHALATION) at 03:11

## 2023-11-11 RX ADMIN — IPRATROPIUM BROMIDE AND ALBUTEROL SULFATE 3 ML: .5; 3 SOLUTION RESPIRATORY (INHALATION) at 06:11

## 2023-11-11 NOTE — PLAN OF CARE
MICU DAILY GOALS     Family/Goals of care/Code Status   Code Status: Full Code    24H Vital Sign Range  Temp:  [98.5 °F (36.9 °C)-99.8 °F (37.7 °C)]   Pulse:  []   Resp:  [12-34]   BP: (110-169)/(53-85)   SpO2:  [96 %-100 %]      Shift Events (include procedures and significant events)   No acute events throughout shift    AWAKE RASS: Goal - RASS Goal: 0-->alert and calm  Actual - RASS (Gonzalez Agitation-Sedation Scale): light sedation    Restraint necessity: Removing medical devices   BREATHE SBT: Fail    Coordinate A & B, analgesics/sedatives Pain: managed   SAT: Fail   Delirium CAM-ICU: Overall CAM-ICU: Positive   Early(intubated/ Progressive (non-intubated) Mobility MOVE Screen (INTUBATED ONLY): Fail    Activity: Activity Management: Rolling - L1   Feeding/Nutrition Diet order: Diet/Nutrition Received: NPO, tube feeding, Specialty Diet/Nutrition Received: other (see comments) ([employer])   Thrombus DVT prophylaxis: VTE Required Core Measure: Provider determined low risk VTE   HOB Elevation Head of Bed (HOB) Positioning: HOB at 30 degrees   Ulcer Prophylaxis GI: yes   Glucose control managed Glycemic Management: blood glucose monitored   Skin Skin assessed during: Daily Assessment    Sacrum intact/not altered? No  Heels intact/not altered? Yes  Surgical wound? No    [] No Altered Skin Integrity Present    []Prevention Measures Documented    [] Altered Skin Integrity Present or Discovered   [] LDA present in EPIC              [] LDA added in EPIC   [] Wound Image Taken (required on admit,                   transfer/discharge and every Tuesday)    Wound Care Consulted? Yes    Attending Nurse:     Second RN/Staff Member:    Bowel Function diarrhea    Indwelling Catheter Necessity [REMOVED]      Urethral Catheter 10/28/23 4726-Reason for Continuing Urinary Catheterization: Critically ill in ICU and requiring hourly monitoring of intake/output     Purewick in place   De-escalation Antibiotics No       VS and  assessment per flow sheet, patient progressing towards goals as tolerated, plan of care reviewed with family, all concerns addressed, will continue to monitor.

## 2023-11-12 LAB
ALBUMIN SERPL BCP-MCNC: 2.2 G/DL (ref 3.5–5.2)
ALLENS TEST: ABNORMAL
ALP SERPL-CCNC: 150 U/L (ref 55–135)
ALT SERPL W/O P-5'-P-CCNC: 90 U/L (ref 10–44)
ANION GAP SERPL CALC-SCNC: 6 MMOL/L (ref 8–16)
ANISOCYTOSIS BLD QL SMEAR: SLIGHT
AST SERPL-CCNC: 184 U/L (ref 10–40)
BASOPHILS # BLD AUTO: 0.02 K/UL (ref 0–0.2)
BASOPHILS NFR BLD: 0.1 % (ref 0–1.9)
BILIRUB SERPL-MCNC: 6.6 MG/DL (ref 0.1–1)
BUN SERPL-MCNC: 37 MG/DL (ref 6–20)
BURR CELLS BLD QL SMEAR: ABNORMAL
CALCIUM SERPL-MCNC: 9.2 MG/DL (ref 8.7–10.5)
CHLORIDE SERPL-SCNC: 113 MMOL/L (ref 95–110)
CO2 SERPL-SCNC: 22 MMOL/L (ref 23–29)
CREAT SERPL-MCNC: 0.6 MG/DL (ref 0.5–1.4)
DELSYS: ABNORMAL
DIFFERENTIAL METHOD: ABNORMAL
EOSINOPHIL # BLD AUTO: 0.5 K/UL (ref 0–0.5)
EOSINOPHIL NFR BLD: 3.2 % (ref 0–8)
ERYTHROCYTE [DISTWIDTH] IN BLOOD BY AUTOMATED COUNT: 20.3 % (ref 11.5–14.5)
EST. GFR  (NO RACE VARIABLE): >60 ML/MIN/1.73 M^2
FIO2: 30
GLUCOSE SERPL-MCNC: 106 MG/DL (ref 70–110)
HCO3 UR-SCNC: 25.9 MMOL/L (ref 24–28)
HCT VFR BLD AUTO: 24.2 % (ref 37–48.5)
HGB BLD-MCNC: 7.6 G/DL (ref 12–16)
IMM GRANULOCYTES # BLD AUTO: 0.27 K/UL (ref 0–0.04)
IMM GRANULOCYTES NFR BLD AUTO: 1.8 % (ref 0–0.5)
INR PPP: 1.6 (ref 0.8–1.2)
LYMPHOCYTES # BLD AUTO: 1.4 K/UL (ref 1–4.8)
LYMPHOCYTES NFR BLD: 9.2 % (ref 18–48)
MAGNESIUM SERPL-MCNC: 2.1 MG/DL (ref 1.6–2.6)
MCH RBC QN AUTO: 32.3 PG (ref 27–31)
MCHC RBC AUTO-ENTMCNC: 31.4 G/DL (ref 32–36)
MCV RBC AUTO: 103 FL (ref 82–98)
MIN VOL: 6.19
MODE: ABNORMAL
MONOCYTES # BLD AUTO: 2.2 K/UL (ref 0.3–1)
MONOCYTES NFR BLD: 14.7 % (ref 4–15)
NEUTROPHILS # BLD AUTO: 10.5 K/UL (ref 1.8–7.7)
NEUTROPHILS NFR BLD: 71 % (ref 38–73)
NRBC BLD-RTO: 0 /100 WBC
OVALOCYTES BLD QL SMEAR: ABNORMAL
PCO2 BLDA: 32.8 MMHG (ref 35–45)
PEEP: 5
PH SMN: 7.51 [PH] (ref 7.35–7.45)
PHOSPHATE SERPL-MCNC: 3.4 MG/DL (ref 2.7–4.5)
PLATELET # BLD AUTO: 30 K/UL (ref 150–450)
PLATELET BLD QL SMEAR: ABNORMAL
PMV BLD AUTO: 14.5 FL (ref 9.2–12.9)
PO2 BLDA: 119 MMHG (ref 80–100)
POC BE: 3 MMOL/L
POC SATURATED O2: 99 % (ref 95–100)
POC TCO2: 27 MMOL/L (ref 23–27)
POCT GLUCOSE: 105 MG/DL (ref 70–110)
POCT GLUCOSE: 108 MG/DL (ref 70–110)
POCT GLUCOSE: 127 MG/DL (ref 70–110)
POCT GLUCOSE: 70 MG/DL (ref 70–110)
POCT GLUCOSE: 82 MG/DL (ref 70–110)
POIKILOCYTOSIS BLD QL SMEAR: ABNORMAL
POTASSIUM SERPL-SCNC: 4.5 MMOL/L (ref 3.5–5.1)
PROT SERPL-MCNC: 5.3 G/DL (ref 6–8.4)
PROTHROMBIN TIME: 16.8 SEC (ref 9–12.5)
PS: 8
RBC # BLD AUTO: 2.35 M/UL (ref 4–5.4)
SAMPLE: ABNORMAL
SITE: ABNORMAL
SODIUM SERPL-SCNC: 141 MMOL/L (ref 136–145)
SP02: 100
SPONT RATE: 9
TARGETS BLD QL SMEAR: ABNORMAL
WBC # BLD AUTO: 14.82 K/UL (ref 3.9–12.7)

## 2023-11-12 PROCEDURE — 25000003 PHARM REV CODE 250: Mod: NTX

## 2023-11-12 PROCEDURE — 51701 INSERT BLADDER CATHETER: CPT | Mod: NTX

## 2023-11-12 PROCEDURE — 51798 US URINE CAPACITY MEASURE: CPT | Mod: NTX

## 2023-11-12 PROCEDURE — 25000242 PHARM REV CODE 250 ALT 637 W/ HCPCS: Mod: NTX | Performed by: PSYCHIATRY & NEUROLOGY

## 2023-11-12 PROCEDURE — 27100171 HC OXYGEN HIGH FLOW UP TO 24 HOURS: Mod: NTX

## 2023-11-12 PROCEDURE — 20000000 HC ICU ROOM: Mod: NTX

## 2023-11-12 PROCEDURE — 94003 VENT MGMT INPAT SUBQ DAY: CPT | Mod: NTX

## 2023-11-12 PROCEDURE — 25000003 PHARM REV CODE 250: Mod: NTX | Performed by: NURSE PRACTITIONER

## 2023-11-12 PROCEDURE — 85610 PROTHROMBIN TIME: CPT | Mod: NTX | Performed by: STUDENT IN AN ORGANIZED HEALTH CARE EDUCATION/TRAINING PROGRAM

## 2023-11-12 PROCEDURE — 85025 COMPLETE CBC W/AUTO DIFF WBC: CPT | Mod: NTX | Performed by: HOSPITALIST

## 2023-11-12 PROCEDURE — 25000003 PHARM REV CODE 250: Mod: NTX | Performed by: HOSPITALIST

## 2023-11-12 PROCEDURE — 99900035 HC TECH TIME PER 15 MIN (STAT): Mod: NTX

## 2023-11-12 PROCEDURE — 94761 N-INVAS EAR/PLS OXIMETRY MLT: CPT | Mod: NTX

## 2023-11-12 PROCEDURE — 83735 ASSAY OF MAGNESIUM: CPT | Mod: NTX | Performed by: STUDENT IN AN ORGANIZED HEALTH CARE EDUCATION/TRAINING PROGRAM

## 2023-11-12 PROCEDURE — 84100 ASSAY OF PHOSPHORUS: CPT | Mod: NTX | Performed by: STUDENT IN AN ORGANIZED HEALTH CARE EDUCATION/TRAINING PROGRAM

## 2023-11-12 PROCEDURE — 80053 COMPREHEN METABOLIC PANEL: CPT | Mod: NTX | Performed by: HOSPITALIST

## 2023-11-12 PROCEDURE — 27200966 HC CLOSED SUCTION SYSTEM: Mod: NTX

## 2023-11-12 PROCEDURE — 99291 PR CRITICAL CARE, E/M 30-74 MINUTES: ICD-10-PCS | Mod: FS,NTX,, | Performed by: PSYCHIATRY & NEUROLOGY

## 2023-11-12 PROCEDURE — 99499 UNLISTED E&M SERVICE: CPT | Mod: NTX,,,

## 2023-11-12 PROCEDURE — 82803 BLOOD GASES ANY COMBINATION: CPT | Mod: NTX

## 2023-11-12 PROCEDURE — 36600 WITHDRAWAL OF ARTERIAL BLOOD: CPT | Mod: NTX

## 2023-11-12 PROCEDURE — 82800 BLOOD PH: CPT | Mod: NTX

## 2023-11-12 PROCEDURE — 99900026 HC AIRWAY MAINTENANCE (STAT): Mod: NTX

## 2023-11-12 PROCEDURE — 94640 AIRWAY INHALATION TREATMENT: CPT | Mod: NTX

## 2023-11-12 PROCEDURE — 99499 NO LOS: ICD-10-PCS | Mod: NTX,,,

## 2023-11-12 PROCEDURE — 25000003 PHARM REV CODE 250: Mod: NTX | Performed by: PHYSICIAN ASSISTANT

## 2023-11-12 PROCEDURE — 25000003 PHARM REV CODE 250: Mod: NTX | Performed by: PSYCHIATRY & NEUROLOGY

## 2023-11-12 PROCEDURE — 99291 CRITICAL CARE FIRST HOUR: CPT | Mod: FS,NTX,, | Performed by: PSYCHIATRY & NEUROLOGY

## 2023-11-12 RX ADMIN — IPRATROPIUM BROMIDE AND ALBUTEROL SULFATE 3 ML: .5; 3 SOLUTION RESPIRATORY (INHALATION) at 08:11

## 2023-11-12 RX ADMIN — IPRATROPIUM BROMIDE AND ALBUTEROL SULFATE 3 ML: .5; 3 SOLUTION RESPIRATORY (INHALATION) at 12:11

## 2023-11-12 RX ADMIN — LACTULOSE 30 G: 20 SOLUTION ORAL at 09:11

## 2023-11-12 RX ADMIN — LACTULOSE 30 G: 20 SOLUTION ORAL at 08:11

## 2023-11-12 RX ADMIN — FOLIC ACID 1 MG: 1 TABLET ORAL at 09:11

## 2023-11-12 RX ADMIN — RIFAXIMIN 550 MG: 550 TABLET ORAL at 08:11

## 2023-11-12 RX ADMIN — LEVETIRACETAM 500 MG: 100 SOLUTION ORAL at 08:11

## 2023-11-12 RX ADMIN — RIFAXIMIN 550 MG: 550 TABLET ORAL at 09:11

## 2023-11-12 RX ADMIN — THIAMINE HCL TAB 100 MG 100 MG: 100 TAB at 09:11

## 2023-11-12 RX ADMIN — LEVETIRACETAM 500 MG: 100 SOLUTION ORAL at 09:11

## 2023-11-12 RX ADMIN — THERA TABS 1 TABLET: TAB at 09:11

## 2023-11-12 RX ADMIN — FAMOTIDINE 20 MG: 20 TABLET ORAL at 08:11

## 2023-11-12 RX ADMIN — FAMOTIDINE 20 MG: 20 TABLET ORAL at 09:11

## 2023-11-12 RX ADMIN — IPRATROPIUM BROMIDE AND ALBUTEROL SULFATE 3 ML: .5; 3 SOLUTION RESPIRATORY (INHALATION) at 03:11

## 2023-11-12 NOTE — ASSESSMENT & PLAN NOTE
This is a 52 year old woman with a history of ethanol cirrhosis who presents after being found on the ground with fecal and urinary incontinence, and altered mental status. Elevated ammonia on admission.    - Continue lactulose via NG tube TID (titrate till 3-4 daily BM achieved). Continue Xifaxin.   - Avoid opiates and benzodiazepines, if able.   - IV thiamine, folate supplementation, multivitamin through NG tube.  - [facility]   -11/7: CTH stable  -MELD score ~50% survival without transplant  -Off of EEG

## 2023-11-12 NOTE — ASSESSMENT & PLAN NOTE
This is a 52 year old woman with a history of ethanol cirrhosis who presents after being found on the ground with fecal and urinary incontinence, and altered mental status. Elevated ammonia on admission.    - Continue lactulose via NG tube TID (titrate till 3-4 daily BM achieved). Continue Xifaxin.   - Avoid opiates and benzodiazepines, if able.   - IV thiamine, folate supplementation, multivitamin through NG tube.  - [facility]   -11/7: CTH stable  -MELD score ~50% survival without transplant  -Off of EEG

## 2023-11-12 NOTE — ASSESSMENT & PLAN NOTE
58 year old presented for altered mental status on 10/18. Clinical picture confounded with hepatic encephalopathy, infection, and seizure activity. Neurology was consulted by primary team prior to admission to [facility].    10/27 EEG IMPRESSION:  This is an abnormal EEG during lethargic state.  Diffuse disorganized slowing of the background was noted.  Frequent triphasic waves noted.  Left posterior pseudo periodic epileptiform discharges were noted.  Numerous electrographic seizures emanating from the left posterior region were noted.    10/31 - rehook and 11/1 read with frequent discharges, vimpat lowered to 50 given mental status and liver function, will continue to watch on eeg  · vEEG in place    · Neurochecks   · Vitals q1hr   11/03: EEG remains negative with lacosamide taper, DC and monitor for seizure recurrence  11/04: stable EEG, beginning to awaken  11/5: remains without seizures  11/8/23: EEG pending  11/9/23: EEG slowing, Keprpa 500 mg BID

## 2023-11-12 NOTE — ASSESSMENT & PLAN NOTE
2/2 to tracheal stenosis   -patient with stridor, increased secretions, requiring 5L 28% and inability to protect airway   -intubated by anesthesia team with small ETT    Vent Mode: SIMV  Oxygen Concentration (%):  [30] 30  Resp Rate Total:  [8.2 br/min-32 br/min] 20 br/min  Vt Set:  [420 mL] 420 mL  PEEP/CPAP:  [5 cmH20] 5 cmH20  Pressure Support:  [8 cmH20] 8 cmH20  Mean Airway Pressure:  [6.7 cmH20-8.8 cmH20] 8.8 cmH20      Reintubated after trial extubation 2/2 stridor non responsive to med management  Passed SBT

## 2023-11-12 NOTE — ASSESSMENT & PLAN NOTE
58 year old presented for altered mental status on 10/18. Clinical picture confounded with hepatic encephalopathy, infection, and seizure activity. Neurology was consulted by primary team prior to admission to [facility].    10/27 EEG IMPRESSION:  This is an abnormal EEG during lethargic state.  Diffuse disorganized slowing of the background was noted.  Frequent triphasic waves noted.  Left posterior pseudo periodic epileptiform discharges were noted.  Numerous electrographic seizures emanating from the left posterior region were noted.    10/31 - rehook and 11/1 read with frequent discharges, vimpat lowered to 50 given mental status and liver function, will continue to watch on eeg  vEEG discontinued    Neurochecks   Vitals q1hr  11/03: EEG remains negative with lacosamide taper, DC and monitor for seizure recurrence  11/5: remains without seizures  11/9/23: EEG slowing  Keprpa 500 mg BID

## 2023-11-12 NOTE — SUBJECTIVE & OBJECTIVE
Interval History:  As above    Review of Systems   Unable to perform ROS: Intubated     2 systems   Objective:     Vitals:  Temp: 96.1 °F (35.6 °C)  Pulse: 89  Rhythm: normal sinus rhythm  BP: (!) 119/58  MAP (mmHg): 83  Resp: 16  SpO2: 100 %  Oxygen Concentration (%): 30  Vent Mode: SIMV  Set Rate: 14 BPM  Vt Set: 420 mL  Pressure Support: 8 cmH20  PEEP/CPAP: 5 cmH20  Peak Airway Pressure: 24 cmH20  Mean Airway Pressure: 8.8 cmH20  Plateau Pressure: 0 cmH20    Temp  Min: 96.1 °F (35.6 °C)  Max: 99.9 °F (37.7 °C)  Pulse  Min: 74  Max: 107  BP  Min: 96/59  Max: 130/62  MAP (mmHg)  Min: 69  Max: 89  Resp  Min: 10  Max: 25  SpO2  Min: 98 %  Max: 100 %  Oxygen Concentration (%)  Min: 30  Max: 30    11/11 0701 - 11/12 0700  In: 825   Out: 600 [Urine:600]   Unmeasured Output  Urine Occurrence: 1  Stool Occurrence: 1  Emesis Occurrence: 0  Pad Count: 1        Physical Exam  Vitals and nursing note reviewed.   Constitutional:       Appearance: She is ill-appearing.   HENT:      Head: Normocephalic.      Right Ear: External ear normal.      Left Ear: External ear normal.      Nose: Nose normal.      Mouth/Throat:      Mouth: Mucous membranes are moist.   Eyes:      Pupils: Pupils are equal, round, and reactive to light.   Cardiovascular:      Rate and Rhythm: Normal rate and regular rhythm.      Pulses: Normal pulses.      Heart sounds: Normal heart sounds.   Pulmonary:      Comments: Intubated  Abdominal:      General: There is no distension.      Palpations: Abdomen is soft.      Tenderness: There is no abdominal tenderness.   Musculoskeletal:      Cervical back: Neck supple.   Skin:     General: Skin is warm and dry.   Neurological:      Mental Status: She is alert.      Comments: No sedation   E4 V1T M3    Alert. Tracking. Only followed 1 command- wiggled L toes.   CN II-XII grossly intact, specifically:  PERRL   Eyes cross midline   No facial asymmetry  + cough, gag   Motor:  Flaccid weakness throughout   RUE flexion to  noxious stimuli   RLE 1/5  LUE 1/5  LLE 1/5, wiggled L toes to command   Sensation intact to noxious stimuli x 4- grimaces       Unable to test orientation, language, memory, judgment, insight, fund of knowledge, hearing, shoulder shrug, tongue protrusion, coordination, gait due to level of consciousness.       Medications:  Continuousdextrose 10 % in water (D10W)    Scheduledalbuterol-ipratropium, 3 mL, QID WAKE  famotidine, 20 mg, BID  folic acid, 1 mg, Daily  lactulose, 30 g, BID  levetiracetam, 500 mg, BID  multivitamin, 1 tablet, Daily  rifAXImin, 550 mg, BID  thiamine, 100 mg, Daily    PRNcalcium gluconate IVPB, 1 g, PRN  calcium gluconate IVPB, 2 g, PRN  calcium gluconate IVPB, 3 g, PRN  dextrose 10 % in water (D10W), , Continuous PRN  dextrose 10%, 12.5 g, PRN  dextrose 10%, 25 g, PRN  fentaNYL, 25 mcg, Q1H PRN  glucagon (human recombinant), 1 mg, PRN  hydrALAZINE, 10 mg, Q4H PRN  insulin aspart U-100, 0-10 Units, Q4H PRN  labetalol, 10 mg, Q6H PRN  magnesium sulfate IVPB, 2 g, PRN  magnesium sulfate IVPB, 4 g, PRN  ondansetron, 4 mg, Q8H PRN  potassium chloride, 40 mEq, PRN   And  potassium chloride, 60 mEq, PRN   And  potassium chloride, 80 mEq, PRN  sodium phosphate 15 mmol in dextrose 5 % (D5W) 250 mL IVPB, 15 mmol, PRN  sodium phosphate 20.01 mmol in dextrose 5 % (D5W) 250 mL IVPB, 20.01 mmol, PRN  sodium phosphate 30 mmol in dextrose 5 % (D5W) 250 mL IVPB, 30 mmol, PRN      Today I personally reviewed pertinent medications, lines/drains/airways, imaging, cardiology results, laboratory results, microbiology results, notably:    MRI c-spine    Diet  Diet NPO  Diet NPO  Diet NPO  Diet NPO

## 2023-11-12 NOTE — SUBJECTIVE & OBJECTIVE
Interval History:  olerate SBT    Review of Systems   Reason unable to perform ROS: decrease LOC.     2 systems   Objective:     Vitals:  Temp: 96.1 °F (35.6 °C)  Pulse: 89  Rhythm: normal sinus rhythm  BP: (!) 119/58  MAP (mmHg): 83  Resp: 16  SpO2: 100 %  Oxygen Concentration (%): 30  Vent Mode: SIMV  Set Rate: 14 BPM  Vt Set: 420 mL  Pressure Support: 8 cmH20  PEEP/CPAP: 5 cmH20  Peak Airway Pressure: 24 cmH20  Mean Airway Pressure: 8.8 cmH20  Plateau Pressure: 0 cmH20    Temp  Min: 96.1 °F (35.6 °C)  Max: 99.9 °F (37.7 °C)  Pulse  Min: 74  Max: 107  BP  Min: 96/59  Max: 130/62  MAP (mmHg)  Min: 69  Max: 89  Resp  Min: 10  Max: 25  SpO2  Min: 98 %  Max: 100 %  Oxygen Concentration (%)  Min: 30  Max: 30    11/11 0701 - 11/12 0700  In: 825   Out: 600 [Urine:600]   Unmeasured Output  Urine Occurrence: 1  Stool Occurrence: 1  Emesis Occurrence: 0  Pad Count: 1        Physical Exam  Vitals and nursing note reviewed.   Constitutional:       Appearance: She is ill-appearing.   HENT:      Head: Normocephalic.      Nose: Nose normal.      Mouth/Throat:      Mouth: Mucous membranes are moist.      Pharynx: Oropharynx is clear.   Eyes:      Pupils: Pupils are equal, round, and reactive to light.   Cardiovascular:      Rate and Rhythm: Normal rate and regular rhythm.      Pulses: Normal pulses.      Heart sounds: Normal heart sounds.   Pulmonary:      Effort: Pulmonary effort is normal.      Breath sounds: Normal breath sounds.   Abdominal:      General: Bowel sounds are normal.      Palpations: Abdomen is soft.   Musculoskeletal:         General: Normal range of motion.   Skin:     General: Skin is warm and dry.      Capillary Refill: Capillary refill takes 2 to 3 seconds.   Neurological:      Comments: No sedation  E4 V1T M2  Obtunded. Not following commands.   CN II-XII:  PERRL.   No gaze today  No facial asymmetry  + cough, gag  Motor:   Slight Withdraws to noxious stimuli x4         Unable to test orientation, language,  memory, judgment, insight, fund of knowledge, hearing, shoulder shrug, tongue protrusion, coordination, gait due to level of consciousness.       Medications:  Continuousdextrose 10 % in water (D10W)    Scheduledalbuterol-ipratropium, 3 mL, QID WAKE  famotidine, 20 mg, BID  folic acid, 1 mg, Daily  lactulose, 30 g, BID  levetiracetam, 500 mg, BID  multivitamin, 1 tablet, Daily  rifAXImin, 550 mg, BID  thiamine, 100 mg, Daily    PRNcalcium gluconate IVPB, 1 g, PRN  calcium gluconate IVPB, 2 g, PRN  calcium gluconate IVPB, 3 g, PRN  dextrose 10 % in water (D10W), , Continuous PRN  dextrose 10%, 12.5 g, PRN  dextrose 10%, 25 g, PRN  fentaNYL, 25 mcg, Q1H PRN  glucagon (human recombinant), 1 mg, PRN  hydrALAZINE, 10 mg, Q4H PRN  insulin aspart U-100, 0-10 Units, Q4H PRN  labetalol, 10 mg, Q6H PRN  magnesium sulfate IVPB, 2 g, PRN  magnesium sulfate IVPB, 4 g, PRN  ondansetron, 4 mg, Q8H PRN  potassium chloride, 40 mEq, PRN   And  potassium chloride, 60 mEq, PRN   And  potassium chloride, 80 mEq, PRN  sodium phosphate 15 mmol in dextrose 5 % (D5W) 250 mL IVPB, 15 mmol, PRN  sodium phosphate 20.01 mmol in dextrose 5 % (D5W) 250 mL IVPB, 20.01 mmol, PRN  sodium phosphate 30 mmol in dextrose 5 % (D5W) 250 mL IVPB, 30 mmol, PRN      Today I personally reviewed pertinent medications, lines/drains/airways, imaging, cardiology results, laboratory results, microbiology results, notably:    Diet  Diet NPO  Diet NPO  Diet NPO  Diet NPO

## 2023-11-12 NOTE — NURSING
Patient arrived to [facility] from [facility] via ICU bed    Report received from:  [facility] , ELLE Soriano    Type of stroke/diagnosis:  Acute hepatic Encephalopathy    Current symptoms: no movement on lower extremities, Right arm posturing, Left arm withdraws    Skin Assessment done: Yes  Wounds noted: Right inner thigh skin tear, Perineum redness, wound at buttocks    *If wounds noted, was Wound Care consulted? On board  *If wounds noted, LDA placed? Yes    Skin Assessment Verified by:  ELLE Manuel RN [place] Completed? No, pt. intubated    Patient Belongings on Admit: none    NCC notified: name of person notified  Rebecca JOHNSON

## 2023-11-12 NOTE — PLAN OF CARE
[facility] Care Plan    POC reviewed with Mara Mojica and family at 0300. Pt unable to verbalized understanding. Questions and concerns of family addressed. No acute events overnight. Pt progressing toward goals. Will continue to monitor. See below and flowsheets for full assessment and VS info.     - Pt. kept NPO since midnight for possible extubation this morning.       Is this a stroke patient? no    Neuro:  [place] Coma Scale  Best Eye Response: 4-->(E4) spontaneous  Best Motor Response: 6-->(M6) obeys commands  Best Verbal Response: 1-->(V1) none  [place] Coma Scale Score: 11  Assessment Qualifiers: patient not sedated/intubated  Pupil PERRLA: yes     24hr Temp:  [97.5 °F (36.4 °C)-99.9 °F (37.7 °C)]     CV:   Rhythm: normal sinus rhythm  BP goals:   SBP < 180  MAP > 65    Resp:      Vent Mode: Spont  Set Rate: 16 BPM  Oxygen Concentration (%): 30  Vt Set: 380 mL  PEEP/CPAP: 5 cmH20  Pressure Support: 8 cmH20    Plan: wean to extubate    GI/:     Diet/Nutrition Received: NPO  Last Bowel Movement: 11/11/23  Voiding Characteristics: external catheter    Intake/Output Summary (Last 24 hours) at 11/12/2023 0644  Last data filed at 11/12/2023 0605  Gross per 24 hour   Intake 825 ml   Output 600 ml   Net 225 ml     Unmeasured Output  Urine Occurrence: 1  Stool Occurrence: 1  Emesis Occurrence: 0  Pad Count: 1    Labs/Accuchecks:  Recent Labs   Lab 11/12/23 0213   WBC 14.82*   RBC 2.35*   HGB 7.6*   HCT 24.2*   PLT 30*      Recent Labs   Lab 11/12/23 0213      K 4.5   CO2 22*   *   BUN 37*   CREATININE 0.6   ALKPHOS 150*   ALT 90*   *   BILITOT 6.6*      Recent Labs   Lab 11/12/23 0213   INR 1.6*      Recent Labs   Lab 11/09/23  1043   *       Electrolytes: N/A - electrolytes WDL  Accuchecks: Q4H    Gtts:   dextrose 10 % in water (D10W)         LDA/Wounds:  Lines/Drains/Airways       Drain  Duration                  NG/OG Tube 10/20/23 2000 16 Fr. Left nostril 22 days    Female External  Urinary Catheter 11/11/23 1743 <1 day              Airway  Duration                  Airway - Non-Surgical 11/05/23 2000 6 days       Airway Anesthesia 11/05/23 6 days              Peripheral Intravenous Line  Duration                  Peripheral IV - Single Lumen 11/06/23 1910 20 G Anterior;Left Upper Arm 5 days         Peripheral IV - Single Lumen 11/09/23 1809 Left;Posterior Wrist 2 days                  Wounds: Yes  Wound care consulted: Yes

## 2023-11-12 NOTE — PROGRESS NOTES
[facility] - Neuro Critical Care  Neurocritical Care  Progress Note    Admit Date: 10/25/2023  Service Date: 11/12/2023  Length of Stay: 18    Subjective:     Chief Complaint: Acute hepatic encephalopathy    History of Present Illness: This is a 58-year-old female with a past medical history of alcoholic cirrhosis, s/p ex-lap w/ bowel resection for incarcerated hernia, who initially presented on 10/18 to [facility] with acute encephalopathy.  Her  found her on the floor at home with evidence of fecal/urine incontinence with confusion and slurred speech. Possible reported tongue injury in chart. Reported concern L sided weakness and down drift of L arm however CT head without evidence of acute abnormalities, MRI brain with only small vessel vascular changes without evidence of territorial infarct.     Hospital Course: EEG done on 10/19 with mild diffuse nonspecific background slowing, no potential epileptiform activity. Repeat MRI brain with contrast on 10/23 unchanged from initial MRI. Became more lethargic and was no longer following commands or answering questions. Due to worsening hepatic encephalopathy in setting of hepatic cirrhosis, patient transferred to [facility] for management with transplant team. Has remained on antibiotics, lactulose and rifaximin for treatment of UTI with pansensitive Ecoli, HE, and presumed SBP. Neurology consulted for management recommendations regarding persistent AMS. Had repeat EEG done on 10/24 with similar findings to prior EEG showing mild generalized non-specific cerebral dysfunction and no electrographic seizures or indication of seizure tendency. Additional CT head w/o contrast on 10/25 without evidence of acute issues. Remains confused and unable to answer questions on exam. Not withdrawing to painful stimuli. Was able to awaken to verbal stimuli in AM however when reevaluated in afternoon unresponsive however was after receiving Ativan.     On admission to [facility]:  Patient  had EEG running, and was noted to have seizures at 7:30 a.m., 8:00 a.m. in, and 10:00 a.m. was noted to be in focal status prior to receiving Vimpat.  Patient was noted to have stridor, worsening secretions with suspicion for aspiration, decreased airway protection, and rapids was called due to low GCS score of 6. Antibiotics broadened to Vanc/Zosyn. Clinical picture of mental status confounded with episodes of seizures, infection, and hepatic encephalopathy.               Hospital Course: 10/28: Improving GCS from 6 to 10. Continuing pulmonary toilet and deep suctioning for stridor and copious secretions. UA positive for candida. Blood cultures from 10/27 NGTD. Sputum cultures sent. Patient on day 2 of broadened antibiotics vanc/zosyn due to worsening clinical status but will discontinue tomorrow if cultures remain negative. Still hypernatremic, treating with D5W. Patient was transferred with no ordered diuretics, very edematous on physical exam. Adequate stooling on lactulose and rifaximin. Due to increased UOP/stooling will place eckert for one day for irritated skin. EEG update showing no seizures since 10am 10/27. Monitoring CBC for thrombocytopenia and macrocytic anemia. Discussed code status and goals of care with  and best friend at bedside. Trickle feeds resumed.   10/29: No acute events overnight, EEG reportedly without further seizures for ~48 hours can disconnect once formal report is in, neuro status slowly improving as patient now tracking in room, moving extremities, responding with appropriate emotional reactions to her .  Respiratory status largely unchanged with intermittent events of large volume breaths that sound almost stridorous but without actual respiratory distress- gas remains compensated.  UOP low, diuresis resumed.  10/30/2023: d/c mucomyst nebs, add racemic epi scheduled nebs, add budesonide and dex 6 q8 hours, ammonia at 35- continue lactulose and rifaximin, abg reviewed,  40mEq of K once, ENT consulted for stridor, continue eckert catheter in today   10/31/2023 Patient with worsening respiratory status, continued decreased mentation and increased secretions. Plan for elective intubation in controlled setting with anesthesia. Will also recheck eeg, if negative will start to wean AEDs and see if that improves patient's arousal. PLT stabilized, continue to monitor.   11/1/2023 this morning patient's 6.0 ETT exchanged for 7.0 to allow suctioning. EEG with frequent discharges, lowered vimpat to 50 mg and will follow EEG. Attempting to remove confounding factors for patients mental status. Slow drop in H/H, 1 unit ordered.   11/2/2023 NAEO, cEEG to remain in place, no seizures but is having frequent discharges in runs. Continue lower dose vimpat and 1500 keppra Eye opening this morning which is slight improvement in exam. Failed SBT   11/3/2023: no improvement with decrease in lacosamide, no re-development of seizures, if does not wake up in next 48 hrs off lacosamide or redevelops seizures, prognosis is extremely poor  11/4/2023: improved mental state this am, no seizures overnight  11/5/2023: LOC stable, has cuff leak, trial of extubation  11/06/2023 reintubated for stridor non responsive to med management overnight  11/07/2023 CTH stable. Off of levo. Completed dex (wbc 22, afebrile). CXR w L mildly increasing consolidation. Very thick secretions, added mucomyst and duonebs. Weaning keppra to 750. Pt appearing more edematous today, dc LR. Scheduled tylenol max 2g/d. Scheduled oxy 5q12.  11/8/23: EEG pending  11/9/23: EEG slowing  11/10/23: more alert today  11/11/23 pressure support trial  11/12/2023 Alert today. Not following commands. On spontaneous overnight, tolerated well. MRI c-spine overnight with spondylosis. 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 Resume TF. Hold TF tomorrow at 5AM for possible extubation tomorrow.       Interval History:  olerate SBT    Review of  Systems   Reason unable to perform ROS: decrease LOC.     2 systems   Objective:     Vitals:  Temp: 96.1 °F (35.6 °C)  Pulse: 89  Rhythm: normal sinus rhythm  BP: (!) 119/58  MAP (mmHg): 83  Resp: 16  SpO2: 100 %  Oxygen Concentration (%): 30  Vent Mode: SIMV  Set Rate: 14 BPM  Vt Set: 420 mL  Pressure Support: 8 cmH20  PEEP/CPAP: 5 cmH20  Peak Airway Pressure: 24 cmH20  Mean Airway Pressure: 8.8 cmH20  Plateau Pressure: 0 cmH20    Temp  Min: 96.1 °F (35.6 °C)  Max: 99.9 °F (37.7 °C)  Pulse  Min: 74  Max: 107  BP  Min: 96/59  Max: 130/62  MAP (mmHg)  Min: 69  Max: 89  Resp  Min: 10  Max: 25  SpO2  Min: 98 %  Max: 100 %  Oxygen Concentration (%)  Min: 30  Max: 30    11/11 0701 - 11/12 0700  In: 825   Out: 600 [Urine:600]   Unmeasured Output  Urine Occurrence: 1  Stool Occurrence: 1  Emesis Occurrence: 0  Pad Count: 1        Physical Exam  Vitals and nursing note reviewed.   Constitutional:       Appearance: She is ill-appearing.   HENT:      Head: Normocephalic.      Nose: Nose normal.      Mouth/Throat:      Mouth: Mucous membranes are moist.      Pharynx: Oropharynx is clear.   Eyes:      Pupils: Pupils are equal, round, and reactive to light.   Cardiovascular:      Rate and Rhythm: Normal rate and regular rhythm.      Pulses: Normal pulses.      Heart sounds: Normal heart sounds.   Pulmonary:      Effort: Pulmonary effort is normal.      Breath sounds: Normal breath sounds.   Abdominal:      General: Bowel sounds are normal.      Palpations: Abdomen is soft.   Musculoskeletal:         General: Normal range of motion.   Skin:     General: Skin is warm and dry.      Capillary Refill: Capillary refill takes 2 to 3 seconds.   Neurological:      Comments: No sedation  E4 V1T M2  Obtunded. Not following commands.   CN II-XII:  PERRL.   No gaze today  No facial asymmetry  + cough, gag  Motor:   Slight Withdraws to noxious stimuli x4         Unable to test orientation, language, memory, judgment, insight, fund of  knowledge, hearing, shoulder shrug, tongue protrusion, coordination, gait due to level of consciousness.       Medications:  Continuousdextrose 10 % in water (D10W)    Scheduledalbuterol-ipratropium, 3 mL, QID WAKE  famotidine, 20 mg, BID  folic acid, 1 mg, Daily  lactulose, 30 g, BID  levetiracetam, 500 mg, BID  multivitamin, 1 tablet, Daily  rifAXImin, 550 mg, BID  thiamine, 100 mg, Daily    PRNcalcium gluconate IVPB, 1 g, PRN  calcium gluconate IVPB, 2 g, PRN  calcium gluconate IVPB, 3 g, PRN  dextrose 10 % in water (D10W), , Continuous PRN  dextrose 10%, 12.5 g, PRN  dextrose 10%, 25 g, PRN  fentaNYL, 25 mcg, Q1H PRN  glucagon (human recombinant), 1 mg, PRN  hydrALAZINE, 10 mg, Q4H PRN  insulin aspart U-100, 0-10 Units, Q4H PRN  labetalol, 10 mg, Q6H PRN  magnesium sulfate IVPB, 2 g, PRN  magnesium sulfate IVPB, 4 g, PRN  ondansetron, 4 mg, Q8H PRN  potassium chloride, 40 mEq, PRN   And  potassium chloride, 60 mEq, PRN   And  potassium chloride, 80 mEq, PRN  sodium phosphate 15 mmol in dextrose 5 % (D5W) 250 mL IVPB, 15 mmol, PRN  sodium phosphate 20.01 mmol in dextrose 5 % (D5W) 250 mL IVPB, 20.01 mmol, PRN  sodium phosphate 30 mmol in dextrose 5 % (D5W) 250 mL IVPB, 30 mmol, PRN      Today I personally reviewed pertinent medications, lines/drains/airways, imaging, cardiology results, laboratory results, microbiology results, notably:    Diet  Diet NPO  Diet NPO  Diet NPO  Diet NPO          Assessment/Plan:     Neuro  Seizure  58 year old presented for altered mental status on 10/18. Clinical picture confounded with hepatic encephalopathy, infection, and seizure activity. Neurology was consulted by primary team prior to admission to [facility].    10/27 EEG IMPRESSION:  This is an abnormal EEG during lethargic state.  Diffuse disorganized slowing of the background was noted.  Frequent triphasic waves noted.  Left posterior pseudo periodic epileptiform discharges were noted.  Numerous electrographic seizures emanating  from the left posterior region were noted.    10/31 - rehook and 11/1 read with frequent discharges, vimpat lowered to 50 given mental status and liver function, will continue to watch on eeg  · vEEG in place    · Neurochecks   · Vitals q1hr   11/03: EEG remains negative with lacosamide taper, DC and monitor for seizure recurrence  11/04: stable EEG, beginning to awaken  11/5: remains without seizures  11/8/23: EEG pending  11/9/23: EEG slowing, Keprpa 500 mg BID      Acute encephalopathy  See acute hepatic encephalopathy and seizures    Hematology  Thrombocytopenia  This is a 58-year-old woman with a history of decompensated alcoholic cirrhosis.  Suspect thrombocytopenia is likely secondary to chronic alcohol use and is consumptive in nature. If trend worsens, will consider consulting Hematology.    · Daily CBC, transfuse only for active bleeding  · SCDs; sqh      Endocrine  Moderate malnutrition  Resuming tube feeds at 10 mL/hr    Nutrition consulted. Most recent weight and BMI monitored-     Measurements:  Wt Readings from Last 1 Encounters:   10/26/23 56.2 kg (123 lb 14.4 oz)   Body mass index is 25.02 kg/m².    Patient has been screened and assessed by RD.    Malnutrition Type:  Context: social/environmental circumstances  Level: moderate    Malnutrition Characteristic Summary:  Subcutaneous Fat (Malnutrition): mild depletion  Muscle Mass (Malnutrition): mild depletion  Fluid Accumulation (Malnutrition): moderate    Interventions/Recommendations (treatment strategy):  1. When able, initiate TFs. Rec'd Isosource 1.5 @ 50 mL/hr to provide 1800 kcals, 82 g of protein, 917 mL fluid. 2. RD to monitor & follow-up.    GI  * Acute hepatic encephalopathy  This is a 52 year old woman with a history of ethanol cirrhosis who presents after being found on the ground with fecal and urinary incontinence, and altered mental status. Elevated ammonia on admission.    - Continue lactulose via NG tube TID (titrate till 3-4 daily  BM achieved). Continue Xifaxin.   - Avoid opiates and benzodiazepines, if able.   - IV thiamine, folate supplementation, multivitamin through NG tube.  - Keppra   -11/7: CTH stable  -MELD score ~50% survival without transplant  -Off of EEG       Decompensated alcoholic hepatic cirrhosis  · Hepatology following PEth. Daily CBC, CMP & INR.   · Not currently being evaluated for transplant due to clinical status  · Vitamin K given for chronic coagulopathy  · Continuing lactulose and rifaximin titrated to 3-4 bowel movements daily  · Ammonia WNL    Other  Hypoxic respiratory failure  2/2 to tracheal stenosis   -patient with stridor, increased secretions, requiring 5L 28% and inability to protect airway   -intubated by anesthesia team with small ETT    Vent Mode: SIMV  Oxygen Concentration (%):  [30] 30  Resp Rate Total:  [8.2 br/min-32 br/min] 20 br/min  Vt Set:  [420 mL] 420 mL  PEEP/CPAP:  [5 cmH20] 5 cmH20  Pressure Support:  [8 cmH20] 8 cmH20  Mean Airway Pressure:  [6.7 cmH20-8.8 cmH20] 8.8 cmH20      Reintubated after trial extubation 2/2 stridor non responsive to med management  Passed SBT            The patient is being Prophylaxed for:  Venous Thromboembolism with:   Stress Ulcer with: H2B  Ventilator Pneumonia with: chlorhexidine oral care    Activity Orders          Diet NPO: NPO starting at 11/13 0500    Diet NPO: NPO starting at 11/05 0500    Elevate HOB Elevate (30-45 degrees) Elevate HOB to 30 - 45 degrees during feeding unless otherwise stated starting at 10/28 1218    Elevate HOB to 30-45 degrees during feeding unless otherwise stated starting at 10/26 1138    Progressive Mobility Protocol (mobilize patient to their highest level of functioning at least twice daily) starting at 10/25 2000    Turn patient starting at 10/25 2000        Full Code    Keagan Day MD  Neurocritical Care  [facility] - Neuro Critical Care    Time spent with this critically ill patient and care team t the bedside:  35min  -There is high probability for acute neurological and/or systemic change leading to clinical and possibly life-threatening deterioration

## 2023-11-12 NOTE — ASSESSMENT & PLAN NOTE
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

## 2023-11-12 NOTE — ASSESSMENT & PLAN NOTE
2/2 to tracheal stenosis   -patient with stridor, increased secretions, requiring 5L 28% and inability to protect airway   -intubated by anesthesia team with small ETT    Vent Mode: SIMV  Oxygen Concentration (%):  [30] 30  Resp Rate Total:  [8.2 br/min-32 br/min] 20 br/min  Vt Set:  [420 mL] 420 mL  PEEP/CPAP:  [5 cmH20] 5 cmH20  Pressure Support:  [8 cmH20] 8 cmH20  Mean Airway Pressure:  [6.7 cmH20-8.8 cmH20] 8.8 cmH20      Reintubated after trial extubation 2/2 stridor non responsive to med management  Passed SBT  - Tolerated 24h spontaneous again on 11/12  - Hold TF tomorrow at 5AM for possible extubation tomorrow

## 2023-11-12 NOTE — PROGRESS NOTES
[facility] - Neuro Critical Care  Neurocritical Care  Progress Note    Admit Date: 10/25/2023  Service Date: 11/12/2023  Length of Stay: 18    Subjective:     Chief Complaint: Acute hepatic encephalopathy    History of Present Illness: This is a 58-year-old female with a past medical history of alcoholic cirrhosis, s/p ex-lap w/ bowel resection for incarcerated hernia, who initially presented on 10/18 to [facility] with acute encephalopathy.  Her  found her on the floor at home with evidence of fecal/urine incontinence with confusion and slurred speech. Possible reported tongue injury in chart. Reported concern L sided weakness and down drift of L arm however CT head without evidence of acute abnormalities, MRI brain with only small vessel vascular changes without evidence of territorial infarct.     Hospital Course: EEG done on 10/19 with mild diffuse nonspecific background slowing, no potential epileptiform activity. Repeat MRI brain with contrast on 10/23 unchanged from initial MRI. Became more lethargic and was no longer following commands or answering questions. Due to worsening hepatic encephalopathy in setting of hepatic cirrhosis, patient transferred to [facility] for management with transplant team. Has remained on antibiotics, lactulose and rifaximin for treatment of UTI with pansensitive Ecoli, HE, and presumed SBP. Neurology consulted for management recommendations regarding persistent AMS. Had repeat EEG done on 10/24 with similar findings to prior EEG showing mild generalized non-specific cerebral dysfunction and no electrographic seizures or indication of seizure tendency. Additional CT head w/o contrast on 10/25 without evidence of acute issues. Remains confused and unable to answer questions on exam. Not withdrawing to painful stimuli. Was able to awaken to verbal stimuli in AM however when reevaluated in afternoon unresponsive however was after receiving Ativan.     On admission to [facility]:  Patient  had EEG running, and was noted to have seizures at 7:30 a.m., 8:00 a.m. in, and 10:00 a.m. was noted to be in focal status prior to receiving Vimpat.  Patient was noted to have stridor, worsening secretions with suspicion for aspiration, decreased airway protection, and rapids was called due to low GCS score of 6. Antibiotics broadened to Vanc/Zosyn. Clinical picture of mental status confounded with episodes of seizures, infection, and hepatic encephalopathy.             Hospital Course: 10/28: Improving GCS from 6 to 10. Continuing pulmonary toilet and deep suctioning for stridor and copious secretions. UA positive for candida. Blood cultures from 10/27 NGTD. Sputum cultures sent. Patient on day 2 of broadened antibiotics vanc/zosyn due to worsening clinical status but will discontinue tomorrow if cultures remain negative. Still hypernatremic, treating with D5W. Patient was transferred with no ordered diuretics, very edematous on physical exam. Adequate stooling on lactulose and rifaximin. Due to increased UOP/stooling will place eckert for one day for irritated skin. EEG update showing no seizures since 10am 10/27. Monitoring CBC for thrombocytopenia and macrocytic anemia. Discussed code status and goals of care with  and best friend at bedside. Trickle feeds resumed.   10/29: No acute events overnight, EEG reportedly without further seizures for ~48 hours can disconnect once formal report is in, neuro status slowly improving as patient now tracking in room, moving extremities, responding with appropriate emotional reactions to her .  Respiratory status largely unchanged with intermittent events of large volume breaths that sound almost stridorous but without actual respiratory distress- gas remains compensated.  UOP low, diuresis resumed.  10/30/2023: d/c mucomyst nebs, add racemic epi scheduled nebs, add budesonide and dex 6 q8 hours, ammonia at 35- continue lactulose and rifaximin, abg reviewed,  40mEq of K once, ENT consulted for stridor, continue eckert catheter in today   10/31/2023 Patient with worsening respiratory status, continued decreased mentation and increased secretions. Plan for elective intubation in controlled setting with anesthesia. Will also recheck eeg, if negative will start to wean AEDs and see if that improves patient's arousal. PLT stabilized, continue to monitor.   11/1/2023 this morning patient's 6.0 ETT exchanged for 7.0 to allow suctioning. EEG with frequent discharges, lowered vimpat to 50 mg and will follow EEG. Attempting to remove confounding factors for patients mental status. Slow drop in H/H, 1 unit ordered.   11/2/2023 NAEO, cEEG to remain in place, no seizures but is having frequent discharges in runs. Continue lower dose vimpat and 1500 keppra Eye opening this morning which is slight improvement in exam. Failed SBT   11/3/2023: no improvement with decrease in lacosamide, no re-development of seizures, if does not wake up in next 48 hrs off lacosamide or redevelops seizures, prognosis is extremely poor  11/4/2023: improved mental state this am, no seizures overnight  11/5/2023: LOC stable, has cuff leak, trial of extubation  11/06/2023 reintubated for stridor non responsive to med management overnight  11/07/2023 CTH stable. Off of levo. Completed dex (wbc 22, afebrile). CXR w L mildly increasing consolidation. Very thick secretions, added mucomyst and duonebs. Weaning keppra to 750. Pt appearing more edematous today, dc LR. Scheduled tylenol max 2g/d. Scheduled oxy 5q12.  11/8/23: EEG pending  11/9/23: EEG slowing  11/10/23: more alert today  11/11/23 pressure support trial  11/12/2023 Alert today. Not following commands. On spontaneous overnight, tolerated well. MRI c-spine overnight with spondylosis. No LP at this time. Resume TF. Hold TF tomorrow at 5AM for possible extubation tomorrow.     Interval History:  As above    Review of Systems   Unable to perform ROS:  Intubated     2 systems   Objective:     Vitals:  Temp: 96.1 °F (35.6 °C)  Pulse: 89  Rhythm: normal sinus rhythm  BP: (!) 119/58  MAP (mmHg): 83  Resp: 16  SpO2: 100 %  Oxygen Concentration (%): 30  Vent Mode: SIMV  Set Rate: 14 BPM  Vt Set: 420 mL  Pressure Support: 8 cmH20  PEEP/CPAP: 5 cmH20  Peak Airway Pressure: 24 cmH20  Mean Airway Pressure: 8.8 cmH20  Plateau Pressure: 0 cmH20    Temp  Min: 96.1 °F (35.6 °C)  Max: 99.9 °F (37.7 °C)  Pulse  Min: 74  Max: 107  BP  Min: 96/59  Max: 130/62  MAP (mmHg)  Min: 69  Max: 89  Resp  Min: 10  Max: 25  SpO2  Min: 98 %  Max: 100 %  Oxygen Concentration (%)  Min: 30  Max: 30    11/11 0701 - 11/12 0700  In: 825   Out: 600 [Urine:600]   Unmeasured Output  Urine Occurrence: 1  Stool Occurrence: 1  Emesis Occurrence: 0  Pad Count: 1        Physical Exam  Vitals and nursing note reviewed.   Constitutional:       Appearance: She is ill-appearing.   HENT:      Head: Normocephalic.      Right Ear: External ear normal.      Left Ear: External ear normal.      Nose: Nose normal.      Mouth/Throat:      Mouth: Mucous membranes are moist.   Eyes:      Pupils: Pupils are equal, round, and reactive to light.   Cardiovascular:      Rate and Rhythm: Normal rate and regular rhythm.      Pulses: Normal pulses.      Heart sounds: Normal heart sounds.   Pulmonary:      Comments: Intubated  Abdominal:      General: There is no distension.      Palpations: Abdomen is soft.      Tenderness: There is no abdominal tenderness.   Musculoskeletal:      Cervical back: Neck supple.   Skin:     General: Skin is warm and dry.   Neurological:      Mental Status: She is alert.      Comments: No sedation   E4 V1T M3    Alert. Tracking. Only followed 1 command- wiggled L toes.   CN II-XII grossly intact, specifically:  PERRL   Eyes cross midline   No facial asymmetry  + cough, gag   Motor:  Flaccid weakness throughout   RUE flexion to noxious stimuli   RLE 1/5  LUE 1/5  LLE 1/5, wiggled L toes to command    Sensation intact to noxious stimuli x 4- grimaces       Unable to test orientation, language, memory, judgment, insight, fund of knowledge, hearing, shoulder shrug, tongue protrusion, coordination, gait due to level of consciousness.       Medications:  Continuousdextrose 10 % in water (D10W)    Scheduledalbuterol-ipratropium, 3 mL, QID WAKE  famotidine, 20 mg, BID  folic acid, 1 mg, Daily  lactulose, 30 g, BID  levetiracetam, 500 mg, BID  multivitamin, 1 tablet, Daily  rifAXImin, 550 mg, BID  thiamine, 100 mg, Daily    PRNcalcium gluconate IVPB, 1 g, PRN  calcium gluconate IVPB, 2 g, PRN  calcium gluconate IVPB, 3 g, PRN  dextrose 10 % in water (D10W), , Continuous PRN  dextrose 10%, 12.5 g, PRN  dextrose 10%, 25 g, PRN  fentaNYL, 25 mcg, Q1H PRN  glucagon (human recombinant), 1 mg, PRN  hydrALAZINE, 10 mg, Q4H PRN  insulin aspart U-100, 0-10 Units, Q4H PRN  labetalol, 10 mg, Q6H PRN  magnesium sulfate IVPB, 2 g, PRN  magnesium sulfate IVPB, 4 g, PRN  ondansetron, 4 mg, Q8H PRN  potassium chloride, 40 mEq, PRN   And  potassium chloride, 60 mEq, PRN   And  potassium chloride, 80 mEq, PRN  sodium phosphate 15 mmol in dextrose 5 % (D5W) 250 mL IVPB, 15 mmol, PRN  sodium phosphate 20.01 mmol in dextrose 5 % (D5W) 250 mL IVPB, 20.01 mmol, PRN  sodium phosphate 30 mmol in dextrose 5 % (D5W) 250 mL IVPB, 30 mmol, PRN      Today I personally reviewed pertinent medications, lines/drains/airways, imaging, cardiology results, laboratory results, microbiology results, notably:    MRI c-spine    Diet  Diet NPO  Diet NPO  Diet NPO  Diet NPO        Assessment/Plan:     Neuro  Seizure  58 year old presented for altered mental status on 10/18. Clinical picture confounded with hepatic encephalopathy, infection, and seizure activity. Neurology was consulted by primary team prior to admission to [facility].    10/27 EEG IMPRESSION:  This is an abnormal EEG during lethargic state.  Diffuse disorganized slowing of the background was noted.   Frequent triphasic waves noted.  Left posterior pseudo periodic epileptiform discharges were noted.  Numerous electrographic seizures emanating from the left posterior region were noted.    10/31 - rehook and 11/1 read with frequent discharges, vimpat lowered to 50 given mental status and liver function, will continue to watch on eeg  vEEG discontinued    Neurochecks   Vitals q1hr  11/03: EEG remains negative with lacosamide taper, DC and monitor for seizure recurrence  11/5: remains without seizures  11/9/23: EEG slowing  Keprpa 500 mg BID      Acute encephalopathy  See acute hepatic encephalopathy and seizures    Pulmonary  Tracheal stenosis  Scope complete by ENT   -patient on scheduled nebs and racemic epi without improvement in respiratory status   -elective intubation 10/31 with 6.0 ETT with anesthesia  -11/1 tube exchange to 7.0 ETT to allow suctioning, completed steroids.  11/3: continue sbt trials, extubation attempt if able to awaken further   11/4: tolerating sbt, hold TFs at midnight  11/5: has cuff leak, extubate  Reintubated for stridor   Dexamethasone x 24 h completed 11/7    Hematology  Thrombocytopenia  This is a 58-year-old woman with a history of decompensated alcoholic cirrhosis.  Suspect thrombocytopenia is likely secondary to chronic alcohol use and is consumptive in nature. If trend worsens, will consider consulting Hematology.    Daily CBC, transfuse only for active bleeding  SCDs; SQH      Endocrine  Moderate malnutrition  Resuming tube feeds at 10 mL/hr    Nutrition consulted. Most recent weight and BMI monitored-     Measurements:  Wt Readings from Last 1 Encounters:   10/26/23 56.2 kg (123 lb 14.4 oz)   Body mass index is 25.02 kg/m².    Patient has been screened and assessed by RD.    Malnutrition Type:  Context: social/environmental circumstances  Level: moderate    Malnutrition Characteristic Summary:  Subcutaneous Fat (Malnutrition): mild depletion  Muscle Mass (Malnutrition): mild  depletion  Fluid Accumulation (Malnutrition): moderate    Interventions/Recommendations (treatment strategy):  1. When able, initiate TFs. Rec'd Isosource 1.5 @ 50 mL/hr to provide 1800 kcals, 82 g of protein, 917 mL fluid. 2. RD to monitor & follow-up.    GI  * Acute hepatic encephalopathy  This is a 52 year old woman with a history of ethanol cirrhosis who presents after being found on the ground with fecal and urinary incontinence, and altered mental status. Elevated ammonia on admission.    - Continue lactulose via NG tube TID (titrate till 3-4 daily BM achieved). Continue Xifaxin.   - Avoid opiates and benzodiazepines, if able.   - IV thiamine, folate supplementation, multivitamin through NG tube.  - Keppra   -11/7: CTH stable  -MELD score ~50% survival without transplant  -Off of EEG       Decompensated alcoholic hepatic cirrhosis  Hepatology following PEth. Daily CBC, CMP & INR.   Not currently being evaluated for transplant due to clinical status  Vitamin K given for chronic coagulopathy  Continuing lactulose and rifaximin titrated to 3-4 bowel movements daily  Ammonia WNL    Other  Hypoxic respiratory failure  2/2 to tracheal stenosis   -patient with stridor, increased secretions, requiring 5L 28% and inability to protect airway   -intubated by anesthesia team with small ETT    Vent Mode: SIMV  Oxygen Concentration (%):  [30] 30  Resp Rate Total:  [8.2 br/min-32 br/min] 20 br/min  Vt Set:  [420 mL] 420 mL  PEEP/CPAP:  [5 cmH20] 5 cmH20  Pressure Support:  [8 cmH20] 8 cmH20  Mean Airway Pressure:  [6.7 cmH20-8.8 cmH20] 8.8 cmH20      Reintubated after trial extubation 2/2 stridor non responsive to med management  Passed SBT  - Tolerated 24h spontaneous again on 11/12  - Hold TF tomorrow at 5AM for possible extubation tomorrow     Debility  Diffuse weakness with decreased DTRs   - MRI c-spine overnight with spondylosis  - 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, no LP at this time  - PT/OT when appropriate           The patient is being Prophylaxed for:  Venous Thromboembolism with: Mechanical  Stress Ulcer with: H2B  Ventilator Pneumonia with: chlorhexidine oral care    Activity Orders            Diet NPO: NPO starting at 11/13 0500    Diet NPO: NPO starting at 11/05 0500    Elevate HOB Elevate (30-45 degrees) Elevate HOB to 30 - 45 degrees during feeding unless otherwise stated starting at 10/28 1218    Elevate HOB to 30-45 degrees during feeding unless otherwise stated starting at 10/26 1138    Progressive Mobility Protocol (mobilize patient to their highest level of functioning at least twice daily) starting at 10/25 2000    Turn patient starting at 10/25 2000          Full Code    Critical care time spent on the evaluation and treatment of severe organ dysfunction, review of pertinent labs and imaging studies, discussions with consulting providers and discussions with patient/family: 35 minutes.    Inderjit Chappell PA-C  Neurocritical Care  [facility] - Neuro Critical Care

## 2023-11-13 LAB
ALBUMIN SERPL BCP-MCNC: 2.1 G/DL (ref 3.5–5.2)
ALBUMIN SERPL BCP-MCNC: 2.1 G/DL (ref 3.5–5.2)
ALLENS TEST: ABNORMAL
ALP SERPL-CCNC: 99 U/L (ref 55–135)
ALP SERPL-CCNC: 99 U/L (ref 55–135)
ALT SERPL W/O P-5'-P-CCNC: 99 U/L (ref 10–44)
ALT SERPL W/O P-5'-P-CCNC: 99 U/L (ref 10–44)
ANION GAP SERPL CALC-SCNC: 6 MMOL/L (ref 8–16)
ANION GAP SERPL CALC-SCNC: 6 MMOL/L (ref 8–16)
ANISOCYTOSIS BLD QL SMEAR: SLIGHT
ANISOCYTOSIS BLD QL SMEAR: SLIGHT
AST SERPL-CCNC: 196 U/L (ref 10–40)
AST SERPL-CCNC: 196 U/L (ref 10–40)
BASO STIPL BLD QL SMEAR: ABNORMAL
BASOPHILS # BLD AUTO: 0.01 K/UL (ref 0–0.2)
BASOPHILS # BLD AUTO: 0.03 K/UL (ref 0–0.2)
BASOPHILS NFR BLD: 0.1 % (ref 0–1.9)
BASOPHILS NFR BLD: 0.3 % (ref 0–1.9)
BILIRUB SERPL-MCNC: 8.2 MG/DL (ref 0.1–1)
BILIRUB SERPL-MCNC: 8.2 MG/DL (ref 0.1–1)
BUN SERPL-MCNC: 33 MG/DL (ref 6–20)
BUN SERPL-MCNC: 33 MG/DL (ref 6–20)
BURR CELLS BLD QL SMEAR: ABNORMAL
BURR CELLS BLD QL SMEAR: ABNORMAL
CALCIUM SERPL-MCNC: 9.3 MG/DL (ref 8.7–10.5)
CALCIUM SERPL-MCNC: 9.3 MG/DL (ref 8.7–10.5)
CHLORIDE SERPL-SCNC: 112 MMOL/L (ref 95–110)
CHLORIDE SERPL-SCNC: 112 MMOL/L (ref 95–110)
CO2 SERPL-SCNC: 22 MMOL/L (ref 23–29)
CO2 SERPL-SCNC: 22 MMOL/L (ref 23–29)
CREAT SERPL-MCNC: 0.5 MG/DL (ref 0.5–1.4)
CREAT SERPL-MCNC: 0.5 MG/DL (ref 0.5–1.4)
DACRYOCYTES BLD QL SMEAR: ABNORMAL
DACRYOCYTES BLD QL SMEAR: ABNORMAL
DELSYS: ABNORMAL
DIFFERENTIAL METHOD: ABNORMAL
DIFFERENTIAL METHOD: ABNORMAL
EOSINOPHIL # BLD AUTO: 0.3 K/UL (ref 0–0.5)
EOSINOPHIL # BLD AUTO: 0.5 K/UL (ref 0–0.5)
EOSINOPHIL NFR BLD: 3.7 % (ref 0–8)
EOSINOPHIL NFR BLD: 3.9 % (ref 0–8)
ERYTHROCYTE [DISTWIDTH] IN BLOOD BY AUTOMATED COUNT: 19.9 % (ref 11.5–14.5)
ERYTHROCYTE [DISTWIDTH] IN BLOOD BY AUTOMATED COUNT: 20.2 % (ref 11.5–14.5)
EST. GFR  (NO RACE VARIABLE): >60 ML/MIN/1.73 M^2
EST. GFR  (NO RACE VARIABLE): >60 ML/MIN/1.73 M^2
FIO2: 30
GIANT PLATELETS BLD QL SMEAR: PRESENT
GLUCOSE SERPL-MCNC: 82 MG/DL (ref 70–110)
GLUCOSE SERPL-MCNC: 82 MG/DL (ref 70–110)
HCO3 UR-SCNC: 24.1 MMOL/L (ref 24–28)
HCT VFR BLD AUTO: 19.1 % (ref 37–48.5)
HCT VFR BLD AUTO: 22.2 % (ref 37–48.5)
HGB BLD-MCNC: 6.1 G/DL (ref 12–16)
HGB BLD-MCNC: 7.1 G/DL (ref 12–16)
HYPOCHROMIA BLD QL SMEAR: ABNORMAL
HYPOCHROMIA BLD QL SMEAR: ABNORMAL
IMM GRANULOCYTES # BLD AUTO: 0.13 K/UL (ref 0–0.04)
IMM GRANULOCYTES # BLD AUTO: 0.18 K/UL (ref 0–0.04)
IMM GRANULOCYTES NFR BLD AUTO: 1.5 % (ref 0–0.5)
IMM GRANULOCYTES NFR BLD AUTO: 1.8 % (ref 0–0.5)
INR PPP: 1.7 (ref 0.8–1.2)
LYMPHOCYTES # BLD AUTO: 0.8 K/UL (ref 1–4.8)
LYMPHOCYTES # BLD AUTO: 1.3 K/UL (ref 1–4.8)
LYMPHOCYTES NFR BLD: 10.9 % (ref 18–48)
LYMPHOCYTES NFR BLD: 11.4 % (ref 18–48)
MAGNESIUM SERPL-MCNC: 1.8 MG/DL (ref 1.6–2.6)
MCH RBC QN AUTO: 32.6 PG (ref 27–31)
MCH RBC QN AUTO: 32.7 PG (ref 27–31)
MCHC RBC AUTO-ENTMCNC: 31.9 G/DL (ref 32–36)
MCHC RBC AUTO-ENTMCNC: 32 G/DL (ref 32–36)
MCV RBC AUTO: 102 FL (ref 82–98)
MCV RBC AUTO: 102 FL (ref 82–98)
MIN VOL: 6.87
MODE: ABNORMAL
MONOCYTES # BLD AUTO: 1.1 K/UL (ref 0.3–1)
MONOCYTES # BLD AUTO: 1.7 K/UL (ref 0.3–1)
MONOCYTES NFR BLD: 14.5 % (ref 4–15)
MONOCYTES NFR BLD: 14.7 % (ref 4–15)
NEUTROPHILS # BLD AUTO: 5 K/UL (ref 1.8–7.7)
NEUTROPHILS # BLD AUTO: 8.1 K/UL (ref 1.8–7.7)
NEUTROPHILS NFR BLD: 68.3 % (ref 38–73)
NEUTROPHILS NFR BLD: 68.9 % (ref 38–73)
NRBC BLD-RTO: 0 /100 WBC
NRBC BLD-RTO: 0 /100 WBC
OVALOCYTES BLD QL SMEAR: ABNORMAL
OVALOCYTES BLD QL SMEAR: ABNORMAL
PCO2 BLDA: 37.9 MMHG (ref 35–45)
PEEP: 5
PH SMN: 7.41 [PH] (ref 7.35–7.45)
PHOSPHATE SERPL-MCNC: 3.3 MG/DL (ref 2.7–4.5)
PLATELET # BLD AUTO: 10 K/UL (ref 150–450)
PLATELET # BLD AUTO: 29 K/UL (ref 150–450)
PLATELET BLD QL SMEAR: ABNORMAL
PLATELET BLD QL SMEAR: ABNORMAL
PMV BLD AUTO: 13.8 FL (ref 9.2–12.9)
PMV BLD AUTO: ABNORMAL FL (ref 9.2–12.9)
PO2 BLDA: 33 MMHG (ref 80–100)
POC BE: -1 MMOL/L
POC SATURATED O2: 65 % (ref 95–100)
POC TCO2: 25 MMOL/L (ref 23–27)
POCT GLUCOSE: 104 MG/DL (ref 70–110)
POCT GLUCOSE: 112 MG/DL (ref 70–110)
POCT GLUCOSE: 73 MG/DL (ref 70–110)
POCT GLUCOSE: 84 MG/DL (ref 70–110)
POCT GLUCOSE: 88 MG/DL (ref 70–110)
POCT GLUCOSE: 98 MG/DL (ref 70–110)
POIKILOCYTOSIS BLD QL SMEAR: ABNORMAL
POIKILOCYTOSIS BLD QL SMEAR: SLIGHT
POLYCHROMASIA BLD QL SMEAR: ABNORMAL
POLYCHROMASIA BLD QL SMEAR: ABNORMAL
POTASSIUM SERPL-SCNC: 4.3 MMOL/L (ref 3.5–5.1)
POTASSIUM SERPL-SCNC: 4.3 MMOL/L (ref 3.5–5.1)
PROT SERPL-MCNC: 5.1 G/DL (ref 6–8.4)
PROT SERPL-MCNC: 5.1 G/DL (ref 6–8.4)
PROTHROMBIN TIME: 17.2 SEC (ref 9–12.5)
PS: 8
RBC # BLD AUTO: 1.87 M/UL (ref 4–5.4)
RBC # BLD AUTO: 2.17 M/UL (ref 4–5.4)
SAMPLE: ABNORMAL
SCHISTOCYTES BLD QL SMEAR: ABNORMAL
SCHISTOCYTES BLD QL SMEAR: PRESENT
SITE: ABNORMAL
SODIUM SERPL-SCNC: 140 MMOL/L (ref 136–145)
SODIUM SERPL-SCNC: 140 MMOL/L (ref 136–145)
SP02: 100
SPONT RATE: 9
TARGETS BLD QL SMEAR: ABNORMAL
WBC # BLD AUTO: 11.68 K/UL (ref 3.9–12.7)
WBC # BLD AUTO: 7.34 K/UL (ref 3.9–12.7)

## 2023-11-13 PROCEDURE — 99291 PR CRITICAL CARE, E/M 30-74 MINUTES: ICD-10-PCS | Mod: FS,NTX,, | Performed by: PSYCHIATRY & NEUROLOGY

## 2023-11-13 PROCEDURE — 36410 VNPNXR 3YR/> PHY/QHP DX/THER: CPT | Mod: NTX

## 2023-11-13 PROCEDURE — 94660 CPAP INITIATION&MGMT: CPT | Mod: NTX,XB

## 2023-11-13 PROCEDURE — 27100171 HC OXYGEN HIGH FLOW UP TO 24 HOURS: Mod: NTX

## 2023-11-13 PROCEDURE — 99900035 HC TECH TIME PER 15 MIN (STAT): Mod: NTX

## 2023-11-13 PROCEDURE — 20000000 HC ICU ROOM: Mod: NTX

## 2023-11-13 PROCEDURE — 25000003 PHARM REV CODE 250: Mod: NTX

## 2023-11-13 PROCEDURE — C1751 CATH, INF, PER/CENT/MIDLINE: HCPCS | Mod: NTX

## 2023-11-13 PROCEDURE — 25000003 PHARM REV CODE 250: Mod: NTX | Performed by: NURSE PRACTITIONER

## 2023-11-13 PROCEDURE — 27200966 HC CLOSED SUCTION SYSTEM: Mod: NTX

## 2023-11-13 PROCEDURE — 27000190 HC CPAP FULL FACE MASK W/VALVE: Mod: NTX

## 2023-11-13 PROCEDURE — 94003 VENT MGMT INPAT SUBQ DAY: CPT | Mod: NTX

## 2023-11-13 PROCEDURE — 84100 ASSAY OF PHOSPHORUS: CPT | Mod: NTX | Performed by: STUDENT IN AN ORGANIZED HEALTH CARE EDUCATION/TRAINING PROGRAM

## 2023-11-13 PROCEDURE — 25000242 PHARM REV CODE 250 ALT 637 W/ HCPCS: Mod: NTX

## 2023-11-13 PROCEDURE — 99291 CRITICAL CARE FIRST HOUR: CPT | Mod: FS,NTX,, | Performed by: PSYCHIATRY & NEUROLOGY

## 2023-11-13 PROCEDURE — 83735 ASSAY OF MAGNESIUM: CPT | Mod: NTX | Performed by: STUDENT IN AN ORGANIZED HEALTH CARE EDUCATION/TRAINING PROGRAM

## 2023-11-13 PROCEDURE — 99499 UNLISTED E&M SERVICE: CPT | Mod: NTX,,, | Performed by: NURSE PRACTITIONER

## 2023-11-13 PROCEDURE — 25000242 PHARM REV CODE 250 ALT 637 W/ HCPCS: Mod: NTX | Performed by: PSYCHIATRY & NEUROLOGY

## 2023-11-13 PROCEDURE — 94640 AIRWAY INHALATION TREATMENT: CPT | Mod: NTX

## 2023-11-13 PROCEDURE — 25000003 PHARM REV CODE 250: Mod: NTX | Performed by: PSYCHIATRY & NEUROLOGY

## 2023-11-13 PROCEDURE — 25000003 PHARM REV CODE 250: Mod: NTX | Performed by: HOSPITALIST

## 2023-11-13 PROCEDURE — 63600175 PHARM REV CODE 636 W HCPCS: Mod: NTX | Performed by: NURSE PRACTITIONER

## 2023-11-13 PROCEDURE — 99900026 HC AIRWAY MAINTENANCE (STAT): Mod: NTX

## 2023-11-13 PROCEDURE — 85025 COMPLETE CBC W/AUTO DIFF WBC: CPT | Mod: 91,NTX

## 2023-11-13 PROCEDURE — 94668 MNPJ CHEST WALL SBSQ: CPT | Mod: NTX

## 2023-11-13 PROCEDURE — 76937 US GUIDE VASCULAR ACCESS: CPT | Mod: NTX

## 2023-11-13 PROCEDURE — 85025 COMPLETE CBC W/AUTO DIFF WBC: CPT | Mod: NTX | Performed by: HOSPITALIST

## 2023-11-13 PROCEDURE — 25000003 PHARM REV CODE 250: Mod: NTX | Performed by: PHYSICIAN ASSISTANT

## 2023-11-13 PROCEDURE — 51798 US URINE CAPACITY MEASURE: CPT | Mod: NTX

## 2023-11-13 PROCEDURE — 99499 NO LOS: ICD-10-PCS | Mod: NTX,,, | Performed by: NURSE PRACTITIONER

## 2023-11-13 PROCEDURE — 85610 PROTHROMBIN TIME: CPT | Mod: NTX | Performed by: INTERNAL MEDICINE

## 2023-11-13 PROCEDURE — 94010 BREATHING CAPACITY TEST: CPT | Mod: NTX

## 2023-11-13 PROCEDURE — 80053 COMPREHEN METABOLIC PANEL: CPT | Mod: NTX | Performed by: HOSPITALIST

## 2023-11-13 PROCEDURE — 94761 N-INVAS EAR/PLS OXIMETRY MLT: CPT | Mod: NTX

## 2023-11-13 RX ORDER — DEXAMETHASONE SODIUM PHOSPHATE 4 MG/ML
10 INJECTION, SOLUTION INTRA-ARTICULAR; INTRALESIONAL; INTRAMUSCULAR; INTRAVENOUS; SOFT TISSUE ONCE
Status: COMPLETED | OUTPATIENT
Start: 2023-11-13 | End: 2023-11-13

## 2023-11-13 RX ADMIN — LACTULOSE 30 G: 20 SOLUTION ORAL at 08:11

## 2023-11-13 RX ADMIN — IPRATROPIUM BROMIDE AND ALBUTEROL SULFATE 3 ML: .5; 3 SOLUTION RESPIRATORY (INHALATION) at 07:11

## 2023-11-13 RX ADMIN — THERA TABS 1 TABLET: TAB at 11:11

## 2023-11-13 RX ADMIN — FAMOTIDINE 20 MG: 20 TABLET ORAL at 11:11

## 2023-11-13 RX ADMIN — IPRATROPIUM BROMIDE AND ALBUTEROL SULFATE 3 ML: .5; 3 SOLUTION RESPIRATORY (INHALATION) at 08:11

## 2023-11-13 RX ADMIN — RIFAXIMIN 550 MG: 550 TABLET ORAL at 11:11

## 2023-11-13 RX ADMIN — LEVETIRACETAM 500 MG: 100 SOLUTION ORAL at 11:11

## 2023-11-13 RX ADMIN — LACTULOSE 30 G: 20 SOLUTION ORAL at 11:11

## 2023-11-13 RX ADMIN — IPRATROPIUM BROMIDE AND ALBUTEROL SULFATE 3 ML: .5; 3 SOLUTION RESPIRATORY (INHALATION) at 03:11

## 2023-11-13 RX ADMIN — RACEPINEPHRINE HYDROCHLORIDE 0.5 ML: 11.25 SOLUTION RESPIRATORY (INHALATION) at 10:11

## 2023-11-13 RX ADMIN — FOLIC ACID 1 MG: 1 TABLET ORAL at 11:11

## 2023-11-13 RX ADMIN — LEVETIRACETAM 500 MG: 100 SOLUTION ORAL at 08:11

## 2023-11-13 RX ADMIN — DEXAMETHASONE SODIUM PHOSPHATE 10 MG: 4 INJECTION INTRA-ARTICULAR; INTRALESIONAL; INTRAMUSCULAR; INTRAVENOUS; SOFT TISSUE at 10:11

## 2023-11-13 RX ADMIN — RIFAXIMIN 550 MG: 550 TABLET ORAL at 08:11

## 2023-11-13 RX ADMIN — IPRATROPIUM BROMIDE AND ALBUTEROL SULFATE 3 ML: .5; 3 SOLUTION RESPIRATORY (INHALATION) at 11:11

## 2023-11-13 RX ADMIN — THIAMINE HCL TAB 100 MG 100 MG: 100 TAB at 11:11

## 2023-11-13 NOTE — PLAN OF CARE
[facility] Care Plan    POC reviewed with Mara Mojica and family at 0300. Pt unable to verbalize understanding. Questions and concerns addressed. No acute events overnight. Pt progressing toward goals. Will continue to monitor. See below and flowsheets for full assessment and VS info.     - NPO since 5 am, for possible extubation today.       Is this a stroke patient? no    Neuro:  [place] Coma Scale  Best Eye Response: 4-->(E4) spontaneous  Best Motor Response: 5-->(M5) localizes pain  Best Verbal Response: 1-->(V1) none  [place] Coma Scale Score: 10  Assessment Qualifiers: patient not sedated/intubated, no eye obstruction present  Pupil PERRLA: yes     24hr Temp:  [96.1 °F (35.6 °C)-99.3 °F (37.4 °C)]     CV:   Rhythm: normal sinus rhythm  BP goals:   SBP < 180  MAP > 65    Resp:      Vent Mode: Spont  Set Rate: 14 BPM  Oxygen Concentration (%): 30  Vt Set: 420 mL  PEEP/CPAP: 5 cmH20  Pressure Support: 8 cmH20    Plan: wean to extubate    GI/:     Diet/Nutrition Received: NPO  Last Bowel Movement: 11/13/23  Voiding Characteristics: other (see comments) (urinary retention)    Intake/Output Summary (Last 24 hours) at 11/13/2023 0635  Last data filed at 11/13/2023 0600  Gross per 24 hour   Intake 1220 ml   Output 401 ml   Net 819 ml     Unmeasured Output  Urine Occurrence: 1  Stool Occurrence: 1  Emesis Occurrence: 0  Pad Count: 1    Labs/Accuchecks:  Recent Labs   Lab 11/13/23  0157   WBC 11.68   RBC 2.17*   HGB 7.1*   HCT 22.2*   PLT 29*      Recent Labs   Lab 11/13/23  0033     140   K 4.3  4.3   CO2 22*  22*   *  112*   BUN 33*  33*   CREATININE 0.5  0.5   ALKPHOS 99  99   ALT 99*  99*   *  196*   BILITOT 8.2*  8.2*      Recent Labs   Lab 11/13/23  0128   INR 1.7*      Recent Labs   Lab 11/09/23  1043   *       Electrolytes: N/A - electrolytes WDL  Accuchecks: Q4H    Gtts:   dextrose 10 % in water (D10W)         LDA/Wounds:  Lines/Drains/Airways       Drain  Duration                   NG/OG Tube 10/20/23 2000 16 Fr. Left nostril 23 days              Airway  Duration                  Airway - Non-Surgical 11/05/23 2000 7 days       Airway Anesthesia 11/05/23 7 days              Peripheral Intravenous Line  Duration                  Peripheral IV - Single Lumen 11/06/23 1910 20 G Anterior;Left Upper Arm 6 days         Peripheral IV - Single Lumen 11/09/23 1809 Left;Posterior Wrist 3 days                  Wounds: Yes  Wound care consulted: Yes

## 2023-11-13 NOTE — ACP (ADVANCE CARE PLANNING)
GOC  I engaged the significant other in a discussion about repeat trial of extubation given improvement in patient mental status. Pt has already failed extubation trial x2 previously, family and significant other aware that patient would require trach if re-intubated. Significant other stating that the longer she stays in the hospital, the more he is coming to accept that she may not recover from this event. Does not feel that patient would want a trach.     Discussed that will trial extubation to racemic epi tx followed by BiPap to maximize pt's chances of doing well with extubation. Did discuss that given GOC as stated by family (would not want trach), would not re-intubate patient if fails trial of extubation. Would start morphine/ativan for air hunger and transition pt to comfort care in that case, family in agreement with above. Code status changed to DNR per family's stated GOC.     I spent 20 minutes engaging family in a goals of care discussion regarding patient preferences. Patient unable to participate due to acute encephalopathy, intubated state.     Naomi Fournier MD, MPH  Neurocritical Care Physician

## 2023-11-13 NOTE — ASSESSMENT & PLAN NOTE
Scope complete by ENT   -patient on scheduled nebs and racemic epi without improvement in respiratory status   -elective intubation 10/31 with 6.0 ETT with anesthesia  -11/1 tube exchange to 7.0 ETT to allow suctioning, completed steroids.  11/3: continue sbt trials, extubation attempt if able to awaken further   11/4: tolerating sbt, hold TFs at midnight  11/5: has cuff leak, extubate  Reintubated for stridor   Dexamethasone x 24 h completed 11/7 11/13/2023: 10 of dexamethasone IV X1  Patient extubated with no reintubation. Please see ACP note.   Racemic-epi PRN  Bipap available   Patient made DNR

## 2023-11-13 NOTE — SUBJECTIVE & OBJECTIVE
Review of Systems  Unable to obtain a complete ROS due to level of consciousness.  Objective:     Vitals:  Temp: 98.5 °F (36.9 °C)  Pulse: 96  Rhythm: normal sinus rhythm  BP: (!) 114/56  MAP (mmHg): 81  Resp: 16  SpO2: 100 %  Oxygen Concentration (%): 30  Vent Mode: Spont  Pressure Support: 5 cmH20  PEEP/CPAP: 5 cmH20  Peak Airway Pressure: 14 cmH20  Mean Airway Pressure: 6.7 cmH20  Plateau Pressure: 0 cmH20    Temp  Min: 97 °F (36.1 °C)  Max: 99.3 °F (37.4 °C)  Pulse  Min: 73  Max: 98  BP  Min: 96/47  Max: 144/71  MAP (mmHg)  Min: 68  Max: 101  Resp  Min: 6  Max: 32  SpO2  Min: 95 %  Max: 100 %  Oxygen Concentration (%)  Min: 30  Max: 30    11/12 0701 - 11/13 0700  In: 1220   Out: 401 [Urine:400]   Unmeasured Output  Urine Occurrence: 1  Stool Occurrence: 1  Emesis Occurrence: 0  Pad Count: 2        Physical Exam  GA: comfortable, no acute distress.   HEENT: No scleral icterus or JVD.   Pulmonary: Clear to auscultation A/P/L. No wheezing, crackles, or rhonchi.  Cardiac: RRR S1 & S2 w/o rubs/murmurs/gallops.   Abdominal: Bowel sounds present x 4. No appreciable hepatosplenomegaly.  Skin: No jaundice, rashes, or visible lesions.  Neuro:  --GCS: E4 V1 M5  --Mental Status:  awake, tracks doesn't follow commands  --CN II-XII grossly intact.   --Pupils 2mm, PERRL.   --Corneal reflex, gag, cough intact.  --LUE flexion posturing   --RUE minimal spont  --BLE spont         Medications:  Continuousdextrose 10 % in water (D10W)    Scheduledalbuterol-ipratropium, 3 mL, QID WAKE  famotidine, 20 mg, BID  folic acid, 1 mg, Daily  lactulose, 30 g, BID  levetiracetam, 500 mg, BID  multivitamin, 1 tablet, Daily  rifAXImin, 550 mg, BID  thiamine, 100 mg, Daily    PRNcalcium gluconate IVPB, 1 g, PRN  calcium gluconate IVPB, 2 g, PRN  calcium gluconate IVPB, 3 g, PRN  dextrose 10 % in water (D10W), , Continuous PRN  dextrose 10%, 12.5 g, PRN  dextrose 10%, 25 g, PRN  fentaNYL, 25 mcg, Q1H PRN  glucagon (human recombinant), 1 mg,  PRN  hydrALAZINE, 10 mg, Q4H PRN  insulin aspart U-100, 0-10 Units, Q4H PRN  labetalol, 10 mg, Q6H PRN  magnesium sulfate IVPB, 2 g, PRN  magnesium sulfate IVPB, 4 g, PRN  ondansetron, 4 mg, Q8H PRN  potassium chloride, 40 mEq, PRN   And  potassium chloride, 60 mEq, PRN   And  potassium chloride, 80 mEq, PRN  racepinephrine, 0.5 mL, Q4H PRN  sodium phosphate 15 mmol in dextrose 5 % (D5W) 250 mL IVPB, 15 mmol, PRN  sodium phosphate 20.01 mmol in dextrose 5 % (D5W) 250 mL IVPB, 20.01 mmol, PRN  sodium phosphate 30 mmol in dextrose 5 % (D5W) 250 mL IVPB, 30 mmol, PRN      Today I personally reviewed pertinent medications, lines/drains/airways, imaging, cardiology results, laboratory results, microbiology results,   Diet  Diet NPO  Diet NPO

## 2023-11-13 NOTE — RESPIRATORY THERAPY
Pt extubated without parameters per physician order. Pt placed on bipap per order. Procedure tolerated well, will continue to monitor.

## 2023-11-13 NOTE — ASSESSMENT & PLAN NOTE
58 year old presented for altered mental status on 10/18. Clinical picture confounded with hepatic encephalopathy, infection, and seizure activity. Neurology was consulted by primary team prior to admission to [facility].    10/27 EEG IMPRESSION:  This is an abnormal EEG during lethargic state.  Diffuse disorganized slowing of the background was noted.  Frequent triphasic waves noted.  Left posterior pseudo periodic epileptiform discharges were noted.  Numerous electrographic seizures emanating from the left posterior region were noted.    10/31 - rehook and 11/1 read with frequent discharges, vimpat lowered to 50 given mental status and liver function, will continue to watch on eeg  vEEG discontinued    Neurochecks   Vitals q1hr  11/03: EEG remains negative with lacosamide taper, DC and monitor for seizure recurrence  11/5: remains without seizures  11/9/23: EEG slowing  Keprpa 500 mg BID

## 2023-11-13 NOTE — ASSESSMENT & PLAN NOTE
2/2 to tracheal stenosis   -patient with stridor, increased secretions, requiring 5L 28% and inability to protect airway   -intubated by anesthesia team with small ETT    Vent Mode: Spont  Oxygen Concentration (%):  [30] 30  Resp Rate Total:  [8.7 br/min-24 br/min] 24 br/min  Vt Set:  [420 mL] 420 mL  PEEP/CPAP:  [5 cmH20] 5 cmH20  Pressure Support:  [5 cmH20-8 cmH20] 5 cmH20  Mean Airway Pressure:  [6.7 cmH20-8.2 cmH20] 6.7 cmH20      Reintubated after trial extubation 2/2 stridor non responsive to med management  Passed SBT  - Tolerated 24h spontaneous again on 11/12  - Hold TF tomorrow at 5AM for possible extubation tomorrow   11/13/2023: patient extubated with no reintubation, made DNR  10mg dexamethasone IVP X1

## 2023-11-13 NOTE — PLAN OF CARE
[facility] Care Plan    POC reviewed with Mara Mojica and family at 1400. Pt intubated, unable to verbalize understanding. Questions and concerns addressed. No acute events today. Pt progressing toward goals. Will continue to monitor. See below and flowsheets for full assessment and VS info.     -Family updated at the bedside.   -full bath completed /   -Pt retaining urine, unable to void. Straight cath x1.  -NPO; on spontaneous trial. Plan to extubate tomorrow am.         Is this a stroke patient? no    Neuro:  [place] Coma Scale  Best Eye Response: 4-->(E4) spontaneous  Best Motor Response: 5-->(M5) localizes pain  Best Verbal Response: 1-->(V1) none  [place] Coma Scale Score: 10  Assessment Qualifiers: no eye obstruction present, patient intubated  Pupil PERRLA: yes     24 hr Temp:  [96.1 °F (35.6 °C)-99.9 °F (37.7 °C)]     CV:   Rhythm: normal sinus rhythm  BP goals:   SBP < 180  MAP > 65    Resp:      Vent Mode: Spont  Set Rate: 14 BPM  Oxygen Concentration (%): 30  Vt Set: 420 mL  PEEP/CPAP: 5 cmH20  Pressure Support: 8 cmH20    Plan: wean to extubate    GI/:     Diet/Nutrition Received: NPO  Last Bowel Movement: 11/11/23  Voiding Characteristics: external catheter    Intake/Output Summary (Last 24 hours) at 11/12/2023 1821  Last data filed at 11/12/2023 1801  Gross per 24 hour   Intake 895 ml   Output 700 ml   Net 195 ml     Unmeasured Output  Urine Occurrence: 1  Stool Occurrence: 1  Emesis Occurrence: 0  Pad Count: 1    Labs/Accuchecks:  Recent Labs   Lab 11/12/23 0213   WBC 14.82*   RBC 2.35*   HGB 7.6*   HCT 24.2*   PLT 30*      Recent Labs   Lab 11/12/23 0213      K 4.5   CO2 22*   *   BUN 37*   CREATININE 0.6   ALKPHOS 150*   ALT 90*   *   BILITOT 6.6*      Recent Labs   Lab 11/12/23 0213   INR 1.6*      Recent Labs   Lab 11/09/23  1043   *       Electrolytes: N/A - electrolytes WDL  Accuchecks: Q6H    Gtts:   dextrose 10 % in water (D10W)          LDA/Wounds:  Lines/Drains/Airways       Drain  Duration                  NG/OG Tube 10/20/23 2000 16 Fr. Left nostril 22 days              Airway  Duration                  Airway - Non-Surgical 11/05/23 2000 6 days       Airway Anesthesia 11/05/23 6 days              Peripheral Intravenous Line  Duration                  Peripheral IV - Single Lumen 11/06/23 1910 20 G Anterior;Left Upper Arm 5 days         Peripheral IV - Single Lumen 11/09/23 1809 Left;Posterior Wrist 3 days                  Wounds: Yes  Wound care consulted: Yes

## 2023-11-13 NOTE — PROGRESS NOTES
[facility] Neuro Critical Care  Neurocritical Care  Progress Note    Admit Date: 10/25/2023  Service Date: 11/13/2023  Length of Stay: 19    Subjective:     Chief Complaint: Seizure    History of Present Illness: This is a 58-year-old female with a past medical history of alcoholic cirrhosis, s/p ex-lap w/ bowel resection for incarcerated hernia, who initially presented on 10/18 to [facility] with acute encephalopathy.  Her  found her on the floor at home with evidence of fecal/urine incontinence with confusion and slurred speech. Possible reported tongue injury in chart. Reported concern L sided weakness and down drift of L arm however CT head without evidence of acute abnormalities, MRI brain with only small vessel vascular changes without evidence of territorial infarct.     Hospital Course: EEG done on 10/19 with mild diffuse nonspecific background slowing, no potential epileptiform activity. Repeat MRI brain with contrast on 10/23 unchanged from initial MRI. Became more lethargic and was no longer following commands or answering questions. Due to worsening hepatic encephalopathy in setting of hepatic cirrhosis, patient transferred to [facility] for management with transplant team. Has remained on antibiotics, lactulose and rifaximin for treatment of UTI with pansensitive Ecoli, HE, and presumed SBP. Neurology consulted for management recommendations regarding persistent AMS. Had repeat EEG done on 10/24 with similar findings to prior EEG showing mild generalized non-specific cerebral dysfunction and no electrographic seizures or indication of seizure tendency. Additional CT head w/o contrast on 10/25 without evidence of acute issues. Remains confused and unable to answer questions on exam. Not withdrawing to painful stimuli. Was able to awaken to verbal stimuli in AM however when reevaluated in afternoon unresponsive however was after receiving Ativan.     On admission to [facility]:  Patient had EEG running, and  was noted to have seizures at 7:30 a.m., 8:00 a.m. in, and 10:00 a.m. was noted to be in focal status prior to receiving Vimpat.  Patient was noted to have stridor, worsening secretions with suspicion for aspiration, decreased airway protection, and rapids was called due to low GCS score of 6. Antibiotics broadened to Vanc/Zosyn. Clinical picture of mental status confounded with episodes of seizures, infection, and hepatic encephalopathy.             Hospital Course: 10/28: Improving GCS from 6 to 10. Continuing pulmonary toilet and deep suctioning for stridor and copious secretions. UA positive for candida. Blood cultures from 10/27 NGTD. Sputum cultures sent. Patient on day 2 of broadened antibiotics vanc/zosyn due to worsening clinical status but will discontinue tomorrow if cultures remain negative. Still hypernatremic, treating with D5W. Patient was transferred with no ordered diuretics, very edematous on physical exam. Adequate stooling on lactulose and rifaximin. Due to increased UOP/stooling will place eckert for one day for irritated skin. EEG update showing no seizures since 10am 10/27. Monitoring CBC for thrombocytopenia and macrocytic anemia. Discussed code status and goals of care with  and best friend at bedside. Trickle feeds resumed.   10/29: No acute events overnight, EEG reportedly without further seizures for ~48 hours can disconnect once formal report is in, neuro status slowly improving as patient now tracking in room, moving extremities, responding with appropriate emotional reactions to her .  Respiratory status largely unchanged with intermittent events of large volume breaths that sound almost stridorous but without actual respiratory distress- gas remains compensated.  UOP low, diuresis resumed.  10/30/2023: d/c mucomyst nebs, add racemic epi scheduled nebs, add budesonide and dex 6 q8 hours, ammonia at 35- continue lactulose and rifaximin, abg reviewed, 40mEq of K once, ENT  consulted for stridor, continue eckert catheter in today   10/31/2023 Patient with worsening respiratory status, continued decreased mentation and increased secretions. Plan for elective intubation in controlled setting with anesthesia. Will also recheck eeg, if negative will start to wean AEDs and see if that improves patient's arousal. PLT stabilized, continue to monitor.   11/1/2023 this morning patient's 6.0 ETT exchanged for 7.0 to allow suctioning. EEG with frequent discharges, lowered vimpat to 50 mg and will follow EEG. Attempting to remove confounding factors for patients mental status. Slow drop in H/H, 1 unit ordered.   11/2/2023 NAEO, cEEG to remain in place, no seizures but is having frequent discharges in runs. Continue lower dose vimpat and 1500 keppra Eye opening this morning which is slight improvement in exam. Failed SBT   11/3/2023: no improvement with decrease in lacosamide, no re-development of seizures, if does not wake up in next 48 hrs off lacosamide or redevelops seizures, prognosis is extremely poor  11/4/2023: improved mental state this am, no seizures overnight  11/5/2023: LOC stable, has cuff leak, trial of extubation  11/06/2023 reintubated for stridor non responsive to med management overnight  11/07/2023 CTH stable. Off of levo. Completed dex (wbc 22, afebrile). CXR w L mildly increasing consolidation. Very thick secretions, added mucomyst and duonebs. Weaning keppra to 750. Pt appearing more edematous today, dc LR. Scheduled tylenol max 2g/d. Scheduled oxy 5q12.  11/8/23: EEG pending  11/9/23: EEG slowing  11/10/23: more alert today  11/11/23 pressure support trial  11/12/2023 Alert today. Not following commands. On spontaneous overnight, tolerated well. MRI c-spine overnight with spondylosis. No LP at this time. Resume TF. Hold TF tomorrow at 5AM for possible extubation tomorrow.   11/13/2023: patient made DNR, 10 dexamethsone IV one prior extubation with no intent of re-intubation,  PRN racemic epi and Bipap were initiated        Review of Systems  Unable to obtain a complete ROS due to level of consciousness.  Objective:     Vitals:  Temp: 98.5 °F (36.9 °C)  Pulse: 96  Rhythm: normal sinus rhythm  BP: (!) 114/56  MAP (mmHg): 81  Resp: 16  SpO2: 100 %  Oxygen Concentration (%): 30  Vent Mode: Spont  Pressure Support: 5 cmH20  PEEP/CPAP: 5 cmH20  Peak Airway Pressure: 14 cmH20  Mean Airway Pressure: 6.7 cmH20  Plateau Pressure: 0 cmH20    Temp  Min: 97 °F (36.1 °C)  Max: 99.3 °F (37.4 °C)  Pulse  Min: 73  Max: 98  BP  Min: 96/47  Max: 144/71  MAP (mmHg)  Min: 68  Max: 101  Resp  Min: 6  Max: 32  SpO2  Min: 95 %  Max: 100 %  Oxygen Concentration (%)  Min: 30  Max: 30    11/12 0701 - 11/13 0700  In: 1220   Out: 401 [Urine:400]   Unmeasured Output  Urine Occurrence: 1  Stool Occurrence: 1  Emesis Occurrence: 0  Pad Count: 2        Physical Exam  GA: comfortable, no acute distress.   HEENT: No scleral icterus or JVD.   Pulmonary: Clear to auscultation A/P/L. No wheezing, crackles, or rhonchi.  Cardiac: RRR S1 & S2 w/o rubs/murmurs/gallops.   Abdominal: Bowel sounds present x 4. No appreciable hepatosplenomegaly.  Skin: No jaundice, rashes, or visible lesions.  Neuro:  --GCS: E4 V1 M5  --Mental Status:  awake, tracks doesn't follow commands  --CN II-XII grossly intact.   --Pupils 2mm, PERRL.   --Corneal reflex, gag, cough intact.  --LUE flexion posturing   --RUE minimal spont  --BLE spont         Medications:  Continuousdextrose 10 % in water (D10W)    Scheduledalbuterol-ipratropium, 3 mL, QID WAKE  famotidine, 20 mg, BID  folic acid, 1 mg, Daily  lactulose, 30 g, BID  levetiracetam, 500 mg, BID  multivitamin, 1 tablet, Daily  rifAXImin, 550 mg, BID  thiamine, 100 mg, Daily    PRNcalcium gluconate IVPB, 1 g, PRN  calcium gluconate IVPB, 2 g, PRN  calcium gluconate IVPB, 3 g, PRN  dextrose 10 % in water (D10W), , Continuous PRN  dextrose 10%, 12.5 g, PRN  dextrose 10%, 25 g, PRN  fentaNYL, 25 mcg, Q1H  PRN  glucagon (human recombinant), 1 mg, PRN  hydrALAZINE, 10 mg, Q4H PRN  insulin aspart U-100, 0-10 Units, Q4H PRN  labetalol, 10 mg, Q6H PRN  magnesium sulfate IVPB, 2 g, PRN  magnesium sulfate IVPB, 4 g, PRN  ondansetron, 4 mg, Q8H PRN  potassium chloride, 40 mEq, PRN   And  potassium chloride, 60 mEq, PRN   And  potassium chloride, 80 mEq, PRN  racepinephrine, 0.5 mL, Q4H PRN  sodium phosphate 15 mmol in dextrose 5 % (D5W) 250 mL IVPB, 15 mmol, PRN  sodium phosphate 20.01 mmol in dextrose 5 % (D5W) 250 mL IVPB, 20.01 mmol, PRN  sodium phosphate 30 mmol in dextrose 5 % (D5W) 250 mL IVPB, 30 mmol, PRN      Today I personally reviewed pertinent medications, lines/drains/airways, imaging, cardiology results, laboratory results, microbiology results,   Diet  Diet NPO  Diet NPO        Assessment/Plan:     Neuro  * Seizure  58 year old presented for altered mental status on 10/18. Clinical picture confounded with hepatic encephalopathy, infection, and seizure activity. Neurology was consulted by primary team prior to admission to [facility].    10/27 EEG IMPRESSION:  This is an abnormal EEG during lethargic state.  Diffuse disorganized slowing of the background was noted.  Frequent triphasic waves noted.  Left posterior pseudo periodic epileptiform discharges were noted.  Numerous electrographic seizures emanating from the left posterior region were noted.    10/31 - rehook and 11/1 read with frequent discharges, vimpat lowered to 50 given mental status and liver function, will continue to watch on eeg  vEEG discontinued    Neurochecks   Vitals q1hr  11/03: EEG remains negative with lacosamide taper, DC and monitor for seizure recurrence  11/5: remains without seizures  11/9/23: EEG slowing  Keprpa 500 mg BID      Acute encephalopathy  See acute hepatic encephalopathy and seizures    Pulmonary  Tracheal stenosis  Scope complete by ENT   -patient on scheduled nebs and racemic epi without improvement in respiratory status    -elective intubation 10/31 with 6.0 ETT with anesthesia  -11/1 tube exchange to 7.0 ETT to allow suctioning, completed steroids.  11/3: continue sbt trials, extubation attempt if able to awaken further   11/4: tolerating sbt, hold TFs at midnight  11/5: has cuff leak, extubate  Reintubated for stridor   Dexamethasone x 24 h completed 11/7 11/13/2023: 10 of dexamethasone IV X1  Patient extubated with no reintubation. Please see ACP note.   Racemic-epi PRN  Bipap available   Patient made DNR    Hematology  Thrombocytopenia  This is a 58-year-old woman with a history of decompensated alcoholic cirrhosis.  Suspect thrombocytopenia is likely secondary to chronic alcohol use and is consumptive in nature. If trend worsens, will consider consulting Hematology.    Daily CBC, transfuse only for active bleeding  SCDs; SQH      GI  Acute hepatic encephalopathy  This is a 52 year old woman with a history of ethanol cirrhosis who presents after being found on the ground with fecal and urinary incontinence, and altered mental status. Elevated ammonia on admission.    - Continue lactulose via NG tube TID (titrate till 3-4 daily BM achieved). Continue Xifaxin.   - Avoid opiates and benzodiazepines, if able.   - IV thiamine, folate supplementation, multivitamin through NG tube.  - Keppra   -11/7: CTH stable  -MELD score ~50% survival without transplant  -Off of EEG       Decompensated alcoholic hepatic cirrhosis  Hepatology following PEth. Daily CBC, CMP & INR.   Not currently being evaluated for transplant due to clinical status  Vitamin K given for chronic coagulopathy  Continuing lactulose and rifaximin titrated to 3-4 bowel movements daily  Ammonia WNL    Other  Hypoxic respiratory failure  2/2 to tracheal stenosis   -patient with stridor, increased secretions, requiring 5L 28% and inability to protect airway   -intubated by anesthesia team with small ETT    Vent Mode: Spont  Oxygen Concentration (%):  [30] 30  Resp Rate  Total:  [8.7 br/min-24 br/min] 24 br/min  Vt Set:  [420 mL] 420 mL  PEEP/CPAP:  [5 cmH20] 5 cmH20  Pressure Support:  [5 cmH20-8 cmH20] 5 cmH20  Mean Airway Pressure:  [6.7 cmH20-8.2 cmH20] 6.7 cmH20      Reintubated after trial extubation 2/2 stridor non responsive to med management  Passed SBT  - Tolerated 24h spontaneous again on 11/12  - Hold TF tomorrow at 5AM for possible extubation tomorrow   11/13/2023: patient extubated with no reintubation, made DNR  10mg dexamethasone IVP X1            The patient is being Prophylaxed for:  Venous Thromboembolism with: Mechanical  Stress Ulcer with: H2B  Ventilator Pneumonia with: chlorhexidine oral care    Activity Orders            Diet NPO: NPO starting at 11/13 0500    Elevate HOB Elevate (30-45 degrees) Elevate HOB to 30 - 45 degrees during feeding unless otherwise stated starting at 10/28 1218    Elevate HOB to 30-45 degrees during feeding unless otherwise stated starting at 10/26 1138    Progressive Mobility Protocol (mobilize patient to their highest level of functioning at least twice daily) starting at 10/25 2000    Turn patient starting at 10/25 2000          DNR    Bambi Urrutia NP  Neurocritical Care  [facility] - Neuro Critical Care

## 2023-11-13 NOTE — PLAN OF CARE
[facility] - Neuro Critical Care  Discharge Reassessment    Primary Care Provider: Osiris Nevarez NP    Expected Discharge Date: 11/22/2023    Per MD: 11/13/2023: patient made DNR, 10 dexamethsone IV one prior extubation with no intent of re-intubation, PRN racemic epi and Bipap were initiated     Patient not medically ready for discharge.     Discharge Plan A and Plan B have been determined by review of patient's clinical status, future medical and therapeutic needs, and coverage/benefits for post-acute care in coordination with multidisciplinary team members.    Reassessment (most recent)       Discharge Reassessment - 11/13/23 1702          Discharge Reassessment    Assessment Type Discharge Planning Reassessment     Did the patient's condition or plan change since previous assessment? No     Communicated ASHELY with patient/caregiver Date not available/Unable to determine     Discharge Plan A Comfort care/withdrawal     Discharge Plan B Inpatient Hospice     DME Needed Upon Discharge  none     Transition of Care Barriers Underinsured     Why the patient remains in the hospital Requires continued medical care                     May Rosario RN, CCRN-K, [facility]  Neuro-Critical Care   [phone]

## 2023-11-14 PROBLEM — K13.70 ORAL MUCOSAL LESION: Status: RESOLVED | Noted: 2023-11-08 | Resolved: 2023-11-14

## 2023-11-14 LAB
ACANTHOCYTES BLD QL SMEAR: PRESENT
ALBUMIN SERPL BCP-MCNC: 2.2 G/DL (ref 3.5–5.2)
ALP SERPL-CCNC: 110 U/L (ref 55–135)
ALT SERPL W/O P-5'-P-CCNC: 120 U/L (ref 10–44)
ANION GAP SERPL CALC-SCNC: 9 MMOL/L (ref 8–16)
ANISOCYTOSIS BLD QL SMEAR: SLIGHT
AST SERPL-CCNC: 203 U/L (ref 10–40)
BASOPHILS # BLD AUTO: 0.02 K/UL (ref 0–0.2)
BASOPHILS NFR BLD: 0.2 % (ref 0–1.9)
BILIRUB SERPL-MCNC: 7.8 MG/DL (ref 0.1–1)
BUN SERPL-MCNC: 34 MG/DL (ref 6–20)
BURR CELLS BLD QL SMEAR: ABNORMAL
CALCIUM SERPL-MCNC: 9.6 MG/DL (ref 8.7–10.5)
CHLORIDE SERPL-SCNC: 111 MMOL/L (ref 95–110)
CO2 SERPL-SCNC: 21 MMOL/L (ref 23–29)
CREAT SERPL-MCNC: 0.5 MG/DL (ref 0.5–1.4)
DIFFERENTIAL METHOD: ABNORMAL
EOSINOPHIL # BLD AUTO: 0 K/UL (ref 0–0.5)
EOSINOPHIL NFR BLD: 0.1 % (ref 0–8)
ERYTHROCYTE [DISTWIDTH] IN BLOOD BY AUTOMATED COUNT: 19.9 % (ref 11.5–14.5)
EST. GFR  (NO RACE VARIABLE): >60 ML/MIN/1.73 M^2
GLUCOSE SERPL-MCNC: 116 MG/DL (ref 70–110)
HCT VFR BLD AUTO: 25.1 % (ref 37–48.5)
HGB BLD-MCNC: 8 G/DL (ref 12–16)
HYPOCHROMIA BLD QL SMEAR: ABNORMAL
IMM GRANULOCYTES # BLD AUTO: 0.23 K/UL (ref 0–0.04)
IMM GRANULOCYTES NFR BLD AUTO: 2.1 % (ref 0–0.5)
INR PPP: 1.7 (ref 0.8–1.2)
LYMPHOCYTES # BLD AUTO: 0.8 K/UL (ref 1–4.8)
LYMPHOCYTES NFR BLD: 7.6 % (ref 18–48)
MAGNESIUM SERPL-MCNC: 1.9 MG/DL (ref 1.6–2.6)
MCH RBC QN AUTO: 32.7 PG (ref 27–31)
MCHC RBC AUTO-ENTMCNC: 31.9 G/DL (ref 32–36)
MCV RBC AUTO: 102 FL (ref 82–98)
MONOCYTES # BLD AUTO: 0.4 K/UL (ref 0.3–1)
MONOCYTES NFR BLD: 4 % (ref 4–15)
NEUTROPHILS # BLD AUTO: 9.6 K/UL (ref 1.8–7.7)
NEUTROPHILS NFR BLD: 86 % (ref 38–73)
NRBC BLD-RTO: 0 /100 WBC
OVALOCYTES BLD QL SMEAR: ABNORMAL
PHOSPHATE SERPL-MCNC: 3.8 MG/DL (ref 2.7–4.5)
PLATELET # BLD AUTO: 36 K/UL (ref 150–450)
PLATELET BLD QL SMEAR: ABNORMAL
PMV BLD AUTO: 14.6 FL (ref 9.2–12.9)
POCT GLUCOSE: 107 MG/DL (ref 70–110)
POCT GLUCOSE: 113 MG/DL (ref 70–110)
POCT GLUCOSE: 116 MG/DL (ref 70–110)
POCT GLUCOSE: 129 MG/DL (ref 70–110)
POCT GLUCOSE: 93 MG/DL (ref 70–110)
POCT GLUCOSE: 99 MG/DL (ref 70–110)
POIKILOCYTOSIS BLD QL SMEAR: SLIGHT
POLYCHROMASIA BLD QL SMEAR: ABNORMAL
POTASSIUM SERPL-SCNC: 4.7 MMOL/L (ref 3.5–5.1)
PROT SERPL-MCNC: 5.7 G/DL (ref 6–8.4)
PROTHROMBIN TIME: 17.7 SEC (ref 9–12.5)
RBC # BLD AUTO: 2.45 M/UL (ref 4–5.4)
SCHISTOCYTES BLD QL SMEAR: ABNORMAL
SCHISTOCYTES BLD QL SMEAR: PRESENT
SODIUM SERPL-SCNC: 141 MMOL/L (ref 136–145)
SPHEROCYTES BLD QL SMEAR: ABNORMAL
WBC # BLD AUTO: 11.09 K/UL (ref 3.9–12.7)

## 2023-11-14 PROCEDURE — 84100 ASSAY OF PHOSPHORUS: CPT | Mod: NTX | Performed by: STUDENT IN AN ORGANIZED HEALTH CARE EDUCATION/TRAINING PROGRAM

## 2023-11-14 PROCEDURE — 99900035 HC TECH TIME PER 15 MIN (STAT): Mod: NTX

## 2023-11-14 PROCEDURE — 25000003 PHARM REV CODE 250: Mod: NTX

## 2023-11-14 PROCEDURE — 99291 CRITICAL CARE FIRST HOUR: CPT | Mod: NTX,,, | Performed by: PSYCHIATRY & NEUROLOGY

## 2023-11-14 PROCEDURE — 85610 PROTHROMBIN TIME: CPT | Mod: NTX | Performed by: STUDENT IN AN ORGANIZED HEALTH CARE EDUCATION/TRAINING PROGRAM

## 2023-11-14 PROCEDURE — 25000003 PHARM REV CODE 250: Mod: NTX | Performed by: PHYSICIAN ASSISTANT

## 2023-11-14 PROCEDURE — 25000242 PHARM REV CODE 250 ALT 637 W/ HCPCS: Mod: NTX | Performed by: PSYCHIATRY & NEUROLOGY

## 2023-11-14 PROCEDURE — 94640 AIRWAY INHALATION TREATMENT: CPT | Mod: NTX

## 2023-11-14 PROCEDURE — 99291 PR CRITICAL CARE, E/M 30-74 MINUTES: ICD-10-PCS | Mod: NTX,,, | Performed by: PSYCHIATRY & NEUROLOGY

## 2023-11-14 PROCEDURE — 80053 COMPREHEN METABOLIC PANEL: CPT | Mod: NTX | Performed by: HOSPITALIST

## 2023-11-14 PROCEDURE — 25000003 PHARM REV CODE 250: Mod: NTX | Performed by: HOSPITALIST

## 2023-11-14 PROCEDURE — 94668 MNPJ CHEST WALL SBSQ: CPT | Mod: NTX

## 2023-11-14 PROCEDURE — 85025 COMPLETE CBC W/AUTO DIFF WBC: CPT | Mod: NTX | Performed by: HOSPITALIST

## 2023-11-14 PROCEDURE — 94761 N-INVAS EAR/PLS OXIMETRY MLT: CPT | Mod: NTX

## 2023-11-14 PROCEDURE — 83735 ASSAY OF MAGNESIUM: CPT | Mod: NTX | Performed by: STUDENT IN AN ORGANIZED HEALTH CARE EDUCATION/TRAINING PROGRAM

## 2023-11-14 PROCEDURE — 20000000 HC ICU ROOM: Mod: NTX

## 2023-11-14 PROCEDURE — 25000003 PHARM REV CODE 250: Mod: NTX | Performed by: NURSE PRACTITIONER

## 2023-11-14 PROCEDURE — 94660 CPAP INITIATION&MGMT: CPT | Mod: NTX

## 2023-11-14 PROCEDURE — 27100171 HC OXYGEN HIGH FLOW UP TO 24 HOURS: Mod: NTX

## 2023-11-14 RX ADMIN — FOLIC ACID 1 MG: 1 TABLET ORAL at 08:11

## 2023-11-14 RX ADMIN — IPRATROPIUM BROMIDE AND ALBUTEROL SULFATE 3 ML: .5; 3 SOLUTION RESPIRATORY (INHALATION) at 07:11

## 2023-11-14 RX ADMIN — THERA TABS 1 TABLET: TAB at 08:11

## 2023-11-14 RX ADMIN — RIFAXIMIN 550 MG: 550 TABLET ORAL at 08:11

## 2023-11-14 RX ADMIN — THIAMINE HCL TAB 100 MG 100 MG: 100 TAB at 08:11

## 2023-11-14 RX ADMIN — LACTULOSE 30 G: 20 SOLUTION ORAL at 08:11

## 2023-11-14 RX ADMIN — LEVETIRACETAM 500 MG: 100 SOLUTION ORAL at 08:11

## 2023-11-14 RX ADMIN — IPRATROPIUM BROMIDE AND ALBUTEROL SULFATE 3 ML: .5; 3 SOLUTION RESPIRATORY (INHALATION) at 12:11

## 2023-11-14 NOTE — SUBJECTIVE & OBJECTIVE
Interval History:  No acute changes overnight. She tolerated BiPAP and was weaned to nasal cannula this morning. She still has minimal ability to move, with only the ability to feel minor muscle activation in the hands and feet, but no observable movements can be made. She is hemodynamically stable and due to skin breakdown and bowel movements 2/2 lactulose/rifaximin therapy she had a flexiseal placed.     Review of Systems   Unable to perform ROS: Acuity of condition       Objective:     Vitals:  Temp: 98.4 °F (36.9 °C)  Pulse: 61  Rhythm: normal sinus rhythm  BP: 116/64  MAP (mmHg): 84  Resp: (!) 9  SpO2: 100 %  Oxygen Concentration (%): 30    Temp  Min: 97.7 °F (36.5 °C)  Max: 98.4 °F (36.9 °C)  Pulse  Min: 50  Max: 89  BP  Min: 81/49  Max: 154/71  MAP (mmHg)  Min: 61  Max: 104  Resp  Min: 7  Max: 23  SpO2  Min: 97 %  Max: 100 %  Oxygen Concentration (%)  Min: 30  Max: 30    11/13 0701 - 11/14 0700  In: 800   Out: 150 [Urine:150]   Unmeasured Output  Urine Occurrence: 1  Stool Occurrence: 1  Emesis Occurrence: 0  Pad Count: 1        Physical Exam  Vitals and nursing note reviewed.   Constitutional:       Appearance: She is ill-appearing.   HENT:      Head: Normocephalic.      Right Ear: External ear normal.      Left Ear: External ear normal.      Nose: Nose normal.      Mouth/Throat:      Mouth: Mucous membranes are moist.   Eyes:      Pupils: Pupils are equal, round, and reactive to light.   Cardiovascular:      Rate and Rhythm: Normal rate and regular rhythm.      Pulses: Normal pulses.      Heart sounds: Normal heart sounds.   Pulmonary:      Comments: Intubated  Abdominal:      General: There is no distension.      Palpations: Abdomen is soft.      Tenderness: There is no abdominal tenderness.   Musculoskeletal:      Cervical back: Neck supple.   Skin:     General: Skin is warm and dry.   Neurological:      Mental Status: She is alert.      Comments: No sedation   Alert. Tracking. Followed command to open  and close eyes.  PERRL, Eyes cross midline.   No facial asymmetry  Motor:  Flaccid weakness throughout, minor muscular activation in bilateral hands and feet   Sensation intact to noxious stimuli x 4- grimaces            Medications:  Continuousdextrose 10 % in water (D10W)    Scheduledalbuterol-ipratropium, 3 mL, QID WAKE  folic acid, 1 mg, Daily  lactulose, 30 g, BID  levetiracetam, 500 mg, BID  multivitamin, 1 tablet, Daily  rifAXImin, 550 mg, BID  thiamine, 100 mg, Daily    PRNcalcium gluconate IVPB, 1 g, PRN  calcium gluconate IVPB, 2 g, PRN  calcium gluconate IVPB, 3 g, PRN  dextrose 10 % in water (D10W), , Continuous PRN  dextrose 10%, 12.5 g, PRN  dextrose 10%, 25 g, PRN  glucagon (human recombinant), 1 mg, PRN  hydrALAZINE, 10 mg, Q4H PRN  insulin aspart U-100, 0-10 Units, Q4H PRN  labetalol, 10 mg, Q6H PRN  magnesium sulfate IVPB, 2 g, PRN  magnesium sulfate IVPB, 4 g, PRN  ondansetron, 4 mg, Q8H PRN  potassium chloride, 40 mEq, PRN   And  potassium chloride, 60 mEq, PRN   And  potassium chloride, 80 mEq, PRN  racepinephrine, 0.5 mL, Q4H PRN  sodium phosphate 15 mmol in dextrose 5 % (D5W) 250 mL IVPB, 15 mmol, PRN  sodium phosphate 20.01 mmol in dextrose 5 % (D5W) 250 mL IVPB, 20.01 mmol, PRN  sodium phosphate 30 mmol in dextrose 5 % (D5W) 250 mL IVPB, 30 mmol, PRN      Today I personally reviewed pertinent medications, lines/drains/airways, laboratory results, notably:    BMP  Lab Results   Component Value Date     11/14/2023    K 4.7 11/14/2023     (H) 11/14/2023    CO2 21 (L) 11/14/2023    BUN 34 (H) 11/14/2023    CREATININE 0.5 11/14/2023    CALCIUM 9.6 11/14/2023    ANIONGAP 9 11/14/2023    EGFRNORACEVR >60.0 11/14/2023           Diet  Diet NPO  Diet NPO

## 2023-11-14 NOTE — CONSULTS
Single lumen 20G, 10CM midline placed in the right brachial vein. Needle advanced into the vessel under real time ultrasound guidance. Image recorded and saved.    Max dwell date 12/12/23 .  Lot number: [id number]

## 2023-11-14 NOTE — ASSESSMENT & PLAN NOTE
Resuming tube feeds at 10 mL/hr, hold when on BiPAP    Nutrition consulted. Most recent weight and BMI monitored-     Measurements:  Wt Readings from Last 1 Encounters:   10/26/23 56.2 kg (123 lb 14.4 oz)   Body mass index is 25.02 kg/m².    Patient has been screened and assessed by RD.    Malnutrition Type:  Context: social/environmental circumstances  Level: moderate    Malnutrition Characteristic Summary:  Subcutaneous Fat (Malnutrition): mild depletion  Muscle Mass (Malnutrition): mild depletion  Fluid Accumulation (Malnutrition): moderate    Interventions/Recommendations (treatment strategy):  1. When able, initiate TFs. Rec'd Isosource 1.5 @ 50 mL/hr to provide 1800 kcals, 82 g of protein, 917 mL fluid. 2. RD to monitor & follow-up.

## 2023-11-14 NOTE — PROGRESS NOTES
[facility]  Neurocritical Care  Progress Note    Admit Date: 10/25/2023  Service Date: 11/14/2023  Length of Stay: 20    Subjective:     Chief Complaint: Seizure    History of Present Illness: This is a 58-year-old female with a past medical history of alcoholic cirrhosis, s/p ex-lap w/ bowel resection for incarcerated hernia, who initially presented on 10/18 to [facility] with acute encephalopathy.  Her  found her on the floor at home with evidence of fecal/urine incontinence with confusion and slurred speech. Possible reported tongue injury in chart. Reported concern L sided weakness and down drift of L arm however CT head without evidence of acute abnormalities, MRI brain with only small vessel vascular changes without evidence of territorial infarct.     Hospital Course: EEG done on 10/19 with mild diffuse nonspecific background slowing, no potential epileptiform activity. Repeat MRI brain with contrast on 10/23 unchanged from initial MRI. Became more lethargic and was no longer following commands or answering questions. Due to worsening hepatic encephalopathy in setting of hepatic cirrhosis, patient transferred to [facility] for management with transplant team. Has remained on antibiotics, lactulose and rifaximin for treatment of UTI with pansensitive Ecoli, HE, and presumed SBP. Neurology consulted for management recommendations regarding persistent AMS. Had repeat EEG done on 10/24 with similar findings to prior EEG showing mild generalized non-specific cerebral dysfunction and no electrographic seizures or indication of seizure tendency. Additional CT head w/o contrast on 10/25 without evidence of acute issues. Remains confused and unable to answer questions on exam. Not withdrawing to painful stimuli. Was able to awaken to verbal stimuli in AM however when reevaluated in afternoon unresponsive however was after receiving Ativan.     On admission to [facility]:  Patient had EEG running, and  was noted to have seizures at 7:30 a.m., 8:00 a.m. in, and 10:00 a.m. was noted to be in focal status prior to receiving Vimpat.  Patient was noted to have stridor, worsening secretions with suspicion for aspiration, decreased airway protection, and rapids was called due to low GCS score of 6. Antibiotics broadened to Vanc/Zosyn. Clinical picture of mental status confounded with episodes of seizures, infection, and hepatic encephalopathy.             Hospital Course: 10/28: Improving GCS from 6 to 10. Continuing pulmonary toilet and deep suctioning for stridor and copious secretions. UA positive for candida. Blood cultures from 10/27 NGTD. Sputum cultures sent. Patient on day 2 of broadened antibiotics vanc/zosyn due to worsening clinical status but will discontinue tomorrow if cultures remain negative. Still hypernatremic, treating with D5W. Patient was transferred with no ordered diuretics, very edematous on physical exam. Adequate stooling on lactulose and rifaximin. Due to increased UOP/stooling will place eckert for one day for irritated skin. EEG update showing no seizures since 10am 10/27. Monitoring CBC for thrombocytopenia and macrocytic anemia. Discussed code status and goals of care with  and best friend at bedside. Trickle feeds resumed.   10/29: No acute events overnight, EEG reportedly without further seizures for ~48 hours can disconnect once formal report is in, neuro status slowly improving as patient now tracking in room, moving extremities, responding with appropriate emotional reactions to her .  Respiratory status largely unchanged with intermittent events of large volume breaths that sound almost stridorous but without actual respiratory distress- gas remains compensated.  UOP low, diuresis resumed.  10/30/2023: d/c mucomyst nebs, add racemic epi scheduled nebs, add budesonide and dex 6 q8 hours, ammonia at 35- continue lactulose and rifaximin, abg reviewed, 40mEq of K once, ENT  consulted for stridor, continue eckert catheter in today   10/31/2023 Patient with worsening respiratory status, continued decreased mentation and increased secretions. Plan for elective intubation in controlled setting with anesthesia. Will also recheck eeg, if negative will start to wean AEDs and see if that improves patient's arousal. PLT stabilized, continue to monitor.   11/1/2023 this morning patient's 6.0 ETT exchanged for 7.0 to allow suctioning. EEG with frequent discharges, lowered vimpat to 50 mg and will follow EEG. Attempting to remove confounding factors for patients mental status. Slow drop in H/H, 1 unit ordered.   11/2/2023 NAEO, cEEG to remain in place, no seizures but is having frequent discharges in runs. Continue lower dose vimpat and 1500 keppra Eye opening this morning which is slight improvement in exam. Failed SBT   11/3/2023: no improvement with decrease in lacosamide, no re-development of seizures, if does not wake up in next 48 hrs off lacosamide or redevelops seizures, prognosis is extremely poor  11/4/2023: improved mental state this am, no seizures overnight  11/5/2023: LOC stable, has cuff leak, trial of extubation  11/06/2023 reintubated for stridor non responsive to med management overnight  11/07/2023 CTH stable. Off of levo. Completed dex (wbc 22, afebrile). CXR w L mildly increasing consolidation. Very thick secretions, added mucomyst and duonebs. Weaning keppra to 750. Pt appearing more edematous today, dc LR. Scheduled tylenol max 2g/d. Scheduled oxy 5q12.  11/8/23: EEG pending  11/9/23: EEG slowing  11/10/23: more alert today  11/11/23 pressure support trial  11/12/2023 Alert today. Not following commands. On spontaneous overnight, tolerated well. MRI c-spine overnight with spondylosis. No LP at this time. Resume TF. Hold TF tomorrow at 5AM for possible extubation tomorrow.   11/13/2023: patient made DNR, 10 dexamethsone IV one prior extubation with no intent of re-intubation,  PRN racemic epi and Bipap were initiated  11/14/2023: Patient tolerated BiPAP overnight and continues to be more alert, and is tracking faces more consistently. She was taken off BiPAP and placed on 4L oxygen via nasal cannula. Trickle feeds were started while off BiPAP. Significant other at bedside updated on clinical course. Midline placed overnight.     Interval History:  No acute changes overnight. She tolerated BiPAP and was weaned to nasal cannula this morning. She still has minimal ability to move, with only the ability to feel minor muscle activation in the hands and feet, but no observable movements can be made. She is hemodynamically stable and due to skin breakdown and bowel movements 2/2 lactulose/rifaximin therapy she had a flexiseal placed.     Review of Systems   Unable to perform ROS: Acuity of condition       Objective:     Vitals:  Temp: 98.4 °F (36.9 °C)  Pulse: 61  Rhythm: normal sinus rhythm  BP: 116/64  MAP (mmHg): 84  Resp: (!) 9  SpO2: 100 %  Oxygen Concentration (%): 30    Temp  Min: 97.7 °F (36.5 °C)  Max: 98.4 °F (36.9 °C)  Pulse  Min: 50  Max: 89  BP  Min: 81/49  Max: 154/71  MAP (mmHg)  Min: 61  Max: 104  Resp  Min: 7  Max: 23  SpO2  Min: 97 %  Max: 100 %  Oxygen Concentration (%)  Min: 30  Max: 30    11/13 0701 - 11/14 0700  In: 800   Out: 150 [Urine:150]   Unmeasured Output  Urine Occurrence: 1  Stool Occurrence: 1  Emesis Occurrence: 0  Pad Count: 1        Physical Exam  Vitals and nursing note reviewed.   Constitutional:       Appearance: She is ill-appearing.   HENT:      Head: Normocephalic.      Right Ear: External ear normal.      Left Ear: External ear normal.      Nose: Nose normal.      Mouth/Throat:      Mouth: Mucous membranes are moist.   Eyes:      Pupils: Pupils are equal, round, and reactive to light.   Cardiovascular:      Rate and Rhythm: Normal rate and regular rhythm.      Pulses: Normal pulses.      Heart sounds: Normal heart sounds.   Pulmonary:      Comments:  Intubated  Abdominal:      General: There is no distension.      Palpations: Abdomen is soft.      Tenderness: There is no abdominal tenderness.   Musculoskeletal:      Cervical back: Neck supple.   Skin:     General: Skin is warm and dry.   Neurological:      Mental Status: She is alert.      Comments: No sedation   Alert. Tracking. Followed command to open and close eyes.  PERRL, Eyes cross midline.   No facial asymmetry  Motor:  Flaccid weakness throughout, minor muscular activation in bilateral hands and feet   Sensation intact to noxious stimuli x 4- grimaces            Medications:  Continuousdextrose 10 % in water (D10W)    Scheduledalbuterol-ipratropium, 3 mL, QID WAKE  folic acid, 1 mg, Daily  lactulose, 30 g, BID  levetiracetam, 500 mg, BID  multivitamin, 1 tablet, Daily  rifAXImin, 550 mg, BID  thiamine, 100 mg, Daily    PRNcalcium gluconate IVPB, 1 g, PRN  calcium gluconate IVPB, 2 g, PRN  calcium gluconate IVPB, 3 g, PRN  dextrose 10 % in water (D10W), , Continuous PRN  dextrose 10%, 12.5 g, PRN  dextrose 10%, 25 g, PRN  glucagon (human recombinant), 1 mg, PRN  hydrALAZINE, 10 mg, Q4H PRN  insulin aspart U-100, 0-10 Units, Q4H PRN  labetalol, 10 mg, Q6H PRN  magnesium sulfate IVPB, 2 g, PRN  magnesium sulfate IVPB, 4 g, PRN  ondansetron, 4 mg, Q8H PRN  potassium chloride, 40 mEq, PRN   And  potassium chloride, 60 mEq, PRN   And  potassium chloride, 80 mEq, PRN  racepinephrine, 0.5 mL, Q4H PRN  sodium phosphate 15 mmol in dextrose 5 % (D5W) 250 mL IVPB, 15 mmol, PRN  sodium phosphate 20.01 mmol in dextrose 5 % (D5W) 250 mL IVPB, 20.01 mmol, PRN  sodium phosphate 30 mmol in dextrose 5 % (D5W) 250 mL IVPB, 30 mmol, PRN      Today I personally reviewed pertinent medications, lines/drains/airways, laboratory results, notably:    BMP  Lab Results   Component Value Date     11/14/2023    K 4.7 11/14/2023     (H) 11/14/2023    CO2 21 (L) 11/14/2023    BUN 34 (H) 11/14/2023    CREATININE 0.5  11/14/2023    CALCIUM 9.6 11/14/2023    ANIONGAP 9 11/14/2023    EGFRNORACEVR >60.0 11/14/2023           Diet  Diet NPO  Diet NPO        Assessment/Plan:     Neuro  * Seizure  58 year old presented for altered mental status on 10/18. Clinical picture confounded with hepatic encephalopathy, infection, and seizure activity. Neurology was consulted by primary team prior to admission to [facility].    10/27 EEG IMPRESSION:  This is an abnormal EEG during lethargic state.  Diffuse disorganized slowing of the background was noted.  Frequent triphasic waves noted.  Left posterior pseudo periodic epileptiform discharges were noted.  Numerous electrographic seizures emanating from the left posterior region were noted.    10/31 - rehook  11/1 read with frequent discharges, vimpat lowered to 50 given mental status and liver function, will continue to watch on eeg vEEG discontinued  11/03: EEG restarted, remains negative with lacosamide taper, DC and monitor for seizure recurrence  11/5: remains without seizures  11/9/23: EEG slowing      Neurochecks   Vitals q1hr  Keprpa 500 mg BID      Acute encephalopathy  See acute hepatic encephalopathy and seizures    Pulmonary  Tracheal stenosis  Scope complete by ENT     Patient failed extubation twice, and the decision was made to extubate and if she failed to consider transition to comfort care (see ACP note). Patient placed on BiPAP and tolerated it well. On 11/14 she was transitioned to nasal cannula and was saturating 100% on 4L. Plan for BiPAP overnight.         Hematology  Thrombocytopenia  This is a 58-year-old woman with a history of decompensated alcoholic cirrhosis.  Suspect thrombocytopenia is likely secondary to chronic alcohol use and is consumptive in nature. If trend worsens, will consider consulting Hematology.    Daily CBC, transfuse only for active bleeding  SCDs; SQH      Endocrine  Moderate malnutrition  Resuming tube feeds at 10 mL/hr, hold when on BiPAP    Nutrition  consulted. Most recent weight and BMI monitored-     Measurements:  Wt Readings from Last 1 Encounters:   10/26/23 56.2 kg (123 lb 14.4 oz)   Body mass index is 25.02 kg/m².    Patient has been screened and assessed by RD.    Malnutrition Type:  Context: social/environmental circumstances  Level: moderate    Malnutrition Characteristic Summary:  Subcutaneous Fat (Malnutrition): mild depletion  Muscle Mass (Malnutrition): mild depletion  Fluid Accumulation (Malnutrition): moderate    Interventions/Recommendations (treatment strategy):  1. When able, initiate TFs. Rec'd Isosource 1.5 @ 50 mL/hr to provide 1800 kcals, 82 g of protein, 917 mL fluid. 2. RD to monitor & follow-up.    GI  Acute hepatic encephalopathy  This is a 52 year old woman with a history of ethanol cirrhosis who presents after being found on the ground with fecal and urinary incontinence, and altered mental status. Elevated ammonia on admission.    - Continue lactulose via NG tube TID (titrate till 3-4 daily BM achieved). Continue Xifaxin.   - Avoid opiates and benzodiazepines, if able.   - IV thiamine, folate supplementation, multivitamin through NG tube.  - [facility]   -11/7: [facility] stable  -MELD score ~50% survival without transplant  -Off of EEG       Decompensated alcoholic hepatic cirrhosis  Daily CBC, CMP & INR.   Not currently being evaluated for transplant due to clinical status  Vitamin K given for chronic coagulopathy  Continuing lactulose and rifaximin titrated to 3-4 bowel movements daily  Ammonia WNL    Other  Hypoxic respiratory failure  2/2 to tracheal stenosis   -patient with stridor, increased secretions, requiring 5L 28% and inability to protect airway   -intubated by anesthesia team with small ETT     Reintubated after trial extubation 2/2 stridor non responsive to med management  Passed SBT  - Tolerated 24h spontaneous again on 11/12  - Hold TF tomorrow at 5AM for possible extubation tomorrow   - 11/13/2023 patient extubated with no  reintubation, made DNR, 10mg dexamethasone IVP X1  - 11/14/2023 Comfortable on 4L nasal cannula      Debility  Diffuse weakness with decreased DTRs     - MRI c-spine showing spondylosis  - 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  - PT/OT when appropriate          The patient is being Prophylaxed for:  Venous Thromboembolism with: Mechanical  Stress Ulcer with: Not Applicable   Ventilator Pneumonia with: not applicable    Activity Orders            Diet NPO: NPO starting at 11/13 0500    Elevate HOB Elevate (30-45 degrees) Elevate HOB to 30 - 45 degrees during feeding unless otherwise stated starting at 10/28 1218    Elevate HOB to 30-45 degrees during feeding unless otherwise stated starting at 10/26 1138    Progressive Mobility Protocol (mobilize patient to their highest level of functioning at least twice daily) starting at 10/25 2000    Turn patient starting at 10/25 2000          DNR    Darion Torres MD  Neurocritical Care  [facility] Neuro [facility]

## 2023-11-14 NOTE — ASSESSMENT & PLAN NOTE
Daily CBC, CMP & INR.   Not currently being evaluated for transplant due to clinical status  Vitamin K given for chronic coagulopathy  Continuing lactulose and rifaximin titrated to 3-4 bowel movements daily  Ammonia WNL

## 2023-11-14 NOTE — ASSESSMENT & PLAN NOTE
Scope complete by ENT     Patient failed extubation twice, and the decision was made to extubate and if she failed to consider transition to comfort care (see ACP note). Patient placed on BiPAP and tolerated it well. On 11/14 she was transitioned to nasal cannula and was saturating 100% on 4L. Plan for BiPAP overnight.

## 2023-11-14 NOTE — ASSESSMENT & PLAN NOTE
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

## 2023-11-14 NOTE — ASSESSMENT & PLAN NOTE
58 year old presented for altered mental status on 10/18. Clinical picture confounded with hepatic encephalopathy, infection, and seizure activity. Neurology was consulted by primary team prior to admission to [facility].    10/27 EEG IMPRESSION:  This is an abnormal EEG during lethargic state.  Diffuse disorganized slowing of the background was noted.  Frequent triphasic waves noted.  Left posterior pseudo periodic epileptiform discharges were noted.  Numerous electrographic seizures emanating from the left posterior region were noted.    10/31 - rehook  11/1 read with frequent discharges, vimpat lowered to 50 given mental status and liver function, will continue to watch on eeg vEEG discontinued  11/03: EEG restarted, remains negative with lacosamide taper, DC and monitor for seizure recurrence  11/5: remains without seizures  11/9/23: EEG slowing      Neurochecks   Vitals q1hr  Keprpa 500 mg BID

## 2023-11-14 NOTE — LOPA/MORA/SWTA/AOC/AEB
[facility] ([facility])  On-Site Evaluation  [facility] Contact # [phone]        Thank you for the referral of this patient to determine suitability for organ, tissue, and eye donation.  A chart review has been conducted  11/14/2023 at 1130.  [facility] findings are as follows:        Referral Closed- Any changes in patients condition, GCS of 5 or less, discussion of withdrawing the vent, brain death exams, or family mention of donation immediately call [phone].      [facility] Representative:  Emily Abadie BS, MS           [facility] Referral Number:   [id number]

## 2023-11-14 NOTE — PLAN OF CARE
[facility] Care Plan    POC reviewed with Mara Mojica and family at 0300. Pt verbalized understanding. Questions and concerns addressed. No acute events overnight. Pt progressing toward goals. Will continue to monitor. See below and flowsheets for full assessment and VS info.     -Flexi placed for frequent loose BMs      Is this a stroke patient? no    Neuro:  [place] Coma Scale  Best Eye Response: 4-->(E4) spontaneous  Best Motor Response: 4-->(M4) withdraws from pain  Best Verbal Response: 1-->(V1) none  [place] Coma Scale Score: 9  Assessment Qualifiers: patient not sedated/intubated  Pupil PERRLA: yes     24hr Temp:  [97.7 °F (36.5 °C)-98.5 °F (36.9 °C)]     CV:   Rhythm: normal sinus rhythm  BP goals:   SBP < 180  MAP > 65    Resp:      Vent Mode: Spont  Set Rate: 14 BPM  Oxygen Concentration (%): 30  Vt Set: 420 mL  PEEP/CPAP: 5 cmH20  Pressure Support: 5 cmH20    Plan:  BIPAP    GI/:     Diet/Nutrition Received: NPO  Last Bowel Movement: 11/13/23  Voiding Characteristics: incontinence    Intake/Output Summary (Last 24 hours) at 11/14/2023 0412  Last data filed at 11/14/2023 0401  Gross per 24 hour   Intake 800 ml   Output 151 ml   Net 649 ml     Unmeasured Output  Urine Occurrence: 1  Stool Occurrence: 1  Emesis Occurrence: 0  Pad Count: 1    Labs/Accuchecks:  Recent Labs   Lab 11/14/23  0001   WBC 11.09   RBC 2.45*   HGB 8.0*   HCT 25.1*   PLT 36*      Recent Labs   Lab 11/14/23  0001      K 4.7   CO2 21*   *   BUN 34*   CREATININE 0.5   ALKPHOS 110   *   *   BILITOT 7.8*      Recent Labs   Lab 11/14/23  0001   INR 1.7*      Recent Labs   Lab 11/09/23  1043   *       Electrolytes: N/A - electrolytes WDL  Accuchecks: Q4H    Gtts:   dextrose 10 % in water (D10W)         LDA/Wounds:  Lines/Drains/Airways       Drain  Duration                  NG/OG Tube 10/20/23 2000 16 Fr. Left nostril 24 days         Rectal Tube 11/13/23 1954 fecal management system <1 day              Airway   Duration                Airway Anesthesia 11/05/23 8 days              Peripheral Intravenous Line  Duration                  Peripheral IV - Single Lumen 11/09/23 1809 Left;Posterior Wrist 4 days         Midline Catheter Insertion/Assessment  - Single Lumen 11/13/23 1840 Right brachial vein 20g x 10cm <1 day                  Wounds: Yes  Wound care consulted: Yes

## 2023-11-14 NOTE — ASSESSMENT & PLAN NOTE
2/2 to tracheal stenosis   -patient with stridor, increased secretions, requiring 5L 28% and inability to protect airway   -intubated by anesthesia team with small ETT     Reintubated after trial extubation 2/2 stridor non responsive to med management  Passed SBT  - Tolerated 24h spontaneous again on 11/12  - Hold TF tomorrow at 5AM for possible extubation tomorrow   - 11/13/2023 patient extubated with no reintubation, made DNR, 10mg dexamethasone IVP X1  - 11/14/2023 Comfortable on 4L nasal cannula

## 2023-11-14 NOTE — ASSESSMENT & PLAN NOTE
This is a 52 year old woman with a history of ethanol cirrhosis who presents after being found on the ground with fecal and urinary incontinence, and altered mental status. Elevated ammonia on admission.    - Continue lactulose via NG tube TID (titrate till 3-4 daily BM achieved). Continue Xifaxin.   - Avoid opiates and benzodiazepines, if able.   - IV thiamine, folate supplementation, multivitamin through NG tube.  - [facility]   -11/7: [facility] stable  -MELD score ~50% survival without transplant  -Off of EEG

## 2023-11-14 NOTE — PLAN OF CARE
[facility] Care Plan    POC reviewed with Mara Mojica and family at 1400. Pt verbalized understanding. Questions and concerns addressed. No acute events today. Pt progressing toward goals. Will continue to monitor. See below and flowsheets for full assessment and VS info.     -pt given 10 mg dexamethasone and then extubated; pt was placed on BIPAP immediately after and given racemic epi breathing tx. Pt tolerating well; O2 sat has remained 100% with RR 15-18 for remainder of shift  -NGT remains; TF held while pt on bipap  -pt with several BMS today   -bath completed and gown & linens changed.   -wound care completed; triad applied to skin breakdown in perineum and on buttocks  -midline placed today  -turned Q2H to prevent further skin breakdown        Is this a stroke patient? no    Neuro:  [place] Coma Scale  Best Eye Response: 4-->(E4) spontaneous  Best Motor Response: 5-->(M5) localizes pain  Best Verbal Response: 1-->(V1) none  [place]y Coma Scale Score: 10  Assessment Qualifiers: patient not sedated/intubated, no eye obstruction present  Pupil PERRLA: yes     24 hr Temp:  [98.2 °F (36.8 °C)-99.3 °F (37.4 °C)]     CV:   Rhythm: normal sinus rhythm  BP goals:   SBP < 180  MAP > 65    Resp:      Vent Mode: Spont  Set Rate: 14 BPM  Oxygen Concentration (%): 30  Vt Set: 420 mL  PEEP/CPAP: 5 cmH20  Pressure Support: 5 cmH20    Plan: N/A    GI/:     Diet/Nutrition Received: NPO  Last Bowel Movement: 11/13/23  Voiding Characteristics: incontinence    Intake/Output Summary (Last 24 hours) at 11/13/2023 1836  Last data filed at 11/13/2023 1200  Gross per 24 hour   Intake 920 ml   Output 1 ml   Net 919 ml     Unmeasured Output  Urine Occurrence: 1  Stool Occurrence: 1  Emesis Occurrence: 0  Pad Count: 2    Labs/Accuchecks:  Recent Labs   Lab 11/13/23  0157   WBC 11.68   RBC 2.17*   HGB 7.1*   HCT 22.2*   PLT 29*      Recent Labs   Lab 11/13/23  0033     140   K 4.3  4.3   CO2 22*  22*   *  112*   BUN 33*   33*   CREATININE 0.5  0.5   ALKPHOS 99  99   ALT 99*  99*   *  196*   BILITOT 8.2*  8.2*      Recent Labs   Lab 11/13/23  0128   INR 1.7*      Recent Labs   Lab 11/09/23  1043   *       Electrolytes: N/A - electrolytes WDL  Accuchecks: Q4H    Gtts:   dextrose 10 % in water (D10W)         LDA/Wounds:  Lines/Drains/Airways       Drain  Duration                  NG/OG Tube 10/20/23 2000 16 Fr. Left nostril 23 days              Airway  Duration                Airway Anesthesia 11/05/23 7 days              Peripheral Intravenous Line  Duration                  Peripheral IV - Single Lumen 11/06/23 1910 20 G Anterior;Left Upper Arm 6 days         Peripheral IV - Single Lumen 11/09/23 1809 Left;Posterior Wrist 4 days                  Wounds: Yes  Wound care consulted: Yes

## 2023-11-15 LAB
ALBUMIN SERPL BCP-MCNC: 2.3 G/DL (ref 3.5–5.2)
ALP SERPL-CCNC: 109 U/L (ref 55–135)
ALT SERPL W/O P-5'-P-CCNC: 122 U/L (ref 10–44)
ANION GAP SERPL CALC-SCNC: 10 MMOL/L (ref 8–16)
AST SERPL-CCNC: 174 U/L (ref 10–40)
BASOPHILS # BLD AUTO: 0.02 K/UL (ref 0–0.2)
BASOPHILS NFR BLD: 0.1 % (ref 0–1.9)
BILIRUB SERPL-MCNC: 6.8 MG/DL (ref 0.1–1)
BUN SERPL-MCNC: 37 MG/DL (ref 6–20)
CALCIUM SERPL-MCNC: 9.6 MG/DL (ref 8.7–10.5)
CHLORIDE SERPL-SCNC: 112 MMOL/L (ref 95–110)
CO2 SERPL-SCNC: 19 MMOL/L (ref 23–29)
CREAT SERPL-MCNC: 0.5 MG/DL (ref 0.5–1.4)
DIFFERENTIAL METHOD: ABNORMAL
EOSINOPHIL # BLD AUTO: 0 K/UL (ref 0–0.5)
EOSINOPHIL NFR BLD: 0.2 % (ref 0–8)
ERYTHROCYTE [DISTWIDTH] IN BLOOD BY AUTOMATED COUNT: 20.4 % (ref 11.5–14.5)
EST. GFR  (NO RACE VARIABLE): >60 ML/MIN/1.73 M^2
GLUCOSE SERPL-MCNC: 97 MG/DL (ref 70–110)
HCT VFR BLD AUTO: 24.7 % (ref 37–48.5)
HGB BLD-MCNC: 8.5 G/DL (ref 12–16)
IMM GRANULOCYTES # BLD AUTO: 0.23 K/UL (ref 0–0.04)
IMM GRANULOCYTES NFR BLD AUTO: 1.7 % (ref 0–0.5)
INR PPP: 1.7 (ref 0.8–1.2)
LYMPHOCYTES # BLD AUTO: 1.3 K/UL (ref 1–4.8)
LYMPHOCYTES NFR BLD: 9.5 % (ref 18–48)
MAGNESIUM SERPL-MCNC: 1.8 MG/DL (ref 1.6–2.6)
MCH RBC QN AUTO: 35 PG (ref 27–31)
MCHC RBC AUTO-ENTMCNC: 34.4 G/DL (ref 32–36)
MCV RBC AUTO: 102 FL (ref 82–98)
MONOCYTES # BLD AUTO: 1.8 K/UL (ref 0.3–1)
MONOCYTES NFR BLD: 13.3 % (ref 4–15)
NEUTROPHILS # BLD AUTO: 10.1 K/UL (ref 1.8–7.7)
NEUTROPHILS NFR BLD: 75.2 % (ref 38–73)
NRBC BLD-RTO: 0 /100 WBC
PHOSPHATE SERPL-MCNC: 3.2 MG/DL (ref 2.7–4.5)
PLATELET # BLD AUTO: 60 K/UL (ref 150–450)
PMV BLD AUTO: ABNORMAL FL (ref 9.2–12.9)
POCT GLUCOSE: 110 MG/DL (ref 70–110)
POCT GLUCOSE: 80 MG/DL (ref 70–110)
POCT GLUCOSE: 83 MG/DL (ref 70–110)
POCT GLUCOSE: 90 MG/DL (ref 70–110)
POCT GLUCOSE: 91 MG/DL (ref 70–110)
POTASSIUM SERPL-SCNC: 4.4 MMOL/L (ref 3.5–5.1)
PROT SERPL-MCNC: 5.7 G/DL (ref 6–8.4)
PROTHROMBIN TIME: 17.8 SEC (ref 9–12.5)
RBC # BLD AUTO: 2.43 M/UL (ref 4–5.4)
SODIUM SERPL-SCNC: 141 MMOL/L (ref 136–145)
WBC # BLD AUTO: 13.48 K/UL (ref 3.9–12.7)

## 2023-11-15 PROCEDURE — 85610 PROTHROMBIN TIME: CPT | Mod: NTX | Performed by: STUDENT IN AN ORGANIZED HEALTH CARE EDUCATION/TRAINING PROGRAM

## 2023-11-15 PROCEDURE — 85025 COMPLETE CBC W/AUTO DIFF WBC: CPT | Mod: NTX | Performed by: HOSPITALIST

## 2023-11-15 PROCEDURE — 99900035 HC TECH TIME PER 15 MIN (STAT): Mod: NTX

## 2023-11-15 PROCEDURE — 20000000 HC ICU ROOM: Mod: NTX

## 2023-11-15 PROCEDURE — 80053 COMPREHEN METABOLIC PANEL: CPT | Mod: NTX | Performed by: HOSPITALIST

## 2023-11-15 PROCEDURE — 27100171 HC OXYGEN HIGH FLOW UP TO 24 HOURS: Mod: NTX

## 2023-11-15 PROCEDURE — 97166 OT EVAL MOD COMPLEX 45 MIN: CPT | Mod: NTX

## 2023-11-15 PROCEDURE — 25000242 PHARM REV CODE 250 ALT 637 W/ HCPCS: Mod: NTX | Performed by: PSYCHIATRY & NEUROLOGY

## 2023-11-15 PROCEDURE — 94640 AIRWAY INHALATION TREATMENT: CPT | Mod: NTX

## 2023-11-15 PROCEDURE — 97530 THERAPEUTIC ACTIVITIES: CPT | Mod: NTX

## 2023-11-15 PROCEDURE — 99233 SBSQ HOSP IP/OBS HIGH 50: CPT | Mod: NTX,,, | Performed by: PSYCHIATRY & NEUROLOGY

## 2023-11-15 PROCEDURE — 94668 MNPJ CHEST WALL SBSQ: CPT | Mod: NTX

## 2023-11-15 PROCEDURE — 83735 ASSAY OF MAGNESIUM: CPT | Mod: NTX | Performed by: STUDENT IN AN ORGANIZED HEALTH CARE EDUCATION/TRAINING PROGRAM

## 2023-11-15 PROCEDURE — 25000003 PHARM REV CODE 250: Mod: NTX | Performed by: PHYSICIAN ASSISTANT

## 2023-11-15 PROCEDURE — 25000003 PHARM REV CODE 250: Mod: NTX

## 2023-11-15 PROCEDURE — 94761 N-INVAS EAR/PLS OXIMETRY MLT: CPT | Mod: NTX

## 2023-11-15 PROCEDURE — 99233 PR SUBSEQUENT HOSPITAL CARE,LEVL III: ICD-10-PCS | Mod: NTX,,, | Performed by: PSYCHIATRY & NEUROLOGY

## 2023-11-15 PROCEDURE — 25000003 PHARM REV CODE 250: Mod: NTX | Performed by: HOSPITALIST

## 2023-11-15 PROCEDURE — 97163 PT EVAL HIGH COMPLEX 45 MIN: CPT | Mod: NTX

## 2023-11-15 PROCEDURE — 94660 CPAP INITIATION&MGMT: CPT | Mod: NTX

## 2023-11-15 PROCEDURE — 25000003 PHARM REV CODE 250: Mod: NTX | Performed by: NURSE PRACTITIONER

## 2023-11-15 PROCEDURE — 97112 NEUROMUSCULAR REEDUCATION: CPT | Mod: NTX

## 2023-11-15 PROCEDURE — 84100 ASSAY OF PHOSPHORUS: CPT | Mod: NTX | Performed by: STUDENT IN AN ORGANIZED HEALTH CARE EDUCATION/TRAINING PROGRAM

## 2023-11-15 PROCEDURE — 99900026 HC AIRWAY MAINTENANCE (STAT): Mod: NTX

## 2023-11-15 RX ORDER — IBUPROFEN 600 MG/1
600 TABLET ORAL EVERY 8 HOURS PRN
Status: DISCONTINUED | OUTPATIENT
Start: 2023-11-15 | End: 2023-11-24

## 2023-11-15 RX ADMIN — THERA TABS 1 TABLET: TAB at 09:11

## 2023-11-15 RX ADMIN — RIFAXIMIN 550 MG: 550 TABLET ORAL at 09:11

## 2023-11-15 RX ADMIN — LACTULOSE 30 G: 20 SOLUTION ORAL at 09:11

## 2023-11-15 RX ADMIN — IPRATROPIUM BROMIDE AND ALBUTEROL SULFATE 3 ML: .5; 3 SOLUTION RESPIRATORY (INHALATION) at 08:11

## 2023-11-15 RX ADMIN — IPRATROPIUM BROMIDE AND ALBUTEROL SULFATE 3 ML: .5; 3 SOLUTION RESPIRATORY (INHALATION) at 12:11

## 2023-11-15 RX ADMIN — FOLIC ACID 1 MG: 1 TABLET ORAL at 09:11

## 2023-11-15 RX ADMIN — THIAMINE HCL TAB 100 MG 100 MG: 100 TAB at 09:11

## 2023-11-15 RX ADMIN — LEVETIRACETAM 500 MG: 100 SOLUTION ORAL at 09:11

## 2023-11-15 RX ADMIN — IBUPROFEN 600 MG: 600 TABLET, FILM COATED ORAL at 01:11

## 2023-11-15 RX ADMIN — IPRATROPIUM BROMIDE AND ALBUTEROL SULFATE 3 ML: .5; 3 SOLUTION RESPIRATORY (INHALATION) at 03:11

## 2023-11-15 RX ADMIN — SODIUM CHLORIDE 250 ML: 9 INJECTION, SOLUTION INTRAVENOUS at 03:11

## 2023-11-15 NOTE — PT/OT/SLP EVAL
Occupational Therapy  Co- Evaluation w/ PT     Name: Mara Mojica  MRN: [id number]  Admitting Diagnosis: Seizure  Recent Surgery: * No surgery found *      Recommendations:     Discharge Recommendations: Moderate Intensity Therapy    Discharge Equipment Recommendations: to be determined by next level of care   Barriers to discharge: Increased level of assist, Inaccessible home      Assessment:     Mara Mojica is a 58 y.o. female with a medical diagnosis of Seizure.  She presents with decrease functional status secondary to medical diagnosis. Performance deficits affecting function: weakness, impaired endurance, impaired self care skills, impaired functional mobility, gait instability, impaired balance, impaired cognition, decreased coordination, decreased upper extremity function, decreased lower extremity function, decreased safety awareness, decreased ROM, impaired coordination, impaired fine motor, impaired cardiopulmonary response to activity, impaired joint extensibility, impaired muscle length.  Patient limited by emotional liability this date crying throughout 90% of session. Patient unable to follow commands this date therefore mobility, strength, and ROM testing limited. Patient is therefore appropriate for acute OT services to increase patient self care performance and functional mobility. Following DC from [facility] patient should continue with a moderate intensity therapy to ensure patient safety and promote return to independence.     Rehab Prognosis: Good; patient would benefit from acute skilled OT services to address these deficits and reach maximum level of function.       Plan:     Patient to be seen 4x to address the above listed problems via    Plan of Care Expires:  12/15/23    Subjective     Chief Complaint: Patient non verbal at this time  Patient/Family Comments/goals: Increase functional status     Occupational Profile:  Living Environment: Patient lives with spouse in mobil home with 4  CLAYTON. Unknown bathroom environment.   Previous level of function: Independent   Roles and Routines: Unknown   Equipment Used at Home: None; patient owns Rolator and shower chair    Assistance upon Discharge: unknown     Patients cultural, spiritual, Sabianist conflicts given the current situation: No     Objective:     Communicated with: RN prior to session. Patient found left sidelying with HOB elevated with peripheral IV, NG tube, telemetry, pressure relief boots, bed alarm, PureWick, bowel management system, SCD upon OT entry to room     General Precautions: Standard,  aspiration, fall   Orthopedic Precautions: NA    Braces: NA    Respiratory Status: Room air    Occupational Performance:    Bed Mobility:    Patient completed Rolling/Turning to Left with  total assistance and 2 persons  Patient completed Scooting/Bridging with total assistance and 2 persons  Patient completed Supine to Sit with total assistance and 2 persons  Patient completed Sit to Supine with total assistance and 2 persons    Functional Mobility/Transfers:  Deferred due to patient current mobility status     Activities of Daily Living:  Grooming: total assistance    Upper Body Dressing: total assistance    Lower Body Dressing: total assistance      Cognitive/Visual Perceptual:  Patient is non verbal at this time. Patient following 3 commands throughout session inconsistently. Patient with R sided neglect; L and down gaze.     Physical Exam:  Sensation: Unable to assess   Upper Extremity Range of Motion: BUE PROM intact; no AROM noted  Upper Extremity Strength: BUE no AROM    Strength: BUE no AROM   Fine Motor Coordination: BUE no AROM     AMPAC 6 Click ADL:  AMPAC Total Score:      Treatment & Education:  Co-evaluation completed due to patient medical instability and to ensure patient safety. Provided education regarding role of OT, POC, & discharge recommendations.  Pt with no further questions/concerns at this time. OT provided education  on home recommendations and fall prevention in preparation for D/C.     Patient left supine with all lines intact, call button in reach, and RN notified    GOALS:   Multidisciplinary Problems       Occupational Therapy Goals          Problem: Occupational Therapy    Goal Priority Disciplines Outcome Interventions   Occupational Therapy Goal     OT, PT/OT Ongoing, Progressing    Description: Goals to be met by: 12/15/23     Patient will increase functional independence with ADLs by performing:    UE Dressing with Minimal Assistance.  LE Dressing with Moderate Assistance.  Grooming while seated with [place].  Toileting from bedside commode with Minimal Assistance for hygiene and clothing management.   Step transfer with Minimal Assistance  Toilet transfer to bedside commode with Minimal Assistance.                         History:     Past Medical History:   Diagnosis Date    Alcoholic cirrhosis of liver     Kidney failure     Unilateral inguinal hernia, without obstruction or gangrene, not specified as recurrent          Past Surgical History:   Procedure Laterality Date    ESOPHAGOGASTRODUODENOSCOPY N/A 09/21/2023    Procedure: EGD (ESOPHAGOGASTRODUODENOSCOPY);  Surgeon: Melissa Edwards MD;  Location: [facility];  Service: Endoscopy;  Laterality: N/A;    HERNIA REPAIR      TONSILLECTOMY         Time Tracking:     OT Date of Treatment:    OT Start Time:  1320  OT Stop Time:  1350  OT Total Time (min): 30 min      Billable Minutes:Evaluation 5 min   Therapeutic Activity 25 min     11/15/2023

## 2023-11-15 NOTE — PLAN OF CARE
Problem: Occupational Therapy  Goal: Occupational Therapy Goal  Description: Goals to be met by: 12/15/23     Patient will increase functional independence with ADLs by performing:    UE Dressing with Minimal Assistance.  LE Dressing with Moderate Assistance.  Grooming while seated with [place].  Toileting from bedside commode with Minimal Assistance for hygiene and clothing management.   Step transfer with Minimal Assistance  Toilet transfer to bedside commode with Minimal Assistance.    Outcome: Ongoing, Progressing

## 2023-11-15 NOTE — ASSESSMENT & PLAN NOTE
Scope complete by ENT     Patient failed extubation twice, and the decision was made to extubate and if she failed to consider transition to comfort care (see ACP note). Patient placed on BiPAP and tolerated it well. On 11/14 she was transitioned to nasal cannula and was saturating 100% on 4L. Plan for BiPAP as needed

## 2023-11-15 NOTE — PLAN OF CARE
[facility] Care Plan    POC reviewed with Mara Mojica and family at 0300. Pt unable to verbalize understanding. Questions and concerns addressed. No acute events overnight. Pt progressing toward goals. Will continue to monitor. See below and flowsheets for full assessment and VS info.     -Water flushes and tube feeds held while on BiPAP      Is this a stroke patient? no    Neuro:  Addison Coma Scale  Best Eye Response: 4-->(E4) spontaneous  Best Motor Response: 6-->(M6) obeys commands  Best Verbal Response: 1-->(V1) none  Addison Coma Scale Score: 11  Assessment Qualifiers: patient not sedated/intubated  Pupil PERRLA: yes     24hr Temp:  [97.3 °F (36.3 °C)-98.4 °F (36.9 °C)]     CV:   Rhythm: normal sinus rhythm  BP goals:   SBP < 180  MAP > 65    Resp:      Vent Mode: Spont  Set Rate: 14 BPM  Oxygen Concentration (%): 30  Vt Set: 420 mL  PEEP/CPAP: 5 cmH20  Pressure Support: 5 cmH20    Plan:  BiPAP    GI/:     Diet/Nutrition Received: NPO  Last Bowel Movement: 11/14/23  Voiding Characteristics: incontinence    Intake/Output Summary (Last 24 hours) at 11/15/2023 0431  Last data filed at 11/15/2023 0401  Gross per 24 hour   Intake 200 ml   Output 625 ml   Net -425 ml     Unmeasured Output  Urine Occurrence: 1  Stool Occurrence: 1  Emesis Occurrence: 0  Pad Count: 1    Labs/Accuchecks:  Recent Labs   Lab 11/15/23  0009   WBC 13.48*   RBC 2.43*   HGB 8.5*   HCT 24.7*   PLT 60*      Recent Labs   Lab 11/14/23  0001      K 4.7   CO2 21*   *   BUN 34*   CREATININE 0.5   ALKPHOS 110   *   *   BILITOT 7.8*      Recent Labs   Lab 11/15/23  0009   INR 1.7*      Recent Labs   Lab 11/09/23  1043   *       Electrolytes: N/A - electrolytes WDL  Accuchecks: Q4H    Gtts:   dextrose 10 % in water (D10W)         LDA/Wounds:  Lines/Drains/Airways       Drain  Duration                  NG/OG Tube 10/20/23 2000 16 Fr. Left nostril 25 days         Rectal Tube 11/13/23 1954 fecal management system 1 day               Airway  Duration                Airway Anesthesia 11/05/23 9 days              Peripheral Intravenous Line  Duration                  Peripheral IV - Single Lumen 11/09/23 1809 Left;Posterior Wrist 5 days         Midline Catheter Insertion/Assessment  - Single Lumen 11/13/23 1840 Right brachial vein 20g x 10cm 1 day                  Wounds: Yes  Wound care consulted: Yes

## 2023-11-15 NOTE — PLAN OF CARE
PT Eval complete, appropriate goals created    Problem: Physical Therapy  Goal: Physical Therapy Goal  Description: Goals to be completed by: 12/15/23    Pt will perform sup<>sit transfers w/ moderate assistance  Pt will have sufficient dynamic balance to sit EOB while performing ADLs/therex w/ moderate assistance  Pt will be able to stand up from EOB w/ moderate assistance using LRAD  Pt will ambulate 10 feet w/ moderate assistance using LRAD  Pt will be independent w/ HEP therex on BLE w/ good form and ROM   Pt will follow >50 % of single-step commands throughout treatment session   Outcome: Ongoing, Progressing

## 2023-11-15 NOTE — ASSESSMENT & PLAN NOTE
Daily CBC, CMP & INR.   Not currently being evaluated for transplant due to clinical status  Continuing lactulose and rifaximin titrated to 3-4 bowel movements daily  Ammonia WNL  Folic acid, thiamine, and multivitamin

## 2023-11-15 NOTE — ASSESSMENT & PLAN NOTE
This is a 52 year old woman with a history of ethanol cirrhosis who presents after being found on the ground with fecal and urinary incontinence, and altered mental status. Elevated ammonia on admission.    - Continue lactulose via NG tube TID (titrate till 3-4 daily BM achieved). Continue Xifaxin.   - Avoid opiates and benzodiazepines, if able.   - IV thiamine, folate supplementation, multivitamin through NG tube.  -MELD score ~50% survival without transplant

## 2023-11-15 NOTE — ASSESSMENT & PLAN NOTE
Advancing tube feeds to goal    Nutrition consulted. Most recent weight and BMI monitored-     Measurements:  Wt Readings from Last 1 Encounters:   10/26/23 56.2 kg (123 lb 14.4 oz)   Body mass index is 25.02 kg/m².    Patient has been screened and assessed by RD.    Malnutrition Type:  Context: social/environmental circumstances  Level: moderate    Malnutrition Characteristic Summary:  Subcutaneous Fat (Malnutrition): mild depletion  Muscle Mass (Malnutrition): mild depletion  Fluid Accumulation (Malnutrition): moderate    Interventions/Recommendations (treatment strategy):  1. When able, initiate TFs. Rec'd Isosource 1.5 @ 50 mL/hr to provide 1800 kcals, 82 g of protein, 917 mL fluid. 2. RD to monitor & follow-up.

## 2023-11-15 NOTE — ASSESSMENT & PLAN NOTE
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

## 2023-11-15 NOTE — PROGRESS NOTES
11/15/23 1504   Vital Signs   BP (!) (S)  79/49   MAP (mmHg) (S)  59     A.  ALEX Forman informed of above.  New orders given.  See MAR    1508 - A.  ALEX Forman and Dr Torres at bedside assessing patient.

## 2023-11-15 NOTE — ASSESSMENT & PLAN NOTE
This is a 58-year-old woman with a history of decompensated alcoholic cirrhosis.  Suspect thrombocytopenia is likely secondary to chronic alcohol use and is consumptive in nature. If trend worsens, will consider consulting Hematology.    Daily CBC, transfuse only for active bleeding  SCDs  Frequent bleeding and oozing in gums

## 2023-11-15 NOTE — PT/OT/SLP EVAL
Physical Therapy Co-Evaluation and Treatment    Patient Name: Mara Mojica   MRN: [id number]  Recent Surgery: * No surgery found *      Recommendations:     Discharge Recommendations: Moderate Intensity Therapy    Discharge Equipment Recommendations: to be determined by next level of care   Barriers to discharge: Increased level of assist, Inaccessible home, and Decreased caregiver support    Assessment:     Mara Mojica is a 58 y.o. female admitted with a medical diagnosis of Seizure. She presents with the following impairments/functional limitations: weakness, impaired endurance, impaired self care skills, impaired functional mobility, gait instability, impaired balance, impaired cognition, decreased coordination, decreased upper extremity function, decreased lower extremity function, decreased safety awareness, decreased ROM, impaired coordination, impaired fine motor, impaired cardiopulmonary response to activity, impaired joint extensibility, impaired muscle length. Pt tearful throughout session, followed 3 commands inconsistently, and w/ mild dec in ROM grossly 2/2 edema and muscle stiffness. She currently demonstrates no active muscle contractions in BLE/UE, but was able to hold her head up initially when sitting EOB.    Rehab Prognosis: Good; patient would benefit from acute skilled PT services to address these deficits and reach maximum level of function.  Recent Surgery: * No surgery found *      Plan:     During this hospitalization, patient to be seen 4 x/week to address the identified rehab impairments via therapeutic activities, therapeutic exercises, neuromuscular re-education and progress toward the following goals:    Plan of Care Expires: 12/15/23    Subjective     Chief Complaint: MAGY 2/2 AMS nonverbal   Patient/Family Comments/Goals: MAGY 2/2 AMS nonverbal   Pain/Comfort:  Location 1:  (MAGY 2/2 AMS nonverbal)  Pain Addressed 1: Reposition, Distraction, Pre-medicate for activity    Patients  cultural, spiritual, Yarsani conflicts given the current situation: no    Social History:  Living Environment: Patient lives with their spouse in a trailer, number of outside stairs: 4 .  Prior Level of Function: Prior to admission, patient was independent with ADLs.  Equipment Used at Home: none    DME owned (not currently used):  rollator; shower chair  Assistance Upon Discharge: unknown    Objective:     Communicated with RN prior to session. Patient found left sidelying with HOB elevated with peripheral IV, NG tube, telemetry, pressure relief boots, bed alarm, PureWick, bowel management system, SCD upon PT entry to room. Co-evaluation w/ OT 2/2 suspected pt complexity and requirement of assistance from 2 skilled therapists to maximize treatment potential and maintain pt safety     General Precautions: Standard, aspiration, fall, NPO   Orthopedic Precautions:N/A   Braces: N/A  Respiratory Status: Nasal cannula, flow 2 L/min    Exams:  Cognitive Exam: Patient is oriented to unable to assess, patient non-verbal, followed 3 commands throughout session, not consistent  RLE ROM: Deficits: mildly dec throughout  RLE Strength:  0/5  LLE ROM: Deficits: mildly dec throughout  LLE Strength:  0/5    Functional Mobility:  Bed Mobility:  Rolling Left:  total assistance  Rolling Right: total assistance  Scooting: total assistance of 2 persons  Supine to Sit: total assistance of 2 persons  Sit to Supine: total assistance of 2 persons  Balance:   Static Sitting: total assistance at EOB  Dynamic Sitting: total assistance at EOB    Therapeutic Activities and Exercises:  Patient educated on role of acute care PT and PT POC, safety while in hospital including calling nurse for mobility, and call light usage  Patient educated about importance of OOB mobility  BLE stretching and PROM in long sit  Sitting balance w/ verbal and tactile cueing for upright posture, head control, safety awareness, midline orientation, and core  activation  Rolling L/R in bed to reposition pt w/ wedges and pillows to offload pressure, avoid muscle contractures, and prevent skin breakdown. Pt educated on importance of pt being rotated every 2hrs for these reasons.     Patient not clear to transfer with RN/PCT, medi-chair transfer via drawsheet only.    AM-PAC 6 CLICK MOBILITY  Turning over in bed (including adjusting bedclothes, sheets and blankets)?: 1  Sitting down on and standing up from a chair with arms (e.g., wheelchair, bedside commode, etc.): 1  Moving from lying on back to sitting on the side of the bed?: 1  Moving to and from a bed to a chair (including a wheelchair)?: 1  Need to walk in hospital room?: 1  Climbing 3-5 steps with a railing?: 1  Basic Mobility Total Score: 6     Patient left HOB elevated with all lines intact, call button in reach, RN notified, and bed alarm on.    GOALS:   Multidisciplinary Problems       Physical Therapy Goals          Problem: Physical Therapy    Goal Priority Disciplines Outcome Goal Variances Interventions   Physical Therapy Goal     PT, PT/OT Ongoing, Progressing     Description: Goals to be completed by: 12/15/23    Pt will perform sup<>sit transfers w/ moderate assistance  Pt will have sufficient dynamic balance to sit EOB while performing ADLs/therex w/ moderate assistance  Pt will be able to stand up from EOB w/ moderate assistance using LRAD  Pt will ambulate 10 feet w/ moderate assistance using LRAD  Pt will be independent w/ HEP therex on BLE w/ good form and ROM   Pt will follow >50 % of single-step commands throughout treatment session                        History:     Past Medical History:   Diagnosis Date    Alcoholic cirrhosis of liver     Kidney failure     Unilateral inguinal hernia, without obstruction or gangrene, not specified as recurrent        Past Surgical History:   Procedure Laterality Date    ESOPHAGOGASTRODUODENOSCOPY N/A 09/21/2023    Procedure: EGD (ESOPHAGOGASTRODUODENOSCOPY);   Surgeon: Melissa Edwards MD;  Location: [facility];  Service: Endoscopy;  Laterality: N/A;    HERNIA REPAIR      TONSILLECTOMY         Time Tracking:     PT Received On: 11/15/23  PT Start Time: 1320  PT Stop Time: 1350  PT Total Time (min): 30 min     Billable Minutes: Evaluation 5 and Neuromuscular Re-education 25    11/15/2023

## 2023-11-15 NOTE — SUBJECTIVE & OBJECTIVE
Interval History:  No acute events overnight reported by the nursing staff.  She is noticeably more uncomfortable and tearful today.  We will try scheduled ibuprofen to see if that helps with her discomfort.  She had extremely thick secretions requiring suction when BiPAP was removed this morning.  Due to her poor dentition and loath platelet count, she has frequent bleeding in her mouth requiring frequent cleaning and oral care. Tube feeds advanced towards goal. Noted slight elevation in WBCs, CXR not showing any new consolidations.    Review of Systems   Unable to perform ROS: Acuity of condition       Objective:     Vitals:  Temp: 96.6 °F (35.9 °C)  Pulse: 84  Rhythm: normal sinus rhythm  BP: (!) 121/58  MAP (mmHg): 83  Resp: (!) 22  SpO2: 95 %  Oxygen Concentration (%): 28    Temp  Min: 96.4 °F (35.8 °C)  Max: 98.2 °F (36.8 °C)  Pulse  Min: 60  Max: 84  BP  Min: 105/61  Max: 157/65  MAP (mmHg)  Min: 74  Max: 100  Resp  Min: 9  Max: 33  SpO2  Min: 94 %  Max: 100 %  Oxygen Concentration (%)  Min: 28  Max: 30    11/14 0701 - 11/15 0700  In: 200   Out: 625 [Urine:625]   Unmeasured Output  Urine Occurrence: 1  Stool Occurrence: 1  Emesis Occurrence: 0  Pad Count: 1        Physical Exam  Vitals and nursing note reviewed.   Constitutional:       Appearance: She is ill-appearing.   HENT:      Head: Normocephalic.      Right Ear: External ear normal.      Left Ear: External ear normal.      Nose: Nose normal.      Mouth/Throat:      Mouth: Mucous membranes are moist.      Comments: Poor dentition, bleeding and oozing in mouth, and gums.   Eyes:      Pupils: Pupils are equal, round, and reactive to light.   Cardiovascular:      Rate and Rhythm: Normal rate and regular rhythm.      Pulses: Normal pulses.      Heart sounds: Normal heart sounds.   Pulmonary:      Comments: Stridors sounds, thick secretions, wet cough  Abdominal:      General: There is no distension.      Palpations: Abdomen is soft.      Tenderness: There is  no abdominal tenderness.   Musculoskeletal:      Cervical back: Neck supple.      Comments: Edematous hands bilaterally and pitting edema in posterior thighs and presacral region   Skin:     General: Skin is warm and dry.   Neurological:      Mental Status: She is alert.      Comments: No sedation   Alert. Tracking. Followed command to open and close eyes.  PERRL, Eyes cross midline.   No facial asymmetry  Motor:  Flaccid weakness throughout, minor muscular activation in bilateral hands and feet   Sensation intact to noxious stimuli x 4- grimaces            Medications:  Continuousdextrose 10 % in water (D10W)    Scheduledalbuterol-ipratropium, 3 mL, QID WAKE  folic acid, 1 mg, Daily  lactulose, 30 g, BID  levetiracetam, 500 mg, BID  multivitamin, 1 tablet, Daily  rifAXImin, 550 mg, BID  thiamine, 100 mg, Daily    PRNcalcium gluconate IVPB, 1 g, PRN  calcium gluconate IVPB, 2 g, PRN  calcium gluconate IVPB, 3 g, PRN  dextrose 10 % in water (D10W), , Continuous PRN  dextrose 10%, 12.5 g, PRN  dextrose 10%, 25 g, PRN  glucagon (human recombinant), 1 mg, PRN  hydrALAZINE, 10 mg, Q4H PRN  ibuprofen, 600 mg, Q8H PRN  insulin aspart U-100, 0-10 Units, Q4H PRN  labetalol, 10 mg, Q6H PRN  magnesium sulfate IVPB, 2 g, PRN  magnesium sulfate IVPB, 4 g, PRN  ondansetron, 4 mg, Q8H PRN  potassium chloride, 40 mEq, PRN   And  potassium chloride, 60 mEq, PRN   And  potassium chloride, 80 mEq, PRN  racepinephrine, 0.5 mL, Q4H PRN  sodium phosphate 15 mmol in dextrose 5 % (D5W) 250 mL IVPB, 15 mmol, PRN  sodium phosphate 20.01 mmol in dextrose 5 % (D5W) 250 mL IVPB, 20.01 mmol, PRN  sodium phosphate 30 mmol in dextrose 5 % (D5W) 250 mL IVPB, 30 mmol, PRN      Today I personally reviewed pertinent medications, lines/drains/airways, imaging, laboratory results, notably:  XR CHEST AP PORTABLE     CLINICAL HISTORY:  post extubation;     TECHNIQUE:  Single frontal view of the chest was performed.     COMPARISON:  Non 11/13/2023 e      FINDINGS:  NG tube remain.  The heart is not enlarged.  Ill-defined patchy airspace consolidation and atelectatic changes noted at the left lung base with slightly blunted left costophrenic angle.  Small subsegmental atelectatic changes also noted at the right lung base.  Otherwise the right lung is clear.      Diet  Diet NPO  Diet NPO

## 2023-11-15 NOTE — PROGRESS NOTES
[facility]  Neurocritical Care  Progress Note    Admit Date: 10/25/2023  Service Date: 11/15/2023  Length of Stay: 21    Subjective:     Chief Complaint: Seizure    History of Present Illness: This is a 58-year-old female with a past medical history of alcoholic cirrhosis, s/p ex-lap w/ bowel resection for incarcerated hernia, who initially presented on 10/18 to [facility] with acute encephalopathy.  Her  found her on the floor at home with evidence of fecal/urine incontinence with confusion and slurred speech. Possible reported tongue injury in chart. Reported concern L sided weakness and down drift of L arm however CT head without evidence of acute abnormalities, MRI brain with only small vessel vascular changes without evidence of territorial infarct.     Hospital Course: EEG done on 10/19 with mild diffuse nonspecific background slowing, no potential epileptiform activity. Repeat MRI brain with contrast on 10/23 unchanged from initial MRI. Became more lethargic and was no longer following commands or answering questions. Due to worsening hepatic encephalopathy in setting of hepatic cirrhosis, patient transferred to [facility] for management with transplant team. Has remained on antibiotics, lactulose and rifaximin for treatment of UTI with pansensitive Ecoli, HE, and presumed SBP. Neurology consulted for management recommendations regarding persistent AMS. Had repeat EEG done on 10/24 with similar findings to prior EEG showing mild generalized non-specific cerebral dysfunction and no electrographic seizures or indication of seizure tendency. Additional CT head w/o contrast on 10/25 without evidence of acute issues. Remains confused and unable to answer questions on exam. Not withdrawing to painful stimuli. Was able to awaken to verbal stimuli in AM however when reevaluated in afternoon unresponsive however was after receiving Ativan.     On admission to [facility]:  Patient had EEG running, and  was noted to have seizures at 7:30 a.m., 8:00 a.m. in, and 10:00 a.m. was noted to be in focal status prior to receiving Vimpat.  Patient was noted to have stridor, worsening secretions with suspicion for aspiration, decreased airway protection, and rapids was called due to low GCS score of 6. Antibiotics broadened to Vanc/Zosyn. Clinical picture of mental status confounded with episodes of seizures, infection, and hepatic encephalopathy.             Hospital Course: 10/28: Improving GCS from 6 to 10. Continuing pulmonary toilet and deep suctioning for stridor and copious secretions. UA positive for candida. Blood cultures from 10/27 NGTD. Sputum cultures sent. Patient on day 2 of broadened antibiotics vanc/zosyn due to worsening clinical status but will discontinue tomorrow if cultures remain negative. Still hypernatremic, treating with D5W. Patient was transferred with no ordered diuretics, very edematous on physical exam. Adequate stooling on lactulose and rifaximin. Due to increased UOP/stooling will place eckert for one day for irritated skin. EEG update showing no seizures since 10am 10/27. Monitoring CBC for thrombocytopenia and macrocytic anemia. Discussed code status and goals of care with  and best friend at bedside. Trickle feeds resumed.   10/29: No acute events overnight, EEG reportedly without further seizures for ~48 hours can disconnect once formal report is in, neuro status slowly improving as patient now tracking in room, moving extremities, responding with appropriate emotional reactions to her .  Respiratory status largely unchanged with intermittent events of large volume breaths that sound almost stridorous but without actual respiratory distress- gas remains compensated.  UOP low, diuresis resumed.  10/30/2023: d/c mucomyst nebs, add racemic epi scheduled nebs, add budesonide and dex 6 q8 hours, ammonia at 35- continue lactulose and rifaximin, abg reviewed, 40mEq of K once, ENT  consulted for stridor, continue eckert catheter in today   10/31/2023 Patient with worsening respiratory status, continued decreased mentation and increased secretions. Plan for elective intubation in controlled setting with anesthesia. Will also recheck eeg, if negative will start to wean AEDs and see if that improves patient's arousal. PLT stabilized, continue to monitor.   11/1/2023 this morning patient's 6.0 ETT exchanged for 7.0 to allow suctioning. EEG with frequent discharges, lowered vimpat to 50 mg and will follow EEG. Attempting to remove confounding factors for patients mental status. Slow drop in H/H, 1 unit ordered.   11/2/2023 NAEO, cEEG to remain in place, no seizures but is having frequent discharges in runs. Continue lower dose vimpat and 1500 keppra Eye opening this morning which is slight improvement in exam. Failed SBT   11/3/2023: no improvement with decrease in lacosamide, no re-development of seizures, if does not wake up in next 48 hrs off lacosamide or redevelops seizures, prognosis is extremely poor  11/4/2023: improved mental state this am, no seizures overnight  11/5/2023: LOC stable, has cuff leak, trial of extubation  11/06/2023 reintubated for stridor non responsive to med management overnight  11/07/2023 CTH stable. Off of levo. Completed dex (wbc 22, afebrile). CXR w L mildly increasing consolidation. Very thick secretions, added mucomyst and duonebs. Weaning keppra to 750. Pt appearing more edematous today, dc LR. Scheduled tylenol max 2g/d. Scheduled oxy 5q12.  11/8/23: EEG pending  11/9/23: EEG slowing  11/10/23: more alert today  11/11/23 pressure support trial  11/12/2023 Alert today. Not following commands. On spontaneous overnight, tolerated well. MRI c-spine overnight with spondylosis. No LP at this time. Resume TF. Hold TF tomorrow at 5AM for possible extubation tomorrow.   11/13/2023: patient made DNR, 10 dexamethsone IV one prior extubation with no intent of re-intubation,  PRN racemic epi and Bipap were initiated  11/14/2023: Patient tolerated BiPAP overnight and continues to be more alert, and is tracking faces more consistently. She was taken off BiPAP and placed on 4L oxygen via nasal cannula. Trickle feeds were started while off BiPAP. Significant other at bedside updated on clinical course. Midline placed overnight.   11/15/2023: Thick secretions noted when BiPAP removed this morning, starting CPT. PT/OT consulted with improving mental and respiratory status. Patient more sad and uncomfortable today.     Interval History:  No acute events overnight reported by the nursing staff.  She is noticeably more uncomfortable and tearful today.  We will try scheduled ibuprofen to see if that helps with her discomfort.  She had extremely thick secretions requiring suction when BiPAP was removed this morning.  Due to her poor dentition and loath platelet count, she has frequent bleeding in her mouth requiring frequent cleaning and oral care. Tube feeds advanced towards goal. Noted slight elevation in WBCs, CXR not showing any new consolidations.    Review of Systems   Unable to perform ROS: Acuity of condition       Objective:     Vitals:  Temp: 96.6 °F (35.9 °C)  Pulse: 84  Rhythm: normal sinus rhythm  BP: (!) 121/58  MAP (mmHg): 83  Resp: (!) 22  SpO2: 95 %  Oxygen Concentration (%): 28    Temp  Min: 96.4 °F (35.8 °C)  Max: 98.2 °F (36.8 °C)  Pulse  Min: 60  Max: 84  BP  Min: 105/61  Max: 157/65  MAP (mmHg)  Min: 74  Max: 100  Resp  Min: 9  Max: 33  SpO2  Min: 94 %  Max: 100 %  Oxygen Concentration (%)  Min: 28  Max: 30    11/14 0701 - 11/15 0700  In: 200   Out: 625 [Urine:625]   Unmeasured Output  Urine Occurrence: 1  Stool Occurrence: 1  Emesis Occurrence: 0  Pad Count: 1        Physical Exam  Vitals and nursing note reviewed.   Constitutional:       Appearance: She is ill-appearing.   HENT:      Head: Normocephalic.      Right Ear: External ear normal.      Left Ear: External ear  normal.      Nose: Nose normal.      Mouth/Throat:      Mouth: Mucous membranes are moist.      Comments: Poor dentition, bleeding and oozing in mouth, and gums.   Eyes:      Pupils: Pupils are equal, round, and reactive to light.   Cardiovascular:      Rate and Rhythm: Normal rate and regular rhythm.      Pulses: Normal pulses.      Heart sounds: Normal heart sounds.   Pulmonary:      Comments: Stridors sounds, thick secretions, wet cough  Abdominal:      General: There is no distension.      Palpations: Abdomen is soft.      Tenderness: There is no abdominal tenderness.   Musculoskeletal:      Cervical back: Neck supple.      Comments: Edematous hands bilaterally and pitting edema in posterior thighs and presacral region   Skin:     General: Skin is warm and dry.   Neurological:      Mental Status: She is alert.      Comments: No sedation   Alert. Tracking. Followed command to open and close eyes.  PERRL, Eyes cross midline.   No facial asymmetry  Motor:  Flaccid weakness throughout, minor muscular activation in bilateral hands and feet   Sensation intact to noxious stimuli x 4- grimaces            Medications:  Continuousdextrose 10 % in water (D10W)    Scheduledalbuterol-ipratropium, 3 mL, QID WAKE  folic acid, 1 mg, Daily  lactulose, 30 g, BID  levetiracetam, 500 mg, BID  multivitamin, 1 tablet, Daily  rifAXImin, 550 mg, BID  thiamine, 100 mg, Daily    PRNcalcium gluconate IVPB, 1 g, PRN  calcium gluconate IVPB, 2 g, PRN  calcium gluconate IVPB, 3 g, PRN  dextrose 10 % in water (D10W), , Continuous PRN  dextrose 10%, 12.5 g, PRN  dextrose 10%, 25 g, PRN  glucagon (human recombinant), 1 mg, PRN  hydrALAZINE, 10 mg, Q4H PRN  ibuprofen, 600 mg, Q8H PRN  insulin aspart U-100, 0-10 Units, Q4H PRN  labetalol, 10 mg, Q6H PRN  magnesium sulfate IVPB, 2 g, PRN  magnesium sulfate IVPB, 4 g, PRN  ondansetron, 4 mg, Q8H PRN  potassium chloride, 40 mEq, PRN   And  potassium chloride, 60 mEq, PRN   And  potassium chloride,  80 mEq, PRN  racepinephrine, 0.5 mL, Q4H PRN  sodium phosphate 15 mmol in dextrose 5 % (D5W) 250 mL IVPB, 15 mmol, PRN  sodium phosphate 20.01 mmol in dextrose 5 % (D5W) 250 mL IVPB, 20.01 mmol, PRN  sodium phosphate 30 mmol in dextrose 5 % (D5W) 250 mL IVPB, 30 mmol, PRN      Today I personally reviewed pertinent medications, lines/drains/airways, imaging, laboratory results, notably:  XR CHEST AP PORTABLE     CLINICAL HISTORY:  post extubation;     TECHNIQUE:  Single frontal view of the chest was performed.     COMPARISON:  Non 11/13/2023 e     FINDINGS:  NG tube remain.  The heart is not enlarged.  Ill-defined patchy airspace consolidation and atelectatic changes noted at the left lung base with slightly blunted left costophrenic angle.  Small subsegmental atelectatic changes also noted at the right lung base.  Otherwise the right lung is clear.      Diet  Diet NPO  Diet NPO        Assessment/Plan:     Neuro  * Seizure  58 year old presented for altered mental status on 10/18. Clinical picture confounded with hepatic encephalopathy, infection, and seizure activity. Neurology was consulted by primary team prior to admission to [facility].    10/27 EEG IMPRESSION:  This is an abnormal EEG during lethargic state.  Diffuse disorganized slowing of the background was noted.  Frequent triphasic waves noted.  Left posterior pseudo periodic epileptiform discharges were noted.  Numerous electrographic seizures emanating from the left posterior region were noted.    10/31 - rehook  11/1 read with frequent discharges, vimpat lowered to 50 given mental status and liver function, will continue to watch on eeg vEEG discontinued  11/03: EEG restarted, remains negative with lacosamide taper, DC and monitor for seizure recurrence  11/5: remains without seizures  11/9/23: EEG slowing  11/15: No witnessed seizures      Neurochecks   Vitals q1hr  Keppra 500 mg BID      Acute encephalopathy  See acute hepatic encephalopathy and  seizures    Pulmonary  Tracheal stenosis  Scope complete by ENT     Patient failed extubation twice, and the decision was made to extubate and if she failed to consider transition to comfort care (see ACP note). Patient placed on BiPAP and tolerated it well. On 11/14 she was transitioned to nasal cannula and was saturating 100% on 4L. Plan for BiPAP as needed         Hematology  Thrombocytopenia  This is a 58-year-old woman with a history of decompensated alcoholic cirrhosis.  Suspect thrombocytopenia is likely secondary to chronic alcohol use and is consumptive in nature. If trend worsens, will consider consulting Hematology.    Daily CBC, transfuse only for active bleeding  SCDs  Frequent bleeding and oozing in gums       Endocrine  Moderate malnutrition  Advancing tube feeds to goal    Nutrition consulted. Most recent weight and BMI monitored-     Measurements:  Wt Readings from Last 1 Encounters:   10/26/23 56.2 kg (123 lb 14.4 oz)   Body mass index is 25.02 kg/m².    Patient has been screened and assessed by RD.    Malnutrition Type:  Context: social/environmental circumstances  Level: moderate    Malnutrition Characteristic Summary:  Subcutaneous Fat (Malnutrition): mild depletion  Muscle Mass (Malnutrition): mild depletion  Fluid Accumulation (Malnutrition): moderate    Interventions/Recommendations (treatment strategy):  1. When able, initiate TFs. Rec'd Isosource 1.5 @ 50 mL/hr to provide 1800 kcals, 82 g of protein, 917 mL fluid. 2. RD to monitor & follow-up.    GI  Acute hepatic encephalopathy  This is a 52 year old woman with a history of ethanol cirrhosis who presents after being found on the ground with fecal and urinary incontinence, and altered mental status. Elevated ammonia on admission.    - Continue lactulose via NG tube TID (titrate till 3-4 daily BM achieved). Continue Xifaxin.   - Avoid opiates and benzodiazepines, if able.   - IV thiamine, folate supplementation, multivitamin through NG  tube.  -MELD score ~50% survival without transplant        Decompensated alcoholic hepatic cirrhosis  Daily CBC, CMP & INR.   Not currently being evaluated for transplant due to clinical status  Continuing lactulose and rifaximin titrated to 3-4 bowel movements daily  Ammonia WNL  Folic acid, thiamine, and multivitamin    Other  Hypoxic respiratory failure  2/2 to tracheal stenosis   -patient with stridor, increased secretions, requiring 5L 28% and inability to protect airway   -intubated by anesthesia team with small ETT     Reintubated after trial extubation 2/2 stridor non responsive to med management  Passed SBT  - Tolerated 24h spontaneous again on 11/12  - Hold TF tomorrow at 5AM for possible extubation tomorrow   - 11/13/2023 patient extubated with no reintubation, made DNR, 10mg dexamethasone IVP X1  - 11/14/2023 Comfortable on 4L nasal cannula  - Plan for BiPAP as needed, but tolerating nasal cannula well. 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  - PT/OT consulted 11/15          The patient is being Prophylaxed for:  Venous Thromboembolism with: Mechanical  Stress Ulcer with: Not Applicable   Ventilator Pneumonia with: not applicable    Activity Orders            Diet NPO: NPO starting at 11/13 0500    Elevate HOB Elevate (30-45 degrees) Elevate HOB to 30 - 45 degrees during feeding unless otherwise stated starting at 10/28 1218    Elevate HOB to 30-45 degrees during feeding unless otherwise stated starting at 10/26 1138    Progressive Mobility Protocol (mobilize patient to their highest level of functioning at least twice daily) starting at 10/25 2000    Turn patient starting at 10/25 2000          DNR    Darion Torres MD  Neurocritical Care  [facility] Neuro [facility]

## 2023-11-15 NOTE — ASSESSMENT & PLAN NOTE
2/2 to tracheal stenosis   -patient with stridor, increased secretions, requiring 5L 28% and inability to protect airway   -intubated by anesthesia team with small ETT     Reintubated after trial extubation 2/2 stridor non responsive to med management  Passed SBT  - Tolerated 24h spontaneous again on 11/12  - Hold TF tomorrow at 5AM for possible extubation tomorrow   - 11/13/2023 patient extubated with no reintubation, made DNR, 10mg dexamethasone IVP X1  - 11/14/2023 Comfortable on 4L nasal cannula  - Plan for BiPAP as needed, but tolerating nasal cannula well. Add CPT to duonebs with observed thick secretions

## 2023-11-15 NOTE — ASSESSMENT & PLAN NOTE
58 year old presented for altered mental status on 10/18. Clinical picture confounded with hepatic encephalopathy, infection, and seizure activity. Neurology was consulted by primary team prior to admission to [facility].    10/27 EEG IMPRESSION:  This is an abnormal EEG during lethargic state.  Diffuse disorganized slowing of the background was noted.  Frequent triphasic waves noted.  Left posterior pseudo periodic epileptiform discharges were noted.  Numerous electrographic seizures emanating from the left posterior region were noted.    10/31 - rehook  11/1 read with frequent discharges, vimpat lowered to 50 given mental status and liver function, will continue to watch on eeg vEEG discontinued  11/03: EEG restarted, remains negative with lacosamide taper, DC and monitor for seizure recurrence  11/5: remains without seizures  11/9/23: EEG slowing  11/15: No witnessed seizures      Neurochecks   Vitals q1hr  Keppra 500 mg BID

## 2023-11-15 NOTE — PROGRESS NOTES
[facility] - Neuro Critical Care  Wound Care    Patient Name:  Mara Mojica   MRN:  [id number]  Date: 11/15/2023  Diagnosis: Seizure    History:     Past Medical History:   Diagnosis Date    Alcoholic cirrhosis of liver     Kidney failure     Unilateral inguinal hernia, without obstruction or gangrene, not specified as recurrent        Social History     Socioeconomic History    Marital status:    Tobacco Use    Smoking status: Every Day     Current packs/day: 0.50     Average packs/day: 0.5 packs/day for 25.1 years (12.5 ttl pk-yrs)     Types: Cigarettes     Start date: 10/18/1998     Passive exposure: Never    Smokeless tobacco: Never   Substance and Sexual Activity    Alcohol use: Not Currently    Drug use: Never     Social Determinants of Health     Financial Resource Strain: Low Risk  (10/25/2023)    Overall Financial Resource Strain (CARDIA)     Difficulty of Paying Living Expenses: Not hard at all   Food Insecurity: No Food Insecurity (10/25/2023)    Hunger Vital Sign     Worried About Running Out of Food in the Last Year: Never true     Ran Out of Food in the Last Year: Never true   Transportation Needs: No Transportation Needs (10/25/2023)    PRAPARE - Transportation     Lack of Transportation (Medical): No     Lack of Transportation (Non-Medical): No   Physical Activity: Insufficiently Active (10/25/2023)    Exercise Vital Sign     Days of Exercise per Week: 4 days     Minutes of Exercise per Session: 20 min   Stress: Stress Concern Present (10/25/2023)    [employer] [place] [employer] - Occupational Stress Questionnaire     Feeling of Stress : Rather much   Social Connections: Moderately Isolated (10/25/2023)    Social Connection and Isolation Panel [NHANES]     Frequency of Communication with Friends and Family: Three times a week     Frequency of Social Gatherings with Friends and Family: Twice a week     Attends [employer] Services: Never     Active Member of Clubs or Organizations:  No     Attends Club or Organization Meetings: Never     Marital Status:    Housing Stability: Unknown (10/25/2023)    Housing Stability Vital Sign     Unable to Pay for Housing in the Last Year: No     Unstable Housing in the Last Year: No       Precautions:     Allergies as of 10/23/2023    (No Known Allergies)       [facility] Assessment Details/Treatment     Patient seen for wound care follow up for perineum.   Reviewed chart for this encounter.   See Flow Sheet for findings.     Pt found lying in bed, OK for care at this time. Pt turned into lateral position for perineal assessment. Perineum/perirectal region remains moist w/ scattered and irregular partial thickness skin loss r/t incontinence, improving w/ rectal tube use - Triad applied.      RECOMMENDATIONS:  Continue Triad BID/PRN for perineal region     Discussed POC with patient and primary nurse.   See EMR for orders & patient education.     Nursing to continue care & monitoring.  Nursing to maintain pressure injury prevention interventions.      11/15/23 1121   WOCN Assessment   WOCN Total Time (mins) 15   Visit Date 11/15/23   Visit Time 1121   Consult Type Follow Up   WOCN Speciality Wound   Intervention assessed;applied;chart review;coordination of care   Teaching on-going        Altered Skin Integrity 11/07/23 2013 Perineum   Date First Assessed/Time First Assessed: 11/07/23 2013   Altered Skin Integrity Present on Admission - Did Patient arrive to the hospital with altered skin?: yes  Location: Perineum   Wound Image    Description of Altered Skin Integrity Partial thickness tissue loss. Shallow open ulcer with a red or pink wound bed, without slough. Intact or Open/Ruptured Serum-filled blister.   Dressing Appearance Open to air   Drainage Amount None   Drainage Characteristics/Odor No odor   Appearance Pink;Red;Moist   Tissue loss description Partial thickness   Periwound Area Excoriated;Moist;Pink   Wound Edges Irregular   Care Cleansed  with:;Soap and water;Applied:;Skin Barrier

## 2023-11-16 LAB
ABO + RH BLD: ABNORMAL
ALBUMIN SERPL BCP-MCNC: 2 G/DL (ref 3.5–5.2)
ALP SERPL-CCNC: 113 U/L (ref 55–135)
ALT SERPL W/O P-5'-P-CCNC: 121 U/L (ref 10–44)
ANION GAP SERPL CALC-SCNC: 7 MMOL/L (ref 8–16)
AST SERPL-CCNC: 142 U/L (ref 10–40)
BASOPHILS # BLD AUTO: 0.02 K/UL (ref 0–0.2)
BASOPHILS NFR BLD: 0.1 % (ref 0–1.9)
BILIRUB SERPL-MCNC: 5.8 MG/DL (ref 0.1–1)
BLD GP AB SCN CELLS X3 SERPL QL: ABNORMAL
BLOOD GROUP ANTIBODIES SERPL: NORMAL
BUN SERPL-MCNC: 32 MG/DL (ref 6–20)
CA-I BLDV-SCNC: 1.34 MMOL/L (ref 1.06–1.42)
CALCIUM SERPL-MCNC: 8.8 MG/DL (ref 8.7–10.5)
CHLORIDE SERPL-SCNC: 111 MMOL/L (ref 95–110)
CO2 SERPL-SCNC: 22 MMOL/L (ref 23–29)
CREAT SERPL-MCNC: 0.5 MG/DL (ref 0.5–1.4)
DIFFERENTIAL METHOD: ABNORMAL
EOSINOPHIL # BLD AUTO: 0.1 K/UL (ref 0–0.5)
EOSINOPHIL NFR BLD: 0.9 % (ref 0–8)
ERYTHROCYTE [DISTWIDTH] IN BLOOD BY AUTOMATED COUNT: 20.6 % (ref 11.5–14.5)
EST. GFR  (NO RACE VARIABLE): >60 ML/MIN/1.73 M^2
GLUCOSE SERPL-MCNC: 96 MG/DL (ref 70–110)
HCT VFR BLD AUTO: 21.8 % (ref 37–48.5)
HGB BLD-MCNC: 7.3 G/DL (ref 12–16)
IMM GRANULOCYTES # BLD AUTO: 0.32 K/UL (ref 0–0.04)
IMM GRANULOCYTES NFR BLD AUTO: 2.2 % (ref 0–0.5)
INR PPP: 1.9 (ref 0.8–1.2)
LYMPHOCYTES # BLD AUTO: 1.6 K/UL (ref 1–4.8)
LYMPHOCYTES NFR BLD: 10.5 % (ref 18–48)
MAGNESIUM SERPL-MCNC: 1.6 MG/DL (ref 1.6–2.6)
MCH RBC QN AUTO: 33.3 PG (ref 27–31)
MCHC RBC AUTO-ENTMCNC: 33.5 G/DL (ref 32–36)
MCV RBC AUTO: 100 FL (ref 82–98)
MONOCYTES # BLD AUTO: 1.4 K/UL (ref 0.3–1)
MONOCYTES NFR BLD: 9.5 % (ref 4–15)
NEUTROPHILS # BLD AUTO: 11.4 K/UL (ref 1.8–7.7)
NEUTROPHILS NFR BLD: 76.8 % (ref 38–73)
NRBC BLD-RTO: 0 /100 WBC
PHOSPHATE SERPL-MCNC: 2.6 MG/DL (ref 2.7–4.5)
PLATELET # BLD AUTO: 52 K/UL (ref 150–450)
PMV BLD AUTO: 13.4 FL (ref 9.2–12.9)
POCT GLUCOSE: 100 MG/DL (ref 70–110)
POCT GLUCOSE: 105 MG/DL (ref 70–110)
POCT GLUCOSE: 96 MG/DL (ref 70–110)
POTASSIUM SERPL-SCNC: 3.9 MMOL/L (ref 3.5–5.1)
PROT SERPL-MCNC: 4.9 G/DL (ref 6–8.4)
PROTHROMBIN TIME: 19.3 SEC (ref 9–12.5)
RBC # BLD AUTO: 2.19 M/UL (ref 4–5.4)
SODIUM SERPL-SCNC: 140 MMOL/L (ref 136–145)
SPECIMEN OUTDATE: ABNORMAL
WBC # BLD AUTO: 14.79 K/UL (ref 3.9–12.7)

## 2023-11-16 PROCEDURE — 99900035 HC TECH TIME PER 15 MIN (STAT): Mod: NTX

## 2023-11-16 PROCEDURE — 97112 NEUROMUSCULAR REEDUCATION: CPT | Mod: NTX

## 2023-11-16 PROCEDURE — 99900026 HC AIRWAY MAINTENANCE (STAT): Mod: NTX

## 2023-11-16 PROCEDURE — 97110 THERAPEUTIC EXERCISES: CPT | Mod: NTX

## 2023-11-16 PROCEDURE — 20000000 HC ICU ROOM: Mod: NTX

## 2023-11-16 PROCEDURE — 99233 SBSQ HOSP IP/OBS HIGH 50: CPT | Mod: NTX,,, | Performed by: PSYCHIATRY & NEUROLOGY

## 2023-11-16 PROCEDURE — 84100 ASSAY OF PHOSPHORUS: CPT | Mod: NTX | Performed by: STUDENT IN AN ORGANIZED HEALTH CARE EDUCATION/TRAINING PROGRAM

## 2023-11-16 PROCEDURE — 99233 PR SUBSEQUENT HOSPITAL CARE,LEVL III: ICD-10-PCS | Mod: NTX,,, | Performed by: PSYCHIATRY & NEUROLOGY

## 2023-11-16 PROCEDURE — 25000242 PHARM REV CODE 250 ALT 637 W/ HCPCS: Mod: NTX | Performed by: PSYCHIATRY & NEUROLOGY

## 2023-11-16 PROCEDURE — 86902 BLOOD TYPE ANTIGEN DONOR EA: CPT | Mod: NTX | Performed by: PSYCHIATRY & NEUROLOGY

## 2023-11-16 PROCEDURE — 25000003 PHARM REV CODE 250: Mod: NTX

## 2023-11-16 PROCEDURE — 97530 THERAPEUTIC ACTIVITIES: CPT | Mod: NTX

## 2023-11-16 PROCEDURE — 83735 ASSAY OF MAGNESIUM: CPT | Mod: NTX | Performed by: STUDENT IN AN ORGANIZED HEALTH CARE EDUCATION/TRAINING PROGRAM

## 2023-11-16 PROCEDURE — 82330 ASSAY OF CALCIUM: CPT | Mod: NTX | Performed by: PSYCHIATRY & NEUROLOGY

## 2023-11-16 PROCEDURE — 80053 COMPREHEN METABOLIC PANEL: CPT | Mod: NTX | Performed by: HOSPITALIST

## 2023-11-16 PROCEDURE — 85610 PROTHROMBIN TIME: CPT | Mod: NTX | Performed by: STUDENT IN AN ORGANIZED HEALTH CARE EDUCATION/TRAINING PROGRAM

## 2023-11-16 PROCEDURE — 27000221 HC OXYGEN, UP TO 24 HOURS: Mod: NTX

## 2023-11-16 PROCEDURE — 94668 MNPJ CHEST WALL SBSQ: CPT | Mod: NTX

## 2023-11-16 PROCEDURE — 86870 RBC ANTIBODY IDENTIFICATION: CPT | Mod: NTX | Performed by: PSYCHIATRY & NEUROLOGY

## 2023-11-16 PROCEDURE — 51701 INSERT BLADDER CATHETER: CPT | Mod: NTX

## 2023-11-16 PROCEDURE — 25000003 PHARM REV CODE 250: Mod: NTX | Performed by: HOSPITALIST

## 2023-11-16 PROCEDURE — 63600175 PHARM REV CODE 636 W HCPCS: Mod: NTX | Performed by: NURSE PRACTITIONER

## 2023-11-16 PROCEDURE — 25000003 PHARM REV CODE 250: Mod: NTX | Performed by: NURSE PRACTITIONER

## 2023-11-16 PROCEDURE — 51798 US URINE CAPACITY MEASURE: CPT | Mod: NTX

## 2023-11-16 PROCEDURE — 86850 RBC ANTIBODY SCREEN: CPT | Mod: NTX | Performed by: PSYCHIATRY & NEUROLOGY

## 2023-11-16 PROCEDURE — 85025 COMPLETE CBC W/AUTO DIFF WBC: CPT | Mod: NTX | Performed by: HOSPITALIST

## 2023-11-16 PROCEDURE — 94761 N-INVAS EAR/PLS OXIMETRY MLT: CPT | Mod: NTX

## 2023-11-16 PROCEDURE — 94640 AIRWAY INHALATION TREATMENT: CPT | Mod: NTX

## 2023-11-16 PROCEDURE — 25000003 PHARM REV CODE 250: Mod: NTX | Performed by: PHYSICIAN ASSISTANT

## 2023-11-16 RX ORDER — FAMOTIDINE 20 MG/1
20 TABLET, FILM COATED ORAL 2 TIMES DAILY
Status: DISCONTINUED | OUTPATIENT
Start: 2023-11-16 | End: 2023-11-25

## 2023-11-16 RX ORDER — IPRATROPIUM BROMIDE AND ALBUTEROL SULFATE 2.5; .5 MG/3ML; MG/3ML
3 SOLUTION RESPIRATORY (INHALATION) 4 TIMES DAILY PRN
Status: DISCONTINUED | OUTPATIENT
Start: 2023-11-16 | End: 2023-11-20

## 2023-11-16 RX ADMIN — LACTULOSE 30 G: 20 SOLUTION ORAL at 08:11

## 2023-11-16 RX ADMIN — RIFAXIMIN 550 MG: 550 TABLET ORAL at 08:11

## 2023-11-16 RX ADMIN — LEVETIRACETAM 500 MG: 100 SOLUTION ORAL at 08:11

## 2023-11-16 RX ADMIN — POTASSIUM CHLORIDE 40 MEQ: 7.46 INJECTION, SOLUTION INTRAVENOUS at 04:11

## 2023-11-16 RX ADMIN — FAMOTIDINE 20 MG: 20 TABLET ORAL at 08:11

## 2023-11-16 RX ADMIN — THIAMINE HCL TAB 100 MG 100 MG: 100 TAB at 08:11

## 2023-11-16 RX ADMIN — SODIUM PHOSPHATE, MONOBASIC, MONOHYDRATE AND SODIUM PHOSPHATE, DIBASIC, ANHYDROUS 15 MMOL: 142; 276 INJECTION, SOLUTION INTRAVENOUS at 06:11

## 2023-11-16 RX ADMIN — FOLIC ACID 1 MG: 1 TABLET ORAL at 08:11

## 2023-11-16 RX ADMIN — THERA TABS 1 TABLET: TAB at 08:11

## 2023-11-16 RX ADMIN — IPRATROPIUM BROMIDE AND ALBUTEROL SULFATE 3 ML: .5; 3 SOLUTION RESPIRATORY (INHALATION) at 08:11

## 2023-11-16 RX ADMIN — MAGNESIUM SULFATE HEPTAHYDRATE 2 G: 40 INJECTION, SOLUTION INTRAVENOUS at 04:11

## 2023-11-16 NOTE — PT/OT/SLP PROGRESS
Physical Therapy Co-Treatment    Patient Name:  Mara Mojica   MRN:  [id number]    Recommendations:     Discharge Recommendations: Moderate Intensity Therapy  Discharge Equipment Recommendations: to be determined by next level of care  Barriers to discharge: Inaccessible home    Assessment:     Mara Mojica is a 58 y.o. female admitted with a medical diagnosis of Seizure.  She presents with the following impairments/functional limitations: weakness, impaired endurance, impaired self care skills, impaired functional mobility, impaired balance, gait instability, visual deficits, impaired cognition, decreased coordination, decreased upper extremity function, decreased lower extremity function, pain, decreased safety awareness, abnormal tone, impaired coordination, decreased ROM, impaired fine motor, edema, impaired muscle length, impaired cardiopulmonary response to activity. Pt w/ less crying during today's session, and able to wiggle toes on R foot 3x but not consistently. Still requiring significant assistance for functional mobility and w/ L downward gaze throughout session.    Rehab Prognosis: Fair; patient would benefit from acute skilled PT services to address these deficits and reach maximum level of function.    Recent Surgery: * No surgery found *      Plan:     During this hospitalization, patient to be seen 4 x/week to address the identified rehab impairments via therapeutic exercises, neuromuscular re-education, therapeutic activities and progress toward the following goals:    Plan of Care Expires:  12/15/23    Subjective     Chief Complaint: NA 2/2 AMS  Patient/Family Comments/goals: NA 2/2 AMS  Pain/Comfort:  Pain Rating 1: 0/10  Pain Rating Post-Intervention 1: 0/10      Objective:     Communicated with RN prior to session.  Patient found HOB elevated with peripheral IV, NG tube, telemetry, pressure relief boots, bed alarm, PureWick, bowel management system, SCD upon PT entry to room. Co-tx w/ OT  2/2 suspected pt complexity and requirement of 2 skilled therapists to assist in order to maximize pt treatment     General Precautions: Standard, aspiration, NPO, fall  Orthopedic Precautions: N/A  Braces: N/A  Respiratory Status: Nasal cannula, flow 2 L/min     Functional Mobility:  Bed Mobility:     Scooting: total assistance and of 2 persons  Supine to Sit: total assistance and of 2 persons  Sit to Supine: total assistance and of 2 persons  Balance: EOB sitting balance total A      AM-PAC 6 CLICK MOBILITY  Turning over in bed (including adjusting bedclothes, sheets and blankets)?: 1  Sitting down on and standing up from a chair with arms (e.g., wheelchair, bedside commode, etc.): 1  Moving from lying on back to sitting on the side of the bed?: 1  Moving to and from a bed to a chair (including a wheelchair)?: 1  Need to walk in hospital room?: 1  Climbing 3-5 steps with a railing?: 1  Basic Mobility Total Score: 6       Treatment & Education:  Pt educated on PT POC/goals, d/c recs, and continued treatment. All questions answered and pt in agreement w/ POC.   PROM and stretching to BLE in sitting at EOB, w/ WB applied through BLE for sensory input  Sitting balance w/ cueing and assistance for muscle activation, attention to task, postural control, and midline orientation  Rolling L/R in bed to reposition pt w/ wedges and pillows to offload pressure, avoid muscle contractures, and prevent skin breakdown. Pt educated on importance of pt being rotated every 2hrs for these reasons.     Patient left HOB elevated with all lines intact, call button in reach, bed alarm on, and RN present..    GOALS:   Multidisciplinary Problems       Physical Therapy Goals          Problem: Physical Therapy    Goal Priority Disciplines Outcome Goal Variances Interventions   Physical Therapy Goal     PT, PT/OT Ongoing, Progressing     Description: Goals to be completed by: 12/15/23    Pt will perform sup<>sit transfers w/ moderate  assistance  Pt will have sufficient dynamic balance to sit EOB while performing ADLs/therex w/ moderate assistance  Pt will be able to stand up from EOB w/ moderate assistance using LRAD  Pt will ambulate 10 feet w/ moderate assistance using LRAD  Pt will be independent w/ HEP therex on BLE w/ good form and ROM   Pt will follow >50 % of single-step commands throughout treatment session                        Time Tracking:     PT Received On: 11/16/23  PT Start Time: 0904     PT Stop Time: 0934  PT Total Time (min): 30 min     Billable Minutes: Therapeutic Exercise 15 and Neuromuscular Re-education 15    Treatment Type: Treatment  PT/PTA: PT     Number of PTA visits since last PT visit: 0     11/16/2023

## 2023-11-16 NOTE — PLAN OF CARE
Recommendations     1. Updated TF recommendation: Isosource 1.5 @ goal rate of 35 mL/hr- provides 1260 kcals, 57 g pro, and 642 mL fluid.   2. If able to tolerate PO intake, ADAT to regular- texture per SLP.  3. RD following.     Goals: Meet % EEN, EPN by RD f/u date  Nutrition Goal Status: goal met  Communication of RD Recs:  (POC)    Opal Colon RDN,LDN

## 2023-11-16 NOTE — PLAN OF CARE
[facility] - Neuro Critical Care  Discharge Reassessment    Primary Care Provider: Osiris Nevarez NP    Expected Discharge Date: 11/22/2023    Per MD; 11/16/23: Advancing tube feeds to goal and encouraging continued work with PT/OT. She has been tolerating nasal cannula oxygenation well and has had a reduction in her secretions.      SW sent SNF referrals.  Patient with Medicaid and limited or no SNF benefits    Discharge Plan A and Plan B have been determined by review of patient's clinical status, future medical and therapeutic needs, and coverage/benefits for post-acute care in coordination with multidisciplinary team members.    Reassessment (most recent)       Discharge Reassessment - 11/16/23 1625          Discharge Reassessment    Assessment Type Discharge Planning Reassessment     Did the patient's condition or plan change since previous assessment? No     Communicated ASHELY with patient/caregiver Date not available/Unable to determine     Discharge Plan A Skilled Nursing Facility     Discharge Plan B Other   tbd    DME Needed Upon Discharge  other (see comments)   tbd    Transition of Care Barriers Underinsured     Why the patient remains in the hospital Requires continued medical care                   May Rosario RN, CCRN-K, [facility]  Neuro-Critical Care   [phone]

## 2023-11-16 NOTE — ASSESSMENT & PLAN NOTE
58 year old presented for altered mental status on 10/18. Clinical picture confounded with hepatic encephalopathy, infection, and seizure activity. Neurology was consulted by primary team prior to admission to [facility].    10/27 EEG IMPRESSION:  This is an abnormal EEG during lethargic state.  Diffuse disorganized slowing of the background was noted.  Frequent triphasic waves noted.  Left posterior pseudo periodic epileptiform discharges were noted.  Numerous electrographic seizures emanating from the left posterior region were noted.    10/31 - rehook  11/1 read with frequent discharges, vimpat lowered to 50 given mental status and liver function, will continue to watch on eeg vEEG discontinued  11/03: EEG restarted, remains negative with lacosamide taper, DC and monitor for seizure recurrence  11/5: remains without seizures  11/9/23: EEG slowing  11/15: No witnessed seizures      Neurochecks q4  Vitals q4hr  Keppra 500 mg BID

## 2023-11-16 NOTE — SUBJECTIVE & OBJECTIVE
Interval History:  No acute events reported overnight. She required a small bolus to maintain blood pressure, likely due to decreased tube fees while on BiPAP. She continues to have a cough so we will continue chest physiotherapy and duonebs PRN. Blood sugars have been stable while on tube feeds so SSI discontinued.      Review of Systems   Unable to perform ROS: Acuity of condition       Objective:     Vitals:  Temp: 97.6 °F (36.4 °C)  Pulse: 82  Rhythm: normal sinus rhythm  BP: (!) 125/59  MAP (mmHg): 85  Resp: 19  SpO2: 100 %  Oxygen Concentration (%): 28    Temp  Min: 97 °F (36.1 °C)  Max: 98.1 °F (36.7 °C)  Pulse  Min: 69  Max: 112  BP  Min: 79/49  Max: 133/59  MAP (mmHg)  Min: 59  Max: 95  Resp  Min: 11  Max: 30  SpO2  Min: 92 %  Max: 100 %  Oxygen Concentration (%)  Min: 28  Max: 28    11/15 0701 - 11/16 0700  In: 2066.1 [I.V.:9.3]  Out: 900 [Urine:600]   Unmeasured Output  Urine Occurrence: 1  Stool Occurrence: 1  Emesis Occurrence: 0  Pad Count: 1        Physical Exam  Vitals and nursing note reviewed.   Constitutional:       Appearance: She is ill-appearing.   HENT:      Head: Normocephalic.      Right Ear: External ear normal.      Left Ear: External ear normal.      Nose: Nose normal.      Mouth/Throat:      Mouth: Mucous membranes are moist.      Comments: Poor dentition, bleeding and oozing in mouth, and gums.   Eyes:      Pupils: Pupils are equal, round, and reactive to light.   Cardiovascular:      Rate and Rhythm: Normal rate and regular rhythm.      Pulses: Normal pulses.      Heart sounds: Normal heart sounds.   Pulmonary:      Comments: Stridors sounds, thick secretions, wet cough  Abdominal:      General: There is no distension.      Palpations: Abdomen is soft.      Tenderness: There is no abdominal tenderness.   Musculoskeletal:      Cervical back: Neck supple.      Comments: Edematous hands bilaterally and pitting edema in posterior thighs and presacral region   Skin:     General: Skin is  warm and dry.   Neurological:      Mental Status: She is alert.      Comments: No sedation   Alert. Tracking. Followed command to open and close eyes.  PERRL, Eyes cross midline.   No facial asymmetry  Motor:  Flaccid weakness throughout, minor muscular activation in bilateral hands and feet   Sensation intact to noxious stimuli x 4- grimaces           Medications:  Continuous Scheduledfamotidine, 20 mg, BID  folic acid, 1 mg, Daily  lactulose, 30 g, BID  levetiracetam, 500 mg, BID  multivitamin, 1 tablet, Daily  rifAXImin, 550 mg, BID  thiamine, 100 mg, Daily    PRNalbuterol-ipratropium, 3 mL, QID PRN  calcium gluconate IVPB, 1 g, PRN  calcium gluconate IVPB, 2 g, PRN  calcium gluconate IVPB, 3 g, PRN  dextrose 10%, 12.5 g, PRN  dextrose 10%, 25 g, PRN  hydrALAZINE, 10 mg, Q4H PRN  ibuprofen, 600 mg, Q8H PRN  labetalol, 10 mg, Q6H PRN  magnesium sulfate IVPB, 2 g, PRN  magnesium sulfate IVPB, 4 g, PRN  ondansetron, 4 mg, Q8H PRN  potassium chloride, 40 mEq, PRN   And  potassium chloride, 60 mEq, PRN   And  potassium chloride, 80 mEq, PRN  racepinephrine, 0.5 mL, Q4H PRN  sodium phosphate 15 mmol in dextrose 5 % (D5W) 250 mL IVPB, 15 mmol, PRN  sodium phosphate 20.01 mmol in dextrose 5 % (D5W) 250 mL IVPB, 20.01 mmol, PRN  sodium phosphate 30 mmol in dextrose 5 % (D5W) 250 mL IVPB, 30 mmol, PRN      Today I personally reviewed pertinent medications, lines/drains/airways, laboratory results, notably:    Lab Results   Component Value Date    WBC 14.79 (H) 11/16/2023    HGB 7.3 (L) 11/16/2023    HCT 21.8 (L) 11/16/2023     (H) 11/16/2023    PLT 52 (L) 11/16/2023           Diet  Diet NPO  Diet NPO

## 2023-11-16 NOTE — PROGRESS NOTES
[facility] - Neuro Critical Care  Adult Nutrition  Progress Note    SUMMARY       Recommendations    1. Updated TF recommendation: Isosource 1.5 @ goal rate of 35 mL/hr- provides 1260 kcals, 57 g pro, and 642 mL fluid.   2. If able to tolerate PO intake, ADAT to regular- texture per SLP.  3. RD following.    Goals: Meet % EEN, EPN by RD f/u date  Nutrition Goal Status: goal met  Communication of RD Recs:  (POC)    Assessment and Plan    Moderate malnutrition    Nutrition Problem:  Moderate Protein-Calorie Malnutrition  Malnutrition in the context of Social/Environmental Circumstances    Related to (etiology):  Inability to consume sufficient energy     Signs and Symptoms (as evidenced by):  Body Fat Depletion: mild depletion of orbitals and triceps   Muscle Mass Depletion: mild depletion of temples, clavicle region and lower extremities   Fluid Accumulation: mild and moderate    Interventions(treatment strategy):  Collaboration of nutrition care w/ other providers  EN    Nutrition Diagnosis Status:  Continues    Malnutrition Assessment  Malnutrition Context: social/environmental circumstances  Malnutrition Level: moderate    Subcutaneous Fat (Malnutrition): mild depletion  Muscle Mass (Malnutrition): mild depletion  Fluid Accumulation (Malnutrition): moderate     Reason for Assessment    Reason For Assessment: RD follow-up  Diagnosis: other (see comments) (Encephalopathy)  Relevant Medical History: Alcoholic cirrhosis, Bowel resection  Interdisciplinary Rounds: did not attend  General Information Comments: RD f/u. Noted pt extubated 11/13. Remains NPO- noted current TF regimen running. Per chart review, pt w/ UBW of 120-130#; CBW remains 123# (currently w/ +3 edema in ULEs and BLEs). Mild wasting found on physical exam on 10/26. RD feels pt meets criteria for moderate malnutrition - please see PES statement for details. LBM 11/16.  Nutrition Discharge Planning: Pending clinical course    Nutrition Risk  "Screen    Nutrition Risk Screen: tube feeding or parenteral nutrition, difficulty chewing/swallowing    Nutrition/Diet History    Spiritual, Cultural Beliefs, Confucianism Practices, Values that Affect Care: no  Food Allergies: NKFA  Factors Affecting Nutritional Intake: NPO, difficulty/impaired swallowing    Anthropometrics    Temp: 98.2 °F (36.8 °C)  Height: 4' 11" (149.9 cm)  Height (inches): 59 in  Weight Method: Bed Scale  Weight: 56.2 kg (123 lb 14.4 oz)  Weight (lb): 123.9 lb  Ideal Body Weight (IBW), Female: 95 lb  % Ideal Body Weight, Female (lb): 130.42 %  BMI (Calculated): 25  BMI Grade: 25 - 29.9 - overweight    Lab/Procedures/Meds    Pertinent Labs Reviewed: reviewed  Pertinent Labs Comments: BUN 32, Phos 2.6, Albumin 2.0, , , T Bili 5.8  Pertinent Medications Reviewed: reviewed  Pertinent Medications Comments: Folic acid, lactulose, MVI, thiamine    Estimated/Assessed Needs    Weight Used For Calorie Calculations: 56.2 kg (123 lb 14.4 oz)  Energy Calorie Requirements (kcal): 1269 kcals  Energy Need Method: Wheelwright-St Jeor (MSJ x 1.2 PAL)  Protein Requirements: 56 g (1.0 g/kg)  Weight Used For Protein Calculations: 56.2 kg (123 lb 14.4 oz)  Fluid Requirements (mL): 1 mL/kcal or fluid per MD  Estimated Fluid Requirement Method: RDA Method  RDA Method (mL): 1269    Nutrition Prescription Ordered    Current Diet Order: NPO  Current Nutrition Support Formula Ordered: Isosource 1.5  Current Nutrition Support Rate Ordered: 30 (ml)  Current Nutrition Support Frequency Ordered: mL/hr    Evaluation of Received Nutrient/Fluid Intake    Enteral Calories (kcal): 1080  Enteral Protein (gm): 49  Enteral (Free Water) Fluid (mL): 550  % Kcal Needs: 86%  % Protein Needs: 88%  I/O: +12.5 L since 11/2  Energy Calories Required: meeting needs  Protein Required: meeting needs  Tolerance: tolerating  % Intake of Estimated Energy Needs: 86%  % Meal Intake: NPO    Nutrition Risk    Level of Risk/Frequency of " Follow-up:  (1x/week)     Monitor and Evaluation    Food and Nutrient Intake: enteral nutrition intake, food and beverage intake, energy intake  Food and Nutrient Adminstration: enteral and parenteral nutrition administration, diet order  Knowledge/Beliefs/Attitudes: beliefs and attitudes, food and nutrition knowledge/skill  Physical Activity and Function: nutrition-related ADLs and IADLs  Anthropometric Measurements: weight, weight change  Biochemical Data, Medical Tests and Procedures: inflammatory profile, lipid profile, glucose/endocrine profile, gastrointestinal profile, electrolyte and renal panel  Nutrition-Focused Physical Findings: overall appearance     Nutrition Follow-Up    RD Follow-up?: Yes    Opal Colon RDN,LDN

## 2023-11-16 NOTE — PT/OT/SLP PROGRESS
Occupational Therapy  Co- Treatment w/ PT     Name: Mara Mojica  MRN: [id number]  Admitting Diagnosis:  Seizure       Recommendations:     Discharge Recommendations: Moderate Intensity Therapy  Discharge Equipment Recommendations:  to be determined by next level of care  Barriers to discharge:       Assessment:     Mara Mojica is a 58 y.o. female with a medical diagnosis of Seizure.  She presents with decrease functional status secondary to medical diagnosis. Performance deficits affecting function are weakness, impaired endurance, impaired self care skills, decreased coordination, decreased ROM, impaired fine motor, impaired functional mobility, impaired balance, impaired cognition, decreased upper extremity function, decreased safety awareness, impaired coordination. Patient limited by emotional liability this session. Patient requiring increased A for in bed mobility this date and not demonstrating any AROM of BUE. Patient remains appropriate candidate for acute OT services.     Rehab Prognosis:  Fair; patient would benefit from acute skilled OT services to address these deficits and reach maximum level of function.       Plan:     Patient to be seen 4 x/week to address the above listed problems via self-care/home management, therapeutic exercises, therapeutic activities, neuromuscular re-education  Plan of Care Expires: 12/14/23  Plan of Care Reviewed with: patient    Subjective     Chief Complaint: None verbalized   Patient/Family Comments/goals: DC  Pain/Comfort:  Pain Rating 1: 0/10    Objective:     Communicated with: RN prior to session.  Patient found supine with peripheral IV, NG tube, telemetry, pressure relief boots, bed alarm, PureWick, bowel management system, SCD upon OT entry to room.    General Precautions: Standard, aspiration, NPO, fall    Orthopedic Precautions:N/A  Braces: N/A  Respiratory Status: Nasal cannula, flow 2 L/min     Occupational Performance:     Bed Mobility:    Patient  completed Rolling/Turning to Left with  total assistance and 2 persons  Patient completed Scooting/Bridging with total assistance and 2 persons  Patient completed Supine to Sit with total assistance and 2 persons  Patient completed Sit to Supine with total assistance and 2 persons     Functional Mobility/Transfers:  Not attempted at this time.     Activities of Daily Living:  Grooming: total assistance for facial grooming via [place] assist EOB     [facility] 6 Click ADL: 6    Treatment & Education:  Patient sat EOB with total assistance for increased trunk stability, functional status, and mobility. Patient crying throughout session unable to follow consistent commands. PT attempted reaching activities without success. Patient with R neglect; OT attempted midline > L attention without success. Co-Treatment conducted due to patient medical instability and to promote patient safety.      Patient left supine with all lines intact, call button in reach, and bed alarm on    GOALS:   Multidisciplinary Problems       Occupational Therapy Goals          Problem: Occupational Therapy    Goal Priority Disciplines Outcome Interventions   Occupational Therapy Goal     OT, PT/OT Ongoing, Progressing    Description: Goals to be met by: 12/15/23     Patient will increase functional independence with ADLs by performing:    UE Dressing with Minimal Assistance.  LE Dressing with Moderate Assistance.  Grooming while seated with [place].  Toileting from bedside commode with Minimal Assistance for hygiene and clothing management.   Step transfer with Minimal Assistance  Toilet transfer to bedside commode with Minimal Assistance.                         Time Tracking:     OT Date of Treatment: 11/16/23  OT Start Time:  0904  OT Stop Time:  0934  OT Total Time (min):  30 minutes    Billable Minutes:Therapeutic Activity 15 minutes   Neuromuscular Re-education 15 minutes     OT/SHAWNA: OT          11/16/2023

## 2023-11-16 NOTE — PLAN OF CARE
"[facility] Care Plan    POC reviewed with Mara Mojica and family at 1400. Pt verbalized understanding. Questions and concerns addressed. No acute events today. Pt progressing toward goals. Will continue to monitor. See below and flowsheets for full assessment and VS info.             Is this a stroke patient? no    Neuro:  [place] Coma Scale  Best Eye Response: 4-->(E4) spontaneous  Best Motor Response: 4-->(M4) withdraws from pain  Best Verbal Response: 1-->(V1) none  [place] Coma Scale Score: 9  Assessment Qualifiers: patient not sedated/intubated  Pupil PERRLA: yes     24 hr Temp:  [97.6 °F (36.4 °C)-98.2 °F (36.8 °C)]     CV:   Rhythm: normal sinus rhythm  BP goals:   SBP < 180    Resp:   Room air    Plan: N/A    GI/:     Diet/Nutrition Received: tube feeding  Last Bowel Movement: 11/16/23  Voiding Characteristics: external catheter    Intake/Output Summary (Last 24 hours) at 11/16/2023 1742  Last data filed at 11/16/2023 1620  Gross per 24 hour   Intake 1261.06 ml   Output 1375 ml   Net -113.94 ml     Unmeasured Output  Urine Occurrence: 1  Stool Occurrence: 1  Emesis Occurrence: 0  Pad Count: 1    Labs/Accuchecks:  Recent Labs   Lab 11/16/23  0305   WBC 14.79*   RBC 2.19*   HGB 7.3*   HCT 21.8*   PLT 52*      Recent Labs   Lab 11/16/23  0305      K 3.9   CO2 22*   *   BUN 32*   CREATININE 0.5   ALKPHOS 113   *   *   BILITOT 5.8*      Recent Labs   Lab 11/16/23  0305   INR 1.9*    No results for input(s): "CPK", "CPKMB", "TROPONINI", "MB" in the last 168 hours.    Electrolytes: Electrolytes replaced  Accuchecks: none    Gtts:      LDA/Wounds:  Lines/Drains/Airways       Drain  Duration                  NG/OG Tube 10/20/23 2000 16 Fr. Left nostril 26 days         Rectal Tube 11/13/23 1954 fecal management system 2 days    Female External Urinary Catheter 11/15/23 0701 1 day              Airway  Duration                Airway Anesthesia 11/05/23 10 days              Peripheral " Intravenous Line  Duration                  Peripheral IV - Single Lumen 11/09/23 1809 Left;Posterior Wrist 6 days         Midline Catheter Insertion/Assessment  - Single Lumen 11/13/23 1840 Right brachial vein 20g x 10cm 2 days                  Wounds: Yes  Wound care consulted: Yes

## 2023-11-16 NOTE — ASSESSMENT & PLAN NOTE
2/2 to tracheal stenosis   -patient with stridor, increased secretions, requiring 5L 28% and inability to protect airway   -intubated by anesthesia team with small ETT     Reintubated after trial extubation 2/2 stridor non responsive to med management  Passed SBT  - Tolerated 24h spontaneous again on 11/12  - Hold TF tomorrow at 5AM for possible extubation tomorrow   - 11/13/2023 patient extubated with no reintubation, made DNR, 10mg dexamethasone IVP X1  - 11/14/2023 Comfortable on 4L nasal cannula  - Tolerating nasal cannula well. Continue pulmonary toilet

## 2023-11-16 NOTE — PLAN OF CARE
11/16/23 1318   Post-Acute Status   Post-Acute Authorization Placement   Post-Acute Placement Status Patient List Provided     No family/visitors currently at bedside. Attempted to contact patient's S/O, Darron, and dayron voicemail requesting return call. Left SNF list at bedside.    Attempted to call [facility] to complete LOCET. All representatives are currently unavailable and will return call to  as soon as possible.    1:55 PM  Completed LOCET and faxed PASRR to the Office of Aging. Awaiting issuance of form 142.    3:17 PM  Received return call from patient's S/OMack, to review discharge recommendation of SNF and is agreeable to plan.    Patient/family provided list of facilities in-network with patient's payor plan. Providers that are owned, operated, or affiliated with [facility] are included on the list. Emailed SNF list to [email or web address]    Notified that referral sent to below listed facilities from in-network list based on proximity to home/family support:   [facility]    Patient/family instructed to identify preference.    Preferred Facility:  S/O will review SNF list to determine preferences.    If an additional preferred facility not listed above is identified, additional referral to be sent. If above facilities unable to accept, will send additional referrals to in-network providers.     Form 142/PASRR has been uploaded to [employer].    Edna Earl LCSW  [facility]  Ext. [phone]

## 2023-11-16 NOTE — PROGRESS NOTES
[facility] Neuro [facility] Care  Neurocritical Care  Progress Note    Admit Date: 10/25/2023  Service Date: 11/16/2023  Length of Stay: 22    Subjective:     Chief Complaint: Seizure    History of Present Illness: This is a 58-year-old female with a past medical history of alcoholic cirrhosis, s/p ex-lap w/ bowel resection for incarcerated hernia, who initially presented on 10/18 to [facility] with acute encephalopathy.  Her  found her on the floor at home with evidence of fecal/urine incontinence with confusion and slurred speech. Possible reported tongue injury in chart. Reported concern L sided weakness and down drift of L arm however CT head without evidence of acute abnormalities, MRI brain with only small vessel vascular changes without evidence of territorial infarct.     Hospital Course: EEG done on 10/19 with mild diffuse nonspecific background slowing, no potential epileptiform activity. Repeat MRI brain with contrast on 10/23 unchanged from initial MRI. Became more lethargic and was no longer following commands or answering questions. Due to worsening hepatic encephalopathy in setting of hepatic cirrhosis, patient transferred to [facility] for management with transplant team. Has remained on antibiotics, lactulose and rifaximin for treatment of UTI with pansensitive Ecoli, HE, and presumed SBP. Neurology consulted for management recommendations regarding persistent AMS. Had repeat EEG done on 10/24 with similar findings to prior EEG showing mild generalized non-specific cerebral dysfunction and no electrographic seizures or indication of seizure tendency. Additional CT head w/o contrast on 10/25 without evidence of acute issues. Remains confused and unable to answer questions on exam. Not withdrawing to painful stimuli. Was able to awaken to verbal stimuli in AM however when reevaluated in afternoon unresponsive however was after receiving Ativan.     On admission to [facility]:  Patient had EEG running, and  was noted to have seizures at 7:30 a.m., 8:00 a.m. in, and 10:00 a.m. was noted to be in focal status prior to receiving Vimpat.  Patient was noted to have stridor, worsening secretions with suspicion for aspiration, decreased airway protection, and rapids was called due to low GCS score of 6. Antibiotics broadened to Vanc/Zosyn. Clinical picture of mental status confounded with episodes of seizures, infection, and hepatic encephalopathy.             Hospital Course: 10/28: Improving GCS from 6 to 10. Continuing pulmonary toilet and deep suctioning for stridor and copious secretions. UA positive for candida. Blood cultures from 10/27 NGTD. Sputum cultures sent. Patient on day 2 of broadened antibiotics vanc/zosyn due to worsening clinical status but will discontinue tomorrow if cultures remain negative. Still hypernatremic, treating with D5W. Patient was transferred with no ordered diuretics, very edematous on physical exam. Adequate stooling on lactulose and rifaximin. Due to increased UOP/stooling will place eckert for one day for irritated skin. EEG update showing no seizures since 10am 10/27. Monitoring CBC for thrombocytopenia and macrocytic anemia. Discussed code status and goals of care with  and best friend at bedside. Trickle feeds resumed.   10/29: No acute events overnight, EEG reportedly without further seizures for ~48 hours can disconnect once formal report is in, neuro status slowly improving as patient now tracking in room, moving extremities, responding with appropriate emotional reactions to her .  Respiratory status largely unchanged with intermittent events of large volume breaths that sound almost stridorous but without actual respiratory distress- gas remains compensated.  UOP low, diuresis resumed.  10/30/2023: d/c mucomyst nebs, add racemic epi scheduled nebs, add budesonide and dex 6 q8 hours, ammonia at 35- continue lactulose and rifaximin, abg reviewed, 40mEq of K once, ENT  consulted for stridor, continue eckert catheter in today   10/31/2023 Patient with worsening respiratory status, continued decreased mentation and increased secretions. Plan for elective intubation in controlled setting with anesthesia. Will also recheck eeg, if negative will start to wean AEDs and see if that improves patient's arousal. PLT stabilized, continue to monitor.   11/1/2023 this morning patient's 6.0 ETT exchanged for 7.0 to allow suctioning. EEG with frequent discharges, lowered vimpat to 50 mg and will follow EEG. Attempting to remove confounding factors for patients mental status. Slow drop in H/H, 1 unit ordered.   11/2/2023 NAEO, cEEG to remain in place, no seizures but is having frequent discharges in runs. Continue lower dose vimpat and 1500 keppra Eye opening this morning which is slight improvement in exam. Failed SBT   11/3/2023: no improvement with decrease in lacosamide, no re-development of seizures, if does not wake up in next 48 hrs off lacosamide or redevelops seizures, prognosis is extremely poor  11/4/2023: improved mental state this am, no seizures overnight  11/5/2023: LOC stable, has cuff leak, trial of extubation  11/06/2023 reintubated for stridor non responsive to med management overnight  11/07/2023 CTH stable. Off of levo. Completed dex (wbc 22, afebrile). CXR w L mildly increasing consolidation. Very thick secretions, added mucomyst and duonebs. Weaning keppra to 750. Pt appearing more edematous today, dc LR. Scheduled tylenol max 2g/d. Scheduled oxy 5q12.  11/8/23: EEG pending  11/9/23: EEG slowing  11/10/23: more alert today  11/11/23 pressure support trial  11/12/2023 Alert today. Not following commands. On spontaneous overnight, tolerated well. MRI c-spine overnight with spondylosis. No LP at this time. Resume TF. Hold TF tomorrow at 5AM for possible extubation tomorrow.   11/13/2023: patient made DNR, 10 dexamethsone IV one prior extubation with no intent of re-intubation,  PRN racemic epi and Bipap were initiated  11/14/2023: Patient tolerated BiPAP overnight and continues to be more alert, and is tracking faces more consistently. She was taken off BiPAP and placed on 4L oxygen via nasal cannula. Trickle feeds were started while off BiPAP. Significant other at bedside updated on clinical course. Midline placed overnight.   11/15/2023: Thick secretions noted when BiPAP removed this morning, starting CPT. PT/OT consulted with improving mental and respiratory status. Patient more sad and uncomfortable today.   11/16/23: Advancing tube feeds to goal and encouraging continued work with PT/OT. She has been tolerating nasal cannula oxygenation well and has had a reduction in her secretions.     Interval History:  No acute events reported overnight. She required a small bolus to maintain blood pressure, likely due to decreased tube fees while on BiPAP. She continues to have a cough so we will continue chest physiotherapy and duonebs PRN. Blood sugars have been stable while on tube feeds so SSI discontinued.      Review of Systems   Unable to perform ROS: Acuity of condition       Objective:     Vitals:  Temp: 97.6 °F (36.4 °C)  Pulse: 82  Rhythm: normal sinus rhythm  BP: (!) 125/59  MAP (mmHg): 85  Resp: 19  SpO2: 100 %  Oxygen Concentration (%): 28    Temp  Min: 97 °F (36.1 °C)  Max: 98.1 °F (36.7 °C)  Pulse  Min: 69  Max: 112  BP  Min: 79/49  Max: 133/59  MAP (mmHg)  Min: 59  Max: 95  Resp  Min: 11  Max: 30  SpO2  Min: 92 %  Max: 100 %  Oxygen Concentration (%)  Min: 28  Max: 28    11/15 0701 - 11/16 0700  In: 2066.1 [I.V.:9.3]  Out: 900 [Urine:600]   Unmeasured Output  Urine Occurrence: 1  Stool Occurrence: 1  Emesis Occurrence: 0  Pad Count: 1        Physical Exam  Vitals and nursing note reviewed.   Constitutional:       Appearance: She is ill-appearing.   HENT:      Head: Normocephalic.      Right Ear: External ear normal.      Left Ear: External ear normal.      Nose: Nose normal.       Mouth/Throat:      Mouth: Mucous membranes are moist.      Comments: Poor dentition, bleeding and oozing in mouth, and gums.   Eyes:      Pupils: Pupils are equal, round, and reactive to light.   Cardiovascular:      Rate and Rhythm: Normal rate and regular rhythm.      Pulses: Normal pulses.      Heart sounds: Normal heart sounds.   Pulmonary:      Comments: Stridors sounds, thick secretions, wet cough  Abdominal:      General: There is no distension.      Palpations: Abdomen is soft.      Tenderness: There is no abdominal tenderness.   Musculoskeletal:      Cervical back: Neck supple.      Comments: Edematous hands bilaterally and pitting edema in posterior thighs and presacral region   Skin:     General: Skin is warm and dry.   Neurological:      Mental Status: She is alert.      Comments: No sedation   Alert. Tracking. Followed command to open and close eyes.  PERRL, Eyes cross midline.   No facial asymmetry  Motor:  Flaccid weakness throughout, minor muscular activation in bilateral hands and feet   Sensation intact to noxious stimuli x 4- grimaces           Medications:  Continuous Scheduledfamotidine, 20 mg, BID  folic acid, 1 mg, Daily  lactulose, 30 g, BID  levetiracetam, 500 mg, BID  multivitamin, 1 tablet, Daily  rifAXImin, 550 mg, BID  thiamine, 100 mg, Daily    PRNalbuterol-ipratropium, 3 mL, QID PRN  calcium gluconate IVPB, 1 g, PRN  calcium gluconate IVPB, 2 g, PRN  calcium gluconate IVPB, 3 g, PRN  dextrose 10%, 12.5 g, PRN  dextrose 10%, 25 g, PRN  hydrALAZINE, 10 mg, Q4H PRN  ibuprofen, 600 mg, Q8H PRN  labetalol, 10 mg, Q6H PRN  magnesium sulfate IVPB, 2 g, PRN  magnesium sulfate IVPB, 4 g, PRN  ondansetron, 4 mg, Q8H PRN  potassium chloride, 40 mEq, PRN   And  potassium chloride, 60 mEq, PRN   And  potassium chloride, 80 mEq, PRN  racepinephrine, 0.5 mL, Q4H PRN  sodium phosphate 15 mmol in dextrose 5 % (D5W) 250 mL IVPB, 15 mmol, PRN  sodium phosphate 20.01 mmol in dextrose 5 % (D5W) 250 mL  IVPB, 20.01 mmol, PRN  sodium phosphate 30 mmol in dextrose 5 % (D5W) 250 mL IVPB, 30 mmol, PRN      Today I personally reviewed pertinent medications, lines/drains/airways, laboratory results, notably:    Lab Results   Component Value Date    WBC 14.79 (H) 11/16/2023    HGB 7.3 (L) 11/16/2023    HCT 21.8 (L) 11/16/2023     (H) 11/16/2023    PLT 52 (L) 11/16/2023           Diet  Diet NPO  Diet NPO        Assessment/Plan:     Neuro  * Seizure  58 year old presented for altered mental status on 10/18. Clinical picture confounded with hepatic encephalopathy, infection, and seizure activity. Neurology was consulted by primary team prior to admission to [facility].    10/27 EEG IMPRESSION:  This is an abnormal EEG during lethargic state.  Diffuse disorganized slowing of the background was noted.  Frequent triphasic waves noted.  Left posterior pseudo periodic epileptiform discharges were noted.  Numerous electrographic seizures emanating from the left posterior region were noted.    10/31 - rehook  11/1 read with frequent discharges, vimpat lowered to 50 given mental status and liver function, will continue to watch on eeg vEEG discontinued  11/03: EEG restarted, remains negative with lacosamide taper, DC and monitor for seizure recurrence  11/5: remains without seizures  11/9/23: EEG slowing  11/15: No witnessed seizures      Neurochecks q4  Vitals q4hr  Keppra 500 mg BID      Acute encephalopathy  See acute hepatic encephalopathy and seizures    Pulmonary  Tracheal stenosis  Scope complete by ENT     Patient failed extubation twice, and the decision was made to extubate and if she failed to consider transition to comfort care (see ACP note). Patient placed on BiPAP and tolerated it well. On 11/14 she was transitioned to nasal cannula and was saturating 100% on 4L. Plan for BiPAP as needed         Hematology  Thrombocytopenia  This is a 58-year-old woman with a history of decompensated alcoholic cirrhosis.  Suspect  thrombocytopenia is likely secondary to chronic alcohol use and is consumptive in nature. If trend worsens, will consider consulting Hematology.    Daily CBC, transfuse only for active bleeding  SCDs  Frequent bleeding and oozing in gums   Famotidine prophylaxis       Endocrine  Moderate malnutrition  Advancing tube feeds to goal    Nutrition consulted. Most recent weight and BMI monitored-     Measurements:  Wt Readings from Last 1 Encounters:   10/26/23 56.2 kg (123 lb 14.4 oz)   Body mass index is 25.02 kg/m².    Patient has been screened and assessed by RD.    Malnutrition Type:  Context: social/environmental circumstances  Level: moderate    Malnutrition Characteristic Summary:  Subcutaneous Fat (Malnutrition): mild depletion  Muscle Mass (Malnutrition): mild depletion  Fluid Accumulation (Malnutrition): moderate    Interventions/Recommendations (treatment strategy):  1. When able, initiate TFs. Rec'd Isosource 1.5 @ 50 mL/hr to provide 1800 kcals, 82 g of protein, 917 mL fluid. 2. RD to monitor & follow-up.    GI  Acute hepatic encephalopathy  This is a 52 year old woman with a history of ethanol cirrhosis who presents after being found on the ground with fecal and urinary incontinence, and altered mental status. Elevated ammonia on admission.    - Continue lactulose via NG tube TID (titrate till 3-4 daily BM achieved). Continue Xifaxin.   - Avoid opiates and benzodiazepines, if able.   - IV thiamine, folate supplementation, multivitamin through NG tube.  -MELD score ~50% survival without transplant        Decompensated alcoholic hepatic cirrhosis  Daily CBC, CMP & INR.   Not currently being evaluated for transplant due to clinical status  Continuing lactulose and rifaximin titrated to 3-4 bowel movements daily  Ammonia WNL  Folic acid, thiamine, and multivitamin    Other  Hypoxic respiratory failure  2/2 to tracheal stenosis   -patient with stridor, increased secretions, requiring 5L 28% and inability to  protect airway   -intubated by anesthesia team with small ETT     Reintubated after trial extubation 2/2 stridor non responsive to med management  Passed SBT  - Tolerated 24h spontaneous again on 11/12  - Hold TF tomorrow at 5AM for possible extubation tomorrow   - 11/13/2023 patient extubated with no reintubation, made DNR, 10mg dexamethasone IVP X1  - 11/14/2023 Comfortable on 4L nasal cannula  - Tolerating nasal cannula well. 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  - Continue working with PT/OT, appreciate recommendations          The patient is being Prophylaxed for:  Venous Thromboembolism with: Mechanical  Stress Ulcer with: H2B      Activity Orders            Diet NPO: NPO starting at 11/13 0500    Elevate HOB Elevate (30-45 degrees) Elevate HOB to 30 - 45 degrees during feeding unless otherwise stated starting at 10/28 1218    Elevate HOB to 30-45 degrees during feeding unless otherwise stated starting at 10/26 1138    Progressive Mobility Protocol (mobilize patient to their highest level of functioning at least twice daily) starting at 10/25 2000    Turn patient starting at 10/25 2000          DNR    Darion Torres MD  Neurocritical Care  [facility] - Neuro [facility]

## 2023-11-16 NOTE — ASSESSMENT & PLAN NOTE
This is a 58-year-old woman with a history of decompensated alcoholic cirrhosis.  Suspect thrombocytopenia is likely secondary to chronic alcohol use and is consumptive in nature. If trend worsens, will consider consulting Hematology.    Daily CBC, transfuse only for active bleeding  SCDs  Frequent bleeding and oozing in gums   Famotidine prophylaxis

## 2023-11-16 NOTE — ASSESSMENT & PLAN NOTE
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

## 2023-11-17 LAB
ALBUMIN SERPL BCP-MCNC: 2.4 G/DL (ref 3.5–5.2)
ALP SERPL-CCNC: 201 U/L (ref 55–135)
ALT SERPL W/O P-5'-P-CCNC: 152 U/L (ref 10–44)
ANION GAP SERPL CALC-SCNC: 6 MMOL/L (ref 8–16)
ANISOCYTOSIS BLD QL SMEAR: SLIGHT
AST SERPL-CCNC: 170 U/L (ref 10–40)
BASOPHILS # BLD AUTO: 0.07 K/UL (ref 0–0.2)
BASOPHILS NFR BLD: 0.5 % (ref 0–1.9)
BILIRUB SERPL-MCNC: 7 MG/DL (ref 0.1–1)
BUN SERPL-MCNC: 23 MG/DL (ref 6–20)
BURR CELLS BLD QL SMEAR: ABNORMAL
CALCIUM SERPL-MCNC: 8.8 MG/DL (ref 8.7–10.5)
CHLORIDE SERPL-SCNC: 110 MMOL/L (ref 95–110)
CO2 SERPL-SCNC: 20 MMOL/L (ref 23–29)
CREAT SERPL-MCNC: 0.5 MG/DL (ref 0.5–1.4)
DIFFERENTIAL METHOD: ABNORMAL
EOSINOPHIL # BLD AUTO: 0.6 K/UL (ref 0–0.5)
EOSINOPHIL NFR BLD: 4.3 % (ref 0–8)
ERYTHROCYTE [DISTWIDTH] IN BLOOD BY AUTOMATED COUNT: 21.6 % (ref 11.5–14.5)
EST. GFR  (NO RACE VARIABLE): >60 ML/MIN/1.73 M^2
GLUCOSE SERPL-MCNC: 102 MG/DL (ref 70–110)
HCT VFR BLD AUTO: 28.1 % (ref 37–48.5)
HGB BLD-MCNC: 9 G/DL (ref 12–16)
HYPOCHROMIA BLD QL SMEAR: ABNORMAL
IMM GRANULOCYTES # BLD AUTO: 0.56 K/UL (ref 0–0.04)
IMM GRANULOCYTES NFR BLD AUTO: 3.8 % (ref 0–0.5)
INR PPP: 1.7 (ref 0.8–1.2)
LYMPHOCYTES # BLD AUTO: 1.9 K/UL (ref 1–4.8)
LYMPHOCYTES NFR BLD: 13.1 % (ref 18–48)
MAGNESIUM SERPL-MCNC: 1.8 MG/DL (ref 1.6–2.6)
MCH RBC QN AUTO: 33 PG (ref 27–31)
MCHC RBC AUTO-ENTMCNC: 32 G/DL (ref 32–36)
MCV RBC AUTO: 103 FL (ref 82–98)
MONOCYTES # BLD AUTO: 1.5 K/UL (ref 0.3–1)
MONOCYTES NFR BLD: 10.3 % (ref 4–15)
NEUTROPHILS # BLD AUTO: 9.9 K/UL (ref 1.8–7.7)
NEUTROPHILS NFR BLD: 68 % (ref 38–73)
NRBC BLD-RTO: 1 /100 WBC
OVALOCYTES BLD QL SMEAR: ABNORMAL
PHOSPHATE SERPL-MCNC: 2.6 MG/DL (ref 2.7–4.5)
PLATELET # BLD AUTO: 73 K/UL (ref 150–450)
PLATELET BLD QL SMEAR: ABNORMAL
PMV BLD AUTO: 12.7 FL (ref 9.2–12.9)
POIKILOCYTOSIS BLD QL SMEAR: SLIGHT
POLYCHROMASIA BLD QL SMEAR: ABNORMAL
POTASSIUM SERPL-SCNC: 4.3 MMOL/L (ref 3.5–5.1)
PROT SERPL-MCNC: 5.8 G/DL (ref 6–8.4)
PROTHROMBIN TIME: 17.6 SEC (ref 9–12.5)
RBC # BLD AUTO: 2.73 M/UL (ref 4–5.4)
SODIUM SERPL-SCNC: 136 MMOL/L (ref 136–145)
WBC # BLD AUTO: 14.56 K/UL (ref 3.9–12.7)

## 2023-11-17 PROCEDURE — 25000003 PHARM REV CODE 250: Mod: NTX

## 2023-11-17 PROCEDURE — 94761 N-INVAS EAR/PLS OXIMETRY MLT: CPT | Mod: NTX

## 2023-11-17 PROCEDURE — 99233 SBSQ HOSP IP/OBS HIGH 50: CPT | Mod: NTX,,, | Performed by: PSYCHIATRY & NEUROLOGY

## 2023-11-17 PROCEDURE — 97110 THERAPEUTIC EXERCISES: CPT | Mod: NTX

## 2023-11-17 PROCEDURE — 83735 ASSAY OF MAGNESIUM: CPT | Mod: NTX | Performed by: STUDENT IN AN ORGANIZED HEALTH CARE EDUCATION/TRAINING PROGRAM

## 2023-11-17 PROCEDURE — 94640 AIRWAY INHALATION TREATMENT: CPT | Mod: NTX

## 2023-11-17 PROCEDURE — 84100 ASSAY OF PHOSPHORUS: CPT | Mod: NTX | Performed by: STUDENT IN AN ORGANIZED HEALTH CARE EDUCATION/TRAINING PROGRAM

## 2023-11-17 PROCEDURE — 80053 COMPREHEN METABOLIC PANEL: CPT | Mod: NTX | Performed by: HOSPITALIST

## 2023-11-17 PROCEDURE — 97530 THERAPEUTIC ACTIVITIES: CPT | Mod: NTX

## 2023-11-17 PROCEDURE — 25000003 PHARM REV CODE 250: Mod: NTX | Performed by: PHYSICIAN ASSISTANT

## 2023-11-17 PROCEDURE — 85610 PROTHROMBIN TIME: CPT | Mod: NTX | Performed by: STUDENT IN AN ORGANIZED HEALTH CARE EDUCATION/TRAINING PROGRAM

## 2023-11-17 PROCEDURE — 99900035 HC TECH TIME PER 15 MIN (STAT): Mod: NTX

## 2023-11-17 PROCEDURE — 20000000 HC ICU ROOM: Mod: NTX

## 2023-11-17 PROCEDURE — 85025 COMPLETE CBC W/AUTO DIFF WBC: CPT | Mod: NTX | Performed by: HOSPITALIST

## 2023-11-17 PROCEDURE — 97535 SELF CARE MNGMENT TRAINING: CPT | Mod: NTX

## 2023-11-17 PROCEDURE — 99900026 HC AIRWAY MAINTENANCE (STAT): Mod: NTX

## 2023-11-17 PROCEDURE — 99233 PR SUBSEQUENT HOSPITAL CARE,LEVL III: ICD-10-PCS | Mod: NTX,,, | Performed by: PSYCHIATRY & NEUROLOGY

## 2023-11-17 PROCEDURE — 97112 NEUROMUSCULAR REEDUCATION: CPT | Mod: NTX

## 2023-11-17 PROCEDURE — 25000003 PHARM REV CODE 250: Mod: NTX | Performed by: HOSPITALIST

## 2023-11-17 PROCEDURE — 27000221 HC OXYGEN, UP TO 24 HOURS: Mod: NTX

## 2023-11-17 PROCEDURE — 25000003 PHARM REV CODE 250: Mod: NTX | Performed by: NURSE PRACTITIONER

## 2023-11-17 PROCEDURE — 25000242 PHARM REV CODE 250 ALT 637 W/ HCPCS: Mod: NTX | Performed by: NURSE PRACTITIONER

## 2023-11-17 RX ADMIN — FOLIC ACID 1 MG: 1 TABLET ORAL at 08:11

## 2023-11-17 RX ADMIN — THIAMINE HCL TAB 100 MG 100 MG: 100 TAB at 08:11

## 2023-11-17 RX ADMIN — RIFAXIMIN 550 MG: 550 TABLET ORAL at 09:11

## 2023-11-17 RX ADMIN — LACTULOSE 30 G: 20 SOLUTION ORAL at 08:11

## 2023-11-17 RX ADMIN — RIFAXIMIN 550 MG: 550 TABLET ORAL at 08:11

## 2023-11-17 RX ADMIN — LACTULOSE 30 G: 20 SOLUTION ORAL at 09:11

## 2023-11-17 RX ADMIN — FAMOTIDINE 20 MG: 20 TABLET ORAL at 08:11

## 2023-11-17 RX ADMIN — FAMOTIDINE 20 MG: 20 TABLET ORAL at 09:11

## 2023-11-17 RX ADMIN — RACEPINEPHRINE HYDROCHLORIDE 0.5 ML: 11.25 SOLUTION RESPIRATORY (INHALATION) at 08:11

## 2023-11-17 RX ADMIN — THERA TABS 1 TABLET: TAB at 08:11

## 2023-11-17 RX ADMIN — LEVETIRACETAM 500 MG: 100 SOLUTION ORAL at 08:11

## 2023-11-17 RX ADMIN — LEVETIRACETAM 500 MG: 100 SOLUTION ORAL at 09:11

## 2023-11-17 NOTE — ASSESSMENT & PLAN NOTE
Advancing tube feeds to goal    Nutrition consulted. Most recent weight and BMI monitored-     Measurements:  Wt Readings from Last 1 Encounters:   10/26/23 56.2 kg (123 lb 14.4 oz)   Body mass index is 25.02 kg/m².    Patient has been screened and assessed by RD.    Malnutrition Type:  Context: social/environmental circumstances  Level: moderate    Malnutrition Characteristic Summary:  Subcutaneous Fat (Malnutrition): mild depletion  Muscle Mass (Malnutrition): mild depletion  Fluid Accumulation (Malnutrition): moderate    Interventions/Recommendations (treatment strategy):  1. Updated TF recommendation: Isosource 1.5 @ goal rate of 35 mL/hr- provides 1260 kcals, 57 g pro, and 642 mL fluid. 2. If able to tolerate PO intake, ADAT to 3. RD following.

## 2023-11-17 NOTE — SUBJECTIVE & OBJECTIVE
Interval History: No acute events reported overnight. She had increased secretions this morning requiring aggressive suctioning and respiratory therapy. Mild leukocytosis, but no obvious infectious foci at this time and she remains afebrile.      Review of Systems   Unable to perform ROS: Acuity of condition     Objective:     Vitals:  Temp: 99 °F (37.2 °C)  Pulse: 102  Rhythm: sinus tachycardia  BP: 139/61  MAP (mmHg): 88  Resp: (!) 23  SpO2: 98 %    Temp  Min: 98 °F (36.7 °C)  Max: 99 °F (37.2 °C)  Pulse  Min: 79  Max: 129  BP  Min: 107/60  Max: 180/73  MAP (mmHg)  Min: 79  Max: 135  Resp  Min: 16  Max: 44  SpO2  Min: 94 %  Max: 99 %    11/16 0701 - 11/17 0700  In: 1420   Out: 1050 [Urine:900]   Unmeasured Output  Urine Occurrence: 1  Stool Occurrence: 1  Emesis Occurrence: 0  Pad Count: 2        Physical Exam  Vitals and nursing note reviewed.   Constitutional:       Appearance: She is ill-appearing.   HENT:      Head: Normocephalic.      Right Ear: External ear normal.      Left Ear: External ear normal.      Nose: Nose normal.      Mouth/Throat:      Mouth: Mucous membranes are moist.      Comments: Poor dentition, bleeding and oozing in mouth, and gums.   Eyes:      Pupils: Pupils are equal, round, and reactive to light.   Cardiovascular:      Rate and Rhythm: Normal rate and regular rhythm.      Pulses: Normal pulses.      Heart sounds: Normal heart sounds.   Pulmonary:      Comments: Stridors sounds, thick secretions, wet cough  Abdominal:      General: There is no distension.      Palpations: Abdomen is soft.      Tenderness: There is no abdominal tenderness.   Musculoskeletal:      Cervical back: Neck supple.      Comments: Edematous hands bilaterally and pitting edema in posterior thighs and presacral region   Skin:     General: Skin is warm and dry.   Neurological:      Mental Status: She is alert.      Comments: No sedation   Alert. Tracking. Increased ability to rotate head.   PERRL, Eyes cross  midline.   No facial asymmetry  Motor: Flaccid weakness throughout, minor muscular activation in bilateral hands and feet. Increased ability to support her neck    Sensation: intact to noxious stimuli x 4- grimaces            Medications:  Continuous Scheduledfamotidine, 20 mg, BID  folic acid, 1 mg, Daily  lactulose, 30 g, BID  levetiracetam, 500 mg, BID  multivitamin, 1 tablet, Daily  rifAXImin, 550 mg, BID  thiamine, 100 mg, Daily    PRNalbuterol-ipratropium, 3 mL, QID PRN  calcium gluconate IVPB, 1 g, PRN  calcium gluconate IVPB, 2 g, PRN  calcium gluconate IVPB, 3 g, PRN  dextrose 10%, 12.5 g, PRN  dextrose 10%, 25 g, PRN  hydrALAZINE, 10 mg, Q4H PRN  ibuprofen, 600 mg, Q8H PRN  labetalol, 10 mg, Q6H PRN  magnesium sulfate IVPB, 2 g, PRN  magnesium sulfate IVPB, 4 g, PRN  ondansetron, 4 mg, Q8H PRN  potassium chloride, 40 mEq, PRN   And  potassium chloride, 60 mEq, PRN   And  potassium chloride, 80 mEq, PRN  racepinephrine, 0.5 mL, Q4H PRN  sodium phosphate 15 mmol in dextrose 5 % (D5W) 250 mL IVPB, 15 mmol, PRN  sodium phosphate 20.01 mmol in dextrose 5 % (D5W) 250 mL IVPB, 20.01 mmol, PRN  sodium phosphate 30 mmol in dextrose 5 % (D5W) 250 mL IVPB, 30 mmol, PRN      Today I personally reviewed pertinent medications, lines/drains/airways, laboratory results, notably:    Lab Results   Component Value Date    WBC 14.56 (H) 11/17/2023    HGB 9.0 (L) 11/17/2023    HCT 28.1 (L) 11/17/2023     (H) 11/17/2023    PLT 73 (L) 11/17/2023           Diet  Diet NPO  Diet NPO

## 2023-11-17 NOTE — PT/OT/SLP PROGRESS
Physical Therapy Co-Treatment    Patient Name:  Mara Mojica   MRN:  [id number]    Recommendations:     Discharge Recommendations: Moderate Intensity Therapy  Discharge Equipment Recommendations: to be determined by next level of care  Barriers to discharge: Inaccessible home and Decreased caregiver support    Assessment:     Mara Mojica is a 58 y.o. female admitted with a medical diagnosis of Seizure.  She presents with the following impairments/functional limitations: weakness, impaired endurance, impaired self care skills, impaired functional mobility, gait instability, impaired balance, visual deficits, impaired cognition, decreased coordination, decreased upper extremity function, decreased safety awareness, decreased lower extremity function, decreased ROM, impaired coordination, impaired fine motor, edema. Pt w/ significantly improved head control, able to keep head up independently throughout entire session, but w/ continued L gaze and no command following. She appears to be in better spirits w/ no crying today.     Rehab Prognosis: Fair; patient would benefit from acute skilled PT services to address these deficits and reach maximum level of function.    Recent Surgery: * No surgery found *      Plan:     During this hospitalization, patient to be seen 4 x/week to address the identified rehab impairments via therapeutic activities, therapeutic exercises, neuromuscular re-education and progress toward the following goals:    Plan of Care Expires:  12/15/23    Subjective     Chief Complaint: NA 2/2 nonverbal  Patient/Family Comments/goals: NA 2/2 nonverbal  Pain/Comfort:  Pain Rating 1: 0/10  Pain Rating Post-Intervention 1: 0/10      Objective:     Communicated with RN prior to session.  Patient found HOB elevated with NG tube, telemetry, bed alarm, PureWick, bowel management system, oxygen, pulse ox (continuous), blood pressure cuff upon PT entry to room. Co-tx w/ OT 2/2 suspected pt complexity and  requirement of 2 skilled therapists to assist in order to maximize pt treatment     General Precautions: Standard, aspiration, fall, NPO  Orthopedic Precautions: N/A  Braces: N/A  Respiratory Status: Nasal cannula, flow 2 L/min     Functional Mobility:  Bed Mobility:     Scooting: total assistance and of 2 persons  Supine to Sit: total assistance and of 2 persons  Sit to Supine: total assistance and of 2 persons  Balance: EOB sitting balance total A w/ improved head control      AM-PAC 6 CLICK MOBILITY  Turning over in bed (including adjusting bedclothes, sheets and blankets)?: 1  Sitting down on and standing up from a chair with arms (e.g., wheelchair, bedside commode, etc.): 1  Moving from lying on back to sitting on the side of the bed?: 1  Moving to and from a bed to a chair (including a wheelchair)?: 1  Need to walk in hospital room?: 1  Climbing 3-5 steps with a railing?: 1  Basic Mobility Total Score: 6       Treatment & Education:  Pt educated on PT POC/goals, d/c recs, and continued treatment. All questions answered and pt in agreement w/ POC.  PROM and stretching to BLE in sitting at EOB  Sitting balance w/ cueing and assistance for muscle activation, attention to task, postural control, and midline orientation  Rolling L/R in bed to reposition pt w/ wedges and pillows to offload pressure, avoid muscle contractures, and prevent skin breakdown. Pt educated on importance of pt being rotated every 2hrs for these reasons.     Patient left with bed in chair position with all lines intact, call button in reach, bed alarm on, and RN notified..    GOALS:   Multidisciplinary Problems       Physical Therapy Goals          Problem: Physical Therapy    Goal Priority Disciplines Outcome Goal Variances Interventions   Physical Therapy Goal     PT, PT/OT Ongoing, Progressing     Description: Goals to be completed by: 12/15/23    Pt will perform sup<>sit transfers w/ moderate assistance  Pt will have sufficient dynamic  balance to sit EOB while performing ADLs/therex w/ moderate assistance  Pt will be able to stand up from EOB w/ moderate assistance using LRAD  Pt will ambulate 10 feet w/ moderate assistance using LRAD  Pt will be independent w/ HEP therex on BLE w/ good form and ROM   Pt will follow >50 % of single-step commands throughout treatment session                        Time Tracking:     PT Received On: 11/17/23  PT Start Time: 1127     PT Stop Time: 1155  PT Total Time (min): 28 min     Billable Minutes: Therapeutic Exercise 13 and Neuromuscular Re-education 15    Treatment Type: Treatment  PT/PTA: PT     Number of PTA visits since last PT visit: 0     11/17/2023

## 2023-11-17 NOTE — ASSESSMENT & PLAN NOTE
2/2 to tracheal stenosis     - Tolerating nasal cannula well. Continue pulmonary toilet.  - CXR tomorrow morning   - CPT and continued monitoring by respiratory therapy

## 2023-11-17 NOTE — ASSESSMENT & PLAN NOTE
58 year old presented for altered mental status on 10/18. Clinical picture confounded with hepatic encephalopathy, infection, and seizure activity. Neurology was consulted by primary team prior to admission to [facility].    10/27 EEG IMPRESSION:  This is an abnormal EEG during lethargic state.  Diffuse disorganized slowing of the background was noted.  Frequent triphasic waves noted.  Left posterior pseudo periodic epileptiform discharges were noted.  Numerous electrographic seizures emanating from the left posterior region were noted.    10/31 - rehook  11/1 read with frequent discharges, vimpat lowered to 50 given mental status and liver function, will continue to watch on eeg vEEG discontinued  11/03: EEG restarted, remains negative with lacosamide taper, DC and monitor for seizure recurrence  11/5: remains without seizures  11/9/23: EEG slowing  11/15: No witnessed seizures      Neurochecks q4  Vitals q4hr  Keppra 500 mg BID

## 2023-11-17 NOTE — PROGRESS NOTES
[facility] - Neuro Critical Care  Neurocritical Care  Progress Note    Admit Date: 10/25/2023  Service Date: 11/17/2023  Length of Stay: 23    Subjective:     Chief Complaint: Seizure    History of Present Illness: This is a 58-year-old female with a past medical history of alcoholic cirrhosis, s/p ex-lap w/ bowel resection for incarcerated hernia, who initially presented on 10/18 to [facility] with acute encephalopathy.  Her  found her on the floor at home with evidence of fecal/urine incontinence with confusion and slurred speech. Possible reported tongue injury in chart. Reported concern L sided weakness and down drift of L arm however CT head without evidence of acute abnormalities, MRI brain with only small vessel vascular changes without evidence of territorial infarct.     Hospital Course: EEG done on 10/19 with mild diffuse nonspecific background slowing, no potential epileptiform activity. Repeat MRI brain with contrast on 10/23 unchanged from initial MRI. Became more lethargic and was no longer following commands or answering questions. Due to worsening hepatic encephalopathy in setting of hepatic cirrhosis, patient transferred to [facility] for management with transplant team. Has remained on antibiotics, lactulose and rifaximin for treatment of UTI with pansensitive Ecoli, HE, and presumed SBP. Neurology consulted for management recommendations regarding persistent AMS. Had repeat EEG done on 10/24 with similar findings to prior EEG showing mild generalized non-specific cerebral dysfunction and no electrographic seizures or indication of seizure tendency. Additional CT head w/o contrast on 10/25 without evidence of acute issues. Remains confused and unable to answer questions on exam. Not withdrawing to painful stimuli. Was able to awaken to verbal stimuli in AM however when reevaluated in afternoon unresponsive however was after receiving Ativan.     On admission to [facility]:  Patient had EEG running, and  was noted to have seizures at 7:30 a.m., 8:00 a.m. in, and 10:00 a.m. was noted to be in focal status prior to receiving Vimpat.  Patient was noted to have stridor, worsening secretions with suspicion for aspiration, decreased airway protection, and rapids was called due to low GCS score of 6. Antibiotics broadened to Vanc/Zosyn. Clinical picture of mental status confounded with episodes of seizures, infection, and hepatic encephalopathy.             Hospital Course: 10/28: Improving GCS from 6 to 10. Continuing pulmonary toilet and deep suctioning for stridor and copious secretions. UA positive for candida. Blood cultures from 10/27 NGTD. Sputum cultures sent. Patient on day 2 of broadened antibiotics vanc/zosyn due to worsening clinical status but will discontinue tomorrow if cultures remain negative. Still hypernatremic, treating with D5W. Patient was transferred with no ordered diuretics, very edematous on physical exam. Adequate stooling on lactulose and rifaximin. Due to increased UOP/stooling will place eckert for one day for irritated skin. EEG update showing no seizures since 10am 10/27. Monitoring CBC for thrombocytopenia and macrocytic anemia. Discussed code status and goals of care with  and best friend at bedside. Trickle feeds resumed.   10/29: No acute events overnight, EEG reportedly without further seizures for ~48 hours can disconnect once formal report is in, neuro status slowly improving as patient now tracking in room, moving extremities, responding with appropriate emotional reactions to her .  Respiratory status largely unchanged with intermittent events of large volume breaths that sound almost stridorous but without actual respiratory distress- gas remains compensated.  UOP low, diuresis resumed.  10/30/2023: d/c mucomyst nebs, add racemic epi scheduled nebs, add budesonide and dex 6 q8 hours, ammonia at 35- continue lactulose and rifaximin, abg reviewed, 40mEq of K once, ENT  consulted for stridor, continue eckert catheter in today   10/31/2023 Patient with worsening respiratory status, continued decreased mentation and increased secretions. Plan for elective intubation in controlled setting with anesthesia. Will also recheck eeg, if negative will start to wean AEDs and see if that improves patient's arousal. PLT stabilized, continue to monitor.   11/1/2023 this morning patient's 6.0 ETT exchanged for 7.0 to allow suctioning. EEG with frequent discharges, lowered vimpat to 50 mg and will follow EEG. Attempting to remove confounding factors for patients mental status. Slow drop in H/H, 1 unit ordered.   11/2/2023 NAEO, cEEG to remain in place, no seizures but is having frequent discharges in runs. Continue lower dose vimpat and 1500 keppra Eye opening this morning which is slight improvement in exam. Failed SBT   11/3/2023: no improvement with decrease in lacosamide, no re-development of seizures, if does not wake up in next 48 hrs off lacosamide or redevelops seizures, prognosis is extremely poor  11/4/2023: improved mental state this am, no seizures overnight  11/5/2023: LOC stable, has cuff leak, trial of extubation  11/06/2023 reintubated for stridor non responsive to med management overnight  11/07/2023 CTH stable. Off of levo. Completed dex (wbc 22, afebrile). CXR w L mildly increasing consolidation. Very thick secretions, added mucomyst and duonebs. Weaning keppra to 750. Pt appearing more edematous today, dc LR. Scheduled tylenol max 2g/d. Scheduled oxy 5q12.  11/8/23: EEG pending  11/9/23: EEG slowing  11/10/23: more alert today  11/11/23 pressure support trial  11/12/2023 Alert today. Not following commands. On spontaneous overnight, tolerated well. MRI c-spine overnight with spondylosis. No LP at this time. Resume TF. Hold TF tomorrow at 5AM for possible extubation tomorrow.   11/13/2023: patient made DNR, 10 dexamethsone IV one prior extubation with no intent of re-intubation,  PRN racemic epi and Bipap were initiated  11/14/2023: Patient tolerated BiPAP overnight and continues to be more alert, and is tracking faces more consistently. She was taken off BiPAP and placed on 4L oxygen via nasal cannula. Trickle feeds were started while off BiPAP. Significant other at bedside updated on clinical course. Midline placed overnight.   11/15/2023: Thick secretions noted when BiPAP removed this morning, starting CPT. PT/OT consulted with improving mental and respiratory status. Patient more sad and uncomfortable today.   11/16/23: Advancing tube feeds to goal and encouraging continued work with PT/OT. She has been tolerating nasal cannula oxygenation well and has had a reduction in her secretions.   11/17/23: Increased secretions overnight and this morning. Continues to work with PT/OT and has increased movement in her head and neck. Tube feed goal adjusted per dietician recommendations. Starting to plan for disposition. Considering ACP and Palliative care talks with family this weekend.     Interval History: No acute events reported overnight. She had increased secretions this morning requiring aggressive suctioning and respiratory therapy. Mild leukocytosis, but no obvious infectious foci at this time and she remains afebrile.      Review of Systems   Unable to perform ROS: Acuity of condition     Objective:     Vitals:  Temp: 99 °F (37.2 °C)  Pulse: 102  Rhythm: sinus tachycardia  BP: 139/61  MAP (mmHg): 88  Resp: (!) 23  SpO2: 98 %    Temp  Min: 98 °F (36.7 °C)  Max: 99 °F (37.2 °C)  Pulse  Min: 79  Max: 129  BP  Min: 107/60  Max: 180/73  MAP (mmHg)  Min: 79  Max: 135  Resp  Min: 16  Max: 44  SpO2  Min: 94 %  Max: 99 %    11/16 0701 - 11/17 0700  In: 1420   Out: 1050 [Urine:900]   Unmeasured Output  Urine Occurrence: 1  Stool Occurrence: 1  Emesis Occurrence: 0  Pad Count: 2        Physical Exam  Vitals and nursing note reviewed.   Constitutional:       Appearance: She is ill-appearing.   HENT:       Head: Normocephalic.      Right Ear: External ear normal.      Left Ear: External ear normal.      Nose: Nose normal.      Mouth/Throat:      Mouth: Mucous membranes are moist.      Comments: Poor dentition, bleeding and oozing in mouth, and gums.   Eyes:      Pupils: Pupils are equal, round, and reactive to light.   Cardiovascular:      Rate and Rhythm: Normal rate and regular rhythm.      Pulses: Normal pulses.      Heart sounds: Normal heart sounds.   Pulmonary:      Comments: Stridors sounds, thick secretions, wet cough  Abdominal:      General: There is no distension.      Palpations: Abdomen is soft.      Tenderness: There is no abdominal tenderness.   Musculoskeletal:      Cervical back: Neck supple.      Comments: Edematous hands bilaterally and pitting edema in posterior thighs and presacral region   Skin:     General: Skin is warm and dry.   Neurological:      Mental Status: She is alert.      Comments: No sedation   Alert. Tracking. Increased ability to rotate head.   PERRL, Eyes cross midline.   No facial asymmetry  Motor: Flaccid weakness throughout, minor muscular activation in bilateral hands and feet. Increased ability to support her neck    Sensation: intact to noxious stimuli x 4- grimaces            Medications:  Continuous Scheduledfamotidine, 20 mg, BID  folic acid, 1 mg, Daily  lactulose, 30 g, BID  levetiracetam, 500 mg, BID  multivitamin, 1 tablet, Daily  rifAXImin, 550 mg, BID  thiamine, 100 mg, Daily    PRNalbuterol-ipratropium, 3 mL, QID PRN  calcium gluconate IVPB, 1 g, PRN  calcium gluconate IVPB, 2 g, PRN  calcium gluconate IVPB, 3 g, PRN  dextrose 10%, 12.5 g, PRN  dextrose 10%, 25 g, PRN  hydrALAZINE, 10 mg, Q4H PRN  ibuprofen, 600 mg, Q8H PRN  labetalol, 10 mg, Q6H PRN  magnesium sulfate IVPB, 2 g, PRN  magnesium sulfate IVPB, 4 g, PRN  ondansetron, 4 mg, Q8H PRN  potassium chloride, 40 mEq, PRN   And  potassium chloride, 60 mEq, PRN   And  potassium chloride, 80 mEq,  PRN  racepinephrine, 0.5 mL, Q4H PRN  sodium phosphate 15 mmol in dextrose 5 % (D5W) 250 mL IVPB, 15 mmol, PRN  sodium phosphate 20.01 mmol in dextrose 5 % (D5W) 250 mL IVPB, 20.01 mmol, PRN  sodium phosphate 30 mmol in dextrose 5 % (D5W) 250 mL IVPB, 30 mmol, PRN      Today I personally reviewed pertinent medications, lines/drains/airways, laboratory results, notably:    Lab Results   Component Value Date    WBC 14.56 (H) 11/17/2023    HGB 9.0 (L) 11/17/2023    HCT 28.1 (L) 11/17/2023     (H) 11/17/2023    PLT 73 (L) 11/17/2023           Diet  Diet NPO  Diet NPO        Assessment/Plan:     Neuro  * Seizure  58 year old presented for altered mental status on 10/18. Clinical picture confounded with hepatic encephalopathy, infection, and seizure activity. Neurology was consulted by primary team prior to admission to [facility].    10/27 EEG IMPRESSION:  This is an abnormal EEG during lethargic state.  Diffuse disorganized slowing of the background was noted.  Frequent triphasic waves noted.  Left posterior pseudo periodic epileptiform discharges were noted.  Numerous electrographic seizures emanating from the left posterior region were noted.    10/31 - rehook  11/1 read with frequent discharges, vimpat lowered to 50 given mental status and liver function, will continue to watch on eeg vEEG discontinued  11/03: EEG restarted, remains negative with lacosamide taper, DC and monitor for seizure recurrence  11/5: remains without seizures  11/9/23: EEG slowing  11/15: No witnessed seizures      Neurochecks q4  Vitals q4hr  Keppra 500 mg BID      Acute encephalopathy  See acute hepatic encephalopathy and seizures    Pulmonary  Tracheal stenosis  Scope complete by ENT     Patient failed extubation twice, and the decision was made to extubate and if she failed to consider transition to comfort care (see ACP note). Patient placed on BiPAP and tolerated it well. On 11/14 she was transitioned to nasal cannula and was saturating  100% on 4L. Plan for BiPAP as needed         Hematology  Thrombocytopenia  This is a 58-year-old woman with a history of decompensated alcoholic cirrhosis.  Suspect thrombocytopenia is likely secondary to chronic alcohol use and is consumptive in nature. If trend worsens, will consider consulting Hematology.    Daily CBC, transfuse only for active bleeding  SCDs  Frequent bleeding and oozing in gums   Famotidine prophylaxis       Endocrine  Moderate malnutrition  Advancing tube feeds to goal    Nutrition consulted. Most recent weight and BMI monitored-     Measurements:  Wt Readings from Last 1 Encounters:   10/26/23 56.2 kg (123 lb 14.4 oz)   Body mass index is 25.02 kg/m².    Patient has been screened and assessed by RD.    Malnutrition Type:  Context: social/environmental circumstances  Level: moderate    Malnutrition Characteristic Summary:  Subcutaneous Fat (Malnutrition): mild depletion  Muscle Mass (Malnutrition): mild depletion  Fluid Accumulation (Malnutrition): moderate    Interventions/Recommendations (treatment strategy):  1. Updated TF recommendation: Isosource 1.5 @ goal rate of 35 mL/hr- provides 1260 kcals, 57 g pro, and 642 mL fluid. 2. If able to tolerate PO intake, ADAT to 3. RD following.    GI  Acute hepatic encephalopathy  This is a 52 year old woman with a history of ethanol cirrhosis who presents after being found on the ground with fecal and urinary incontinence, and altered mental status. Elevated ammonia on admission.    - Continue lactulose via NG tube TID (titrate till 3-4 daily BM achieved). Continue Xifaxin.   - Avoid opiates and benzodiazepines, if able.   - IV thiamine, folate supplementation, multivitamin through NG tube.  -MELD score ~50% survival without transplant        Decompensated alcoholic hepatic cirrhosis  Daily CBC, CMP & INR.   Not currently being evaluated for transplant due to clinical status  Continuing lactulose and rifaximin titrated to 3-4 bowel movements  daily  Ammonia WNL  Folic acid, thiamine, and multivitamin    Other  Hypoxic respiratory failure  2/2 to tracheal stenosis     - Tolerating nasal cannula well. Continue pulmonary toilet.  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  - Continue working with PT/OT, appreciate recommendations          The patient is being Prophylaxed for:  Venous Thromboembolism with: Mechanical  Stress Ulcer with: H2B  Ventilator Pneumonia with: not applicable    Activity Orders            Diet NPO: NPO starting at 11/13 0500    Elevate HOB Elevate (30-45 degrees) Elevate HOB to 30 - 45 degrees during feeding unless otherwise stated starting at 10/28 1218    Elevate HOB to 30-45 degrees during feeding unless otherwise stated starting at 10/26 1138    Progressive Mobility Protocol (mobilize patient to their highest level of functioning at least twice daily) starting at 10/25 2000    Turn patient starting at 10/25 2000          DNR    Darion Torres MD  Neurocritical Care  [facility] - Neuro Critical Care

## 2023-11-17 NOTE — PT/OT/SLP PROGRESS
Occupational Therapy   Co-Treatment w/ PT     Name: Mara Mojica  MRN: [id number]  Admitting Diagnosis:  Seizure       Recommendations:     Discharge Recommendations: Moderate Intensity Therapy  Discharge Equipment Recommendations:  to be determined by next level of care  Barriers to discharge:  Inaccessible home environment    Assessment:     Mara Mojica is a 58 y.o. female with a medical diagnosis of Seizure.  She presents with decrease functional status secondary to medical diagnosis. Performance deficits affecting function are weakness, impaired endurance, impaired self care skills, impaired functional mobility, gait instability, impaired balance, visual deficits, impaired cognition, decreased coordination, decreased upper extremity function, decreased lower extremity function, decreased safety awareness, decreased ROM, impaired coordination, impaired fine motor, edema. Patient with increased cervical extension this date able to support head throughout session. Patient with continued strong L gaze and no command following at this time. Patient remains limited due to no volitional movement in BUE at this time. Patient remains appropriate candidate for acute OT services.       Rehab Prognosis:  Fair; patient would benefit from acute skilled OT services to address these deficits and reach maximum level of function.       Plan:     Patient to be seen 4 x/week to address the above listed problems via self-care/home management, therapeutic activities, therapeutic exercises, neuromuscular re-education  Plan of Care Expires: 12/14/23  Plan of Care Reviewed with: patient    Subjective     Chief Complaint: Patient unable to verbalize   Patient/Family Comments/goals: RN  Pain/Comfort:  Pain Rating 1: 0/10    Objective:     Communicated with: RN prior to session.  Patient found supine with NG tube, telemetry, bed alarm, PureWick, bowel management system, oxygen, pulse ox (continuous), blood pressure cuff upon OT  entry to room.    General Precautions: Standard, aspiration, fall, NPO    Orthopedic Precautions:N/A  Braces: N/A  Respiratory Status: Room air     Occupational Performance:     Bed Mobility:    Patient completed Rolling/Turning to Left with  total assistance  Patient completed Scooting/Bridging with total assistance  Patient completed Supine to Sit with total assistance  Patient completed Sit to Supine with total assistance   Patient sat EOB with total assistance at this time    Functional Mobility/Transfers:  Deferred at this time    Activities of Daily Living:  Grooming: total assistance OT performed oral swabbing for patient.    [facility] 6 Click ADL: 6    Treatment & Education:  Patient with R neglect; OT attempted midline > L attention without success. OT performed PROM of BUE to promote UE movement and tissue extensibility. Co-Treatment conducted due to patient medical instability and to promote patient safety.     Patient left supine with all lines intact, call button in reach, and bed alarm on    GOALS:   Multidisciplinary Problems       Occupational Therapy Goals          Problem: Occupational Therapy    Goal Priority Disciplines Outcome Interventions   Occupational Therapy Goal     OT, PT/OT Ongoing, Progressing    Description: Goals to be met by: 12/15/23     Patient will increase functional independence with ADLs by performing:    UE Dressing with Minimal Assistance.  LE Dressing with Moderate Assistance.  Grooming while seated with [place].  Toileting from bedside commode with Minimal Assistance for hygiene and clothing management.   Step transfer with Minimal Assistance  Toilet transfer to bedside commode with Minimal Assistance.                         Time Tracking:     OT Date of Treatment: 11/17/23  OT Start Time: 1127  OT Stop Time: 1155  OT Total Time (min): 28 min    Billable Minutes:Self Care/Home Management 15 minutes   Therapeutic Activity 13 minutes     OT/SHAWNA: OT          11/17/2023

## 2023-11-17 NOTE — PLAN OF CARE
"[facility] Care Plan    POC reviewed with Mara Mojica and family at 0300. Questions and concerns addressed. No acute events overnight. Pt progressing toward goals. Will continue to monitor. See below and flowsheets for full assessment and VS info.           Is this a stroke patient? no    Neuro:  Carrington Coma Scale  Best Eye Response: 4-->(E4) spontaneous  Best Motor Response: 1-->(M1) none  Best Verbal Response: 1-->(V1) none  Woody Coma Scale Score: 6  Assessment Qualifiers: patient not sedated/intubated  Pupil PERRLA: yes     24hr Temp:  [97.6 °F (36.4 °C)-98.6 °F (37 °C)]     CV:   Rhythm: normal sinus rhythm  BP goals:   SBP < 180  MAP > 65    Resp:      Vent Mode: Spont  Set Rate: 14 BPM  Oxygen Concentration (%): 28  Vt Set: 420 mL  PEEP/CPAP: 5 cmH20  Pressure Support: 5 cmH20    Plan: N/A    GI/:     Diet/Nutrition Received: tube feeding  Last Bowel Movement: 11/16/23  Voiding Characteristics: external catheter    Intake/Output Summary (Last 24 hours) at 11/17/2023 0650  Last data filed at 11/17/2023 0601  Gross per 24 hour   Intake 1420 ml   Output 1050 ml   Net 370 ml     Unmeasured Output  Urine Occurrence: 1  Stool Occurrence: 1  Emesis Occurrence: 0  Pad Count: 2    Labs/Accuchecks:  Recent Labs   Lab 11/17/23  0509   WBC 14.56*   RBC 2.73*   HGB 9.0*   HCT 28.1*   PLT 73*      Recent Labs   Lab 11/17/23  0509      K 4.3   CO2 20*      BUN 23*   CREATININE 0.5   ALKPHOS 201*   *   *   BILITOT 7.0*      Recent Labs   Lab 11/17/23  0509   INR 1.7*    No results for input(s): "CPK", "CPKMB", "TROPONINI", "MB" in the last 168 hours.    Electrolytes: Electrolytes replaced  Accuchecks: none    Gtts:      LDA/Wounds:  Lines/Drains/Airways       Drain  Duration                  NG/OG Tube 10/20/23 2000 16 Fr. Left nostril 27 days         Rectal Tube 11/13/23 1954 fecal management system 3 days    Female External Urinary Catheter 11/15/23 0701 1 day              Airway  Duration "                Airway Anesthesia 11/05/23 11 days              Peripheral Intravenous Line  Duration                  Peripheral IV - Single Lumen 11/09/23 1809 Left;Posterior Wrist 7 days         Midline Catheter Insertion/Assessment  - Single Lumen 11/13/23 1840 Right brachial vein 20g x 10cm 3 days                  Wounds: No  Wound care consulted: Yes

## 2023-11-18 LAB
ALBUMIN SERPL BCP-MCNC: 1.9 G/DL (ref 3.5–5.2)
ALP SERPL-CCNC: 176 U/L (ref 55–135)
ALT SERPL W/O P-5'-P-CCNC: 117 U/L (ref 10–44)
AMMONIA PLAS-SCNC: 33 UMOL/L (ref 10–50)
ANION GAP SERPL CALC-SCNC: 5 MMOL/L (ref 8–16)
AST SERPL-CCNC: 124 U/L (ref 10–40)
BASOPHILS # BLD AUTO: 0.02 K/UL (ref 0–0.2)
BASOPHILS NFR BLD: 0.2 % (ref 0–1.9)
BILIRUB SERPL-MCNC: 5.7 MG/DL (ref 0.1–1)
BUN SERPL-MCNC: 22 MG/DL (ref 6–20)
CALCIUM SERPL-MCNC: 8.4 MG/DL (ref 8.7–10.5)
CHLORIDE SERPL-SCNC: 113 MMOL/L (ref 95–110)
CO2 SERPL-SCNC: 23 MMOL/L (ref 23–29)
CREAT SERPL-MCNC: 0.5 MG/DL (ref 0.5–1.4)
DIFFERENTIAL METHOD: ABNORMAL
EOSINOPHIL # BLD AUTO: 0.4 K/UL (ref 0–0.5)
EOSINOPHIL NFR BLD: 3.6 % (ref 0–8)
ERYTHROCYTE [DISTWIDTH] IN BLOOD BY AUTOMATED COUNT: 21.8 % (ref 11.5–14.5)
EST. GFR  (NO RACE VARIABLE): >60 ML/MIN/1.73 M^2
GLUCOSE SERPL-MCNC: 98 MG/DL (ref 70–110)
HCT VFR BLD AUTO: 22 % (ref 37–48.5)
HGB BLD-MCNC: 7 G/DL (ref 12–16)
IMM GRANULOCYTES # BLD AUTO: 0.2 K/UL (ref 0–0.04)
IMM GRANULOCYTES NFR BLD AUTO: 1.7 % (ref 0–0.5)
INR PPP: 1.9 (ref 0.8–1.2)
LYMPHOCYTES # BLD AUTO: 1 K/UL (ref 1–4.8)
LYMPHOCYTES NFR BLD: 8 % (ref 18–48)
MAGNESIUM SERPL-MCNC: 1.8 MG/DL (ref 1.6–2.6)
MCH RBC QN AUTO: 33.5 PG (ref 27–31)
MCHC RBC AUTO-ENTMCNC: 31.8 G/DL (ref 32–36)
MCV RBC AUTO: 105 FL (ref 82–98)
MONOCYTES # BLD AUTO: 1.3 K/UL (ref 0.3–1)
MONOCYTES NFR BLD: 10.6 % (ref 4–15)
NEUTROPHILS # BLD AUTO: 9.1 K/UL (ref 1.8–7.7)
NEUTROPHILS NFR BLD: 75.9 % (ref 38–73)
NRBC BLD-RTO: 0 /100 WBC
PHOSPHATE SERPL-MCNC: 3.1 MG/DL (ref 2.7–4.5)
PLATELET # BLD AUTO: 52 K/UL (ref 150–450)
PMV BLD AUTO: 12.4 FL (ref 9.2–12.9)
POTASSIUM SERPL-SCNC: 4.2 MMOL/L (ref 3.5–5.1)
PROT SERPL-MCNC: 4.7 G/DL (ref 6–8.4)
PROTHROMBIN TIME: 19.8 SEC (ref 9–12.5)
RBC # BLD AUTO: 2.09 M/UL (ref 4–5.4)
SODIUM SERPL-SCNC: 141 MMOL/L (ref 136–145)
WBC # BLD AUTO: 11.93 K/UL (ref 3.9–12.7)

## 2023-11-18 PROCEDURE — 94761 N-INVAS EAR/PLS OXIMETRY MLT: CPT | Mod: NTX

## 2023-11-18 PROCEDURE — 25000003 PHARM REV CODE 250: Mod: NTX

## 2023-11-18 PROCEDURE — 80053 COMPREHEN METABOLIC PANEL: CPT | Mod: NTX | Performed by: HOSPITALIST

## 2023-11-18 PROCEDURE — 25000003 PHARM REV CODE 250: Mod: NTX | Performed by: HOSPITALIST

## 2023-11-18 PROCEDURE — 94668 MNPJ CHEST WALL SBSQ: CPT | Mod: NTX

## 2023-11-18 PROCEDURE — 83735 ASSAY OF MAGNESIUM: CPT | Mod: NTX | Performed by: STUDENT IN AN ORGANIZED HEALTH CARE EDUCATION/TRAINING PROGRAM

## 2023-11-18 PROCEDURE — 99233 PR SUBSEQUENT HOSPITAL CARE,LEVL III: ICD-10-PCS | Mod: FS,NTX,, | Performed by: PSYCHIATRY & NEUROLOGY

## 2023-11-18 PROCEDURE — 84100 ASSAY OF PHOSPHORUS: CPT | Mod: NTX | Performed by: STUDENT IN AN ORGANIZED HEALTH CARE EDUCATION/TRAINING PROGRAM

## 2023-11-18 PROCEDURE — 85025 COMPLETE CBC W/AUTO DIFF WBC: CPT | Mod: NTX | Performed by: HOSPITALIST

## 2023-11-18 PROCEDURE — 85610 PROTHROMBIN TIME: CPT | Mod: NTX | Performed by: STUDENT IN AN ORGANIZED HEALTH CARE EDUCATION/TRAINING PROGRAM

## 2023-11-18 PROCEDURE — 99233 SBSQ HOSP IP/OBS HIGH 50: CPT | Mod: FS,NTX,, | Performed by: PSYCHIATRY & NEUROLOGY

## 2023-11-18 PROCEDURE — 27100171 HC OXYGEN HIGH FLOW UP TO 24 HOURS: Mod: NTX

## 2023-11-18 PROCEDURE — 25000003 PHARM REV CODE 250: Mod: NTX | Performed by: PHYSICIAN ASSISTANT

## 2023-11-18 PROCEDURE — 99499 UNLISTED E&M SERVICE: CPT | Mod: NTX,,,

## 2023-11-18 PROCEDURE — 51798 US URINE CAPACITY MEASURE: CPT | Mod: NTX

## 2023-11-18 PROCEDURE — 63600175 PHARM REV CODE 636 W HCPCS: Mod: JZ,JG,NTX

## 2023-11-18 PROCEDURE — 99499 NO LOS: ICD-10-PCS | Mod: NTX,,,

## 2023-11-18 PROCEDURE — 99900035 HC TECH TIME PER 15 MIN (STAT): Mod: NTX

## 2023-11-18 PROCEDURE — 20000000 HC ICU ROOM: Mod: NTX

## 2023-11-18 PROCEDURE — 51701 INSERT BLADDER CATHETER: CPT | Mod: NTX

## 2023-11-18 PROCEDURE — 25000003 PHARM REV CODE 250: Mod: NTX | Performed by: NURSE PRACTITIONER

## 2023-11-18 PROCEDURE — P9047 ALBUMIN (HUMAN), 25%, 50ML: HCPCS | Mod: JZ,JG,NTX

## 2023-11-18 PROCEDURE — 82140 ASSAY OF AMMONIA: CPT | Mod: NTX | Performed by: PHYSICIAN ASSISTANT

## 2023-11-18 PROCEDURE — 99900026 HC AIRWAY MAINTENANCE (STAT): Mod: NTX

## 2023-11-18 RX ORDER — ALBUMIN HUMAN 250 G/1000ML
25 SOLUTION INTRAVENOUS ONCE
Status: COMPLETED | OUTPATIENT
Start: 2023-11-18 | End: 2023-11-18

## 2023-11-18 RX ORDER — FUROSEMIDE 10 MG/ML
40 INJECTION INTRAMUSCULAR; INTRAVENOUS ONCE
Status: COMPLETED | OUTPATIENT
Start: 2023-11-18 | End: 2023-11-18

## 2023-11-18 RX ADMIN — LEVETIRACETAM 500 MG: 100 SOLUTION ORAL at 08:11

## 2023-11-18 RX ADMIN — FAMOTIDINE 20 MG: 20 TABLET ORAL at 08:11

## 2023-11-18 RX ADMIN — IBUPROFEN 600 MG: 600 TABLET, FILM COATED ORAL at 12:11

## 2023-11-18 RX ADMIN — THERA TABS 1 TABLET: TAB at 08:11

## 2023-11-18 RX ADMIN — RIFAXIMIN 550 MG: 550 TABLET ORAL at 08:11

## 2023-11-18 RX ADMIN — FUROSEMIDE 40 MG: 10 INJECTION, SOLUTION INTRAVENOUS at 02:11

## 2023-11-18 RX ADMIN — LACTULOSE 30 G: 20 SOLUTION ORAL at 08:11

## 2023-11-18 RX ADMIN — FOLIC ACID 1 MG: 1 TABLET ORAL at 08:11

## 2023-11-18 RX ADMIN — ALBUMIN (HUMAN) 25 G: 12.5 SOLUTION INTRAVENOUS at 02:11

## 2023-11-18 RX ADMIN — THIAMINE HCL TAB 100 MG 100 MG: 100 TAB at 08:11

## 2023-11-18 NOTE — SUBJECTIVE & OBJECTIVE
Interval History: See problem list above    Review of Systems   Unable to perform ROS: Acuity of condition      Objective:     Vitals:  Temp: 97.8 °F (36.6 °C)  Pulse: 77  Rhythm: normal sinus rhythm, sinus tachycardia  BP: 111/60  MAP (mmHg): 81  Resp: 20  SpO2: 100 %  Oxygen Concentration (%): 50    Temp  Min: 97.8 °F (36.6 °C)  Max: 98.6 °F (37 °C)  Pulse  Min: 66  Max: 100  BP  Min: 105/53  Max: 159/73  MAP (mmHg)  Min: 73  Max: 110  Resp  Min: 11  Max: 23  SpO2  Min: 95 %  Max: 100 %  Oxygen Concentration (%)  Min: 50  Max: 50    11/17 0701 - 11/18 0700  In: 2135   Out: 1575 [Urine:1000]   Unmeasured Output  Urine Occurrence: 1  Stool Occurrence: 1  Emesis Occurrence: 0  Pad Count: 2        Physical Exam  Vitals and nursing note reviewed.   Constitutional:       Appearance: She is ill-appearing.   HENT:      Head: Normocephalic.      Right Ear: External ear normal.      Left Ear: External ear normal.      Nose: Nose normal.      Mouth/Throat:      Mouth: Mucous membranes are moist.      Comments: Poor dentition, bleeding and oozing in mouth, and gums.   Eyes:      General: Scleral icterus present.      Pupils: Pupils are equal, round, and reactive to light.   Cardiovascular:      Rate and Rhythm: Normal rate and regular rhythm.      Pulses: Normal pulses.      Heart sounds: Normal heart sounds.   Pulmonary:      Comments: Stridors sounds, thick secretions, wet cough  Abdominal:      General: There is no distension.      Palpations: Abdomen is soft.      Tenderness: There is no abdominal tenderness.   Musculoskeletal:      Cervical back: Neck supple.      Comments: Edematous hands bilaterally and pitting edema in posterior thighs and presacral region   Skin:     General: Skin is warm and dry.   Neurological:      Mental Status: She is alert.      Comments:   No sedation   Alert. Tracking. Increased ability to rotate head.   PERRL, Eyes cross midline. No gross facial asymmetry.   No facial asymmetry  Motor:  Flaccid weakness throughout, minor muscular activation in bilateral hands and feet. Increased ability to support her neck    Sensation: intact to noxious stimuli x 4- grimaces       Gait & coordination exams deferred.        Medications:  Continuous Scheduledfamotidine, 20 mg, BID  folic acid, 1 mg, Daily  lactulose, 30 g, BID  levetiracetam, 500 mg, BID  multivitamin, 1 tablet, Daily  rifAXImin, 550 mg, BID  thiamine, 100 mg, Daily    PRNalbuterol-ipratropium, 3 mL, QID PRN  calcium gluconate IVPB, 1 g, PRN  calcium gluconate IVPB, 2 g, PRN  calcium gluconate IVPB, 3 g, PRN  dextrose 10%, 12.5 g, PRN  dextrose 10%, 25 g, PRN  hydrALAZINE, 10 mg, Q4H PRN  ibuprofen, 600 mg, Q8H PRN  labetalol, 10 mg, Q6H PRN  magnesium sulfate IVPB, 2 g, PRN  magnesium sulfate IVPB, 4 g, PRN  ondansetron, 4 mg, Q8H PRN  potassium chloride, 40 mEq, PRN   And  potassium chloride, 60 mEq, PRN   And  potassium chloride, 80 mEq, PRN  racepinephrine, 0.5 mL, Q4H PRN  sodium phosphate 15 mmol in dextrose 5 % (D5W) 250 mL IVPB, 15 mmol, PRN  sodium phosphate 20.01 mmol in dextrose 5 % (D5W) 250 mL IVPB, 20.01 mmol, PRN  sodium phosphate 30 mmol in dextrose 5 % (D5W) 250 mL IVPB, 30 mmol, PRN      Today I personally reviewed pertinent medications, lines/drains/airways, laboratory results, notably:    Laboratory:  CBC:  Recent Labs   Lab 11/18/23 0414   WBC 11.93   RBC 2.09*   HGB 7.0*   HCT 22.0*   PLT 52*   *   MCH 33.5*   MCHC 31.8*       CMP:  Recent Labs   Lab 11/18/23 0414   CALCIUM 8.4*   ALBUMIN 1.9*   PROT 4.7*      K 4.2   CO2 23   *   BUN 22*   CREATININE 0.5   ALKPHOS 176*   *   *   BILITOT 5.7*     Recent Labs   Lab 11/18/23 0414   MG 1.8   PHOS 3.1       Coagulation:  Recent Labs   Lab 11/18/23  0414   LABPROT 19.8*   INR 1.9*            Diet  Diet NPO  Diet NPO

## 2023-11-18 NOTE — ASSESSMENT & PLAN NOTE
58 year old presented for altered mental status on 10/18. Clinical picture confounded with hepatic encephalopathy, infection, and seizure activity. Neurology was consulted by primary team prior to admission to [facility].    10/27 EEG IMPRESSION:  This is an abnormal EEG during lethargic state.  Diffuse disorganized slowing of the background was noted.  Frequent triphasic waves noted.  Left posterior pseudo periodic epileptiform discharges were noted.  Numerous electrographic seizures emanating from the left posterior region were noted.    10/31 - rehook  11/1 read with frequent discharges, vimpat lowered to 50 given mental status and liver function, will continue to watch on eeg vEEG discontinued  11/03: EEG restarted, remains negative with lacosamide taper, DC and monitor for seizure recurrence  11/5: remains without seizures  11/9/23: EEG slowing  11/15: No witnessed seizures      Neurochecks q4  Vitals q4hr  Keppra 500 mg BID

## 2023-11-18 NOTE — PLAN OF CARE
"[facility] Care Plan    POC reviewed with Mara Mojica and family at 0300. Questions and concerns addressed. No acute events overnight. Pt progressing toward goals. Will continue to monitor. See below and flowsheets for full assessment and VS info.           Is this a stroke patient? no    Neuro:  Woody Coma Scale  Best Eye Response: 4-->(E4) spontaneous  Best Motor Response: 1-->(M1) none  Best Verbal Response: 1-->(V1) none  Woody Coma Scale Score: 6  Assessment Qualifiers: patient not sedated/intubated, no eye obstruction present  Pupil PERRLA: yes     24hr Temp:  [98.4 °F (36.9 °C)-99 °F (37.2 °C)]     CV:   Rhythm: normal sinus rhythm  BP goals:   SBP < 180  MAP > 65    Resp:      Vent Mode: Spont  Set Rate: 14 BPM  Oxygen Concentration (%): 28  Vt Set: 420 mL  PEEP/CPAP: 5 cmH20  Pressure Support: 5 cmH20    Plan: N/A    GI/:     Diet/Nutrition Received: NPO, tube feeding  Last Bowel Movement: 11/18/23  Voiding Characteristics: external catheter    Intake/Output Summary (Last 24 hours) at 11/18/2023 0703  Last data filed at 11/18/2023 0639  Gross per 24 hour   Intake 1855 ml   Output 1425 ml   Net 430 ml     Unmeasured Output  Urine Occurrence: 1  Stool Occurrence: 1  Emesis Occurrence: 0  Pad Count: 2    Labs/Accuchecks:  Recent Labs   Lab 11/18/23  0414   WBC 11.93   RBC 2.09*   HGB 7.0*   HCT 22.0*   PLT 52*      Recent Labs   Lab 11/18/23  0414      K 4.2   CO2 23   *   BUN 22*   CREATININE 0.5   ALKPHOS 176*   *   *   BILITOT 5.7*      Recent Labs   Lab 11/18/23  0414   INR 1.9*    No results for input(s): "CPK", "CPKMB", "TROPONINI", "MB" in the last 168 hours.    Electrolytes: Electrolytes replaced  Accuchecks: none    Gtts:      LDA/Wounds:  Lines/Drains/Airways       Drain  Duration                  NG/OG Tube 10/20/23 2000 16 Fr. Left nostril 28 days              Airway  Duration                Airway Anesthesia 11/05/23 12 days              Peripheral Intravenous Line  " Duration                  Peripheral IV - Single Lumen 11/09/23 1809 Left;Posterior Wrist 8 days         Midline Catheter Insertion/Assessment  - Single Lumen 11/13/23 1840 Right brachial vein 20g x 10cm 4 days                  Wounds: Yes  Wound care consulted: No

## 2023-11-18 NOTE — PROGRESS NOTES
[facility] - Neuro [facility] Care  Neurocritical Care  Progress Note    Admit Date: 10/25/2023  Service Date: 11/18/2023  Length of Stay: 24    Subjective:     Chief Complaint: Seizure    History of Present Illness: This is a 58-year-old female with a past medical history of alcoholic cirrhosis, s/p ex-lap w/ bowel resection for incarcerated hernia, who initially presented on 10/18 to [facility] with acute encephalopathy.  Her  found her on the floor at home with evidence of fecal/urine incontinence with confusion and slurred speech. Possible reported tongue injury in chart. Reported concern L sided weakness and down drift of L arm however CT head without evidence of acute abnormalities, MRI brain with only small vessel vascular changes without evidence of territorial infarct.     Hospital Course: EEG done on 10/19 with mild diffuse nonspecific background slowing, no potential epileptiform activity. Repeat MRI brain with contrast on 10/23 unchanged from initial MRI. Became more lethargic and was no longer following commands or answering questions. Due to worsening hepatic encephalopathy in setting of hepatic cirrhosis, patient transferred to [facility] for management with transplant team. Has remained on antibiotics, lactulose and rifaximin for treatment of UTI with pansensitive Ecoli, HE, and presumed SBP. Neurology consulted for management recommendations regarding persistent AMS. Had repeat EEG done on 10/24 with similar findings to prior EEG showing mild generalized non-specific cerebral dysfunction and no electrographic seizures or indication of seizure tendency. Additional CT head w/o contrast on 10/25 without evidence of acute issues. Remains confused and unable to answer questions on exam. Not withdrawing to painful stimuli. Was able to awaken to verbal stimuli in AM however when reevaluated in afternoon unresponsive however was after receiving Ativan.     On admission to [facility]:  Patient had EEG running, and  was noted to have seizures at 7:30 a.m., 8:00 a.m. in, and 10:00 a.m. was noted to be in focal status prior to receiving Vimpat.  Patient was noted to have stridor, worsening secretions with suspicion for aspiration, decreased airway protection, and rapids was called due to low GCS score of 6. Antibiotics broadened to Vanc/Zosyn. Clinical picture of mental status confounded with episodes of seizures, infection, and hepatic encephalopathy.             Hospital Course: 10/28/2023: Improving GCS from 6 to 10. Continuing pulmonary toilet and deep suctioning for stridor and copious secretions. UA positive for candida. Blood cultures from 10/27 NGTD. Sputum cultures sent. Patient on day 2 of broadened antibiotics vanc/zosyn due to worsening clinical status but will discontinue tomorrow if cultures remain negative. Still hypernatremic, treating with D5W. Patient was transferred with no ordered diuretics, very edematous on physical exam. Adequate stooling on lactulose and rifaximin. Due to increased UOP/stooling will place eckert for one day for irritated skin. EEG update showing no seizures since 10am 10/27. Monitoring CBC for thrombocytopenia and macrocytic anemia. Discussed code status and goals of care with  and best friend at bedside. Trickle feeds resumed.   10/29/2023: No acute events overnight, EEG reportedly without further seizures for ~48 hours can disconnect once formal report is in, neuro status slowly improving as patient now tracking in room, moving extremities, responding with appropriate emotional reactions to her .  Respiratory status largely unchanged with intermittent events of large volume breaths that sound almost stridorous but without actual respiratory distress- gas remains compensated.  UOP low, diuresis resumed.  10/30/2023: d/c mucomyst nebs, add racemic epi scheduled nebs, add budesonide and dex 6 q8 hours, ammonia at 35- continue lactulose and rifaximin, abg reviewed, 40mEq of K  once, ENT consulted for stridor, continue eckert catheter in today   10/31/2023 Patient with worsening respiratory status, continued decreased mentation and increased secretions. Plan for elective intubation in controlled setting with anesthesia. Will also recheck eeg, if negative will start to wean AEDs and see if that improves patient's arousal. PLT stabilized, continue to monitor.   11/1/2023 this morning patient's 6.0 ETT exchanged for 7.0 to allow suctioning. EEG with frequent discharges, lowered vimpat to 50 mg and will follow EEG. Attempting to remove confounding factors for patients mental status. Slow drop in H/H, 1 unit ordered.   11/2/2023 NAEO, cEEG to remain in place, no seizures but is having frequent discharges in runs. Continue lower dose vimpat and 1500 keppra Eye opening this morning which is slight improvement in exam. Failed SBT   11/03/2023: no improvement with decrease in lacosamide, no re-development of seizures, if does not wake up in next 48 hrs off lacosamide or redevelops seizures, prognosis is extremely poor  11/04/2023: improved mental state this am, no seizures overnight  11/05/2023: LOC stable, has cuff leak, trial of extubation  11/06/2023 reintubated for stridor non responsive to med management overnight  11/07/2023 CTH stable. Off of levo. Completed dex (wbc 22, afebrile). CXR w L mildly increasing consolidation. Very thick secretions, added mucomyst and duonebs. Weaning keppra to 750. Pt appearing more edematous today, dc LR. Scheduled tylenol max 2g/d. Scheduled oxy 5q12.  11/08/2023: EEG pending  11/09/2023: EEG slowing  11/10/2023: more alert today  11/11/2023 pressure support trial  11/12/2023 Alert today. Not following commands. On spontaneous overnight, tolerated well. MRI c-spine overnight with spondylosis. No LP at this time. Resume TF. Hold TF tomorrow at 5AM for possible extubation tomorrow.   11/13/2023: patient made DNR, 10 dexamethsone IV one prior extubation with no  intent of re-intubation, PRN racemic epi and Bipap were initiated  11/14/2023: Patient tolerated BiPAP overnight and continues to be more alert, and is tracking faces more consistently. She was taken off BiPAP and placed on 4L oxygen via nasal cannula. Trickle feeds were started while off BiPAP. Significant other at bedside updated on clinical course. Midline placed overnight.   11/15/2023: Thick secretions noted when BiPAP removed this morning, starting CPT. PT/OT consulted with improving mental and respiratory status. Patient more sad and uncomfortable today.   11/16/2023: Advancing tube feeds to goal and encouraging continued work with PT/OT. She has been tolerating nasal cannula oxygenation well and has had a reduction in her secretions.   11/17/2023: Increased secretions overnight and this morning. Continues to work with PT/OT and has increased movement in her head and neck. Tube feed goal adjusted per dietician recommendations. Starting to plan for disposition. Considering ACP and Palliative care talks with family this weekend.   11/18/2023: NAEON. 50g albumin and 40mg lasix given for diuresis.     Interval History: See problem list above    Review of Systems   Unable to perform ROS: Acuity of condition      Objective:     Vitals:  Temp: 97.8 °F (36.6 °C)  Pulse: 77  Rhythm: normal sinus rhythm, sinus tachycardia  BP: 111/60  MAP (mmHg): 81  Resp: 20  SpO2: 100 %  Oxygen Concentration (%): 50    Temp  Min: 97.8 °F (36.6 °C)  Max: 98.6 °F (37 °C)  Pulse  Min: 66  Max: 100  BP  Min: 105/53  Max: 159/73  MAP (mmHg)  Min: 73  Max: 110  Resp  Min: 11  Max: 23  SpO2  Min: 95 %  Max: 100 %  Oxygen Concentration (%)  Min: 50  Max: 50    11/17 0701 - 11/18 0700  In: 2135   Out: 1575 [Urine:1000]   Unmeasured Output  Urine Occurrence: 1  Stool Occurrence: 1  Emesis Occurrence: 0  Pad Count: 2        Physical Exam  Vitals and nursing note reviewed.   Constitutional:       Appearance: She is ill-appearing.   HENT:       Head: Normocephalic.      Right Ear: External ear normal.      Left Ear: External ear normal.      Nose: Nose normal.      Mouth/Throat:      Mouth: Mucous membranes are moist.      Comments: Poor dentition, bleeding and oozing in mouth, and gums.   Eyes:      General: Scleral icterus present.      Pupils: Pupils are equal, round, and reactive to light.   Cardiovascular:      Rate and Rhythm: Normal rate and regular rhythm.      Pulses: Normal pulses.      Heart sounds: Normal heart sounds.   Pulmonary:      Comments: Stridors sounds, thick secretions, wet cough  Abdominal:      General: There is no distension.      Palpations: Abdomen is soft.      Tenderness: There is no abdominal tenderness.   Musculoskeletal:      Cervical back: Neck supple.      Comments: Edematous hands bilaterally and pitting edema in posterior thighs and presacral region   Skin:     General: Skin is warm and dry.   Neurological:      Mental Status: She is alert.      Comments:   No sedation   Alert. Tracking. Increased ability to rotate head.   PERRL, Eyes cross midline. No gross facial asymmetry.   No facial asymmetry  Motor: Flaccid weakness throughout, minor muscular activation in bilateral hands and feet. Increased ability to support her neck    Sensation: intact to noxious stimuli x 4- grimaces       Gait & coordination exams deferred.        Medications:  Continuous Scheduledfamotidine, 20 mg, BID  folic acid, 1 mg, Daily  lactulose, 30 g, BID  levetiracetam, 500 mg, BID  multivitamin, 1 tablet, Daily  rifAXImin, 550 mg, BID  thiamine, 100 mg, Daily    PRNalbuterol-ipratropium, 3 mL, QID PRN  calcium gluconate IVPB, 1 g, PRN  calcium gluconate IVPB, 2 g, PRN  calcium gluconate IVPB, 3 g, PRN  dextrose 10%, 12.5 g, PRN  dextrose 10%, 25 g, PRN  hydrALAZINE, 10 mg, Q4H PRN  ibuprofen, 600 mg, Q8H PRN  labetalol, 10 mg, Q6H PRN  magnesium sulfate IVPB, 2 g, PRN  magnesium sulfate IVPB, 4 g, PRN  ondansetron, 4 mg, Q8H PRN  potassium  chloride, 40 mEq, PRN   And  potassium chloride, 60 mEq, PRN   And  potassium chloride, 80 mEq, PRN  racepinephrine, 0.5 mL, Q4H PRN  sodium phosphate 15 mmol in dextrose 5 % (D5W) 250 mL IVPB, 15 mmol, PRN  sodium phosphate 20.01 mmol in dextrose 5 % (D5W) 250 mL IVPB, 20.01 mmol, PRN  sodium phosphate 30 mmol in dextrose 5 % (D5W) 250 mL IVPB, 30 mmol, PRN      Today I personally reviewed pertinent medications, lines/drains/airways, laboratory results, notably:    Laboratory:  CBC:  Recent Labs   Lab 11/18/23 0414   WBC 11.93   RBC 2.09*   HGB 7.0*   HCT 22.0*   PLT 52*   *   MCH 33.5*   MCHC 31.8*       CMP:  Recent Labs   Lab 11/18/23 0414   CALCIUM 8.4*   ALBUMIN 1.9*   PROT 4.7*      K 4.2   CO2 23   *   BUN 22*   CREATININE 0.5   ALKPHOS 176*   *   *   BILITOT 5.7*     Recent Labs   Lab 11/18/23 0414   MG 1.8   PHOS 3.1       Coagulation:  Recent Labs   Lab 11/18/23 0414   LABPROT 19.8*   INR 1.9*            Diet  Diet NPO  Diet NPO        Assessment/Plan:     Neuro  * Seizure  58 year old presented for altered mental status on 10/18. Clinical picture confounded with hepatic encephalopathy, infection, and seizure activity. Neurology was consulted by primary team prior to admission to [facility].    10/27 EEG IMPRESSION:  This is an abnormal EEG during lethargic state.  Diffuse disorganized slowing of the background was noted.  Frequent triphasic waves noted.  Left posterior pseudo periodic epileptiform discharges were noted.  Numerous electrographic seizures emanating from the left posterior region were noted.    10/31 - rehook  11/1 read with frequent discharges, vimpat lowered to 50 given mental status and liver function, will continue to watch on eeg vEEG discontinued  11/03: EEG restarted, remains negative with lacosamide taper, DC and monitor for seizure recurrence  11/5: remains without seizures  11/9/23: EEG slowing  11/15: No witnessed seizures      Neurochecks q4  Vitals  q4hr  Keppra 500 mg BID      Acute encephalopathy  See acute hepatic encephalopathy and seizures    Pulmonary  Tracheal stenosis  Scope complete by ENT     Patient failed extubation twice, and the decision was made to extubate and if she failed to consider transition to comfort care (see ACP note). Patient placed on BiPAP and tolerated it well. On 11/14 she was transitioned to nasal cannula and was saturating 100% on 4L. Plan for BiPAP as needed         Hematology  Thrombocytopenia  This is a 58-year-old woman with a history of decompensated alcoholic cirrhosis.  Suspect thrombocytopenia is likely secondary to chronic alcohol use and is consumptive in nature. If trend worsens, will consider consulting Hematology.    Daily CBC, transfuse only for active bleeding  SCDs  Frequent bleeding and oozing in gums   Famotidine prophylaxis       Endocrine  Moderate malnutrition  Advancing tube feeds to goal    Nutrition consulted. Most recent weight and BMI monitored-     Measurements:  Wt Readings from Last 1 Encounters:   10/26/23 56.2 kg (123 lb 14.4 oz)   Body mass index is 25.02 kg/m².    Patient has been screened and assessed by RD.    Malnutrition Type:  Context: social/environmental circumstances  Level: moderate    Malnutrition Characteristic Summary:  Subcutaneous Fat (Malnutrition): mild depletion  Muscle Mass (Malnutrition): mild depletion  Fluid Accumulation (Malnutrition): moderate    Interventions/Recommendations (treatment strategy):  1. Updated TF recommendation: Isosource 1.5 @ goal rate of 35 mL/hr- provides 1260 kcals, 57 g pro, and 642 mL fluid. 2. If able to tolerate PO intake, ADAT to 3. RD following.    GI  Acute hepatic encephalopathy  This is a 52 year old woman with a history of ethanol cirrhosis who presents after being found on the ground with fecal and urinary incontinence, and altered mental status. Elevated ammonia on admission.    - Continue lactulose via NG tube TID (titrate till 3-4 daily BM  achieved). Continue Xifaxin.   - Avoid opiates and benzodiazepines, if able.   - IV thiamine, folate supplementation, multivitamin through NG tube.  -MELD score ~50% survival without transplant        Decompensated alcoholic hepatic cirrhosis  Daily CBC, CMP & INR.   Not currently being evaluated for transplant due to clinical status  Continuing lactulose and rifaximin titrated to 3-4 bowel movements daily  Ammonia WNL  Folic acid, thiamine, and multivitamin    Other  Hypoxic respiratory failure  2/2 to tracheal stenosis     - Tolerating nasal cannula well. Continue pulmonary toilet.  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  - Continue working with PT/OT, appreciate recommendations          The patient is being Prophylaxed for:  Venous Thromboembolism with: Mechanical  Stress Ulcer with: H2B  Ventilator Pneumonia with: not applicable    Activity Orders            Diet NPO: NPO starting at 11/13 0500    Elevate HOB Elevate (30-45 degrees) Elevate HOB to 30 - 45 degrees during feeding unless otherwise stated starting at 10/28 1218    Elevate HOB to 30-45 degrees during feeding unless otherwise stated starting at 10/26 1138    Progressive Mobility Protocol (mobilize patient to their highest level of functioning at least twice daily) starting at 10/25 2000    Turn patient starting at 10/25 2000          DNR    Level 3    Hilaria Acevedo PAAquilesC  Neurocritical Care  [facility] - Neuro Critical Care

## 2023-11-18 NOTE — PLAN OF CARE
"[facility] Care Plan    POC reviewed with Mara Mojica and family. Pt unable to verbalize understanding. Questions and concerns addressed. No acute events today. Pt progressing toward goals. Will continue to monitor. See below and flowsheets for full assessment and VS info.     - PRN ibuprofen given x1  - Albumin and furosemide given once  - q4 water flushes held at this time due to fluid status    Is this a stroke patient? no    Neuro:  [place] Coma Scale  Best Eye Response: 4-->(E4) spontaneous  Best Motor Response: 4-->(M4) withdraws from pain  Best Verbal Response: 1-->(V1) none  [place] Coma Scale Score: 9  Assessment Qualifiers: patient not sedated/intubated, no eye obstruction present  Pupil PERRLA: yes     24 hr Temp:  [98.4 °F (36.9 °C)-98.9 °F (37.2 °C)]     CV:   Rhythm: normal sinus rhythm, sinus tachycardia  BP goals:   SBP < 160  MAP > 65    Resp:      Vent Mode: Spont  Set Rate: 14 BPM  Oxygen Concentration (%): 28  Vt Set: 420 mL  PEEP/CPAP: 5 cmH20  Pressure Support: 5 cmH20    Plan:  2L NC with humidification    GI/:     Diet/Nutrition Received: NPO, tube feeding  Last Bowel Movement: 11/18/23  Voiding Characteristics: external catheter    Intake/Output Summary (Last 24 hours) at 11/18/2023 1344  Last data filed at 11/18/2023 1301  Gross per 24 hour   Intake 2805.6 ml   Output 1425 ml   Net 1380.6 ml     Unmeasured Output  Urine Occurrence: 1  Stool Occurrence: 1  Emesis Occurrence: 0  Pad Count: 2    Labs/Accuchecks:  Recent Labs   Lab 11/18/23 0414   WBC 11.93   RBC 2.09*   HGB 7.0*   HCT 22.0*   PLT 52*      Recent Labs   Lab 11/18/23 0414      K 4.2   CO2 23   *   BUN 22*   CREATININE 0.5   ALKPHOS 176*   *   *   BILITOT 5.7*      Recent Labs   Lab 11/18/23 0414   INR 1.9*    No results for input(s): "CPK", "CPKMB", "TROPONINI", "MB" in the last 168 hours.    Electrolytes: N/A - electrolytes WDL  Accuchecks: none    Gtts:      LDA/Wounds:  Lines/Drains/Airways       " Drain  Duration                  NG/OG Tube 10/20/23 2000 16 Fr. Left nostril 28 days              Airway  Duration                Airway Anesthesia 11/05/23 12 days              Peripheral Intravenous Line  Duration                  Peripheral IV - Single Lumen 11/09/23 1809 Left;Posterior Wrist 8 days         Midline Catheter Insertion/Assessment  - Single Lumen 11/13/23 1840 Right brachial vein 20g x 10cm 4 days                  Wounds: Yes  Wound care consulted: Yes

## 2023-11-18 NOTE — NURSING
"[facility] Care Plan    No acute events today. Pt progressing toward goals. Will continue to monitor. See below and flowsheets for full assessment and VS info.    Neuro:  [place] Coma Scale  Best Eye Response: 4-->(E4) spontaneous  Best Motor Response: 1-->(M1) none  Best Verbal Response: 1-->(V1) none  [place] Coma Scale Score: 6  Assessment Qualifiers: patient not sedated/intubated, no eye obstruction present  Pupil PERRLA: yes     24 hr Temp:  [98 °F (36.7 °C)-99 °F (37.2 °C)]     CV:   Rhythm: sinus tachycardia  BP goals:   SBP < 160  MAP > 65    Resp: 2L NC     Plan: N/A    GI/:   Diet/Nutrition Received: NPO, tube feeding  Last Bowel Movement: 11/17/23  Voiding Characteristics: external catheter, incontinence    Intake/Output Summary (Last 24 hours) at 11/17/2023 1909  Last data filed at 11/17/2023 1801  Gross per 24 hour   Intake 2245 ml   Output 1400 ml   Net 845 ml     Unmeasured Output  Urine Occurrence: 1  Stool Occurrence: 1  Emesis Occurrence: 0  Pad Count: 2    Labs/Accuchecks:  Recent Labs   Lab 11/17/23  0509   WBC 14.56*   RBC 2.73*   HGB 9.0*   HCT 28.1*   PLT 73*      Recent Labs   Lab 11/17/23  0509      K 4.3   CO2 20*      BUN 23*   CREATININE 0.5   ALKPHOS 201*   *   *   BILITOT 7.0*      Recent Labs   Lab 11/17/23  0509   INR 1.7*    No results for input(s): "CPK", "CPKMB", "TROPONINI", "MB" in the last 168 hours.    Electrolytes: Electrolytes replaced  Accuchecks: none    Gtts: None      LDA/Wounds:  Lines/Drains/Airways       Drain  Duration                  NG/OG Tube 10/20/23 2000 16 Fr. Left nostril 28 days         Rectal Tube 11/13/23 1954 fecal management system 3 days    Female External Urinary Catheter 11/15/23 0701 2 days              Airway  Duration                Airway Anesthesia 11/05/23 11 days              Peripheral Intravenous Line  Duration                  Peripheral IV - Single Lumen 11/09/23 1809 Left;Posterior Wrist 8 days         Midline Catheter " Insertion/Assessment  - Single Lumen 11/13/23 1840 Right brachial vein 20g x 10cm 4 days                  Wounds: No  Wound care consulted: Yes

## 2023-11-19 LAB
ALBUMIN SERPL BCP-MCNC: 2.2 G/DL (ref 3.5–5.2)
ALP SERPL-CCNC: 146 U/L (ref 55–135)
ALT SERPL W/O P-5'-P-CCNC: 88 U/L (ref 10–44)
ANION GAP SERPL CALC-SCNC: 6 MMOL/L (ref 8–16)
ANISOCYTOSIS BLD QL SMEAR: SLIGHT
AST SERPL-CCNC: 89 U/L (ref 10–40)
BASOPHILS # BLD AUTO: 0.02 K/UL (ref 0–0.2)
BASOPHILS # BLD AUTO: 0.04 K/UL (ref 0–0.2)
BASOPHILS NFR BLD: 0.2 % (ref 0–1.9)
BASOPHILS NFR BLD: 0.4 % (ref 0–1.9)
BILIRUB SERPL-MCNC: 5 MG/DL (ref 0.1–1)
BLD PROD TYP BPU: NORMAL
BLOOD UNIT EXPIRATION DATE: NORMAL
BLOOD UNIT TYPE CODE: 600
BLOOD UNIT TYPE: NORMAL
BUN SERPL-MCNC: 22 MG/DL (ref 6–20)
BURR CELLS BLD QL SMEAR: ABNORMAL
CALCIUM SERPL-MCNC: 8.3 MG/DL (ref 8.7–10.5)
CHLORIDE SERPL-SCNC: 112 MMOL/L (ref 95–110)
CO2 SERPL-SCNC: 24 MMOL/L (ref 23–29)
CODING SYSTEM: NORMAL
CREAT SERPL-MCNC: 0.5 MG/DL (ref 0.5–1.4)
CROSSMATCH INTERPRETATION: NORMAL
DACRYOCYTES BLD QL SMEAR: ABNORMAL
DIFFERENTIAL METHOD: ABNORMAL
DIFFERENTIAL METHOD: ABNORMAL
DISPENSE STATUS: NORMAL
EOSINOPHIL # BLD AUTO: 0.4 K/UL (ref 0–0.5)
EOSINOPHIL # BLD AUTO: 0.4 K/UL (ref 0–0.5)
EOSINOPHIL NFR BLD: 4 % (ref 0–8)
EOSINOPHIL NFR BLD: 4 % (ref 0–8)
ERYTHROCYTE [DISTWIDTH] IN BLOOD BY AUTOMATED COUNT: 20.6 % (ref 11.5–14.5)
ERYTHROCYTE [DISTWIDTH] IN BLOOD BY AUTOMATED COUNT: 21.4 % (ref 11.5–14.5)
EST. GFR  (NO RACE VARIABLE): >60 ML/MIN/1.73 M^2
GLUCOSE SERPL-MCNC: 106 MG/DL (ref 70–110)
HCT VFR BLD AUTO: 19.8 % (ref 37–48.5)
HCT VFR BLD AUTO: 26.6 % (ref 37–48.5)
HGB BLD-MCNC: 6.4 G/DL (ref 12–16)
HGB BLD-MCNC: 8.8 G/DL (ref 12–16)
HYPOCHROMIA BLD QL SMEAR: ABNORMAL
IMM GRANULOCYTES # BLD AUTO: 0.07 K/UL (ref 0–0.04)
IMM GRANULOCYTES # BLD AUTO: 0.09 K/UL (ref 0–0.04)
IMM GRANULOCYTES NFR BLD AUTO: 0.7 % (ref 0–0.5)
IMM GRANULOCYTES NFR BLD AUTO: 0.9 % (ref 0–0.5)
INR PPP: 2 (ref 0.8–1.2)
LYMPHOCYTES # BLD AUTO: 0.8 K/UL (ref 1–4.8)
LYMPHOCYTES # BLD AUTO: 0.8 K/UL (ref 1–4.8)
LYMPHOCYTES NFR BLD: 7.3 % (ref 18–48)
LYMPHOCYTES NFR BLD: 8.7 % (ref 18–48)
MAGNESIUM SERPL-MCNC: 1.8 MG/DL (ref 1.6–2.6)
MAGNESIUM SERPL-MCNC: 2.1 MG/DL (ref 1.6–2.6)
MCH RBC QN AUTO: 33 PG (ref 27–31)
MCH RBC QN AUTO: 33.7 PG (ref 27–31)
MCHC RBC AUTO-ENTMCNC: 32.3 G/DL (ref 32–36)
MCHC RBC AUTO-ENTMCNC: 33.1 G/DL (ref 32–36)
MCV RBC AUTO: 100 FL (ref 82–98)
MCV RBC AUTO: 104 FL (ref 82–98)
MONOCYTES # BLD AUTO: 1.3 K/UL (ref 0.3–1)
MONOCYTES # BLD AUTO: 1.6 K/UL (ref 0.3–1)
MONOCYTES NFR BLD: 13.4 % (ref 4–15)
MONOCYTES NFR BLD: 15.8 % (ref 4–15)
NEUTROPHILS # BLD AUTO: 6.9 K/UL (ref 1.8–7.7)
NEUTROPHILS # BLD AUTO: 7.4 K/UL (ref 1.8–7.7)
NEUTROPHILS NFR BLD: 71.6 % (ref 38–73)
NEUTROPHILS NFR BLD: 73 % (ref 38–73)
NRBC BLD-RTO: 0 /100 WBC
NRBC BLD-RTO: 0 /100 WBC
NUM UNITS TRANS PACKED RBC: NORMAL
OVALOCYTES BLD QL SMEAR: ABNORMAL
PHOSPHATE SERPL-MCNC: 3 MG/DL (ref 2.7–4.5)
PLATELET # BLD AUTO: 44 K/UL (ref 150–450)
PLATELET # BLD AUTO: 48 K/UL (ref 150–450)
PLATELET BLD QL SMEAR: ABNORMAL
PMV BLD AUTO: 12.5 FL (ref 9.2–12.9)
PMV BLD AUTO: 12.8 FL (ref 9.2–12.9)
POIKILOCYTOSIS BLD QL SMEAR: SLIGHT
POLYCHROMASIA BLD QL SMEAR: ABNORMAL
POTASSIUM SERPL-SCNC: 3.6 MMOL/L (ref 3.5–5.1)
POTASSIUM SERPL-SCNC: 4.4 MMOL/L (ref 3.5–5.1)
PROT SERPL-MCNC: 4.5 G/DL (ref 6–8.4)
PROTHROMBIN TIME: 20.3 SEC (ref 9–12.5)
RBC # BLD AUTO: 1.9 M/UL (ref 4–5.4)
RBC # BLD AUTO: 2.67 M/UL (ref 4–5.4)
SODIUM SERPL-SCNC: 142 MMOL/L (ref 136–145)
WBC # BLD AUTO: 10.34 K/UL (ref 3.9–12.7)
WBC # BLD AUTO: 9.45 K/UL (ref 3.9–12.7)

## 2023-11-19 PROCEDURE — 51701 INSERT BLADDER CATHETER: CPT | Mod: NTX

## 2023-11-19 PROCEDURE — 83735 ASSAY OF MAGNESIUM: CPT | Mod: NTX | Performed by: STUDENT IN AN ORGANIZED HEALTH CARE EDUCATION/TRAINING PROGRAM

## 2023-11-19 PROCEDURE — 80053 COMPREHEN METABOLIC PANEL: CPT | Mod: NTX | Performed by: HOSPITALIST

## 2023-11-19 PROCEDURE — 27000221 HC OXYGEN, UP TO 24 HOURS: Mod: NTX

## 2023-11-19 PROCEDURE — 25000003 PHARM REV CODE 250: Mod: NTX

## 2023-11-19 PROCEDURE — 25000003 PHARM REV CODE 250: Mod: NTX | Performed by: PHYSICIAN ASSISTANT

## 2023-11-19 PROCEDURE — 51798 US URINE CAPACITY MEASURE: CPT | Mod: NTX

## 2023-11-19 PROCEDURE — 83735 ASSAY OF MAGNESIUM: CPT | Mod: 91,NTX | Performed by: PSYCHIATRY & NEUROLOGY

## 2023-11-19 PROCEDURE — P9016 RBC LEUKOCYTES REDUCED: HCPCS | Mod: NTX | Performed by: PHYSICIAN ASSISTANT

## 2023-11-19 PROCEDURE — 97112 NEUROMUSCULAR REEDUCATION: CPT | Mod: NTX

## 2023-11-19 PROCEDURE — 94668 MNPJ CHEST WALL SBSQ: CPT | Mod: NTX

## 2023-11-19 PROCEDURE — 20600001 HC STEP DOWN PRIVATE ROOM: Mod: NTX

## 2023-11-19 PROCEDURE — 25000003 PHARM REV CODE 250: Mod: NTX | Performed by: HOSPITALIST

## 2023-11-19 PROCEDURE — 86922 COMPATIBILITY TEST ANTIGLOB: CPT | Mod: NTX | Performed by: PHYSICIAN ASSISTANT

## 2023-11-19 PROCEDURE — 63600175 PHARM REV CODE 636 W HCPCS: Mod: NTX | Performed by: NURSE PRACTITIONER

## 2023-11-19 PROCEDURE — 99900026 HC AIRWAY MAINTENANCE (STAT): Mod: NTX

## 2023-11-19 PROCEDURE — 84100 ASSAY OF PHOSPHORUS: CPT | Mod: NTX | Performed by: STUDENT IN AN ORGANIZED HEALTH CARE EDUCATION/TRAINING PROGRAM

## 2023-11-19 PROCEDURE — 85025 COMPLETE CBC W/AUTO DIFF WBC: CPT | Mod: 91,NTX | Performed by: HOSPITALIST

## 2023-11-19 PROCEDURE — 25000003 PHARM REV CODE 250: Mod: NTX | Performed by: NURSE PRACTITIONER

## 2023-11-19 PROCEDURE — 99900035 HC TECH TIME PER 15 MIN (STAT): Mod: NTX

## 2023-11-19 PROCEDURE — 85610 PROTHROMBIN TIME: CPT | Mod: NTX | Performed by: STUDENT IN AN ORGANIZED HEALTH CARE EDUCATION/TRAINING PROGRAM

## 2023-11-19 PROCEDURE — 99233 SBSQ HOSP IP/OBS HIGH 50: CPT | Mod: NTX,,,

## 2023-11-19 PROCEDURE — 84132 ASSAY OF SERUM POTASSIUM: CPT | Mod: NTX | Performed by: PSYCHIATRY & NEUROLOGY

## 2023-11-19 PROCEDURE — 99233 PR SUBSEQUENT HOSPITAL CARE,LEVL III: ICD-10-PCS | Mod: NTX,,,

## 2023-11-19 PROCEDURE — 36430 TRANSFUSION BLD/BLD COMPNT: CPT

## 2023-11-19 PROCEDURE — 85025 COMPLETE CBC W/AUTO DIFF WBC: CPT | Mod: NTX

## 2023-11-19 PROCEDURE — 94761 N-INVAS EAR/PLS OXIMETRY MLT: CPT | Mod: NTX

## 2023-11-19 RX ORDER — HYDROCODONE BITARTRATE AND ACETAMINOPHEN 500; 5 MG/1; MG/1
TABLET ORAL
Status: DISCONTINUED | OUTPATIENT
Start: 2023-11-19 | End: 2023-11-20

## 2023-11-19 RX ORDER — PROPRANOLOL HYDROCHLORIDE 20 MG/5ML
5 SOLUTION ORAL EVERY 12 HOURS
Status: DISCONTINUED | OUTPATIENT
Start: 2023-11-19 | End: 2023-11-24

## 2023-11-19 RX ADMIN — POTASSIUM CHLORIDE 10 MEQ: 7.46 INJECTION, SOLUTION INTRAVENOUS at 10:11

## 2023-11-19 RX ADMIN — PROPRANOLOL HYDROCHLORIDE 5 MG: 20 SOLUTION ORAL at 09:11

## 2023-11-19 RX ADMIN — LEVETIRACETAM 500 MG: 100 SOLUTION ORAL at 08:11

## 2023-11-19 RX ADMIN — FOLIC ACID 1 MG: 1 TABLET ORAL at 08:11

## 2023-11-19 RX ADMIN — RIFAXIMIN 550 MG: 550 TABLET ORAL at 09:11

## 2023-11-19 RX ADMIN — LACTULOSE 30 G: 20 SOLUTION ORAL at 08:11

## 2023-11-19 RX ADMIN — THERA TABS 1 TABLET: TAB at 08:11

## 2023-11-19 RX ADMIN — PROPRANOLOL HYDROCHLORIDE 5 MG: 20 SOLUTION ORAL at 01:11

## 2023-11-19 RX ADMIN — POTASSIUM CHLORIDE 10 MEQ: 7.46 INJECTION, SOLUTION INTRAVENOUS at 04:11

## 2023-11-19 RX ADMIN — MAGNESIUM SULFATE HEPTAHYDRATE 2 G: 40 INJECTION, SOLUTION INTRAVENOUS at 08:11

## 2023-11-19 RX ADMIN — POTASSIUM CHLORIDE 10 MEQ: 7.46 INJECTION, SOLUTION INTRAVENOUS at 11:11

## 2023-11-19 RX ADMIN — FAMOTIDINE 20 MG: 20 TABLET ORAL at 09:11

## 2023-11-19 RX ADMIN — FAMOTIDINE 20 MG: 20 TABLET ORAL at 08:11

## 2023-11-19 RX ADMIN — THIAMINE HCL TAB 100 MG 100 MG: 100 TAB at 08:11

## 2023-11-19 RX ADMIN — LEVETIRACETAM 500 MG: 100 SOLUTION ORAL at 09:11

## 2023-11-19 RX ADMIN — LACTULOSE 30 G: 20 SOLUTION ORAL at 09:11

## 2023-11-19 RX ADMIN — POTASSIUM CHLORIDE 10 MEQ: 7.46 INJECTION, SOLUTION INTRAVENOUS at 01:11

## 2023-11-19 RX ADMIN — RIFAXIMIN 550 MG: 550 TABLET ORAL at 08:11

## 2023-11-19 NOTE — PLAN OF CARE
"[facility] Care Plan    POC reviewed with Mara Mojica and family at 0300.  Questions and concerns addressed. No acute events overnight. Pt progressing toward goals. Will continue to monitor. See below and flowsheets for full assessment and VS info.           Is this a stroke patient? no    Neuro:  [place] Coma Scale  Best Eye Response: 4-->(E4) spontaneous  Best Motor Response: 4-->(M4) withdraws from pain  Best Verbal Response: 1-->(V1) none  [place]y Coma Scale Score: 9  Assessment Qualifiers: patient not sedated/intubated, no eye obstruction present  Pupil PERRLA: yes     24hr Temp:  [97.5 °F (36.4 °C)-98.8 °F (37.1 °C)]     CV:   Rhythm: normal sinus rhythm  BP goals:   SBP < 180  MAP > 65    Resp:      Vent Mode: Spont  Set Rate: 14 BPM  Oxygen Concentration (%): 50  Vt Set: 420 mL  PEEP/CPAP: 5 cmH20  Pressure Support: 5 cmH20    Plan: N/A    GI/:     Diet/Nutrition Received: NPO, tube feeding  Last Bowel Movement: 11/16/23  Voiding Characteristics: external catheter    Intake/Output Summary (Last 24 hours) at 11/19/2023 0717  Last data filed at 11/19/2023 0601  Gross per 24 hour   Intake 1440 ml   Output 1100 ml   Net 340 ml     Unmeasured Output  Urine Occurrence: 2  Stool Occurrence: 1  Emesis Occurrence: 0  Pad Count: 3    Labs/Accuchecks:  Recent Labs   Lab 11/19/23  0454   WBC 9.45   RBC 1.90*   HGB 6.4*   HCT 19.8*   PLT 44*      Recent Labs   Lab 11/19/23  0454      K 3.6   CO2 24   *   BUN 22*   CREATININE 0.5   ALKPHOS 146*   ALT 88*   AST 89*   BILITOT 5.0*      Recent Labs   Lab 11/19/23  0454   INR 2.0*    No results for input(s): "CPK", "CPKMB", "TROPONINI", "MB" in the last 168 hours.    Electrolytes: Electrolytes replaced  Accuchecks: none    Gtts:      LDA/Wounds:  Lines/Drains/Airways       Drain  Duration                  NG/OG Tube 10/20/23 2000 16 Fr. Left nostril 29 days              Airway  Duration                Airway Anesthesia 11/05/23 13 days              Peripheral " Intravenous Line  Duration                  Peripheral IV - Single Lumen 11/09/23 1809 Left;Posterior Wrist 9 days         Midline Catheter Insertion/Assessment  - Single Lumen 11/13/23 1840 Right brachial vein 20g x 10cm 5 days                  Wounds: Yes  Wound care consulted: Yes

## 2023-11-19 NOTE — PROVIDER TRANSFER
Neuro Critical Care Transfer of Care note    Date of Admit: 10/25/2023  Date of Transfer / Stepdown: 11/19/2023    Brief History of Present Illness:      History of Present Illness: This is a 58-year-old female with a past medical history of alcoholic cirrhosis, s/p ex-lap w/ bowel resection for incarcerated hernia, who initially presented on 10/18 to [facility] with acute encephalopathy.  Her  found her on the floor at home with evidence of fecal/urine incontinence with confusion and slurred speech. Possible reported tongue injury in chart. Reported concern L sided weakness and down drift of L arm however CT head without evidence of acute abnormalities, MRI brain with only small vessel vascular changes without evidence of territorial infarct.     Hospital Course: EEG done on 10/19 with mild diffuse nonspecific background slowing, no potential epileptiform activity. Repeat MRI brain with contrast on 10/23 unchanged from initial MRI. Became more lethargic and was no longer following commands or answering questions. Due to worsening hepatic encephalopathy in setting of hepatic cirrhosis, patient transferred to [facility] for management with transplant team. Has remained on antibiotics, lactulose and rifaximin for treatment of UTI with pansensitive Ecoli, HE, and presumed SBP. Neurology consulted for management recommendations regarding persistent AMS. Had repeat EEG done on 10/24 with similar findings to prior EEG showing mild generalized non-specific cerebral dysfunction and no electrographic seizures or indication of seizure tendency. Additional CT head w/o contrast on 10/25 without evidence of acute issues. Remains confused and unable to answer questions on exam. Not withdrawing to painful stimuli. Was able to awaken to verbal stimuli in AM however when reevaluated in afternoon unresponsive however was after receiving Ativan.     On admission to [facility]:  Patient had EEG running, and was noted to have seizures at  7:30 a.m., 8:00 a.m. in, and 10:00 a.m. was noted to be in focal status prior to receiving Vimpat.  Patient was noted to have stridor, worsening secretions with suspicion for aspiration, decreased airway protection, and rapids was called due to low GCS score of 6. Antibiotics broadened to Vanc/Zosyn. Clinical picture of mental status confounded with episodes of seizures, infection, and hepatic encephalopathy.               Hospital Course: 10/28/2023: Improving GCS from 6 to 10. Continuing pulmonary toilet and deep suctioning for stridor and copious secretions. UA positive for candida. Blood cultures from 10/27 NGTD. Sputum cultures sent. Patient on day 2 of broadened antibiotics vanc/zosyn due to worsening clinical status but will discontinue tomorrow if cultures remain negative. Still hypernatremic, treating with D5W. Patient was transferred with no ordered diuretics, very edematous on physical exam. Adequate stooling on lactulose and rifaximin. Due to increased UOP/stooling will place eckert for one day for irritated skin. EEG update showing no seizures since 10am 10/27. Monitoring CBC for thrombocytopenia and macrocytic anemia. Discussed code status and goals of care with  and best friend at bedside. Trickle feeds resumed.   10/29/2023: No acute events overnight, EEG reportedly without further seizures for ~48 hours can disconnect once formal report is in, neuro status slowly improving as patient now tracking in room, moving extremities, responding with appropriate emotional reactions to her .  Respiratory status largely unchanged with intermittent events of large volume breaths that sound almost stridorous but without actual respiratory distress- gas remains compensated.  UOP low, diuresis resumed.  10/30/2023: d/c mucomyst nebs, add racemic epi scheduled nebs, add budesonide and dex 6 q8 hours, ammonia at 35- continue lactulose and rifaximin, abg reviewed, 40mEq of K once, ENT consulted for  stridor, continue eckert catheter in today   10/31/2023 Patient with worsening respiratory status, continued decreased mentation and increased secretions. Plan for elective intubation in controlled setting with anesthesia. Will also recheck eeg, if negative will start to wean AEDs and see if that improves patient's arousal. PLT stabilized, continue to monitor.   11/1/2023 this morning patient's 6.0 ETT exchanged for 7.0 to allow suctioning. EEG with frequent discharges, lowered vimpat to 50 mg and will follow EEG. Attempting to remove confounding factors for patients mental status. Slow drop in H/H, 1 unit ordered.   11/2/2023 NAEO, cEEG to remain in place, no seizures but is having frequent discharges in runs. Continue lower dose vimpat and 1500 keppra Eye opening this morning which is slight improvement in exam. Failed SBT   11/03/2023: no improvement with decrease in lacosamide, no re-development of seizures, if does not wake up in next 48 hrs off lacosamide or redevelops seizures, prognosis is extremely poor  11/04/2023: improved mental state this am, no seizures overnight  11/05/2023: LOC stable, has cuff leak, trial of extubation  11/06/2023 reintubated for stridor non responsive to med management overnight  11/07/2023 CTH stable. Off of levo. Completed dex (wbc 22, afebrile). CXR w L mildly increasing consolidation. Very thick secretions, added mucomyst and duonebs. Weaning keppra to 750. Pt appearing more edematous today, dc LR. Scheduled tylenol max 2g/d. Scheduled oxy 5q12.  11/08/2023: EEG pending  11/09/2023: EEG slowing  11/10/2023: more alert today  11/11/2023 pressure support trial  11/12/2023 Alert today. Not following commands. On spontaneous overnight, tolerated well. MRI c-spine overnight with spondylosis. No LP at this time. Resume TF. Hold TF tomorrow at 5AM for possible extubation tomorrow.   11/13/2023: patient made DNR, 10 dexamethsone IV one prior extubation with no intent of re-intubation,  PRN racemic epi and Bipap were initiated  11/14/2023: Patient tolerated BiPAP overnight and continues to be more alert, and is tracking faces more consistently. She was taken off BiPAP and placed on 4L oxygen via nasal cannula. Trickle feeds were started while off BiPAP. Significant other at bedside updated on clinical course. Midline placed overnight.   11/15/2023: Thick secretions noted when BiPAP removed this morning, starting CPT. PT/OT consulted with improving mental and respiratory status. Patient more sad and uncomfortable today.   11/16/2023: Advancing tube feeds to goal and encouraging continued work with PT/OT. She has been tolerating nasal cannula oxygenation well and has had a reduction in her secretions.   11/17/2023: Increased secretions overnight and this morning. Continues to work with PT/OT and has increased movement in her head and neck. Tube feed goal adjusted per dietician recommendations. Starting to plan for disposition. Considering ACP and Palliative care talks with family this weekend.   11/18/2023: NAEON. 50g albumin and 40mg lasix given for diuresis.   11/19/2023: H&H dropped overnight, received 1 unit [facility]. Otherwise, NAEON. Stable for transfer to floor with .     Hospital Course By Problem with Pertinent Findings:     1. Seizure  58 year old presented for altered mental status on 10/18. Clinical picture confounded with hepatic encephalopathy, infection, and seizure activity. Neurology was consulted by primary team prior to admission to [facility].     10/27 EEG IMPRESSION:  This is an abnormal EEG during lethargic state.  Diffuse disorganized slowing of the background was noted.  Frequent triphasic waves noted.  Left posterior pseudo periodic epileptiform discharges were noted.  Numerous electrographic seizures emanating from the left posterior region were noted.     10/31 - rehook  11/1 read with frequent discharges, vimpat lowered to 50 given mental status and liver function, will continue  to watch on eeg vEEG discontinued  11/03: EEG restarted, remains negative with lacosamide taper, DC and monitor for seizure recurrence  11/5: remains without seizures  11/9/23: EEG slowing  11/15: No witnessed seizures        Neurochecks q4  Vitals q4hr  Keppra 500 mg BID     Stable for transfer to floor with     2. Acute Encephalopathy      See acute hepatic encephalopathy and seizures     3. Tracheal Stenosis  Scope complete by ENT      Patient failed extubation twice, and the decision was made to extubate and if she failed to consider transition to comfort care (see ACP note). Patient placed on BiPAP and tolerated it well. On 11/14 she was transitioned to nasal cannula and was saturating 100% on 4L. Plan for BiPAP as needed     4. Thrombocytopenia  This is a 58-year-old woman with a history of decompensated alcoholic cirrhosis.  Suspect thrombocytopenia is likely secondary to chronic alcohol use and is consumptive in nature. If trend worsens, will consider consulting Hematology.     Daily CBC, transfuse only for active bleeding  SCDs  Frequent bleeding and oozing in gums   Famotidine prophylaxis        5. Moderate Malnutrition  Advancing tube feeds to goal     Nutrition consulted. Most recent weight and BMI monitored-      Measurements:      Wt Readings from Last 1 Encounters:   10/26/23 56.2 kg (123 lb 14.4 oz)   Body mass index is 25.02 kg/m².     Patient has been screened and assessed by RD.     Malnutrition Type:  Context: social/environmental circumstances  Level: moderate     Malnutrition Characteristic Summary:  Subcutaneous Fat (Malnutrition): mild depletion  Muscle Mass (Malnutrition): mild depletion  Fluid Accumulation (Malnutrition): moderate     Interventions/Recommendations (treatment strategy):  1. Updated TF recommendation: Isosource 1.5 @ goal rate of 35 mL/hr- provides 1260 kcals, 57 g pro, and 642 mL fluid. 2. If able to tolerate PO intake, ADAT to 3. RD following.    6. Acute Hepatic  Encephalopathy  This is a 52 year old woman with a history of ethanol cirrhosis who presents after being found on the ground with fecal and urinary incontinence, and altered mental status. Elevated ammonia on admission.     - Continue lactulose via NG tube TID (titrate till 3-4 daily BM achieved). Continue Xifaxin.   - Avoid opiates and benzodiazepines, if able.   - IV thiamine, folate supplementation, multivitamin through NG tube.  -MELD score ~50% survival without transplant    7. Decompensated Alcoholic Hepatic Cirrhosis  Daily CBC, CMP & INR.   Not currently being evaluated for transplant due to clinical status  Continuing lactulose and rifaximin titrated to 3-4 bowel movements daily  Ammonia WNL  Folic acid, thiamine, and multivitamin    8. Hypoxic respiratory failure  2/2 to tracheal stenosis     - Tolerating nasal cannula well. Continue pulmonary toilet.  - CXR tomorrow morning   - CPT and continued monitoring by respiratory therapy    9. 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  - Continue working with PT/OT, appreciate recommendations    Consultants and Procedures:     Consultants:  Consults (From admission, onward)          Status Ordering Provider     Inpatient consult to Midline team  Once        Provider:  (Not yet assigned)    Completed RENUKA SON     Inpatient consult to Midline team  Once        Provider:  (Not yet assigned)    Completed VIVIAN JOHNSON     Inpatient consult to Skin Integrity  Practitioner  Once        Provider:  (Not yet assigned)    Completed LUAN LOPEZ     Inpatient consult to Midline team  Once        Provider:  (Not yet assigned)    Completed EBONY BYNUM     Inpatient consult to ENT  Once        Provider:  (Not yet assigned)    Completed EBONY BYNUM     Inpatient consult to Neuro Critical Care  Once        Provider:  (Not yet assigned)    Completed VIVIAN WHITAKER     Inpatient  consult to Infectious Diseases  Once        Provider:  (Not yet assigned)    Completed VIVIAN WHITAKER     Inpatient consult to Registered Dietitian/Nutritionist  Once        Provider:  (Not yet assigned)    Completed LEONIDES LEIJA     Inpatient consult to Hepatology  Once        Provider:  (Not yet assigned)    Completed LEONIDES LEIJA     Inpatient consult to Neurology  Once        Provider:  (Not yet assigned)    Completed LEONIDES LEIJA             Procedures:    Endoscopy    Transfer Information:     Diet:  TF via NGT    Physical Activity:  As tolerated    To Do / Pending Studies / Follow ups:  Pulm toileting, discharge planning    Hilaria Acevedo PA-C  Neuro Critical Care

## 2023-11-19 NOTE — PT/OT/SLP PROGRESS
Occupational Therapy   Treatment    Name: Mara Mojica  MRN: [id number]  Admitting Diagnosis:  Seizure       Recommendations:     Discharge Recommendations: Moderate Intensity Therapy  Discharge Equipment Recommendations:  to be determined by next level of care  Barriers to discharge:  Inaccessible home environment, Decreased caregiver support    Assessment:     Mara Mojica is a 58 y.o. female with a medical diagnosis of Seizure.  She presents with the following performance deficits affecting function: weakness, impaired endurance, impaired sensation, impaired self care skills, impaired functional mobility, gait instability, impaired balance, visual deficits, impaired cognition, decreased coordination, decreased upper extremity function, decreased lower extremity function, decreased safety awareness, abnormal tone, decreased ROM, impaired fine motor, impaired coordination, impaired skin, edema, impaired cardiopulmonary response to activity.  OT worked on PROM to all extremities, as well as, bed positioning and grooming tasks.  No AROM noted in UEs/LEs but pt noted to become more alert during ROM exercises.  Edema noted on L UE and elevation provided at end of session to address.      Rehab Prognosis:  Fair; patient would benefit from acute skilled OT services to address these deficits and reach maximum level of function.       Plan:     Patient to be seen 4 x/week to address the above listed problems via self-care/home management, therapeutic activities, therapeutic exercises, neuromuscular re-education  Plan of Care Expires: 12/14/23  Plan of Care Reviewed with: patient    Subjective     Chief Complaint: N/A  Patient/Family Comments/goals: N/A - pt not communicating goals to OT during session  Pain/Comfort:  Pain Rating 1: 0/10  Pain Rating Post-Intervention 1: 0/10    Objective:     Communicated with: nurse prior to session.  Patient found HOB elevated with NG tube, telemetry, bed alarm, PureWick, oxygen,  blood pressure cuff, pulse ox (continuous) upon OT entry to room.    General Precautions: Standard, aspiration, fall    Orthopedic Precautions:N/A  Braces: N/A  Respiratory Status: Nasal cannula, flow 2 L/min     Occupational Performance:     Bed Mobility:    Patient completed Rolling/Turning to Right with total A  Patient completed Scooting/Bridging with total A (max x2)      Functional Mobility/Transfers:  N/A      Activities of Daily Living:  Grooming: total assistance - pt assisted with oral care using suction toothbrush and washing face with washcloth      [facility] 6 Click ADL: 6    Treatment & Education:  Pt completed 1 set of 12 reps of B UE/LE PROM exercises in order to work towards maintaining available ROM and comfort - exercises performed to all joints in UEs/LEs bilaterally - noted with decreased PROM to B ankles, knees and hips  Edema noted on L hand - assisted with elevating with pillow at end of session      Patient left right sidelying with all lines intact, call button in reach, and nurse notified    GOALS:   Multidisciplinary Problems       Occupational Therapy Goals          Problem: Occupational Therapy    Goal Priority Disciplines Outcome Interventions   Occupational Therapy Goal     OT, PT/OT Ongoing, Progressing    Description: Goals to be met by: 12/15/23     Patient will increase functional independence with ADLs by performing:    UE Dressing with Minimal Assistance.  LE Dressing with Moderate Assistance.  Grooming while seated with [place].  Toileting from bedside commode with Minimal Assistance for hygiene and clothing management.   Step transfer with Minimal Assistance  Toilet transfer to bedside commode with Minimal Assistance.                         Time Tracking:     OT Date of Treatment: 11/19/23  OT Start Time: 0847  OT Stop Time: 0905  OT Total Time (min): 18 min    Billable Minutes:Neuromuscular Re-education 18    OT/SHAWNA: OT          11/19/2023

## 2023-11-19 NOTE — SUBJECTIVE & OBJECTIVE
Interval History: See problem list above    Review of Systems   Unable to perform ROS: Acuity of condition      Objective:     Vitals:  Temp: 98.3 °F (36.8 °C)  Pulse: 69  Rhythm: normal sinus rhythm  BP: (!) 104/55  MAP (mmHg): 78  Resp: 14  SpO2: 98 %    Temp  Min: 97.5 °F (36.4 °C)  Max: 99.2 °F (37.3 °C)  Pulse  Min: 65  Max: 94  BP  Min: 93/55  Max: 130/60  MAP (mmHg)  Min: 68  Max: 98  Resp  Min: 11  Max: 29  SpO2  Min: 94 %  Max: 100 %    11/18 0701 - 11/19 0700  In: 1955.6 [I.V.:12.5]  Out: 1100 [Urine:1100]   Unmeasured Output  Urine Occurrence: 2  Stool Occurrence: (P) 1  Emesis Occurrence: 0  Pad Count: 3        Physical Exam  Vitals and nursing note reviewed.   Constitutional:       Appearance: She is ill-appearing.   HENT:      Head: Normocephalic.      Right Ear: External ear normal.      Left Ear: External ear normal.      Nose: Nose normal.      Mouth/Throat:      Mouth: Mucous membranes are moist.      Comments: Poor dentition, bleeding and oozing in mouth, and gums.   Eyes:      General: Scleral icterus present.      Pupils: Pupils are equal, round, and reactive to light.   Cardiovascular:      Rate and Rhythm: Normal rate and regular rhythm.      Pulses: Normal pulses.      Heart sounds: Normal heart sounds.   Pulmonary:      Comments: Stridors sounds, thick secretions, wet cough  Abdominal:      General: There is no distension.      Palpations: Abdomen is soft.      Tenderness: There is no abdominal tenderness.   Musculoskeletal:      Cervical back: Neck supple.      Comments: Edematous hands bilaterally and pitting edema in posterior thighs and presacral region   Skin:     General: Skin is warm and dry.   Neurological:      Mental Status: She is alert.      Comments:   No sedation   Alert. Tracking. Increased ability to rotate head.   PERRL, Eyes cross midline. No gross facial asymmetry.   No facial asymmetry  Motor: Flaccid weakness throughout, minor muscular activation in bilateral hands and  feet. Increased ability to support her neck    Sensation: intact to noxious stimuli x 4- grimaces       Gait & coordination exams deferred.        Medications:  Continuous Scheduledfamotidine, 20 mg, BID  folic acid, 1 mg, Daily  lactulose, 30 g, BID  levetiracetam, 500 mg, BID  multivitamin, 1 tablet, Daily  propranoloL, 5 mg, Q12H  rifAXImin, 550 mg, BID  thiamine, 100 mg, Daily    PRN0.9%  NaCl infusion (for blood administration), , Q24H PRN  albuterol-ipratropium, 3 mL, QID PRN  calcium gluconate IVPB, 1 g, PRN  calcium gluconate IVPB, 2 g, PRN  calcium gluconate IVPB, 3 g, PRN  dextrose 10%, 12.5 g, PRN  dextrose 10%, 25 g, PRN  hydrALAZINE, 10 mg, Q4H PRN  ibuprofen, 600 mg, Q8H PRN  labetalol, 10 mg, Q6H PRN  magnesium sulfate IVPB, 2 g, PRN  magnesium sulfate IVPB, 4 g, PRN  ondansetron, 4 mg, Q8H PRN  potassium chloride, 40 mEq, PRN   And  potassium chloride, 60 mEq, PRN   And  potassium chloride, 80 mEq, PRN  racepinephrine, 0.5 mL, Q4H PRN  sodium phosphate 15 mmol in dextrose 5 % (D5W) 250 mL IVPB, 15 mmol, PRN  sodium phosphate 20.01 mmol in dextrose 5 % (D5W) 250 mL IVPB, 20.01 mmol, PRN  sodium phosphate 30 mmol in dextrose 5 % (D5W) 250 mL IVPB, 30 mmol, PRN      Today I personally reviewed pertinent medications, lines/drains/airways, laboratory results, notably:    Laboratory:  CBC:  Recent Labs   Lab 11/19/23  1153   WBC 10.34   RBC 2.67*   HGB 8.8*   HCT 26.6*   PLT 48*   *   MCH 33.0*   MCHC 33.1       CMP:  Recent Labs   Lab 11/19/23  0454   CALCIUM 8.3*   ALBUMIN 2.2*   PROT 4.5*      K 3.6   CO2 24   *   BUN 22*   CREATININE 0.5   ALKPHOS 146*   ALT 88*   AST 89*   BILITOT 5.0*     Recent Labs   Lab 11/19/23  0454   MG 1.8   PHOS 3.0       Coagulation:  Recent Labs   Lab 11/19/23  0454   LABPROT 20.3*   INR 2.0*            Diet  Diet NPO  Diet NPO

## 2023-11-19 NOTE — ASSESSMENT & PLAN NOTE
58 year old presented for altered mental status on 10/18. Clinical picture confounded with hepatic encephalopathy, infection, and seizure activity. Neurology was consulted by primary team prior to admission to [facility].    10/27 EEG IMPRESSION:  This is an abnormal EEG during lethargic state.  Diffuse disorganized slowing of the background was noted.  Frequent triphasic waves noted.  Left posterior pseudo periodic epileptiform discharges were noted.  Numerous electrographic seizures emanating from the left posterior region were noted.    10/31 - rehook  11/1 read with frequent discharges, vimpat lowered to 50 given mental status and liver function, will continue to watch on eeg vEEG discontinued  11/03: EEG restarted, remains negative with lacosamide taper, DC and monitor for seizure recurrence  11/5: remains without seizures  11/9/23: EEG slowing  11/15: No witnessed seizures      Neurochecks q4  Vitals q4hr  Keppra 500 mg BID    Stable for transfer to floor with

## 2023-11-19 NOTE — PROGRESS NOTES
[facility] Neuro [facility] Care  Neurocritical Care  Progress Note    Admit Date: 10/25/2023  Service Date: 11/19/2023  Length of Stay: 25    Subjective:     Chief Complaint: Seizure    History of Present Illness: This is a 58-year-old female with a past medical history of alcoholic cirrhosis, s/p ex-lap w/ bowel resection for incarcerated hernia, who initially presented on 10/18 to [facility] with acute encephalopathy.  Her  found her on the floor at home with evidence of fecal/urine incontinence with confusion and slurred speech. Possible reported tongue injury in chart. Reported concern L sided weakness and down drift of L arm however CT head without evidence of acute abnormalities, MRI brain with only small vessel vascular changes without evidence of territorial infarct.     Hospital Course: EEG done on 10/19 with mild diffuse nonspecific background slowing, no potential epileptiform activity. Repeat MRI brain with contrast on 10/23 unchanged from initial MRI. Became more lethargic and was no longer following commands or answering questions. Due to worsening hepatic encephalopathy in setting of hepatic cirrhosis, patient transferred to [facility] for management with transplant team. Has remained on antibiotics, lactulose and rifaximin for treatment of UTI with pansensitive Ecoli, HE, and presumed SBP. Neurology consulted for management recommendations regarding persistent AMS. Had repeat EEG done on 10/24 with similar findings to prior EEG showing mild generalized non-specific cerebral dysfunction and no electrographic seizures or indication of seizure tendency. Additional CT head w/o contrast on 10/25 without evidence of acute issues. Remains confused and unable to answer questions on exam. Not withdrawing to painful stimuli. Was able to awaken to verbal stimuli in AM however when reevaluated in afternoon unresponsive however was after receiving Ativan.     On admission to [facility]:  Patient had EEG running, and  was noted to have seizures at 7:30 a.m., 8:00 a.m. in, and 10:00 a.m. was noted to be in focal status prior to receiving Vimpat.  Patient was noted to have stridor, worsening secretions with suspicion for aspiration, decreased airway protection, and rapids was called due to low GCS score of 6. Antibiotics broadened to Vanc/Zosyn. Clinical picture of mental status confounded with episodes of seizures, infection, and hepatic encephalopathy.             Hospital Course: 10/28/2023: Improving GCS from 6 to 10. Continuing pulmonary toilet and deep suctioning for stridor and copious secretions. UA positive for candida. Blood cultures from 10/27 NGTD. Sputum cultures sent. Patient on day 2 of broadened antibiotics vanc/zosyn due to worsening clinical status but will discontinue tomorrow if cultures remain negative. Still hypernatremic, treating with D5W. Patient was transferred with no ordered diuretics, very edematous on physical exam. Adequate stooling on lactulose and rifaximin. Due to increased UOP/stooling will place eckert for one day for irritated skin. EEG update showing no seizures since 10am 10/27. Monitoring CBC for thrombocytopenia and macrocytic anemia. Discussed code status and goals of care with  and best friend at bedside. Trickle feeds resumed.   10/29/2023: No acute events overnight, EEG reportedly without further seizures for ~48 hours can disconnect once formal report is in, neuro status slowly improving as patient now tracking in room, moving extremities, responding with appropriate emotional reactions to her .  Respiratory status largely unchanged with intermittent events of large volume breaths that sound almost stridorous but without actual respiratory distress- gas remains compensated.  UOP low, diuresis resumed.  10/30/2023: d/c mucomyst nebs, add racemic epi scheduled nebs, add budesonide and dex 6 q8 hours, ammonia at 35- continue lactulose and rifaximin, abg reviewed, 40mEq of K  once, ENT consulted for stridor, continue eckert catheter in today   10/31/2023 Patient with worsening respiratory status, continued decreased mentation and increased secretions. Plan for elective intubation in controlled setting with anesthesia. Will also recheck eeg, if negative will start to wean AEDs and see if that improves patient's arousal. PLT stabilized, continue to monitor.   11/1/2023 this morning patient's 6.0 ETT exchanged for 7.0 to allow suctioning. EEG with frequent discharges, lowered vimpat to 50 mg and will follow EEG. Attempting to remove confounding factors for patients mental status. Slow drop in H/H, 1 unit ordered.   11/2/2023 NAEO, cEEG to remain in place, no seizures but is having frequent discharges in runs. Continue lower dose vimpat and 1500 keppra Eye opening this morning which is slight improvement in exam. Failed SBT   11/03/2023: no improvement with decrease in lacosamide, no re-development of seizures, if does not wake up in next 48 hrs off lacosamide or redevelops seizures, prognosis is extremely poor  11/04/2023: improved mental state this am, no seizures overnight  11/05/2023: LOC stable, has cuff leak, trial of extubation  11/06/2023 reintubated for stridor non responsive to med management overnight  11/07/2023 CTH stable. Off of levo. Completed dex (wbc 22, afebrile). CXR w L mildly increasing consolidation. Very thick secretions, added mucomyst and duonebs. Weaning keppra to 750. Pt appearing more edematous today, dc LR. Scheduled tylenol max 2g/d. Scheduled oxy 5q12.  11/08/2023: EEG pending  11/09/2023: EEG slowing  11/10/2023: more alert today  11/11/2023 pressure support trial  11/12/2023 Alert today. Not following commands. On spontaneous overnight, tolerated well. MRI c-spine overnight with spondylosis. No LP at this time. Resume TF. Hold TF tomorrow at 5AM for possible extubation tomorrow.   11/13/2023: patient made DNR, 10 dexamethsone IV one prior extubation with no  intent of re-intubation, PRN racemic epi and Bipap were initiated  11/14/2023: Patient tolerated BiPAP overnight and continues to be more alert, and is tracking faces more consistently. She was taken off BiPAP and placed on 4L oxygen via nasal cannula. Trickle feeds were started while off BiPAP. Significant other at bedside updated on clinical course. Midline placed overnight.   11/15/2023: Thick secretions noted when BiPAP removed this morning, starting CPT. PT/OT consulted with improving mental and respiratory status. Patient more sad and uncomfortable today.   11/16/2023: Advancing tube feeds to goal and encouraging continued work with PT/OT. She has been tolerating nasal cannula oxygenation well and has had a reduction in her secretions.   11/17/2023: Increased secretions overnight and this morning. Continues to work with PT/OT and has increased movement in her head and neck. Tube feed goal adjusted per dietician recommendations. Starting to plan for disposition. Considering ACP and Palliative care talks with family this weekend.   11/18/2023: NAEON. 50g albumin and 40mg lasix given for diuresis.   11/19/2023: H&H dropped overnight, received 1 unit PBRC. Otherwise, NAEON. Stable for transfer to floor with HM.     Interval History: See problem list above    Review of Systems   Unable to perform ROS: Acuity of condition      Objective:     Vitals:  Temp: 98.3 °F (36.8 °C)  Pulse: 69  Rhythm: normal sinus rhythm  BP: (!) 104/55  MAP (mmHg): 78  Resp: 14  SpO2: 98 %    Temp  Min: 97.5 °F (36.4 °C)  Max: 99.2 °F (37.3 °C)  Pulse  Min: 65  Max: 94  BP  Min: 93/55  Max: 130/60  MAP (mmHg)  Min: 68  Max: 98  Resp  Min: 11  Max: 29  SpO2  Min: 94 %  Max: 100 %    11/18 0701 - 11/19 0700  In: 1955.6 [I.V.:12.5]  Out: 1100 [Urine:1100]   Unmeasured Output  Urine Occurrence: 2  Stool Occurrence: (P) 1  Emesis Occurrence: 0  Pad Count: 3        Physical Exam  Vitals and nursing note reviewed.   Constitutional:        Appearance: She is ill-appearing.   HENT:      Head: Normocephalic.      Right Ear: External ear normal.      Left Ear: External ear normal.      Nose: Nose normal.      Mouth/Throat:      Mouth: Mucous membranes are moist.      Comments: Poor dentition, bleeding and oozing in mouth, and gums.   Eyes:      General: Scleral icterus present.      Pupils: Pupils are equal, round, and reactive to light.   Cardiovascular:      Rate and Rhythm: Normal rate and regular rhythm.      Pulses: Normal pulses.      Heart sounds: Normal heart sounds.   Pulmonary:      Comments: Stridors sounds, thick secretions, wet cough  Abdominal:      General: There is no distension.      Palpations: Abdomen is soft.      Tenderness: There is no abdominal tenderness.   Musculoskeletal:      Cervical back: Neck supple.      Comments: Edematous hands bilaterally and pitting edema in posterior thighs and presacral region   Skin:     General: Skin is warm and dry.   Neurological:      Mental Status: She is alert.      Comments:   No sedation   Alert. Tracking. Increased ability to rotate head.   PERRL, Eyes cross midline. No gross facial asymmetry.   No facial asymmetry  Motor: Flaccid weakness throughout, minor muscular activation in bilateral hands and feet. Increased ability to support her neck    Sensation: intact to noxious stimuli x 4- grimaces       Gait & coordination exams deferred.        Medications:  Continuous Scheduledfamotidine, 20 mg, BID  folic acid, 1 mg, Daily  lactulose, 30 g, BID  levetiracetam, 500 mg, BID  multivitamin, 1 tablet, Daily  propranoloL, 5 mg, Q12H  rifAXImin, 550 mg, BID  thiamine, 100 mg, Daily    PRN0.9%  NaCl infusion (for blood administration), , Q24H PRN  albuterol-ipratropium, 3 mL, QID PRN  calcium gluconate IVPB, 1 g, PRN  calcium gluconate IVPB, 2 g, PRN  calcium gluconate IVPB, 3 g, PRN  dextrose 10%, 12.5 g, PRN  dextrose 10%, 25 g, PRN  hydrALAZINE, 10 mg, Q4H PRN  ibuprofen, 600 mg, Q8H  PRN  labetalol, 10 mg, Q6H PRN  magnesium sulfate IVPB, 2 g, PRN  magnesium sulfate IVPB, 4 g, PRN  ondansetron, 4 mg, Q8H PRN  potassium chloride, 40 mEq, PRN   And  potassium chloride, 60 mEq, PRN   And  potassium chloride, 80 mEq, PRN  racepinephrine, 0.5 mL, Q4H PRN  sodium phosphate 15 mmol in dextrose 5 % (D5W) 250 mL IVPB, 15 mmol, PRN  sodium phosphate 20.01 mmol in dextrose 5 % (D5W) 250 mL IVPB, 20.01 mmol, PRN  sodium phosphate 30 mmol in dextrose 5 % (D5W) 250 mL IVPB, 30 mmol, PRN      Today I personally reviewed pertinent medications, lines/drains/airways, laboratory results, notably:    Laboratory:  CBC:  Recent Labs   Lab 11/19/23  1153   WBC 10.34   RBC 2.67*   HGB 8.8*   HCT 26.6*   PLT 48*   *   MCH 33.0*   MCHC 33.1       CMP:  Recent Labs   Lab 11/19/23  0454   CALCIUM 8.3*   ALBUMIN 2.2*   PROT 4.5*      K 3.6   CO2 24   *   BUN 22*   CREATININE 0.5   ALKPHOS 146*   ALT 88*   AST 89*   BILITOT 5.0*     Recent Labs   Lab 11/19/23  0454   MG 1.8   PHOS 3.0       Coagulation:  Recent Labs   Lab 11/19/23  0454   LABPROT 20.3*   INR 2.0*            Diet  Diet NPO  Diet NPO        Assessment/Plan:     Neuro  * Seizure  58 year old presented for altered mental status on 10/18. Clinical picture confounded with hepatic encephalopathy, infection, and seizure activity. Neurology was consulted by primary team prior to admission to [facility].    10/27 EEG IMPRESSION:  This is an abnormal EEG during lethargic state.  Diffuse disorganized slowing of the background was noted.  Frequent triphasic waves noted.  Left posterior pseudo periodic epileptiform discharges were noted.  Numerous electrographic seizures emanating from the left posterior region were noted.    10/31 - rehook  11/1 read with frequent discharges, vimpat lowered to 50 given mental status and liver function, will continue to watch on eeg vEEG discontinued  11/03: EEG restarted, remains negative with lacosamide taper, DC and monitor  for seizure recurrence  11/5: remains without seizures  11/9/23: EEG slowing  11/15: No witnessed seizures      Neurochecks q4  Vitals q4hr  Keppra 500 mg BID    Stable for transfer to floor with HM    Acute encephalopathy  See acute hepatic encephalopathy and seizures    Pulmonary  Tracheal stenosis  Scope complete by ENT     Patient failed extubation twice, and the decision was made to extubate and if she failed to consider transition to comfort care (see ACP note). Patient placed on BiPAP and tolerated it well. On 11/14 she was transitioned to nasal cannula and was saturating 100% on 4L. Plan for BiPAP as needed         Hematology  Thrombocytopenia  This is a 58-year-old woman with a history of decompensated alcoholic cirrhosis.  Suspect thrombocytopenia is likely secondary to chronic alcohol use and is consumptive in nature. If trend worsens, will consider consulting Hematology.    Daily CBC, transfuse only for active bleeding  SCDs  Frequent bleeding and oozing in gums   Famotidine prophylaxis       Endocrine  Moderate malnutrition  Advancing tube feeds to goal    Nutrition consulted. Most recent weight and BMI monitored-     Measurements:  Wt Readings from Last 1 Encounters:   10/26/23 56.2 kg (123 lb 14.4 oz)   Body mass index is 25.02 kg/m².    Patient has been screened and assessed by RD.    Malnutrition Type:  Context: social/environmental circumstances  Level: moderate    Malnutrition Characteristic Summary:  Subcutaneous Fat (Malnutrition): mild depletion  Muscle Mass (Malnutrition): mild depletion  Fluid Accumulation (Malnutrition): moderate    Interventions/Recommendations (treatment strategy):  1. Updated TF recommendation: Isosource 1.5 @ goal rate of 35 mL/hr- provides 1260 kcals, 57 g pro, and 642 mL fluid. 2. If able to tolerate PO intake, ADAT to 3. RD following.    GI  Acute hepatic encephalopathy  This is a 52 year old woman with a history of ethanol cirrhosis who presents after being found on  the ground with fecal and urinary incontinence, and altered mental status. Elevated ammonia on admission.    - Continue lactulose via NG tube TID (titrate till 3-4 daily BM achieved). Continue Xifaxin.   - Avoid opiates and benzodiazepines, if able.   - IV thiamine, folate supplementation, multivitamin through NG tube.  -MELD score ~50% survival without transplant        Decompensated alcoholic hepatic cirrhosis  Daily CBC, CMP & INR.   Not currently being evaluated for transplant due to clinical status  Continuing lactulose and rifaximin titrated to 3-4 bowel movements daily  Ammonia WNL  Folic acid, thiamine, and multivitamin    Other  Hypoxic respiratory failure  2/2 to tracheal stenosis     - Tolerating nasal cannula well. Continue pulmonary toilet.  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  - Continue working with PT/OT, appreciate recommendations          The patient is being Prophylaxed for:  Venous Thromboembolism with: Mechanical or Chemical  Stress Ulcer with: H2B  Ventilator Pneumonia with: not applicable    Activity Orders            Diet NPO: NPO starting at 11/13 0500    Elevate HOB Elevate (30-45 degrees) Elevate HOB to 30 - 45 degrees during feeding unless otherwise stated starting at 10/28 1218    Elevate HOB to 30-45 degrees during feeding unless otherwise stated starting at 10/26 1138    Progressive Mobility Protocol (mobilize patient to their highest level of functioning at least twice daily) starting at 10/25 2000    Turn patient starting at 10/25 2000          DNR    Level 3    Hilaria Acevedo PAAquilesC  Neurocritical Care  [facility] - Neuro Critical Care

## 2023-11-20 LAB
ABO + RH BLD: ABNORMAL
ALBUMIN SERPL BCP-MCNC: 2.4 G/DL (ref 3.5–5.2)
ALP SERPL-CCNC: 167 U/L (ref 55–135)
ALT SERPL W/O P-5'-P-CCNC: 95 U/L (ref 10–44)
ANION GAP SERPL CALC-SCNC: 5 MMOL/L (ref 8–16)
ANISOCYTOSIS BLD QL SMEAR: SLIGHT
AST SERPL-CCNC: 93 U/L (ref 10–40)
BASOPHILS # BLD AUTO: 0.06 K/UL (ref 0–0.2)
BASOPHILS NFR BLD: 0.6 % (ref 0–1.9)
BILIRUB SERPL-MCNC: 7 MG/DL (ref 0.1–1)
BLD GP AB SCN CELLS X3 SERPL QL: ABNORMAL
BUN SERPL-MCNC: 23 MG/DL (ref 6–20)
CALCIUM SERPL-MCNC: 8.4 MG/DL (ref 8.7–10.5)
CHLORIDE SERPL-SCNC: 111 MMOL/L (ref 95–110)
CO2 SERPL-SCNC: 23 MMOL/L (ref 23–29)
CREAT SERPL-MCNC: 0.5 MG/DL (ref 0.5–1.4)
DIFFERENTIAL METHOD: ABNORMAL
EOSINOPHIL # BLD AUTO: 0.5 K/UL (ref 0–0.5)
EOSINOPHIL NFR BLD: 4.9 % (ref 0–8)
ERYTHROCYTE [DISTWIDTH] IN BLOOD BY AUTOMATED COUNT: 21.5 % (ref 11.5–14.5)
EST. GFR  (NO RACE VARIABLE): >60 ML/MIN/1.73 M^2
GLUCOSE SERPL-MCNC: 100 MG/DL (ref 70–110)
HCT VFR BLD AUTO: 28.8 % (ref 37–48.5)
HGB BLD-MCNC: 9.5 G/DL (ref 12–16)
HYPOCHROMIA BLD QL SMEAR: ABNORMAL
IMM GRANULOCYTES # BLD AUTO: 0.11 K/UL (ref 0–0.04)
IMM GRANULOCYTES NFR BLD AUTO: 1 % (ref 0–0.5)
INR PPP: 1.8 (ref 0.8–1.2)
LYMPHOCYTES # BLD AUTO: 1.1 K/UL (ref 1–4.8)
LYMPHOCYTES NFR BLD: 10 % (ref 18–48)
MAGNESIUM SERPL-MCNC: 2 MG/DL (ref 1.6–2.6)
MCH RBC QN AUTO: 33 PG (ref 27–31)
MCHC RBC AUTO-ENTMCNC: 33 G/DL (ref 32–36)
MCV RBC AUTO: 100 FL (ref 82–98)
MONOCYTES # BLD AUTO: 2.1 K/UL (ref 0.3–1)
MONOCYTES NFR BLD: 19.4 % (ref 4–15)
NEUTROPHILS # BLD AUTO: 7 K/UL (ref 1.8–7.7)
NEUTROPHILS NFR BLD: 64.1 % (ref 38–73)
NRBC BLD-RTO: 0 /100 WBC
OVALOCYTES BLD QL SMEAR: ABNORMAL
PHOSPHATE SERPL-MCNC: 2.9 MG/DL (ref 2.7–4.5)
PLATELET # BLD AUTO: 60 K/UL (ref 150–450)
PLATELET BLD QL SMEAR: ABNORMAL
PMV BLD AUTO: 12.8 FL (ref 9.2–12.9)
POIKILOCYTOSIS BLD QL SMEAR: SLIGHT
POLYCHROMASIA BLD QL SMEAR: ABNORMAL
POTASSIUM SERPL-SCNC: 4.4 MMOL/L (ref 3.5–5.1)
PROT SERPL-MCNC: 5.3 G/DL (ref 6–8.4)
PROTHROMBIN TIME: 18.8 SEC (ref 9–12.5)
RBC # BLD AUTO: 2.88 M/UL (ref 4–5.4)
SODIUM SERPL-SCNC: 139 MMOL/L (ref 136–145)
SPECIMEN OUTDATE: ABNORMAL
WBC # BLD AUTO: 10.9 K/UL (ref 3.9–12.7)

## 2023-11-20 PROCEDURE — 25000003 PHARM REV CODE 250: Mod: NTX

## 2023-11-20 PROCEDURE — 25000242 PHARM REV CODE 250 ALT 637 W/ HCPCS: Mod: NTX | Performed by: PHYSICIAN ASSISTANT

## 2023-11-20 PROCEDURE — 25000003 PHARM REV CODE 250: Mod: NTX | Performed by: PHYSICIAN ASSISTANT

## 2023-11-20 PROCEDURE — 99233 SBSQ HOSP IP/OBS HIGH 50: CPT | Mod: FS,NTX,, | Performed by: PSYCHIATRY & NEUROLOGY

## 2023-11-20 PROCEDURE — 85610 PROTHROMBIN TIME: CPT | Mod: NTX | Performed by: STUDENT IN AN ORGANIZED HEALTH CARE EDUCATION/TRAINING PROGRAM

## 2023-11-20 PROCEDURE — 99900035 HC TECH TIME PER 15 MIN (STAT): Mod: NTX

## 2023-11-20 PROCEDURE — 94668 MNPJ CHEST WALL SBSQ: CPT | Mod: NTX

## 2023-11-20 PROCEDURE — 97110 THERAPEUTIC EXERCISES: CPT | Mod: NTX

## 2023-11-20 PROCEDURE — 99233 PR SUBSEQUENT HOSPITAL CARE,LEVL III: ICD-10-PCS | Mod: FS,NTX,, | Performed by: PSYCHIATRY & NEUROLOGY

## 2023-11-20 PROCEDURE — 94640 AIRWAY INHALATION TREATMENT: CPT | Mod: NTX

## 2023-11-20 PROCEDURE — 25000003 PHARM REV CODE 250: Mod: NTX | Performed by: NURSE PRACTITIONER

## 2023-11-20 PROCEDURE — 97530 THERAPEUTIC ACTIVITIES: CPT | Mod: NTX

## 2023-11-20 PROCEDURE — 85025 COMPLETE CBC W/AUTO DIFF WBC: CPT | Mod: NTX | Performed by: HOSPITALIST

## 2023-11-20 PROCEDURE — 99499 NO LOS: ICD-10-PCS | Mod: NTX,,, | Performed by: PHYSICIAN ASSISTANT

## 2023-11-20 PROCEDURE — 84100 ASSAY OF PHOSPHORUS: CPT | Mod: NTX | Performed by: STUDENT IN AN ORGANIZED HEALTH CARE EDUCATION/TRAINING PROGRAM

## 2023-11-20 PROCEDURE — 99499 UNLISTED E&M SERVICE: CPT | Mod: NTX,,, | Performed by: PHYSICIAN ASSISTANT

## 2023-11-20 PROCEDURE — 25000003 PHARM REV CODE 250: Mod: NTX | Performed by: HOSPITALIST

## 2023-11-20 PROCEDURE — 20600001 HC STEP DOWN PRIVATE ROOM: Mod: NTX

## 2023-11-20 PROCEDURE — 99900026 HC AIRWAY MAINTENANCE (STAT): Mod: NTX

## 2023-11-20 PROCEDURE — 86901 BLOOD TYPING SEROLOGIC RH(D): CPT | Mod: NTX | Performed by: PSYCHIATRY & NEUROLOGY

## 2023-11-20 PROCEDURE — 25000242 PHARM REV CODE 250 ALT 637 W/ HCPCS: Mod: NTX

## 2023-11-20 PROCEDURE — 31720 CLEARANCE OF AIRWAYS: CPT | Mod: NTX

## 2023-11-20 PROCEDURE — 27000221 HC OXYGEN, UP TO 24 HOURS: Mod: NTX

## 2023-11-20 PROCEDURE — 80053 COMPREHEN METABOLIC PANEL: CPT | Mod: NTX | Performed by: HOSPITALIST

## 2023-11-20 PROCEDURE — 97112 NEUROMUSCULAR REEDUCATION: CPT | Mod: NTX

## 2023-11-20 PROCEDURE — 83735 ASSAY OF MAGNESIUM: CPT | Mod: NTX | Performed by: STUDENT IN AN ORGANIZED HEALTH CARE EDUCATION/TRAINING PROGRAM

## 2023-11-20 PROCEDURE — 94761 N-INVAS EAR/PLS OXIMETRY MLT: CPT | Mod: NTX

## 2023-11-20 RX ORDER — CEPHALEXIN 250 MG/5ML
250 POWDER, FOR SUSPENSION ORAL EVERY 6 HOURS
Status: DISCONTINUED | OUTPATIENT
Start: 2023-11-20 | End: 2023-11-20

## 2023-11-20 RX ORDER — IPRATROPIUM BROMIDE AND ALBUTEROL SULFATE 2.5; .5 MG/3ML; MG/3ML
3 SOLUTION RESPIRATORY (INHALATION) EVERY 6 HOURS
Status: DISCONTINUED | OUTPATIENT
Start: 2023-11-20 | End: 2023-11-25

## 2023-11-20 RX ADMIN — LACTULOSE 30 G: 20 SOLUTION ORAL at 09:11

## 2023-11-20 RX ADMIN — THIAMINE HCL TAB 100 MG 100 MG: 100 TAB at 08:11

## 2023-11-20 RX ADMIN — FOLIC ACID 1 MG: 1 TABLET ORAL at 08:11

## 2023-11-20 RX ADMIN — PROPRANOLOL HYDROCHLORIDE 5 MG: 20 SOLUTION ORAL at 09:11

## 2023-11-20 RX ADMIN — LACTULOSE 30 G: 20 SOLUTION ORAL at 08:11

## 2023-11-20 RX ADMIN — THERA TABS 1 TABLET: TAB at 08:11

## 2023-11-20 RX ADMIN — IPRATROPIUM BROMIDE AND ALBUTEROL SULFATE 3 ML: .5; 3 SOLUTION RESPIRATORY (INHALATION) at 08:11

## 2023-11-20 RX ADMIN — FAMOTIDINE 20 MG: 20 TABLET ORAL at 09:11

## 2023-11-20 RX ADMIN — IPRATROPIUM BROMIDE AND ALBUTEROL SULFATE 3 ML: .5; 3 SOLUTION RESPIRATORY (INHALATION) at 12:11

## 2023-11-20 RX ADMIN — RIFAXIMIN 550 MG: 550 TABLET ORAL at 08:11

## 2023-11-20 RX ADMIN — PROPRANOLOL HYDROCHLORIDE 5 MG: 20 SOLUTION ORAL at 08:11

## 2023-11-20 RX ADMIN — LEVETIRACETAM 500 MG: 100 SOLUTION ORAL at 08:11

## 2023-11-20 RX ADMIN — LEVETIRACETAM 500 MG: 100 SOLUTION ORAL at 09:11

## 2023-11-20 RX ADMIN — IPRATROPIUM BROMIDE AND ALBUTEROL SULFATE 3 ML: .5; 3 SOLUTION RESPIRATORY (INHALATION) at 07:11

## 2023-11-20 RX ADMIN — FAMOTIDINE 20 MG: 20 TABLET ORAL at 08:11

## 2023-11-20 RX ADMIN — RIFAXIMIN 550 MG: 550 TABLET ORAL at 09:11

## 2023-11-20 RX ADMIN — IBUPROFEN 600 MG: 600 TABLET, FILM COATED ORAL at 09:11

## 2023-11-20 NOTE — CONSULTS
[facility] - Neuro Critical Care  Wound Care    Patient Name:  Mara Mojica   MRN:  [id number]  Date: 11/20/2023  Diagnosis: Seizure    History:     Past Medical History:   Diagnosis Date    Alcoholic cirrhosis of liver     Kidney failure     Unilateral inguinal hernia, without obstruction or gangrene, not specified as recurrent        Social History     Socioeconomic History    Marital status:    Tobacco Use    Smoking status: Every Day     Current packs/day: 0.50     Average packs/day: 0.5 packs/day for 25.1 years (12.5 ttl pk-yrs)     Types: Cigarettes     Start date: 10/18/1998     Passive exposure: Never    Smokeless tobacco: Never   Substance and Sexual Activity    Alcohol use: Not Currently    Drug use: Never     Social Determinants of Health     Financial Resource Strain: Low Risk  (10/25/2023)    Overall Financial Resource Strain (CARDIA)     Difficulty of Paying Living Expenses: Not hard at all   Food Insecurity: No Food Insecurity (10/25/2023)    Hunger Vital Sign     Worried About Running Out of Food in the Last Year: Never true     Ran Out of Food in the Last Year: Never true   Transportation Needs: No Transportation Needs (10/25/2023)    PRAPARE - Transportation     Lack of Transportation (Medical): No     Lack of Transportation (Non-Medical): No   Physical Activity: Insufficiently Active (10/25/2023)    Exercise Vital Sign     Days of Exercise per Week: 4 days     Minutes of Exercise per Session: 20 min   Stress: Stress Concern Present (10/25/2023)    [employer] [place] [employer] - Occupational Stress Questionnaire     Feeling of Stress : Rather much   Social Connections: Moderately Isolated (10/25/2023)    Social Connection and Isolation Panel [NHANES]     Frequency of Communication with Friends and Family: Three times a week     Frequency of Social Gatherings with Friends and Family: Twice a week     Attends Religion Services: Never     Active Member of Clubs or Organizations:  No     Attends Club or Organization Meetings: Never     Marital Status:    Housing Stability: Unknown (10/25/2023)    Housing Stability Vital Sign     Unable to Pay for Housing in the Last Year: No     Unstable Housing in the Last Year: No       Precautions:     Allergies as of 10/23/2023    (No Known Allergies)       [facility] Assessment Details/Treatment     Patient seen for wound care consultation for L wrist.  Reviewed chart for this encounter.   See Flow Sheet for findings.    Pt found lying in bed, agreeable to care at this time. Per bedside RN, pt previously had a 10 day old IV to her L wrist that has since been removed. Bedside nursing states malodorous purulent drainage was noted on IV removal. On assessment today, L wrist is SHAWNA w/ small area of intact scabbing noted, surrounding skin ecchymotic. No signs of active drainage noted - leave SHAWNA, continue to monitor.    Discussed POC with patient and primary nurse.   See EMR for orders & patient education.    Nursing to continue care & monitoring.  Nursing to maintain pressure injury prevention interventions.       11/20/23 1144   WOCN Assessment   WOCN Total Time (mins) 15   Visit Date 11/20/23   Visit Time 1144   Consult Type New   [facility] Speciality Wound   Intervention assessed;chart review;coordination of care   Teaching on-going        Altered Skin Integrity 11/19/23 0900 Left distal;lower;posterior Wrist Partial thickness tissue loss. Shallow open ulcer with a red or pink wound bed, without slough. Intact or Open/Ruptured Serum-filled blister.   Date First Assessed/Time First Assessed: 11/19/23 0900   Altered Skin Integrity Present on Admission - Did Patient arrive to the hospital with altered skin?: suspected hospital acquired  Side: Left  Orientation: distal;lower;posterior  Location: Wrist...   Wound Image     Dressing Appearance Open to air   Drainage Amount None   Drainage Characteristics/Odor No odor   Appearance Intact;Pink;Red;Maroon;Dry    Periwound Area Intact;Dry

## 2023-11-20 NOTE — PROGRESS NOTES
[facility] - Neuro Critical Care  Neurocritical Care  Progress Note    Admit Date: 10/25/2023  Service Date: 11/20/2023  Length of Stay: 26    Subjective:     Chief Complaint: Seizure    History of Present Illness: This is a 58-year-old female with a past medical history of alcoholic cirrhosis, s/p ex-lap w/ bowel resection for incarcerated hernia, who initially presented on 10/18 to [facility] with acute encephalopathy.  Her  found her on the floor at home with evidence of fecal/urine incontinence with confusion and slurred speech. Possible reported tongue injury in chart. Reported concern L sided weakness and down drift of L arm however CT head without evidence of acute abnormalities, MRI brain with only small vessel vascular changes without evidence of territorial infarct.     Hospital Course: EEG done on 10/19 with mild diffuse nonspecific background slowing, no potential epileptiform activity. Repeat MRI brain with contrast on 10/23 unchanged from initial MRI. Became more lethargic and was no longer following commands or answering questions. Due to worsening hepatic encephalopathy in setting of hepatic cirrhosis, patient [place] [facility] [place] management with transplant team. Has remained on antibiotics, lactulose and rifaximin for treatment of UTI with pansensitive Ecoli, HE, and presumed SBP. Neurology consulted for management recommendations regarding persistent AMS. Had repeat EEG done on 10/24 with similar findings to prior EEG showing mild generalized non-specific cerebral dysfunction and no electrographic seizures or indication of seizure tendency. Additional CT head w/o contrast on 10/25 without evidence of acute issues. Remains confused and unable to answer questions on exam. Not withdrawing to painful stimuli. Was able to awaken to verbal stimuli in AM however when reevaluated in afternoon unresponsive however was after receiving Ativan.     On admission to [facility]:  Patient had EEG running, and  was noted to have seizures at 7:30 a.m., 8:00 a.m. in, and 10:00 a.m. was noted to be in focal status prior to receiving Vimpat.  Patient was noted to have stridor, worsening secretions with suspicion for aspiration, decreased airway protection, and rapids was called due to low GCS score of 6. Antibiotics broadened to Vanc/Zosyn. Clinical picture of mental status confounded with episodes of seizures, infection, and hepatic encephalopathy.             Hospital Course: 10/28/2023: Improving GCS from 6 to 10. Continuing pulmonary toilet and deep suctioning for stridor and copious secretions. UA positive for candida. Blood cultures from 10/27 NGTD. Sputum cultures sent. Patient on day 2 of broadened antibiotics vanc/zosyn due to worsening clinical status but will discontinue tomorrow if cultures remain negative. Still hypernatremic, treating with D5W. Patient was transferred with no ordered diuretics, very edematous on physical exam. Adequate stooling on lactulose and rifaximin. Due to increased UOP/stooling will place eckert for one day for irritated skin. EEG update showing no seizures since 10am 10/27. Monitoring CBC for thrombocytopenia and macrocytic anemia. Discussed code status and goals of care with  and best friend at bedside. Trickle feeds resumed.   10/29/2023: No acute events overnight, EEG reportedly without further seizures for ~48 hours can disconnect once formal report is in, neuro status slowly improving as patient now tracking in room, moving extremities, responding with appropriate emotional reactions to her .  Respiratory status largely unchanged with intermittent events of large volume breaths that sound almost stridorous but without actual respiratory distress- gas remains compensated.  UOP low, diuresis resumed.  10/30/2023: d/c mucomyst nebs, add racemic epi scheduled nebs, add budesonide and dex 6 q8 hours, ammonia at 35- continue lactulose and rifaximin, abg reviewed, 40mEq of K  once, ENT consulted for stridor, continue eckert catheter in today   10/31/2023 Patient with worsening respiratory status, continued decreased mentation and increased secretions. Plan for elective intubation in controlled setting with anesthesia. Will also recheck eeg, if negative will start to wean AEDs and see if that improves patient's arousal. PLT stabilized, continue to monitor.   11/1/2023 this morning patient's 6.0 ETT exchanged for 7.0 to allow suctioning. EEG with frequent discharges, lowered vimpat to 50 mg and will follow EEG. Attempting to remove confounding factors for patients mental status. Slow drop in H/H, 1 unit ordered.   11/2/2023 NAEO, cEEG to remain in place, no seizures but is having frequent discharges in runs. Continue lower dose vimpat and 1500 keppra Eye opening this morning which is slight improvement in exam. Failed SBT   11/03/2023: no improvement with decrease in lacosamide, no re-development of seizures, if does not wake up in next 48 hrs off lacosamide or redevelops seizures, prognosis is extremely poor  11/04/2023: improved mental state this am, no seizures overnight  11/05/2023: LOC stable, has cuff leak, trial of extubation  11/06/2023 reintubated for stridor non responsive to med management overnight  11/07/2023 CTH stable. Off of levo. Completed dex (wbc 22, afebrile). CXR w L mildly increasing consolidation. Very thick secretions, added mucomyst and duonebs. Weaning keppra to 750. Pt appearing more edematous today, dc LR. Scheduled tylenol max 2g/d. Scheduled oxy 5q12.  11/08/2023: EEG pending  11/09/2023: EEG slowing  11/10/2023: more alert today  11/11/2023 pressure support trial  11/12/2023 Alert today. Not following commands. On spontaneous overnight, tolerated well. MRI c-spine overnight with spondylosis. No LP at this time. Resume TF. Hold TF tomorrow at 5AM for possible extubation tomorrow.   11/13/2023: patient made DNR, 10 dexamethsone IV one prior extubation with no  intent of re-intubation, PRN racemic epi and Bipap were initiated  11/14/2023: Patient tolerated BiPAP overnight and continues to be more alert, and is tracking faces more consistently. She was taken off BiPAP and placed on 4L oxygen via nasal cannula. Trickle feeds were started while off BiPAP. Significant other at bedside updated on clinical course. Midline placed overnight.   11/15/2023: Thick secretions noted when BiPAP removed this morning, starting CPT. PT/OT consulted with improving mental and respiratory status. Patient more sad and uncomfortable today.   11/16/2023: Advancing tube feeds to goal and encouraging continued work with PT/OT. She has been tolerating nasal cannula oxygenation well and has had a reduction in her secretions.   11/17/2023: Increased secretions overnight and this morning. Continues to work with PT/OT and has increased movement in her head and neck. Tube feed goal adjusted per dietician recommendations. Starting to plan for disposition. Considering ACP and Palliative care talks with family this weekend.   11/18/2023: NAEON. 50g albumin and 40mg lasix given for diuresis.   11/19/2023: H&H dropped overnight, received 1 unit PBRC. Otherwise, NAEON. Stable for transfer to floor with .   11/20/2023 hold transfer to  2/2 respiratory status     Interval History: See hospital course.  Hold transfer to        Review of Systems: Unable to obtain a complete ROS due to level of consciousness.     Vitals:   Temp: 99.3 °F (37.4 °C)  Pulse: 69  Rhythm: normal sinus rhythm  BP: (!) 116/57  MAP (mmHg): 81  Resp: 20  SpO2: 97 %    Temp  Min: 98.2 °F (36.8 °C)  Max: 99.3 °F (37.4 °C)  Pulse  Min: 60  Max: 86  BP  Min: 93/55  Max: 144/65  MAP (mmHg)  Min: 70  Max: 93  Resp  Min: 10  Max: 30  SpO2  Min: 92 %  Max: 100 %    11/19 0701 - 11/20 0700  In: 2140.4 [I.V.:50.2]  Out: 300 [Urine:300]   Unmeasured Output  Urine Occurrence: 1  Stool Occurrence: 1  Emesis Occurrence: 0  Pad Count: 2  "    Examination:   Constitutional: Ill appearing.   Eyes: Conjunctiva clear, anicteric. Lids no lesions.  Head/Ears/Nose/Mouth/Throat/Neck: Moist mucous membranes. External ears, nose atraumatic. Scleral icterus.  Cardiovascular: Regular rhythm. No murmurs. No leg edema.  Respiratory: Comfortable respirations. Clear to auscultation.  Gastrointestinal: No hernia. Soft, nondistended, nontender. + bowel sounds.    Neurologic:  -GCS [id number]  -Alert. Does not follow commands  -Cranial nerves grossly intact intact, particularly   -Motor PATHAK but not to command  -Sensation intact throughout       Medications:   Continuous Scheduledalbuterol-ipratropium, 3 mL, Q6H  famotidine, 20 mg, BID  folic acid, 1 mg, Daily  lactulose, 30 g, BID  levetiracetam, 500 mg, BID  multivitamin, 1 tablet, Daily  propranoloL, 5 mg, Q12H  rifAXImin, 550 mg, BID  thiamine, 100 mg, Daily    PRN0.9%  NaCl infusion (for blood administration), , Q24H PRN  calcium gluconate IVPB, 1 g, PRN  calcium gluconate IVPB, 2 g, PRN  calcium gluconate IVPB, 3 g, PRN  dextrose 10%, 12.5 g, PRN  dextrose 10%, 25 g, PRN  hydrALAZINE, 10 mg, Q4H PRN  ibuprofen, 600 mg, Q8H PRN  labetalol, 10 mg, Q6H PRN  magnesium sulfate IVPB, 2 g, PRN  magnesium sulfate IVPB, 4 g, PRN  ondansetron, 4 mg, Q8H PRN  potassium chloride, 40 mEq, PRN   And  potassium chloride, 60 mEq, PRN   And  potassium chloride, 80 mEq, PRN  racepinephrine, 0.5 mL, Q4H PRN  sodium phosphate 15 mmol in dextrose 5 % (D5W) 250 mL IVPB, 15 mmol, PRN  sodium phosphate 20.01 mmol in dextrose 5 % (D5W) 250 mL IVPB, 20.01 mmol, PRN  sodium phosphate 30 mmol in dextrose 5 % (D5W) 250 mL IVPB, 30 mmol, PRN       Today I independently reviewed pertinent medications, lines/drains/airways, imaging, cardiology results, laboratory results, microbiology results,     ISTAT: No results for input(s): "PH", "PCO2", "PO2", "POCSATURATED", "HCO3", "BE", "POCNA", "POCK", "POCTCO2", "POCGLU", "POCICA", "POCLAC", "SAMPLE" in " "the last 24 hours.   Chem:   Recent Labs   Lab 11/20/23  0443      K 4.4   *   CO2 23      BUN 23*   CREATININE 0.5   CALCIUM 8.4*   MG 2.0   PHOS 2.9   ANIONGAP 5*   PROT 5.3*   ALBUMIN 2.4*   BILITOT 7.0*   ALKPHOS 167*   AST 93*   ALT 95*     Heme:   Recent Labs   Lab 11/20/23  0443   WBC 10.90   HGB 9.5*   HCT 28.8*   PLT 60*   INR 1.8*     Endo: No results for input(s): "POCTGLUCOSE" in the last 24 hours.       Assessment/Plan:     Neuro  * Seizure  58 year old presented for altered mental status on 10/18. Clinical picture confounded with hepatic encephalopathy, infection, and seizure activity. Neurology was consulted by primary team prior to admission to [facility].    10/27 EEG IMPRESSION:  This is an abnormal EEG during lethargic state.  Diffuse disorganized slowing of the background was noted.  Frequent triphasic waves noted.  Left posterior pseudo periodic epileptiform discharges were noted.  Numerous electrographic seizures emanating from the left posterior region were noted.    10/31 - rehook  11/1 read with frequent discharges, vimpat lowered to 50 given mental status and liver function, will continue to watch on eeg vEEG discontinued  11/03: EEG restarted, remains negative with lacosamide taper, DC and monitor for seizure recurrence  11/5: remains without seizures  11/9/23: EEG slowing  11/15: No witnessed seizures      Neurochecks q4  Vitals q4hr  Keppra 500 mg BID      Acute encephalopathy  See acute hepatic encephalopathy and seizures    Pulmonary  Tracheal stenosis  Scope complete by ENT     Patient failed extubation twice, and the decision was made to extubate and if she failed to consider transition to comfort care (see ACP note). Patient placed on BiPAP and tolerated it well. On 11/14 she was transitioned to nasal cannula and was saturating 100% on 4L.         Hematology  Thrombocytopenia  This is a 58-year-old woman with a history of decompensated alcoholic cirrhosis.  Suspect " thrombocytopenia is likely secondary to chronic alcohol use and is consumptive in nature. If trend worsens, will consider consulting Hematology.    Daily CBC, transfuse only for active bleeding  SCDs  Frequent bleeding and oozing in gums   Famotidine prophylaxis       Endocrine  Moderate malnutrition  Advancing tube feeds to goal    Nutrition consulted. Most recent weight and BMI monitored-     Measurements:  Wt Readings from Last 1 Encounters:   10/26/23 56.2 kg (123 lb 14.4 oz)   Body mass index is 25.02 kg/m².    Patient has been screened and assessed by RD.    Malnutrition Type:  Context: social/environmental circumstances  Level: moderate    Malnutrition Characteristic Summary:  Subcutaneous Fat (Malnutrition): mild depletion  Muscle Mass (Malnutrition): mild depletion  Fluid Accumulation (Malnutrition): moderate    Interventions/Recommendations (treatment strategy):  1. Updated TF recommendation: Isosource 1.5 @ goal rate of 35 mL/hr- provides 1260 kcals, 57 g pro, and 642 mL fluid. 2. If able to tolerate PO intake, ADAT to 3. RD following.    GI  Acute hepatic encephalopathy  This is a 52 year old woman with a history of ethanol cirrhosis who presents after being found on the ground with fecal and urinary incontinence, and altered mental status. Elevated ammonia on admission.    - Continue lactulose via NG tube TID (titrate till 3-4 daily BM achieved). Continue rifaximin.   - Avoid opiates and benzodiazepines  - IV thiamine, folate supplementation, multivitamin through NG tube.  - MELD score ~50% survival without transplant        Decompensated alcoholic hepatic cirrhosis  Daily CBC, CMP & INR.   Not currently being evaluated for transplant due to clinical status  Continuing lactulose and rifaximin titrated to 3-4 bowel movements daily  Ammonia WNL  Folic acid, thiamine, and multivitamin    Other  Hypoxic respiratory failure  2/2 to tracheal stenosis     - Tolerating nasal cannula well.   - Continue pulmonary  toilet.  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  - Continue working with PT/OT, appreciate recommendations          The patient is being Prophylaxed for:  Venous Thromboembolism with: Mechanical  Stress Ulcer with: H2B  Ventilator Pneumonia with: not applicable    Activity Orders            Diet NPO: NPO starting at 11/13 0500    Elevate HOB Elevate (30-45 degrees) Elevate HOB to 30 - 45 degrees during feeding unless otherwise stated starting at 10/28 1218    Elevate HOB to 30-45 degrees during feeding unless otherwise stated starting at 10/26 1138    Progressive Mobility Protocol (mobilize patient to their highest level of functioning at least twice daily) starting at 10/25 2000    Turn patient starting at 10/25 2000          DNR    Elizabeth Gaspar PA-C  Neurocritical Care  [facility] - Neuro Critical Care

## 2023-11-20 NOTE — PLAN OF CARE
[facility] Care Plan    POC reviewed with Mara Mojica and family at 1200. Pt unable to verbalize understanding, but significant other  at bedside verbalized understanding. . Questions and concerns addressed. No acute events today. Pt progressing toward goals. Will continue to monitor. See below and flowsheets for full assessment and VS info.     - IV removed from pt's wrist that was . When IV was removed, it was very foul smelling and there was pus oozing out of the site, and it was very inflamed and red. ALEX Durand notified and came to bedside to assess site.  New PIV was placed   - Mouth care completed every 4 hours throughout shift with marked improvement in oral mucosa and patient comfort  - K and Mag replaced.   - Transfer orders in   - Transfused 1 unit PRBCs        Is this a stroke patient? no    Neuro:  [place]y Coma Scale  Best Eye Response: 4-->(E4) spontaneous  Best Motor Response: 4-->(M4) withdraws from pain  Best Verbal Response: 1-->(V1) none  [place]y Coma Scale Score: 9  Assessment Qualifiers: patient not sedated/intubated, no eye obstruction present  Pupil PERRLA: yes     24 hr Temp:  [97.5 °F (36.4 °C)-99.2 °F (37.3 °C)]     CV:   Rhythm: normal sinus rhythm  BP goals:   SBP < 180  MAP > 65    Resp:      Vent Mode: Spont  Set Rate: 14 BPM  Oxygen Concentration (%): 50  Vt Set: 420 mL  PEEP/CPAP: 5 cmH20  Pressure Support: 5 cmH20    Plan: N/A    GI/:     Diet/Nutrition Received: NPO, tube feeding  Last Bowel Movement: 23  Voiding Characteristics: external catheter    Intake/Output Summary (Last 24 hours) at 2023 1812  Last data filed at 2023 1810  Gross per 24 hour   Intake 2140.36 ml   Output --   Net 2140.36 ml     Unmeasured Output  Urine Occurrence: 2  Stool Occurrence: 1  Emesis Occurrence: 0  Pad Count: 3    Labs/Accuchecks:  Recent Labs   Lab 23  1153   WBC 10.34   RBC 2.67*   HGB 8.8*   HCT 26.6*   PLT 48*      Recent Labs   Lab 23  0454      K  "3.6   CO2 24   *   BUN 22*   CREATININE 0.5   ALKPHOS 146*   ALT 88*   AST 89*   BILITOT 5.0*      Recent Labs   Lab 11/19/23  0454   INR 2.0*    No results for input(s): "CPK", "CPKMB", "TROPONINI", "MB" in the last 168 hours.    Electrolytes: Electrolytes replaced  Accuchecks: none    Gtts:      LDA/Wounds:  Lines/Drains/Airways       Drain  Duration                  NG/OG Tube 10/20/23 2000 16 Fr. Left nostril 29 days              Airway  Duration                Airway Anesthesia 11/05/23 13 days              Peripheral Intravenous Line  Duration                  Midline Catheter Insertion/Assessment  - Single Lumen 11/13/23 1840 Right brachial vein 20g x 10cm 5 days         Peripheral IV - Single Lumen 11/19/23 0900 22 G Anterior;Right Foot <1 day                  Wounds: Yes  Wound care consulted: Yes    "

## 2023-11-20 NOTE — PLAN OF CARE
[facility] Care Plan    POC reviewed with Mara Mojica and family at 1700. Pt and significant other verbalized understanding. Questions and concerns addressed. No acute events today. Pt progressing toward goals. Will continue to monitor. See below and flowsheets for full assessment and VS info.     - c/o headache 6-10 today.   - Medicated with PRN oxy and dilaudid per MAR  - Migraine cocktail ordered and given with relief   - transfer orders in place          Is this a stroke patient? yes- Stroke booklet reviewed with patient and family, risk factors identified for patient and stroke booklet remains at bedside for ongoing education.     Neuro:  Woody Coma Scale  Best Eye Response: 4-->(E4) spontaneous  Best Motor Response: 4-->(M4) withdraws from pain  Best Verbal Response: 1-->(V1) none  [place] Coma Scale Score: 9  Assessment Qualifiers: patient not sedated/intubated, no eye obstruction present  Pupil PERRLA: yes     24 hr Temp:  [97.5 °F (36.4 °C)-99.2 °F (37.3 °C)]     CV:   Rhythm: normal sinus rhythm  BP goals:   SBP < 160  MAP > 65    Resp:      Vent Mode: Spont  Set Rate: 14 BPM  Oxygen Concentration (%): 50  Vt Set: 420 mL  PEEP/CPAP: 5 cmH20  Pressure Support: 5 cmH20    Plan: N/A    GI/:     Diet/Nutrition Received: NPO, tube feeding  Last Bowel Movement: 11/19/23  Voiding Characteristics: external catheter    Intake/Output Summary (Last 24 hours) at 11/19/2023 1824  Last data filed at 11/19/2023 1810  Gross per 24 hour   Intake 2140.36 ml   Output --   Net 2140.36 ml     Unmeasured Output  Urine Occurrence: 2  Stool Occurrence: 1  Emesis Occurrence: 0  Pad Count: 3    Labs/Accuchecks:  Recent Labs   Lab 11/19/23  1153   WBC 10.34   RBC 2.67*   HGB 8.8*   HCT 26.6*   PLT 48*      Recent Labs   Lab 11/19/23  0454      K 3.6   CO2 24   *   BUN 22*   CREATININE 0.5   ALKPHOS 146*   ALT 88*   AST 89*   BILITOT 5.0*      Recent Labs   Lab 11/19/23  0454   INR 2.0*    No results for input(s):  ""CPK", "CPKMB", "TROPONINI", "MB" in the last 168 hours.    Electrolytes: Electrolytes replaced  Accuchecks: ACHS    Gtts:      LDA/Wounds:  Lines/Drains/Airways       Drain  Duration                  NG/OG Tube 10/20/23 2000 16 Fr. Left nostril 29 days              Airway  Duration                Airway Anesthesia 11/05/23 13 days              Peripheral Intravenous Line  Duration                  Midline Catheter Insertion/Assessment  - Single Lumen 11/13/23 1840 Right brachial vein 20g x 10cm 5 days         Peripheral IV - Single Lumen 11/19/23 0900 22 G Anterior;Right Foot <1 day                  Wounds: No  Wound care consulted: No    "

## 2023-11-20 NOTE — PT/OT/SLP PROGRESS
Occupational Therapy  Co Treatment w/ PT     Name: Mara Mojica  MRN: [id number]  Admitting Diagnosis:  Seizure       Recommendations:     Discharge Recommendations: Moderate Intensity Therapy  Discharge Equipment Recommendations:  to be determined by next level of care  Barriers to discharge:  Inaccessible home environment, Decreased caregiver support    Assessment:     Mara Mojica is a 58 y.o. female with a medical diagnosis of Seizure.  She presents with decrease functional status secondary to medical diagnosis. Performance deficits affecting function are weakness, impaired endurance, impaired sensation, impaired self care skills, impaired functional mobility, gait instability, impaired balance, visual deficits, impaired cognition, decreased coordination, decreased upper extremity function, decreased lower extremity function, decreased safety awareness, abnormal tone, decreased ROM, impaired fine motor, impaired coordination. Patient with improved moral today demonstrating increased alertness and mood. Patient observed with improved head control this date and is able to visually attune to stimuli in all planes. Patient BUE remains with no volitional movement at this time. Patient remains appropriate candidate for acute OT services.     Rehab Prognosis:  Fair; patient would benefit from acute skilled OT services to address these deficits and reach maximum level of function.       Plan:     Patient to be seen 4 x/week to address the above listed problems via self-care/home management, therapeutic activities, therapeutic exercises, neuromuscular re-education  Plan of Care Expires: 12/14/23  Plan of Care Reviewed with: patient    Subjective     Chief Complaint: None at this time  Patient/Family Comments/goals: DC    Objective:     Communicated with: RN prior to session.  Patient found supine with NG tube, telemetry, PureWick, blood pressure cuff, oxygen, pulse ox (continuous) upon OT entry to room.    General  Precautions: Standard, aspiration, fall    Orthopedic Precautions:N/A  Braces: N/A  Respiratory Status: Room air     Occupational Performance:     Bed Mobility:    Patient completed Rolling/Turning to Right with total assistance  Patient completed Scooting/Bridging with total assistance  Patient completed Supine to Sit with total assistance  Patient completed Sit to Supine with total assistance     Activities of Daily Living:  Grooming: total assistance via [place] assistance to complete facial grooming     AMPAC 6 Click ADL: 6    Treatment & Education:  Sitting EOB patient completed visual tracking activities and RUE PROM in all planes. Following OT session patient transferred to medi-chair via draw sheet technique to provide patient with upright sitting activity.     Patient left up in chair with all lines intact, call button in reach, and RN notified    GOALS:   Multidisciplinary Problems       Occupational Therapy Goals          Problem: Occupational Therapy    Goal Priority Disciplines Outcome Interventions   Occupational Therapy Goal     OT, PT/OT Ongoing, Progressing    Description: Goals to be met by: 12/15/23     Patient will increase functional independence with ADLs by performing:    UE Dressing with Minimal Assistance.  LE Dressing with Moderate Assistance.  Grooming while seated with [place].  Toileting from bedside commode with Minimal Assistance for hygiene and clothing management.   Step transfer with Minimal Assistance  Toilet transfer to bedside commode with Minimal Assistance.                         Time Tracking:     OT Date of Treatment: 11/20/23  OT Start Time: 1314  OT Stop Time: 1346  OT Total Time (min): 32 minutes     Billable Minutes:Therapeutic Activity 17 minutes   Neuromuscular Re-education 15 minutes     OT/SHAWNA: OT          11/20/2023

## 2023-11-20 NOTE — PLAN OF CARE
[facility] - [facility]  Discharge Reassessment    Primary Care Provider: Osiris Nevarez NP    Expected Discharge Date: 11/22/2023    Reassessment (most recent)       Discharge Reassessment - 11/20/23 1412          Discharge Reassessment    Assessment Type Discharge Planning Reassessment     Did the patient's condition or plan change since previous assessment? No     Discharge Plan discussed with: Spouse/sig other     Communicated ASHELY with patient/caregiver Yes     Discharge Plan A Skilled Nursing Facility     Discharge Plan B Other     DME Needed Upon Discharge  other (see comments)   TBD    Transition of Care Barriers Underinsured     Why the patient remains in the hospital Requires continued medical care        Post-Acute Status    Post-Acute Authorization Placement     Coverage Medicaid -[facility]     Discharge Delays None known at this time                   Patient is not medically ready for discharge.  Per Md; 11/19/2023: H&H dropped overnight, received 1 unit [facility]. Otherwise, NAEON. Stable for transfer to floor with HM.   11/20/2023 hold transfer to HM 2/2 respiratory status     Discharge Plan A and Plan B have been determined by review of patient's clinical status, future medical and therapeutic needs, and coverage/benefits for post-acute care in coordination with multidisciplinary team members.    Claribel Torres, NIKI  PRN - [facility]  [phone]

## 2023-11-20 NOTE — ASSESSMENT & PLAN NOTE
Scope complete by ENT     Patient failed extubation twice, and the decision was made to extubate and if she failed to consider transition to comfort care (see ACP note). Patient placed on BiPAP and tolerated it well. On 11/14 she was transitioned to nasal cannula and was saturating 100% on 4L.

## 2023-11-20 NOTE — PLAN OF CARE
"[facility] Care Plan    POC reviewed with Mara Mojica and family at 0300. Questions and concerns addressed. No acute events overnight. Pt progressing toward goals. Will continue to monitor. See below and flowsheets for full assessment and VS info.           Is this a stroke patient? no    Neuro:  Woody Coma Scale  Best Eye Response: 4-->(E4) spontaneous  Best Motor Response: 6-->(M6) obeys commands  Best Verbal Response: 1-->(V1) none  Two Dot Coma Scale Score: 11  Assessment Qualifiers: patient not sedated/intubated, no eye obstruction present  Pupil PERRLA: yes     24hr Temp:  [98.2 °F (36.8 °C)-99.2 °F (37.3 °C)]     CV:   Rhythm: normal sinus rhythm  BP goals:   SBP < 180  MAP > 65    Resp:      Vent Mode: Spont  Set Rate: 14 BPM  Oxygen Concentration (%): 50  Vt Set: 420 mL  PEEP/CPAP: 5 cmH20  Pressure Support: 5 cmH20    Plan: N/A    GI/:     Diet/Nutrition Received: NPO, tube feeding  Last Bowel Movement: 11/20/23  Voiding Characteristics: external catheter    Intake/Output Summary (Last 24 hours) at 11/20/2023 0547  Last data filed at 11/20/2023 0501  Gross per 24 hour   Intake 2140.36 ml   Output 300 ml   Net 1840.36 ml     Unmeasured Output  Urine Occurrence: 1  Stool Occurrence: 1  Emesis Occurrence: 0  Pad Count: 2    Labs/Accuchecks:  Recent Labs   Lab 11/20/23 0443   WBC 10.90   RBC 2.88*   HGB 9.5*   HCT 28.8*   PLT 60*      Recent Labs   Lab 11/20/23 0443      K 4.4   CO2 23   *   BUN 23*   CREATININE 0.5   ALKPHOS 167*   ALT 95*   AST 93*   BILITOT 7.0*      Recent Labs   Lab 11/20/23 0443   INR 1.8*    No results for input(s): "CPK", "CPKMB", "TROPONINI", "MB" in the last 168 hours.    Electrolytes: Electrolytes replaced  Accuchecks: none    Gtts:      LDA/Wounds:  Lines/Drains/Airways       Drain  Duration                  NG/OG Tube 10/20/23 2000 16 Fr. Left nostril 30 days              Airway  Duration                Airway Anesthesia 11/05/23 14 days              Peripheral " Intravenous Line  Duration                  Midline Catheter Insertion/Assessment  - Single Lumen 11/13/23 1840 Right brachial vein 20g x 10cm 6 days         Peripheral IV - Single Lumen 11/19/23 0900 22 G Anterior;Right Foot <1 day                  Wounds: No  Wound care consulted: No

## 2023-11-20 NOTE — ASSESSMENT & PLAN NOTE
This is a 52 year old woman with a history of ethanol cirrhosis who presents after being found on the ground with fecal and urinary incontinence, and altered mental status. Elevated ammonia on admission.    - Continue lactulose via NG tube TID (titrate till 3-4 daily BM achieved). Continue rifaximin.   - Avoid opiates and benzodiazepines  - IV thiamine, folate supplementation, multivitamin through NG tube.  - MELD score ~50% survival without transplant

## 2023-11-20 NOTE — PT/OT/SLP PROGRESS
Physical Therapy Co-Treatment    Patient Name:  Mara Mojica   MRN:  [id number]    Recommendations:     Discharge Recommendations: Moderate Intensity Therapy  Discharge Equipment Recommendations: to be determined by next level of care  Barriers to discharge: Inaccessible home and Decreased caregiver support    Assessment:     Mara Mojica is a 58 y.o. female admitted with a medical diagnosis of Seizure.  She presents with the following impairments/functional limitations: weakness, impaired endurance, impaired sensation, impaired self care skills, gait instability, impaired functional mobility, impaired balance, visual deficits, impaired cognition, decreased coordination, decreased upper extremity function, decreased lower extremity function, decreased safety awareness, decreased ROM, abnormal tone, impaired coordination, impaired fine motor, edema, impaired muscle length. Pt w/ improved spirits, shaking her head along the the [employer] music and w/ good visual scanning and head turns. She is now answering yes/no questions appropriately w/ nodding, and able to wiggle toes on LLE and w/ trace dorsiflexion on LLE.     Rehab Prognosis: Fair; patient would benefit from acute skilled PT services to address these deficits and reach maximum level of function.    Recent Surgery: * No surgery found *      Plan:     During this hospitalization, patient to be seen 4 x/week to address the identified rehab impairments via therapeutic activities, therapeutic exercises, neuromuscular re-education and progress toward the following goals:    Plan of Care Expires:  12/15/23    Subjective     Chief Complaint: None  Patient/Family Comments/goals: NA 2/2 nonverbal  Pain/Comfort:  Pain Rating 1: 0/10  Pain Rating Post-Intervention 1: 0/10      Objective:     Communicated with RN prior to session.  Patient found HOB elevated with NG tube, telemetry, PureWick, blood pressure cuff, pulse ox (continuous), oxygen, pressure relief boots  upon PT entry to room. Co-tx w/ OT 2/2 suspected pt complexity and requirement of 2 skilled therapists to assist in order to maximize pt treatment     General Precautions: Standard, aspiration, fall  Orthopedic Precautions: N/A  Braces: N/A  Respiratory Status: Nasal cannula, flow 2 L/min     Functional Mobility:  Bed Mobility:     Supine to Sit: total assistance and of 2 persons  Sit to Supine: total assistance and of 2 persons  Balance: EOB sitting total A w/ pt able to maintain head control 100% of the time      AM-PAC 6 CLICK MOBILITY  Turning over in bed (including adjusting bedclothes, sheets and blankets)?: 1  Sitting down on and standing up from a chair with arms (e.g., wheelchair, bedside commode, etc.): 1  Moving from lying on back to sitting on the side of the bed?: 1  Moving to and from a bed to a chair (including a wheelchair)?: 1  Need to walk in hospital room?: 1  Climbing 3-5 steps with a railing?: 1  Basic Mobility Total Score: 6       Treatment & Education:  Pt educated on PT POC/goals, d/c recs, and continued treatment. All questions answered and pt in agreement w/ POC.  PROM to BLE in sitting w/ stretching at end range  Sitting balance w/ cueing and assistance for postural control while performing head turns and visual scanning    Patient left  up in medichair  with all lines intact, call button in reach, RN notified, and seatbelt on ..    GOALS:   Multidisciplinary Problems       Physical Therapy Goals          Problem: Physical Therapy    Goal Priority Disciplines Outcome Goal Variances Interventions   Physical Therapy Goal     PT, PT/OT Ongoing, Progressing     Description: Goals to be completed by: 12/15/23    Pt will perform sup<>sit transfers w/ moderate assistance  Pt will have sufficient dynamic balance to sit EOB while performing ADLs/therex w/ moderate assistance  Pt will be able to stand up from EOB w/ moderate assistance using LRAD  Pt will ambulate 10 feet w/ moderate assistance  using LRAD  Pt will be independent w/ HEP therex on BLE w/ good form and ROM   Pt will follow >50 % of single-step commands throughout treatment session                        Time Tracking:     PT Received On: 11/20/23  PT Start Time: 1314     PT Stop Time: 1346  PT Total Time (min): 32 min     Billable Minutes: Therapeutic Exercise 15 and Neuromuscular Re-education 17    Treatment Type: Treatment  PT/PTA: PT     Number of PTA visits since last PT visit: 0     11/20/2023

## 2023-11-20 NOTE — ASSESSMENT & PLAN NOTE
58 year old presented for altered mental status on 10/18. Clinical picture confounded with hepatic encephalopathy, infection, and seizure activity. Neurology was consulted by primary team prior to admission to [facility].    10/27 EEG IMPRESSION:  This is an abnormal EEG during lethargic state.  Diffuse disorganized slowing of the background was noted.  Frequent triphasic waves noted.  Left posterior pseudo periodic epileptiform discharges were noted.  Numerous electrographic seizures emanating from the left posterior region were noted.    10/31 - rehook  11/1 read with frequent discharges, vimpat lowered to 50 given mental status and liver function, will continue to watch on eeg vEEG discontinued  11/03: EEG restarted, remains negative with lacosamide taper, DC and monitor for seizure recurrence  11/5: remains without seizures  11/9/23: EEG slowing  11/15: No witnessed seizures      Neurochecks q4  Vitals q4hr  Keppra 500 mg BID

## 2023-11-20 NOTE — PLAN OF CARE
Problem: Occupational Therapy  Goal: Occupational Therapy Goal  Description: Goals to be met by: 12/15/23     Patient will increase functional independence with ADLs by performing:    UE Dressing with Minimal Assistance.  LE Dressing with Moderate Assistance.  Grooming while seated with [place].  Toileting from bedside commode with Minimal Assistance for hygiene and clothing management.   Step transfer with Minimal Assistance  Toilet transfer to bedside commode with Minimal Assistance.    Outcome: Ongoing, Progressing

## 2023-11-20 NOTE — ASSESSMENT & PLAN NOTE
2/2 to tracheal stenosis     - Tolerating nasal cannula well.   - Continue pulmonary toilet.  - CPT and continued monitoring by respiratory therapy

## 2023-11-21 LAB
ALBUMIN SERPL BCP-MCNC: 2.2 G/DL (ref 3.5–5.2)
ALP SERPL-CCNC: 161 U/L (ref 55–135)
ALT SERPL W/O P-5'-P-CCNC: 82 U/L (ref 10–44)
ANION GAP SERPL CALC-SCNC: 5 MMOL/L (ref 8–16)
AST SERPL-CCNC: 82 U/L (ref 10–40)
BASOPHILS # BLD AUTO: 0.05 K/UL (ref 0–0.2)
BASOPHILS NFR BLD: 0.6 % (ref 0–1.9)
BILIRUB SERPL-MCNC: 7.2 MG/DL (ref 0.1–1)
BUN SERPL-MCNC: 30 MG/DL (ref 6–20)
CALCIUM SERPL-MCNC: 8.4 MG/DL (ref 8.7–10.5)
CHLORIDE SERPL-SCNC: 113 MMOL/L (ref 95–110)
CO2 SERPL-SCNC: 21 MMOL/L (ref 23–29)
CREAT SERPL-MCNC: 0.5 MG/DL (ref 0.5–1.4)
DIFFERENTIAL METHOD: ABNORMAL
EOSINOPHIL # BLD AUTO: 0.4 K/UL (ref 0–0.5)
EOSINOPHIL NFR BLD: 4.2 % (ref 0–8)
ERYTHROCYTE [DISTWIDTH] IN BLOOD BY AUTOMATED COUNT: 21.3 % (ref 11.5–14.5)
EST. GFR  (NO RACE VARIABLE): >60 ML/MIN/1.73 M^2
GLUCOSE SERPL-MCNC: 104 MG/DL (ref 70–110)
HCT VFR BLD AUTO: 27.3 % (ref 37–48.5)
HGB BLD-MCNC: 8.9 G/DL (ref 12–16)
IMM GRANULOCYTES # BLD AUTO: 0.06 K/UL (ref 0–0.04)
IMM GRANULOCYTES NFR BLD AUTO: 0.7 % (ref 0–0.5)
INR PPP: 1.9 (ref 0.8–1.2)
LYMPHOCYTES # BLD AUTO: 1.1 K/UL (ref 1–4.8)
LYMPHOCYTES NFR BLD: 13 % (ref 18–48)
MAGNESIUM SERPL-MCNC: 2 MG/DL (ref 1.6–2.6)
MCH RBC QN AUTO: 33.1 PG (ref 27–31)
MCHC RBC AUTO-ENTMCNC: 32.6 G/DL (ref 32–36)
MCV RBC AUTO: 102 FL (ref 82–98)
MONOCYTES # BLD AUTO: 1.9 K/UL (ref 0.3–1)
MONOCYTES NFR BLD: 21.9 % (ref 4–15)
NEUTROPHILS # BLD AUTO: 5.2 K/UL (ref 1.8–7.7)
NEUTROPHILS NFR BLD: 59.6 % (ref 38–73)
NRBC BLD-RTO: 0 /100 WBC
PHOSPHATE SERPL-MCNC: 3.1 MG/DL (ref 2.7–4.5)
PLATELET # BLD AUTO: 51 K/UL (ref 150–450)
PMV BLD AUTO: 12.5 FL (ref 9.2–12.9)
POTASSIUM SERPL-SCNC: 4.2 MMOL/L (ref 3.5–5.1)
PROT SERPL-MCNC: 5 G/DL (ref 6–8.4)
PROTHROMBIN TIME: 19.2 SEC (ref 9–12.5)
RBC # BLD AUTO: 2.69 M/UL (ref 4–5.4)
SODIUM SERPL-SCNC: 139 MMOL/L (ref 136–145)
WBC # BLD AUTO: 8.74 K/UL (ref 3.9–12.7)

## 2023-11-21 PROCEDURE — 94761 N-INVAS EAR/PLS OXIMETRY MLT: CPT | Mod: NTX

## 2023-11-21 PROCEDURE — 99900026 HC AIRWAY MAINTENANCE (STAT): Mod: NTX

## 2023-11-21 PROCEDURE — 25000003 PHARM REV CODE 250: Mod: NTX

## 2023-11-21 PROCEDURE — 25000242 PHARM REV CODE 250 ALT 637 W/ HCPCS: Mod: NTX | Performed by: PHYSICIAN ASSISTANT

## 2023-11-21 PROCEDURE — 94640 AIRWAY INHALATION TREATMENT: CPT | Mod: NTX

## 2023-11-21 PROCEDURE — 84100 ASSAY OF PHOSPHORUS: CPT | Mod: NTX | Performed by: STUDENT IN AN ORGANIZED HEALTH CARE EDUCATION/TRAINING PROGRAM

## 2023-11-21 PROCEDURE — 85610 PROTHROMBIN TIME: CPT | Mod: NTX | Performed by: STUDENT IN AN ORGANIZED HEALTH CARE EDUCATION/TRAINING PROGRAM

## 2023-11-21 PROCEDURE — 99499 NO LOS: ICD-10-PCS | Mod: NTX,,, | Performed by: NURSE PRACTITIONER

## 2023-11-21 PROCEDURE — 25000003 PHARM REV CODE 250: Mod: NTX | Performed by: HOSPITALIST

## 2023-11-21 PROCEDURE — 85025 COMPLETE CBC W/AUTO DIFF WBC: CPT | Mod: NTX | Performed by: HOSPITALIST

## 2023-11-21 PROCEDURE — 99233 PR SUBSEQUENT HOSPITAL CARE,LEVL III: ICD-10-PCS | Mod: FS,NTX,, | Performed by: PSYCHIATRY & NEUROLOGY

## 2023-11-21 PROCEDURE — 27000221 HC OXYGEN, UP TO 24 HOURS: Mod: NTX

## 2023-11-21 PROCEDURE — 83735 ASSAY OF MAGNESIUM: CPT | Mod: NTX | Performed by: STUDENT IN AN ORGANIZED HEALTH CARE EDUCATION/TRAINING PROGRAM

## 2023-11-21 PROCEDURE — 25000003 PHARM REV CODE 250: Mod: NTX | Performed by: PHYSICIAN ASSISTANT

## 2023-11-21 PROCEDURE — 99233 SBSQ HOSP IP/OBS HIGH 50: CPT | Mod: FS,NTX,, | Performed by: PSYCHIATRY & NEUROLOGY

## 2023-11-21 PROCEDURE — 99499 UNLISTED E&M SERVICE: CPT | Mod: NTX,,, | Performed by: NURSE PRACTITIONER

## 2023-11-21 PROCEDURE — 25000003 PHARM REV CODE 250: Mod: NTX | Performed by: NURSE PRACTITIONER

## 2023-11-21 PROCEDURE — 99900035 HC TECH TIME PER 15 MIN (STAT): Mod: NTX

## 2023-11-21 PROCEDURE — 20600001 HC STEP DOWN PRIVATE ROOM: Mod: NTX

## 2023-11-21 PROCEDURE — 80053 COMPREHEN METABOLIC PANEL: CPT | Mod: NTX | Performed by: HOSPITALIST

## 2023-11-21 PROCEDURE — 94668 MNPJ CHEST WALL SBSQ: CPT | Mod: NTX

## 2023-11-21 RX ADMIN — THIAMINE HCL TAB 100 MG 100 MG: 100 TAB at 08:11

## 2023-11-21 RX ADMIN — RIFAXIMIN 550 MG: 550 TABLET ORAL at 09:11

## 2023-11-21 RX ADMIN — LEVETIRACETAM 500 MG: 100 SOLUTION ORAL at 08:11

## 2023-11-21 RX ADMIN — IPRATROPIUM BROMIDE AND ALBUTEROL SULFATE 3 ML: .5; 3 SOLUTION RESPIRATORY (INHALATION) at 07:11

## 2023-11-21 RX ADMIN — IPRATROPIUM BROMIDE AND ALBUTEROL SULFATE 3 ML: .5; 3 SOLUTION RESPIRATORY (INHALATION) at 12:11

## 2023-11-21 RX ADMIN — PROPRANOLOL HYDROCHLORIDE 5 MG: 20 SOLUTION ORAL at 09:11

## 2023-11-21 RX ADMIN — FAMOTIDINE 20 MG: 20 TABLET ORAL at 08:11

## 2023-11-21 RX ADMIN — FAMOTIDINE 20 MG: 20 TABLET ORAL at 09:11

## 2023-11-21 RX ADMIN — LACTULOSE 30 G: 20 SOLUTION ORAL at 09:11

## 2023-11-21 RX ADMIN — RIFAXIMIN 550 MG: 550 TABLET ORAL at 08:11

## 2023-11-21 RX ADMIN — LACTULOSE 30 G: 20 SOLUTION ORAL at 08:11

## 2023-11-21 RX ADMIN — FOLIC ACID 1 MG: 1 TABLET ORAL at 08:11

## 2023-11-21 RX ADMIN — LEVETIRACETAM 500 MG: 100 SOLUTION ORAL at 09:11

## 2023-11-21 RX ADMIN — IPRATROPIUM BROMIDE AND ALBUTEROL SULFATE 3 ML: .5; 3 SOLUTION RESPIRATORY (INHALATION) at 01:11

## 2023-11-21 RX ADMIN — THERA TABS 1 TABLET: TAB at 08:11

## 2023-11-21 NOTE — PROGRESS NOTES
[facility] Neuro [facility]  Neurocritical Care  Progress Note    Admit Date: 10/25/2023  Service Date: 11/21/2023  Length of Stay: 27    Subjective:     Chief Complaint: Seizure    History of Present Illness: This is a 58-year-old female with a past medical history of alcoholic cirrhosis, s/p ex-lap w/ bowel resection for incarcerated hernia, who initially presented on 10/18 to [facility] with acute encephalopathy.  Her  found her on the floor at home with evidence of fecal/urine incontinence with confusion and slurred speech. Possible reported tongue injury in chart. Reported concern L sided weakness and down drift of L arm however CT head without evidence of acute abnormalities, MRI brain with only small vessel vascular changes without evidence of territorial infarct.     Hospital Course: EEG done on 10/19 with mild diffuse nonspecific background slowing, no potential epileptiform activity. Repeat MRI brain with contrast on 10/23 unchanged from initial MRI. Became more lethargic and was no longer following commands or answering questions. Due to worsening hepatic encephalopathy in setting of hepatic cirrhosis, patient transferred to [facility] for management with transplant team. Has remained on antibiotics, lactulose and rifaximin for treatment of UTI with pansensitive Ecoli, HE, and presumed SBP. Neurology consulted for management recommendations regarding persistent AMS. Had repeat EEG done on 10/24 with similar findings to prior EEG showing mild generalized non-specific cerebral dysfunction and no electrographic seizures or indication of seizure tendency. Additional CT head w/o contrast on 10/25 without evidence of acute issues. Remains confused and unable to answer questions on exam. Not withdrawing to painful stimuli. Was able to awaken to verbal stimuli in AM however when reevaluated in afternoon unresponsive however was after receiving Ativan.     On admission to [facility]:  Patient had EEG running, and  was noted to have seizures at 7:30 a.m., 8:00 a.m. in, and 10:00 a.m. was noted to be in focal status prior to receiving Vimpat.  Patient was noted to have stridor, worsening secretions with suspicion for aspiration, decreased airway protection, and rapids was called due to low GCS score of 6. Antibiotics broadened to Vanc/Zosyn. Clinical picture of mental status confounded with episodes of seizures, infection, and hepatic encephalopathy.             Hospital Course: 10/28/2023: Improving GCS from 6 to 10. Continuing pulmonary toilet and deep suctioning for stridor and copious secretions. UA positive for candida. Blood cultures from 10/27 NGTD. Sputum cultures sent. Patient on day 2 of broadened antibiotics vanc/zosyn due to worsening clinical status but will discontinue tomorrow if cultures remain negative. Still hypernatremic, treating with D5W. Patient was transferred with no ordered diuretics, very edematous on physical exam. Adequate stooling on lactulose and rifaximin. Due to increased UOP/stooling will place eckert for one day for irritated skin. EEG update showing no seizures since 10am 10/27. Monitoring CBC for thrombocytopenia and macrocytic anemia. Discussed code status and goals of care with  and best friend at bedside. Trickle feeds resumed.   10/29/2023: No acute events overnight, EEG reportedly without further seizures for ~48 hours can disconnect once formal report is in, neuro status slowly improving as patient now tracking in room, moving extremities, responding with appropriate emotional reactions to her .  Respiratory status largely unchanged with intermittent events of large volume breaths that sound almost stridorous but without actual respiratory distress- gas remains compensated.  UOP low, diuresis resumed.  10/30/2023: d/c mucomyst nebs, add racemic epi scheduled nebs, add budesonide and dex 6 q8 hours, ammonia at 35- continue lactulose and rifaximin, abg reviewed, 40mEq of K  once, ENT consulted for stridor, continue eckert catheter in today   10/31/2023 Patient with worsening respiratory status, continued decreased mentation and increased secretions. Plan for elective intubation in controlled setting with anesthesia. Will also recheck eeg, if negative will start to wean AEDs and see if that improves patient's arousal. PLT stabilized, continue to monitor.   11/1/2023 this morning patient's 6.0 ETT exchanged for 7.0 to allow suctioning. EEG with frequent discharges, lowered vimpat to 50 mg and will follow EEG. Attempting to remove confounding factors for patients mental status. Slow drop in H/H, 1 unit ordered.   11/2/2023 NAEO, cEEG to remain in place, no seizures but is having frequent discharges in runs. Continue lower dose vimpat and 1500 keppra Eye opening this morning which is slight improvement in exam. Failed SBT   11/03/2023: no improvement with decrease in lacosamide, no re-development of seizures, if does not wake up in next 48 hrs off lacosamide or redevelops seizures, prognosis is extremely poor  11/04/2023: improved mental state this am, no seizures overnight  11/05/2023: LOC stable, has cuff leak, trial of extubation  11/06/2023 reintubated for stridor non responsive to med management overnight  11/07/2023 CTH stable. Off of levo. Completed dex (wbc 22, afebrile). CXR w L mildly increasing consolidation. Very thick secretions, added mucomyst and duonebs. Weaning keppra to 750. Pt appearing more edematous today, dc LR. Scheduled tylenol max 2g/d. Scheduled oxy 5q12.  11/08/2023: EEG pending  11/09/2023: EEG slowing  11/10/2023: more alert today  11/11/2023 pressure support trial  11/12/2023 Alert today. Not following commands. On spontaneous overnight, tolerated well. MRI c-spine overnight with spondylosis. No LP at this time. Resume TF. Hold TF tomorrow at 5AM for possible extubation tomorrow.   11/13/2023: patient made DNR, 10 dexamethsone IV one prior extubation with no  intent of re-intubation, PRN racemic epi and Bipap were initiated  11/14/2023: Patient tolerated BiPAP overnight and continues to be more alert, and is tracking faces more consistently. She was taken off BiPAP and placed on 4L oxygen via nasal cannula. Trickle feeds were started while off BiPAP. Significant other at bedside updated on clinical course. Midline placed overnight.   11/15/2023: Thick secretions noted when BiPAP removed this morning, starting CPT. PT/OT consulted with improving mental and respiratory status. Patient more sad and uncomfortable today.   11/16/2023: Advancing tube feeds to goal and encouraging continued work with PT/OT. She has been tolerating nasal cannula oxygenation well and has had a reduction in her secretions.   11/17/2023: Increased secretions overnight and this morning. Continues to work with PT/OT and has increased movement in her head and neck. Tube feed goal adjusted per dietician recommendations. Starting to plan for disposition. Considering ACP and Palliative care talks with family this weekend.   11/18/2023: NAEON. 50g albumin and 40mg lasix given for diuresis.   11/19/2023: H&H dropped overnight, received 1 unit PBRC. Otherwise, NAEON. Stable for transfer to floor with HM.   11/20/2023 hold transfer to HM 2/2 respiratory status   11/21/23: GOC tomorrow    Interval History: GOC metting 11/22.   Review of Systems   Reason unable to perform ROS: decrease LOC.      Unable to obtain a complete ROS due to level of consciousness.  Objective:     Vitals:  Temp: 97.6 °F (36.4 °C)  Pulse: 66  Rhythm: normal sinus rhythm  BP: (!) 104/51  MAP (mmHg): 74  Resp: 16  SpO2: 96 %  Oxygen Concentration (%): 28    Temp  Min: 97.6 °F (36.4 °C)  Max: 99 °F (37.2 °C)  Pulse  Min: 55  Max: 73  BP  Min: 87/59  Max: 140/65  MAP (mmHg)  Min: 66  Max: 93  Resp  Min: 11  Max: 25  SpO2  Min: 93 %  Max: 99 %  Oxygen Concentration (%)  Min: 28  Max: 32    11/20 0701 - 11/21 0700  In: 700   Out: -     Unmeasured Output  Urine Occurrence: 2  Stool Occurrence: 1  Emesis Occurrence: 0  Pad Count: 3        Physical Exam  Vitals and nursing note reviewed.   Constitutional:       Appearance: She is ill-appearing.   HENT:      Head: Normocephalic.      Nose: Nose normal.      Mouth/Throat:      Mouth: Mucous membranes are moist.      Pharynx: Oropharynx is clear.   Eyes:      Pupils: Pupils are equal, round, and reactive to light.   Cardiovascular:      Rate and Rhythm: Normal rate and regular rhythm.      Pulses: Normal pulses.      Heart sounds: Normal heart sounds.   Pulmonary:      Effort: Pulmonary effort is normal.      Breath sounds: Normal breath sounds.   Abdominal:      General: Bowel sounds are normal.      Palpations: Abdomen is soft.   Musculoskeletal:         General: Swelling present. Normal range of motion.      Right lower leg: Edema present.      Left lower leg: Edema present.   Skin:     General: Skin is warm and dry.      Capillary Refill: Capillary refill takes 2 to 3 seconds.      Coloration: Skin is jaundiced.   Neurological:      Comments: GCS 12  Follows simple commands  PATHAK spontaneously  Sensation Intact X4         Unable to test orientation, language, memory, judgment, insight, fund of knowledge, hearing, shoulder shrug, tongue protrusion, coordination, gait due to level of consciousness.       Medications:  Continuous Scheduledalbuterol-ipratropium, 3 mL, Q6H  famotidine, 20 mg, BID  folic acid, 1 mg, Daily  lactulose, 30 g, BID  levetiracetam, 500 mg, BID  multivitamin, 1 tablet, Daily  propranoloL, 5 mg, Q12H  rifAXImin, 550 mg, BID  thiamine, 100 mg, Daily    PRNcalcium gluconate IVPB, 1 g, PRN  calcium gluconate IVPB, 2 g, PRN  calcium gluconate IVPB, 3 g, PRN  dextrose 10%, 12.5 g, PRN  dextrose 10%, 25 g, PRN  hydrALAZINE, 10 mg, Q4H PRN  ibuprofen, 600 mg, Q8H PRN  labetalol, 10 mg, Q6H PRN  magnesium sulfate IVPB, 2 g, PRN  magnesium sulfate IVPB, 4 g, PRN  ondansetron, 4 mg, Q8H  PRN  potassium chloride, 40 mEq, PRN   And  potassium chloride, 60 mEq, PRN   And  potassium chloride, 80 mEq, PRN  racepinephrine, 0.5 mL, Q4H PRN  sodium phosphate 15 mmol in dextrose 5 % (D5W) 250 mL IVPB, 15 mmol, PRN  sodium phosphate 20.01 mmol in dextrose 5 % (D5W) 250 mL IVPB, 20.01 mmol, PRN  sodium phosphate 30 mmol in dextrose 5 % (D5W) 250 mL IVPB, 30 mmol, PRN      Today I personally reviewed pertinent medications, lines/drains/airways, imaging, cardiology results, laboratory results, microbiology results, notably:    Diet  Diet NPO  Diet NPO        Assessment/Plan:     Neuro  * Seizure  58 year old presented for altered mental status on 10/18. Clinical picture confounded with hepatic encephalopathy, infection, and seizure activity. Neurology was consulted by primary team prior to admission to [facility].    10/27 EEG IMPRESSION:  This is an abnormal EEG during lethargic state.  Diffuse disorganized slowing of the background was noted.  Frequent triphasic waves noted.  Left posterior pseudo periodic epileptiform discharges were noted.  Numerous electrographic seizures emanating from the left posterior region were noted.    10/31 - rehook  11/1 read with frequent discharges, vimpat lowered to 50 given mental status and liver function, will continue to watch on eeg vEEG discontinued  11/03: EEG restarted, remains negative with lacosamide taper, DC and monitor for seizure recurrence  11/5: remains without seizures  11/9/23: EEG slowing  11/15: No witnessed seizures      Neurochecks q4  Vitals q4hr  Keppra 500 mg BID      Acute encephalopathy  See acute hepatic encephalopathy and seizures    Pulmonary  Tracheal stenosis  Scope complete by ENT     Patient failed extubation twice, and the decision was made to extubate and if she failed to consider transition to comfort care (see ACP note). Patient placed on BiPAP and tolerated it well. On 11/14 she was transitioned to nasal cannula and was saturating 100% on 4L.          Hematology  Thrombocytopenia  This is a 58-year-old woman with a history of decompensated alcoholic cirrhosis.  Suspect thrombocytopenia is likely secondary to chronic alcohol use and is consumptive in nature. If trend worsens, will consider consulting Hematology.    Daily CBC, transfuse only for active bleeding  SCDs  Frequent bleeding and oozing in gums   Famotidine prophylaxis       Endocrine  Moderate malnutrition  Advancing tube feeds to goal    Nutrition consulted. Most recent weight and BMI monitored-     Measurements:  Wt Readings from Last 1 Encounters:   10/26/23 56.2 kg (123 lb 14.4 oz)   Body mass index is 25.02 kg/m².    Patient has been screened and assessed by RD.    Malnutrition Type:  Context: social/environmental circumstances  Level: moderate    Malnutrition Characteristic Summary:  Subcutaneous Fat (Malnutrition): mild depletion  Muscle Mass (Malnutrition): mild depletion  Fluid Accumulation (Malnutrition): moderate    Interventions/Recommendations (treatment strategy):  1. Updated TF recommendation: Isosource 1.5 @ goal rate of 35 mL/hr- provides 1260 kcals, 57 g pro, and 642 mL fluid. 2. If able to tolerate PO intake, ADAT to 3. RD following.    GI  Acute hepatic encephalopathy  This is a 52 year old woman with a history of ethanol cirrhosis who presents after being found on the ground with fecal and urinary incontinence, and altered mental status. Elevated ammonia on admission.    - Continue lactulose via NG tube TID (titrate till 3-4 daily BM achieved). Continue rifaximin.   - Avoid opiates and benzodiazepines  - IV thiamine, folate supplementation, multivitamin through NG tube.  - MELD score ~50% survival without transplant        Decompensated alcoholic hepatic cirrhosis  Daily CBC, CMP & INR.   Not currently being evaluated for transplant due to clinical status  Continuing lactulose and rifaximin titrated to 3-4 bowel movements daily  Ammonia WNL  Folic acid, thiamine, and  multivitamin    Other  Hypoxic respiratory failure  2/2 to tracheal stenosis     - Tolerating nasal cannula well.   - Continue pulmonary toilet.  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  - Continue working with PT/OT, appreciate recommendations          The patient is being Prophylaxed for:  Venous Thromboembolism with: Mechanical or Chemical  Stress Ulcer with: Not Applicable   Ventilator Pneumonia with: not applicable    Activity Orders            Diet NPO: NPO starting at 11/13 0500    Elevate HOB Elevate (30-45 degrees) Elevate HOB to 30 - 45 degrees during feeding unless otherwise stated starting at 10/28 1218    Elevate HOB to 30-45 degrees during feeding unless otherwise stated starting at 10/26 1138    Progressive Mobility Protocol (mobilize patient to their highest level of functioning at least twice daily) starting at 10/25 2000    Turn patient starting at 10/25 2000          DNR  Level 3  Ebony Kurtz NP  Neurocritical Care  [facility] - Neuro Critical Care

## 2023-11-21 NOTE — PLAN OF CARE
"[facility] Care Plan    POC reviewed with Mara Mojica and family (via phone) at 1415. Pt unable to verbalize understanding, but significant other verbalized understanding on the phone. Questions and concerns addressed. No acute events today. Pt progressing toward goals. Will continue to monitor. See below and flowsheets for full assessment and VS info.     - up to cardiac chair for 2 hours  - 3 urine occurences this shift  - 1 large BM      Is this a stroke patient? no    Neuro:  [place] Coma Scale  Best Eye Response: 4-->(E4) spontaneous  Best Motor Response: 6-->(M6) obeys commands  Best Verbal Response: 1-->(V1) none  [place]y Coma Scale Score: 11  Assessment Qualifiers: patient not sedated/intubated, no eye obstruction present  Pupil PERRLA: yes     24 hr Temp:  [97.8 °F (36.6 °C)-99.3 °F (37.4 °C)]     CV:   Rhythm: normal sinus rhythm  BP goals:   SBP < 160  MAP > 65    Resp:      Vent Mode: Spont  Set Rate: 14 BPM  Oxygen Concentration (%): 50  Vt Set: 420 mL  PEEP/CPAP: 5 cmH20  Pressure Support: 5 cmH20    Plan: N/A    GI/:     Diet/Nutrition Received: NPO, tube feeding  Last Bowel Movement: 11/19/23  Voiding Characteristics: external catheter    Intake/Output Summary (Last 24 hours) at 11/20/2023 1807  Last data filed at 11/20/2023 1405  Gross per 24 hour   Intake 700 ml   Output --   Net 700 ml     Unmeasured Output  Urine Occurrence: 1  Stool Occurrence: 1  Emesis Occurrence: 0  Pad Count: 1    Labs/Accuchecks:  Recent Labs   Lab 11/20/23 0443   WBC 10.90   RBC 2.88*   HGB 9.5*   HCT 28.8*   PLT 60*      Recent Labs   Lab 11/20/23 0443      K 4.4   CO2 23   *   BUN 23*   CREATININE 0.5   ALKPHOS 167*   ALT 95*   AST 93*   BILITOT 7.0*      Recent Labs   Lab 11/20/23 0443   INR 1.8*    No results for input(s): "CPK", "CPKMB", "TROPONINI", "MB" in the last 168 hours.    Electrolytes: N/A - electrolytes WDL  Accuchecks: none    Gtts:      LDA/Wounds:  Lines/Drains/Airways       Drain  " Duration                  NG/OG Tube 10/20/23 2000 16 Fr. Left nostril 30 days              Airway  Duration                Airway Anesthesia 11/05/23 14 days              Peripheral Intravenous Line  Duration                  Midline Catheter Insertion/Assessment  - Single Lumen 11/13/23 1840 Right brachial vein 20g x 10cm 6 days         Peripheral IV - Single Lumen 11/19/23 0900 22 G Anterior;Right Foot 1 day                  Wounds: Yes  Wound care consulted: Yes

## 2023-11-21 NOTE — ASSESSMENT & PLAN NOTE
58 year old presented for altered mental status on 10/18. Clinical picture confounded with hepatic encephalopathy, infection, and seizure activity. Neurology was consulted by primary team prior to admission to [facility].    10/27 EEG IMPRESSION:  This is an abnormal EEG during lethargic state.  Diffuse disorganized slowing of the background was noted.  Frequent triphasic waves noted.  Left posterior pseudo periodic epileptiform discharges were noted.  Numerous electrographic seizures emanating from the left posterior region were noted.    10/31 - rehook  11/1 read with frequent discharges, vimpat lowered to 50 given mental status and liver function, will continue to watch on eeg vEEG discontinued  11/03: EEG restarted, remains negative with lacosamide taper, DC and monitor for seizure recurrence  11/5: remains without seizures  11/9/23: EEG slowing  11/15: No witnessed seizures      Neurochecks q4  Vitals q4hr  Keppra 500 mg BID

## 2023-11-21 NOTE — PLAN OF CARE
"[facility] Care Plan    POC reviewed with Mara Mojica at 0300. No evidence of learning. Will continue to monitor. See below and flowsheets for full assessment and VS info.     Is this a stroke patient? no    Neuro:  Woody Coma Scale  Best Eye Response: 4-->(E4) spontaneous  Best Motor Response: 6-->(M6) obeys commands  Best Verbal Response: 1-->(V1) none  [place] Coma Scale Score: 11  Assessment Qualifiers: patient not sedated/intubated, no eye obstruction present  Pupil PERRLA: yes     24hr Temp:  [97.8 °F (36.6 °C)-99.3 °F (37.4 °C)]     CV:   Rhythm: normal sinus rhythm  BP goals:   SBP < 180  MAP > 65    Resp:      Vent Mode: Spont  Set Rate: 14 BPM  Oxygen Concentration (%): 50  Vt Set: 420 mL  PEEP/CPAP: 5 cmH20  Pressure Support: 5 cmH20    Plan: N/A    GI/:     Diet/Nutrition Received: tube feeding  Last Bowel Movement: 11/21/23  Voiding Characteristics: voids spontaneously without difficulty    Intake/Output Summary (Last 24 hours) at 11/21/2023 0434  Last data filed at 11/21/2023 0301  Gross per 24 hour   Intake 665 ml   Output --   Net 665 ml     Unmeasured Output  Urine Occurrence: 2  Stool Occurrence: 1  Emesis Occurrence: 0  Pad Count: 3    Labs/Accuchecks:  Recent Labs   Lab 11/21/23  0243   WBC 8.74   RBC 2.69*   HGB 8.9*   HCT 27.3*   PLT 51*      Recent Labs   Lab 11/21/23  0243      K 4.2   CO2 21*   *   BUN 30*   CREATININE 0.5   ALKPHOS 161*   ALT 82*   AST 82*   BILITOT 7.2*      Recent Labs   Lab 11/21/23  0243   INR 1.9*    No results for input(s): "CPK", "CPKMB", "TROPONINI", "MB" in the last 168 hours.    Electrolytes: N/A - electrolytes WDL  Accuchecks: none    Gtts:      LDA/Wounds:  Lines/Drains/Airways       Drain  Duration                  NG/OG Tube 10/20/23 2000 16 Fr. Left nostril 31 days              Airway  Duration                Airway Anesthesia 11/05/23 15 days              Peripheral Intravenous Line  Duration                  Midline Catheter " Insertion/Assessment  - Single Lumen 11/13/23 1840 Right brachial vein 20g x 10cm 7 days         Peripheral IV - Single Lumen 11/19/23 0900 22 G Anterior;Right Foot 1 day                  Wounds: Yes  Wound care consulted: Yes    Problem: Adult Inpatient Plan of Care  Goal: Plan of Care Review  Outcome: Ongoing, Progressing  Goal: Patient-Specific Goal (Individualized)  Outcome: Ongoing, Progressing  Goal: Absence of Hospital-Acquired Illness or Injury  Outcome: Ongoing, Progressing  Goal: Optimal Comfort and Wellbeing  Outcome: Ongoing, Progressing  Goal: Readiness for Transition of Care  Outcome: Ongoing, Progressing     Problem: Infection  Goal: Absence of Infection Signs and Symptoms  Outcome: Ongoing, Progressing     Problem: Skin Injury Risk Increased  Goal: Skin Health and Integrity  Outcome: Ongoing, Progressing     Problem: Adjustment to Illness (Sepsis/Septic Shock)  Goal: Optimal Coping  Outcome: Ongoing, Progressing     Problem: Bleeding (Sepsis/Septic Shock)  Goal: Absence of Bleeding  Outcome: Ongoing, Progressing     Problem: Glycemic Control Impaired (Sepsis/Septic Shock)  Goal: Blood Glucose Level Within Desired Range  Outcome: Ongoing, Progressing     Problem: Infection Progression (Sepsis/Septic Shock)  Goal: Absence of Infection Signs and Symptoms  Outcome: Ongoing, Progressing     Problem: Nutrition Impaired (Sepsis/Septic Shock)  Goal: Optimal Nutrition Intake  Outcome: Ongoing, Progressing     Problem: Fall Injury Risk  Goal: Absence of Fall and Fall-Related Injury  Outcome: Ongoing, Progressing     Problem: Communication Impairment (Mechanical Ventilation, Invasive)  Goal: Effective Communication  Outcome: Ongoing, Progressing     Problem: Device-Related Complication Risk (Mechanical Ventilation, Invasive)  Goal: Optimal Device Function  Outcome: Ongoing, Progressing     Problem: Inability to Wean (Mechanical Ventilation, Invasive)  Goal: Mechanical Ventilation Liberation  Outcome: Ongoing,  Progressing     Problem: Nutrition Impairment (Mechanical Ventilation, Invasive)  Goal: Optimal Nutrition Delivery  Outcome: Ongoing, Progressing     Problem: Skin and Tissue Injury (Mechanical Ventilation, Invasive)  Goal: Absence of Device-Related Skin and Tissue Injury  Outcome: Ongoing, Progressing     Problem: Ventilator-Induced Lung Injury (Mechanical Ventilation, Invasive)  Goal: Absence of Ventilator-Induced Lung Injury  Outcome: Ongoing, Progressing     Problem: Restraint, Nonbehavioral (Nonviolent)  Goal: Absence of Harm or Injury  Outcome: Ongoing, Progressing     Problem: Impaired Wound Healing  Goal: Optimal Wound Healing  Outcome: Ongoing, Progressing

## 2023-11-21 NOTE — SUBJECTIVE & OBJECTIVE
Interval History: [facility] metting 11/22.   Review of Systems   Reason unable to perform ROS: decrease LOC.      Unable to obtain a complete ROS due to level of consciousness.  Objective:     Vitals:  Temp: 97.6 °F (36.4 °C)  Pulse: 66  Rhythm: normal sinus rhythm  BP: (!) 104/51  MAP (mmHg): 74  Resp: 16  SpO2: 96 %  Oxygen Concentration (%): 28    Temp  Min: 97.6 °F (36.4 °C)  Max: 99 °F (37.2 °C)  Pulse  Min: 55  Max: 73  BP  Min: 87/59  Max: 140/65  MAP (mmHg)  Min: 66  Max: 93  Resp  Min: 11  Max: 25  SpO2  Min: 93 %  Max: 99 %  Oxygen Concentration (%)  Min: 28  Max: 32    11/20 0701 - 11/21 0700  In: 700   Out: -    Unmeasured Output  Urine Occurrence: 2  Stool Occurrence: 1  Emesis Occurrence: 0  Pad Count: 3        Physical Exam  Vitals and nursing note reviewed.   Constitutional:       Appearance: She is ill-appearing.   HENT:      Head: Normocephalic.      Nose: Nose normal.      Mouth/Throat:      Mouth: Mucous membranes are moist.      Pharynx: Oropharynx is clear.   Eyes:      Pupils: Pupils are equal, round, and reactive to light.   Cardiovascular:      Rate and Rhythm: Normal rate and regular rhythm.      Pulses: Normal pulses.      Heart sounds: Normal heart sounds.   Pulmonary:      Effort: Pulmonary effort is normal.      Breath sounds: Normal breath sounds.   Abdominal:      General: Bowel sounds are normal.      Palpations: Abdomen is soft.   Musculoskeletal:         General: Swelling present. Normal range of motion.      Right lower leg: Edema present.      Left lower leg: Edema present.   Skin:     General: Skin is warm and dry.      Capillary Refill: Capillary refill takes 2 to 3 seconds.      Coloration: Skin is jaundiced.   Neurological:      Comments: GCS 12  Follows simple commands  PATHAK spontaneously  Sensation Intact X4         Unable to test orientation, language, memory, judgment, insight, fund of knowledge, hearing, shoulder shrug, tongue protrusion, coordination, gait due to level of  consciousness.       Medications:  Continuous Scheduledalbuterol-ipratropium, 3 mL, Q6H  famotidine, 20 mg, BID  folic acid, 1 mg, Daily  lactulose, 30 g, BID  levetiracetam, 500 mg, BID  multivitamin, 1 tablet, Daily  propranoloL, 5 mg, Q12H  rifAXImin, 550 mg, BID  thiamine, 100 mg, Daily    PRNcalcium gluconate IVPB, 1 g, PRN  calcium gluconate IVPB, 2 g, PRN  calcium gluconate IVPB, 3 g, PRN  dextrose 10%, 12.5 g, PRN  dextrose 10%, 25 g, PRN  hydrALAZINE, 10 mg, Q4H PRN  ibuprofen, 600 mg, Q8H PRN  labetalol, 10 mg, Q6H PRN  magnesium sulfate IVPB, 2 g, PRN  magnesium sulfate IVPB, 4 g, PRN  ondansetron, 4 mg, Q8H PRN  potassium chloride, 40 mEq, PRN   And  potassium chloride, 60 mEq, PRN   And  potassium chloride, 80 mEq, PRN  racepinephrine, 0.5 mL, Q4H PRN  sodium phosphate 15 mmol in dextrose 5 % (D5W) 250 mL IVPB, 15 mmol, PRN  sodium phosphate 20.01 mmol in dextrose 5 % (D5W) 250 mL IVPB, 20.01 mmol, PRN  sodium phosphate 30 mmol in dextrose 5 % (D5W) 250 mL IVPB, 30 mmol, PRN      Today I personally reviewed pertinent medications, lines/drains/airways, imaging, cardiology results, laboratory results, microbiology results, notably:    Diet  Diet NPO  Diet NPO

## 2023-11-21 NOTE — PLAN OF CARE
11/21/23 1046   Post-Acute Status   Post-Acute Authorization Placement   Coverage Medicaid [place] [facility]   Discharge Delays None known at this time   Discharge Plan   Discharge Plan A Skilled Nursing Facility     PAUL spoke with family member Darron Mccullough via phone  and provided update on SNF referrals. He wanted another referral sent to a location near home in [place], [place]. PAUL faxed referral to [facility] Phone: [phone]  - via Careport for review. PAUL will continue to follow patient.      Claribel Torres LMSW  PRN - [facility]  [phone]

## 2023-11-22 PROBLEM — Z71.89 GOALS OF CARE, COUNSELING/DISCUSSION: Status: ACTIVE | Noted: 2023-11-22

## 2023-11-22 PROBLEM — D64.9 ANEMIA: Status: ACTIVE | Noted: 2023-11-22

## 2023-11-22 PROBLEM — R74.01 TRANSAMINITIS: Status: ACTIVE | Noted: 2023-11-22

## 2023-11-22 PROBLEM — G72.81 CRITICAL ILLNESS MYOPATHY: Status: ACTIVE | Noted: 2023-11-22

## 2023-11-22 LAB
ALBUMIN SERPL BCP-MCNC: 2.2 G/DL (ref 3.5–5.2)
ALP SERPL-CCNC: 149 U/L (ref 55–135)
ALT SERPL W/O P-5'-P-CCNC: 77 U/L (ref 10–44)
ANION GAP SERPL CALC-SCNC: 7 MMOL/L (ref 8–16)
AST SERPL-CCNC: 79 U/L (ref 10–40)
BASOPHILS # BLD AUTO: 0.05 K/UL (ref 0–0.2)
BASOPHILS NFR BLD: 0.6 % (ref 0–1.9)
BILIRUB SERPL-MCNC: 8.5 MG/DL (ref 0.1–1)
BUN SERPL-MCNC: 27 MG/DL (ref 6–20)
CALCIUM SERPL-MCNC: 8.4 MG/DL (ref 8.7–10.5)
CHLORIDE SERPL-SCNC: 115 MMOL/L (ref 95–110)
CO2 SERPL-SCNC: 21 MMOL/L (ref 23–29)
CREAT SERPL-MCNC: 0.4 MG/DL (ref 0.5–1.4)
DIFFERENTIAL METHOD: ABNORMAL
EOSINOPHIL # BLD AUTO: 0.4 K/UL (ref 0–0.5)
EOSINOPHIL NFR BLD: 4.6 % (ref 0–8)
ERYTHROCYTE [DISTWIDTH] IN BLOOD BY AUTOMATED COUNT: 20.9 % (ref 11.5–14.5)
EST. GFR  (NO RACE VARIABLE): >60 ML/MIN/1.73 M^2
GLUCOSE SERPL-MCNC: 74 MG/DL (ref 70–110)
HCT VFR BLD AUTO: 29.9 % (ref 37–48.5)
HGB BLD-MCNC: 9.8 G/DL (ref 12–16)
IMM GRANULOCYTES # BLD AUTO: 0.06 K/UL (ref 0–0.04)
IMM GRANULOCYTES NFR BLD AUTO: 0.7 % (ref 0–0.5)
INR PPP: 1.8 (ref 0.8–1.2)
LYMPHOCYTES # BLD AUTO: 0.9 K/UL (ref 1–4.8)
LYMPHOCYTES NFR BLD: 10.1 % (ref 18–48)
MAGNESIUM SERPL-MCNC: 1.8 MG/DL (ref 1.6–2.6)
MCH RBC QN AUTO: 34 PG (ref 27–31)
MCHC RBC AUTO-ENTMCNC: 32.8 G/DL (ref 32–36)
MCV RBC AUTO: 104 FL (ref 82–98)
MONOCYTES # BLD AUTO: 2 K/UL (ref 0.3–1)
MONOCYTES NFR BLD: 23.4 % (ref 4–15)
NEUTROPHILS # BLD AUTO: 5.1 K/UL (ref 1.8–7.7)
NEUTROPHILS NFR BLD: 60.6 % (ref 38–73)
NRBC BLD-RTO: 0 /100 WBC
PHOSPHATE SERPL-MCNC: 3.5 MG/DL (ref 2.7–4.5)
PLATELET # BLD AUTO: 51 K/UL (ref 150–450)
PMV BLD AUTO: 12.6 FL (ref 9.2–12.9)
POTASSIUM SERPL-SCNC: 4.5 MMOL/L (ref 3.5–5.1)
PROT SERPL-MCNC: 5.2 G/DL (ref 6–8.4)
PROTHROMBIN TIME: 18.8 SEC (ref 9–12.5)
RBC # BLD AUTO: 2.88 M/UL (ref 4–5.4)
SODIUM SERPL-SCNC: 143 MMOL/L (ref 136–145)
WBC # BLD AUTO: 8.49 K/UL (ref 3.9–12.7)

## 2023-11-22 PROCEDURE — 20600001 HC STEP DOWN PRIVATE ROOM: Mod: NTX

## 2023-11-22 PROCEDURE — 99900035 HC TECH TIME PER 15 MIN (STAT): Mod: NTX

## 2023-11-22 PROCEDURE — 80053 COMPREHEN METABOLIC PANEL: CPT | Mod: NTX | Performed by: HOSPITALIST

## 2023-11-22 PROCEDURE — 97530 THERAPEUTIC ACTIVITIES: CPT | Mod: NTX

## 2023-11-22 PROCEDURE — 94668 MNPJ CHEST WALL SBSQ: CPT | Mod: NTX

## 2023-11-22 PROCEDURE — 94640 AIRWAY INHALATION TREATMENT: CPT | Mod: NTX

## 2023-11-22 PROCEDURE — 99233 PR SUBSEQUENT HOSPITAL CARE,LEVL III: ICD-10-PCS | Mod: FS,NTX,, | Performed by: PSYCHIATRY & NEUROLOGY

## 2023-11-22 PROCEDURE — 25000242 PHARM REV CODE 250 ALT 637 W/ HCPCS: Mod: NTX | Performed by: PHYSICIAN ASSISTANT

## 2023-11-22 PROCEDURE — 84100 ASSAY OF PHOSPHORUS: CPT | Mod: NTX | Performed by: STUDENT IN AN ORGANIZED HEALTH CARE EDUCATION/TRAINING PROGRAM

## 2023-11-22 PROCEDURE — 25000003 PHARM REV CODE 250: Mod: NTX

## 2023-11-22 PROCEDURE — 85610 PROTHROMBIN TIME: CPT | Mod: NTX | Performed by: STUDENT IN AN ORGANIZED HEALTH CARE EDUCATION/TRAINING PROGRAM

## 2023-11-22 PROCEDURE — 97110 THERAPEUTIC EXERCISES: CPT | Mod: NTX

## 2023-11-22 PROCEDURE — 25000003 PHARM REV CODE 250: Mod: NTX | Performed by: HOSPITALIST

## 2023-11-22 PROCEDURE — 85025 COMPLETE CBC W/AUTO DIFF WBC: CPT | Mod: NTX | Performed by: HOSPITALIST

## 2023-11-22 PROCEDURE — 99233 SBSQ HOSP IP/OBS HIGH 50: CPT | Mod: FS,NTX,, | Performed by: PSYCHIATRY & NEUROLOGY

## 2023-11-22 PROCEDURE — 83735 ASSAY OF MAGNESIUM: CPT | Mod: NTX | Performed by: STUDENT IN AN ORGANIZED HEALTH CARE EDUCATION/TRAINING PROGRAM

## 2023-11-22 PROCEDURE — 27000221 HC OXYGEN, UP TO 24 HOURS: Mod: NTX

## 2023-11-22 PROCEDURE — 25000003 PHARM REV CODE 250: Mod: NTX | Performed by: PHYSICIAN ASSISTANT

## 2023-11-22 PROCEDURE — 94002 VENT MGMT INPAT INIT DAY: CPT | Mod: NTX

## 2023-11-22 PROCEDURE — 94761 N-INVAS EAR/PLS OXIMETRY MLT: CPT | Mod: NTX

## 2023-11-22 PROCEDURE — 25000003 PHARM REV CODE 250: Mod: NTX | Performed by: NURSE PRACTITIONER

## 2023-11-22 PROCEDURE — 99499 UNLISTED E&M SERVICE: CPT | Mod: NTX,,, | Performed by: NURSE PRACTITIONER

## 2023-11-22 PROCEDURE — 27100171 HC OXYGEN HIGH FLOW UP TO 24 HOURS: Mod: NTX

## 2023-11-22 PROCEDURE — 99499 NO LOS: ICD-10-PCS | Mod: NTX,,, | Performed by: NURSE PRACTITIONER

## 2023-11-22 RX ADMIN — LACTULOSE 30 G: 20 SOLUTION ORAL at 09:11

## 2023-11-22 RX ADMIN — THIAMINE HCL TAB 100 MG 100 MG: 100 TAB at 09:11

## 2023-11-22 RX ADMIN — LEVETIRACETAM 500 MG: 100 SOLUTION ORAL at 09:11

## 2023-11-22 RX ADMIN — IPRATROPIUM BROMIDE AND ALBUTEROL SULFATE 3 ML: .5; 3 SOLUTION RESPIRATORY (INHALATION) at 07:11

## 2023-11-22 RX ADMIN — FAMOTIDINE 20 MG: 20 TABLET ORAL at 08:11

## 2023-11-22 RX ADMIN — LACTULOSE 30 G: 20 SOLUTION ORAL at 08:11

## 2023-11-22 RX ADMIN — PROPRANOLOL HYDROCHLORIDE 5 MG: 20 SOLUTION ORAL at 08:11

## 2023-11-22 RX ADMIN — RIFAXIMIN 550 MG: 550 TABLET ORAL at 08:11

## 2023-11-22 RX ADMIN — IBUPROFEN 600 MG: 600 TABLET, FILM COATED ORAL at 03:11

## 2023-11-22 RX ADMIN — THERA TABS 1 TABLET: TAB at 09:11

## 2023-11-22 RX ADMIN — IPRATROPIUM BROMIDE AND ALBUTEROL SULFATE 3 ML: .5; 3 SOLUTION RESPIRATORY (INHALATION) at 12:11

## 2023-11-22 RX ADMIN — FAMOTIDINE 20 MG: 20 TABLET ORAL at 09:11

## 2023-11-22 RX ADMIN — IPRATROPIUM BROMIDE AND ALBUTEROL SULFATE 3 ML: .5; 3 SOLUTION RESPIRATORY (INHALATION) at 09:11

## 2023-11-22 RX ADMIN — FOLIC ACID 1 MG: 1 TABLET ORAL at 09:11

## 2023-11-22 RX ADMIN — RIFAXIMIN 550 MG: 550 TABLET ORAL at 09:11

## 2023-11-22 RX ADMIN — IBUPROFEN 600 MG: 600 TABLET, FILM COATED ORAL at 10:11

## 2023-11-22 RX ADMIN — IPRATROPIUM BROMIDE AND ALBUTEROL SULFATE 3 ML: .5; 3 SOLUTION RESPIRATORY (INHALATION) at 01:11

## 2023-11-22 RX ADMIN — LEVETIRACETAM 500 MG: 100 SOLUTION ORAL at 08:11

## 2023-11-22 NOTE — RESPIRATORY THERAPY
Patient arrived via stretcher from ED intubated with a 7.5  ETT secured  at 23  at the lip , and  placed on documented settings . Patient tolerated tranfser well will continue to monitor.

## 2023-11-22 NOTE — PT/OT/SLP PROGRESS
Speech Language Pathology      Mara Mojica  MRN: [id number]    SLP Bedside Swallow Evaluation orders received and reviewed with RN. Patient not seen today secondary to Nursing hold ( Respiratory status, Patient not appropriate for PO trials.) ST to continue to monitor and re-attempt 11/23/23 per ST POC.     11/22/2023

## 2023-11-22 NOTE — PT/OT/SLP PROGRESS
Occupational Therapy  Co- Treatment w/ PT    Name: Mara Mojica  MRN: [id number]  Admitting Diagnosis:  Seizure       Recommendations:     Discharge Recommendations: Moderate Intensity Therapy  Discharge Equipment Recommendations:  to be determined by next level of care  Barriers to discharge:  Inaccessible home environment, Decreased caregiver support    Assessment:     Mara Mojica is a 58 y.o. female with a medical diagnosis of Seizure.  She presents with decrease functional status secondary to medical diagnosis. Performance deficits affecting function are weakness, impaired endurance, impaired sensation, impaired self care skills, impaired functional mobility, gait instability, impaired balance, impaired cognition, decreased coordination, decreased upper extremity function, decreased lower extremity function, decreased safety awareness, decreased ROM, impaired coordination, impaired fine motor. Patient with increased movement of BUE demonstrating increased movement at BUE wrist/diget joints this date as well as significantly decreased swelling. Patient more alert and demonstrating increased cervical motion and attention at this time. Patient emotionally liable during session with several episodes of crying throughout.     Rehab Prognosis:  Fair; patient would benefit from acute skilled OT services to address these deficits and reach maximum level of function.       Plan:     Patient to be seen 4 x/week to address the above listed problems via self-care/home management, therapeutic activities, therapeutic exercises, neuromuscular re-education  Plan of Care Expires: 12/14/23  Plan of Care Reviewed with: patient    Subjective     Chief Complaint: None at this time  Patient/Family Comments/goals: Gain functional status   Pain/Comfort:  Pain Rating 1: 0/10    Objective:     Communicated with: RN prior to session.  Patient found supine with NG tube, telemetry, PureWick, blood pressure cuff, pulse ox  (continuous), oxygen, pressure relief boots upon OT entry to room.    General Precautions: Standard, aspiration, fall    Orthopedic Precautions:N/A  Braces: N/A  Respiratory Status: Room air     Occupational Performance:     Bed Mobility:    Patient completed Rolling/Turning to Left with  total assistance  Patient completed Scooting/Bridging with total assistance  Patient completed Supine to Sit with total assistance  Patient completed Sit to Supine with total assistance     Functional Mobility/Transfers:  Not attempted at this time    Activities of Daily Living:  Toileting: Total to complete hygiene following patient urination in hospital bed. Hygiene performed via patient roll with total assistance.     [facility] 6 Click ADL: 6    Treatment & Education:  Co-Treatment conducted due to patient medical instability and to promote patient safety.  OT performed PROM to all planes with BUE 1x 5 with hold at end range. Patient cued for attention to all directions and is demonstrating increased movement/control of cervical area.     Patient left supine with all lines intact, call button in reach, and bed alarm on    GOALS:   Multidisciplinary Problems       Occupational Therapy Goals          Problem: Occupational Therapy    Goal Priority Disciplines Outcome Interventions   Occupational Therapy Goal     OT, PT/OT Ongoing, Progressing    Description: Goals to be met by: 12/15/23     Patient will increase functional independence with ADLs by performing:    UE Dressing with Minimal Assistance.  LE Dressing with Moderate Assistance.  Grooming while seated with [place]er.  Toileting from bedside commode with Minimal Assistance for hygiene and clothing management.   Step transfer with Minimal Assistance  Toilet transfer to bedside commode with Minimal Assistance.                         Time Tracking:     OT Date of Treatment: 11/22/23  OT Start Time: 1128  OT Stop Time: 1153  OT Total Time (min): 25 min    Billable  Minutes:Therapeutic Activity 15 minutes   Therapeutic Exercise 10 minutes     OT/SHAWNA: OT          11/22/2023

## 2023-11-22 NOTE — ACP (ADVANCE CARE PLANNING)
[facility]  I engaged the family (significant other Darron) in a voluntary conversation about advance care planning and we specifically addressed what the goals of care would be moving forward, in light of the patient's change in clinical status, specifically slowly improving mental status and respiratory status, however with persistent profound critical illness neuromyopathy/weakness.  We did specifically address the patient's likely prognosis, which is fair . We specifically discussed that patient has been making slow improvements in motor strength (was able to be placed in chair position the other day, starting to move arms, slight decrease in amount of respiratory/cough support needed), but that her recovery will likely take months, and she will be at risk for setbacks (notably recurrent PNA) during that time. Further discussed that due to profound weakness, will require PEG tube for nutritional support, along w/ SNF placement.  We explored the patient's values and preferences for future care.  The family endorses that what is most important right now is to focus on improvement in condition but with limits to invasive therapies. Notably, Darron reports feeling optimistic that patient has been showing signs of improvement, shares that he feels she would want a PEG tube/ongoing medical care to try and continue getting stronger. Would not want her intubated/coded in an emergent situation, however is agreeable to a procedure understanding that she may require intubation/be made temporarily FULL CODE for procedure.     Accordingly, we have decided that the best plan to meet the patient's goals includes continuing with treatment. Will consult IR/GI for PEG tube placement, CM working w/ Darron on placement.     I did explain the role for hospice care at this stage of the patient's illness, including its ability to help the patient live with the best quality of life possible.  We will not be making a hospice referral.    I  spent a total of 30 minutes engaging the family in this advance care planning discussion. Patient non-verbal, unable to participate in discussion.     Naomi Fournier MD, MPH  Neurocritical Care Physician

## 2023-11-22 NOTE — PLAN OF CARE
"[facility] Care Plan    POC reviewed with Mara Mojica. Pt unable to verbalize understanding due to neurological status. No acute events today. Pt progressing toward goals. Will continue to monitor. See below and flowsheets for full assessment and VS info.       Is this a stroke patient? yes- Stroke booklet reviewed with patient, risk factors identified for patient and stroke booklet remains at bedside for ongoing education.     Neuro:  [place] Coma Scale  Best Eye Response: 4-->(E4) spontaneous  Best Motor Response: 5-->(M5) localizes pain  Best Verbal Response: 1-->(V1) none  [place] Coma Scale Score: 10  Assessment Qualifiers: no eye obstruction present, patient not sedated/intubated  Pupil PERRLA: yes     24 hr Temp:  [96.9 °F (36.1 °C)-98.6 °F (37 °C)]     CV:   Rhythm: normal sinus rhythm, sinus bradycardia  BP goals:   SBP < 180  MAP > 65    Resp:      Vent Mode: Spont  Set Rate: 14 BPM  Oxygen Concentration (%): 28  Vt Set: 420 mL  PEEP/CPAP: 5 cmH20  Pressure Support: 5 cmH20    Plan:  2L NC with humidification    GI/:     Diet/Nutrition Received: tube feeding  Last Bowel Movement: 11/22/23  Voiding Characteristics: voids spontaneously without difficulty    Intake/Output Summary (Last 24 hours) at 11/22/2023 1109  Last data filed at 11/22/2023 1101  Gross per 24 hour   Intake 890 ml   Output --   Net 890 ml     Unmeasured Output  Urine Occurrence: 1  Stool Occurrence: 1  Emesis Occurrence: 0  Pad Count: 2    Labs/Accuchecks:  Recent Labs   Lab 11/22/23 0333   WBC 8.49   RBC 2.88*   HGB 9.8*   HCT 29.9*   PLT 51*      Recent Labs   Lab 11/22/23 0333      K 4.5   CO2 21*   *   BUN 27*   CREATININE 0.4*   ALKPHOS 149*   ALT 77*   AST 79*   BILITOT 8.5*      Recent Labs   Lab 11/22/23 0333   INR 1.8*    No results for input(s): "CPK", "CPKMB", "TROPONINI", "MB" in the last 168 hours.    Electrolytes: N/A - electrolytes WDL  Accuchecks: none    Gtts:      LDA/Wounds:  Lines/Drains/Airways       " Drain  Duration                  NG/OG Tube 10/20/23 2000 16 Fr. Left nostril 32 days              Airway  Duration                Airway Anesthesia 11/05/23 16 days              Peripheral Intravenous Line  Duration                  Midline Catheter Insertion/Assessment  - Single Lumen 11/13/23 1840 Right brachial vein 20g x 10cm 8 days         Peripheral IV - Single Lumen 11/19/23 0900 22 G Anterior;Right Foot 3 days                  Wounds: Yes  Wound care consulted: Yes

## 2023-11-22 NOTE — SUBJECTIVE & OBJECTIVE
Interval History: [facility] metting 11/22,  Review of Systems   Reason unable to perform ROS: decrease LOC.      Unable to obtain a complete ROS due to level of consciousness.  Objective:     Vitals:  Temp: 98.6 °F (37 °C)  Pulse: 71  Rhythm: normal sinus rhythm, sinus bradycardia  BP: 105/85  MAP (mmHg): 92  Resp: 16  SpO2: 97 %    Temp  Min: 96.9 °F (36.1 °C)  Max: 98.6 °F (37 °C)  Pulse  Min: 58  Max: 79  BP  Min: 81/46  Max: 154/62  MAP (mmHg)  Min: 61  Max: 108  Resp  Min: 11  Max: 28  SpO2  Min: 94 %  Max: 98 %    11/21 0701 - 11/22 0700  In: 840   Out: -    Unmeasured Output  Urine Occurrence: 1  Stool Occurrence: 1  Emesis Occurrence: 0  Pad Count: 2        Physical Exam  Vitals and nursing note reviewed.   Constitutional:       Appearance: She is ill-appearing.   HENT:      Head: Normocephalic.      Nose: Nose normal.      Mouth/Throat:      Mouth: Mucous membranes are moist.      Pharynx: Oropharynx is clear.   Eyes:      Pupils: Pupils are equal, round, and reactive to light.   Cardiovascular:      Rate and Rhythm: Normal rate and regular rhythm.      Pulses: Normal pulses.      Heart sounds: Normal heart sounds.   Pulmonary:      Effort: Pulmonary effort is normal.      Breath sounds: Normal breath sounds.   Abdominal:      General: Bowel sounds are normal.      Palpations: Abdomen is soft.   Musculoskeletal:         General: Swelling present. Normal range of motion.      Right lower leg: Edema present.      Left lower leg: Edema present.   Skin:     General: Skin is warm and dry.      Capillary Refill: Capillary refill takes 2 to 3 seconds.      Coloration: Skin is jaundiced.   Neurological:      Comments: GCS 12  Follows simple commands  PATHAK to painful stimuli  Sensation Intact X4           Unable to test orientation, language, memory, judgment, insight, fund of knowledge, hearing, shoulder shrug, tongue protrusion, coordination, gait due to level of consciousness.       Medications:  Continuous  Scheduledalbuterol-ipratropium, 3 mL, Q6H  famotidine, 20 mg, BID  folic acid, 1 mg, Daily  lactulose, 30 g, BID  levetiracetam, 500 mg, BID  multivitamin, 1 tablet, Daily  propranoloL, 5 mg, Q12H  rifAXImin, 550 mg, BID  thiamine, 100 mg, Daily    PRNcalcium gluconate IVPB, 1 g, PRN  calcium gluconate IVPB, 2 g, PRN  calcium gluconate IVPB, 3 g, PRN  dextrose 10%, 12.5 g, PRN  dextrose 10%, 25 g, PRN  hydrALAZINE, 10 mg, Q4H PRN  ibuprofen, 600 mg, Q8H PRN  labetalol, 10 mg, Q6H PRN  magnesium sulfate IVPB, 2 g, PRN  magnesium sulfate IVPB, 4 g, PRN  ondansetron, 4 mg, Q8H PRN  potassium chloride, 40 mEq, PRN   And  potassium chloride, 60 mEq, PRN   And  potassium chloride, 80 mEq, PRN  racepinephrine, 0.5 mL, Q4H PRN  sodium phosphate 15 mmol in dextrose 5 % (D5W) 250 mL IVPB, 15 mmol, PRN  sodium phosphate 20.01 mmol in dextrose 5 % (D5W) 250 mL IVPB, 20.01 mmol, PRN  sodium phosphate 30 mmol in dextrose 5 % (D5W) 250 mL IVPB, 30 mmol, PRN      Today I personally reviewed pertinent medications, lines/drains/airways, imaging, cardiology results, laboratory results, microbiology results, notably:    Diet  Diet NPO  Diet NPO

## 2023-11-22 NOTE — PROGRESS NOTES
[facility] - Neuro Critical Care  Neurocritical Care  Progress Note    Admit Date: 10/25/2023  Service Date: 11/22/2023  Length of Stay: 28    Subjective:     Chief Complaint: Seizure    History of Present Illness: This is a 58-year-old female with a past medical history of alcoholic cirrhosis, s/p ex-lap w/ bowel resection for incarcerated hernia, who initially presented on 10/18 to [facility] with acute encephalopathy.  Her  found her on the floor at home with evidence of fecal/urine incontinence with confusion and slurred speech. Possible reported tongue injury in chart. Reported concern L sided weakness and down drift of L arm however CT head without evidence of acute abnormalities, MRI brain with only small vessel vascular changes without evidence of territorial infarct.     Hospital Course: EEG done on 10/19 with mild diffuse nonspecific background slowing, no potential epileptiform activity. Repeat MRI brain with contrast on 10/23 unchanged from initial MRI. Became more lethargic and was no longer following commands or answering questions. Due to worsening hepatic encephalopathy in setting of hepatic cirrhosis, patient [place] [facility] [place] management with transplant team. Has remained on antibiotics, lactulose and rifaximin for treatment of UTI with pansensitive Ecoli, HE, and presumed SBP. Neurology consulted for management recommendations regarding persistent AMS. Had repeat EEG done on 10/24 with similar findings to prior EEG showing mild generalized non-specific cerebral dysfunction and no electrographic seizures or indication of seizure tendency. Additional CT head w/o contrast on 10/25 without evidence of acute issues. Remains confused and unable to answer questions on exam. Not withdrawing to painful stimuli. Was able to awaken to verbal stimuli in AM however when reevaluated in afternoon unresponsive however was after receiving Ativan.     On admission to [facility]:  Patient had EEG running, and  was noted to have seizures at 7:30 a.m., 8:00 a.m. in, and 10:00 a.m. was noted to be in focal status prior to receiving Vimpat.  Patient was noted to have stridor, worsening secretions with suspicion for aspiration, decreased airway protection, and rapids was called due to low GCS score of 6. Antibiotics broadened to Vanc/Zosyn. Clinical picture of mental status confounded with episodes of seizures, infection, and hepatic encephalopathy.             Hospital Course: 10/28/2023: Improving GCS from 6 to 10. Continuing pulmonary toilet and deep suctioning for stridor and copious secretions. UA positive for candida. Blood cultures from 10/27 NGTD. Sputum cultures sent. Patient on day 2 of broadened antibiotics vanc/zosyn due to worsening clinical status but will discontinue tomorrow if cultures remain negative. Still hypernatremic, treating with D5W. Patient was transferred with no ordered diuretics, very edematous on physical exam. Adequate stooling on lactulose and rifaximin. Due to increased UOP/stooling will place eckert for one day for irritated skin. EEG update showing no seizures since 10am 10/27. Monitoring CBC for thrombocytopenia and macrocytic anemia. Discussed code status and goals of care with  and best friend at bedside. Trickle feeds resumed.   10/29/2023: No acute events overnight, EEG reportedly without further seizures for ~48 hours can disconnect once formal report is in, neuro status slowly improving as patient now tracking in room, moving extremities, responding with appropriate emotional reactions to her .  Respiratory status largely unchanged with intermittent events of large volume breaths that sound almost stridorous but without actual respiratory distress- gas remains compensated.  UOP low, diuresis resumed.  10/30/2023: d/c mucomyst nebs, add racemic epi scheduled nebs, add budesonide and dex 6 q8 hours, ammonia at 35- continue lactulose and rifaximin, abg reviewed, 40mEq of K  once, ENT consulted for stridor, continue eckert catheter in today   10/31/2023 Patient with worsening respiratory status, continued decreased mentation and increased secretions. Plan for elective intubation in controlled setting with anesthesia. Will also recheck eeg, if negative will start to wean AEDs and see if that improves patient's arousal. PLT stabilized, continue to monitor.   11/1/2023 this morning patient's 6.0 ETT exchanged for 7.0 to allow suctioning. EEG with frequent discharges, lowered vimpat to 50 mg and will follow EEG. Attempting to remove confounding factors for patients mental status. Slow drop in H/H, 1 unit ordered.   11/2/2023 NAEO, cEEG to remain in place, no seizures but is having frequent discharges in runs. Continue lower dose vimpat and 1500 keppra Eye opening this morning which is slight improvement in exam. Failed SBT   11/03/2023: no improvement with decrease in lacosamide, no re-development of seizures, if does not wake up in next 48 hrs off lacosamide or redevelops seizures, prognosis is extremely poor  11/04/2023: improved mental state this am, no seizures overnight  11/05/2023: LOC stable, has cuff leak, trial of extubation  11/06/2023 reintubated for stridor non responsive to med management overnight  11/07/2023 CTH stable. Off of levo. Completed dex (wbc 22, afebrile). CXR w L mildly increasing consolidation. Very thick secretions, added mucomyst and duonebs. Weaning keppra to 750. Pt appearing more edematous today, dc LR. Scheduled tylenol max 2g/d. Scheduled oxy 5q12.  11/08/2023: EEG pending  11/09/2023: EEG slowing  11/10/2023: more alert today  11/11/2023 pressure support trial  11/12/2023 Alert today. Not following commands. On spontaneous overnight, tolerated well. MRI c-spine overnight with spondylosis. No LP at this time. Resume TF. Hold TF tomorrow at 5AM for possible extubation tomorrow.   11/13/2023: patient made DNR, 10 dexamethsone IV one prior extubation with no  intent of re-intubation, PRN racemic epi and Bipap were initiated  11/14/2023: Patient tolerated BiPAP overnight and continues to be more alert, and is tracking faces more consistently. She was taken off BiPAP and placed on 4L oxygen via nasal cannula. Trickle feeds were started while off BiPAP. Significant other at bedside updated on clinical course. Midline placed overnight.   11/15/2023: Thick secretions noted when BiPAP removed this morning, starting CPT. PT/OT consulted with improving mental and respiratory status. Patient more sad and uncomfortable today.   11/16/2023: Advancing tube feeds to goal and encouraging continued work with PT/OT. She has been tolerating nasal cannula oxygenation well and has had a reduction in her secretions.   11/17/2023: Increased secretions overnight and this morning. Continues to work with PT/OT and has increased movement in her head and neck. Tube feed goal adjusted per dietician recommendations. Starting to plan for disposition. Considering ACP and Palliative care talks with family this weekend.   11/18/2023: NAEON. 50g albumin and 40mg lasix given for diuresis.   11/19/2023: H&H dropped overnight, received 1 unit PBRC. Otherwise, NAEON. Stable for transfer to floor with HM.   11/20/2023 hold transfer to HM 2/2 respiratory status   11/21/23: GOC tomorrow  11/22/23: GOC today    Interval History: GOC metting 11/22,  Review of Systems   Reason unable to perform ROS: decrease LOC.      Unable to obtain a complete ROS due to level of consciousness.  Objective:     Vitals:  Temp: 98.6 °F (37 °C)  Pulse: 71  Rhythm: normal sinus rhythm, sinus bradycardia  BP: 105/85  MAP (mmHg): 92  Resp: 16  SpO2: 97 %    Temp  Min: 96.9 °F (36.1 °C)  Max: 98.6 °F (37 °C)  Pulse  Min: 58  Max: 79  BP  Min: 81/46  Max: 154/62  MAP (mmHg)  Min: 61  Max: 108  Resp  Min: 11  Max: 28  SpO2  Min: 94 %  Max: 98 %    11/21 0701 - 11/22 0700  In: 840   Out: -    Unmeasured Output  Urine Occurrence: 1  Stool  Occurrence: 1  Emesis Occurrence: 0  Pad Count: 2        Physical Exam  Vitals and nursing note reviewed.   Constitutional:       Appearance: She is ill-appearing.   HENT:      Head: Normocephalic.      Nose: Nose normal.      Mouth/Throat:      Mouth: Mucous membranes are moist.      Pharynx: Oropharynx is clear.   Eyes:      Pupils: Pupils are equal, round, and reactive to light.   Cardiovascular:      Rate and Rhythm: Normal rate and regular rhythm.      Pulses: Normal pulses.      Heart sounds: Normal heart sounds.   Pulmonary:      Effort: Pulmonary effort is normal.      Breath sounds: Normal breath sounds.   Abdominal:      General: Bowel sounds are normal.      Palpations: Abdomen is soft.   Musculoskeletal:         General: Swelling present. Normal range of motion.      Right lower leg: Edema present.      Left lower leg: Edema present.   Skin:     General: Skin is warm and dry.      Capillary Refill: Capillary refill takes 2 to 3 seconds.      Coloration: Skin is jaundiced.   Neurological:      Comments: GCS 12  Follows simple commands  PATHAK to painful stimuli  Sensation Intact X4           Unable to test orientation, language, memory, judgment, insight, fund of knowledge, hearing, shoulder shrug, tongue protrusion, coordination, gait due to level of consciousness.       Medications:  Continuous Scheduledalbuterol-ipratropium, 3 mL, Q6H  famotidine, 20 mg, BID  folic acid, 1 mg, Daily  lactulose, 30 g, BID  levetiracetam, 500 mg, BID  multivitamin, 1 tablet, Daily  propranoloL, 5 mg, Q12H  rifAXImin, 550 mg, BID  thiamine, 100 mg, Daily    PRNcalcium gluconate IVPB, 1 g, PRN  calcium gluconate IVPB, 2 g, PRN  calcium gluconate IVPB, 3 g, PRN  dextrose 10%, 12.5 g, PRN  dextrose 10%, 25 g, PRN  hydrALAZINE, 10 mg, Q4H PRN  ibuprofen, 600 mg, Q8H PRN  labetalol, 10 mg, Q6H PRN  magnesium sulfate IVPB, 2 g, PRN  magnesium sulfate IVPB, 4 g, PRN  ondansetron, 4 mg, Q8H PRN  potassium chloride, 40 mEq, PRN    And  potassium chloride, 60 mEq, PRN   And  potassium chloride, 80 mEq, PRN  racepinephrine, 0.5 mL, Q4H PRN  sodium phosphate 15 mmol in dextrose 5 % (D5W) 250 mL IVPB, 15 mmol, PRN  sodium phosphate 20.01 mmol in dextrose 5 % (D5W) 250 mL IVPB, 20.01 mmol, PRN  sodium phosphate 30 mmol in dextrose 5 % (D5W) 250 mL IVPB, 30 mmol, PRN      Today I personally reviewed pertinent medications, lines/drains/airways, imaging, cardiology results, laboratory results, microbiology results, notably:    Diet  Diet NPO  Diet NPO        Assessment/Plan:     Neuro  * Seizure  58 year old presented for altered mental status on 10/18. Clinical picture confounded with hepatic encephalopathy, infection, and seizure activity. Neurology was consulted by primary team prior to admission to [facility].    10/27 EEG IMPRESSION:  This is an abnormal EEG during lethargic state.  Diffuse disorganized slowing of the background was noted.  Frequent triphasic waves noted.  Left posterior pseudo periodic epileptiform discharges were noted.  Numerous electrographic seizures emanating from the left posterior region were noted.    10/31 - rehook  11/1 read with frequent discharges, vimpat lowered to 50 given mental status and liver function, will continue to watch on eeg vEEG discontinued  11/03: EEG restarted, remains negative with lacosamide taper, DC and monitor for seizure recurrence  11/5: remains without seizures  11/9/23: EEG slowing  11/15: No witnessed seizures      Neurochecks q4  Vitals q4hr  Keppra 500 mg BID      Hematology  Thrombocytopenia  This is a 58-year-old woman with a history of decompensated alcoholic cirrhosis.  Suspect thrombocytopenia is likely secondary to chronic alcohol use and is consumptive in nature. If trend worsens, will consider consulting Hematology.    Daily CBC, transfuse only for active bleeding  SCDs  Frequent bleeding and oozing in gums   Famotidine prophylaxis       GI  Acute hepatic encephalopathy  This is a 52  year old woman with a history of ethanol cirrhosis who presents after being found on the ground with fecal and urinary incontinence, and altered mental status. Elevated ammonia on admission.    - Continue lactulose via NG tube TID (titrate till 3-4 daily BM achieved). Continue rifaximin.   - Avoid opiates and benzodiazepines  - IV thiamine, folate supplementation, multivitamin through NG tube.  - MELD score ~50% survival without transplant        Decompensated alcoholic hepatic cirrhosis  Daily CBC, CMP & INR.   Not currently being evaluated for transplant due to clinical status  Continuing lactulose and rifaximin titrated to 3-4 bowel movements daily  Ammonia WNL  Folic acid, thiamine, and multivitamin    Other  Hypoxic respiratory failure  2/2 to tracheal stenosis     - Tolerating nasal cannula well.   - Continue pulmonary toilet.  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  - Continue working with PT/OT, appreciate recommendations          The patient is being Prophylaxed for:  Venous Thromboembolism with: Mechanical or Chemical  Stress Ulcer with: H2B  Ventilator Pneumonia with: not applicable    Activity Orders            Diet NPO: NPO starting at 11/13 0500    Elevate HOB Elevate (30-45 degrees) Elevate HOB to 30 - 45 degrees during feeding unless otherwise stated starting at 10/28 1218    Elevate HOB to 30-45 degrees during feeding unless otherwise stated starting at 10/26 1138    Progressive Mobility Protocol (mobilize patient to their highest level of functioning at least twice daily) starting at 10/25 2000    Turn patient starting at 10/25 2000          DNR  Level 3  Ebony Kurtz NP  Neurocritical Care  [facility] - Neuro Critical Care

## 2023-11-22 NOTE — ASSESSMENT & PLAN NOTE
58 year old presented for altered mental status on 10/18. Clinical picture confounded with hepatic encephalopathy, infection, and seizure activity. Neurology was consulted by primary team prior to admission to [facility].    10/27 EEG IMPRESSION:  This is an abnormal EEG during lethargic state.  Diffuse disorganized slowing of the background was noted.  Frequent triphasic waves noted.  Left posterior pseudo periodic epileptiform discharges were noted.  Numerous electrographic seizures emanating from the left posterior region were noted.    10/31 - rehook  11/1 read with frequent discharges, vimpat lowered to 50 given mental status and liver function, will continue to watch on eeg vEEG discontinued  11/03: EEG restarted, remains negative with lacosamide taper, DC and monitor for seizure recurrence  11/5: remains without seizures  11/9/23: EEG slowing  11/15: No witnessed seizures      Neurochecks q4  Vitals q4hr  Keppra 500 mg BID

## 2023-11-22 NOTE — PLAN OF CARE
"[facility] Care Plan    POC reviewed with Mara Mojica and family at 0300. Will continue to monitor. See below and flowsheets for full assessment and VS info.     Is this a stroke patient? no    Neuro:  [place] Coma Scale  Best Eye Response: 4-->(E4) spontaneous  Best Motor Response: 6-->(M6) obeys commands  Best Verbal Response: 1-->(V1) none  [place] Coma Scale Score: 11  Assessment Qualifiers: patient not sedated/intubated, no eye obstruction present  Pupil PERRLA: yes     24hr Temp:  [96.9 °F (36.1 °C)-98.2 °F (36.8 °C)]     CV:   Rhythm: normal sinus rhythm  BP goals:   SBP < 180  MAP > 65    Resp:      Vent Mode: Spont  Set Rate: 14 BPM  Oxygen Concentration (%): 28  Vt Set: 420 mL  PEEP/CPAP: 5 cmH20  Pressure Support: 5 cmH20    Plan: N/A    GI/:     Diet/Nutrition Received: tube feeding  Last Bowel Movement: 11/22/23  Voiding Characteristics: voids spontaneously without difficulty    Intake/Output Summary (Last 24 hours) at 11/22/2023 0605  Last data filed at 11/22/2023 0601  Gross per 24 hour   Intake 840 ml   Output --   Net 840 ml     Unmeasured Output  Urine Occurrence: 1  Stool Occurrence: 1  Emesis Occurrence: 0  Pad Count: 2    Labs/Accuchecks:  Recent Labs   Lab 11/22/23 0333   WBC 8.49   RBC 2.88*   HGB 9.8*   HCT 29.9*   PLT 51*      Recent Labs   Lab 11/22/23 0333      K 4.5   CO2 21*   *   BUN 27*   CREATININE 0.4*   ALKPHOS 149*   ALT 77*   AST 79*   BILITOT 8.5*      Recent Labs   Lab 11/22/23  0333   INR 1.8*    No results for input(s): "CPK", "CPKMB", "TROPONINI", "MB" in the last 168 hours.    Electrolytes: N/A - electrolytes WDL  Accuchecks: none    Gtts:      LDA/Wounds:  Lines/Drains/Airways       Drain  Duration                  NG/OG Tube 10/20/23 2000 16 Fr. Left nostril 32 days              Airway  Duration                Airway Anesthesia 11/05/23 16 days              Peripheral Intravenous Line  Duration                  Midline Catheter Insertion/Assessment  - Single " Lumen 11/13/23 1840 Right brachial vein 20g x 10cm 8 days         Peripheral IV - Single Lumen 11/19/23 0900 22 G Anterior;Right Foot 2 days                  Wounds: Yes  Wound care consulted: Yes      Problem: Adult Inpatient Plan of Care  Goal: Plan of Care Review  Outcome: Ongoing, Progressing  Goal: Patient-Specific Goal (Individualized)  Outcome: Ongoing, Progressing  Goal: Absence of Hospital-Acquired Illness or Injury  Outcome: Ongoing, Progressing  Goal: Optimal Comfort and Wellbeing  Outcome: Ongoing, Progressing  Goal: Readiness for Transition of Care  Outcome: Ongoing, Progressing     Problem: Infection  Goal: Absence of Infection Signs and Symptoms  Outcome: Ongoing, Progressing     Problem: Skin Injury Risk Increased  Goal: Skin Health and Integrity  Outcome: Ongoing, Progressing     Problem: Adjustment to Illness (Sepsis/Septic Shock)  Goal: Optimal Coping  Outcome: Ongoing, Progressing     Problem: Bleeding (Sepsis/Septic Shock)  Goal: Absence of Bleeding  Outcome: Ongoing, Progressing     Problem: Glycemic Control Impaired (Sepsis/Septic Shock)  Goal: Blood Glucose Level Within Desired Range  Outcome: Ongoing, Progressing     Problem: Infection Progression (Sepsis/Septic Shock)  Goal: Absence of Infection Signs and Symptoms  Outcome: Ongoing, Progressing     Problem: Nutrition Impaired (Sepsis/Septic Shock)  Goal: Optimal Nutrition Intake  Outcome: Ongoing, Progressing     Problem: Fall Injury Risk  Goal: Absence of Fall and Fall-Related Injury  Outcome: Ongoing, Progressing     Problem: Communication Impairment (Mechanical Ventilation, Invasive)  Goal: Effective Communication  Outcome: Ongoing, Progressing     Problem: Device-Related Complication Risk (Mechanical Ventilation, Invasive)  Goal: Optimal Device Function  Outcome: Ongoing, Progressing     Problem: Inability to Wean (Mechanical Ventilation, Invasive)  Goal: Mechanical Ventilation Liberation  Outcome: Ongoing, Progressing     Problem:  Nutrition Impairment (Mechanical Ventilation, Invasive)  Goal: Optimal Nutrition Delivery  Outcome: Ongoing, Progressing     Problem: Skin and Tissue Injury (Mechanical Ventilation, Invasive)  Goal: Absence of Device-Related Skin and Tissue Injury  Outcome: Ongoing, Progressing     Problem: Ventilator-Induced Lung Injury (Mechanical Ventilation, Invasive)  Goal: Absence of Ventilator-Induced Lung Injury  Outcome: Ongoing, Progressing     Problem: Restraint, Nonbehavioral (Nonviolent)  Goal: Absence of Harm or Injury  Outcome: Ongoing, Progressing     Problem: Impaired Wound Healing  Goal: Optimal Wound Healing  Outcome: Ongoing, Progressing

## 2023-11-22 NOTE — PLAN OF CARE
Problem: Occupational Therapy  Goal: Occupational Therapy Goal  Description: Goals to be met by: 12/15/23     Patient will increase functional independence with ADLs by performing:    UE Dressing with Minimal Assistance.  LE Dressing with Moderate Assistance.  Grooming while seated with [place].  Toileting from bedside commode with Minimal Assistance for hygiene and clothing management.   Step transfer with Minimal Assistance  Toilet transfer to bedside commode with Minimal Assistance.    Outcome: Ongoing, Progressing

## 2023-11-22 NOTE — PT/OT/SLP PROGRESS
Physical Therapy Co-Treatment    Patient Name:  Mara Mojica   MRN:  [id number]    Recommendations:     Discharge Recommendations: Moderate Intensity Therapy  Discharge Equipment Recommendations: to be determined by next level of care  Barriers to discharge: Inaccessible home and Decreased caregiver support    Assessment:     Mara Mojica is a 58 y.o. female admitted with a medical diagnosis of Seizure.  She presents with the following impairments/functional limitations: weakness, impaired endurance, impaired self care skills, impaired functional mobility, gait instability, impaired balance, visual deficits, impaired cognition, decreased coordination, decreased upper extremity function, decreased lower extremity function, decreased safety awareness, abnormal tone, decreased ROM, impaired coordination, impaired fine motor, edema, impaired muscle length. Pt w/ improved strength today, able to activate muscles in B hands, forearms, and shoulders. She continues to demonstrate good head control and is moving her neck around more frequently during treatment session.    Rehab Prognosis: Fair; patient would benefit from acute skilled PT services to address these deficits and reach maximum level of function.    Recent Surgery: * No surgery found *      Plan:     During this hospitalization, patient to be seen 4 x/week to address the identified rehab impairments via therapeutic activities, therapeutic exercises, neuromuscular re-education and progress toward the following goals:    Plan of Care Expires:  12/15/23    Subjective     Chief Complaint: NA 2/2 AMS nonverbal  Patient/Family Comments/goals: NA 2/2 AMS nonverbal  Pain/Comfort:  Pain Rating 1: 0/10  Pain Rating Post-Intervention 1: 0/10      Objective:     Communicated with RN prior to session.  Patient found HOB elevated with NG tube, telemetry, PureWick, blood pressure cuff, pulse ox (continuous), oxygen, pressure relief boots upon PT entry to room. Co-tx w/ OT  2/2 suspected pt complexity and requirement of 2 skilled therapists to assist in order to maximize pt treatment     General Precautions: Standard, aspiration, fall  Orthopedic Precautions: N/A  Braces: N/A  Respiratory Status: Nasal cannula, flow 2 L/min     Functional Mobility:  Bed Mobility:     Rolling Left:  total assistance  Rolling Right: total assistance  Supine to Sit: total assistance and of 2 persons  Sit to Supine: total assistance and of 2 persons  Balance: EOB sitting total A      AM-PAC 6 CLICK MOBILITY  Turning over in bed (including adjusting bedclothes, sheets and blankets)?: 1  Sitting down on and standing up from a chair with arms (e.g., wheelchair, bedside commode, etc.): 1  Moving from lying on back to sitting on the side of the bed?: 1  Moving to and from a bed to a chair (including a wheelchair)?: 1  Need to walk in hospital room?: 1  Climbing 3-5 steps with a railing?: 1  Basic Mobility Total Score: 6       Treatment & Education:  Pt educated on PT POC/goals, d/c recs, and continued treatment. All questions answered and pt in agreement w/ POC.  PROM to BLE in sitting w/ stretching at end range  Sitting balance w/ cueing and assistance for postural control while performing head turns and visual scanning  Rolling L/R while cleaning pt and changing linens  Rolling L/R in bed to reposition pt w/ wedges and pillows to offload pressure, avoid muscle contractures, and prevent skin breakdown. Pt educated on importance of pt being rotated every 2hrs for these reasons.     Patient left HOB elevated with all lines intact, call button in reach, bed alarm on, and RN notified..    GOALS:   Multidisciplinary Problems       Physical Therapy Goals          Problem: Physical Therapy    Goal Priority Disciplines Outcome Goal Variances Interventions   Physical Therapy Goal     PT, PT/OT Ongoing, Progressing     Description: Goals to be completed by: 12/15/23    Pt will perform sup<>sit transfers w/ moderate  assistance  Pt will have sufficient dynamic balance to sit EOB while performing ADLs/therex w/ moderate assistance  Pt will be able to stand up from EOB w/ moderate assistance using LRAD  Pt will ambulate 10 feet w/ moderate assistance using LRAD  Pt will be independent w/ HEP therex on BLE w/ good form and ROM   Pt will follow >50 % of single-step commands throughout treatment session                        Time Tracking:     PT Received On: 11/22/23  PT Start Time: 1141     PT Stop Time: 1208  PT Total Time (min): 27 min     Billable Minutes: Therapeutic Activity 12 and Therapeutic Exercise 15    Treatment Type: Treatment  PT/PTA: PT     Number of PTA visits since last PT visit: 0     11/22/2023

## 2023-11-22 NOTE — PLAN OF CARE
[facility] Care Plan    POC reviewed with Mara Mojica at 1700. Pt nodded in understanding, but comprehension unknown.  Briefly spoke with significant other, Darron, today during rounds but attempts to reach him later int he shift were unsuccessful x2 and voicemail not set up. Questions and concerns addressed. No acute events today. Pt progressing toward goals. Will continue to monitor. See below and flowsheets for full assessment and VS info.     - 3 large BMs today  - 2 urine occurrences  - Neuro exam unchanged  - Bathed and linens changed  - respiratory status appears to be improving from the last two days  - oral care completed every 4 hours today by RN.    - No NT suction needed this shift  - cough getting stronger and able to get some secretions up       Is this a stroke patient? no    Neuro:  [place] Coma Scale  Best Eye Response: 4-->(E4) spontaneous  Best Motor Response: 6-->(M6) obeys commands  Best Verbal Response: 1-->(V1) none  [place] Coma Scale Score: 11  Assessment Qualifiers: patient not sedated/intubated, no eye obstruction present  Pupil PERRLA: yes     24 hr Temp:  [97.6 °F (36.4 °C)-99 °F (37.2 °C)]     CV:   Rhythm: normal sinus rhythm  BP goals:   SBP < 160  MAP > 65    Resp:      Vent Mode: Spont  Set Rate: 14 BPM  Oxygen Concentration (%): 28  Vt Set: 420 mL  PEEP/CPAP: 5 cmH20  Pressure Support: 5 cmH20    Plan: N/A    GI/:     Diet/Nutrition Received: tube feeding  Last Bowel Movement: 11/21/23  Voiding Characteristics: voids spontaneously without difficulty    Intake/Output Summary (Last 24 hours) at 11/21/2023 1829  Last data filed at 11/21/2023 1805  Gross per 24 hour   Intake 840 ml   Output --   Net 840 ml     Unmeasured Output  Urine Occurrence: 2  Stool Occurrence: 1  Emesis Occurrence: 0  Pad Count: 3    Labs/Accuchecks:  Recent Labs   Lab 11/21/23  0243   WBC 8.74   RBC 2.69*   HGB 8.9*   HCT 27.3*   PLT 51*      Recent Labs   Lab 11/21/23 0243      K 4.2   CO2 21*   *  "  BUN 30*   CREATININE 0.5   ALKPHOS 161*   ALT 82*   AST 82*   BILITOT 7.2*      Recent Labs   Lab 11/21/23  0243   INR 1.9*    No results for input(s): "CPK", "CPKMB", "TROPONINI", "MB" in the last 168 hours.    Electrolytes: N/A - electrolytes WDL  Accuchecks: none    Gtts:      LDA/Wounds:  Lines/Drains/Airways       Drain  Duration                  NG/OG Tube 10/20/23 2000 16 Fr. Left nostril 31 days              Airway  Duration                Airway Anesthesia 11/05/23 15 days              Peripheral Intravenous Line  Duration                  Midline Catheter Insertion/Assessment  - Single Lumen 11/13/23 1840 Right brachial vein 20g x 10cm 7 days         Peripheral IV - Single Lumen 11/19/23 0900 22 G Anterior;Right Foot 2 days                  Wounds: Yes  Wound care consulted: Yes    "

## 2023-11-23 LAB
ACANTHOCYTES BLD QL SMEAR: PRESENT
ALBUMIN SERPL BCP-MCNC: 2.1 G/DL (ref 3.5–5.2)
ALP SERPL-CCNC: 140 U/L (ref 55–135)
ALT SERPL W/O P-5'-P-CCNC: 71 U/L (ref 10–44)
ANION GAP SERPL CALC-SCNC: 8 MMOL/L (ref 8–16)
ANISOCYTOSIS BLD QL SMEAR: SLIGHT
AST SERPL-CCNC: 76 U/L (ref 10–40)
BASOPHILS # BLD AUTO: 0.06 K/UL (ref 0–0.2)
BASOPHILS NFR BLD: 0.7 % (ref 0–1.9)
BILIRUB SERPL-MCNC: 9.1 MG/DL (ref 0.1–1)
BUN SERPL-MCNC: 27 MG/DL (ref 6–20)
BURR CELLS BLD QL SMEAR: ABNORMAL
CALCIUM SERPL-MCNC: 8.7 MG/DL (ref 8.7–10.5)
CHLORIDE SERPL-SCNC: 116 MMOL/L (ref 95–110)
CO2 SERPL-SCNC: 21 MMOL/L (ref 23–29)
CREAT SERPL-MCNC: 0.5 MG/DL (ref 0.5–1.4)
DIFFERENTIAL METHOD: ABNORMAL
EOSINOPHIL # BLD AUTO: 0.4 K/UL (ref 0–0.5)
EOSINOPHIL NFR BLD: 4 % (ref 0–8)
ERYTHROCYTE [DISTWIDTH] IN BLOOD BY AUTOMATED COUNT: 20.8 % (ref 11.5–14.5)
EST. GFR  (NO RACE VARIABLE): >60 ML/MIN/1.73 M^2
GLUCOSE SERPL-MCNC: 105 MG/DL (ref 70–110)
HCT VFR BLD AUTO: 30.7 % (ref 37–48.5)
HGB BLD-MCNC: 9.7 G/DL (ref 12–16)
HYPOCHROMIA BLD QL SMEAR: ABNORMAL
IMM GRANULOCYTES # BLD AUTO: 0.06 K/UL (ref 0–0.04)
IMM GRANULOCYTES NFR BLD AUTO: 0.7 % (ref 0–0.5)
INR PPP: 1.7 (ref 0.8–1.2)
LYMPHOCYTES # BLD AUTO: 1.1 K/UL (ref 1–4.8)
LYMPHOCYTES NFR BLD: 12.5 % (ref 18–48)
MAGNESIUM SERPL-MCNC: 1.8 MG/DL (ref 1.6–2.6)
MCH RBC QN AUTO: 32.9 PG (ref 27–31)
MCHC RBC AUTO-ENTMCNC: 31.6 G/DL (ref 32–36)
MCV RBC AUTO: 104 FL (ref 82–98)
MONOCYTES # BLD AUTO: 2.2 K/UL (ref 0.3–1)
MONOCYTES NFR BLD: 25.2 % (ref 4–15)
NEUTROPHILS # BLD AUTO: 5 K/UL (ref 1.8–7.7)
NEUTROPHILS NFR BLD: 56.9 % (ref 38–73)
NRBC BLD-RTO: 0 /100 WBC
OVALOCYTES BLD QL SMEAR: ABNORMAL
PHOSPHATE SERPL-MCNC: 3.5 MG/DL (ref 2.7–4.5)
PLATELET # BLD AUTO: 55 K/UL (ref 150–450)
PLATELET BLD QL SMEAR: ABNORMAL
PMV BLD AUTO: 12.2 FL (ref 9.2–12.9)
POIKILOCYTOSIS BLD QL SMEAR: SLIGHT
POLYCHROMASIA BLD QL SMEAR: ABNORMAL
POTASSIUM SERPL-SCNC: 4.4 MMOL/L (ref 3.5–5.1)
PROT SERPL-MCNC: 5.3 G/DL (ref 6–8.4)
PROTHROMBIN TIME: 17.8 SEC (ref 9–12.5)
RBC # BLD AUTO: 2.95 M/UL (ref 4–5.4)
SODIUM SERPL-SCNC: 145 MMOL/L (ref 136–145)
WBC # BLD AUTO: 8.74 K/UL (ref 3.9–12.7)

## 2023-11-23 PROCEDURE — 83735 ASSAY OF MAGNESIUM: CPT | Mod: NTX | Performed by: STUDENT IN AN ORGANIZED HEALTH CARE EDUCATION/TRAINING PROGRAM

## 2023-11-23 PROCEDURE — 25000003 PHARM REV CODE 250: Mod: NTX | Performed by: HOSPITALIST

## 2023-11-23 PROCEDURE — 25000003 PHARM REV CODE 250: Mod: NTX

## 2023-11-23 PROCEDURE — 25000242 PHARM REV CODE 250 ALT 637 W/ HCPCS: Mod: NTX | Performed by: PHYSICIAN ASSISTANT

## 2023-11-23 PROCEDURE — 92610 EVALUATE SWALLOWING FUNCTION: CPT | Mod: NTX

## 2023-11-23 PROCEDURE — 99900035 HC TECH TIME PER 15 MIN (STAT): Mod: NTX

## 2023-11-23 PROCEDURE — 85610 PROTHROMBIN TIME: CPT | Mod: NTX | Performed by: STUDENT IN AN ORGANIZED HEALTH CARE EDUCATION/TRAINING PROGRAM

## 2023-11-23 PROCEDURE — 20600001 HC STEP DOWN PRIVATE ROOM: Mod: NTX

## 2023-11-23 PROCEDURE — 63600175 PHARM REV CODE 636 W HCPCS: Mod: NTX | Performed by: REGISTERED NURSE

## 2023-11-23 PROCEDURE — 27000221 HC OXYGEN, UP TO 24 HOURS: Mod: NTX

## 2023-11-23 PROCEDURE — 25000003 PHARM REV CODE 250: Mod: NTX | Performed by: NURSE PRACTITIONER

## 2023-11-23 PROCEDURE — 99233 PR SUBSEQUENT HOSPITAL CARE,LEVL III: ICD-10-PCS | Mod: FS,NTX,, | Performed by: PSYCHIATRY & NEUROLOGY

## 2023-11-23 PROCEDURE — 80053 COMPREHEN METABOLIC PANEL: CPT | Mod: NTX | Performed by: HOSPITALIST

## 2023-11-23 PROCEDURE — 99233 SBSQ HOSP IP/OBS HIGH 50: CPT | Mod: FS,NTX,, | Performed by: PSYCHIATRY & NEUROLOGY

## 2023-11-23 PROCEDURE — 25000242 PHARM REV CODE 250 ALT 637 W/ HCPCS: Mod: NTX | Performed by: NURSE PRACTITIONER

## 2023-11-23 PROCEDURE — 25000003 PHARM REV CODE 250: Mod: NTX | Performed by: PHYSICIAN ASSISTANT

## 2023-11-23 PROCEDURE — 99900026 HC AIRWAY MAINTENANCE (STAT): Mod: NTX

## 2023-11-23 PROCEDURE — 85025 COMPLETE CBC W/AUTO DIFF WBC: CPT | Mod: NTX | Performed by: HOSPITALIST

## 2023-11-23 PROCEDURE — 63600175 PHARM REV CODE 636 W HCPCS: Mod: NTX

## 2023-11-23 PROCEDURE — 84100 ASSAY OF PHOSPHORUS: CPT | Mod: NTX | Performed by: STUDENT IN AN ORGANIZED HEALTH CARE EDUCATION/TRAINING PROGRAM

## 2023-11-23 PROCEDURE — 94640 AIRWAY INHALATION TREATMENT: CPT | Mod: NTX

## 2023-11-23 PROCEDURE — 25000003 PHARM REV CODE 250: Mod: NTX | Performed by: PSYCHIATRY & NEUROLOGY

## 2023-11-23 PROCEDURE — 94668 MNPJ CHEST WALL SBSQ: CPT | Mod: NTX

## 2023-11-23 PROCEDURE — 94761 N-INVAS EAR/PLS OXIMETRY MLT: CPT | Mod: NTX

## 2023-11-23 RX ORDER — FUROSEMIDE 10 MG/ML
20 INJECTION INTRAMUSCULAR; INTRAVENOUS ONCE
Status: COMPLETED | OUTPATIENT
Start: 2023-11-23 | End: 2023-11-23

## 2023-11-23 RX ORDER — FENTANYL CITRATE 50 UG/ML
12.5 INJECTION, SOLUTION INTRAMUSCULAR; INTRAVENOUS ONCE
Status: COMPLETED | OUTPATIENT
Start: 2023-11-23 | End: 2023-11-23

## 2023-11-23 RX ORDER — ESCITALOPRAM OXALATE 10 MG/1
10 TABLET ORAL DAILY
Status: DISCONTINUED | OUTPATIENT
Start: 2023-11-23 | End: 2023-11-25

## 2023-11-23 RX ADMIN — RIFAXIMIN 550 MG: 550 TABLET ORAL at 09:11

## 2023-11-23 RX ADMIN — IPRATROPIUM BROMIDE AND ALBUTEROL SULFATE 3 ML: .5; 3 SOLUTION RESPIRATORY (INHALATION) at 08:11

## 2023-11-23 RX ADMIN — THIAMINE HCL TAB 100 MG 100 MG: 100 TAB at 09:11

## 2023-11-23 RX ADMIN — FUROSEMIDE 20 MG: 10 INJECTION, SOLUTION INTRAVENOUS at 01:11

## 2023-11-23 RX ADMIN — LACTULOSE 30 G: 20 SOLUTION ORAL at 09:11

## 2023-11-23 RX ADMIN — LEVETIRACETAM 500 MG: 100 SOLUTION ORAL at 09:11

## 2023-11-23 RX ADMIN — FENTANYL CITRATE 12.5 MCG: 50 INJECTION INTRAMUSCULAR; INTRAVENOUS at 04:11

## 2023-11-23 RX ADMIN — FUROSEMIDE 20 MG: 10 INJECTION, SOLUTION INTRAVENOUS at 03:11

## 2023-11-23 RX ADMIN — FAMOTIDINE 20 MG: 20 TABLET ORAL at 09:11

## 2023-11-23 RX ADMIN — PROPRANOLOL HYDROCHLORIDE 5 MG: 20 SOLUTION ORAL at 09:11

## 2023-11-23 RX ADMIN — IPRATROPIUM BROMIDE AND ALBUTEROL SULFATE 3 ML: .5; 3 SOLUTION RESPIRATORY (INHALATION) at 01:11

## 2023-11-23 RX ADMIN — LABETALOL HYDROCHLORIDE 10 MG: 5 INJECTION, SOLUTION INTRAVENOUS at 03:11

## 2023-11-23 RX ADMIN — RACEPINEPHRINE HYDROCHLORIDE 0.5 ML: 11.25 SOLUTION RESPIRATORY (INHALATION) at 01:11

## 2023-11-23 RX ADMIN — ESCITALOPRAM OXALATE 10 MG: 10 TABLET ORAL at 12:11

## 2023-11-23 RX ADMIN — FOLIC ACID 1 MG: 1 TABLET ORAL at 09:11

## 2023-11-23 RX ADMIN — IPRATROPIUM BROMIDE AND ALBUTEROL SULFATE 3 ML: .5; 3 SOLUTION RESPIRATORY (INHALATION) at 12:11

## 2023-11-23 RX ADMIN — THERA TABS 1 TABLET: TAB at 09:11

## 2023-11-23 RX ADMIN — IPRATROPIUM BROMIDE AND ALBUTEROL SULFATE 3 ML: .5; 3 SOLUTION RESPIRATORY (INHALATION) at 07:11

## 2023-11-23 NOTE — PT/OT/SLP EVAL
Speech Language Pathology Evaluation  Bedside Swallow    Patient Name:  Mara Mojica   MRN:  [id number]  Admitting Diagnosis: Seizure    Recommendations:                 General Recommendations:  Dysphagia therapy, Speech language evaluation, and Cognitive-linguistic evaluation  Diet recommendations:  NPO, NPO   Aspiration Precautions: Frequent oral care and Strict aspiration precautions   General Precautions: Standard, aspiration, fall, NPO  Communication strategies:  go to room if call light pushed    Assessment:     Mara Mojica is a 58 y.o. female with an SLP diagnosis of Dysphagia.  Patient would benefit from ongoing swallow assessment to determine safety and feasibility to resume PO.  ST to continue to follow.     History:     Past Medical History:   Diagnosis Date    Alcoholic cirrhosis of liver     Kidney failure     Unilateral inguinal hernia, without obstruction or gangrene, not specified as recurrent        Past Surgical History:   Procedure Laterality Date    ESOPHAGOGASTRODUODENOSCOPY N/A 09/21/2023    Procedure: EGD (ESOPHAGOGASTRODUODENOSCOPY);  Surgeon: Melissa Edwards MD;  Location: [facility];  Service: Endoscopy;  Laterality: N/A;    HERNIA REPAIR      TONSILLECTOMY         Social /occupational History: unknown 2/2 underlying cognitive status and no family present at bedside.    Prior Intubation HX:  10/31/23 - 11/1/23, 11/5/23 - 11/12/23    Modified Barium Swallow: none prior at this facility     Chest X-Rays: 11/23/23: Volume overload/CHF and pulmonary edema       Prior diet: per chart, soft textures with thin liquids.  Patient NPO with NG in place at time of this assessment    Subjective     SLP reviewed Pt with RN, RN cleared for tx   Pt presents lethargic  SLP unable to elicit vocalization    Pain/Comfort:  Pain Rating 1: other (see comments) (difficult to assess 2/2 decreased alertness and receptive language)    Respiratory Status: Nasal cannula, flow 2 L/min    Objective:     Oral  Musculature Evaluation  Oral Musculature: unable to assess due to poor participation/comprehension  Dentition: scattered dentition, teeth in poor condition  Secretion Management: problems swallowing secretions  Mucosal Quality: dry  Oral Labial Strength and Mobility: other (see comments) (unable to assess 2/2 decreased receptive language and alertness)  Lingual Strength and Mobility:  (unable to assess 2/2 decreased receptive language and alertness)  Volitional Cough: unable to elicit  Volitional Swallow: unable to elicit  Voice Prior to PO Intake: unable to elicit  Oral Musculature Comments: Unable to assess 2/2 pt was unable to participate.    Bedside Swallow Eval:   Consistencies Assessed:  Note: PO trials deferred this service day due to diffuse weakness, open mouth breathing posture, persistent lethargy, decreased command following, and aspiration risk     Oral Phase:   MAGY    Pharyngeal Phase:   MAGY    Compensatory Strategies  MAGY     Treatment: Pt found asleep in bed with NG and bilateral soft restrained in place. She woke per moderate verbal cues. RN in room to reposition Pt in bed and elevated HOB. Pt with open mouth breathing posture.  Patient with decreased sustained eye contact and required consistent cues to wake and attend to SLP. Oral care attempted via suction toothette in anticipation of PO trials. SLP unable to elicit commands which impeded assessment of the oral mechanism. SLP unable to elicit vocalization, cough or throat clear on command via max cues which impeded assessment of airway protection. SLP unable to elicit swallow.  PO trials cautiously deferred due to decreased command following, persistent lethargy, diffuse weakness and aspiration risk. She was educated on SLP role, aspiration precautions and ongoing SLP POC. Pt did not demonstrate understanding .MING family present. Pt remained upright in bed upon SPL exit/handoff with RN.     Goals:   Multidisciplinary Problems       SLP Goals           Problem: SLP    Goal Priority Disciplines Outcome   SLP Goal     SLP Ongoing, Progressing   Description: Speech Language Pathology Goals  Goals expected to be met by 11/30/23    1. Pt will participate in ongoing assessment of swallow function to determine safest, least restrictive means of nutrition/hydration  2. Educate Pt and family on aspiration precautions and SLP POC                         Plan:     Patient to be seen:  3 x/week   Plan of Care expires:  12/23/23  Plan of Care reviewed with:  patient   SLP Follow-Up:  Yes       Discharge recommendations:  Moderate Intensity Therapy   Barriers to Discharge:   NPO    Time Tracking:     SLP Treatment Date:   11/23/23  Speech Start Time:  1027  Speech Stop Time:  1036     Speech Total Time (min):  9 min    Billable Minutes: Eval Swallow and Oral Function 8    11/23/2023

## 2023-11-23 NOTE — PLAN OF CARE
Problem: SLP  Goal: SLP Goal  Description: Speech Language Pathology Goals  Goals expected to be met by 11/30/23    1. Pt will participate in ongoing assessment of swallow function to determine safest, least restrictive means of nutrition/hydration  2. Educate Pt and family on aspiration precautions and SLP POC    Outcome: Ongoing, Progressing     SLP Bedside Swallow Evaluation initiated. Full report to follow. REC: NPO, strict aspiration precautions, frequent oral care and ST To continue to follow.     11/23/2023

## 2023-11-23 NOTE — PLAN OF CARE
"[facility] Care Plan    POC reviewed with Mara Mojica at 0500. Pt nonverbal and unable to verbalize understanding. Questions and concerns addressed. No acute events overnight. Pt progressing toward goals. Will continue to monitor. See below and flowsheets for full assessment and VS info.     - PRN ibuprofen given x 1      Is this a stroke patient? no    Neuro:  [place] Coma Scale  Best Eye Response: 4-->(E4) spontaneous  Best Motor Response: 5-->(M5) localizes pain  Best Verbal Response: 1-->(V1) none  [place] Coma Scale Score: 10  Assessment Qualifiers: no eye obstruction present  Pupil PERRLA: yes     24hr Temp:  [97.4 °F (36.3 °C)-98.7 °F (37.1 °C)]     CV:   Rhythm: normal sinus rhythm  BP goals:   SBP < 180  MAP > 65    Resp:      Vent Mode: A/C  Set Rate: 18 BPM  Oxygen Concentration (%): 50  Vt Set: 350 mL  PEEP/CPAP: 5 cmH20  Pressure Support: 5 cmH20    Plan: N/A    GI/:     Diet/Nutrition Received: tube feeding  Last Bowel Movement: 11/23/23  Voiding Characteristics: voids spontaneously without difficulty    Intake/Output Summary (Last 24 hours) at 11/23/2023 0608  Last data filed at 11/23/2023 0605  Gross per 24 hour   Intake 855 ml   Output --   Net 855 ml     Unmeasured Output  Urine Occurrence: 1  Stool Occurrence: 0  Emesis Occurrence: 0  Pad Count: 1    Labs/Accuchecks:  Recent Labs   Lab 11/23/23 0311   WBC 8.74   RBC 2.95*   HGB 9.7*   HCT 30.7*   PLT 55*      Recent Labs   Lab 11/23/23 0311      K 4.4   CO2 21*   *   BUN 27*   CREATININE 0.5   ALKPHOS 140*   ALT 71*   AST 76*   BILITOT 9.1*      Recent Labs   Lab 11/23/23 0311   INR 1.7*    No results for input(s): "CPK", "CPKMB", "TROPONINI", "MB" in the last 168 hours.    Electrolytes: N/A - electrolytes WDL  Accuchecks: none    Gtts:      LDA/Wounds:  Lines/Drains/Airways       Drain  Duration                  NG/OG Tube 10/20/23 2000 16 Fr. Left nostril 33 days              Airway  Duration                Airway Anesthesia " 11/05/23 17 days              Peripheral Intravenous Line  Duration                  Midline Catheter Insertion/Assessment  - Single Lumen 11/13/23 1840 Right brachial vein 20g x 10cm 9 days         Peripheral IV - Single Lumen 11/19/23 0900 22 G Anterior;Right Foot 3 days                  Wounds: Yes  Wound care consulted: Yes

## 2023-11-24 PROBLEM — Z46.59 ENCOUNTER FOR FEEDING TUBE PLACEMENT: Status: ACTIVE | Noted: 2023-11-24

## 2023-11-24 LAB
ALBUMIN SERPL BCP-MCNC: 2 G/DL (ref 3.5–5.2)
ALLENS TEST: ABNORMAL
ALLENS TEST: ABNORMAL
ALP SERPL-CCNC: 120 U/L (ref 55–135)
ALT SERPL W/O P-5'-P-CCNC: 66 U/L (ref 10–44)
ANION GAP SERPL CALC-SCNC: 8 MMOL/L (ref 8–16)
ANISOCYTOSIS BLD QL SMEAR: SLIGHT
AST SERPL-CCNC: 64 U/L (ref 10–40)
BASOPHILS # BLD AUTO: 0.05 K/UL (ref 0–0.2)
BASOPHILS NFR BLD: 0.6 % (ref 0–1.9)
BILIRUB SERPL-MCNC: 8.7 MG/DL (ref 0.1–1)
BUN SERPL-MCNC: 34 MG/DL (ref 6–20)
BURR CELLS BLD QL SMEAR: ABNORMAL
CALCIUM SERPL-MCNC: 8.3 MG/DL (ref 8.7–10.5)
CHLORIDE SERPL-SCNC: 117 MMOL/L (ref 95–110)
CO2 SERPL-SCNC: 22 MMOL/L (ref 23–29)
CREAT SERPL-MCNC: 0.6 MG/DL (ref 0.5–1.4)
DELSYS: ABNORMAL
DIFFERENTIAL METHOD: ABNORMAL
EOSINOPHIL # BLD AUTO: 0.1 K/UL (ref 0–0.5)
EOSINOPHIL NFR BLD: 0.6 % (ref 0–8)
ERYTHROCYTE [DISTWIDTH] IN BLOOD BY AUTOMATED COUNT: 20.3 % (ref 11.5–14.5)
EST. GFR  (NO RACE VARIABLE): >60 ML/MIN/1.73 M^2
GLUCOSE SERPL-MCNC: 97 MG/DL (ref 70–110)
HCO3 UR-SCNC: 23.6 MMOL/L (ref 24–28)
HCO3 UR-SCNC: 24.1 MMOL/L (ref 24–28)
HCT VFR BLD AUTO: 27 % (ref 37–48.5)
HCT VFR BLD CALC: 30 %PCV (ref 36–54)
HGB BLD-MCNC: 8.8 G/DL (ref 12–16)
HYPOCHROMIA BLD QL SMEAR: ABNORMAL
IMM GRANULOCYTES # BLD AUTO: 0.04 K/UL (ref 0–0.04)
IMM GRANULOCYTES NFR BLD AUTO: 0.4 % (ref 0–0.5)
INR PPP: 1.9 (ref 0.8–1.2)
LYMPHOCYTES # BLD AUTO: 1.1 K/UL (ref 1–4.8)
LYMPHOCYTES NFR BLD: 11.9 % (ref 18–48)
MAGNESIUM SERPL-MCNC: 1.7 MG/DL (ref 1.6–2.6)
MCH RBC QN AUTO: 33.6 PG (ref 27–31)
MCHC RBC AUTO-ENTMCNC: 32.6 G/DL (ref 32–36)
MCV RBC AUTO: 103 FL (ref 82–98)
MONOCYTES # BLD AUTO: 2.1 K/UL (ref 0.3–1)
MONOCYTES NFR BLD: 22.8 % (ref 4–15)
NEUTROPHILS # BLD AUTO: 5.8 K/UL (ref 1.8–7.7)
NEUTROPHILS NFR BLD: 63.7 % (ref 38–73)
NRBC BLD-RTO: 0 /100 WBC
OVALOCYTES BLD QL SMEAR: ABNORMAL
PCO2 BLDA: 34.1 MMHG (ref 35–45)
PCO2 BLDA: 34.7 MMHG (ref 35–45)
PH SMN: 7.45 [PH] (ref 7.35–7.45)
PH SMN: 7.45 [PH] (ref 7.35–7.45)
PHOSPHATE SERPL-MCNC: 4.4 MG/DL (ref 2.7–4.5)
PLATELET # BLD AUTO: 54 K/UL (ref 150–450)
PLATELET BLD QL SMEAR: ABNORMAL
PMV BLD AUTO: 12.1 FL (ref 9.2–12.9)
PO2 BLDA: 105 MMHG (ref 80–100)
PO2 BLDA: 62 MMHG (ref 80–100)
POC BE: 0 MMOL/L
POC BE: 0 MMOL/L
POC IONIZED CALCIUM: 1.29 MMOL/L (ref 1.06–1.42)
POC SATURATED O2: 93 % (ref 95–100)
POC SATURATED O2: 98 % (ref 95–100)
POC TCO2: 25 MMOL/L (ref 23–27)
POC TCO2: 25 MMOL/L (ref 23–27)
POCT GLUCOSE: 150 MG/DL (ref 70–110)
POCT GLUCOSE: 66 MG/DL (ref 70–110)
POIKILOCYTOSIS BLD QL SMEAR: SLIGHT
POLYCHROMASIA BLD QL SMEAR: ABNORMAL
POTASSIUM BLD-SCNC: 4.2 MMOL/L (ref 3.5–5.1)
POTASSIUM SERPL-SCNC: 4.3 MMOL/L (ref 3.5–5.1)
PROT SERPL-MCNC: 5 G/DL (ref 6–8.4)
PROTHROMBIN TIME: 19.5 SEC (ref 9–12.5)
RBC # BLD AUTO: 2.62 M/UL (ref 4–5.4)
SAMPLE: ABNORMAL
SAMPLE: ABNORMAL
SITE: ABNORMAL
SITE: ABNORMAL
SODIUM BLD-SCNC: 147 MMOL/L (ref 136–145)
SODIUM SERPL-SCNC: 147 MMOL/L (ref 136–145)
WBC # BLD AUTO: 9.04 K/UL (ref 3.9–12.7)

## 2023-11-24 PROCEDURE — 36600 WITHDRAWAL OF ARTERIAL BLOOD: CPT | Mod: NTX

## 2023-11-24 PROCEDURE — 83605 ASSAY OF LACTIC ACID: CPT | Mod: NTX | Performed by: HOSPITALIST

## 2023-11-24 PROCEDURE — 31720 CLEARANCE OF AIRWAYS: CPT | Mod: NTX

## 2023-11-24 PROCEDURE — 83735 ASSAY OF MAGNESIUM: CPT | Mod: NTX | Performed by: STUDENT IN AN ORGANIZED HEALTH CARE EDUCATION/TRAINING PROGRAM

## 2023-11-24 PROCEDURE — 51702 INSERT TEMP BLADDER CATH: CPT | Mod: NTX

## 2023-11-24 PROCEDURE — 25000003 PHARM REV CODE 250: Mod: NTX | Performed by: PSYCHIATRY & NEUROLOGY

## 2023-11-24 PROCEDURE — 99223 1ST HOSP IP/OBS HIGH 75: CPT | Mod: NTX,,,

## 2023-11-24 PROCEDURE — 94761 N-INVAS EAR/PLS OXIMETRY MLT: CPT | Mod: NTX,XB

## 2023-11-24 PROCEDURE — 94668 MNPJ CHEST WALL SBSQ: CPT | Mod: NTX

## 2023-11-24 PROCEDURE — 63600175 PHARM REV CODE 636 W HCPCS: Mod: NTX | Performed by: HOSPITALIST

## 2023-11-24 PROCEDURE — 25000003 PHARM REV CODE 250: Mod: NTX | Performed by: PHYSICIAN ASSISTANT

## 2023-11-24 PROCEDURE — 25000003 PHARM REV CODE 250: Mod: NTX

## 2023-11-24 PROCEDURE — 85610 PROTHROMBIN TIME: CPT | Mod: NTX | Performed by: STUDENT IN AN ORGANIZED HEALTH CARE EDUCATION/TRAINING PROGRAM

## 2023-11-24 PROCEDURE — 80053 COMPREHEN METABOLIC PANEL: CPT | Mod: 91,NTX | Performed by: HOSPITALIST

## 2023-11-24 PROCEDURE — 25000242 PHARM REV CODE 250 ALT 637 W/ HCPCS: Mod: NTX | Performed by: NURSE PRACTITIONER

## 2023-11-24 PROCEDURE — 25000242 PHARM REV CODE 250 ALT 637 W/ HCPCS: Mod: NTX | Performed by: PHYSICIAN ASSISTANT

## 2023-11-24 PROCEDURE — 82803 BLOOD GASES ANY COMBINATION: CPT | Mod: NTX

## 2023-11-24 PROCEDURE — 25000003 PHARM REV CODE 250: Mod: NTX | Performed by: NURSE PRACTITIONER

## 2023-11-24 PROCEDURE — 94640 AIRWAY INHALATION TREATMENT: CPT | Mod: NTX

## 2023-11-24 PROCEDURE — 85610 PROTHROMBIN TIME: CPT | Mod: 91,NTX | Performed by: HOSPITALIST

## 2023-11-24 PROCEDURE — 85025 COMPLETE CBC W/AUTO DIFF WBC: CPT | Mod: NTX | Performed by: HOSPITALIST

## 2023-11-24 PROCEDURE — 99900035 HC TECH TIME PER 15 MIN (STAT): Mod: NTX

## 2023-11-24 PROCEDURE — 94760 N-INVAS EAR/PLS OXIMETRY 1: CPT | Mod: NTX,XB

## 2023-11-24 PROCEDURE — 25000003 PHARM REV CODE 250: Mod: NTX | Performed by: HOSPITALIST

## 2023-11-24 PROCEDURE — 20600001 HC STEP DOWN PRIVATE ROOM: Mod: NTX

## 2023-11-24 PROCEDURE — 99223 PR INITIAL HOSPITAL CARE,LEVL III: ICD-10-PCS | Mod: NTX,,,

## 2023-11-24 PROCEDURE — 85025 COMPLETE CBC W/AUTO DIFF WBC: CPT | Mod: 91,NTX | Performed by: HOSPITALIST

## 2023-11-24 PROCEDURE — 27000221 HC OXYGEN, UP TO 24 HOURS: Mod: NTX

## 2023-11-24 PROCEDURE — 85730 THROMBOPLASTIN TIME PARTIAL: CPT | Mod: NTX | Performed by: HOSPITALIST

## 2023-11-24 PROCEDURE — 25000242 PHARM REV CODE 250 ALT 637 W/ HCPCS: Mod: NTX

## 2023-11-24 PROCEDURE — 80053 COMPREHEN METABOLIC PANEL: CPT | Mod: NTX | Performed by: HOSPITALIST

## 2023-11-24 PROCEDURE — 84100 ASSAY OF PHOSPHORUS: CPT | Mod: NTX | Performed by: STUDENT IN AN ORGANIZED HEALTH CARE EDUCATION/TRAINING PROGRAM

## 2023-11-24 RX ORDER — MORPHINE SULFATE 2 MG/ML
2 INJECTION, SOLUTION INTRAMUSCULAR; INTRAVENOUS ONCE
Status: COMPLETED | OUTPATIENT
Start: 2023-11-25 | End: 2023-11-25

## 2023-11-24 RX ORDER — FUROSEMIDE 10 MG/ML
40 INJECTION INTRAMUSCULAR; INTRAVENOUS ONCE
Status: COMPLETED | OUTPATIENT
Start: 2023-11-24 | End: 2023-11-24

## 2023-11-24 RX ORDER — LEVETIRACETAM 500 MG/5ML
500 INJECTION, SOLUTION, CONCENTRATE INTRAVENOUS EVERY 12 HOURS
Status: DISCONTINUED | OUTPATIENT
Start: 2023-11-24 | End: 2023-11-25

## 2023-11-24 RX ORDER — MORPHINE SULFATE 2 MG/ML
2 INJECTION, SOLUTION INTRAMUSCULAR; INTRAVENOUS ONCE
Status: DISCONTINUED | OUTPATIENT
Start: 2023-11-25 | End: 2023-11-24

## 2023-11-24 RX ADMIN — IPRATROPIUM BROMIDE AND ALBUTEROL SULFATE 3 ML: .5; 3 SOLUTION RESPIRATORY (INHALATION) at 07:11

## 2023-11-24 RX ADMIN — THERA TABS 1 TABLET: TAB at 09:11

## 2023-11-24 RX ADMIN — IPRATROPIUM BROMIDE AND ALBUTEROL SULFATE 3 ML: .5; 3 SOLUTION RESPIRATORY (INHALATION) at 08:11

## 2023-11-24 RX ADMIN — ESCITALOPRAM OXALATE 10 MG: 10 TABLET ORAL at 09:11

## 2023-11-24 RX ADMIN — LEVETIRACETAM 500 MG: 100 SOLUTION ORAL at 09:11

## 2023-11-24 RX ADMIN — IPRATROPIUM BROMIDE AND ALBUTEROL SULFATE 3 ML: .5; 3 SOLUTION RESPIRATORY (INHALATION) at 12:11

## 2023-11-24 RX ADMIN — LACTULOSE 30 G: 20 SOLUTION ORAL at 09:11

## 2023-11-24 RX ADMIN — THIAMINE HCL TAB 100 MG 100 MG: 100 TAB at 09:11

## 2023-11-24 RX ADMIN — IBUPROFEN 600 MG: 600 TABLET, FILM COATED ORAL at 01:11

## 2023-11-24 RX ADMIN — LEVETIRACETAM 500 MG: 100 INJECTION, SOLUTION INTRAVENOUS at 10:11

## 2023-11-24 RX ADMIN — FUROSEMIDE 40 MG: 10 INJECTION, SOLUTION INTRAVENOUS at 10:11

## 2023-11-24 RX ADMIN — DEXTROSE MONOHYDRATE 125 ML: 100 INJECTION, SOLUTION INTRAVENOUS at 09:11

## 2023-11-24 RX ADMIN — RIFAXIMIN 550 MG: 550 TABLET ORAL at 09:11

## 2023-11-24 RX ADMIN — FAMOTIDINE 20 MG: 20 TABLET ORAL at 09:11

## 2023-11-24 RX ADMIN — FOLIC ACID 1 MG: 1 TABLET ORAL at 09:11

## 2023-11-24 RX ADMIN — RACEPINEPHRINE HYDROCHLORIDE 0.5 ML: 11.25 SOLUTION RESPIRATORY (INHALATION) at 10:11

## 2023-11-24 NOTE — ASSESSMENT & PLAN NOTE
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

## 2023-11-24 NOTE — PLAN OF CARE
"[facility] Care Plan    POC reviewed with Mara Mojica and family at 1400. Pt unable to verbalize understanding. Questions and concerns addressed with patient's . Pt progressing toward goals. Will continue to monitor. See below and flowsheets for full assessment and VS info.     - Patient required increased oxygen requirements during shift, placed on non-rebreather, PRN racepinephrine administered, NT suctioned, 40mg total furosemide and 12.5mcg fentanyl given once per MAR.  - PRN straight cath needed  - Urine output total 475 mL  - Bath completed      Is this a stroke patient? no    Neuro:  [place] Coma Scale  Best Eye Response: 4-->(E4) spontaneous  Best Motor Response: 5-->(M5) localizes pain  Best Verbal Response: 1-->(V1) none  [place] Coma Scale Score: 10  Assessment Qualifiers: no eye obstruction present, patient not sedated/intubated  Pupil PERRLA: yes     24 hr Temp:  [97.4 °F (36.3 °C)-97.9 °F (36.6 °C)]     CV:   Rhythm: normal sinus rhythm, sinus bradycardia  BP goals:   SBP < 180  MAP > 65    Resp:      Vent Mode: A/C  Set Rate: 18 BPM  Oxygen Concentration (%): 100  Vt Set: 350 mL  PEEP/CPAP: 5 cmH20  Pressure Support: 5 cmH20    Plan:  4L NC with humidification    GI/:     Diet/Nutrition Received: tube feeding  Last Bowel Movement: 11/22/23  Voiding Characteristics: voids spontaneously without difficulty    Intake/Output Summary (Last 24 hours) at 11/23/2023 1829  Last data filed at 11/23/2023 0605  Gross per 24 hour   Intake 630 ml   Output --   Net 630 ml     Unmeasured Output  Urine Occurrence: 1  Stool Occurrence: 0  Emesis Occurrence: 0  Pad Count: 1    Labs/Accuchecks:  Recent Labs   Lab 11/23/23 0311   WBC 8.74   RBC 2.95*   HGB 9.7*   HCT 30.7*   PLT 55*      Recent Labs   Lab 11/23/23 0311      K 4.4   CO2 21*   *   BUN 27*   CREATININE 0.5   ALKPHOS 140*   ALT 71*   AST 76*   BILITOT 9.1*      Recent Labs   Lab 11/23/23 0311   INR 1.7*    No results for input(s): "CPK", " ""CPKMB", "TROPONINI", "MB" in the last 168 hours.    Electrolytes: N/A - electrolytes WDL  Accuchecks: none    Gtts:      LDA/Wounds:  Lines/Drains/Airways       Drain  Duration                  NG/OG Tube 10/20/23 2000 16 Fr. Left nostril 33 days              Airway  Duration                Airway Anesthesia 11/05/23 17 days              Peripheral Intravenous Line  Duration                  Midline Catheter Insertion/Assessment  - Single Lumen 11/13/23 1840 Right brachial vein 20g x 10cm 9 days         Peripheral IV - Single Lumen 11/19/23 0900 22 G Anterior;Right Foot 4 days                  Wounds: Yes  Wound care consulted: Yes   "

## 2023-11-24 NOTE — PROGRESS NOTES
[facility] - Neuro Critical Care  Neurocritical Care  Progress Note    Admit Date: 10/25/2023  Service Date: 11/23/2023  Length of Stay: 29    Subjective:     Chief Complaint: Seizure    History of Present Illness: This is a 58-year-old female with a past medical history of alcoholic cirrhosis, s/p ex-lap w/ bowel resection for incarcerated hernia, who initially presented on 10/18 to [facility] with acute encephalopathy.  Her  found her on the floor at home with evidence of fecal/urine incontinence with confusion and slurred speech. Possible reported tongue injury in chart. Reported concern L sided weakness and down drift of L arm however CT head without evidence of acute abnormalities, MRI brain with only small vessel vascular changes without evidence of territorial infarct.     Hospital Course: EEG done on 10/19 with mild diffuse nonspecific background slowing, no potential epileptiform activity. Repeat MRI brain with contrast on 10/23 unchanged from initial MRI. Became more lethargic and was no longer following commands or answering questions. Due to worsening hepatic encephalopathy in setting of hepatic cirrhosis, patient transferred to [facility] for management with transplant team. Has remained on antibiotics, lactulose and rifaximin for treatment of UTI with pansensitive Ecoli, HE, and presumed SBP. Neurology consulted for management recommendations regarding persistent AMS. Had repeat EEG done on 10/24 with similar findings to prior EEG showing mild generalized non-specific cerebral dysfunction and no electrographic seizures or indication of seizure tendency. Additional CT head w/o contrast on 10/25 without evidence of acute issues. Remains confused and unable to answer questions on exam. Not withdrawing to painful stimuli. Was able to awaken to verbal stimuli in AM however when reevaluated in afternoon unresponsive however was after receiving Ativan.     On admission to [facility]:  Patient had EEG running, and  was noted to have seizures at 7:30 a.m., 8:00 a.m. in, and 10:00 a.m. was noted to be in focal status prior to receiving Vimpat.  Patient was noted to have stridor, worsening secretions with suspicion for aspiration, decreased airway protection, and rapids was called due to low GCS score of 6. Antibiotics broadened to Vanc/Zosyn. Clinical picture of mental status confounded with episodes of seizures, infection, and hepatic encephalopathy.             Hospital Course: 10/28/2023: Improving GCS from 6 to 10. Continuing pulmonary toilet and deep suctioning for stridor and copious secretions. UA positive for candida. Blood cultures from 10/27 NGTD. Sputum cultures sent. Patient on day 2 of broadened antibiotics vanc/zosyn due to worsening clinical status but will discontinue tomorrow if cultures remain negative. Still hypernatremic, treating with D5W. Patient was transferred with no ordered diuretics, very edematous on physical exam. Adequate stooling on lactulose and rifaximin. Due to increased UOP/stooling will place eckert for one day for irritated skin. EEG update showing no seizures since 10am 10/27. Monitoring CBC for thrombocytopenia and macrocytic anemia. Discussed code status and goals of care with  and best friend at bedside. Trickle feeds resumed.   10/29/2023: No acute events overnight, EEG reportedly without further seizures for ~48 hours can disconnect once formal report is in, neuro status slowly improving as patient now tracking in room, moving extremities, responding with appropriate emotional reactions to her .  Respiratory status largely unchanged with intermittent events of large volume breaths that sound almost stridorous but without actual respiratory distress- gas remains compensated.  UOP low, diuresis resumed.  10/30/2023: d/c mucomyst nebs, add racemic epi scheduled nebs, add budesonide and dex 6 q8 hours, ammonia at 35- continue lactulose and rifaximin, abg reviewed, 40mEq of K  once, ENT consulted for stridor, continue eckert catheter in today   10/31/2023 Patient with worsening respiratory status, continued decreased mentation and increased secretions. Plan for elective intubation in controlled setting with anesthesia. Will also recheck eeg, if negative will start to wean AEDs and see if that improves patient's arousal. PLT stabilized, continue to monitor.   11/1/2023 this morning patient's 6.0 ETT exchanged for 7.0 to allow suctioning. EEG with frequent discharges, lowered vimpat to 50 mg and will follow EEG. Attempting to remove confounding factors for patients mental status. Slow drop in H/H, 1 unit ordered.   11/2/2023 NAEO, cEEG to remain in place, no seizures but is having frequent discharges in runs. Continue lower dose vimpat and 1500 keppra Eye opening this morning which is slight improvement in exam. Failed SBT   11/03/2023: no improvement with decrease in lacosamide, no re-development of seizures, if does not wake up in next 48 hrs off lacosamide or redevelops seizures, prognosis is extremely poor  11/04/2023: improved mental state this am, no seizures overnight  11/05/2023: LOC stable, has cuff leak, trial of extubation  11/06/2023 reintubated for stridor non responsive to med management overnight  11/07/2023 CTH stable. Off of levo. Completed dex (wbc 22, afebrile). CXR w L mildly increasing consolidation. Very thick secretions, added mucomyst and duonebs. Weaning keppra to 750. Pt appearing more edematous today, dc LR. Scheduled tylenol max 2g/d. Scheduled oxy 5q12.  11/08/2023: EEG pending  11/09/2023: EEG slowing  11/10/2023: more alert today  11/11/2023 pressure support trial  11/12/2023 Alert today. Not following commands. On spontaneous overnight, tolerated well. MRI c-spine overnight with spondylosis. No LP at this time. Resume TF. Hold TF tomorrow at 5AM for possible extubation tomorrow.   11/13/2023: patient made DNR, 10 dexamethsone IV one prior extubation with no  intent of re-intubation, PRN racemic epi and Bipap were initiated  11/14/2023: Patient tolerated BiPAP overnight and continues to be more alert, and is tracking faces more consistently. She was taken off BiPAP and placed on 4L oxygen via nasal cannula. Trickle feeds were started while off BiPAP. Significant other at bedside updated on clinical course. Midline placed overnight.   11/15/2023: Thick secretions noted when BiPAP removed this morning, starting CPT. PT/OT consulted with improving mental and respiratory status. Patient more sad and uncomfortable today.   11/16/2023: Advancing tube feeds to goal and encouraging continued work with PT/OT. She has been tolerating nasal cannula oxygenation well and has had a reduction in her secretions.   11/17/2023: Increased secretions overnight and this morning. Continues to work with PT/OT and has increased movement in her head and neck. Tube feed goal adjusted per dietician recommendations. Starting to plan for disposition. Considering ACP and Palliative care talks with family this weekend.   11/18/2023: NAEON. 50g albumin and 40mg lasix given for diuresis.   11/19/2023: H&H dropped overnight, received 1 unit PBRC. Otherwise, NAEON. Stable for transfer to floor with HM.   11/20/2023 hold transfer to  2/2 respiratory status   11/21/23: GOC tomorrow  11/22/23: GOC today  11/23/2023 Increased work of breathing this AM. CXR w/ volume overload, lasix given. IR consulted for PEG placement.    Interval History:  Increased work of breathing this AM. CXR w/ volume overload, lasix given. IR consulted for PEG placement. Pending stepdown to hospital medicine.    Review of Systems   Unable to perform ROS: Mental status change     Objective:     Vitals:  Temp: 98.3 °F (36.8 °C)  Pulse: 63  Rhythm: normal sinus rhythm, sinus bradycardia  BP: (!) 105/59  MAP (mmHg): 72  Resp: 18  SpO2: 98 %  Oxygen Concentration (%): 100    Temp  Min: 97.4 °F (36.3 °C)  Max: 98.6 °F (37 °C)  Pulse  Min:  63  Max: 91  BP  Min: 102/51  Max: 235/97  MAP (mmHg)  Min: 70  Max: 142  Resp  Min: 13  Max: 36  SpO2  Min: 85 %  Max: 100 %  Oxygen Concentration (%)  Min: 28  Max: 100     0701 -  0700  In: 855   Out: -    Unmeasured Output  Urine Occurrence: 1  Stool Occurrence: 0  Emesis Occurrence: 0  Pad Count: 1        Physical Exam  Vitals and nursing note reviewed.   Constitutional:       Appearance: She is ill-appearing.   Eyes:      Pupils: Pupils are equal, round, and reactive to light.   Cardiovascular:      Rate and Rhythm: Normal rate and regular rhythm.      Pulses: Normal pulses.      Heart sounds: Normal heart sounds.   Pulmonary:      Effort: Pulmonary effort is normal.      Breath sounds: Wheezing and rhonchi present.   Abdominal:      General: Bowel sounds are normal.      Palpations: Abdomen is soft.   Musculoskeletal:      Right lower le+ Pitting Edema present.      Left lower le+ Pitting Edema present.   Skin:     General: Skin is warm and dry.      Capillary Refill: Capillary refill takes less than 2 seconds.      Coloration: Skin is jaundiced.   Neurological:      GCS: GCS eye subscore is 4. GCS verbal subscore is 1. GCS motor subscore is 5.      Comments:   -E4 V1 M5  -Opens eyes spontaneously  -PERRL  -Intermittently follows simple commands   -Localizes w/ all four extremities  -PATHAK spontaneously/purposefully       Unable to test orientation, language, memory, judgment, insight, fund of knowledge, shoulder shrug, tongue protrusion, coordination, gait due to level of consciousness.       Medications:  Continuous Scheduledalbuterol-ipratropium, 3 mL, Q6H  EScitalopram oxalate, 10 mg, Daily  famotidine, 20 mg, BID  folic acid, 1 mg, Daily  lactulose, 30 g, BID  levetiracetam, 500 mg, BID  multivitamin, 1 tablet, Daily  propranoloL, 5 mg, Q12H  rifAXImin, 550 mg, BID  thiamine, 100 mg, Daily    PRNcalcium gluconate IVPB, 1 g, PRN  calcium gluconate IVPB, 2 g, PRN  calcium gluconate IVPB, 3  g, PRN  dextrose 10%, 12.5 g, PRN  dextrose 10%, 25 g, PRN  hydrALAZINE, 10 mg, Q4H PRN  ibuprofen, 600 mg, Q8H PRN  labetalol, 10 mg, Q6H PRN  magnesium sulfate IVPB, 2 g, PRN  magnesium sulfate IVPB, 4 g, PRN  ondansetron, 4 mg, Q8H PRN  potassium chloride, 40 mEq, PRN   And  potassium chloride, 60 mEq, PRN   And  potassium chloride, 80 mEq, PRN  racepinephrine, 0.5 mL, Q4H PRN  sodium phosphate 15 mmol in dextrose 5 % (D5W) 250 mL IVPB, 15 mmol, PRN  sodium phosphate 20.01 mmol in dextrose 5 % (D5W) 250 mL IVPB, 20.01 mmol, PRN  sodium phosphate 30 mmol in dextrose 5 % (D5W) 250 mL IVPB, 30 mmol, PRN      Today I personally reviewed pertinent medications, lines/drains/airways, imaging, laboratory results,:    Diet  Diet NPO  Diet NPO      Assessment/Plan:     Neuro  * Seizure  59 y/o F presented for altered mental status on 10/18. Clinical picture confounded with hepatic encephalopathy, infection, and seizure activity. Neurology was consulted by primary team prior to admission to [facility].    10/27 EEG IMPRESSION:  Abnormal EEG during lethargic state.  Diffuse disorganized slowing of the background was noted.  Frequent triphasic waves noted.  Left posterior pseudo periodic epileptiform discharges were noted.  Numerous electrographic seizures emanating from the left posterior region were noted.    -Neuro checks q1h while in [facility]  -Keppra 500 BID  -Seizure precautions      Acute encephalopathy  -See acute hepatic encephalopathy and seizures    Hematology  Thrombocytopenia  59 y/o F with a history of decompensated alcoholic cirrhosis.  Suspect thrombocytopenia is likely secondary to chronic alcohol use and is consumptive in nature. If trend worsens, will consider consulting Hematology.    -CBC daily, transfuse only for active bleeding    Oncology  Anemia  -Chronic  -H/H stable  -CBC daily    GI  Acute hepatic encephalopathy  51 y/o F with a history of ethanol cirrhosis who presents after being found on the ground with  fecal and urinary incontinence, and altered mental status. Elevated ammonia on admission; has now trended down.    -MELD score ~50% survival without transplant  -Continue lactulose and rifaximin BID   -Avoid opiates and benzodiazepines  -Thiamine, folic acid, multivitamin daily        Decompensated alcoholic hepatic cirrhosis  -Daily CBC, CMP, INR   -Not currently being evaluated for transplant due to clinical status  -Lactulose and rifaximin BID  -Folic acid, thiamine, and multivitamin daily    Other  Hypoxic respiratory failure  2/2 to tracheal stenosis     -Increase WOB 11/23  -CXR w/ volume overload  -Lasix 20 IV x 2  -CXR in AM  -Straight cath PRN  -CPT q6h  -Continue aggressive pulmonary toilet    Debility  -Diffuse weakness with decreased DTRs   -MRI c-spine w/ spondylosis  -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  -PT/OT          The patient is being Prophylaxed for:  Venous Thromboembolism with: Mechanical  Stress Ulcer with: H2B  Ventilator Pneumonia with: not applicable    Activity Orders            Diet NPO: NPO starting at 11/13 0500    Elevate HOB Elevate (30-45 degrees) Elevate HOB to 30 - 45 degrees during feeding unless otherwise stated starting at 10/28 1218    Elevate HOB to 30-45 degrees during feeding unless otherwise stated starting at 10/26 1138    Progressive Mobility Protocol (mobilize patient to their highest level of functioning at least twice daily) starting at 10/25 2000    Turn patient starting at 10/25 2000          DNR    Rebecca Perez NP  Neurocritical Care  [facility] - Neuro Critical Care

## 2023-11-24 NOTE — ASSESSMENT & PLAN NOTE
2/2 to tracheal stenosis     -Increase WOB 11/23  -CXR w/ volume overload  -Lasix 20 IV x 2  -CXR in AM  -Straight cath PRN  -CPT q6h  -Continue aggressive pulmonary toilet

## 2023-11-24 NOTE — PLAN OF CARE
Plan of care reviewed with the patient upon transfer from Neuro ICU. Pt alone, no family at the bedside. Pt opens her eyes spontaneously and is able to make eye contact. However, she is nonverbal. Limited mobility, not much spontaneous movements at all. L NGT, capped. Tube feedings on hold, ICU nurse stated that the medicine team would reconsider after pt transfers to IM. PT has not been able to tolerate tube feeds. Pt on 4L NC. Four eye skin assessment with Yanique EARLY RN. She has excoriation in her perineal area but no pressure ulcers present. She is incontinent x2. Fall precautions maintained. Bed alarm on. She does not get oob. Pt remained free from falls and injury this shift. Bed locked in lowest position, side rails up x2, call light within reach. Instructed pt to call for assistance as needed. Pt unable to communicate understanding. Vitals stable. No acute issues overnight. Will continue to monitor.

## 2023-11-24 NOTE — PT/OT/SLP PROGRESS
Speech Language Pathology      Mara Mojica  MRN: [id number]    Patient not seen today secondary to not appropriate for PO trials this date. Will follow-up at next service date.

## 2023-11-24 NOTE — NURSING
Nursing Transfer Note       Transfer To TSU Room [address]163    Transfer via bed    Transfer with O2 and Cardiac monitoring    Transported by ELLE Goss and ELIZABETH José    Medicines sent: Yes    Chart sent with patient: Yes    Belongings sent with patient: hairbrush and get well card    Notified: spouse    Bedside Neuro assessment performed: Yes    Bedside Handoff given to: TSU RN    Upon arrival to floor: cardiac monitor applied, patient oriented to room, call bell in reach, and bed in lowest position

## 2023-11-24 NOTE — CONSULTS
Gastrostomy Tube Placement Consult Note  Interventional Radiology      Date: 11/24/2023   Primary team: [facility] MED Romie ERICKSON Adil, MD   Room/bed: 79906/85368 A    Inpatient consult to Interventional Radiology  Consult performed by: Karyn Angel PA-C  Consult ordered by: Rebecca Perez NP             SUBJECTIVE:     Chief Complaint:  AMS    History of Present Illness:  Mara Mojica is a 58 y.o. female with a past medical hx of alcoholic cirrhosis, s/p ex-lap w/ bowel resection for incarcerated hernia who was admitted on 10/25/23 for encephelopathy.     Interventional Radiology has been consulted for percutaneous gastrostomy tube placement for management of long term enteral feeds. Pt unable to tolerate oral feeds due to AMS/encephelopathy. Had recent imaging including a CT a/p which revealed a window for G tube placement. The pt does have an NG tube in place. Has been evaluated by SLP, last on 11/23/23 with recs to continue keeping NPO    Review of Systems   Reason unable to perform ROS: mental status.        Scheduled Meds:   albuterol-ipratropium  3 mL Nebulization Q6H    EScitalopram oxalate  10 mg Per NG tube Daily    famotidine  20 mg Per NG tube BID    folic acid  1 mg Per NG tube Daily    lactulose  30 g Per NG tube BID    levetiracetam  500 mg Per NG tube BID    multivitamin  1 tablet Per NG tube Daily    propranoloL  5 mg Per NG tube Q12H    rifAXImin  550 mg Per NG tube BID    thiamine  100 mg Per NG tube Daily     Continuous Infusions:  PRN Meds:dextrose 10%, dextrose 10%, ondansetron, racepinephrine    Review of patient's allergies indicates:  No Known Allergies    Past Medical History:   Diagnosis Date    Alcoholic cirrhosis of liver     Kidney failure     Unilateral inguinal hernia, without obstruction or gangrene, not specified as recurrent      Past Surgical History:   Procedure Laterality Date    ESOPHAGOGASTRODUODENOSCOPY N/A 09/21/2023    Procedure: EGD (ESOPHAGOGASTRODUODENOSCOPY);   Surgeon: Melissa Edwards MD;  Location: [facility];  Service: Endoscopy;  Laterality: N/A;    HERNIA REPAIR      TONSILLECTOMY       Family History   Problem Relation Age of Onset    Ovarian cancer Mother     Seizures Father      Social History     Tobacco Use    Smoking status: Every Day     Current packs/day: 0.50     Average packs/day: 0.5 packs/day for 25.1 years (12.6 ttl pk-yrs)     Types: Cigarettes     Start date: 10/18/1998     Passive exposure: Never    Smokeless tobacco: Never   Substance Use Topics    Alcohol use: Not Currently    Drug use: Never       OBJECTIVE:       Anticoagulants/Antiplatelets:   No anticoagulation    Vital Signs (Most Recent)  Temp: 97.9 °F (36.6 °C) (11/24/23 0400)  Pulse: 63 (11/24/23 0851)  Resp: 12 (11/24/23 0851)  BP: (!) 109/56 (11/24/23 0400)  SpO2: 100 % (11/24/23 0851)    Physical Exam:   Physical Exam  Constitutional:       General: She is not in acute distress.     Comments: Awake, non verbal   HENT:      Head: Normocephalic.      Nose:      Comments: NG in place  Cardiovascular:      Rate and Rhythm: Normal rate.   Pulmonary:      Effort: Pulmonary effort is normal.   Abdominal:      General: Abdomen is flat.   Neurological:      Comments: Non verbal   Psychiatric:      Comments: Unable to assess due to mental status         Laboratory  Lab Results   Component Value Date    INR 1.9 (H) 11/24/2023       Lab Results   Component Value Date    WBC 9.04 11/24/2023    HGB 8.8 (L) 11/24/2023    HCT 27.0 (L) 11/24/2023     (H) 11/24/2023    PLT 54 (L) 11/24/2023      Lab Results   Component Value Date    GLU 97 11/24/2023     (H) 11/24/2023    K 4.3 11/24/2023     (H) 11/24/2023    CO2 22 (L) 11/24/2023    BUN 34 (H) 11/24/2023    CREATININE 0.6 11/24/2023    CALCIUM 8.3 (L) 11/24/2023    MG 1.7 11/24/2023    ALT 66 (H) 11/24/2023    AST 64 (H) 11/24/2023    ALBUMIN 2.0 (L) 11/24/2023    BILITOT 8.7 (H) 11/24/2023       ASA/Mallampati  ASA: 3  Mallampati:  2    Imaging:  Reviewed by Dakotah Benjamin MD. CT shows window, though vasculature noted adjacent to stomach.     ASSESSMENT/PLAN:     Assessment:  Mara Mojica is a 58 y.o. female with a past medical hx of alcoholic cirrhosis, s/p ex-lap w/ bowel resection for incarcerated hernia who was admitted on 10/25/23 for encephelopathy. IR consulted for percutaneous gastrostomy tube placement for management of long term enteral feeds. Pt unable to tolerate oral feeds due to AMS/encephelopathy. Has been evaluated by SLP, last on 11/23/23 with recs to continue keeping NPO     Plan:  Will proceed with percutaneous gastrostomy tube placement on 11/25/23  Sedation plan:  up to moderate    Please keep pt NPO/ hold TF starting at midnight on day of procedure.   Administer barium overnight starting at midnight (ordered by IR, see instructions in orders) via NG tube  Anticoagulation history reviewed.   Coagulation labs reviewed.  Please keep NG tube in place until procedure.  Thank you for the consult.Please contact with questions via [employer] secure chat.      Karyn Angel PA-C  Interventional Radiology  Spectra [phone]  11/24/2023

## 2023-11-24 NOTE — ASSESSMENT & PLAN NOTE
53 y/o F with a history of ethanol cirrhosis who presents after being found on the ground with fecal and urinary incontinence, and altered mental status. Elevated ammonia on admission; has now trended down.    -MELD score ~50% survival without transplant  -Continue lactulose and rifaximin BID   -Avoid opiates and benzodiazepines  -Thiamine, folic acid, multivitamin daily

## 2023-11-24 NOTE — ASSESSMENT & PLAN NOTE
59 y/o F with a history of decompensated alcoholic cirrhosis.  Suspect thrombocytopenia is likely secondary to chronic alcohol use and is consumptive in nature. If trend worsens, will consider consulting Hematology.    -CBC daily, transfuse only for active bleeding

## 2023-11-24 NOTE — ASSESSMENT & PLAN NOTE
57 y/o F presented for altered mental status on 10/18. Clinical picture confounded with hepatic encephalopathy, infection, and seizure activity. Neurology was consulted by primary team prior to admission to [facility].    10/27 EEG IMPRESSION:  Abnormal EEG during lethargic state.  Diffuse disorganized slowing of the background was noted.  Frequent triphasic waves noted.  Left posterior pseudo periodic epileptiform discharges were noted.  Numerous electrographic seizures emanating from the left posterior region were noted.    -Neuro checks q1h while in [facility]  -Keppra 500 BID  -Seizure precautions

## 2023-11-24 NOTE — SUBJECTIVE & OBJECTIVE
Interval History:  Increased work of breathing this AM. CXR w/ volume overload, lasix given. IR consulted for PEG placement. Pending stepdown to hospital medicine.    Review of Systems   Unable to perform ROS: Mental status change     Objective:     Vitals:  Temp: 98.3 °F (36.8 °C)  Pulse: 63  Rhythm: normal sinus rhythm, sinus bradycardia  BP: (!) 105/59  MAP (mmHg): 72  Resp: 18  SpO2: 98 %  Oxygen Concentration (%): 100    Temp  Min: 97.4 °F (36.3 °C)  Max: 98.6 °F (37 °C)  Pulse  Min: 63  Max: 91  BP  Min: 102/51  Max: 235/97  MAP (mmHg)  Min: 70  Max: 142  Resp  Min: 13  Max: 36  SpO2  Min: 85 %  Max: 100 %  Oxygen Concentration (%)  Min: 28  Max: 100     0701 -  0700  In: 855   Out: -    Unmeasured Output  Urine Occurrence: 1  Stool Occurrence: 0  Emesis Occurrence: 0  Pad Count: 1        Physical Exam  Vitals and nursing note reviewed.   Constitutional:       Appearance: She is ill-appearing.   Eyes:      Pupils: Pupils are equal, round, and reactive to light.   Cardiovascular:      Rate and Rhythm: Normal rate and regular rhythm.      Pulses: Normal pulses.      Heart sounds: Normal heart sounds.   Pulmonary:      Effort: Pulmonary effort is normal.      Breath sounds: Wheezing and rhonchi present.   Abdominal:      General: Bowel sounds are normal.      Palpations: Abdomen is soft.   Musculoskeletal:      Right lower le+ Pitting Edema present.      Left lower le+ Pitting Edema present.   Skin:     General: Skin is warm and dry.      Capillary Refill: Capillary refill takes less than 2 seconds.      Coloration: Skin is jaundiced.   Neurological:      GCS: GCS eye subscore is 4. GCS verbal subscore is 1. GCS motor subscore is 5.      Comments:   -E4 V1 M5  -Opens eyes spontaneously  -PERRL  -Intermittently follows simple commands   -Localizes w/ all four extremities  -PATHAK spontaneously/purposefully       Unable to test orientation, language, memory, judgment, insight, fund of knowledge,  shoulder shrug, tongue protrusion, coordination, gait due to level of consciousness.       Medications:  Continuous Scheduledalbuterol-ipratropium, 3 mL, Q6H  EScitalopram oxalate, 10 mg, Daily  famotidine, 20 mg, BID  folic acid, 1 mg, Daily  lactulose, 30 g, BID  levetiracetam, 500 mg, BID  multivitamin, 1 tablet, Daily  propranoloL, 5 mg, Q12H  rifAXImin, 550 mg, BID  thiamine, 100 mg, Daily    PRNcalcium gluconate IVPB, 1 g, PRN  calcium gluconate IVPB, 2 g, PRN  calcium gluconate IVPB, 3 g, PRN  dextrose 10%, 12.5 g, PRN  dextrose 10%, 25 g, PRN  hydrALAZINE, 10 mg, Q4H PRN  ibuprofen, 600 mg, Q8H PRN  labetalol, 10 mg, Q6H PRN  magnesium sulfate IVPB, 2 g, PRN  magnesium sulfate IVPB, 4 g, PRN  ondansetron, 4 mg, Q8H PRN  potassium chloride, 40 mEq, PRN   And  potassium chloride, 60 mEq, PRN   And  potassium chloride, 80 mEq, PRN  racepinephrine, 0.5 mL, Q4H PRN  sodium phosphate 15 mmol in dextrose 5 % (D5W) 250 mL IVPB, 15 mmol, PRN  sodium phosphate 20.01 mmol in dextrose 5 % (D5W) 250 mL IVPB, 20.01 mmol, PRN  sodium phosphate 30 mmol in dextrose 5 % (D5W) 250 mL IVPB, 30 mmol, PRN      Today I personally reviewed pertinent medications, lines/drains/airways, imaging, laboratory results,:    Diet  Diet NPO  Diet NPO

## 2023-11-24 NOTE — ASSESSMENT & PLAN NOTE
-Daily CBC, CMP, INR   -Not currently being evaluated for transplant due to clinical status  -Lactulose and rifaximin BID  -Folic acid, thiamine, and multivitamin daily

## 2023-11-25 PROBLEM — Z51.5 COMFORT MEASURES ONLY STATUS: Status: ACTIVE | Noted: 2023-11-25

## 2023-11-25 PROBLEM — J96.01 ACUTE RESPIRATORY FAILURE WITH HYPOXIA: Status: ACTIVE | Noted: 2023-10-31

## 2023-11-25 PROBLEM — K56.609 SBO (SMALL BOWEL OBSTRUCTION): Status: RESOLVED | Noted: 2023-10-14 | Resolved: 2023-11-25

## 2023-11-25 LAB
ALBUMIN SERPL BCP-MCNC: 2.1 G/DL (ref 3.5–5.2)
ALBUMIN SERPL BCP-MCNC: 2.4 G/DL (ref 3.5–5.2)
ALLENS TEST: ABNORMAL
ALLENS TEST: NORMAL
ALP SERPL-CCNC: 116 U/L (ref 55–135)
ALP SERPL-CCNC: 98 U/L (ref 55–135)
ALT SERPL W/O P-5'-P-CCNC: 58 U/L (ref 10–44)
ALT SERPL W/O P-5'-P-CCNC: 73 U/L (ref 10–44)
ANION GAP SERPL CALC-SCNC: 12 MMOL/L (ref 8–16)
ANION GAP SERPL CALC-SCNC: 9 MMOL/L (ref 8–16)
ANISOCYTOSIS BLD QL SMEAR: SLIGHT
APTT PPP: 34.4 SEC (ref 21–32)
AST SERPL-CCNC: 57 U/L (ref 10–40)
AST SERPL-CCNC: 78 U/L (ref 10–40)
BASOPHILS # BLD AUTO: 0.03 K/UL (ref 0–0.2)
BASOPHILS # BLD AUTO: ABNORMAL K/UL (ref 0–0.2)
BASOPHILS NFR BLD: 0 % (ref 0–1.9)
BASOPHILS NFR BLD: 0.4 % (ref 0–1.9)
BILIRUB SERPL-MCNC: 11.2 MG/DL (ref 0.1–1)
BILIRUB SERPL-MCNC: 9.3 MG/DL (ref 0.1–1)
BUN SERPL-MCNC: 40 MG/DL (ref 6–20)
BUN SERPL-MCNC: 43 MG/DL (ref 6–20)
BURR CELLS BLD QL SMEAR: ABNORMAL
CALCIUM SERPL-MCNC: 8.3 MG/DL (ref 8.7–10.5)
CALCIUM SERPL-MCNC: 9 MG/DL (ref 8.7–10.5)
CHLORIDE SERPL-SCNC: 114 MMOL/L (ref 95–110)
CHLORIDE SERPL-SCNC: 116 MMOL/L (ref 95–110)
CO2 SERPL-SCNC: 21 MMOL/L (ref 23–29)
CO2 SERPL-SCNC: 23 MMOL/L (ref 23–29)
CREAT SERPL-MCNC: 0.6 MG/DL (ref 0.5–1.4)
CREAT SERPL-MCNC: 0.7 MG/DL (ref 0.5–1.4)
DELSYS: ABNORMAL
DELSYS: NORMAL
DIFFERENTIAL METHOD: ABNORMAL
DIFFERENTIAL METHOD: ABNORMAL
EOSINOPHIL # BLD AUTO: 0.1 K/UL (ref 0–0.5)
EOSINOPHIL # BLD AUTO: ABNORMAL K/UL (ref 0–0.5)
EOSINOPHIL NFR BLD: 0.9 % (ref 0–8)
EOSINOPHIL NFR BLD: 2 % (ref 0–8)
ERYTHROCYTE [DISTWIDTH] IN BLOOD BY AUTOMATED COUNT: 20.8 % (ref 11.5–14.5)
ERYTHROCYTE [DISTWIDTH] IN BLOOD BY AUTOMATED COUNT: 21.2 % (ref 11.5–14.5)
EST. GFR  (NO RACE VARIABLE): >60 ML/MIN/1.73 M^2
EST. GFR  (NO RACE VARIABLE): >60 ML/MIN/1.73 M^2
FIO2: 35
FIO2: 35
FLOW: 8
FLOW: 8
GLUCOSE SERPL-MCNC: 90 MG/DL (ref 70–110)
GLUCOSE SERPL-MCNC: 97 MG/DL (ref 70–110)
HCO3 UR-SCNC: 23.2 MMOL/L (ref 24–28)
HCT VFR BLD AUTO: 26.1 % (ref 37–48.5)
HCT VFR BLD AUTO: 28.9 % (ref 37–48.5)
HCT VFR BLD CALC: 29 %PCV (ref 36–54)
HGB BLD-MCNC: 8.6 G/DL (ref 12–16)
HGB BLD-MCNC: 9.6 G/DL (ref 12–16)
HYPOCHROMIA BLD QL SMEAR: ABNORMAL
IMM GRANULOCYTES # BLD AUTO: 0.04 K/UL (ref 0–0.04)
IMM GRANULOCYTES # BLD AUTO: ABNORMAL K/UL (ref 0–0.04)
IMM GRANULOCYTES NFR BLD AUTO: 0.5 % (ref 0–0.5)
IMM GRANULOCYTES NFR BLD AUTO: ABNORMAL % (ref 0–0.5)
INR PPP: 1.8 (ref 0.8–1.2)
INR PPP: 1.9 (ref 0.8–1.2)
LACTATE SERPL-SCNC: 2 MMOL/L (ref 0.5–2.2)
LDH SERPL L TO P-CCNC: 1.99 MMOL/L (ref 0.5–2.2)
LYMPHOCYTES # BLD AUTO: 1.2 K/UL (ref 1–4.8)
LYMPHOCYTES # BLD AUTO: ABNORMAL K/UL (ref 1–4.8)
LYMPHOCYTES NFR BLD: 14.2 % (ref 18–48)
LYMPHOCYTES NFR BLD: 3 % (ref 18–48)
MAGNESIUM SERPL-MCNC: 1.8 MG/DL (ref 1.6–2.6)
MCH RBC QN AUTO: 33.7 PG (ref 27–31)
MCH RBC QN AUTO: 34.1 PG (ref 27–31)
MCHC RBC AUTO-ENTMCNC: 33 G/DL (ref 32–36)
MCHC RBC AUTO-ENTMCNC: 33.2 G/DL (ref 32–36)
MCV RBC AUTO: 101 FL (ref 82–98)
MCV RBC AUTO: 104 FL (ref 82–98)
METAMYELOCYTES NFR BLD MANUAL: 2 %
MODE: ABNORMAL
MODE: NORMAL
MONOCYTES # BLD AUTO: 1.8 K/UL (ref 0.3–1)
MONOCYTES # BLD AUTO: ABNORMAL K/UL (ref 0.3–1)
MONOCYTES NFR BLD: 21.1 % (ref 4–15)
MONOCYTES NFR BLD: 8 % (ref 4–15)
MYELOCYTES NFR BLD MANUAL: 1 %
NEUTROPHILS # BLD AUTO: 5.3 K/UL (ref 1.8–7.7)
NEUTROPHILS NFR BLD: 62.9 % (ref 38–73)
NEUTROPHILS NFR BLD: 81 % (ref 38–73)
NEUTS BAND NFR BLD MANUAL: 3 %
NRBC BLD-RTO: 0 /100 WBC
NRBC BLD-RTO: 0 /100 WBC
OVALOCYTES BLD QL SMEAR: ABNORMAL
PCO2 BLDA: 36.1 MMHG (ref 35–45)
PH SMN: 7.42 [PH] (ref 7.35–7.45)
PHOSPHATE SERPL-MCNC: 4.6 MG/DL (ref 2.7–4.5)
PLATELET # BLD AUTO: 63 K/UL (ref 150–450)
PLATELET # BLD AUTO: 71 K/UL (ref 150–450)
PLATELET BLD QL SMEAR: ABNORMAL
PMV BLD AUTO: 12.3 FL (ref 9.2–12.9)
PMV BLD AUTO: 12.6 FL (ref 9.2–12.9)
PO2 BLDA: 61 MMHG (ref 40–60)
POC BE: -1 MMOL/L
POC IONIZED CALCIUM: 1.24 MMOL/L (ref 1.06–1.42)
POC SATURATED O2: 91 % (ref 95–100)
POC TCO2: 24 MMOL/L (ref 24–29)
POCT GLUCOSE: 101 MG/DL (ref 70–110)
POCT GLUCOSE: 111 MG/DL (ref 70–110)
POCT GLUCOSE: 84 MG/DL (ref 70–110)
POIKILOCYTOSIS BLD QL SMEAR: SLIGHT
POLYCHROMASIA BLD QL SMEAR: ABNORMAL
POTASSIUM BLD-SCNC: 4 MMOL/L (ref 3.5–5.1)
POTASSIUM SERPL-SCNC: 3.9 MMOL/L (ref 3.5–5.1)
POTASSIUM SERPL-SCNC: 4.1 MMOL/L (ref 3.5–5.1)
PROT SERPL-MCNC: 5 G/DL (ref 6–8.4)
PROT SERPL-MCNC: 6.1 G/DL (ref 6–8.4)
PROTHROMBIN TIME: 18.6 SEC (ref 9–12.5)
PROTHROMBIN TIME: 19.6 SEC (ref 9–12.5)
RBC # BLD AUTO: 2.52 M/UL (ref 4–5.4)
RBC # BLD AUTO: 2.85 M/UL (ref 4–5.4)
SAMPLE: ABNORMAL
SAMPLE: NORMAL
SCHISTOCYTES BLD QL SMEAR: ABNORMAL
SCHISTOCYTES BLD QL SMEAR: PRESENT
SITE: ABNORMAL
SITE: NORMAL
SODIUM BLD-SCNC: 148 MMOL/L (ref 136–145)
SODIUM SERPL-SCNC: 147 MMOL/L (ref 136–145)
SODIUM SERPL-SCNC: 148 MMOL/L (ref 136–145)
SP02: 100
SP02: 98
SPHEROCYTES BLD QL SMEAR: ABNORMAL
WBC # BLD AUTO: 10.63 K/UL (ref 3.9–12.7)
WBC # BLD AUTO: 8.44 K/UL (ref 3.9–12.7)

## 2023-11-25 PROCEDURE — 63600175 PHARM REV CODE 636 W HCPCS: Mod: NTX | Performed by: STUDENT IN AN ORGANIZED HEALTH CARE EDUCATION/TRAINING PROGRAM

## 2023-11-25 PROCEDURE — 85014 HEMATOCRIT: CPT | Mod: NTX

## 2023-11-25 PROCEDURE — 63600175 PHARM REV CODE 636 W HCPCS: Mod: NTX | Performed by: HOSPITALIST

## 2023-11-25 PROCEDURE — 25000003 PHARM REV CODE 250: Mod: NTX | Performed by: HOSPITALIST

## 2023-11-25 PROCEDURE — 27000221 HC OXYGEN, UP TO 24 HOURS: Mod: NTX

## 2023-11-25 PROCEDURE — 82803 BLOOD GASES ANY COMBINATION: CPT | Mod: NTX

## 2023-11-25 PROCEDURE — 25000242 PHARM REV CODE 250 ALT 637 W/ HCPCS: Mod: NTX

## 2023-11-25 PROCEDURE — 25000003 PHARM REV CODE 250: Mod: NTX | Performed by: STUDENT IN AN ORGANIZED HEALTH CARE EDUCATION/TRAINING PROGRAM

## 2023-11-25 PROCEDURE — 94640 AIRWAY INHALATION TREATMENT: CPT | Mod: NTX

## 2023-11-25 PROCEDURE — 99900035 HC TECH TIME PER 15 MIN (STAT): Mod: NTX

## 2023-11-25 PROCEDURE — 82330 ASSAY OF CALCIUM: CPT | Mod: NTX

## 2023-11-25 PROCEDURE — 84100 ASSAY OF PHOSPHORUS: CPT | Mod: NTX

## 2023-11-25 PROCEDURE — 84132 ASSAY OF SERUM POTASSIUM: CPT | Mod: NTX

## 2023-11-25 PROCEDURE — 85025 COMPLETE CBC W/AUTO DIFF WBC: CPT | Mod: NTX

## 2023-11-25 PROCEDURE — 83735 ASSAY OF MAGNESIUM: CPT | Mod: NTX

## 2023-11-25 PROCEDURE — 84295 ASSAY OF SERUM SODIUM: CPT | Mod: NTX

## 2023-11-25 PROCEDURE — 94761 N-INVAS EAR/PLS OXIMETRY MLT: CPT | Mod: NTX

## 2023-11-25 PROCEDURE — 80053 COMPREHEN METABOLIC PANEL: CPT | Mod: NTX

## 2023-11-25 PROCEDURE — 20600001 HC STEP DOWN PRIVATE ROOM: Mod: NTX

## 2023-11-25 PROCEDURE — 85610 PROTHROMBIN TIME: CPT | Mod: NTX

## 2023-11-25 PROCEDURE — 83605 ASSAY OF LACTIC ACID: CPT | Mod: NTX

## 2023-11-25 PROCEDURE — 82800 BLOOD PH: CPT | Mod: NTX

## 2023-11-25 RX ORDER — NALOXONE HCL 0.4 MG/ML
0.4 VIAL (ML) INJECTION ONCE
Status: COMPLETED | OUTPATIENT
Start: 2023-11-25 | End: 2023-11-25

## 2023-11-25 RX ORDER — LEVETIRACETAM 500 MG/5ML
500 INJECTION, SOLUTION, CONCENTRATE INTRAVENOUS EVERY 12 HOURS
Status: DISCONTINUED | OUTPATIENT
Start: 2023-11-25 | End: 2023-11-27 | Stop reason: HOSPADM

## 2023-11-25 RX ORDER — DEXTROSE MONOHYDRATE 50 MG/ML
INJECTION, SOLUTION INTRAVENOUS CONTINUOUS
Status: ACTIVE | OUTPATIENT
Start: 2023-11-25 | End: 2023-11-25

## 2023-11-25 RX ORDER — LORAZEPAM 2 MG/ML
1 INJECTION INTRAMUSCULAR EVERY 4 HOURS PRN
Status: DISCONTINUED | OUTPATIENT
Start: 2023-11-25 | End: 2023-11-27 | Stop reason: HOSPADM

## 2023-11-25 RX ORDER — SCOLOPAMINE TRANSDERMAL SYSTEM 1 MG/1
1 PATCH, EXTENDED RELEASE TRANSDERMAL
Status: DISCONTINUED | OUTPATIENT
Start: 2023-11-25 | End: 2023-11-27

## 2023-11-25 RX ORDER — LACTULOSE 10 G/15ML
200 SOLUTION ORAL; RECTAL 2 TIMES DAILY
Status: DISCONTINUED | OUTPATIENT
Start: 2023-11-25 | End: 2023-11-25

## 2023-11-25 RX ORDER — MORPHINE SULFATE 2 MG/ML
2 INJECTION, SOLUTION INTRAMUSCULAR; INTRAVENOUS EVERY 8 HOURS PRN
Status: DISCONTINUED | OUTPATIENT
Start: 2023-11-25 | End: 2023-11-25

## 2023-11-25 RX ORDER — MORPHINE SULFATE 2 MG/ML
4 INJECTION, SOLUTION INTRAMUSCULAR; INTRAVENOUS
Status: DISCONTINUED | OUTPATIENT
Start: 2023-11-25 | End: 2023-11-27 | Stop reason: HOSPADM

## 2023-11-25 RX ORDER — ONDANSETRON 2 MG/ML
4 INJECTION INTRAMUSCULAR; INTRAVENOUS EVERY 6 HOURS PRN
Status: DISCONTINUED | OUTPATIENT
Start: 2023-11-25 | End: 2023-11-27 | Stop reason: HOSPADM

## 2023-11-25 RX ORDER — FAMOTIDINE 10 MG/ML
20 INJECTION INTRAVENOUS 2 TIMES DAILY
Status: DISCONTINUED | OUTPATIENT
Start: 2023-11-25 | End: 2023-11-25

## 2023-11-25 RX ORDER — IPRATROPIUM BROMIDE AND ALBUTEROL SULFATE 2.5; .5 MG/3ML; MG/3ML
3 SOLUTION RESPIRATORY (INHALATION) EVERY 6 HOURS PRN
Status: DISCONTINUED | OUTPATIENT
Start: 2023-11-25 | End: 2023-11-27 | Stop reason: HOSPADM

## 2023-11-25 RX ORDER — HALOPERIDOL 5 MG/ML
1 INJECTION INTRAMUSCULAR EVERY 6 HOURS PRN
Status: DISCONTINUED | OUTPATIENT
Start: 2023-11-25 | End: 2023-11-27 | Stop reason: HOSPADM

## 2023-11-25 RX ORDER — FUROSEMIDE 10 MG/ML
40 INJECTION INTRAMUSCULAR; INTRAVENOUS DAILY
Status: DISCONTINUED | OUTPATIENT
Start: 2023-11-25 | End: 2023-11-25

## 2023-11-25 RX ADMIN — SCOPALAMINE 1 PATCH: 1 PATCH, EXTENDED RELEASE TRANSDERMAL at 12:11

## 2023-11-25 RX ADMIN — ONDANSETRON 4 MG: 2 INJECTION INTRAMUSCULAR; INTRAVENOUS at 05:11

## 2023-11-25 RX ADMIN — FAMOTIDINE 20 MG: 10 INJECTION, SOLUTION INTRAVENOUS at 08:11

## 2023-11-25 RX ADMIN — FUROSEMIDE 40 MG: 10 INJECTION, SOLUTION INTRAVENOUS at 08:11

## 2023-11-25 RX ADMIN — NALXONE HYDROCHLORIDE 0.4 MG: 0.4 INJECTION INTRAMUSCULAR; INTRAVENOUS; SUBCUTANEOUS at 05:11

## 2023-11-25 RX ADMIN — MORPHINE SULFATE 2 MG: 2 INJECTION, SOLUTION INTRAMUSCULAR; INTRAVENOUS at 03:11

## 2023-11-25 RX ADMIN — HALOPERIDOL LACTATE 1 MG: 5 INJECTION, SOLUTION INTRAMUSCULAR at 08:11

## 2023-11-25 RX ADMIN — DEXTROSE: 5 SOLUTION INTRAVENOUS at 03:11

## 2023-11-25 RX ADMIN — LEVETIRACETAM 500 MG: 500 INJECTION, SOLUTION INTRAVENOUS at 08:11

## 2023-11-25 RX ADMIN — LEVETIRACETAM 500 MG: 100 INJECTION, SOLUTION INTRAVENOUS at 08:11

## 2023-11-25 RX ADMIN — SODIUM CHLORIDE 500 ML: 9 INJECTION, SOLUTION INTRAVENOUS at 03:11

## 2023-11-25 RX ADMIN — RACEPINEPHRINE HYDROCHLORIDE 0.5 ML: 11.25 SOLUTION RESPIRATORY (INHALATION) at 08:11

## 2023-11-25 RX ADMIN — MORPHINE SULFATE 2 MG: 2 INJECTION, SOLUTION INTRAMUSCULAR; INTRAVENOUS at 12:11

## 2023-11-25 NOTE — PLAN OF CARE
VIR update re (G Tube placement):    Discussed today with family and primary team. Patient will be on comfort measures only and will not pursue G tube placement at this time.     CHESTER Garibay MD  VIR fellow

## 2023-11-25 NOTE — CARE UPDATE
RAPID RESPONSE NURSE PROACTIVE ROUNDING NOTE       Time of Visit: 0837    Admit Date: 10/25/2023  LOS: 31  Code Status: DNR   Date of Visit: 2023  : 1965  Age: 58 y.o.  Sex: female  Race: White  Bed: 72364/18458 A:   MRN: [id number]  Was the patient discharged from an ICU this admission? Yes   Was the patient discharged from a PACU within last 24 hours? No   Did the patient receive conscious sedation/general anesthesia in last 24 hours? No  Was the patient in the ED within the past 24 hours? No  Was the patient on NIPPV within the past 24 hours? No   Attending Physician: Kate Grubbs MD  Primary Service: [facility] MED    Time spent at the bedside: > 60 min    SITUATION    Notified by bedside RN via phone call.  Reason for alert: Respiratory Distress  Called to evaluate the patient for Respiratory    BACKGROUND     Why is the patient in the hospital?: Seizure    Patient has a past medical history of Alcoholic cirrhosis of liver, Kidney failure, and Unilateral inguinal hernia, without obstruction or gangrene, not specified as recurrent.    Last Vitals:  Temp: 97.9 °F (36.6 °C) (652)  Pulse: 67 (603)  Resp: 12 (603)  BP: 118/53 (603)  SpO2: 100 % (603)    24 Hours Vitals Range:  Temp:  [97.1 °F (36.2 °C)-99.2 °F (37.3 °C)]   Pulse:  [55-96]   Resp:  [6-40]   BP: ()/(32-82)   SpO2:  [91 %-100 %]     Labs:  Recent Labs     23  0556   WBC 9.04  --  10.63 8.44   HGB 8.8*  --  9.6* 8.6*   HCT 27.0* 30* 28.9* 26.1*   PLT 54*  --  71* 63*       Recent Labs     23  03123  0556    147* 147* 148*   K 4.4 4.3 4.1 3.9   * 117* 114* 116*   CO2 21* 22* 21* 23   BUN 27* 34* 43* 40*   CREATININE 0.5 0.6 0.6 0.7    97 90 97   PHOS 3.5 4.4  --  4.6*   MG 1.8 1.7  --  1.8        Recent Labs     23  2322 23  2348   PH 7.450 7.447   PCO2 34.7* 34.1*   PO2 62* 105*    HCO3 24.1 23.6*   POCSATURATED 93 98   BE 0 0        ASSESSMENT    Physical Exam  Constitutional:       Appearance: She is ill-appearing and diaphoretic.      Interventions: Face mask in place.      Comments: Arms and legs with pitting edema    HENT:      Mouth/Throat:      Mouth: Oral lesions present.   Eyes:      Pupils: Pupils are equal, round, and reactive to light.   Cardiovascular:      Rate and Rhythm: Normal rate.   Pulmonary:      Breath sounds: Stridor present.   Skin:     Coloration: Skin is jaundiced.   Neurological:      Mental Status: She is lethargic.      GCS: GCS eye subscore is 2. GCS verbal subscore is 2. GCS motor subscore is 4.     Patient in bed with face mask @ 8L receiving breathing treatment. Patient only responds to pain and stridor can be heard from the door. Lactate, VBG, Chest x-ray have been ordered.      HR 68  SPO2 100% @8L facemask  B/P 88/51 (65), 113/93 (98)  Family contacted about patient decline but lives approximately 2.5 hr drive. On his way will decide on comfort measures when   He gets here     INTERVENTIONS    The patient was seen for Respiratory problem. Staff concerns included new onset of difficulty breathing. The following interventions were performed: portable chest x-ray, continuous pulse ox monitoring continued, and continuous cardiac monitoring continued.VGB w/lytes and lactate    RECOMMENDATIONS    -Chest x-ray  -VBG/lytes and lactate  -continuous telemetry and pulse ox  -maintain IV access  -goals of care meeting with family     PROVIDER ESCALATION    Yes/No  Yes    Orders received and case discussed with Dr. Grubbs .    Disposition: Remain in room 83653.    FOLLOW-UP    charge Viviana ALMEIDA  updated on plan of care. Instructed to call the Rapid Response Nurse, Ronnie Everett RN at [phone] for additional questions or concerns.

## 2023-11-25 NOTE — PROGRESS NOTES
[facility] - Transplant [facility] Medicine  Progress Note    Patient Name: Mara Mojica  MRN: [id number]  Patient Class: IP- Inpatient   Admission Date: 10/25/2023  Length of Stay: 31 days  Attending Physician: Kate Grubbs MD  Primary Care Provider: Osiris Nevarez NP        Subjective:     Principal Problem:Seizure        HPI:  59 yo woman with alcoholic cirrhosis with ascites, s/p Ex Lap with bowel resection for incarcerated hernia, recent SBO with [facility], [place] [facility] [place] [facility] [place] with AMS.  Patient found on the floor confused with slurred speech with incontinence and with questionable tongue injury    On presentation /65, HR 95, RR 18, SpO2 96% (RA).  At that time the patient was alert and oriented.  There was left-sided facial weakness.  Otherwise neurologic exam was unremarkable (? )    Labs (10/18) WBC 6.2, Hb 8.5 (BL 10.1), Plts 66, INR 2.0, Na 136, K 4.3, BUN 16, Cr 1.5 (BL 1.0) bilirubin 6.4, AST 96, ALT 47, ammonia 42.  .  Alcohol not tested (<10 on 10/14) UTOX neg U/A WBC 13, bacteria rare. CTH and MRI brain neg for acute changes.    Patient evaluated by Neurology on 10/19.  Per neurology, at that time she was oriented to person place time and situation and attentive to her environment.  She was able to follow one and two-step commands.  Facial features were symmetrical.  Speech marked by dysarthria.  Most answers are yes/no.  Muscle testing of proximal and distal muscles of upper and LE showed diffuse generalized weakness WO focal or lateralizing findings.  No pathologic reflexes were noted.     Labs 10/18 WBC 6.2 > 6.8, Hb 8.5 > 7.9, Plts 66 > 69, INR > 2.2, Na 136 > 142 Cr 1.5 > 0.8, T bili 6.4 > 5.0, AST 96 > 79, ALT 47 > 44, Ammonia 42 > 28  Presently patient is lethargic and responding to questions with one-word answers.      Her AMS is not readily attributed to HE with ammonia now 28 and CVA seems unlikely with neg MRI brain.  An MRI brain with contrast has been scheduled.  An EEG  has not been done so seizures remain in the differential especially given her presentation.  Patient found on the floor with questionable tongue injury and incontinent of urine and feces.  Patient known to be drinking as recently as July 2023.  More recently patient has said that she is not drinking.  On admission alcohol was < 10.   A PEth has not been done    [place] Shannan SORIA ([place] [facility] [place] Renteria who recommended [facility].      Overview/Hospital Course:  Ms. Mojica is a 59 y/o woman w/ PMHx alcoholic decompensated cirrhosis, thrombocytopenia, prior bowel resection for incarcerated hernia, CKD presenting w/ acute encephalopathy, found to be multifactorial in setting of hepatic encephalopathy, infection (E. Coli and candida albicans UTI, Candida PNA) and focal status epilepticus.     Hospital course c/b failed extubation trial 2/2 profound stridor s/p re-intubation and evaluation by ENT w/o clear pathology, pt successfully extubated 11/13, decreased mental status and progressive weakness c/w critical illness neuromyopathy.     Transfer to floor on 11/24/2023 and brief encounter with minimal participation as patient was nonverbal.  Alert however not oriented.  2 critical events later at night 11/24/2023 and early morning 11/25/2023 with the night team.  Her NG tube had to be removed with blood clots at its tip also noted to have blood clots the back of her nasopharynx some amenable to suction with oral suctioning. Worsening lactate, respiratory distress needing racemic epinephrine, fluid resuscitation along with diuresis to maintain airway given DNR status.    11/25/2023 -patient more diaphoretic this morning with inspiratory stridor and crackles.  Rapid response team with attempted measures for continued care. Chest x-ray with patchy bilateral pulmonary opacities and bilateral pleural effusions, similar to mildly worse.  VBG with stable lactate and satisfactory blood gas  however Patient with distress with labored breathing and hypotensive.  Detailed conversation with patient's .  He was in agreement with not to escalate care and that he was on his way. respiratory status some improvement after racemic epinephrine.  Further decline in blood pressure and apneic spells noted at this point  was notified again and after long discussion and understanding of medical condition and prognosis decision to initiate comfort measures.  Med  at bedside upon arrival and  present at bedside agreement to continue comfort measures.    Interval History:     11/25/2023 -patient more diaphoretic this morning with inspiratory stridor and crackles.  Rapid response team with attempted measures for continued care. Chest x-ray with patchy bilateral pulmonary opacities and bilateral pleural effusions, similar to mildly worse.  VBG with stable lactate and satisfactory blood gas however Patient with distress with labored breathing and hypotensive.  Detailed conversation with patient's .  He was in agreement with not to escalate care and that he was on his way. respiratory status some improvement after racemic epinephrine.  Further decline in blood pressure and apneic spells noted at this point  was notified again and after long discussion and understanding of medical condition and prognosis decision to initiate comfort measures.  Med  at bedside upon arrival and  present at bedside agreement to continue comfort measures.    Review of Systems   Unable to perform ROS: Mental status change   Constitutional:  Positive for diaphoresis.   Eyes: Negative.    Respiratory:  Positive for apnea and stridor.    Neurological:  Negative for headaches.   Psychiatric/Behavioral:  Positive for confusion.      Objective:     Vital Signs (Most Recent):  Temp: 97.9 °F (36.6 °C) (11/25/23 0652)  Pulse: 69 (11/25/23 1447)  Resp: (!) 24 (11/25/23 1529)  BP: (!) 113/93 (11/25/23  0925)  SpO2: 100 % (11/25/23 1447) Vital Signs (24h Range):  Temp:  [97.1 °F (36.2 °C)-99.2 °F (37.3 °C)] 97.9 °F (36.6 °C)  Pulse:  [59-96] 69  Resp:  [6-40] 24  SpO2:  [91 %-100 %] 100 %  BP: ()/(32-93) 113/93     Weight: 56.2 kg (123 lb 14.4 oz)  Body mass index is 25.02 kg/m².    Intake/Output Summary (Last 24 hours) at 11/25/2023 1709  Last data filed at 11/25/2023 0351  Gross per 24 hour   Intake 0 ml   Output 700 ml   Net -700 ml           Physical Exam  Constitutional:       Appearance: Normal appearance.   HENT:      Head: Normocephalic and atraumatic.      Nose:      Comments: Mask for oxygenation     Mouth/Throat:      Mouth: Mucous membranes are dry.   Eyes:      Pupils: Pupils are equal, round, and reactive to light.   Cardiovascular:      Rate and Rhythm: Regular rhythm. Tachycardia present.      Pulses: Normal pulses.   Pulmonary:      Effort: Respiratory distress present.      Breath sounds: Stridor present. Rales present.   Abdominal:      General: There is distension.   Musculoskeletal:      Cervical back: No rigidity.      Comments: Guarded assessment with critical illness myopathy.  Worsening edema of all 4 extremities.   Skin:     Findings: No rash.   Neurological:      Mental Status: She is alert. She is disoriented.      Comments: More somnolent today.  Continues to be nonverbal.  Only responds to noxious stimuli.   Psychiatric:      Comments: Unable to assess.  Not agitated.             Significant Labs: All pertinent labs within the past 24 hours have been reviewed.    Significant Imaging: I have reviewed all pertinent imaging results/findings within the past 24 hours.    Assessment/Plan:      * Seizure  focal status epilepticus   Maintained on Keppra.  We will maintain while on comfort measures    Acute encephalopathy  multifactorial in setting of hepatic encephalopathy, infection (E. Coli and candida albicans UTI, Candida PNA) and focal status epilepticus   Continues to be treated  with Keppra.  No further epileptiform activity noted.  Has been treated for urinary tract infection and antibiotics completed on 10/30/2023.  Chronic vascular changes noted on MRI.    11/25/2023 -patient more diaphoretic this morning with inspiratory stridor and crackles.  Rapid response team with attempted measures for continued care. Chest x-ray with patchy bilateral pulmonary opacities and bilateral pleural effusions, similar to mildly worse.  VBG with stable lactate and satisfactory blood gas however Patient with distress with labored breathing and hypotensive.  Detailed conversation with patient's .  He was in agreement with not to escalate care and that he was on his way. respiratory status some improvement after racemic epinephrine.  Further decline in blood pressure and apneic spells noted at this point  was notified again and after long discussion and understanding of medical condition and prognosis decision to initiate comfort measures.  Med  at bedside upon arrival and  present at bedside agreement to continue comfort measures.    Decompensated alcoholic hepatic cirrhosis  MELD 3.0: 26 at 11/25/2023  5:56 AM  MELD-Na: 22 at 11/25/2023  5:56 AM  Calculated from:  Serum Creatinine: 0.7 mg/dL (Using min of 1 mg/dL) at 11/25/2023  5:56 AM  Serum Sodium: 148 mmol/L (Using max of 137 mmol/L) at 11/25/2023  5:56 AM  Total Bilirubin: 9.3 mg/dL at 11/25/2023  5:56 AM  Serum Albumin: 2.1 g/dL at 11/25/2023  5:56 AM  INR(ratio): 1.9 at 11/25/2023  5:56 AM  Age at listing (hypothetical): 58 years  Sex: Female at 11/25/2023  5:56 AM    Can consider holding lactulose enemas      Acute hepatic encephalopathy  See encephalopthy      Comfort measures only status        Encounter for feeding tube placement    SLP -> failed formal SLP evaluation, IR consult for PEG tube placement pending.  Scheduled for PEG tube placement tomorrow.    Goals of care, counseling/discussion  11/25/2023 -patient  more diaphoretic this morning with inspiratory stridor and crackles.  Rapid response team with attempted measures for continued care. Chest x-ray with patchy bilateral pulmonary opacities and bilateral pleural effusions, similar to mildly worse.  VBG with stable lactate and satisfactory blood gas however Patient with distress with labored breathing and hypotensive.  Detailed conversation with patient's .  He was in agreement with not to escalate care and that he was on his way. respiratory status some improvement after racemic epinephrine.  Further decline in blood pressure and apneic spells noted at this point  was notified again and after long discussion and understanding of medical condition and prognosis decision to initiate comfort measures.  Met  at bedside upon arrival and  present at bedside agreement to continue comfort measures.    Critical care time including coordination with rapid response team, providing respiratory relief, monitoring vitals, coordination with  and in-depth clarification of goals of care and 2 conversations to clarify goals ( see ACP notes) 60 minutes    Critical illness myopathy        Acute respiratory failure with hypoxia  Continues to be on oxygen via nasal cannula.  Received 2 doses of diuretics yesterday.  In light of critical illness and malnutrition.  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  We will coordinate with case management for disposition planning    Moderate malnutrition  NG tube had to be taken out because of blood clots at the tip she did not receive any nutrition in the past 25 hours except for D50 at 50 mL an hour.    Thrombocytopenia    Suspect related to liver disease  If progresses consider Hematology evaluation but  monitoring for now in setting of no active bleeding.       VTE Risk Mitigation (From admission, onward)           Ordered     IP VTE LOW RISK PATIENT  Once         10/25/23 1303     Place JOI hose  Until discontinued         10/25/23 1303     Place sequential compression device  Until discontinued         10/25/23 1303                    Discharge Planning   ASHELY: 11/27/2023     Code Status: DNR   Is the patient medically ready for discharge?: No    Reason for patient still in hospital (select all that apply): Pending disposition and Other (specify) She is now comfort care awaiting hospice depending on clinical status decline  Discharge Plan A: Skilled Nursing Facility   Discharge Delays: None known at this time              Kate Grubbs MD  Department of Hospital Medicine   [facility] - Transplant Stepdown

## 2023-11-25 NOTE — ASSESSMENT & PLAN NOTE
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  We will coordinate with case management for disposition planning

## 2023-11-25 NOTE — ASSESSMENT & PLAN NOTE
Family not reachable, see ACP from Neuro crit from 11/22.   Will attempt to reach to family tomorrow

## 2023-11-25 NOTE — ASSESSMENT & PLAN NOTE
multifactorial in setting of hepatic encephalopathy, infection (E. Coli and candida albicans UTI, Candida PNA) and focal status epilepticus   Continues to be treated with Keppra.  No further epileptiform activity noted.  Has been treated for urinary tract infection and antibiotics completed on 10/30/2023.  Chronic vascular changes noted on MRI.    11/25/2023 -patient more diaphoretic this morning with inspiratory stridor and crackles.  Rapid response team with attempted measures for continued care. Chest x-ray with patchy bilateral pulmonary opacities and bilateral pleural effusions, similar to mildly worse.  VBG with stable lactate and satisfactory blood gas however Patient with distress with labored breathing and hypotensive.  Detailed conversation with patient's .  He was in agreement with not to escalate care and that he was on his way. respiratory status some improvement after racemic epinephrine.  Further decline in blood pressure and apneic spells noted at this point  was notified again and after long discussion and understanding of medical condition and prognosis decision to initiate comfort measures.  Med  at bedside upon arrival and  present at bedside agreement to continue comfort measures.

## 2023-11-25 NOTE — ACP (ADVANCE CARE PLANNING)
Advance Care Planning     Date: 11/25/2023    [facility]  I engaged the family (Darron ) in a voluntary conversation about advance care planning and we specifically addressed what the goals of care would be moving forward, in light of the patient's change in clinical status, specifically decline in .  We did specifically address the patient's likely prognosis, which is poor.  We explored the patient's values and preferences for future care.  The family endorses that what is most important right now is to focus on comfort and QOL     Accordingly, we have decided that the best plan to meet the patient's goals includes pivot to comfort-focused care    I did explain the role for hospice care at this stage of the patient's illness, including its ability to help the patient live with the best quality of life possible.  We will not be making a hospice referral now. Stevo wait for husbands arrival and make hospice referral accordingly.     Conversation witnessed by Charge Nurse Viviana Whittaker.    I spent a total of 10 minutes engaging the patient in this advance care planning discussion.

## 2023-11-25 NOTE — ASSESSMENT & PLAN NOTE
11/25/2023 -patient more diaphoretic this morning with inspiratory stridor and crackles.  Rapid response team with attempted measures for continued care. Chest x-ray with patchy bilateral pulmonary opacities and bilateral pleural effusions, similar to mildly worse.  VBG with stable lactate and satisfactory blood gas however Patient with distress with labored breathing and hypotensive.  Detailed conversation with patient's .  He was in agreement with not to escalate care and that he was on his way. respiratory status some improvement after racemic epinephrine.  Further decline in blood pressure and apneic spells noted at this point  was notified again and after long discussion and understanding of medical condition and prognosis decision to initiate comfort measures.  Met  at bedside upon arrival and  present at bedside agreement to continue comfort measures.    Critical care time including coordination with rapid response team, providing respiratory relief, monitoring vitals, coordination with  and in-depth clarification of goals of care and 2 conversations to clarify goals ( see ACP notes) 60 minutes

## 2023-11-25 NOTE — SUBJECTIVE & OBJECTIVE
Interval History:     11/25/2023 -patient more diaphoretic this morning with inspiratory stridor and crackles.  Rapid response team with attempted measures for continued care. Chest x-ray with patchy bilateral pulmonary opacities and bilateral pleural effusions, similar to mildly worse.  VBG with stable lactate and satisfactory blood gas however Patient with distress with labored breathing and hypotensive.  Detailed conversation with patient's .  He was in agreement with not to escalate care and that he was on his way. respiratory status some improvement after racemic epinephrine.  Further decline in blood pressure and apneic spells noted at this point  was notified again and after long discussion and understanding of medical condition and prognosis decision to initiate comfort measures.  Med  at bedside upon arrival and  present at bedside agreement to continue comfort measures.    Review of Systems   Unable to perform ROS: Mental status change   Constitutional:  Positive for diaphoresis.   Eyes: Negative.    Respiratory:  Positive for apnea and stridor.    Neurological:  Negative for headaches.   Psychiatric/Behavioral:  Positive for confusion.      Objective:     Vital Signs (Most Recent):  Temp: 97.9 °F (36.6 °C) (11/25/23 0652)  Pulse: 69 (11/25/23 1447)  Resp: (!) 24 (11/25/23 1529)  BP: (!) 113/93 (11/25/23 0925)  SpO2: 100 % (11/25/23 1447) Vital Signs (24h Range):  Temp:  [97.1 °F (36.2 °C)-99.2 °F (37.3 °C)] 97.9 °F (36.6 °C)  Pulse:  [59-96] 69  Resp:  [6-40] 24  SpO2:  [91 %-100 %] 100 %  BP: ()/(32-93) 113/93     Weight: 56.2 kg (123 lb 14.4 oz)  Body mass index is 25.02 kg/m².    Intake/Output Summary (Last 24 hours) at 11/25/2023 1709  Last data filed at 11/25/2023 0351  Gross per 24 hour   Intake 0 ml   Output 700 ml   Net -700 ml           Physical Exam  Constitutional:       Appearance: Normal appearance.   HENT:      Head: Normocephalic and atraumatic.      Nose:       Comments: Mask for oxygenation     Mouth/Throat:      Mouth: Mucous membranes are dry.   Eyes:      Pupils: Pupils are equal, round, and reactive to light.   Cardiovascular:      Rate and Rhythm: Regular rhythm. Tachycardia present.      Pulses: Normal pulses.   Pulmonary:      Effort: Respiratory distress present.      Breath sounds: Stridor present. Rales present.   Abdominal:      General: There is distension.   Musculoskeletal:      Cervical back: No rigidity.      Comments: Guarded assessment with critical illness myopathy.  Worsening edema of all 4 extremities.   Skin:     Findings: No rash.   Neurological:      Mental Status: She is alert. She is disoriented.      Comments: More somnolent today.  Continues to be nonverbal.  Only responds to noxious stimuli.   Psychiatric:      Comments: Unable to assess.  Not agitated.             Significant Labs: All pertinent labs within the past 24 hours have been reviewed.    Significant Imaging: I have reviewed all pertinent imaging results/findings within the past 24 hours.

## 2023-11-25 NOTE — RESPIRATORY THERAPY
RAPID RESPONSE RESPIRATORY THERAPY NOTE             Code Status: DNR   : 1965  Bed: 51064/77751 A:   MRN: [id number]  Time page Received: 0845  Time Rapid Response RT at Bedside: 0845  Time Rapid Response RT left Bedside: 09    SITUATION    Evaluated patient for: increased WOB agonal breathing, adventitious breath sounds.     BACKGROUND    Why is the patient in the hospital?: Seizure    Patient has a past medical history of Alcoholic cirrhosis of liver, Kidney failure, and Unilateral inguinal hernia, without obstruction or gangrene, not specified as recurrent.    24 Hours Vitals Range:  Temp:  [97.1 °F (36.2 °C)-99.2 °F (37.3 °C)]   Pulse:  [55-96]   Resp:  [6-40]   BP: ()/(32-93)   SpO2:  [91 %-100 %]     Labs:    Recent Labs     23  0311 23  0139 23  2350 23  0556    147* 147* 148*   K 4.4 4.3 4.1 3.9   * 117* 114* 116*   CO2 21* 22* 21* 23   BUN 27* 34* 43* 40*   CREATININE 0.5 0.6 0.6 0.7    97 90 97   PHOS 3.5 4.4  --  4.6*   MG 1.8 1.7  --  1.8        Recent Labs     23  2322 23  2348 23  0900   PH 7.450 7.447 7.416   PCO2 34.7* 34.1* 36.1   PO2 62* 105* 61*   HCO3 24.1 23.6* 23.2*   POCSATURATED 93 98 91   BE 0 0 -1       ASSESSMENT/INTERVENTIONS  Pt in bed responds to pain AMS    Last Vitals: Temp: 97.9 °F (36.6 °C) (652)  Pulse: 64 (935)  Resp: 9 (935)  BP: 113/93 (925)  SpO2: 97 % (935)  Level of Consciousness: Level of Consciousness (AVPU): responds to pain  Respiratory Effort: Respiratory Effort: Moderate, Severe, Gasping, Labored  Expansion/Accessory Muscle Usage: Expansion/Accessory Muscles/Retractions: accessory muscle use  All Lung Field Breath Sounds: All Lung Fields Breath Sounds: Anterior:, Lateral:, coarse  CHACORTA Breath Sounds: Anterior:, coarse, rhonchi  LLL Breath Sounds: Anterior:, absent  RUL Breath Sounds: Anterior:, coarse, rhonchi  RML Breath Sounds: Anterior:, diminished  RLL Breath  Sounds: Anterior:, absent  O2 Device/Concentration: 8L/35% Cool mist via mask  NIPPV: No Surgical airway: No Vent: No  ETCO2 monitored:    Ambu at bedside:      Active Orders   Respiratory Care    Chest physiotherapy Q6H     Frequency: Q6H     Number of Occurrences: Until Specified     Order Questions:      Indications: ATELECTASIS NONRESPONSIVE TO TX      Indications: ATELECTASIS PROPHYLAXIS    Oxygen Continuous     Frequency: Continuous     Number of Occurrences: Until Specified     Order Questions:      Device type: Low flow      Device: Aerosol Mask      FiO2%: 35      Titrate O2 per Oxygen Titration Protocol: Yes      To maintain SpO2 goal of: >= 92%    POCT ARTERIAL BLOOD GAS Blood Gas     Frequency: PRN     Number of Occurrences: Until Specified     Order Comments: Notify Physician if: see parameters below.       Order Questions:      Component: Blood Gas    POCT Venous Blood Gas     Frequency: Once     Number of Occurrences: 1 Occurrences     Order Comments: This test should be used for VBGs.  If using this order for other tests (K, creatinine, HCT, PT/INR, lactate etc)  ONLY do so in the case of an emergency or rapid response.Notify Physician if: see parameters below.         Order Questions:      Component: Lytes (NA,K,ICA,HCT)      Component: Lactate    Pulse Oximetry Continuous     Frequency: Continuous     Number of Occurrences: Until Specified       RECOMMENDATIONS  ?  We recommend: RRT Recs: MD at bedside discussing care plan and goals for pt.     ESCALATION        FOLLOW-UP    Disposition: Remain in room 71909.    Please call back the Rapid Response RTRangel RRT at x [phone] for any questions or concerns.

## 2023-11-25 NOTE — NURSING
Pt's RR rate noted less than 10 bpm. MD Tran notified. PRN morphine discontinued. No new orders at this time.

## 2023-11-25 NOTE — ASSESSMENT & PLAN NOTE
Nutrition consulted. Most recent weight and BMI monitored-     Measurements:  Wt Readings from Last 1 Encounters:   10/26/23 56.2 kg (123 lb 14.4 oz)   Body mass index is 25.02 kg/m².    Patient has been screened and assessed by RD.    Malnutrition Type:  Context: social/environmental circumstances  Level: moderate    Malnutrition Characteristic Summary:  Subcutaneous Fat (Malnutrition): mild depletion  Muscle Mass (Malnutrition): mild depletion  Fluid Accumulation (Malnutrition): moderate    Interventions/Recommendations (treatment strategy):  1. Updated TF recommendation: Isosource 1.5 @ goal rate of 35 mL/hr- provides 1260 kcals, 57 g pro, and 642 mL fluid. 2. If able to tolerate PO intake, ADAT to 3. RD following.     Continue feeding via NG tube, to get PEG tube with IR tomorrow.

## 2023-11-25 NOTE — ASSESSMENT & PLAN NOTE
MELD 3.0: 26 at 11/24/2023  1:39 AM  MELD-Na: 22 at 11/24/2023  1:39 AM  Calculated from:  Serum Creatinine: 0.6 mg/dL (Using min of 1 mg/dL) at 11/24/2023  1:39 AM  Serum Sodium: 147 mmol/L (Using max of 137 mmol/L) at 11/24/2023  1:39 AM  Total Bilirubin: 8.7 mg/dL at 11/24/2023  1:39 AM  Serum Albumin: 2.0 g/dL at 11/24/2023  1:39 AM  INR(ratio): 1.9 at 11/24/2023  1:39 AM  Age at listing (hypothetical): 58 years  Sex: Female at 11/24/2023  1:39 AM    Continue lactulose and rifaximin and titrate for bowel movements.

## 2023-11-25 NOTE — SUBJECTIVE & OBJECTIVE
Interval History: No acute events overnight.  Reportedly had received 2 doses of IV Lasix for increased work of breathing yesterday in the day.  Has been on stable flow on nasal cannula.  No excessive secretions reported.  Patient is awake however not oriented and nonverbal.  No family present at bedside and does not follow commands    Review of Systems   Unable to perform ROS: Mental status change   Constitutional:  Negative for chills and fever.   HENT:  Negative for sinus pressure.    Eyes: Negative.    Respiratory:  Negative for cough, chest tightness and shortness of breath.    Cardiovascular:  Negative for chest pain.   Gastrointestinal:  Negative for abdominal distention, abdominal pain, diarrhea, nausea and vomiting.   Genitourinary: Negative.    Musculoskeletal:  Negative for back pain.   Neurological:  Negative for headaches.   Psychiatric/Behavioral:  Negative for suicidal ideas.      Objective:     Vital Signs (Most Recent):  Temp: 98.2 °F (36.8 °C) (11/24/23 1527)  Pulse: 68 (11/24/23 1953)  Resp: 20 (11/24/23 1953)  BP: (!) 98/50 (11/24/23 1527)  SpO2: 98 % (11/24/23 1953) Vital Signs (24h Range):  Temp:  [97.9 °F (36.6 °C)-98.3 °F (36.8 °C)] 98.2 °F (36.8 °C)  Pulse:  [55-69] 68  Resp:  [10-20] 20  SpO2:  [96 %-100 %] 98 %  BP: ()/(50-63) 98/50     Weight: 56.2 kg (123 lb 14.4 oz)  Body mass index is 25.02 kg/m².    Intake/Output Summary (Last 24 hours) at 11/24/2023 2130  Last data filed at 11/24/2023 1814  Gross per 24 hour   Intake 240 ml   Output --   Net 240 ml         Physical Exam  Constitutional:       Appearance: Normal appearance.   HENT:      Head: Normocephalic and atraumatic.      Nose:      Comments: NG tube in place  along with nasal canula      Mouth/Throat:      Mouth: Mucous membranes are dry.   Eyes:      Pupils: Pupils are equal, round, and reactive to light.   Cardiovascular:      Rate and Rhythm: Normal rate and regular rhythm.      Pulses: Normal pulses.   Pulmonary:       Effort: Pulmonary effort is normal. No respiratory distress.      Breath sounds: Rhonchi present.   Abdominal:      General: There is no distension.      Tenderness: There is no abdominal tenderness.   Musculoskeletal:      Cervical back: Normal range of motion.      Comments: Guarded assessment with critical illness myopathy inpatients altered mental status.  Unable to follow commands or make a  on hands.  Did not follow commands on movement of lower extremities either.  Seems to respond to painful stimuli.   Skin:     Findings: No rash.   Neurological:      Mental Status: She is alert. She is disoriented.      Comments: Alert however not oriented and is nonverbal and does not respond to verbal commands.   Psychiatric:      Comments: Unable to assess.  Not agitated.             Significant Labs: All pertinent labs within the past 24 hours have been reviewed.    Significant Imaging: I have reviewed all pertinent imaging results/findings within the past 24 hours.

## 2023-11-25 NOTE — CARE UPDATE
RAPID RESPONSE NURSE FOLLOW-UP NOTE       Followed up with patient for a rapid response.  Pt. Remained hypotensive despite fluid resuscitation (1L NS) with MAPs <65.  Given Narcan IVP per MAR orders with positive effect (more awake, MAPs >65).  Returned to mental status baseline post administration.  S/t opioid sensitivity, recommend less-sedating medications for pain/agitation.  Continue to monitor hemodynamic, neuro, and respiratory status closely.  Recommend GOC conversation with family.  Reviewed plan of care with Bedside RNCoty .   Please call Rapid Response RN, Osiris Mc, RN with any questions or concerns at [phone].

## 2023-11-25 NOTE — ASSESSMENT & PLAN NOTE
NG tube had to be taken out because of blood clots at the tip she did not receive any nutrition in the past 25 hours except for D50 at 50 mL an hour.

## 2023-11-25 NOTE — RESPIRATORY THERAPY
RAPID RESPONSE RESPIRATORY THERAPY FOLLOW-UP NOTE       Followed up with patient for proactive rounding. Pt with family and appears comfortable at this time.  Please call Rapid Response RT, Rangel Lea RRT at [phone] with any questions or concerns.

## 2023-11-25 NOTE — NURSING
Pt with tachycardia and restlessness. Nonverbal indicators of pain acknowledged and IV morphine administered. Pt spouse at the bedside.

## 2023-11-25 NOTE — CARE UPDATE
Patient became somnolent and hypotensive likely from morphine 2 mg IV received earlier around midnight for anxiety and dyspnea. She also received lasix 40 mg IV with 700 cc UOP. NS 1 liter bolus given for hypotension w/o significant response. During my exam patient minimally responsive with sternal rub. Responded well with narcan 0.4 mg IV x 1 as patient became more awake, alert and SBP improved to ~120.     Avoid opioid use as patient is very sensitive and developed hypotension after morphine. Will order haldol 1 mg IV prn agitation. Overall, poor prognosis and attempt to discuss GOC with family in am.     Roberto Jean Baptiste DO  Staff Physician, Hospital Medicine.

## 2023-11-25 NOTE — CONSULTS
Palliative medicine consult received and discussed with attending Dr. Grubbs, who discussed Mrs. Mojica's decline and recommendations to focus on comfort care with family completed. Question related to lack of formal documentation prior to this abrupt decline in her 31 day hospitalization. Discussed with Dr. Grubbs that in order to proceed with code status update, change in plan of care to focus on comfort, and engaging hospice, he has appropriately reflected these new conversations in documented conversations and there does not have to be a [place] or other formal ACP forms completed in order to proceed with current plan of care. Hospice will be engaged and discussed with family according to chart review, which if family is agreeable, case management and social work will be able to send referral to set up.    Patient was not seen by palliative medicine. If additional questions need clarification, please do not hesitate to contact us.

## 2023-11-25 NOTE — CODE/ RAPID DOCUMENTATION
RAPID RESPONSE NURSE NOTE        Admit Date: 10/25/2023  LOS: 30  Code Status: DNR   Date of Consult: 2023  : 1965  Age: 58 y.o.  Weight:   Wt Readings from Last 1 Encounters:   10/26/23 56.2 kg (123 lb 14.4 oz)     Sex: female  Race: White   [facility] A:   MRN: [id number]  Time Rapid Response Team page Received:  called by charge RN  Time Rapid Response Team at Bedside:   Time Rapid Response Team left Bedside: 0   Was the patient discharged from an ICU this admission? Yes   Was the patient discharged from a PACU within last 24 hours? No   Did the patient receive conscious sedation/general anesthesia in last 24 hours? No  Was the patient in the ED within the past 24 hours? No  Was the patient on NIPPV within the past 24 hours? No   Did this progress into an ARC or CPA: No  Attending Physician: Kate Grubbs MD  Primary Service: [facility] MED        SITUATION    Notified by charge RN via phone call.  Reason for alert: Respiratory distress  Called to evaluate the patient for Respiratory    BACKGROUND     Why is the patient in the hospital?: Seizure    Patient has a past medical history of Alcoholic cirrhosis of liver, Kidney failure, and Unilateral inguinal hernia, without obstruction or gangrene, not specified as recurrent.    Last Vitals:  Temp: 98.2 °F (36.8 °C) (1527)  Pulse: 68 (1953)  Resp: 20 (1953)  BP: 98/50 (1527)  SpO2: 98 % (1953)    24 Hours Vitals Range:  Temp:  [97.9 °F (36.6 °C)-98.3 °F (36.8 °C)]   Pulse:  [55-69]   Resp:  [10-20]   BP: ()/(50-62)   SpO2:  [96 %-100 %]     Labs:  Recent Labs     23   WBC 8.49 8.74 9.04   HGB 9.8* 9.7* 8.8*   HCT 29.9* 30.7* 27.0*   PLT 51* 55* 54*       Recent Labs     23/24/23  0139    145 147*   K 4.5 4.4 4.3   * 116* 117*   CO2 21* 21* 22*   BUN 27* 27* 34*   CREATININE 0.4* 0.5 0.6   GLU 74 105 97   PHOS 3.5 3.5 4.4  "  MG 1.8 1.8 1.7        No results for input(s): "PH", "PCO2", "PO2", "HCO3", "POCSATURATED", "BE" in the last 72 hours.     ASSESSMENT    Physical Exam  Vitals and nursing note reviewed.   Constitutional:       General: She is in acute distress.      Appearance: She is ill-appearing.      Comments: Awakens to loud verbal stimuli, falls quickly back to sleep   HENT:      Mouth/Throat:      Mouth: Mucous membranes are dry.      Pharynx: Oropharynx is clear.      Comments: Dried blood noted to lips/mouth  Cardiovascular:      Rate and Rhythm: Normal rate and regular rhythm.   Pulmonary:      Effort: Respiratory distress present.      Comments: Increased WOB, intermittent hypoxia, tachypnea, BBS with rhonchi.  Abdominal:      General: Abdomen is flat.      Palpations: Abdomen is soft.   Musculoskeletal:         General: Normal range of motion.   Skin:     Capillary Refill: Capillary refill takes less than 2 seconds.      Comments: Hot and diaphoretic   Neurological:      General: No focal deficit present.      Mental Status: Mental status is at baseline.      Comments: Nonverbal @ baseline       INTERVENTIONS    Called to bedside by charge RN for noted increased WOB/tachypnea/intermittent hypoxia (down to 88% on baseline 3L NC O2). Upon initial assessment, pt. Laying in hospital bed with primary RN and charge RN at bs. Pt. Awakens to loud verbal stimuli but easily falls back to sleep, nonverbal at baseline, does not follow commands. Diaphoretic with noted respiratory distress. O2 increased to 6L NC O2 per primary RN with increase in spO2 to 99%. Was initially hypoglycemic with BS down to 66, given PRNs via MAR with increase in POCT CBG to 150.    Airway: patent, non obstructed.  Breathing: equal chest rise and fall, labored breathing, bbs with rhonchi, +retractions, tachypnea.  Circulation: without gross hemorrhage or bleeding noted externally, skin hot and diaphoretic to palpation.    Vital Signs:  HR: 90 NSR w/o " ectopy  BP: 131/82 (80)  SpO2: 99% 6L NC O2  RR: 30  Temperature: 99.2 axillary    Med team L contacted per RRN and updated on patient assessment. Verbal orders for Lasix IV, stat portable chest xray, eckert placement, and deep suctioning per RT placed. Pt. Repositioned in bed for better pulmonary expansion (sitting up). RT deep suctioned patient with minimal output and breathing treatments administered per orders.    Post interventions, pt. With minimal improvement in WOB/tachypnea. Dr. Tran at  along with NCC team. NG tube removed and upper airway noises decreased minimally. Pt. Given Morphine IVP per MAR orders with moderate relief in WOB.     Plan to monitor respiratory and hemodynamic status closely and provide continued aspiration and seizure precautions. Spoke with primary RNCoty, and updated on POC. Please call RRN with questions/concerns/deterioration of patient.     Vital Signs:  HR: 78 NSR w/o ectopy  BP: 155/79 (104)  SpO2: 100% 4L NC O2  RR: 22  Temperature: 98.8 axillary    PROVIDER ESCALATION    Orders received and case discussed with Dr. Tran .    Disposition: Remain in room 75866.    FOLLOW UP    Bedside Coty ALMEIDA  updated on plan of care. Instructed to call the Rapid Response Nurse, Osiris Mc RN at [phone] for additional questions or concerns.

## 2023-11-25 NOTE — ASSESSMENT & PLAN NOTE
Continues to be on oxygen via nasal cannula.  Received 2 doses of diuretics yesterday.  In light of critical illness and malnutrition.  Needs continued pulmonary toileting

## 2023-11-25 NOTE — ASSESSMENT & PLAN NOTE
SLP -> failed formal SLP evaluation, IR consult for PEG tube placement pending.  Scheduled for PEG tube placement tomorrow.

## 2023-11-25 NOTE — NURSING
Assumed care of pt at this time. Currently supine in bed with HOB elevated and patient positioned with pillows for comfort. Low flow face mask in place with 35% FiO2 (8L) VSS. Breath sounds course. Suction at bedside. Pt is awake but unable to verbally communicate or follow commands. D5 infusing at ordered rate.

## 2023-11-25 NOTE — PROGRESS NOTES
Rapid called to bedside. Increased stridor. Hr 100's , tachypneic on face mask. Primary Dr Memon called to bedside as well.

## 2023-11-25 NOTE — CODE/ RAPID DOCUMENTATION
RAPID RESPONSE NURSE NOTE        Admit Date: 10/25/2023  LOS: 31  Code Status: DNR   Date of Consult: 2023  : 1965  Age: 58 y.o.  Weight:   Wt Readings from Last 1 Encounters:   10/26/23 56.2 kg (123 lb 14.4 oz)     Sex: female  Race: White   Bed: On license of [facility]/230[address] A:   MRN: [id number]  Time Rapid Response Team page Received: 032 Called  by charge RN  Time Rapid Response Team at Bedside: 033  Time Rapid Response Team left Bedside: 0430  Was the patient discharged from an ICU this admission? Yes   Was the patient discharged from a PACU within last 24 hours? No   Did the patient receive conscious sedation/general anesthesia in last 24 hours? No  Was the patient in the ED within the past 24 hours? No  Was the patient on NIPPV within the past 24 hours? No   Did this progress into an ARC or CPA: No  Attending Physician: Kate Grubbs MD  Primary Service: [facility]       SITUATION    Notified by charge RN via phone call.  Reason for alert: Hypotension  Called to evaluate the patient for Circulatory    BACKGROUND     Why is the patient in the hospital?: Seizure    Patient has a past medical history of Alcoholic cirrhosis of liver, Kidney failure, and Unilateral inguinal hernia, without obstruction or gangrene, not specified as recurrent.    Last Vitals:  Temp: 97.1 °F (36.2 °C) ( 034)  Pulse: 62 ( 0403)  Resp: 7 ( 0403)  BP: 82/45 ( 0403)  SpO2: 100 % (403)    24 Hours Vitals Range:  Temp:  [97.1 °F (36.2 °C)-99.2 °F (37.3 °C)]   Pulse:  [55-96]   Resp:  [7-31]   BP: ()/(32-82)   SpO2:  [91 %-100 %]     Labs:  Recent Labs     23  03123  0139 232 23  2350   WBC 8.74 9.04  --  10.63   HGB 9.7* 8.8*  --  9.6*   HCT 30.7* 27.0* 30* 28.9*   PLT 55* 54*  --  71*       Recent Labs     23  0311 23  0139 23  2350    147* 147*   K 4.4 4.3 4.1   * 117* 114*   CO2 21* 22* 21*   BUN 27* 34* 43*   CREATININE 0.5 0.6 0.6     97 90   PHOS 3.5 4.4  --    MG 1.8 1.7  --         Recent Labs     11/24/23  2322 11/24/23  2348   PH 7.450 7.447   PCO2 34.7* 34.1*   PO2 62* 105*   HCO3 24.1 23.6*   POCSATURATED 93 98   BE 0 0        ASSESSMENT    Physical Exam  Vitals and nursing note reviewed.   Constitutional:       Comments: Sleeping, awakens briefly to noxious stimuli, remains somnolent   HENT:      Mouth/Throat:      Mouth: Mucous membranes are dry.      Pharynx: Oropharynx is clear.   Eyes:      Pupils: Pupils are equal, round, and reactive to light.   Cardiovascular:      Rate and Rhythm: Normal rate and regular rhythm.      Comments: Weak peripheral pulses palpated  Pulmonary:      Effort: Pulmonary effort is normal.      Breath sounds: Rales present.   Abdominal:      General: Abdomen is flat.      Palpations: Abdomen is soft.   Skin:     General: Skin is warm and dry.      Capillary Refill: Capillary refill takes less than 2 seconds.       INTERVENTIONS    Called to bedside by charge RN for hypotension. Upon initial assessment, pt. Sleeping in hospital bed with primary RN at . Pt. Awakens briefly to noxious stimuli but quickly falls back to sleep. Remains nonverbal (baseline) and does not follow commands. BP: 74/40 (52).    Airway: patent, non obstructed, able to maintain secretions.  Breathing: non labored, equal chest rise and fall, spO2 100% on aerosol face mask @ 8L/35%, intermittent stridor (baseline).  Circulation: without gross hemorrhage or bleeding noted externally, skin warm and dry to palpation, weak palpated distal pulses.    Vital Signs:  HR: 62 NSR w/o ectopy  BP: Automatic: 74/40 (52)  BP: Manual: 68/32 (44)  SpO2: 100%  RR: 9  Temperature: 97.1 axillary     med L MD updated on patient assessment per primary RN. 500mL NS IV bolus given with moderate increase in BP.    Vital Signs:  HR: 60 NSR w/o ectopy  BP: Automatic: 95/52 (68)  SpO2: 100%  RR: 8  Afebrile    Additional 500mL NS bolus ordered and initiated.  Planning to reassess hemodynamic status post administration. Spoke with primary RNCoty, and updated on POC. Please call RRN with questions/concerns/deterioration of patient.     PROVIDER ESCALATION    Orders received and case discussed with Dr. Jean Baptiste .    Disposition: Remain in room 82104.    FOLLOW UP    Bedside RNCoty  updated on plan of care. Instructed to call the Rapid Response Nurse, Osiris Mc RN at [phone] for additional questions or concerns.

## 2023-11-25 NOTE — PROGRESS NOTES
[facility]  Progress Note    Patient Name: Mara Mojica  MRN: [id number]  Patient Class: IP- Inpatient   Admission Date: 10/25/2023  Length of Stay: 30 days  Attending Physician: Kate Grubbs MD  Primary Care Provider: Osiris Nevarez NP        Subjective:     Principal Problem:Seizure        HPI:  59 yo woman with alcoholic cirrhosis with ascites, s/p Ex Lap with bowel resection for incarcerated hernia, recent SBO with [facility], adm to [facility] on 10/18 with AMS.  Patient found on the floor confused with slurred speech with incontinence and with questionable tongue injury    On presentation /65, HR 95, RR 18, SpO2 96% (RA).  At that time the patient was alert and oriented.  There was left-sided facial weakness.  Otherwise neurologic exam was unremarkable (? )    Labs (10/18) WBC 6.2, Hb 8.5 (BL 10.1), Plts 66, INR 2.0, Na 136, K 4.3, BUN 16, Cr 1.5 (BL 1.0) bilirubin 6.4, AST 96, ALT 47, ammonia 42.  .  Alcohol not tested (<10 on 10/14) UTOX neg U/A WBC 13, bacteria rare. CTH and MRI brain neg for acute changes.    Patient evaluated by Neurology on 10/19.  Per neurology, at that time she was oriented to person place time and situation and attentive to her environment.  She was able to follow one and two-step commands.  Facial features were symmetrical.  Speech marked by dysarthria.  Most answers are yes/no.  Muscle testing of proximal and distal muscles of upper and LE showed diffuse generalized weakness WO focal or lateralizing findings.  No pathologic reflexes were noted.     Labs 10/18 WBC 6.2 > 6.8, Hb 8.5 > 7.9, Plts 66 > 69, INR > 2.2, Na 136 > 142 Cr 1.5 > 0.8, T bili 6.4 > 5.0, AST 96 > 79, ALT 47 > 44, Ammonia 42 > 28  Presently patient is lethargic and responding to questions with one-word answers.      Her AMS is not readily attributed to HE with ammonia now 28 and CVA seems unlikely with neg MRI brain.  An MRI brain with contrast has been scheduled.  An EEG  has not been done so seizures remain in the differential especially given her presentation.  Patient found on the floor with questionable tongue injury and incontinent of urine and feces.  Patient known to be drinking as recently as July 2023.  More recently patient has said that she is not drinking.  On admission alcohol was < 10.   A PEth has not been done    Dr Shannan SORIA ([place]) at [facility] consulted with Dr Renteria who recommended transfer to [facility].      Overview/Hospital Course:  Ms. Mojica is a 59 y/o woman w/ PMHx alcoholic decompensated cirrhosis, thrombocytopenia, prior bowel resection for incarcerated hernia, CKD presenting w/ acute encephalopathy, found to be multifactorial in setting of hepatic encephalopathy, infection (E. Coli and candida albicans UTI, Candida PNA) and focal status epilepticus.     Hospital course c/b failed extubation trial 2/2 profound stridor s/p re-intubation and evaluation by ENT w/o clear pathology, pt successfully extubated 11/13, decreased mental status and progressive weakness c/w critical illness neuromyopathy.     Transfer to floor on 11/23/2023.  Continues to have NG with tube feeds ongoing.  Planned for PEG tube per IR.    Interval History: No acute events overnight.  Reportedly had received 2 doses of IV Lasix for increased work of breathing yesterday in the day.  Has been on stable flow on nasal cannula.  No excessive secretions reported.  Patient is awake however not oriented and nonverbal.  No family present at bedside and does not follow commands    Review of Systems   Unable to perform ROS: Mental status change   Constitutional:  Negative for chills and fever.   HENT:  Negative for sinus pressure.    Eyes: Negative.    Respiratory:  Negative for cough, chest tightness and shortness of breath.    Cardiovascular:  Negative for chest pain.   Gastrointestinal:  Negative for abdominal distention, abdominal pain, diarrhea, nausea and vomiting.   Genitourinary:  Negative.    Musculoskeletal:  Negative for back pain.   Neurological:  Negative for headaches.   Psychiatric/Behavioral:  Negative for suicidal ideas.      Objective:     Vital Signs (Most Recent):  Temp: 98.2 °F (36.8 °C) (11/24/23 1527)  Pulse: 68 (11/24/23 1953)  Resp: 20 (11/24/23 1953)  BP: (!) 98/50 (11/24/23 1527)  SpO2: 98 % (11/24/23 1953) Vital Signs (24h Range):  Temp:  [97.9 °F (36.6 °C)-98.3 °F (36.8 °C)] 98.2 °F (36.8 °C)  Pulse:  [55-69] 68  Resp:  [10-20] 20  SpO2:  [96 %-100 %] 98 %  BP: ()/(50-63) 98/50     Weight: 56.2 kg (123 lb 14.4 oz)  Body mass index is 25.02 kg/m².    Intake/Output Summary (Last 24 hours) at 11/24/2023 2130  Last data filed at 11/24/2023 1814  Gross per 24 hour   Intake 240 ml   Output --   Net 240 ml         Physical Exam  Constitutional:       Appearance: Normal appearance.   HENT:      Head: Normocephalic and atraumatic.      Nose:      Comments: NG tube in place  along with nasal canula      Mouth/Throat:      Mouth: Mucous membranes are dry.   Eyes:      Pupils: Pupils are equal, round, and reactive to light.   Cardiovascular:      Rate and Rhythm: Normal rate and regular rhythm.      Pulses: Normal pulses.   Pulmonary:      Effort: Pulmonary effort is normal. No respiratory distress.      Breath sounds: Rhonchi present.   Abdominal:      General: There is no distension.      Tenderness: There is no abdominal tenderness.   Musculoskeletal:      Cervical back: Normal range of motion.      Comments: Guarded assessment with critical illness myopathy inpatients altered mental status.  Unable to follow commands or make a  on hands.  Did not follow commands on movement of lower extremities either.  Seems to respond to painful stimuli.   Skin:     Findings: No rash.   Neurological:      Mental Status: She is alert. She is disoriented.      Comments: Alert however not oriented and is nonverbal and does not respond to verbal commands.   Psychiatric:      Comments:  Unable to assess.  Not agitated.             Significant Labs: All pertinent labs within the past 24 hours have been reviewed.    Significant Imaging: I have reviewed all pertinent imaging results/findings within the past 24 hours.    Assessment/Plan:      * Seizure  focal status epilepticus   Maintained on Keppra.    Acute encephalopathy  multifactorial in setting of hepatic encephalopathy, infection (E. Coli and candida albicans UTI, Candida PNA) and focal status epilepticus   Continues to be treated with Keppra.  No further epileptiform activity noted.  Has been treated for urinary tract infection and antibiotics completed on 10/30/2023.  Chronic vascular changes noted on MRI.  Continue lactulose and rifaximin for hepatic encephalopathy.  Titrate to 3-4 bowel movements.  She has a NG tube and tube feeds have been intermittently Amaya concerns for respiratory distress.  She is planned for PEG tube.  Goal clarification at attempted with patient's significant other Darron by a neurocritical care and patient is planned for PEG tube placement tomorrow with IR.  She needs placement to skilled nursing facility and case management is helping facilitate.      Decompensated alcoholic hepatic cirrhosis  MELD 3.0: 26 at 11/24/2023  1:39 AM  MELD-Na: 22 at 11/24/2023  1:39 AM  Calculated from:  Serum Creatinine: 0.6 mg/dL (Using min of 1 mg/dL) at 11/24/2023  1:39 AM  Serum Sodium: 147 mmol/L (Using max of 137 mmol/L) at 11/24/2023  1:39 AM  Total Bilirubin: 8.7 mg/dL at 11/24/2023  1:39 AM  Serum Albumin: 2.0 g/dL at 11/24/2023  1:39 AM  INR(ratio): 1.9 at 11/24/2023  1:39 AM  Age at listing (hypothetical): 58 years  Sex: Female at 11/24/2023  1:39 AM    Continue lactulose and rifaximin and titrate for bowel movements.    Acute hepatic encephalopathy  See encephalopthy      Encounter for feeding tube placement    SLP -> failed formal SLP evaluation, IR consult for PEG tube placement pending.  Scheduled for PEG tube placement  tomorrow.    Goals of care, counseling/discussion  Family not reachable, see ACP from Neuro crit from 11/22.   Will attempt to reach to family tomorrow     Hypoxic respiratory failure  Continues to be on oxygen via nasal cannula.  Received 2 doses of diuretics yesterday.  In light of critical illness and malnutrition.  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  We will coordinate with case management for disposition planning    Moderate malnutrition  Nutrition consulted. Most recent weight and BMI monitored-     Measurements:  Wt Readings from Last 1 Encounters:   10/26/23 56.2 kg (123 lb 14.4 oz)   Body mass index is 25.02 kg/m².    Patient has been screened and assessed by RD.    Malnutrition Type:  Context: social/environmental circumstances  Level: moderate    Malnutrition Characteristic Summary:  Subcutaneous Fat (Malnutrition): mild depletion  Muscle Mass (Malnutrition): mild depletion  Fluid Accumulation (Malnutrition): moderate    Interventions/Recommendations (treatment strategy):  1. Updated TF recommendation: Isosource 1.5 @ goal rate of 35 mL/hr- provides 1260 kcals, 57 g pro, and 642 mL fluid. 2. If able to tolerate PO intake, ADAT to 3. RD following.     Continue feeding via NG tube, to get PEG tube with IR tomorrow.    Thrombocytopenia    Suspect related to liver disease  If progresses consider Hematology evaluation but monitoring for now in setting of no active bleeding.       VTE Risk Mitigation (From admission, onward)           Ordered     IP VTE LOW RISK PATIENT  Once         10/25/23 1303     Place JOI hose  Until discontinued         10/25/23 1303     Place sequential compression device  Until discontinued         10/25/23 1303                    Discharge Planning   ASHELY: 11/27/2023     Code  Status: DNR   Is the patient medically ready for discharge?: No    Reason for patient still in hospital (select all that apply): Patient trending condition, Treatment, Consult recommendations, PT / OT recommendations, and Other (specify) PEG tube placement and SNF placement   Discharge Plan A: Skilled Nursing Facility   Discharge Delays: None known at this time              Kate Grubbs MD  Department of Hospital Medicine   [facility]

## 2023-11-25 NOTE — ACP (ADVANCE CARE PLANNING)
Advance Care Planning     Date: 11/25/2023    Today a voluntary meeting took place: other (conference room) phone call to Darron () [phone]    Patient Participation: Patient is unable to participate     Attendees (Name and  Relationship to patient): Legal surrogate decision-maker: Darron Kelly ()      Staff attendees (Name and  Role): Kate Grubbs MD, Primary Physician     ACP Conversation (General): Understanding of current condition and critical condition and need for comfort measures initiation     Code Status: DNR; status confirmed/order placed in chart     ACP Documents: Other Documents (specify): Reviewed previous ACP conversations, No LaPost needed discussed with palliative care, ACP conversation charted     Goals of care: The healthcare power of   endorses that what is most important right now is to focus on symptom/pain control, improvement in condition but with limits to invasive therapies, and comfort and QOL     Accordingly, we have decided that the best plan to meet the patient's goals includes no further escalation in treatment and pivot to comfort-focused care      Recommendations/  Follow-up tasks: The patient and health care agent were provided the following recommendations Darorn is driving to hospital and wants to be able to see her and understand that we may have to kasey back if she gets worse and initiate measures and he understands and is agreeable.  Follow up family meeting planned: Randal giang arrive 12 -12.30 and susu speak to her best friend erick and initiate measures then        Length of ACP   conversation in minutes: 20 minutes

## 2023-11-25 NOTE — PLAN OF CARE
Pt remained hypotensive after two 500mL NS boluses. MD notified, came to bedside. Naloxone administered by charge RN. PRN Zofran administered.     D5 infusing 50 mL/hr. Q4 accuchecks. Q4 neurochecks. PRN haloperidol available for agitation. Pt incontinent to urine and bowel. No BM overnight. Scheduled and PRN breathing treatments. Low flow face mask in place with 35% FiO2 (8L). Kang catheter in place. Pt CDI at this time.

## 2023-11-25 NOTE — NURSING
Pt appears to be resting comfortably. RR 6-8 per min. Does not respond to verbal stimuli. Tele monitoring discontinued. Spouse at the bedside, emotional support and empathetic listening provided.

## 2023-11-25 NOTE — ASSESSMENT & PLAN NOTE
MELD 3.0: 26 at 11/25/2023  5:56 AM  MELD-Na: 22 at 11/25/2023  5:56 AM  Calculated from:  Serum Creatinine: 0.7 mg/dL (Using min of 1 mg/dL) at 11/25/2023  5:56 AM  Serum Sodium: 148 mmol/L (Using max of 137 mmol/L) at 11/25/2023  5:56 AM  Total Bilirubin: 9.3 mg/dL at 11/25/2023  5:56 AM  Serum Albumin: 2.1 g/dL at 11/25/2023  5:56 AM  INR(ratio): 1.9 at 11/25/2023  5:56 AM  Age at listing (hypothetical): 58 years  Sex: Female at 11/25/2023  5:56 AM    Can consider holding lactulose enemas

## 2023-11-25 NOTE — NURSING
RN entered room at 2100 for med pass. Pt noticeably more agitated than at beginning of shift. SaO2 dropped to 88%. Pt's noted with coarse breath sounds, stridor, throat congestion/secretions, elevated SBP. RN increased NC O2 from 3L to 5 L, SaO2 increased to mid-upper 90%s. Charge RN came to bedside, assisted RN with suctioning using trach suction d/t pt's disorientation and biting of normal suctioning. Pt visibly sweaty--BG assessed, 66. D10% 125 mL bolus administered. MD notified and updated on pt's status changes. Pt to receive PRN respiratory medication for stridor, per MD. Rapid nurses came to bedside, spoke with MD over phone, new orders placed. PO meds held at this time. Keppra and lasix given IV. Kang catheter placed by Charge RN and rapid nurse.     Respiratory therapist and xray contacted, awaiting arrival.     Pulse ox placed on ear with improved sats. O2 titrated down to 3L.     Care continued. See flowsheets for details.

## 2023-11-25 NOTE — SIGNIFICANT EVENT
[facility] Medicine Rapid Response Note      Admit Date: 10/25/2023  LOS: 30  Code Status: DNR   CC: Seizure  Date of Consult: 11/24/2023  RRT Indication(s): respiratory distress  Attending Physician: Kate Grubbs MD  Primary Service: [facility]     SUBJECTIVE:     Interval history:    59 y/o with decompensated cirrhosis, hepatic encephalopathy, recent seizures, hypoxic respiratory failure and persistent encephalopathy, stepped down from Neuro ICU in the last 24 hours.     Tonight nursing has messaged with concerns that patient with rising O2 requirement from 2L-5L through shift, increased tachypnea and worh      OBJECTIVE:     Vital Signs (Most Recent):  Temp: 98.8 °F (37.1 °C) (11/24/23 2330)  Pulse: 87 (11/24/23 2330)  Resp: (!) 23 (11/24/23 2330)  BP: (!) 155/79 (11/24/23 2330)  SpO2: 98 % (11/24/23 2330) Vital Signs (24h Range):  Temp:  [97.9 °F (36.6 °C)-99.2 °F (37.3 °C)] 98.8 °F (37.1 °C)  Pulse:  [55-96] 87  Resp:  [11-29] 23  SpO2:  [91 %-100 %] 98 %  BP: ()/(50-82) 155/79     I & O (24h):    Intake/Output Summary (Last 24 hours) at 11/24/2023 2354  Last data filed at 11/24/2023 1814  Gross per 24 hour   Intake 100 ml   Output --   Net 100 ml        Physical Exam: Physical Exam   Constitutional: She appears toxic. She appears ill.   HENT:   Nose: NG tube in place  Mouth/Throat: Has dried bloody secretion covering her gums/teeth tongue  Eyes: Right pupil is sluggish. Left pupil is sluggish.   Cardiovascular: Normal rate and regular rhythm. Pulmonary:      Effort: Tachypnea present.      Breath sounds: Stridor present.     Musculoskeletal:      Right lower leg: No edema.      Left lower leg: No edema.   Neurological: She is disoriented. She exhibits abnormal muscle tone. GCS eye subscore is 4. GCS verbal subscore is 2. GCS motor subscore is 3.   Skin: She is diaphoretic.   Psychiatric: Cognition and memory are impaired. She is noncommunicative.   Restless, grimacing   Nursing note and vitals  "reviewed.      Lines/Drains/Airway:          Urethral Catheter 11/24/23 2216 Silicone 16 Fr. (Active)   $ Kang Insertion Bedside Insertion Performed 11/24/23 2217   Site Assessment Clean;Intact;Rosebud appearance 11/24/23 2217   Collection Container Urimeter 11/24/23 2217   Securement Method secured to top of thigh w/ adhesive device 11/24/23 2217   Catheter Care Performed yes 11/24/23 2217   Reason for Continuing Urinary Catheterization Hospice/Comfort Care/Palliative Care 11/24/23 2217   CAUTI Prevention Bundle Securement Device in place with 1" slack;Sheeting clip in use;Drainage bag/urimeter below bladder;No dependent loops or kinks;Drainage bag/urimeter off the floor;Drainage bag/urimeter not overfilled (<2/3 full);Intact seal between catheter & drainage tubing 11/24/23 2217        Nutrition:  Current Diet Order   Procedures    Diet NPO         Labs/Imaging- All pertinent labs within the past 24 hours have been reviewed.  ABGs:   Recent Labs   Lab 11/24/23 2322 11/24/23 2348   PH 7.450 7.447   PCO2 34.7* 34.1*   HCO3 24.1 23.6*   POCSATURATED 93 98   BE 0 0   PO2 62* 105*     CBC:   Recent Labs   Lab 11/23/23 0311 11/24/23 0139 11/24/23 2322   WBC 8.74 9.04  --    HGB 9.7* 8.8*  --    HCT 30.7* 27.0* 30*   PLT 55* 54*  --      CMP:   Recent Labs   Lab 11/23/23 0311 11/24/23 0139    147*   K 4.4 4.3   * 117*   CO2 21* 22*    97   BUN 27* 34*   CREATININE 0.5 0.6   CALCIUM 8.7 8.3*   PROT 5.3* 5.0*   ALBUMIN 2.1* 2.0*   BILITOT 9.1* 8.7*   ALKPHOS 140* 120   AST 76* 64*   ALT 71* 66*   ANIONGAP 8 8     Cardiac Markers: No results for input(s): "CKMB", "MYOGLOBIN", "BNP", "TROPISTAT" in the last 48 hours.  Coagulation:   Recent Labs   Lab 11/24/23 0139   INR 1.9*     Lactic Acid: No results for input(s): "LACTATE" in the last 48 hours.  Troponin: No results for input(s): "TROPONINI", "TROPONINIHS" in the last 48 hours.    Diagnostics/Interventions during Rapid response "     CXR  ABG  Additional labs    EXAMINATION:  XR CHEST AP PORTABLE     CLINICAL HISTORY:  respiratory distress;     TECHNIQUE:  Single frontal view of the chest was performed.     COMPARISON:  11/24/2023     FINDINGS:  Esophagogastric tube extends below the diaphragm and outside the field of view of the examination.  Monitoring leads overlie the chest.  Cardiac silhouette is stable in size.  Lungs demonstrate diffuse interstitial and patchy airspace opacities suggesting edema, mildly progressed compared to prior examination.  There is suspected layering left-sided pleural fluid.  No evidence of pneumothorax.     Impression:     As above.        Electronically signed by: Nando Barnard MD  Date:                                            11/24/2023  Time:                                           23:43    ASSESSMENT/PLAN:     Active Hospital Problems    Diagnosis    *Seizure    Encounter for feeding tube placement    Anemia    Critical illness myopathy    Goals of care, counseling/discussion    Tracheal stenosis    Acute respiratory failure with hypoxia    Debility    Moderate malnutrition    Acute encephalopathy    Decompensated alcoholic hepatic cirrhosis          Patient has persistent encephalopathy, with prolonged hospitalization has had multiple ET intubations this hospital stay, has critical illness myopathy concern.     She was planned for NG tube placement tomorrow - Nursing notes that NG cannot aspirate residuals and has not been used for any medication administration in the last 24 hours.     Initially had Rapid response team evaluate the patient, requested Racemic EPI nebulizer be given for concern for stridor, patient receiving duonebs also Patient had deep suctioning performed today     Report from RRT that after initial evaluation  - concern that patient may have aspirated,  Nursing notes patient's mood was calm beginning of the shift but has grown more restless/uncomfortable appearing.   Per RRT  Recs - CXR repeat obtained, VBG performed,     Lasix 40mg IV planned and Kang catheter placement,  I was called to bedside because concern patient appearing worse despite Racemic Epinephrine     Kang catheter placed - patient has had >500 cc UOP since lasix given  CXR read demonstrated pulmonary ángel    NG tube has not been used in 24 hours and in discussion with nursing, they have been unable to aspirate residuals, per LDA - this NG tube has been in for 30 days, requested nursing remove.  After removal the bottom 7-10 cm of the tube occlued with what appeared to be blood clot.     After NG removal patient's persistent stridor did immediately decrease to a degree and tachypnea decreased    Hypoglycemia was treated earlier tonight, etiology is due to halting of tube feeds in the last 24 hours.  Added hypoglycemia protocol and POCT GLucose checks [id number].   Neuro Critical care reconsulted - evaluated pt at bedside and discussed with attending, pt would be readmitted if ABG demonstrated hypoxemia.   ABG tonight shows 7.44/34/105/98% on 4L of Oxygen     Give Morphine 2mg IV x 1 now.  PRN order placed for respiratory distress   Due to NG removal - PEG placement likely to be cancelled. Discontinue oral medications. Changed keppra/famotidine/lactulose to non-PO forms.    Schedule lasix x 2 days    Given persistent encephalopathy - inability to take nutrition, multisystem various decompensated diseases (cirrhosis/enecphalopathy/seizure/myopathy/respiratory failure)  Would engage POA/Family in discussion as noted in prior documentation about goals of care.  She now may be unstable for PEG/G-tube placement unless she was to receive General Anesthesia care and if after placement, returning patient to enteral nutrition is not guaranteed to fully reverse her encephalopathy    Would give cautious/low dose trial medications such as morphine or haldol for management of respiratory distress or agitation       Uninterrupted  Critical Care time (not including procedures): 40 min

## 2023-11-25 NOTE — ASSESSMENT & PLAN NOTE
multifactorial in setting of hepatic encephalopathy, infection (E. Coli and candida albicans UTI, Candida PNA) and focal status epilepticus   Continues to be treated with Keppra.  No further epileptiform activity noted.  Has been treated for urinary tract infection and antibiotics completed on 10/30/2023.  Chronic vascular changes noted on MRI.  Continue lactulose and rifaximin for hepatic encephalopathy.  Titrate to 3-4 bowel movements.  She has a NG tube and tube feeds have been intermittently Amaya concerns for respiratory distress.  She is planned for PEG tube.  Goal clarification at attempted with patient's significant other Darron by a neurocritical care and patient is planned for PEG tube placement tomorrow with IR.  She needs placement to skilled nursing facility and case management is helping facilitate.

## 2023-11-26 PROCEDURE — 20600001 HC STEP DOWN PRIVATE ROOM: Mod: NTX

## 2023-11-26 PROCEDURE — 63600175 PHARM REV CODE 636 W HCPCS: Mod: NTX | Performed by: STUDENT IN AN ORGANIZED HEALTH CARE EDUCATION/TRAINING PROGRAM

## 2023-11-26 PROCEDURE — 27000221 HC OXYGEN, UP TO 24 HOURS: Mod: NTX

## 2023-11-26 RX ADMIN — LEVETIRACETAM 500 MG: 500 INJECTION, SOLUTION INTRAVENOUS at 09:11

## 2023-11-26 NOTE — PT/OT/SLP DISCHARGE
Occupational Therapy Discharge Summary    Mara Mojica  MRN: [id number]   Principal Problem: Seizure      Patient Discharged from acute Occupational Therapy on 11/26/2023.  Please refer to prior OT note dated 11/26/23 for functional status.    Assessment:       Patient discharged to hospice care.     Objective:     GOALS:   Multidisciplinary Problems       Occupational Therapy Goals          Problem: Occupational Therapy    Goal Priority Disciplines Outcome Interventions   Occupational Therapy Goal     OT, PT/OT Ongoing, Progressing    Description: Goals to be met by: 12/15/23     Patient will increase functional independence with ADLs by performing:    UE Dressing with Minimal Assistance.  LE Dressing with Moderate Assistance.  Grooming while seated with [place].  Toileting from bedside commode with Minimal Assistance for hygiene and clothing management.   Step transfer with Minimal Assistance  Toilet transfer to bedside commode with Minimal Assistance.                         Reasons for Discontinuation of Therapy Services  Transfer to alternate level of care.      Plan:     Patient Discharged to: Palliative Care/Hospice    11/26/2023

## 2023-11-26 NOTE — NURSING
Assumed care of pt at this time. Appears to be resting comfortably in bed. Continuous supplemental low flow O2 at 8L. RR 5-6. NAD noted or observed. Spouse at the bedside.

## 2023-11-26 NOTE — PROGRESS NOTES
[facility] Medicine  Progress Note    Patient Name: Mara Mojica  MRN: [id number]  Patient Class: IP- Inpatient   Admission Date: 10/25/2023  Length of Stay: 32 days  Attending Physician: Kate Grubbs MD  Primary Care Provider: Osiris Nevarez NP        Subjective:     Principal Problem:Seizure        HPI:  57 yo woman with alcoholic cirrhosis with ascites, s/p Ex Lap with bowel resection for incarcerated hernia, recent SBO with [facility], adm to [facility] on 10/18 with AMS.  Patient found on the floor confused with slurred speech with incontinence and with questionable tongue injury    On presentation /65, HR 95, RR 18, SpO2 96% (RA).  At that time the patient was alert and oriented.  There was left-sided facial weakness.  Otherwise neurologic exam was unremarkable (? )    Labs (10/18) WBC 6.2, Hb 8.5 (BL 10.1), Plts 66, INR 2.0, Na 136, K 4.3, BUN 16, Cr 1.5 (BL 1.0) bilirubin 6.4, AST 96, ALT 47, ammonia 42.  .  Alcohol not tested (<10 on 10/14) UTOX neg U/A WBC 13, bacteria rare. CTH and MRI brain neg for acute changes.    Patient evaluated by Neurology on 10/19.  Per neurology, at that time she was oriented to person place time and situation and attentive to her environment.  She was able to follow one and two-step commands.  Facial features were symmetrical.  Speech marked by dysarthria.  Most answers are yes/no.  Muscle testing of proximal and distal muscles of upper and LE showed diffuse generalized weakness WO focal or lateralizing findings.  No pathologic reflexes were noted.     Labs 10/18 WBC 6.2 > 6.8, Hb 8.5 > 7.9, Plts 66 > 69, INR > 2.2, Na 136 > 142 Cr 1.5 > 0.8, T bili 6.4 > 5.0, AST 96 > 79, ALT 47 > 44, Ammonia 42 > 28  Presently patient is lethargic and responding to questions with one-word answers.      Her AMS is not readily attributed to HE with ammonia now 28 and CVA seems unlikely with neg MRI brain.  An MRI brain with contrast has been scheduled.  An EEG  has not been done so seizures remain in the differential especially given her presentation.  Patient found on the floor with questionable tongue injury and incontinent of urine and feces.  Patient known to be drinking as recently as 2023.  More recently patient has said that she is not drinking.  On admission alcohol was < 10.   A PEth has not been done    Dr Shannan Guerrier) ESTELLA at [facility] consulted with Dr Renteria who recommended transfer to [facility].      Overview/Hospital Course:  Ms. Mojica is a 59 y/o woman w/ PMHx alcoholic decompensated cirrhosis, thrombocytopenia, prior bowel resection for incarcerated hernia, CKD presenting w/ acute encephalopathy, found to be multifactorial in setting of hepatic encephalopathy, infection (E. Coli and candida albicans UTI, Candida PNA) and focal status epilepticus.     Hospital course c/b failed extubation trial 2/2 profound stridor s/p re-intubation and evaluation by ENT w/o clear pathology, pt successfully extubated , decreased mental status and progressive weakness c/w critical illness neuromyopathy.     Transfer to floor on 2023 and brief encounter with minimal participation as patient was nonverbal.  Alert however not oriented.  2 critical events later at night 2023 and early morning 2023 with the night team.  Her NG tube had to be removed with blood clots at its tip also noted to have blood clots the back of her nasopharynx some amenable to suction with oral suctioning. Worsening lactate, respiratory distress needing racemic epinephrine, fluid resuscitation along with diuresis to maintain airway given DNR status.    2023 -Comfort measures initiated     Interval History:     2023-  present at bedside.  Patient remained comfortable overnight and receiving p.r.n. medications for distress.   services were offered.   says that he would not like to get  services however he would like to speak to  someone for cremation.  Notified charge nurse and we will discuss with social Service.  Also we will notify social Service for arrangement for hospice    Objective:     Vital Signs (Most Recent):  Temp: 98.4 °F (36.9 °C) (11/26/23 0955)  Pulse: 77 (11/26/23 0955)  Resp: (!) 8 (11/26/23 0955)  BP: (!) 89/43 (11/26/23 0955)  SpO2: 96 % (11/26/23 0955) Vital Signs (24h Range):  Temp:  [96.2 °F (35.7 °C)-98.4 °F (36.9 °C)] 98.4 °F (36.9 °C)  Pulse:  [56-77] 77  Resp:  [6-8] 8  SpO2:  [96 %-99 %] 96 %  BP: (72-89)/(42-43) 89/43     Weight: 56.2 kg (123 lb 14.4 oz)  Body mass index is 25.02 kg/m².    Intake/Output Summary (Last 24 hours) at 11/26/2023 1652  Last data filed at 11/26/2023 1021  Gross per 24 hour   Intake --   Output 100 ml   Net -100 ml                 Significant Labs: All pertinent labs within the past 24 hours have been reviewed.    Significant Imaging: I have reviewed all pertinent imaging results/findings within the past 24 hours.    Assessment/Plan:      * Seizure  focal status epilepticus   Maintained on Keppra, will not discontinue as can have rebound seizure and continue on comfort measures.       Acute encephalopathy  multifactorial in setting of hepatic encephalopathy, infection (E. Coli and candida albicans UTI, Candida PNA) and focal status epilepticus   Continues to be treated with Keppra.  No further epileptiform activity noted.  Has been treated for urinary tract infection and antibiotics completed on 10/30/2023.  Chronic vascular changes noted on MRI.    11/25/2023 -patient more diaphoretic this morning with inspiratory stridor and crackles.  Rapid response team with attempted measures for continued care. Chest x-ray with patchy bilateral pulmonary opacities and bilateral pleural effusions, similar to mildly worse.  VBG with stable lactate and satisfactory blood gas however Patient with distress with labored breathing and hypotensive.  Detailed conversation with patient's .  He was  in agreement with not to escalate care and that he was on his way. respiratory status some improvement after racemic epinephrine.  Further decline in blood pressure and apneic spells noted at this point  was notified again and after long discussion and understanding of medical condition and prognosis decision to initiate comfort measures.  Med  at bedside upon arrival and  present at bedside agreement to continue comfort measures.    Decompensated alcoholic hepatic cirrhosis  MELD 3.0: 26 at 2023  5:56 AM  MELD-Na: 22 at 2023  5:56 AM  Calculated from:  Serum Creatinine: 0.7 mg/dL (Using min of 1 mg/dL) at 2023  5:56 AM  Serum Sodium: 148 mmol/L (Using max of 137 mmol/L) at 2023  5:56 AM  Total Bilirubin: 9.3 mg/dL at 2023  5:56 AM  Serum Albumin: 2.1 g/dL at 2023  5:56 AM  INR(ratio): 1.9 at 2023  5:56 AM  Age at listing (hypothetical): 58 years  Sex: Female at 2023  5:56 AM    Can consider holding lactulose enemas      Acute hepatic encephalopathy  See encephalopthy      Comfort measures only status    2023-  present at bedside.  Patient remained comfortable overnight and receiving p.r.n. medications for distress.   services were offered.   says that he would not like to get  services however he would like to speak to someone for cremation.  Notified charge nurse and we will discuss with social Service.  Also we will notify social Service for arrangement for hospice    Encounter for feeding tube placement    SLP -> failed formal SLP evaluation, IR consult for PEG tube placement pending.  Scheduled for PEG tube placement tomorrow.    Goals of care, counseling/discussion  2023 -patient more diaphoretic this morning with inspiratory stridor and crackles.  Rapid response team with attempted measures for continued care. Chest x-ray with patchy bilateral pulmonary opacities and bilateral pleural effusions,  similar to mildly worse.  VBG with stable lactate and satisfactory blood gas however Patient with distress with labored breathing and hypotensive.  Detailed conversation with patient's .  He was in agreement with not to escalate care and that he was on his way. respiratory status some improvement after racemic epinephrine.  Further decline in blood pressure and apneic spells noted at this point  was notified again and after long discussion and understanding of medical condition and prognosis decision to initiate comfort measures.  Met  at bedside upon arrival and  present at bedside agreement to continue comfort measures.    Critical care time including coordination with rapid response team, providing respiratory relief, monitoring vitals, coordination with  and in-depth clarification of goals of care and 2 conversations to clarify goals ( see ACP notes) 60 minutes    Critical illness myopathy        Acute respiratory failure with hypoxia  Continues to be on oxygen via nasal cannula.  Received 2 doses of diuretics yesterday.  In light of critical illness and malnutrition.  Needs continued pulmonary toileting        Tracheal stenosis        Debility  -Diffuse weakness with decreased DTRs   -MRI c-spine w/ spondylosis  -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  -continued follow up PT/OT recommendations and disposition to skilled nursing facility.  We will coordinate with case management for disposition planning    Moderate malnutrition  NG tube had to be taken out because of blood clots at the tip she did not receive any nutrition in the past 25 hours except for D50 at 50 mL an hour.    Thrombocytopenia    Suspect related to liver disease  If progresses consider Hematology evaluation but monitoring for now in setting of no active bleeding.       VTE Risk Mitigation (From admission, onward)           Ordered     IP VTE LOW RISK PATIENT  Once         10/25/23 1303     Place JOI hose  Until  discontinued         10/25/23 1303     Place sequential compression device  Until discontinued         10/25/23 1303                    Discharge Planning   ASHELY: 11/28/2023     Code Status: DNR   Is the patient medically ready for discharge?: No    Reason for patient still in hospital (select all that apply): Pending disposition and Other (specify) Comfort measures initiated   Discharge Plan A: Skilled Nursing Facility   Discharge Delays: None known at this time              Kate Grubbs MD  Department of Hospital Medicine   [facility] Trans[facility] Stepdown

## 2023-11-26 NOTE — PLAN OF CARE
Comfort measures in place. Vitals q shift-- hypotension, bradycardia, bradypnea noted. Kang catheter in place. Scheduled IV Keppra administered. Hourly monitoring for agitation/pain/distress--no non-verbal cues of discomfort noted at this time. No PRN medications administered during the shift thus far. Continuous oxygen via low flow aerosol mask.  at bedside, call light within reach. Encouraged to call RN for any questions and concerns, or changes in pt's status. No concerns voiced at this time.

## 2023-11-26 NOTE — ASSESSMENT & PLAN NOTE
focal status epilepticus   Maintained on Keppra, will not discontinue as can have rebound seizure and continue on comfort measures.

## 2023-11-26 NOTE — NURSING
Pt appears to be resting comfortably. Does not arouse to voice or gentle stimulation  Resp even and unlabored on 8L. No PRN meds given this shift. Comfort measures maintained.

## 2023-11-26 NOTE — PROGRESS NOTES
[facility] - Transplant [facility] Medicine  Progress Note    Patient Name: Mara Mojica  MRN: [id number]  Patient Class: IP- Inpatient   Admission Date: 10/25/2023  Length of Stay: 32 days  Attending Physician: Kate Grubbs MD  Primary Care Provider: Osiris Nevarez NP        Subjective:     Principal Problem:Seizure        HPI:  59 yo woman with alcoholic cirrhosis with ascites, s/p Ex Lap with bowel resection for incarcerated hernia, recent SBO with SAMRA, [place] [facility] [place]18 with AMS.  Patient found on the floor confused with slurred speech with incontinence and with questionable tongue injury    On presentation /65, HR 95, RR 18, SpO2 96% (RA).  At that time the patient was alert and oriented.  There was left-sided facial weakness.  Otherwise neurologic exam was unremarkable (? )    Labs (10/18) WBC 6.2, Hb 8.5 (BL 10.1), Plts 66, INR 2.0, Na 136, K 4.3, BUN 16, Cr 1.5 (BL 1.0) bilirubin 6.4, AST 96, ALT 47, ammonia 42.  .  Alcohol not tested (<10 on 10/14) UTOX neg U/A WBC 13, bacteria rare. CTH and MRI brain neg for acute changes.    Patient evaluated by Neurology on 10/19.  Per neurology, at that time she was oriented to person place time and situation and attentive to her environment.  She was able to follow one and two-step commands.  Facial features were symmetrical.  Speech marked by dysarthria.  Most answers are yes/no.  Muscle testing of proximal and distal muscles of upper and LE showed diffuse generalized weakness WO focal or lateralizing findings.  No pathologic reflexes were noted.     Labs 10/18 WBC 6.2 > 6.8, Hb 8.5 > 7.9, Plts 66 > 69, INR > 2.2, Na 136 > 142 Cr 1.5 > 0.8, T bili 6.4 > 5.0, AST 96 > 79, ALT 47 > 44, Ammonia 42 > 28  Presently patient is lethargic and responding to questions with one-word answers.      Her AMS is not readily attributed to HE with ammonia now 28 and CVA seems unlikely with neg MRI brain.  An MRI brain with contrast has been scheduled.  An EEG  has not been done so seizures remain in the differential especially given her presentation.  Patient found on the floor with questionable tongue injury and incontinent of urine and feces.  Patient known to be drinking as recently as July 2023.  More recently patient has said that she is not drinking.  On admission alcohol was < 10.   A PEth has not been done    Dr Shannan SORIA ([place]) at [facility] consulted with Dr Renteria who recommended transfer to [facility].      Overview/Hospital Course:  Ms. Mojica is a 59 y/o woman w/ PMHx alcoholic decompensated cirrhosis, thrombocytopenia, prior bowel resection for incarcerated hernia, CKD presenting w/ acute encephalopathy, found to be multifactorial in setting of hepatic encephalopathy, infection (E. Coli and candida albicans UTI, Candida PNA) and focal status epilepticus.     Hospital course c/b failed extubation trial 2/2 profound stridor s/p re-intubation and evaluation by ENT w/o clear pathology, pt successfully extubated 11/13, decreased mental status and progressive weakness c/w critical illness neuromyopathy.     Transfer to floor on 11/24/2023 and brief encounter with minimal participation as patient was nonverbal.  Alert however not oriented.  2 critical events later at night 11/24/2023 and early morning 11/25/2023 with the night team.  Her NG tube had to be removed with blood clots at its tip also noted to have blood clots the back of her nasopharynx some amenable to suction with oral suctioning. Worsening lactate, respiratory distress needing racemic epinephrine, fluid resuscitation along with diuresis to maintain airway given DNR status.    11/25/2023 -Comfort measures initiated     No new subjective & objective note has been filed under this hospital service since the last note was generated.      Assessment/Plan:      * Seizure  focal status epilepticus   Maintained on Keppra, will not discontinue as can have rebound seizure and continue on comfort  measures.       Acute encephalopathy  Comfort measures initiated     Decompensated alcoholic hepatic cirrhosis  Comfort measures initiated    Acute hepatic encephalopathy  See encephalopthy      Comfort measures only status          - comfort measures initiated     2023-  present at bedside.  Patient remained comfortable overnight and receiving p.r.n. medications for distress.   services were offered.   says that he would not like to get  services however he would like to speak to someone for cremation.  Notified charge nurse and we will discuss with social Service.  Also we will notify social Service for arrangement for hospice        VTE Risk Mitigation (From admission, onward)           Ordered     IP VTE LOW RISK PATIENT  Once         10/25/23 1303     Place JOI hose  Until discontinued         10/25/23 1303     Place sequential compression device  Until discontinued         10/25/23 1303                    Discharge Planning   ASHELY: 2023     Code Status: DNR   Is the patient medically ready for discharge?: No    Reason for patient still in hospital (select all that apply): Pending disposition and Other (specify) comfort measures and hospice referral   Discharge Plan A: Skilled Nursing Facility   Discharge Delays: None known at this time              Kate Grubbs MD  Department of Hospital Medicine   [facility] - Transplant Stepdown

## 2023-11-26 NOTE — SUBJECTIVE & OBJECTIVE
Interval History:     11/27/2023-  present at bedside.  Patient remained comfortable overnight and receiving p.r.n. medications for distress.    would like to speak to someone for cremation.  I informed hospice referral can be placed and they can guide on this, will also discuss with social service and nursing staff. Also we will notify social Service for arrangement for hospice    Objective:     Vital Signs (Most Recent):  Temp: 98.4 °F (36.9 °C) (11/26/23 0955)  Pulse: 77 (11/26/23 0955)  Resp: (!) 8 (11/26/23 0955)  BP: (!) 89/43 (11/26/23 0955)  SpO2: 96 % (11/26/23 0955) Vital Signs (24h Range):  Temp:  [96.2 °F (35.7 °C)-98.4 °F (36.9 °C)] 98.4 °F (36.9 °C)  Pulse:  [56-77] 77  Resp:  [6-8] 8  SpO2:  [96 %-99 %] 96 %  BP: (72-89)/(42-43) 89/43     Weight: 56.2 kg (123 lb 14.4 oz)  Body mass index is 25.02 kg/m².    Intake/Output Summary (Last 24 hours) at 11/26/2023 1652  Last data filed at 11/26/2023 1021  Gross per 24 hour   Intake --   Output 100 ml   Net -100 ml                 Significant Labs: All pertinent labs within the past 24 hours have been reviewed.    Significant Imaging: I have reviewed all pertinent imaging results/findings within the past 24 hours.

## 2023-11-26 NOTE — ASSESSMENT & PLAN NOTE
present at bedside.  Patient remained comfortable overnight and receiving p.r.n. medications for distress.    would like to speak to someone for cremation.  I informed hospice referral can be placed and they can guide on this, will also discuss with social service and nursing staff. Also we will notify social Service for arrangement for hospice

## 2023-11-27 VITALS
HEIGHT: 59 IN | TEMPERATURE: 98 F | WEIGHT: 123.88 LBS | BODY MASS INDEX: 24.97 KG/M2 | RESPIRATION RATE: 20 BRPM | HEART RATE: 82 BPM | DIASTOLIC BLOOD PRESSURE: 34 MMHG | SYSTOLIC BLOOD PRESSURE: 73 MMHG | OXYGEN SATURATION: 94 %

## 2023-11-27 PROBLEM — G72.81 CRITICAL ILLNESS MYOPATHY: Status: RESOLVED | Noted: 2023-11-22 | Resolved: 2023-11-27

## 2023-11-27 PROBLEM — E87.6 HYPOKALEMIA: Status: RESOLVED | Noted: 2023-10-14 | Resolved: 2023-11-27

## 2023-11-27 PROBLEM — R53.81 DEBILITY: Status: RESOLVED | Noted: 2023-10-29 | Resolved: 2023-11-27

## 2023-11-27 PROBLEM — D69.6 THROMBOCYTOPENIA: Status: RESOLVED | Noted: 2023-10-25 | Resolved: 2023-11-27

## 2023-11-27 PROBLEM — Z46.59 ENCOUNTER FOR FEEDING TUBE PLACEMENT: Status: RESOLVED | Noted: 2023-11-24 | Resolved: 2023-11-27

## 2023-11-27 PROBLEM — J39.8 TRACHEAL STENOSIS: Status: RESOLVED | Noted: 2023-10-31 | Resolved: 2023-11-27

## 2023-11-27 PROBLEM — R53.1 GENERALIZED WEAKNESS: Status: RESOLVED | Noted: 2023-10-18 | Resolved: 2023-11-27

## 2023-11-27 PROBLEM — E44.0 MODERATE MALNUTRITION: Status: RESOLVED | Noted: 2023-10-26 | Resolved: 2023-11-27

## 2023-11-27 PROBLEM — D68.9 COAGULOPATHY: Status: RESOLVED | Noted: 2023-10-18 | Resolved: 2023-11-27

## 2023-11-27 PROBLEM — R18.8 OTHER ASCITES: Status: RESOLVED | Noted: 2023-09-06 | Resolved: 2023-11-27

## 2023-11-27 PROBLEM — M62.82 NON-TRAUMATIC RHABDOMYOLYSIS: Status: RESOLVED | Noted: 2023-10-18 | Resolved: 2023-11-27

## 2023-11-27 PROBLEM — R74.01 TRANSAMINITIS: Status: RESOLVED | Noted: 2023-11-22 | Resolved: 2023-11-27

## 2023-11-27 PROCEDURE — 27000221 HC OXYGEN, UP TO 24 HOURS: Mod: NTX

## 2023-11-27 PROCEDURE — 63600175 PHARM REV CODE 636 W HCPCS: Mod: NTX | Performed by: STUDENT IN AN ORGANIZED HEALTH CARE EDUCATION/TRAINING PROGRAM

## 2023-11-27 PROCEDURE — 99900035 HC TECH TIME PER 15 MIN (STAT): Mod: NTX

## 2023-11-27 PROCEDURE — 25000003 PHARM REV CODE 250: Mod: NTX | Performed by: HOSPITALIST

## 2023-11-27 RX ORDER — LEVETIRACETAM 500 MG/5ML
500 INJECTION, SOLUTION, CONCENTRATE INTRAVENOUS EVERY 12 HOURS
Start: 2023-11-27

## 2023-11-27 RX ORDER — SCOLOPAMINE TRANSDERMAL SYSTEM 1 MG/1
1 PATCH, EXTENDED RELEASE TRANSDERMAL
Status: DISCONTINUED | OUTPATIENT
Start: 2023-11-27 | End: 2023-11-27 | Stop reason: HOSPADM

## 2023-11-27 RX ORDER — SCOLOPAMINE TRANSDERMAL SYSTEM 1 MG/1
1 PATCH, EXTENDED RELEASE TRANSDERMAL
Start: 2023-11-30

## 2023-11-27 RX ADMIN — SCOPALAMINE 1 PATCH: 1 PATCH, EXTENDED RELEASE TRANSDERMAL at 06:11

## 2023-11-27 RX ADMIN — MORPHINE SULFATE 2 MG: 2 INJECTION, SOLUTION INTRAMUSCULAR; INTRAVENOUS at 12:11

## 2023-11-27 RX ADMIN — LEVETIRACETAM 500 MG: 500 INJECTION, SOLUTION INTRAVENOUS at 08:11

## 2023-11-27 NOTE — PROGRESS NOTES
[facility] - Transplant [facility] Medicine  Progress Note    Patient Name: Mara Mojica  MRN: [id number]  Patient Class: IP- Inpatient   Admission Date: 10/25/2023  Length of Stay: 33 days  Attending Physician: Kate Grubbs MD  Primary Care Provider: Osiris Nevarez NP        Subjective:     Principal Problem:Seizure        HPI:  57 yo woman with alcoholic cirrhosis with ascites, s/p Ex Lap with bowel resection for incarcerated hernia, recent SBO with SAMRA, adm to [facility] on 10/18 with AMS.  Patient found on the floor confused with slurred speech with incontinence and with questionable tongue injury    On presentation /65, HR 95, RR 18, SpO2 96% (RA).  At that time the patient was alert and oriented.  There was left-sided facial weakness.  Otherwise neurologic exam was unremarkable (? )    Labs (10/18) WBC 6.2, Hb 8.5 (BL 10.1), Plts 66, INR 2.0, Na 136, K 4.3, BUN 16, Cr 1.5 (BL 1.0) bilirubin 6.4, AST 96, ALT 47, ammonia 42.  .  Alcohol not tested (<10 on 10/14) UTOX neg U/A WBC 13, bacteria rare. CTH and MRI brain neg for acute changes.    Patient evaluated by Neurology on 10/19.  Per neurology, at that time she was oriented to person place time and situation and attentive to her environment.  She was able to follow one and two-step commands.  Facial features were symmetrical.  Speech marked by dysarthria.  Most answers are yes/no.  Muscle testing of proximal and distal muscles of upper and LE showed diffuse generalized weakness WO focal or lateralizing findings.  No pathologic reflexes were noted.     Labs 10/18 WBC 6.2 > 6.8, Hb 8.5 > 7.9, Plts 66 > 69, INR > 2.2, Na 136 > 142 Cr 1.5 > 0.8, T bili 6.4 > 5.0, AST 96 > 79, ALT 47 > 44, Ammonia 42 > 28  Presently patient is lethargic and responding to questions with one-word answers.      Her AMS is not readily attributed to HE with ammonia now 28 and CVA seems unlikely with neg MRI brain.  An MRI brain with contrast has been scheduled.  An EEG  has not been done so seizures remain in the differential especially given her presentation.  Patient found on the floor with questionable tongue injury and incontinent of urine and feces.  Patient known to be drinking as recently as July 2023.  More recently patient has said that she is not drinking.  On admission alcohol was < 10.   A PEth has not been done    Dr Shannan Guerrier) ESTE[place] at [facility] consulted with Dr Renteria who recommended transfer to [facility].      Overview/Hospital Course:  Ms. Mojica is a 57 y/o woman w/ PMHx alcoholic decompensated cirrhosis, thrombocytopenia, prior bowel resection for incarcerated hernia, CKD presenting w/ acute encephalopathy, found to be multifactorial in setting of hepatic encephalopathy, infection (E. Coli and candida albicans UTI, Candida PNA) and focal status epilepticus.     Hospital course c/b failed extubation trial 2/2 profound stridor s/p re-intubation and evaluation by ENT w/o clear pathology, pt successfully extubated 11/13, decreased mental status and progressive weakness c/w critical illness neuromyopathy.     Transfer to floor on 11/24/2023 and brief encounter with minimal participation as patient was nonverbal.  Alert however not oriented.  2 critical events later at night 11/24/2023 and early morning 11/25/2023 with the night team.  Her NG tube had to be removed with blood clots at its tip also noted to have blood clots the back of her nasopharynx some amenable to suction with oral suctioning. Worsening lactate, respiratory distress needing racemic epinephrine, fluid resuscitation along with diuresis to maintain airway given DNR status.    11/25/2023 -Comfort measures initiated     Interval History:     11/27/2023-  present at bedside.  Patient remained comfortable overnight and receiving p.r.n. medications for distress.    would like to speak to someone for cremation.  I informed hospice referral can be placed and they can guide on this, will  also discuss with social service and nursing staff. Also we will notify social Service for arrangement for hospice    Objective:     Vital Signs (Most Recent):  Temp: 98.4 °F (36.9 °C) (11/26/23 0955)  Pulse: 77 (11/26/23 0955)  Resp: (!) 8 (11/26/23 0955)  BP: (!) 89/43 (11/26/23 0955)  SpO2: 96 % (11/26/23 0955) Vital Signs (24h Range):  Temp:  [96.2 °F (35.7 °C)-98.4 °F (36.9 °C)] 98.4 °F (36.9 °C)  Pulse:  [56-77] 77  Resp:  [6-8] 8  SpO2:  [96 %-99 %] 96 %  BP: (72-89)/(42-43) 89/43     Weight: 56.2 kg (123 lb 14.4 oz)  Body mass index is 25.02 kg/m².    Intake/Output Summary (Last 24 hours) at 11/26/2023 1652  Last data filed at 11/26/2023 1021  Gross per 24 hour   Intake --   Output 100 ml   Net -100 ml                 Significant Labs: All pertinent labs within the past 24 hours have been reviewed.    Significant Imaging: I have reviewed all pertinent imaging results/findings within the past 24 hours.    Assessment/Plan:      * Seizure  focal status epilepticus   Maintained on Keppra, will not discontinue as can have rebound seizure and continue on comfort measures.       Acute encephalopathy  Comfort measures initiated     Decompensated alcoholic hepatic cirrhosis  Comfort measures initiated    Acute hepatic encephalopathy  See encephalopthy      Comfort measures only status     present at bedside.  Patient remained comfortable overnight and receiving p.r.n. medications for distress.    would like to speak to someone for cremation.  I informed hospice referral can be placed and they can guide on this, will also discuss with social service and nursing staff. Also we will notify social Service for arrangement for hospice    Goals of care, counseling/discussion  11/25 - comfort measures initiated.     Anemia  .    Acute respiratory failure with hypoxia  Continues to be on oxygen via nasal cannula.  Received 2 doses of diuretics yesterday.  In light of critical illness and malnutrition.  Needs  continued pulmonary toileting          VTE Risk Mitigation (From admission, onward)           Ordered     IP VTE LOW RISK PATIENT  Once         10/25/23 1303     Place JOI hose  Until discontinued         10/25/23 1303     Place sequential compression device  Until discontinued         10/25/23 1303                    Discharge Planning   ASHELY: 11/28/2023     Code Status: DNR   Is the patient medically ready for discharge?: No    Reason for patient still in hospital (select all that apply): Pending disposition,  Hospice referral placed  Discharge Plan A: Skilled Nursing Facility   Discharge Delays: None known at this time              Kate Grubbs MD  Department of Hospital Medicine   [facility] - Transplant Stepdown

## 2023-11-27 NOTE — PROGRESS NOTES
RD f/u. Per chart review, pt transitioned to comfort measures only. No nutrition assessment/intervention needed. Please consult if needed.

## 2023-11-27 NOTE — PLAN OF CARE
[facility]  Department of Hospital Medicine  [address] [place] [address]  [phone] after hours  [phone] fax    HOSPICE  ORDERS    11/27/2023    Admit to Hospice:  Inpatient Service  Diagnoses:   Active Hospital Problems    Diagnosis  POA    *Seizure [R56.9]  Yes    Acute encephalopathy [G93.40]  Yes     Priority: 1 - High    Decompensated alcoholic hepatic cirrhosis [K72.90, K74.60]  Yes     Priority: 2     Comfort measures only status [Z51.5]  Not Applicable    Goals of care, counseling/discussion [Z71.89]  Not Applicable      Resolved Hospital Problems    Diagnosis Date Resolved POA    Oral mucosal lesion [K13.70] 11/14/2023 No    Sepsis [A41.9] 10/31/2023 Yes    Hypernatremia [E87.0] 10/31/2023 Yes       Hospice Qualifying Diagnoses:        Patient has a life expectancy < 6 months due to:  Primary Hospice Diagnosis:  Decompensated alcoholic Cirrhosis with encephalopathy  Comorbid Conditions Contributing to Decline:  Dysphagia, failure to thrive, critical illness myopathy, thrombocytopenia, prior bowel resection for incarcerated hernnia, CKD, focal status epileticus, tracheal stenosis    Vital Signs: Routine per Hospice Protocol.    Code Status: DNR    Allergies: Review of patient's allergies indicates:  No Known Allergies    Diet:  NPO except sips and meds.      Activities: As tolerated    Goals of Care Treatment Preferences:    Code Status: DNR          What is most important right now is to focus on comfort and QOL .  Accordingly, we have decided that the best plan to meet the patient's goals includes pivot to comfort-focused care.      Nursing: Per Hospice Routine  Kang Care: Empty Kang bag Q shift and PRN.  Change Kang every month.    Routine Skin for Bedridden Patients: Apply moisture barrier cream to all skin folds and   wet areas in perineal area daily and after baths and all bowel movements.    Oxygen: 8L  Medications:      Medication List         START taking these medications      levETIRAcetam 500 mg/5 mL injection  Commonly known as: Keppra  Inject 5 mLs (500 mg total) into the vein every 12 (twelve) hours.     scopolamine 1.3-1.5 mg (1 mg over 3 days)  Commonly known as: TRANSDERM-SCOP  Place 1 patch onto the skin Every 3 (three) days.  Start taking on: November 30, 2023            STOP taking these medications      furosemide 40 MG tablet  Commonly known as: LASIX     lactulose 20 gram/30 mL Soln  Commonly known as: CHRONULAC     potassium chloride SA 10 MEQ tablet  Commonly known as: K-DUR,KLOR-CON M     propranoloL 10 MG tablet  Commonly known as: INDERAL     spironolactone 100 MG tablet  Commonly known as: ALDACTONE              Future Orders:  Hospice Medical Director may dictate new orders for comfortable care measures & sign death certificate.        _________________________________  Kate Grubbs MD  11/27/2023

## 2023-11-27 NOTE — PLAN OF CARE
ETA ambulance pickup, 5:20 PM. Bedside nurse can call report to [facility] at [phone]. Patient is cleared to discharge from case management standpoint.      Angie Fields RN  Weekend  - [facility]  Spectralink: [phone]

## 2023-11-27 NOTE — CHAPLAIN
Chp responded to spiritual care consult via [employer] to answer questions about post-mortem process (aka next steps). Pt not able to engage in conversation, but appeared to be resting comfortably. Pts friend by bedside and engaged chp in conversation and prayer. She asked chp to pray for peace and acceptance, she also described decades-long friendship with pt and shared memories with chp. She noted that even when pt lived in [place], we would have girls night every Friday and talk on the phone. She shared her frustrations with pts current partner (whom she referred to as pts ); she said he has not been by to visit pt very often which she believes to be partly to blame for pts sudden decline. She appears to be experiencing grief, which regularly includes emotions such as sadness, frustration, and anger. Chp attempted to accompany pts friend on grief journey by validating anger, providing Biblical examples of grief/anger, encouraging story-telling, and encouraging self-care. Chp available to talk with legal next of kin about post-mortem decisions when appropriate. Chp will continue to follow.    Lorna Sandoval (she/her),  x [phone]  24 hour on-call  x [phone]

## 2023-11-27 NOTE — PT/OT/SLP PROGRESS
Speech Language Pathology  Discharge      Mara Mojica  MRN: [id number]    Patient transitioning to hospice care. ST to discharge at this time.

## 2023-11-27 NOTE — CARE UPDATE
"RAPID RESPONSE NURSE CHART REVIEW        Chart Reviewed: 11/27/2023, 7:18 AM    MRN: [id number]  Bed: 34697/07938 A    Dx: Seizure    Mara Mojica has a past medical history of Alcoholic cirrhosis of liver, Kidney failure, and Unilateral inguinal hernia, without obstruction or gangrene, not specified as recurrent.    Last VS: BP (!) 96/45 (BP Location: Left arm, Patient Position: Lying)   Pulse 76   Temp 98.9 °F (37.2 °C) (Axillary)   Resp (!) 23   Ht 4' 11" (1.499 m)   Wt 56.2 kg (123 lb 14.4 oz)   LMP  (LMP Unknown)   SpO2 (!) 88%   BMI 25.02 kg/m²     24H Vital Sign Range:  Temp:  [98.4 °F (36.9 °C)-98.9 °F (37.2 °C)]   Pulse:  [76-77]   Resp:  [8-23]   BP: (89-96)/(43-45)   SpO2:  [88 %-96 %]     Level of Consciousness (AVPU): unresponsive    Recent Labs     11/24/23  2350 11/25/23  0556 11/25/23  0900   WBC 10.63 8.44  --    HGB 9.6* 8.6*  --    HCT 28.9* 26.1* 29*   PLT 71* 63*  --        Recent Labs     11/24/23  2350 11/25/23  0556   * 148*   K 4.1 3.9   * 116*   CO2 21* 23   BUN 43* 40*   CREATININE 0.6 0.7   GLU 90 97   PHOS  --  4.6*   MG  --  1.8        Recent Labs     11/24/23  2322 11/24/23  2348 11/25/23  0900   PH 7.450 7.447 7.416   PCO2 34.7* 34.1* 36.1   PO2 62* 105* 61*   HCO3 24.1 23.6* 23.2*   POCSATURATED 93 98 91   BE 0 0 -1        OXYGEN:  Flow (L/min): 8  Oxygen Concentration (%): 35     MEWS score: 6    Charge RNBetzy  contacted for decreased oxygen saturation and hypotension. Reports patient will transitioned to inpatient hospice today and comfort measures are in place. No additional concerns verbalized at this time. Instructed to call [phone] for further concerns or assistance.    Re Piña RN        "

## 2023-11-27 NOTE — PLAN OF CARE
Comfort measures in place. Plan for transition to inpatient hospice. Vitals q shift-- tachypnea and hypoxemia noted.     Kang catheter in place. Scheduled IV Keppra administered. Hourly monitoring for agitation/pain/distress. Guppy breathing, tachypnea, and more frequent minor movements observed by RN and ; 2mg PRN morphine administered to reduce discomfort and air hunger.     Breath sounds with crackles in all fields. Pt not able to effectively clear airway. PRN breathing treatments ordered but not indicated at this time. Continuous oxygen via low flow aerosol mask.  at bedside, call light within reach. Multiple conversations between RN and pt's , Mack, regarding pt's status and plan of care. RN actively involved Mack in pt's plan of care. Mack is attentive to pt and demonstrates eagerness to learn more about hospice. RN encouraged Mack to call for any needs/concerns, or changes in pt's status. No concerns voiced at this time.

## 2023-11-27 NOTE — PLAN OF CARE
[facility] - Transplant Stepdown  Discharge Final Note    Primary Care Provider: Osiris Nevarez NP    Expected Discharge Date: 11/27/2023    Final Discharge Note (most recent)       Final Note - 11/27/23 1611          Final Note    Assessment Type Final Discharge Note     Anticipated Discharge Disposition Hospice/Medical Facility        Post-Acute Status    Post-Acute Authorization Hospice     Hospice Status Set-up Complete/Auth obtained     Coverage Medicaid - [place] [facility]     Discharge Delays Ambulance Transport/Facility Transport                 Angie Fields RN  Weekend  - [facility]  Spectralink: [phone]

## 2023-11-27 NOTE — PLAN OF CARE
Update at 3:55 PM  Emailed signed CTI and consents to [facility].     Update at 3:11 PM  CM arranged stretcher transport with oxygen via [facility]. Requested pickup time 4:15 PM. Requested  time does not guarantee arrival time. Complete ambulance pack and deliver to unit secretary prior to transport.    Update at 2:55 PM  CTI and consents received from accepting facility via email. Will have physician complete CTI and deliver consents to patient's family for completion.    Update at 2:15 PM  Hospice orders uploaded into [facility], sent to accepting facility.    Update at 12:00 NN  Met with Darron Kelly, patient's significant other, to review discharge recommendation of inpatient hospice and is agreeable to plan.    Patient/family provided list of facilities in-network with patient's payor plan. Providers that are owned, operated, or affiliated with [facility] are included on the list.     Notified that referral sent to below listed facilities from in-network list based on proximity to home/family support:   [facility], [place]   2.   [facility], [place]  3.   [facility] [place] [place]    Patient/family instructed to identify preference.    Preferred Facility: First accepting in-network facility.  [facility].    If an additional preferred facility not listed above is identified, additional referral to be sent. If above facilities unable to accept, will send additional referrals to in-network providers.        Update at 11:58 AM  Received a call from Latia Kirkpatrick (ph: [phone]) at [facility]. Accepted patient and bed available. CM will speak with family and call facility back.    Update at 11:35 AM  Generated facility list for family. To patient's room to discuss options and obtain choice facility.    Update at 11:15 AM  No response from hospice referral. Searched internet for additional  inpatient facilities. Additional Careport referrals sent to [facility] (ph: [phone]) and [facility] [place] (ph: [phone]).       Team update, patient ready to discharge to inpatient hospice facility. CM sent Careport referral to [facility] in [place], [place] (ph: [phone]). Facesheet, recent progress note and H&P sent. Orders pending completion, forward when available. Will follow-up on acceptance.      Angie Fields RN  Weekend  - [facility]  Spectralink: [phone]

## 2023-11-28 NOTE — PLAN OF CARE
Problem: Adult Inpatient Plan of Care  Goal: Plan of Care Review  Outcome: Met  Goal: Patient-Specific Goal (Individualized)  Outcome: Met  Goal: Absence of Hospital-Acquired Illness or Injury  Outcome: Met  Goal: Optimal Comfort and Wellbeing  Outcome: Met  Goal: Readiness for Transition of Care  Outcome: Met     Problem: Infection  Goal: Absence of Infection Signs and Symptoms  Outcome: Met     Problem: Skin Injury Risk Increased  Goal: Skin Health and Integrity  Outcome: Met     Problem: SLP  Goal: SLP Goal  Description: Speech Language Pathology Goals  Goals expected to be met by 11/30/23    1. Pt will participate in ongoing assessment of swallow function to determine safest, least restrictive means of nutrition/hydration  2. Educate Pt and family on aspiration precautions and SLP POC    Outcome: Met     Problem: Adjustment to Illness (Sepsis/Septic Shock)  Goal: Optimal Coping  Outcome: Met     Problem: Bleeding (Sepsis/Septic Shock)  Goal: Absence of Bleeding  Outcome: Met     Problem: Glycemic Control Impaired (Sepsis/Septic Shock)  Goal: Blood Glucose Level Within Desired Range  Outcome: Met     Problem: Infection Progression (Sepsis/Septic Shock)  Goal: Absence of Infection Signs and Symptoms  Outcome: Met     Problem: Nutrition Impaired (Sepsis/Septic Shock)  Goal: Optimal Nutrition Intake  Outcome: Met     Problem: Fall Injury Risk  Goal: Absence of Fall and Fall-Related Injury  Outcome: Met     Problem: Impaired Wound Healing  Goal: Optimal Wound Healing  Outcome: Met     Problem: Occupational Therapy  Goal: Occupational Therapy Goal  Description: Goals to be met by: 12/15/23     Patient will increase functional independence with ADLs by performing:    UE Dressing with Minimal Assistance.  LE Dressing with Moderate Assistance.  Grooming while seated with [place].  Toileting from bedside commode with Minimal Assistance for hygiene and clothing management.   Step transfer with Minimal  Assistance  Toilet transfer to bedside commode with Minimal Assistance.    Outcome: Met     Problem: Physical Therapy  Goal: Physical Therapy Goal  Description: Goals to be completed by: 12/15/23    Pt will perform sup<>sit transfers w/ moderate assistance  Pt will have sufficient dynamic balance to sit EOB while performing ADLs/therex w/ moderate assistance  Pt will be able to stand up from EOB w/ moderate assistance using LRAD  Pt will ambulate 10 feet w/ moderate assistance using LRAD  Pt will be independent w/ HEP therex on BLE w/ good form and ROM   Pt will follow >50 % of single-step commands throughout treatment session   Outcome: Met     Problem: Adjustment to Illness (Liver Failure)  Goal: Optimal Coping with Liver Failure  Outcome: Met     Problem: Fluid and Electrolyte Imbalance (Liver Failure)  Goal: Fluid and Electrolyte Balance  Outcome: Met     Problem: Gastrointestinal Complications (Liver Failure)  Goal: Optimal Gastrointestinal Function  Outcome: Met     Problem: Impaired Coagulation (Liver Failure)  Goal: Optimal Coagulation Function  Outcome: Met     Problem: Infection (Liver Failure)  Goal: Absence of Infection Signs and Symptoms  Outcome: Met     Problem: Neurologic Function Impaired (Liver Failure)  Goal: Optimal Neurologic Function  Outcome: Met     Problem: Oral Intake Inadequate (Liver Failure)  Goal: Improved Oral Intake  Outcome: Met     Problem: Pain (Liver Failure)  Goal: Optimal Pain Control  Outcome: Met     Problem: Renal Dysfunction (Liver Failure)  Goal: Optimize Renal Function  Outcome: Met     Problem: Respiratory Compromise (Liver Failure)  Goal: Effective Oxygenation and Ventilation  Outcome: Met     Problem: Coping Ineffective  Goal: Effective Coping  Outcome: Met     Problem: Palliative Care  Goal: Enhanced Quality of Life  Outcome: Met

## 2023-11-28 NOTE — NURSING
Patient discharged to [facility] as ordered. Left via [facility] ambulance transport.  Placed on NRB prior to exiting due to desaturation when repositioning.  Patient's SpO2 then improved to 99%.  Otherwise patient appeared comfortable.  FLORINDA Giron to follow in separate vehicle & is in possession of patient's personal belongings.

## 2023-11-28 NOTE — DISCHARGE SUMMARY
[facility] - Transplant [facility] Medicine  Discharge Summary      Patient Name: Mara Mojica  MRN: [id number]  EZEKIEL: [id number]  Patient Class: IP- Inpatient  Admission Date: 10/25/2023  Hospital Length of Stay: 33 days  Discharge Date and Time: 11/27/2023  6:10 PM  Attending Physician: No att. providers found   Discharging Provider: Kate Grubbs MD  Primary Care Provider: Osiris Nevarez NP  Hospital Medicine Team: [facility] Kate Grubbs MD  Primary Care Team: [facility]     HPI:   59 yo woman with alcoholic cirrhosis with ascites, s/p Ex Lap with bowel resection for incarcerated hernia, recent SBO with SAMRA, adm to [facility] on 10/18 with AMS.  Patient found on the floor confused with slurred speech with incontinence and with questionable tongue injury    On presentation /65, HR 95, RR 18, SpO2 96% (RA).  At that time the patient was alert and oriented.  There was left-sided facial weakness.  Otherwise neurologic exam was unremarkable (? )    Labs (10/18) WBC 6.2, Hb 8.5 (BL 10.1), Plts 66, INR 2.0, Na 136, K 4.3, BUN 16, Cr 1.5 (BL 1.0) bilirubin 6.4, AST 96, ALT 47, ammonia 42.  .  Alcohol not tested (<10 on 10/14) UTOX neg U/A WBC 13, bacteria rare. CTH and MRI brain neg for acute changes.    Patient evaluated by Neurology on 10/19.  Per neurology, at that time she was oriented to person place time and situation and attentive to her environment.  She was able to follow one and two-step commands.  Facial features were symmetrical.  Speech marked by dysarthria.  Most answers are yes/no.  Muscle testing of proximal and distal muscles of upper and LE showed diffuse generalized weakness WO focal or lateralizing findings.  No pathologic reflexes were noted.     Labs 10/18 WBC 6.2 > 6.8, Hb 8.5 > 7.9, Plts 66 > 69, INR > 2.2, Na 136 > 142 Cr 1.5 > 0.8, T bili 6.4 > 5.0, AST 96 > 79, ALT 47 > 44, Ammonia 42 > 28  Presently patient is lethargic and responding to questions with one-word answers.       Her AMS is not readily attributed to HE with ammonia now 28 and CVA seems unlikely with neg MRI brain.  An MRI brain with contrast has been scheduled.  An EEG has not been done so seizures remain in the differential especially given her presentation.  Patient found on the floor with questionable tongue injury and incontinent of urine and feces.  Patient known to be drinking as recently as July 2023.  More recently patient has said that she is not drinking.  On admission alcohol was < 10.   A PEth has not been done    Dr Shannan SORIA ([place]) at [facility] consulted with Dr Renteria who recommended transfer to [facility].      * No surgery found *      Hospital Course:   57 y/o female with PMHx of alcoholic cirrhosis, s/p ex-lap w/ bowel resection for incarcerated hernia, who was initially admitted on 10/18 to [facility] with acute encephalopathy.   found patient down on the floor at home with evidence of incontinence of stool and urine. Also noted confusion and slurred speech at that time. Appears to have had some concern for L sided weakness and down drift of L arm however CT head without evidence of acute abnormalities, MRI brain with only small vessel vascular changes without evidence of territorial infarct.     Since then, had short term video EEG done on 10/19 with mild diffuse nonspecific background slowing, no potential epileptiform activity. Repeat MRI brain with contrast on 10/23 unchanged from initial MRI. Became more lethargic and was no longer following commands or answering questions. Due to worsening hepatic encephalopathy in setting of hepatic cirrhosis, patient transferred to [facility] 10/25 for management with transplant team. Has remained on antibiotics, lactulose and rifaximin for treatment of UTI, HE.     Neurology consulted for management recommendations regarding persistent AMS. Had repeat EEG done on 10/24 with similar findings to prior EEG showing mild generalized non-specific cerebral  dysfunction and no electrographic seizures or indication of seizure tendency. Additional CT head w/o contrast on 10/25 without evidence of acute issues. Remained confused and unable to answer questions on exam. Not withdrawing to painful stimuli. She was started on Keppra for subclinical seizures seen on EEG. LP was deferred due to EEG findings. Ucx with pan sensitive e coli and being treated with ceftriaxone for this and SBP ppx as well. She was also treated with lactulose and rifaximin. Inpatient liver transplant evaluation was considered however deferred due to progressive mental status decline and was transferred to ICU on 10/27.       10/28/2023: Improving GCS from 6 to 10. Continuing pulmonary toilet and deep suctioning for stridor and copious secretions. UA positive for candida. Blood cultures from 10/27 NGTD. Sputum cultures sent. Patient on day 2 of broadened antibiotics vanc/zosyn due to worsening clinical status but will discontinue tomorrow if cultures remain negative. Still hypernatremic, treating with D5W. Patient was transferred with no ordered diuretics, very edematous on physical exam. Adequate stooling on lactulose and rifaximin. Due to increased UOP/stooling will place eckert for one day for irritated skin. EEG update showing no seizures since 10am 10/27. Monitoring CBC for thrombocytopenia and macrocytic anemia. Discussed code status and goals of care with  and best friend at bedside. Trickle feeds resumed.   10/29/2023: No acute events overnight, EEG reportedly without further seizures for ~48 hours can disconnect once formal report is in, neuro status slowly improving as patient now tracking in room, moving extremities, responding with appropriate emotional reactions to her .  Respiratory status largely unchanged with intermittent events of large volume breaths that sound almost stridorous but without actual respiratory distress- gas remains compensated.  UOP low, diuresis  resumed.  10/30/2023: d/c mucomyst nebs, add racemic epi scheduled nebs, add budesonide and dex 6 q8 hours, ammonia at 35- continue lactulose and rifaximin, abg reviewed, 40mEq of K once, ENT consulted for stridor, continue eckert catheter in today   10/31/2023 Patient with worsening respiratory status, continued decreased mentation and increased secretions. Plan for elective intubation in controlled setting with anesthesia. Will also recheck eeg, if negative will start to wean AEDs and see if that improves patient's arousal. PLT stabilized, continue to monitor.   11/1/2023 this morning patient's 6.0 ETT exchanged for 7.0 to allow suctioning. EEG with frequent discharges, lowered vimpat to 50 mg and will follow EEG. Attempting to remove confounding factors for patients mental status. Slow drop in H/H, 1 unit ordered.   11/2/2023 NAEO, cEEG to remain in place, no seizures but is having frequent discharges in runs. Continue lower dose vimpat and 1500 keppra Eye opening this morning which is slight improvement in exam. Failed SBT   11/03/2023: no improvement with decrease in lacosamide, no re-development of seizures, if does not wake up in next 48 hrs off lacosamide or redevelops seizures, prognosis is extremely poor  11/04/2023: improved mental state this am, no seizures overnight  11/05/2023: LOC stable, has cuff leak, trial of extubation  11/06/2023 reintubated for stridor non responsive to med management overnight  11/07/2023 CTH stable. Off of levo. Completed dex (wbc 22, afebrile). CXR w L mildly increasing consolidation. Very thick secretions, added mucomyst and duonebs. Weaning keppra to 750. Pt appearing more edematous today, dc LR. Scheduled tylenol max 2g/d. Scheduled oxy 5q12.  11/08/2023: EEG pending  11/09/2023: EEG slowing  11/10/2023: more alert today  11/11/2023 pressure support trial  11/12/2023 Alert today. Not following commands. On spontaneous overnight, tolerated well. MRI c-spine overnight with  spondylosis. No LP at this time. Resume TF. Hold TF tomorrow at 5AM for possible extubation tomorrow.   11/13/2023: patient made DNR, 10 dexamethsone IV one prior extubation with no intent of re-intubation, PRN racemic epi and Bipap were initiated  11/14/2023: Patient tolerated BiPAP overnight and continues to be more alert, and is tracking faces more consistently. She was taken off BiPAP and placed on 4L oxygen via nasal cannula. Trickle feeds were started while off BiPAP. Significant other at bedside updated on clinical course. Midline placed overnight.   11/15/2023: Thick secretions noted when BiPAP removed this morning, starting CPT. PT/OT consulted with improving mental and respiratory status. Patient more sad and uncomfortable today.   11/16/2023: Advancing tube feeds to goal and encouraging continued work with PT/OT. She has been tolerating nasal cannula oxygenation well and has had a reduction in her secretions.   11/17/2023: Increased secretions overnight and this morning. Continues to work with PT/OT and has increased movement in her head and neck. Tube feed goal adjusted per dietician recommendations. Starting to plan for disposition. Considering ACP and Palliative care talks with family this weekend.   11/18/2023: NAEON. 50g albumin and 40mg lasix given for diuresis.   11/19/2023: H&H dropped overnight, received 1 unit [facility]. Otherwise, NAEON. Stable for transfer to floor with .   11/20/2023 hold transfer to  2/2 respiratory status   11/21/23: GOC tomorrow  11/22/23: GOC today  11/23/2023 Increased work of breathing this AM. CXR w/ volume overload, lasix given. IR consulted for PEG placement.    Transfer to floor on 11/24/2023 and brief encounter with minimal participation as patient was nonverbal.  Alert however not oriented.  2 critical events later at night 11/24/2023 and early morning 11/25/2023 with the night team.  Her NG tube had to be removed with blood clots at its tip also noted to have  blood clots the back of her nasopharynx some amenable to suction with oral suctioning. Worsening lactate, respiratory distress needing racemic epinephrine, fluid resuscitation along with diuresis to maintain airway given DNR status.    Her acute encephalopathy thought to be multifactorial in setting of hepatic encephalopathy, infection (E. Coli and candida albicans UTI, Candida PNA) and focal status epilepticus. Hospital course also complicated by failed extubation trial 2/2 profound stridor s/p re-intubation and evaluation by ENT w/o clear pathology, eventually successfully extubated 11/13. Continued failure to thrive thereafter. Also with critical illness neuromyopathy    11/25/2023 -Comfort measures initiated   11/26/2023 - maintained comfort measures  11/27/2023 - hopice referral and discharge to hospice.     Goals of Care Treatment Preferences:  Code Status: DNR          What is most important right now is to focus on comfort and QOL .  Accordingly, we have decided that the best plan to meet the patient's goals includes pivot to comfort-focused care.      Consults:   Consults (From admission, onward)          Status Ordering Provider     Inpatient consult to Hospice  Once        Provider:  (Not yet assigned)    Acknowledged JULIANNE, VIVIAN     Inpatient consult to Spiritual Care  Once        Provider:  (Not yet assigned)    Completed JULIANNE, ADIL     Inpatient consult to Palliative Care  Once        Provider:  (Not yet assigned)    Completed JULIANNE, ADIL     Inpatient consult to Interventional Radiology  Once        Provider:  (Not yet assigned)    Completed OMID MAHER     Inpatient consult to Midline team  Once        Provider:  (Not yet assigned)    Completed RENUKA SON     Inpatient consult to Midline team  Once        Provider:  (Not yet assigned)    Completed VIVIAN JOHNSON     Inpatient consult to Skin Integrity  Practitioner  Once        Provider:  (Not yet assigned)    Completed JOHN  LUAN HENDERSON     Inpatient consult to Midline team  Once        Provider:  (Not yet assigned)    Completed EOBNY BYNUM     Inpatient consult to ENT  Once        Provider:  (Not yet assigned)    Completed MIINEA EBONY     Inpatient consult to Neuro Critical Care  Once        Provider:  (Not yet assigned)    Completed JULIANNE, VIVAIN     Inpatient consult to Infectious Diseases  Once        Provider:  (Not yet assigned)    Completed JULIANNE, ADIL     Inpatient consult to Registered Dietitian/Nutritionist  Once        Provider:  (Not yet assigned)    Completed LEONIDES LEIJA     Inpatient consult to Hepatology  Once        Provider:  (Not yet assigned)    Completed LEONIDES LEIJA     Inpatient consult to Neurology  Once        Provider:  (Not yet assigned)    Completed LEONIDES LEIJA            No new Assessment & Plan notes have been filed under this hospital service since the last note was generated.  Service: Hospital Medicine    Final Active Diagnoses:    Diagnosis Date Noted POA    PRINCIPAL PROBLEM:  Seizure [R56.9] 10/27/2023 Yes    Acute encephalopathy [G93.40] 10/22/2023 Yes    Decompensated alcoholic hepatic cirrhosis [K72.90, K74.60] 09/06/2023 Yes    Comfort measures only status [Z51.5] 11/25/2023 Not Applicable    Goals of care, counseling/discussion [Z71.89] 11/22/2023 Not Applicable      Problems Resolved During this Admission:    Diagnosis Date Noted Date Resolved POA    Oral mucosal lesion [K13.70] 11/08/2023 11/14/2023 No    Sepsis [A41.9] 10/27/2023 10/31/2023 Yes    Hypernatremia [E87.0] 10/27/2023 10/31/2023 Yes       Discharged Condition:  Comfort measures initiated and discahrged to hospice    Disposition: Hospice/Medical Facility      Medications:  Reconciled Home Medications:      Medication List        START taking these medications      levETIRAcetam 500 mg/5 mL injection  Commonly known as: Keppra  Inject 5 mLs (500 mg total) into the vein every 12 (twelve) hours.     scopolamine 1.3-1.5  mg (1 mg over 3 days)  Commonly known as: TRANSDERM-SCOP  Place 1 patch onto the skin Every 3 (three) days.  Start taking on: November 30, 2023            STOP taking these medications      furosemide 40 MG tablet  Commonly known as: LASIX     lactulose 20 gram/30 mL Soln  Commonly known as: CHRONULAC     potassium chloride SA 10 MEQ tablet  Commonly known as: K-DUR,KLOR-CON M     propranoloL 10 MG tablet  Commonly known as: INDERAL     spironolactone 100 MG tablet  Commonly known as: ALDACTONE              Indwelling Lines/Drains at time of discharge:   Lines/Drains/Airways       Drain  Duration                  Urethral Catheter 11/24/23 2216 Silicone 16 Fr. 2 days              Airway  Duration                Airway Anesthesia 11/05/23 21 days                    Time spent on the discharge of patient: 50 minutes         Kate Grubbs MD  Department of Hospital Medicine  [facility] - Transplant Stepdown

## 2023-12-11 NOTE — ADDENDUM NOTE
Addendum  created 12/11/23 1109 by Maury To MD    Attestation recorded in Intraprocedure, Clinical Note Signed, Intraprocedure Attestations filed, Intraprocedure Blocks edited

## 2024-02-26 PROBLEM — J96.01 ACUTE RESPIRATORY FAILURE WITH HYPOXIA: Status: RESOLVED | Noted: 2023-10-31 | Resolved: 2024-02-26

## 2024-07-24 ENCOUNTER — PATIENT MESSAGE (OUTPATIENT)
Dept: HEPATOLOGY | Facility: CLINIC | Age: 59
End: 2024-07-24
Payer: MEDICAID

## 2025-06-30 NOTE — ASSESSMENT & PLAN NOTE
This is a 52 year old woman with a history of ethanol cirrhosis who presents after being found on the ground with fecal and urinary incontinence, and altered mental status. Elevated ammonia on admission.    - Continue lactulose via NG tube TID (titrate till 3-4 daily BM achieved). Continue Xifaxin.   - Avoid opiates and benzodiazepines, if able.   - IV thiamine, folate supplementation, multivitamin through NG tube.  - [facility]   -11/7: CTH stable  -MELD score ~50% survival without transplant  -Off of EEG      Stable